# Patient Record
Sex: MALE | Race: BLACK OR AFRICAN AMERICAN | Employment: OTHER | URBAN - METROPOLITAN AREA
[De-identification: names, ages, dates, MRNs, and addresses within clinical notes are randomized per-mention and may not be internally consistent; named-entity substitution may affect disease eponyms.]

---

## 2017-01-12 ENCOUNTER — CLINICAL SUPPORT (OUTPATIENT)
Dept: CARDIOLOGY CLINIC | Age: 77
End: 2017-01-12

## 2017-01-12 DIAGNOSIS — E78.2 MIXED HYPERLIPIDEMIA: ICD-10-CM

## 2017-01-12 DIAGNOSIS — I10 ESSENTIAL HYPERTENSION: ICD-10-CM

## 2017-01-12 DIAGNOSIS — I50.23 ACUTE ON CHRONIC SYSTOLIC HF (HEART FAILURE) (HCC): ICD-10-CM

## 2017-01-12 DIAGNOSIS — Z91.14 NONCOMPLIANCE WITH MEDICATION REGIMEN: ICD-10-CM

## 2017-01-26 ENCOUNTER — OFFICE VISIT (OUTPATIENT)
Dept: CARDIOLOGY CLINIC | Age: 77
End: 2017-01-26

## 2017-01-26 VITALS
WEIGHT: 221 LBS | OXYGEN SATURATION: 98 % | RESPIRATION RATE: 16 BRPM | SYSTOLIC BLOOD PRESSURE: 148 MMHG | HEIGHT: 69 IN | DIASTOLIC BLOOD PRESSURE: 66 MMHG | HEART RATE: 66 BPM | BODY MASS INDEX: 32.73 KG/M2

## 2017-01-26 DIAGNOSIS — I42.8 NICM (NONISCHEMIC CARDIOMYOPATHY) (HCC): Primary | ICD-10-CM

## 2017-01-26 DIAGNOSIS — I50.22 CHRONIC SYSTOLIC CONGESTIVE HEART FAILURE (HCC): ICD-10-CM

## 2017-01-26 DIAGNOSIS — I10 ESSENTIAL HYPERTENSION: ICD-10-CM

## 2017-01-26 DIAGNOSIS — Z91.14 NONCOMPLIANCE WITH MEDICATION REGIMEN: ICD-10-CM

## 2017-01-26 NOTE — PROGRESS NOTES
Chief Complaint   Patient presents with    Results     here for test results-pt denies any cardiac symptoms

## 2017-01-26 NOTE — PROGRESS NOTES
1/26/2017 12:40 PM      Subjective:     Talha Noble is here for f/u visit. After last visit has been taking lasix regularly. Now feels better. No further SOB. Recent Echo unchanged. Visit Vitals    /66 (BP 1 Location: Right arm, BP Patient Position: Sitting)    Pulse 66    Resp 16    Ht 5' 9\" (1.753 m)    Wt 221 lb (100.2 kg)    SpO2 98%    BMI 32.64 kg/m2     Current Outpatient Prescriptions   Medication Sig    furosemide (LASIX) 40 mg tablet TAKE ONE TABLET BY MOUTH DAILY. *DOSE  REDUCED  12/17/15*    albuterol (PROVENTIL HFA, VENTOLIN HFA, PROAIR HFA) 90 mcg/actuation inhaler Take 2 Puffs by inhalation every four (4) hours as needed for Wheezing.  VIAGRA 50 mg tablet     aspirin 81 mg chewable tablet Take 1 Tab by mouth daily.  atorvastatin (LIPITOR) 40 mg tablet Take 40 mg by mouth daily.  carvedilol (COREG) 25 mg tablet Take 25 mg by mouth two (2) times daily (with meals).  enalapril (VASOTEC) 20 mg tablet Take 40 mg by mouth daily. No current facility-administered medications for this visit.           Objective:      Visit Vitals    /66 (BP 1 Location: Right arm, BP Patient Position: Sitting)    Pulse 66    Resp 16    Ht 5' 9\" (1.753 m)    Wt 221 lb (100.2 kg)    SpO2 98%    BMI 32.64 kg/m2       Data Review:     Reviewed and/or ordered active problem list, medication list tests    Past Medical History   Diagnosis Date    Acute on chronic systolic HF (heart failure) ([facility] [address] 2/11/2014     11/15 Echo EF 40-45%    Cancer (HCC)      prostate    COPD (chronic obstructive pulmonary disease) (HCC)     Elevated troponin 2/11/2014    Hypertension     S/P cardiac cath 2/12/2014 2/12/14 no significant coronary disease, severe LV dysfunction      Past Surgical History   Procedure Laterality Date    Colonoscopy,remv lesn,snare  2/22/2016          Colonoscopy,biopsy  2/22/2016           No Known Allergies   Family History   Problem Relation Age of Onset    Adopted: Yes      Social History     Social History    Marital status:      Spouse name: N/A    Number of children: N/A    Years of education: N/A     Occupational History    Not on file. Social History Main Topics    Smoking status: Former Smoker     Packs/day: 2.00     Years: 20.00     Types: Cigarettes     Quit date: 3/10/2016    Smokeless tobacco: Current User    Alcohol use No    Drug use: No    Sexual activity: Not on file     Other Topics Concern    Not on file     Social History Narrative         Review of Systems     General: Not Present- Anorexia, Chills, Dietary Changes, Fatigue, Fever, Medication Changes, Night Sweats, Weight Gain > 10lbs. and Weight Loss > 10lbs. .  Skin: Not Present- Bruising and Excessive Sweating. HEENT: Not Present- Headache, Visual Loss and Vertigo. Respiratory: Not Present- Cough, Decreased Exercise Tolerance, Difficulty Breathing, Snoring and Wheezing. Cardiovascular: Not Present- Abnormal Blood Pressure, Chest Pain, Claudications, Difficulty Breathing On Exertion, Edema, Fainting / Blacking Out, Irregular Heart Beat, Night Cramps, Orthopnea, Palpitations, Paroxysmal Nocturnal Dyspnea, Rapid Heart Rate, Shortness of Breath and Swelling of Extremities. Gastrointestinal: Not Present- Black, Tarry Stool, Bloody Stool, Diarrhea, Hematemesis, Rectal Bleeding and Vomiting. Musculoskeletal: Not Present- Muscle Pain and Muscle Weakness. Neurological: Not Present- Dizziness. Psychiatric: Not Present- Depression. Endocrine: Not Present- Cold Intolerance, Heat Intolerance and Thyroid Problems. Hematology: Not Present- Abnormal Bleeding, Anemia, Blood Clots and Easy Bruising.       Physical Exam   The physical exam findings are as follows:       General   Mental Status - Alert. General Appearance - Not in acute distress.       Chest and Lung Exam   Inspection: Accessory muscles - No use of accessory muscles in breathing. Auscultation:   Breath sounds: - Normal.      Cardiovascular   Inspection: Jugular vein - Bilateral - Inspection Normal.  Palpation/Percussion:   Apical Impulse: - Normal.  Auscultation: Rhythm - Regular. Heart Sounds - S1 WNL and S2 WNL. No S3 or S4. Murmurs & Other Heart Sounds: Auscultation of the heart reveals - No Murmurs. Carotid arteries - No Carotid bruit. Peripheral Vascular   Upper Extremity: Inspection - Bilateral - No Cyanotic nailbeds or Digital clubbing. Lower Extremity:   Palpation: Edema - Bilateral - No edema. Assessment:       ICD-10-CM ICD-9-CM    1. NICM (nonischemic cardiomyopathy) ([facility]) I42.9 425.4    2. Essential hypertension I10 401.9    3. Noncompliance with medication regimen Z91.14 V15.81    4. Chronic systolic congestive heart failure (HCC) I50.22 428.22      428.0     NYHA class 1       Plan:     1. Chronic CHF, NICM: now much better since taking meds. NYHA class 1. Med compliance d/w pt. On Echo LVEF unchanged. Continue current meds. 2. HTN: controlled.

## 2017-01-26 NOTE — MR AVS SNAPSHOT
Visit Information Date & Time Provider Department Dept. Phone Encounter #  
 1/26/2017 12:30 PM Sagar Alvarado, [address] [place] [address] [facility] [phone] [id number] Follow-up Instructions Return in about 3 months (around 4/26/2017). Follow-up and Disposition History Your Appointments 4/27/2017  9:15 AM  
3 MONTH with Sagar Alvarado MD  
[facility] [address] [facility]) Appt Note: . [address] [facility]  
[phone] [address] [facility] Upcoming Health Maintenance Date Due DTaP/Tdap/Td series (1 - Tdap) 4/9/1963 FOBT Q 1 YEAR AGE 50-75 4/9/1992 ZOSTER VACCINE AGE 60> 4/9/2002 GLAUCOMA SCREENING Q2Y 4/9/2007 MEDICARE YEARLY EXAM 4/9/2007 INFLUENZA AGE 9 TO ADULT 8/1/2016 Pneumococcal 65+ High/Highest Risk (2 of 2 - PCV13) 11/30/2016 Allergies as of 1/26/2017  Review Complete On: 1/26/2017 By: Sagar Alvarado MD  
 No Known Allergies Current Immunizations  Reviewed on 11/28/2015 Name Date Influenza Vaccine (Quad) PF 11/30/2015 10:45 AM  
 Pneumococcal Polysaccharide (PPSV-23) 11/30/2015 10:01 AM  
  
 Not reviewed this visit You Were Diagnosed With   
  
 Codes Comments NICM (nonischemic cardiomyopathy) ([facility] [address]    -  Primary ICD-10-CM: I42.9 ICD-9-CM: 425.4 Essential hypertension     ICD-10-CM: I10 
ICD-9-CM: 401.9 Noncompliance with medication regimen     ICD-10-CM: Z91.14 
ICD-9-CM: V15.81 Chronic systolic congestive heart failure (HCC)     ICD-10-CM: I50.22 ICD-9-CM: 428.22, 428.0 NYHA class 1 Vitals BP Pulse Resp Height(growth percentile) Weight(growth percentile) SpO2  
 148/66 (BP 1 Location: Right arm, BP Patient Position: Sitting) 66 16 5' 9\" (1.753 m) 221 lb (100.2 kg) 98% BMI Smoking Status 32.64 kg/m2 Former Smoker Vitals History BMI and BSA Data Body Mass Index Body Surface Area  
 32.64 kg/m 2 2.21 m 2 Preferred Pharmacy Pharmacy Name Phone [facility] [address] [facility], [facility] [address] [facility] [address] [facility] [phone] Your Updated Medication List  
  
   
This list is accurate as of: 1/26/17 12:47 PM.  Always use your most recent med list.  
  
  
  
  
 albuterol 90 mcg/actuation inhaler Commonly known as:  PROVENTIL HFA, VENTOLIN HFA, PROAIR HFA Take 2 Puffs by inhalation every four (4) hours as needed for Wheezing. aspirin 81 mg chewable tablet Take 1 Tab by mouth daily. atorvastatin 40 mg tablet Commonly known as:  LIPITOR Take 40 mg by mouth daily. carvedilol 25 mg tablet Commonly known as:  Mar Kerrick Take 25 mg by mouth two (2) times daily (with meals). enalapril 20 mg tablet Commonly known as:  Sherren Bookbinder Take 40 mg by mouth daily. furosemide 40 mg tablet Commonly known as:  LASIX TAKE ONE TABLET BY MOUTH DAILY. *DOSE  REDUCED  12/17/15* VIAGRA 50 mg tablet Generic drug:  sildenafil citrate Follow-up Instructions Return in about 3 months (around 4/26/2017). Introducing [facility]! Felisha Avnedano introduces [employer] patient portal. Now you can access parts of your medical record, email your doctor's office, and request medication refills online. 1. In your internet browser, go to [email or web address] 2. Click on the First Time User? Click Here link in the Sign In box. You will see the New Member Sign Up page. 3. Enter your [employer] Access Code exactly as it appears below. You will not need to use this code after youve completed the sign-up process. If you do not sign up before the expiration date, you must request a new code. · [employer] Access Code: [email or web address] Expires: 4/12/2017 10:09 AM 
 
4.  Enter the last four digits of your Social Security Number (xxxx) and Date of Birth (mm/dd/yyyy) as indicated and click Submit. You will be taken to the next sign-up page. 5. Create a [employer] ID. This will be your [employer] login ID and cannot be changed, so think of one that is secure and easy to remember. 6. Create a [employer] password. You can change your password at any time. 7. Enter your Password Reset Question and Answer. This can be used at a later time if you forget your password. 8. Enter your e-mail address. You will receive e-mail notification when new information is available in [address] 9. Click Sign Up. You can now view and download portions of your medical record. 10. Click the Download Summary menu link to download a portable copy of your medical information. If you have questions, please visit the Frequently Asked Questions section of the [employer] website. Remember, [employer] is NOT to be used for urgent needs. For medical emergencies, dial 911. Now available from your iPhone and Android! Please provide this summary of care documentation to your next provider. Your primary care clinician is listed as John Minaya. If you have any questions after today's visit, please call [phone].

## 2017-04-09 ENCOUNTER — APPOINTMENT (OUTPATIENT)
Dept: CT IMAGING | Age: 77
End: 2017-04-09
Attending: EMERGENCY MEDICINE
Payer: MEDICARE

## 2017-04-09 ENCOUNTER — HOSPITAL ENCOUNTER (EMERGENCY)
Age: 77
Discharge: HOME | End: 2017-04-09
Attending: EMERGENCY MEDICINE
Payer: MEDICARE

## 2017-04-09 VITALS
TEMPERATURE: 97.4 F | WEIGHT: 220.24 LBS | HEIGHT: 69 IN | DIASTOLIC BLOOD PRESSURE: 59 MMHG | RESPIRATION RATE: 16 BRPM | SYSTOLIC BLOOD PRESSURE: 153 MMHG | BODY MASS INDEX: 32.62 KG/M2 | OXYGEN SATURATION: 93 % | HEART RATE: 52 BPM

## 2017-04-09 DIAGNOSIS — E86.0 DEHYDRATION: ICD-10-CM

## 2017-04-09 DIAGNOSIS — N30.00 ACUTE CYSTITIS WITHOUT HEMATURIA: Primary | ICD-10-CM

## 2017-04-09 LAB
ALBUMIN SERPL BCP-MCNC: 3.4 G/DL (ref 3.5–5)
ALBUMIN/GLOB SERPL: 0.8 {RATIO} (ref 1.1–2.2)
ALP SERPL-CCNC: 86 U/L (ref 45–117)
ALT SERPL-CCNC: 23 U/L (ref 12–78)
ANION GAP BLD CALC-SCNC: 10 MMOL/L (ref 5–15)
APPEARANCE UR: ABNORMAL
AST SERPL W P-5'-P-CCNC: 18 U/L (ref 15–37)
BACTERIA URNS QL MICRO: NEGATIVE /HPF
BASOPHILS # BLD AUTO: 0 K/UL (ref 0–0.1)
BASOPHILS # BLD: 0 % (ref 0–1)
BILIRUB SERPL-MCNC: 0.6 MG/DL (ref 0.2–1)
BILIRUB UR QL CFM: NEGATIVE
BUN SERPL-MCNC: 24 MG/DL (ref 6–20)
BUN/CREAT SERPL: 17 (ref 12–20)
CALCIUM SERPL-MCNC: 9 MG/DL (ref 8.5–10.1)
CHLORIDE SERPL-SCNC: 108 MMOL/L (ref 97–108)
CO2 SERPL-SCNC: 25 MMOL/L (ref 21–32)
COLOR UR: ABNORMAL
CREAT SERPL-MCNC: 1.42 MG/DL (ref 0.7–1.3)
DIFFERENTIAL METHOD BLD: ABNORMAL
EOSINOPHIL # BLD: 0.2 K/UL (ref 0–0.4)
EOSINOPHIL NFR BLD: 3 % (ref 0–7)
EPITH CASTS URNS QL MICRO: ABNORMAL /LPF
ERYTHROCYTE [DISTWIDTH] IN BLOOD BY AUTOMATED COUNT: 14.4 % (ref 11.5–14.5)
GLOBULIN SER CALC-MCNC: 4.3 G/DL (ref 2–4)
GLUCOSE SERPL-MCNC: 107 MG/DL (ref 65–100)
GLUCOSE UR STRIP.AUTO-MCNC: NEGATIVE MG/DL
HCT VFR BLD AUTO: 36.8 % (ref 36.6–50.3)
HGB BLD-MCNC: 11.6 G/DL (ref 12.1–17)
HGB UR QL STRIP: NEGATIVE
HYALINE CASTS URNS QL MICRO: ABNORMAL /LPF (ref 0–5)
KETONES UR QL STRIP.AUTO: NEGATIVE MG/DL
LEUKOCYTE ESTERASE UR QL STRIP.AUTO: ABNORMAL
LYMPHOCYTES # BLD AUTO: 9 % (ref 12–49)
LYMPHOCYTES # BLD: 0.5 K/UL (ref 0.8–3.5)
MCH RBC QN AUTO: 25.7 PG (ref 26–34)
MCHC RBC AUTO-ENTMCNC: 31.5 G/DL (ref 30–36.5)
MCV RBC AUTO: 81.4 FL (ref 80–99)
MONOCYTES # BLD: 0.2 K/UL (ref 0–1)
MONOCYTES NFR BLD AUTO: 4 % (ref 5–13)
NEUTS SEG # BLD: 5.2 K/UL (ref 1.8–8)
NEUTS SEG NFR BLD AUTO: 84 % (ref 32–75)
NITRITE UR QL STRIP.AUTO: NEGATIVE
PH UR STRIP: 5 [PH] (ref 5–8)
PLATELET # BLD AUTO: 109 K/UL (ref 150–400)
POTASSIUM SERPL-SCNC: 3.8 MMOL/L (ref 3.5–5.1)
PROT SERPL-MCNC: 7.7 G/DL (ref 6.4–8.2)
PROT UR STRIP-MCNC: NEGATIVE MG/DL
RBC # BLD AUTO: 4.52 M/UL (ref 4.1–5.7)
RBC #/AREA URNS HPF: ABNORMAL /HPF (ref 0–5)
RBC MORPH BLD: ABNORMAL
SODIUM SERPL-SCNC: 143 MMOL/L (ref 136–145)
SP GR UR REFRACTOMETRY: 1.03 (ref 1–1.03)
UA: UC IF INDICATED,UAUC: ABNORMAL
UROBILINOGEN UR QL STRIP.AUTO: 1 EU/DL (ref 0.2–1)
WBC # BLD AUTO: 6.1 K/UL (ref 4.1–11.1)
WBC URNS QL MICRO: ABNORMAL /HPF (ref 0–4)

## 2017-04-09 PROCEDURE — 36415 COLL VENOUS BLD VENIPUNCTURE: CPT | Performed by: EMERGENCY MEDICINE

## 2017-04-09 PROCEDURE — 96372 THER/PROPH/DIAG INJ SC/IM: CPT

## 2017-04-09 PROCEDURE — 99283 EMERGENCY DEPT VISIT LOW MDM: CPT

## 2017-04-09 PROCEDURE — 85025 COMPLETE CBC W/AUTO DIFF WBC: CPT | Performed by: EMERGENCY MEDICINE

## 2017-04-09 PROCEDURE — 74176 CT ABD & PELVIS W/O CONTRAST: CPT

## 2017-04-09 PROCEDURE — 87086 URINE CULTURE/COLONY COUNT: CPT | Performed by: EMERGENCY MEDICINE

## 2017-04-09 PROCEDURE — 81001 URINALYSIS AUTO W/SCOPE: CPT | Performed by: EMERGENCY MEDICINE

## 2017-04-09 PROCEDURE — 74011000250 HC RX REV CODE- 250: Performed by: EMERGENCY MEDICINE

## 2017-04-09 PROCEDURE — 74011250636 HC RX REV CODE- 250/636: Performed by: EMERGENCY MEDICINE

## 2017-04-09 PROCEDURE — 80053 COMPREHEN METABOLIC PANEL: CPT | Performed by: EMERGENCY MEDICINE

## 2017-04-09 RX ORDER — HYDROCODONE BITARTRATE AND ACETAMINOPHEN 5; 325 MG/1; MG/1
1 TABLET ORAL
Qty: 15 TAB | Refills: 0 | Status: SHIPPED | OUTPATIENT
Start: 2017-04-09 | End: 2017-05-23

## 2017-04-09 RX ORDER — CEFUROXIME AXETIL 500 MG/1
500 TABLET ORAL 2 TIMES DAILY
Qty: 14 TAB | Refills: 0 | Status: SHIPPED | OUTPATIENT
Start: 2017-04-09 | End: 2017-04-16

## 2017-04-09 RX ADMIN — LIDOCAINE HYDROCHLORIDE 1 G: 10 INJECTION, SOLUTION EPIDURAL; INFILTRATION; INTRACAUDAL; PERINEURAL at 09:06

## 2017-04-09 NOTE — ED NOTES
MD at bedside to discharge patient.  Patient in understanding, patient wheeled out to the waiting room

## 2017-04-09 NOTE — ED PROVIDER NOTES
HPI Comments: Dallas Rios is a 78 y.o. male with PMHx of HTN, COPD, and prostate cancer presenting ambulatory to [address] [facility] c/o back pain since Friday, and difficulty urinating since yesterday. Pt notes that his back pain radiates down his right leg, and that the pain is exacerbated by movement. He reports that he has been having decreased urinary output since last night. Per wife, pt has prostate cancer and has had treatment by urology. Last week, pt was on a fluid pill and did not have any issues with urination. Pt thinks that he may have a bladder infection. He denies fever, nausea, vomiting, or abdominal pain. PCP: Aaron Rojas MD  Urology: Dr. John Meza    There are no other complaints, changes, or physical findings at this time. The history is provided by the patient and the spouse. No  was used. Past Medical History:   Diagnosis Date    Acute on chronic systolic HF (heart failure) ([facility] [address] 2/11/2014    11/15 Echo EF 40-45%    Cancer (HCC)     prostate    COPD (chronic obstructive pulmonary disease) ([facility] [address]     Elevated troponin 2/11/2014    Hypertension     S/P cardiac cath 2/12/2014 2/12/14 no significant coronary disease, severe LV dysfunction       Past Surgical History:   Procedure Laterality Date    COLONOSCOPY,DIAGNOSTIC  2/22/2016        [facility]  2/22/2016              Family History:   Problem Relation Age of Onset    Adopted: Yes       Social History     Social History    Marital status:      Spouse name: N/A    Number of children: N/A    Years of education: N/A     Occupational History    Not on file.      Social History Main Topics    Smoking status: Current Every Day Smoker     Packs/day: 2.00     Years: 20.00     Types: Cigarettes    Smokeless tobacco: Current User    Alcohol use No    Drug use: No    Sexual activity: Not on file     Other Topics Concern    Not on file     Social History Narrative ALLERGIES: Review of patient's allergies indicates no known allergies. Review of Systems   Constitutional: Negative for activity change, chills and fever. HENT: Negative for congestion and sore throat. Eyes: Negative for pain and redness. Respiratory: Negative for cough, chest tightness and shortness of breath. Cardiovascular: Negative for chest pain and palpitations. Gastrointestinal: Negative for abdominal pain, diarrhea, nausea and vomiting. Genitourinary: Positive for decreased urine volume and difficulty urinating. Negative for dysuria, frequency and urgency. Musculoskeletal: Positive for back pain. Negative for neck pain. Skin: Negative for rash. Neurological: Negative for syncope, light-headedness and headaches. Psychiatric/Behavioral: Negative for confusion. All other systems reviewed and are negative. Patient Vitals for the past 12 hrs:   Temp Pulse Resp BP SpO2   04/09/17 0651 97.4 °F (36.3 °C) (!) 52 16 153/59 93 %            Physical Exam   Constitutional: He is oriented to person, place, and time. He appears well-developed and well-nourished. No distress. HENT:   Head: Normocephalic. Nose: Nose normal.   Mouth/Throat: Oropharynx is clear and moist. No oropharyngeal exudate. Eyes: Conjunctivae are normal. Pupils are equal, round, and reactive to light. No scleral icterus. Neck: Normal range of motion. Neck supple. No JVD present. No tracheal deviation present. No thyromegaly present. Cardiovascular: Normal rate, regular rhythm and intact distal pulses. Exam reveals no gallop and no friction rub. No murmur heard. Pulmonary/Chest: Effort normal and breath sounds normal. No stridor. No respiratory distress. He has no wheezes. He has no rales. Abdominal: Soft. Bowel sounds are normal. He exhibits no distension. There is no tenderness. There is no rebound and no guarding. No CVAT   Musculoskeletal: Normal range of motion. He exhibits no edema.    Mild tenderness of the right lower lumbar paraspinal muscles  No midline vertebral tenderness   Positive SLR on the right, negative on the left  Full AROM of bilateral LE's  Toes up/down   Lymphadenopathy:     He has no cervical adenopathy. Neurological: He is alert and oriented to person, place, and time. No cranial nerve deficit. He exhibits normal muscle tone. Coordination normal.   Skin: Skin is warm and dry. No rash noted. He is not diaphoretic. No erythema. Psychiatric: He has a normal mood and affect. His behavior is normal.   Nursing note and vitals reviewed.        MDM  Number of Diagnoses or Management Options  Acute cystitis without hematuria:   Dehydration:   Diagnosis management comments: DDx: sciatica, UTI, metabolic abnormalities       Amount and/or Complexity of Data Reviewed  Clinical lab tests: ordered and reviewed  Tests in the radiology section of CPT®: ordered and reviewed  Obtain history from someone other than the patient: yes (Wife)  Review and summarize past medical records: yes    Risk of Complications, Morbidity, and/or Mortality  General comments: Pt well appearing; afebrile; +uti with 20-50 wbc; gave im ceftriaxone here; dc with ceftin; no increased wbc; cr only  mildly increased from baseline; ct abd/pelvis without acute findings; dc home; Pratima Hoyos MD      Patient Progress  Patient progress: stable    ED Course       Procedures    LABORATORY TESTS:  Recent Results (from the past 12 hour(s))   URINALYSIS W/ REFLEX CULTURE    Collection Time: 04/09/17  7:36 AM   Result Value Ref Range    Color YELLOW/STRAW      Appearance CLOUDY (A) CLEAR      Specific gravity 1.029 1.003 - 1.030      pH (UA) 5.0 5.0 - 8.0      Protein NEGATIVE  NEG mg/dL    Glucose NEGATIVE  NEG mg/dL    Ketone NEGATIVE  NEG mg/dL    Blood NEGATIVE  NEG      Urobilinogen 1.0 0.2 - 1.0 EU/dL    Nitrites NEGATIVE  NEG      Leukocyte Esterase SMALL (A) NEG      WBC 20-50 0 - 4 /hpf    RBC 0-5 0 - 5 /hpf    Epithelial cells FEW FEW /lpf    Bacteria NEGATIVE  NEG /hpf    UA:UC IF INDICATED URINE CULTURE ORDERED (A) CNI      Hyaline cast 5-10 0 - 5 /lpf   BILIRUBIN, CONFIRM    Collection Time: 04/09/17  7:36 AM   Result Value Ref Range    Bilirubin UA, confirm NEGATIVE  NEG     METABOLIC PANEL, COMPREHENSIVE    Collection Time: 04/09/17  8:07 AM   Result Value Ref Range    Sodium 143 136 - 145 mmol/L    Potassium 3.8 3.5 - 5.1 mmol/L    Chloride 108 97 - 108 mmol/L    CO2 25 21 - 32 mmol/L    Anion gap 10 5 - 15 mmol/L    Glucose 107 (H) 65 - 100 mg/dL    BUN 24 (H) 6 - 20 MG/DL    Creatinine 1.42 (H) 0.70 - 1.30 MG/DL    BUN/Creatinine ratio 17 12 - 20      GFR est AA 59 (L) >60 ml/min/1.73m2    GFR est non-AA 49 (L) >60 ml/min/1.73m2    Calcium 9.0 8.5 - 10.1 MG/DL    Bilirubin, total 0.6 0.2 - 1.0 MG/DL    ALT (SGPT) 23 12 - 78 U/L    AST (SGOT) 18 15 - 37 U/L    Alk. phosphatase 86 45 - 117 U/L    Protein, total 7.7 6.4 - 8.2 g/dL    Albumin 3.4 (L) 3.5 - 5.0 g/dL    Globulin 4.3 (H) 2.0 - 4.0 g/dL    A-G Ratio 0.8 (L) 1.1 - 2.2     CBC WITH AUTOMATED DIFF    Collection Time: 04/09/17  8:07 AM   Result Value Ref Range    WBC 6.1 4.1 - 11.1 K/uL    RBC 4.52 4.10 - 5.70 M/uL    HGB 11.6 (L) 12.1 - 17.0 g/dL    HCT 36.8 36.6 - 50.3 %    MCV 81.4 80.0 - 99.0 FL    MCH 25.7 (L) 26.0 - 34.0 PG    MCHC 31.5 30.0 - 36.5 g/dL    RDW 14.4 11.5 - 14.5 %    PLATELET 412 (L) 332 - 400 K/uL    NEUTROPHILS 84 (H) 32 - 75 %    LYMPHOCYTES 9 (L) 12 - 49 %    MONOCYTES 4 (L) 5 - 13 %    EOSINOPHILS 3 0 - 7 %    BASOPHILS 0 0 - 1 %    ABS. NEUTROPHILS 5.2 1.8 - 8.0 K/UL    ABS. LYMPHOCYTES 0.5 (L) 0.8 - 3.5 K/UL    ABS. MONOCYTES 0.2 0.0 - 1.0 K/UL    ABS. EOSINOPHILS 0.2 0.0 - 0.4 K/UL    ABS. BASOPHILS 0.0 0.0 - 0.1 K/UL    RBC COMMENTS NORMOCYTIC, NORMOCHROMIC      DF SMEAR SCANNED         IMAGING RESULTS:    INDICATION: right flank pain     COMPARISON: February 2016      the heart is enlarged.  Lung bases are otherwise unremarkable for acute findings. Liver and spleen appear unremarkable. The gallbladder and pancreas appear  normal. There is no renal obstruction or calcification. Large cyst on the left  is unchanged. There is no free air or free fluid. There is no bowel wall  thickening or obstruction, the appendix appears normal. The bladder is midline  and unfilled.     IMPRESSION  impression: No acute findings.     TECHNIQUE:   Thin axial images were obtained through the abdomen and pelvis. Coronal and  sagittal reconstructions were generated. Oral contrast was not administered. CT  dose reduction was achieved through use of a standardized protocol tailored for  this examination and automatic exposure control for dose modulation.      The absence of intravenous contrast material reduces the sensitivity for  evaluation of the solid parenchymal organs of the abdomen. MEDICATIONS GIVEN:  Medications   cefTRIAXone (ROCEPHIN) 1 g in lidocaine (PF) (XYLOCAINE) 10 mg/mL (1 %) IM injection (not administered)       IMPRESSION:  1. Acute cystitis without hematuria    2. Dehydration        PLAN:  1. Current Discharge Medication List      START taking these medications    Details   cefUROXime (CEFTIN) 500 mg tablet Take 1 Tab by mouth two (2) times a day for 7 days. Qty: 14 Tab, Refills: 0      HYDROcodone-acetaminophen (NORCO) 5-325 mg per tablet Take 1 Tab by mouth every four (4) hours as needed for Pain. Max Daily Amount: 6 Tabs. Qty: 15 Tab, Refills: 0           2. Follow-up Information     Follow up With Details Comments Contact Info    Annalise Santos MD Schedule an appointment as soon as possible for a visit in 2 days  [facility]  [phone]          Return to ED if worse     DISCHARGE NOTE:  8:39 AM  The patient is ready for discharge. The patient's signs, symptoms, diagnosis, and discharge instructions have been discussed and the patient has conveyed their understanding.  The patient is to follow up as recommended or return to the ER should their symptoms worsen. Plan has been discussed and the patient is in agreement. This note is prepared by Heydi Camarillo, acting as Scribe for MD Frankie Rivas MD: The scribe's documentation has been prepared under my direction and personally reviewed by me in its entirety. I confirm that the note above accurately reflects all work, treatment, procedures, and medical decision making performed by me.

## 2017-04-09 NOTE — DISCHARGE INSTRUCTIONS

## 2017-04-10 LAB
BACTERIA SPEC CULT: NORMAL
CC UR VC: NORMAL
SERVICE CMNT-IMP: NORMAL

## 2017-04-12 ENCOUNTER — PATIENT OUTREACH (OUTPATIENT)
Dept: CARDIOLOGY CLINIC | Age: 77
End: 2017-04-12

## 2017-04-12 NOTE — PROGRESS NOTES
This note will not be viewable in [address] Called pt to follow up on ED visit to [facility] on 17 with a diagnosis of hematuria. Pt verified  and reports that he is still having back pain. Pt advised to call PCP to schedule follow up. Pt encouraged to complete ABx course and to follow up with PCP. Pt reports that he has not called PCP yet. Pt encouraged to call PCP today. Performed medication reconciliation with pt and pt has medications. Reviewed discharge instructions with pt and pt states that he will call PCP. Will continue to follow.

## 2017-05-14 ENCOUNTER — HOSPITAL ENCOUNTER (INPATIENT)
Age: 77
LOS: 9 days | Discharge: HOME HEALTH | DRG: 871 | End: 2017-05-23
Attending: EMERGENCY MEDICINE | Admitting: INTERNAL MEDICINE
Payer: MEDICARE

## 2017-05-14 ENCOUNTER — APPOINTMENT (OUTPATIENT)
Dept: GENERAL RADIOLOGY | Age: 77
DRG: 871 | End: 2017-05-14
Attending: EMERGENCY MEDICINE
Payer: MEDICARE

## 2017-05-14 DIAGNOSIS — N39.0 URINARY TRACT INFECTION WITHOUT HEMATURIA, SITE UNSPECIFIED: ICD-10-CM

## 2017-05-14 DIAGNOSIS — I50.23 ACUTE ON CHRONIC SYSTOLIC CONGESTIVE HEART FAILURE (HCC): Primary | ICD-10-CM

## 2017-05-14 PROBLEM — J96.00 ACUTE RESPIRATORY FAILURE (HCC): Status: ACTIVE | Noted: 2017-05-14

## 2017-05-14 LAB
ALBUMIN SERPL BCP-MCNC: 3.1 G/DL (ref 3.5–5)
ALBUMIN/GLOB SERPL: 0.7 {RATIO} (ref 1.1–2.2)
ALP SERPL-CCNC: 114 U/L (ref 45–117)
ALT SERPL-CCNC: 28 U/L (ref 12–78)
ANION GAP BLD CALC-SCNC: 9 MMOL/L (ref 5–15)
APPEARANCE UR: ABNORMAL
AST SERPL W P-5'-P-CCNC: 30 U/L (ref 15–37)
BACTERIA URNS QL MICRO: ABNORMAL /HPF
BASOPHILS # BLD AUTO: 0 K/UL
BASOPHILS # BLD: 0 %
BILIRUB SERPL-MCNC: 1.3 MG/DL (ref 0.2–1)
BILIRUB UR QL CFM: NEGATIVE
BNP SERPL-MCNC: 5713 PG/ML (ref 0–450)
BUN SERPL-MCNC: 23 MG/DL (ref 6–20)
BUN/CREAT SERPL: 13 (ref 12–20)
CALCIUM SERPL-MCNC: 8.7 MG/DL (ref 8.5–10.1)
CHLORIDE SERPL-SCNC: 106 MMOL/L (ref 97–108)
CK SERPL-CCNC: 105 U/L (ref 39–308)
CO2 SERPL-SCNC: 23 MMOL/L (ref 21–32)
COLOR UR: ABNORMAL
CREAT SERPL-MCNC: 1.74 MG/DL (ref 0.7–1.3)
DIFFERENTIAL METHOD BLD: ABNORMAL
EOSINOPHIL # BLD: 0.1 K/UL
EOSINOPHIL NFR BLD: 1 %
EPITH CASTS URNS QL MICRO: ABNORMAL /LPF
ERYTHROCYTE [DISTWIDTH] IN BLOOD BY AUTOMATED COUNT: 15 % (ref 11.5–14.5)
GLOBULIN SER CALC-MCNC: 4.6 G/DL (ref 2–4)
GLUCOSE SERPL-MCNC: 172 MG/DL (ref 65–100)
GLUCOSE UR STRIP.AUTO-MCNC: NEGATIVE MG/DL
HCT VFR BLD AUTO: 36.5 % (ref 36.6–50.3)
HGB BLD-MCNC: 11.9 G/DL (ref 12.1–17)
HGB UR QL STRIP: NEGATIVE
KETONES UR QL STRIP.AUTO: ABNORMAL MG/DL
LACTATE SERPL-SCNC: 1.9 MMOL/L (ref 0.4–2)
LEUKOCYTE ESTERASE UR QL STRIP.AUTO: ABNORMAL
LYMPHOCYTES # BLD AUTO: 1 %
LYMPHOCYTES # BLD: 0.1 K/UL
MCH RBC QN AUTO: 26.4 PG (ref 26–34)
MCHC RBC AUTO-ENTMCNC: 32.6 G/DL (ref 30–36.5)
MCV RBC AUTO: 81.1 FL (ref 80–99)
MONOCYTES # BLD: 0 K/UL
MONOCYTES NFR BLD AUTO: 0 %
NEUTS BAND NFR BLD MANUAL: 13 %
NEUTS SEG # BLD: 5.8 K/UL
NEUTS SEG NFR BLD AUTO: 85 %
NITRITE UR QL STRIP.AUTO: NEGATIVE
PH UR STRIP: 5 [PH] (ref 5–8)
PLATELET # BLD AUTO: 52 K/UL (ref 150–400)
PLATELET COMMENTS,PCOM: ABNORMAL
POTASSIUM SERPL-SCNC: 3.5 MMOL/L (ref 3.5–5.1)
PROT SERPL-MCNC: 7.7 G/DL (ref 6.4–8.2)
PROT UR STRIP-MCNC: 30 MG/DL
RBC # BLD AUTO: 4.5 M/UL (ref 4.1–5.7)
RBC #/AREA URNS HPF: ABNORMAL /HPF (ref 0–5)
RBC MORPH BLD: ABNORMAL
RBC MORPH BLD: ABNORMAL
SODIUM SERPL-SCNC: 138 MMOL/L (ref 136–145)
SP GR UR REFRACTOMETRY: 1.02 (ref 1–1.03)
TROPONIN I SERPL-MCNC: <0.04 NG/ML
UA: UC IF INDICATED,UAUC: ABNORMAL
UROBILINOGEN UR QL STRIP.AUTO: 2 EU/DL (ref 0.2–1)
WBC # BLD AUTO: 6 K/UL (ref 4.1–11.1)
WBC URNS QL MICRO: ABNORMAL /HPF (ref 0–4)

## 2017-05-14 PROCEDURE — 65270000029 HC RM PRIVATE

## 2017-05-14 PROCEDURE — 87040 BLOOD CULTURE FOR BACTERIA: CPT | Performed by: EMERGENCY MEDICINE

## 2017-05-14 PROCEDURE — 99285 EMERGENCY DEPT VISIT HI MDM: CPT

## 2017-05-14 PROCEDURE — 84484 ASSAY OF TROPONIN QUANT: CPT | Performed by: EMERGENCY MEDICINE

## 2017-05-14 PROCEDURE — 74011250636 HC RX REV CODE- 250/636: Performed by: EMERGENCY MEDICINE

## 2017-05-14 PROCEDURE — 36415 COLL VENOUS BLD VENIPUNCTURE: CPT | Performed by: EMERGENCY MEDICINE

## 2017-05-14 PROCEDURE — 82550 ASSAY OF CK (CPK): CPT | Performed by: EMERGENCY MEDICINE

## 2017-05-14 PROCEDURE — 94761 N-INVAS EAR/PLS OXIMETRY MLT: CPT

## 2017-05-14 PROCEDURE — 74011000258 HC RX REV CODE- 258: Performed by: EMERGENCY MEDICINE

## 2017-05-14 PROCEDURE — 80053 COMPREHEN METABOLIC PANEL: CPT | Performed by: EMERGENCY MEDICINE

## 2017-05-14 PROCEDURE — 83880 ASSAY OF NATRIURETIC PEPTIDE: CPT | Performed by: EMERGENCY MEDICINE

## 2017-05-14 PROCEDURE — 93005 ELECTROCARDIOGRAM TRACING: CPT

## 2017-05-14 PROCEDURE — 83605 ASSAY OF LACTIC ACID: CPT | Performed by: EMERGENCY MEDICINE

## 2017-05-14 PROCEDURE — 94640 AIRWAY INHALATION TREATMENT: CPT

## 2017-05-14 PROCEDURE — 71020 XR CHEST PA LAT: CPT

## 2017-05-14 PROCEDURE — 87086 URINE CULTURE/COLONY COUNT: CPT | Performed by: EMERGENCY MEDICINE

## 2017-05-14 PROCEDURE — 96360 HYDRATION IV INFUSION INIT: CPT

## 2017-05-14 PROCEDURE — 85025 COMPLETE CBC W/AUTO DIFF WBC: CPT | Performed by: EMERGENCY MEDICINE

## 2017-05-14 PROCEDURE — 81001 URINALYSIS AUTO W/SCOPE: CPT | Performed by: EMERGENCY MEDICINE

## 2017-05-14 PROCEDURE — 74011250636 HC RX REV CODE- 250/636: Performed by: INTERNAL MEDICINE

## 2017-05-14 RX ORDER — SODIUM CHLORIDE 0.9 % (FLUSH) 0.9 %
5-10 SYRINGE (ML) INJECTION AS NEEDED
Status: DISCONTINUED | OUTPATIENT
Start: 2017-05-14 | End: 2017-05-23 | Stop reason: HOSPADM

## 2017-05-14 RX ORDER — ACETAMINOPHEN 325 MG/1
650 TABLET ORAL
Status: DISCONTINUED | OUTPATIENT
Start: 2017-05-14 | End: 2017-05-23 | Stop reason: HOSPADM

## 2017-05-14 RX ORDER — FUROSEMIDE 10 MG/ML
80 INJECTION INTRAMUSCULAR; INTRAVENOUS
Status: DISCONTINUED | OUTPATIENT
Start: 2017-05-14 | End: 2017-05-14

## 2017-05-14 RX ORDER — CARVEDILOL 12.5 MG/1
25 TABLET ORAL 2 TIMES DAILY WITH MEALS
Status: DISCONTINUED | OUTPATIENT
Start: 2017-05-15 | End: 2017-05-23 | Stop reason: HOSPADM

## 2017-05-14 RX ORDER — ATORVASTATIN CALCIUM 40 MG/1
40 TABLET, FILM COATED ORAL DAILY
Status: DISCONTINUED | OUTPATIENT
Start: 2017-05-15 | End: 2017-05-23 | Stop reason: HOSPADM

## 2017-05-14 RX ORDER — ENALAPRIL MALEATE 5 MG/1
40 TABLET ORAL DAILY
Status: DISCONTINUED | OUTPATIENT
Start: 2017-05-15 | End: 2017-05-15

## 2017-05-14 RX ORDER — HYDROCODONE BITARTRATE AND ACETAMINOPHEN 5; 325 MG/1; MG/1
1 TABLET ORAL
Status: DISCONTINUED | OUTPATIENT
Start: 2017-05-14 | End: 2017-05-23 | Stop reason: HOSPADM

## 2017-05-14 RX ORDER — ALBUTEROL SULFATE 90 UG/1
2 AEROSOL, METERED RESPIRATORY (INHALATION)
Status: DISCONTINUED | OUTPATIENT
Start: 2017-05-14 | End: 2017-05-23 | Stop reason: HOSPADM

## 2017-05-14 RX ORDER — SODIUM CHLORIDE 0.9 % (FLUSH) 0.9 %
5-10 SYRINGE (ML) INJECTION EVERY 8 HOURS
Status: DISCONTINUED | OUTPATIENT
Start: 2017-05-14 | End: 2017-05-23 | Stop reason: HOSPADM

## 2017-05-14 RX ORDER — GUAIFENESIN 100 MG/5ML
81 LIQUID (ML) ORAL DAILY
Status: DISCONTINUED | OUTPATIENT
Start: 2017-05-15 | End: 2017-05-23 | Stop reason: HOSPADM

## 2017-05-14 RX ORDER — ENOXAPARIN SODIUM 100 MG/ML
40 INJECTION SUBCUTANEOUS EVERY 24 HOURS
Status: DISCONTINUED | OUTPATIENT
Start: 2017-05-14 | End: 2017-05-16

## 2017-05-14 RX ADMIN — SODIUM CHLORIDE 500 ML: 900 INJECTION, SOLUTION INTRAVENOUS at 22:30

## 2017-05-14 RX ADMIN — Medication 10 ML: at 23:59

## 2017-05-14 RX ADMIN — ENOXAPARIN SODIUM 40 MG: 40 INJECTION SUBCUTANEOUS at 23:53

## 2017-05-14 RX ADMIN — CEFTRIAXONE 1 G: 1 INJECTION, POWDER, FOR SOLUTION INTRAMUSCULAR; INTRAVENOUS at 23:53

## 2017-05-14 NOTE — IP AVS SNAPSHOT
[address] [facility] 
[phone] Patient: Buddy Jung MRN: [id number] FCU:7[id number] You are allergic to the following No active allergies Recent Documentation Height Weight BMI Smoking Status 1.676 m 96.8 kg 34.44 kg/m2 Current Every Day Smoker Emergency Contacts Name Discharge Info Relation Home Work Mobile Paty Wayne  Spouse [3] [phone] Ching Grant  Child [2] [phone] About your hospitalization You were admitted on:  May 14, 2017 You last received care in the:  [facility] 2 GENERAL SURGERY You were discharged on:  May 23, 2017 Unit phone number:  [phone] Why you were hospitalized Your primary diagnosis was:  Not on File Your diagnoses also included:  Acute Respiratory Failure (Hcc) Providers Seen During Your Hospitalizations Provider Role Specialty Primary office phone Adriana Calhoun DO Attending Provider Emergency Medicine [phone] Callie Magdaleno MD Attending Provider Internal Medicine [phone] Armen Weathers MD Attending Provider Hospitalist [phone] Ela Conway MD Attending Provider Internal Medicine [phone] Your Primary Care Physician (PCP) Primary Care Physician Office Phone Office Fax Yessenia Norma [phone] Follow-up Information Follow up With Details Comments Contact Info Jenni Gresham MD On 5/26/2017 2:40 pm  1 [address] [address] [phone] [facility] On 5/23/2017 This is your home health provider of choice. If you do not hear from them within 24 - 48 hours please contact them directly  [address] [address] [phone] Current Discharge Medication List  
  
START taking these medications Dose & Instructions Dispensing Information Comments Morning Noon Evening Bedtime  
 amoxicillin-clavulanate 875-125 mg per tablet Commonly known as:  AUGMENTIN Your last dose was: Your next dose is:    
   
   
 Dose:  1 Tab Take 1 Tab by mouth every twelve (12) hours for 10 days. Quantity:  20 Tab Refills:  0 CONTINUE these medications which have NOT CHANGED Dose & Instructions Dispensing Information Comments Morning Noon Evening Bedtime  
 albuterol 90 mcg/actuation inhaler Commonly known as:  PROVENTIL HFA, VENTOLIN HFA, PROAIR HFA Your last dose was: Your next dose is:    
   
   
 Dose:  2 Puff Take 2 Puffs by inhalation every four (4) hours as needed for Wheezing. Quantity:  1 Inhaler Refills:  0  
     
   
   
   
  
 aspirin 81 mg chewable tablet Your last dose was: Your next dose is:    
   
   
 Dose:  81 mg Take 1 Tab by mouth daily. Quantity:  10 Tab Refills:  0  
     
   
   
   
  
 atorvastatin 40 mg tablet Commonly known as:  LIPITOR Your last dose was: Your next dose is:    
   
   
 Dose:  40 mg Take 40 mg by mouth daily. Refills:  0  
     
   
   
   
  
 carvedilol 25 mg tablet Commonly known as:  Lisa Ness Your last dose was: Your next dose is:    
   
   
 Dose:  25 mg Take 25 mg by mouth two (2) times daily (with meals). Refills:  0  
     
   
   
   
  
 enalapril 20 mg tablet Commonly known as:  Suzanne Nelson Your last dose was: Your next dose is:    
   
   
 Dose:  40 mg Take 40 mg by mouth daily. Refills:  0  
     
   
   
   
  
 furosemide 40 mg tablet Commonly known as:  LASIX Your last dose was: Your next dose is: TAKE ONE TABLET BY MOUTH DAILY. *DOSE  REDUCED  12/17/15* Quantity:  30 Tab Refills:  6 VIAGRA 50 mg tablet Generic drug:  sildenafil citrate Your last dose was: Your next dose is:    
   
   
  Refills:  0 STOP taking these medications HYDROcodone-acetaminophen 5-325 mg per tablet Commonly known as:  Bibi Shipman Where to Get Your Medications Information on where to get these meds will be given to you by the nurse or doctor. ! Ask your nurse or doctor about these medications  
  amoxicillin-clavulanate 875-125 mg per tablet Discharge Instructions HOSPITALIST DISCHARGE INSTRUCTIONS 
 
NAME: Conner Ramirez :  1940 MRN:  [id number] Date/Time:  2017 12:02 PM 
 
ADMIT DATE: 2017 DISCHARGE DATE: 2017 · It is important that you take the medication exactly as they are prescribed. · Keep your medication in the bottles provided by the pharmacist and keep a list of the medication names, dosages, and times to be taken in your wallet. · Do not take other medications without consulting your doctor. What to do at [facility] Recommended diet:  Cardiac Diet Recommended activity: Activity as tolerated If you have questions regarding the hospital related prescriptions or hospital related issues please call [facility] at . You can always direct your questions to your primary care doctor if you are unable to reach your hospital physician; your PCP works as an extension of your hospital doctor just like your hospital doctor is an extension of your PCP for your time at the hospital [facility], [facility]) If you experience any of the following symptoms then please call your primary care physician or return to the emergency room if you cannot get hold of your doctor: 
 
Fever, chills, nausea, vomiting, or persistent diarrhea Worsening weakness or new problems with your speech or balance Dark stools or visible blood in your stools New Leg swelling or shortness of breath as this could be signs of a clot Additional Instructions: 
 
 
Wounds cleaned with sterile water or saline and cover with non-adherent dressings. Information obtained by : 
I understand that if any problems occur once I am at home I am to contact my physician. I understand and acknowledge receipt of the instructions indicated above. Physician's or R.N.'s Signature                                                                  Date/Time Patient or Representative Signature Discharge Orders None [employer] Announcement We are excited to announce that we are making your provider's discharge notes available to you in [employer]. You will see these notes when they are completed and signed by the physician that discharged you from your recent hospital stay. If you have any questions or concerns about any information you see in [employer], please call the Health Information Department where you were seen or reach out to your Primary Care Provider for more information about your plan of care. Introducing [facility]! Selvin Connor introduces [employer] patient portal. Now you can access parts of your medical record, email your doctor's office, and request medication refills online. 1. In your internet browser, go to [email or web address] 2. Click on the First Time User? Click Here link in the Sign In box. You will see the New Member Sign Up page. 3. Enter your [employer] Access Code exactly as it appears below. You will not need to use this code after youve completed the sign-up process. If you do not sign up before the expiration date, you must request a new code. · [employer] Access Code: [email or web address] Expires: 8/21/2017 12:45 PM 
 
4. Enter the last four digits of your Social Security Number (xxxx) and Date of Birth (mm/dd/yyyy) as indicated and click Submit. You will be taken to the next sign-up page. 5. Create a [employer] ID. This will be your [employer] login ID and cannot be changed, so think of one that is secure and easy to remember. 6. Create a [employer] password. You can change your password at any time. 7. Enter your Password Reset Question and Answer. This can be used at a later time if you forget your password. 8. Enter your e-mail address. You will receive e-mail notification when new information is available in [address] 9. Click Sign Up. You can now view and download portions of your medical record. 10. Click the Download Summary menu link to download a portable copy of your medical information. If you have questions, please visit the Frequently Asked Questions section of the [employer] website. Remember, [employer] is NOT to be used for urgent needs. For medical emergencies, dial 911. Now available from your iPhone and Android! General Information Please provide this summary of care documentation to your next provider. Patient Signature:  ____________________________________________________________ Date:  ____________________________________________________________  
  
[facility] Provider Signature:  ____________________________________________________________ Date:  ____________________________________________________________

## 2017-05-15 LAB
ANION GAP BLD CALC-SCNC: 11 MMOL/L (ref 5–15)
BUN SERPL-MCNC: 24 MG/DL (ref 6–20)
BUN/CREAT SERPL: 14 (ref 12–20)
CALCIUM SERPL-MCNC: 8.3 MG/DL (ref 8.5–10.1)
CHLORIDE SERPL-SCNC: 108 MMOL/L (ref 97–108)
CO2 SERPL-SCNC: 21 MMOL/L (ref 21–32)
CREAT SERPL-MCNC: 1.72 MG/DL (ref 0.7–1.3)
ERYTHROCYTE [DISTWIDTH] IN BLOOD BY AUTOMATED COUNT: 15 % (ref 11.5–14.5)
GLUCOSE SERPL-MCNC: 112 MG/DL (ref 65–100)
HCT VFR BLD AUTO: 32.3 % (ref 36.6–50.3)
HGB BLD-MCNC: 10.6 G/DL (ref 12.1–17)
MCH RBC QN AUTO: 26.6 PG (ref 26–34)
MCHC RBC AUTO-ENTMCNC: 32.8 G/DL (ref 30–36.5)
MCV RBC AUTO: 81 FL (ref 80–99)
PLATELET # BLD AUTO: 55 K/UL (ref 150–400)
POTASSIUM SERPL-SCNC: 4.3 MMOL/L (ref 3.5–5.1)
RBC # BLD AUTO: 3.99 M/UL (ref 4.1–5.7)
SODIUM SERPL-SCNC: 140 MMOL/L (ref 136–145)
WBC # BLD AUTO: 5.7 K/UL (ref 4.1–11.1)

## 2017-05-15 PROCEDURE — 36415 COLL VENOUS BLD VENIPUNCTURE: CPT | Performed by: INTERNAL MEDICINE

## 2017-05-15 PROCEDURE — 74011250637 HC RX REV CODE- 250/637: Performed by: INTERNAL MEDICINE

## 2017-05-15 PROCEDURE — 74011000258 HC RX REV CODE- 258: Performed by: INTERNAL MEDICINE

## 2017-05-15 PROCEDURE — 74011250636 HC RX REV CODE- 250/636: Performed by: EMERGENCY MEDICINE

## 2017-05-15 PROCEDURE — 74011250636 HC RX REV CODE- 250/636: Performed by: INTERNAL MEDICINE

## 2017-05-15 PROCEDURE — 65270000029 HC RM PRIVATE

## 2017-05-15 PROCEDURE — 85027 COMPLETE CBC AUTOMATED: CPT | Performed by: INTERNAL MEDICINE

## 2017-05-15 PROCEDURE — 80048 BASIC METABOLIC PNL TOTAL CA: CPT | Performed by: INTERNAL MEDICINE

## 2017-05-15 RX ORDER — FUROSEMIDE 10 MG/ML
40 INJECTION INTRAMUSCULAR; INTRAVENOUS DAILY
Status: DISCONTINUED | OUTPATIENT
Start: 2017-05-15 | End: 2017-05-17

## 2017-05-15 RX ORDER — ACYCLOVIR 200 MG/1
200 CAPSULE ORAL
Status: DISCONTINUED | OUTPATIENT
Start: 2017-05-15 | End: 2017-05-20

## 2017-05-15 RX ORDER — FOLIC ACID 1 MG/1
1 TABLET ORAL DAILY
Status: DISCONTINUED | OUTPATIENT
Start: 2017-05-15 | End: 2017-05-23 | Stop reason: HOSPADM

## 2017-05-15 RX ORDER — ACYCLOVIR 50 MG/G
OINTMENT TOPICAL
Status: DISCONTINUED | OUTPATIENT
Start: 2017-05-15 | End: 2017-05-21

## 2017-05-15 RX ORDER — SODIUM CHLORIDE 9 MG/ML
50 INJECTION, SOLUTION INTRAVENOUS CONTINUOUS
Status: DISCONTINUED | OUTPATIENT
Start: 2017-05-15 | End: 2017-05-16

## 2017-05-15 RX ORDER — AZITHROMYCIN 250 MG/1
500 TABLET, FILM COATED ORAL DAILY
Status: DISCONTINUED | OUTPATIENT
Start: 2017-05-15 | End: 2017-05-15

## 2017-05-15 RX ORDER — HYDRALAZINE HYDROCHLORIDE 20 MG/ML
10 INJECTION INTRAMUSCULAR; INTRAVENOUS
Status: DISCONTINUED | OUTPATIENT
Start: 2017-05-15 | End: 2017-05-23 | Stop reason: HOSPADM

## 2017-05-15 RX ADMIN — ACYCLOVIR 200 MG: 200 CAPSULE ORAL at 22:12

## 2017-05-15 RX ADMIN — ALBUTEROL SULFATE 2 PUFF: 90 AEROSOL, METERED RESPIRATORY (INHALATION) at 02:09

## 2017-05-15 RX ADMIN — Medication 10 ML: at 04:03

## 2017-05-15 RX ADMIN — ACYCLOVIR 200 MG: 200 CAPSULE ORAL at 13:22

## 2017-05-15 RX ADMIN — Medication 10 ML: at 10:08

## 2017-05-15 RX ADMIN — FUROSEMIDE 40 MG: 10 INJECTION, SOLUTION INTRAMUSCULAR; INTRAVENOUS at 10:10

## 2017-05-15 RX ADMIN — ENALAPRIL MALEATE 40 MG: 5 TABLET ORAL at 10:52

## 2017-05-15 RX ADMIN — ACYCLOVIR: 50 OINTMENT TOPICAL at 15:34

## 2017-05-15 RX ADMIN — Medication 10 ML: at 22:13

## 2017-05-15 RX ADMIN — CARVEDILOL 25 MG: 12.5 TABLET, FILM COATED ORAL at 17:00

## 2017-05-15 RX ADMIN — ACYCLOVIR 200 MG: 200 CAPSULE ORAL at 18:02

## 2017-05-15 RX ADMIN — CEFTRIAXONE 1 G: 1 INJECTION, POWDER, FOR SOLUTION INTRAMUSCULAR; INTRAVENOUS at 22:12

## 2017-05-15 RX ADMIN — ACYCLOVIR: 50 OINTMENT TOPICAL at 20:25

## 2017-05-15 RX ADMIN — ACYCLOVIR: 50 OINTMENT TOPICAL at 13:22

## 2017-05-15 RX ADMIN — ATORVASTATIN CALCIUM 40 MG: 40 TABLET, FILM COATED ORAL at 10:09

## 2017-05-15 RX ADMIN — ACETAMINOPHEN 650 MG: 325 TABLET, FILM COATED ORAL at 04:02

## 2017-05-15 RX ADMIN — FOLIC ACID 1 MG: 1 TABLET ORAL at 13:33

## 2017-05-15 RX ADMIN — ASPIRIN 81 MG CHEWABLE TABLET 81 MG: 81 TABLET CHEWABLE at 10:09

## 2017-05-15 RX ADMIN — AZITHROMYCIN MONOHYDRATE 500 MG: 500 INJECTION, POWDER, LYOPHILIZED, FOR SOLUTION INTRAVENOUS at 01:05

## 2017-05-15 RX ADMIN — AZITHROMYCIN 500 MG: 250 TABLET, FILM COATED ORAL at 10:09

## 2017-05-15 RX ADMIN — CARVEDILOL 25 MG: 12.5 TABLET, FILM COATED ORAL at 10:09

## 2017-05-15 RX ADMIN — SODIUM CHLORIDE 50 ML/HR: 900 INJECTION, SOLUTION INTRAVENOUS at 10:06

## 2017-05-15 RX ADMIN — ACYCLOVIR: 50 OINTMENT TOPICAL at 17:00

## 2017-05-15 NOTE — ED NOTES
79yo male Pt with PMH of prostate CA, heart failure and HTN presents to ED for chest pain to left lower chest, SOB and productive cough since yesterday, worse with walking. Febrile and tachycardic upon arrival, Code Sepsis activated from triage oriented to room and call bell, cardiac and continuous spO2 monitoring initiated, IV started, blood samples collected and sent to lab for analysis, EKG completed, plan of care reviewed, call bell in reach, transferred to radiology for Chest xray, side rails up x2, will continue to monitor. 11:43 PM  O2 sat decreased to 84% with ambulation, IV antibiotics administered, POC to admit.  1:03 AM  Hospitalist at bedside for admission eval, IV antibiotics infusing as ordered, awaiting ready bed.  2:14 AM  Nebulizer HFA administered for SOB, inhaler retained in patient's personal belongings at bedside, teaching completed. Report called to nursing unit, transfer pending transport.

## 2017-05-15 NOTE — PROGRESS NOTES
Pt is a 79 y/o AAM admitted for acute respiratory failure. Pt lives with spouse in two story home with 14-steps to bedroom. Pt has two steps to the entrance of his residence. Pt is independent to include driving. Pt has [facility] providers [facility], SNF or DME. Pt prefers [address] in [place], [place]. Pt drove to [address] and at discharge will transport his self via private vehicle. CM met with pt to complete initial assessment. Pt is alert and oriented to person, place, time and situation. CM will continue to follow to assist with discharge plans as needed. Care Management Interventions  PCP Verified by CM:  Yes (Dr. Esther Armijo)  Mode of Transport at Discharge: Self  Transition of Care Consult (CM Consult): Discharge Planning (CM to follow )  Discharge Durable Medical Equipment: No  Health Maintenance Reviewed: Yes  Physical Therapy Consult: No  Occupational Therapy Consult: No  Speech Therapy Consult: No  Current Support Network: Lives with Spouse  Confirm Follow Up Transport: Self  Plan discussed with Pt/Family/Caregiver: Yes  Discharge Location  Discharge Placement: Home    Linn Sanz MSW, [address] [place] [address]   [phone]

## 2017-05-15 NOTE — CARDIO/PULMONARY
C/P Rehab Note:    Chart Reviewed. Admitting diagnosis of Fever and Tachycardia. Pt is a current day smoker. Teaching last received on 2/16. Focus of teaching today on CHF. PMH significant for:  -CHF LV EF 40-45%  -COPD  -CAD  -HTN    Met with patient who was lying in bed. Reviewed role of Cardiac Rehab Nurse. Patient  given printed teaching materials on CHF and low NA diet. Instruction given on s/s of CHF, checking weight every am and calling MD if weight is up 2-3 lbs in a day or 5 lbs in a week, fluid/Na restrictions, s/s of worsening CHF and when to call MD. Reviewed activity as tolerated with frequent rest periods as needed, taking medications as prescribed, and the importance of follow up visits with physician. Pt has a functioning scale at home and agreeable to checking weight daily. Discussed rational for daily weights and following a low NA diet. He reports he normally eats albert and eggs for breakfast and hot dogs for lunch. Provided handout on, \" [employer]\", and reviewed. Discussed low Na alternatives. Pt does not use salt for flavoring but instead pepper. Pt had no further questions at this time but reinforcement needed.

## 2017-05-15 NOTE — H&P
Hospitalist Admission Note    NAME: Margy Hess   :  1940   MRN:  [id number]     Date/Time:  2017 11:38 PM    Patient PCP: Monica Davis MD  ________________________________________________________________________    My assessment of this patient's clinical condition and my plan of care is as follows. Assessment / Plan:  Sirs Syndrome ( Fever, Tachycardia)  Exertional Hypoxemia, POA  Ra sats drop to 88 on ambulation per ED, 95% at rest  Community Acquired Pneumonia Clinically , CXR reads CHF bnp elevated  Hx of Chronic systolic CHF EF 42-47%  COPD, with mild exacerbation            Patient is at high risk for further deterioration and needs to be admitted to Telemetry for further management,   O2 Supplementation  Prn to keep sats > 90  IV Ceftriaxone and Zithromax, Nebs, Mucolytic, hold off steroids for now  Patient has been Given NS Bolus 500 cc and  IV Lasix 80 ng in ED. Will continue his Usual dose of daily Lasix change to IV and watch renal functions    UTI  On IV Ceftriaxone, pending C&S    Acute Renal Failure, POA   Baseline Cr is normal, is on Chronic Lasix  Will not Hydrate nor Give IV Lasix, but continue usual Home dose and follow renal functions  Hold Vasotec    CAD  HTN  Hypercholesterolemia  Continue Coreg, ASA, Lipitor      Prostate CA          Code Status: Full Code  Surrogate Decision Maker:   Daughter Leno Yost                                                     \" My wife is 86y/O\"    DVT Prophylaxis: Lovenox  GI Prophylaxis: not indicated    Baseline: Full ADL  Reviewed all labs, old hx and CXR and performed complex decision making. Discussed plan with ED physican, patient and Nurse        Subjective:   CHIEF COMPLAINT: Cough and SOB    HISTORY OF PRESENT ILLNESS:     Colin Ontiveros is a 78 y.o.  male who presents with 2 days of Fever, cough and SOB. Patient is a very poor historian and difficult to understand.     Patient has CHF, COPD, CAD not on home O2 he comes in complaing that he has been having Cough, with sputum production   For the last 2 days, he states he also felt Febrile but never checked his Temp. He denies CP, no Orthopnea, no Pedal edema. Int he ED. He was noted to have a Fever, Tachycardic in the 130's RA sat at rest 94-95% but with ambulation   Apparently dropped into the 80's. CXR is also being read as interstitial Edema. Denies any Sick contact. He states that he still works as a  3 x a week up to 3 pm.  On those days, he does not take  His lasix until after work    We were asked to admit for work up and evaluation of the above problems. Past Medical History:   Diagnosis Date    Acute on chronic systolic HF (heart failure) ([address] 2/11/2014    11/15 Echo EF 40-45%    Cancer (HCC)     prostate    COPD (chronic obstructive pulmonary disease) ([address]     Elevated troponin 2/11/2014    Hypertension     S/P cardiac cath 2/12/2014 2/12/14 no significant coronary disease, severe LV dysfunction        Past Surgical History:   Procedure Laterality Date    COLONOSCOPY,DIAGNOSTIC  2/22/2016        [facility]  2/22/2016            Social History   Substance Use Topics    Smoking status: Current Every Day Smoker     Packs/day: 2.00     Years: 20.00     Types: Cigarettes    Smokeless tobacco: Current User    Alcohol use No        Family History   Problem Relation Age of Onset    Adopted: Yes     No Known Allergies     Prior to Admission medications    Medication Sig Start Date End Date Taking? Authorizing Provider   HYDROcodone-acetaminophen (NORCO) 5-325 mg per tablet Take 1 Tab by mouth every four (4) hours as needed for Pain. Max Daily Amount: 6 Tabs. 4/9/17   Pratima Hoyos MD   furosemide (LASIX) 40 mg tablet TAKE ONE TABLET BY MOUTH DAILY.  *DOSE  REDUCED  12/17/15* 1/3/17   Daryl Kessler NP   albuterol (PROVENTIL HFA, VENTOLIN HFA, PROAIR HFA) 90 mcg/actuation inhaler Take 2 Puffs by inhalation every four (4) hours as needed for Wheezing. 12/29/16   Vaibhav Castro DO   VIAGRA 50 mg tablet  8/22/16   Historical Provider   aspirin 81 mg chewable tablet Take 1 Tab by mouth daily. 3/3/16   Demar Rollins MD   atorvastatin (LIPITOR) 40 mg tablet Take 40 mg by mouth daily. Historical Provider   carvedilol (COREG) 25 mg tablet Take 25 mg by mouth two (2) times daily (with meals). Historical Provider   enalapril (VASOTEC) 20 mg tablet Take 40 mg by mouth daily. Historical Provider       REVIEW OF SYSTEMS:     I am not able to complete the review of systems because:    The patient is intubated and sedated    The patient has altered mental status due to his acute medical problems    The patient has baseline aphasia from prior stroke(s)    The patient has baseline dementia and is not reliable historian    The patient is in acute medical distress and unable to provide information           Total of 12 systems reviewed as follows:       POSITIVE= underlined text  Negative = text not underlined  General:  fever, chills, sweats, generalized weakness, weight loss/gain,      loss of appetite   Eyes:    blurred vision, eye pain, loss of vision, double vision  ENT:    rhinorrhea, pharyngitis   Respiratory:   cough, sputum production, SOB, PA, wheezing, pleuritic pain   Cardiology:   chest pain, palpitations, orthopnea, PND, edema, syncope   Gastrointestinal:  abdominal pain , N/V, diarrhea, dysphagia, constipation, bleeding   Genitourinary:  frequency, urgency, dysuria, hematuria, incontinence   Muskuloskeletal :  arthralgia, myalgia, back pain  Hematology:  easy bruising, nose or gum bleeding, lymphadenopathy   Dermatological: rash, ulceration, pruritis, color change / jaundice  Endocrine:   hot flashes or polydipsia   Neurological:  headache, dizziness, confusion, focal weakness, paresthesia,     Speech difficulties, memory loss, gait difficulty  Psychological: Feelings of anxiety, depression, agitation    Objective:   VITALS:    Visit Vitals    /64    Pulse (!) 104    Temp 100 °F (37.8 °C)    Resp 22    Ht 5' 6\" (1.676 m)    Wt 98.2 kg (216 lb 7.9 oz)    SpO2 94%    BMI 34.94 kg/m2       PHYSICAL EXAM:    General:    Alert, cooperative, no distress, Coughing Productivelyappears stated age. HEENT: Atraumatic, anicteric sclerae, pink conjunctivae     No oral ulcers, mucosa moist, throat clear, dentition fair  Neck:  Supple, symmetrical,  thyroid: non tender, NO JVP  Lungs:   Clear to auscultation bilaterally. Some Wheezing or positive Rhonchi. minimal rales. Chest wall:  No tenderness  No Accessory muscle use. Heart:   Regular  rhythm,  No  murmur   No edema  Abdomen:   Soft, non-tender. Not distended. Bowel sounds normal  Extremities: No cyanosis. No clubbing,      Skin turgor normal, Capillary refill normal, Radial dial pulse 2+  Skin:     Not pale. Not Jaundiced  No rashes   Psych:  Good insight. Not depressed. Not anxious or agitated. Neurologic: EOMs intact. No facial asymmetry. No aphasia or slurred speech. Symmetrical strength, Sensation grossly intact.  Alert and oriented X 4.     _______________________________________________________________________  Care Plan discussed with:    Comments   Patient y    Family      RN y    Care Manager                    Consultant:   ED Physician   _______________________________________________________________________  Expected  Disposition:   Home with Family y   HH/PT/OT/RN    SNF/LTC    FRANCO    ________________________________________________________________________  TOTAL TIME:  61 Minutes    Critical Care Provided     Minutes non procedure based      Comments    YES Reviewed previous records   >50% of visit spent in counseling and coordination of care  Discussion with patient and/or family and questions answered       ________________________________________________________________________  Signed: Callie Magdaleno MD    Procedures: see electronic medical records for all procedures/Xrays and details which were not copied into this note but were reviewed prior to creation of Plan. LAB DATA REVIEWED:    Recent Results (from the past 24 hour(s))   EKG, 12 LEAD, INITIAL    Collection Time: 05/14/17  8:28 PM   Result Value Ref Range    Ventricular Rate 119 BPM    Atrial Rate 119 BPM    P-R Interval 126 ms    QRS Duration 112 ms    Q-T Interval 332 ms    QTC Calculation (Bezet) 467 ms    Calculated P Axis 73 degrees    Calculated R Axis 103 degrees    Calculated T Axis -100 degrees    Diagnosis       Sinus tachycardia  Rightward axis  Possible Anterior infarct , age undetermined  ST & T wave abnormality, consider inferolateral ischemia  Abnormal ECG  When compared with ECG of 29-DEC-2016 01:47,  GA interval has decreased  T wave inversion now evident in Inferior leads     CBC WITH AUTOMATED DIFF    Collection Time: 05/14/17  8:40 PM   Result Value Ref Range    WBC 6.0 4.1 - 11.1 K/uL    RBC 4.50 4. 10 - 5.70 M/uL    HGB 11.9 (L) 12.1 - 17.0 g/dL    HCT 36.5 (L) 36.6 - 50.3 %    MCV 81.1 80.0 - 99.0 FL    MCH 26.4 26.0 - 34.0 PG    MCHC 32.6 30.0 - 36.5 g/dL    RDW 15.0 (H) 11.5 - 14.5 %    PLATELET 52 (L) 715 - 400 K/uL    NEUTROPHILS 85 %    BAND NEUTROPHILS 13 %    LYMPHOCYTES 1 %    MONOCYTES 0 %    EOSINOPHILS 1 %    BASOPHILS 0 %    ABS. NEUTROPHILS 5.8 K/UL    ABS. LYMPHOCYTES 0.1 K/UL    ABS. MONOCYTES 0.0 K/UL    ABS. EOSINOPHILS 0.1 K/UL    ABS.  BASOPHILS 0.0 K/UL    DF MANUAL      PLATELET COMMENTS DECREASED PLATELETS      RBC COMMENTS ANISOCYTOSIS  PRESENT        RBC COMMENTS MICROCYTOSIS  PRESENT       METABOLIC PANEL, COMPREHENSIVE    Collection Time: 05/14/17  8:40 PM   Result Value Ref Range    Sodium 138 136 - 145 mmol/L    Potassium 3.5 3.5 - 5.1 mmol/L    Chloride 106 97 - 108 mmol/L    CO2 23 21 - 32 mmol/L    Anion gap 9 5 - 15 mmol/L    Glucose 172 (H) 65 - 100 mg/dL    BUN 23 (H) 6 - 20 MG/DL    Creatinine 1.74 (H) 0.70 - 1.30 MG/DL    BUN/Creatinine ratio 13 12 - 20      GFR est AA 47 (L) >60 ml/min/1.73m2    GFR est non-AA 38 (L) >60 ml/min/1.73m2    Calcium 8.7 8.5 - 10.1 MG/DL    Bilirubin, total 1.3 (H) 0.2 - 1.0 MG/DL    ALT (SGPT) 28 12 - 78 U/L    AST (SGOT) 30 15 - 37 U/L    Alk.  phosphatase 114 45 - 117 U/L    Protein, total 7.7 6.4 - 8.2 g/dL    Albumin 3.1 (L) 3.5 - 5.0 g/dL    Globulin 4.6 (H) 2.0 - 4.0 g/dL    A-G Ratio 0.7 (L) 1.1 - 2.2     LACTIC ACID, PLASMA    Collection Time: 05/14/17  8:40 PM   Result Value Ref Range    Lactic acid 1.9 0.4 - 2.0 MMOL/L   CK W/ REFLX CKMB    Collection Time: 05/14/17  8:40 PM   Result Value Ref Range     39 - 308 U/L   TROPONIN I    Collection Time: 05/14/17  8:40 PM   Result Value Ref Range    Troponin-I, Qt. <0.04 <0.05 ng/mL   PRO-BNP    Collection Time: 05/14/17  8:40 PM   Result Value Ref Range    NT pro-BNP 5713 (H) 0 - 450 PG/ML   URINALYSIS W/ REFLEX CULTURE    Collection Time: 05/14/17  9:40 PM   Result Value Ref Range    Color ORANGE      Appearance CLOUDY (A) CLEAR      Specific gravity 1.023 1.003 - 1.030      pH (UA) 5.0 5.0 - 8.0      Protein 30 (A) NEG mg/dL    Glucose NEGATIVE  NEG mg/dL    Ketone TRACE (A) NEG mg/dL    Blood NEGATIVE  NEG      Urobilinogen 2.0 (H) 0.2 - 1.0 EU/dL    Nitrites NEGATIVE  NEG      Leukocyte Esterase MODERATE (A) NEG      WBC 10-20 0 - 4 /hpf    RBC 5-10 0 - 5 /hpf    Epithelial cells MODERATE (A) FEW /lpf    Bacteria 2+ (A) NEG /hpf    UA:UC IF INDICATED URINE CULTURE ORDERED (A) CNI     BILIRUBIN, CONFIRM    Collection Time: 05/14/17  9:40 PM   Result Value Ref Range    Bilirubin UA, confirm NEGATIVE  NEG

## 2017-05-15 NOTE — ED PROVIDER NOTES
HPI Comments: Cristina Gunn is a 78 y.o. male with PMHx significant for COPD, systolic CHF, HTN, prostate CA, who presents ambulatory to ED [facility] ED with cc of progressively worsening SOB onset a couple of days ago. Patient reports exacerbation with walking. Associated symptoms include L lower CP and productive cough. Patient specifically denies any recent leg swelling, N/V/D.       PCP: Didi Mcmullen MD      Social History: (+) Tobacco, (-) EtOH, (-) Illicit Drugs       There are no other complaints, changes, or physical findings at this time. Written by Eugenio Marinelli ED Scribe, as dictated by Nina Mohamud DO. The history is provided by the patient. No  was used. Past Medical History:   Diagnosis Date    Acute on chronic systolic HF (heart failure) ([address] 2/11/2014    11/15 Echo EF 40-45%    Cancer (HCC)     prostate    COPD (chronic obstructive pulmonary disease) ([facility]     Elevated troponin 2/11/2014    Hypertension     S/P cardiac cath 2/12/2014 2/12/14 no significant coronary disease, severe LV dysfunction       Past Surgical History:   Procedure Laterality Date    COLONOSCOPY,DIAGNOSTIC  2/22/2016        [facility]  2/22/2016              Family History:   Problem Relation Age of Onset    Adopted: Yes       Social History     Social History    Marital status:      Spouse name: N/A    Number of children: N/A    Years of education: N/A     Occupational History    Not on file. Social History Main Topics    Smoking status: Current Every Day Smoker     Packs/day: 2.00     Years: 20.00     Types: Cigarettes    Smokeless tobacco: Current User    Alcohol use No    Drug use: No    Sexual activity: Not on file     Other Topics Concern    Not on file     Social History Narrative         ALLERGIES: Review of patient's allergies indicates no known allergies.     Review of Systems   Constitutional: Negative for chills, fatigue and fever.   HENT: Negative for congestion and rhinorrhea. Eyes: Negative for visual disturbance. Respiratory: Positive for cough and shortness of breath. Negative for wheezing. Cardiovascular: Positive for chest pain. Negative for palpitations. Gastrointestinal: Negative for abdominal distention, abdominal pain, constipation, diarrhea, nausea and vomiting. Endocrine: Negative. Genitourinary: Negative for difficulty urinating and dysuria. Musculoskeletal: Negative. Skin: Negative for rash. Neurological: Negative for dizziness, weakness and light-headedness. Psychiatric/Behavioral: Negative for suicidal ideas. Patient Vitals for the past 12 hrs:   Temp Pulse Resp BP SpO2   05/14/17 2145 100 °F (37.8 °C) (!) 104 22 125/64 94 %   05/14/17 2034 - - - 115/64 -   05/14/17 2025 (!) 100.9 °F (38.3 °C) (!) 130 20 114/67 95 %       Physical Exam   Constitutional: He is oriented to person, place, and time. He appears well-developed and well-nourished. No distress. HENT:   Head: Normocephalic and atraumatic. Mouth/Throat: Oropharynx is clear and moist.   Eyes: Conjunctivae and EOM are normal.   Neck: Neck supple. No JVD present. No tracheal deviation present. Cardiovascular: Intact distal pulses. An irregular rhythm present. Tachycardia present. Exam reveals no gallop and no friction rub. No murmur heard. Pulmonary/Chest: Effort normal. No stridor. No respiratory distress. He has no wheezes. Lung sounds diminished in bilateral bases     Abdominal: Soft. Bowel sounds are normal. He exhibits no distension and no mass. There is no tenderness. There is no guarding. Musculoskeletal: Normal range of motion. He exhibits no edema or tenderness. No deformity  No pitting edema   Neurological: He is alert and oriented to person, place, and time. He has normal strength. No focal deficits   Skin: Skin is warm, dry and intact. No rash noted. Psychiatric: He has a normal mood and affect.  His behavior is normal. Judgment and thought content normal.   Nursing note and vitals reviewed. MDM  Number of Diagnoses or Management Options  Acute on chronic systolic congestive heart failure [facility]):   Urinary tract infection without hematuria, site unspecified:   Diagnosis management comments: DDx: pneumonia, CHF exacerbation, COPD exacerbation, ACS, arrhythmia, pleural effusion. Amount and/or Complexity of Data Reviewed  Clinical lab tests: reviewed and ordered  Tests in the radiology section of CPT®: ordered and reviewed  Tests in the medicine section of CPT®: ordered and reviewed  Review and summarize past medical records: yes  Discuss the patient with other providers: yes (hospitalist)  Independent visualization of images, tracings, or specimens: yes      ED Course       Procedures    EKG interpretation: (Preliminary) 20:28  Rhythm: sinus tachycardia; and regular . Rate (approx.): 119; Axis: rightward axis; TX interval: normal; QRS interval: normal ; ST/T wave: T wave inversion in lateral leads. Written by Sony Olmedo ED Scribhollie, as dictated by Lavonne Yang DO.    CONSULT NOTE:   11:00 PM  Lavonne Yang DO spoke with Dr. Stephanie Boston,   Specialty: Hospitalist  Discussed pt's hx, disposition, and available diagnostic and imaging results. Reviewed care plans. Consultant will evaluate pt for admission. Written by Sony Olmedo ED Scribe, as dictated by Lavonne Yang DO. PROGRESS NOTE  11:09 PM   Updated patient on plan of care.   Written by CARLY Flahertyibhollie, as dictated by Lavonne Yang DO.    LABORATORY TESTS:  Recent Results (from the past 12 hour(s))   EKG, 12 LEAD, INITIAL    Collection Time: 05/14/17  8:28 PM   Result Value Ref Range    Ventricular Rate 119 BPM    Atrial Rate 119 BPM    P-R Interval 126 ms    QRS Duration 112 ms    Q-T Interval 332 ms    QTC Calculation (Bezet) 467 ms    Calculated P Axis 73 degrees    Calculated R Axis 103 degrees    Calculated T Axis -100 degrees    Diagnosis       Sinus tachycardia  Rightward axis  Possible Anterior infarct , age undetermined  ST & T wave abnormality, consider inferolateral ischemia  Abnormal ECG  When compared with ECG of 29-DEC-2016 01:47,  SC interval has decreased  T wave inversion now evident in Inferior leads     CBC WITH AUTOMATED DIFF    Collection Time: 05/14/17  8:40 PM   Result Value Ref Range    WBC 6.0 4.1 - 11.1 K/uL    RBC 4.50 4. 10 - 5.70 M/uL    HGB 11.9 (L) 12.1 - 17.0 g/dL    HCT 36.5 (L) 36.6 - 50.3 %    MCV 81.1 80.0 - 99.0 FL    MCH 26.4 26.0 - 34.0 PG    MCHC 32.6 30.0 - 36.5 g/dL    RDW 15.0 (H) 11.5 - 14.5 %    PLATELET 52 (L) 122 - 400 K/uL    NEUTROPHILS 85 %    BAND NEUTROPHILS 13 %    LYMPHOCYTES 1 %    MONOCYTES 0 %    EOSINOPHILS 1 %    BASOPHILS 0 %    ABS. NEUTROPHILS 5.8 K/UL    ABS. LYMPHOCYTES 0.1 K/UL    ABS. MONOCYTES 0.0 K/UL    ABS. EOSINOPHILS 0.1 K/UL    ABS. BASOPHILS 0.0 K/UL    DF MANUAL      PLATELET COMMENTS DECREASED PLATELETS      RBC COMMENTS ANISOCYTOSIS  PRESENT        RBC COMMENTS MICROCYTOSIS  PRESENT       METABOLIC PANEL, COMPREHENSIVE    Collection Time: 05/14/17  8:40 PM   Result Value Ref Range    Sodium 138 136 - 145 mmol/L    Potassium 3.5 3.5 - 5.1 mmol/L    Chloride 106 97 - 108 mmol/L    CO2 23 21 - 32 mmol/L    Anion gap 9 5 - 15 mmol/L    Glucose 172 (H) 65 - 100 mg/dL    BUN 23 (H) 6 - 20 MG/DL    Creatinine 1.74 (H) 0.70 - 1.30 MG/DL    BUN/Creatinine ratio 13 12 - 20      GFR est AA 47 (L) >60 ml/min/1.73m2    GFR est non-AA 38 (L) >60 ml/min/1.73m2    Calcium 8.7 8.5 - 10.1 MG/DL    Bilirubin, total 1.3 (H) 0.2 - 1.0 MG/DL    ALT (SGPT) 28 12 - 78 U/L    AST (SGOT) 30 15 - 37 U/L    Alk.  phosphatase 114 45 - 117 U/L    Protein, total 7.7 6.4 - 8.2 g/dL    Albumin 3.1 (L) 3.5 - 5.0 g/dL    Globulin 4.6 (H) 2.0 - 4.0 g/dL    A-G Ratio 0.7 (L) 1.1 - 2.2     LACTIC ACID, PLASMA    Collection Time: 05/14/17  8:40 PM   Result Value Ref Range    Lactic acid 1.9 0.4 - 2.0 MMOL/L   CK W/ REFLX CKMB    Collection Time: 05/14/17  8:40 PM   Result Value Ref Range     39 - 308 U/L   TROPONIN I    Collection Time: 05/14/17  8:40 PM   Result Value Ref Range    Troponin-I, Qt. <0.04 <0.05 ng/mL   PRO-BNP    Collection Time: 05/14/17  8:40 PM   Result Value Ref Range    NT pro-BNP 5713 (H) 0 - 450 PG/ML   URINALYSIS W/ REFLEX CULTURE    Collection Time: 05/14/17  9:40 PM   Result Value Ref Range    Color ORANGE      Appearance CLOUDY (A) CLEAR      Specific gravity 1.023 1.003 - 1.030      pH (UA) 5.0 5.0 - 8.0      Protein 30 (A) NEG mg/dL    Glucose NEGATIVE  NEG mg/dL    Ketone TRACE (A) NEG mg/dL    Blood NEGATIVE  NEG      Urobilinogen 2.0 (H) 0.2 - 1.0 EU/dL    Nitrites NEGATIVE  NEG      Leukocyte Esterase MODERATE (A) NEG      WBC 10-20 0 - 4 /hpf    RBC 5-10 0 - 5 /hpf    Epithelial cells MODERATE (A) FEW /lpf    Bacteria 2+ (A) NEG /hpf    UA:UC IF INDICATED URINE CULTURE ORDERED (A) CNI     BILIRUBIN, CONFIRM    Collection Time: 05/14/17  9:40 PM   Result Value Ref Range    Bilirubin UA, confirm NEGATIVE  NEG         IMAGING RESULTS:  XR CHEST PA LAT   Final Result   INDICATION: cp/sob      COMPARISON: 12/29/2016     FINDINGS: PA and lateral views of the chest demonstrate a stable  cardiomediastinal silhouette and no focal airspace disease. There is mild  interstitial pulmonary edema. There is chronic cardiomegaly. The visualized  osseous structures are unremarkable.     IMPRESSION  IMPRESSION: Interstitial pulmonary edema.        MEDICATIONS GIVEN:  Medications   azithromycin (ZITHROMAX) 500 mg in 0.9% sodium chloride (MBP/ADV) 250 mL (not administered)   cefTRIAXone (ROCEPHIN) 1 g in 0.9% sodium chloride (MBP/ADV) 50 mL MBP (not administered)   albuterol (PROVENTIL HFA, VENTOLIN HFA, PROAIR HFA) inhaler 2 Puff (not administered)   aspirin chewable tablet 81 mg (not administered)   atorvastatin (LIPITOR) tablet 40 mg (not administered)   carvedilol (COREG) tablet 25 mg (not administered)   enalapril (VASOTEC) tablet 40 mg (not administered)   HYDROcodone-acetaminophen (NORCO) 5-325 mg per tablet 1 Tab (not administered)   sodium chloride (NS) flush 5-10 mL (not administered)   sodium chloride (NS) flush 5-10 mL (not administered)   acetaminophen (TYLENOL) tablet 650 mg (not administered)   enoxaparin (LOVENOX) injection 40 mg (not administered)       IMPRESSION:  1. Acute on chronic systolic congestive heart failure ([facility] [address]    2. Urinary tract infection without hematuria, site unspecified        PLAN:  1. Admit to Hospitalist    Admission Note:  11:05 PM  Patient is being admitted to the hospital by Dr. Sailaja Wallace. The results of their tests and reasons for their admission have been discussed with them and available family. They convey agreement and understanding for the need to be admitted and for their admission diagnosis. Written by Luis Alberto Gaona ED Scribe, as dictated by Marco Antonio Newell DO. This note is prepared by Luis Alberto Gaona, acting as Scribe for Marco Antonio Newell DO. Marco Antonio Newell DO: The scribe's documentation has been prepared under my direction and personally reviewed by me in its entirety. I confirm that the note above accurately reflects all work, treatment, procedures, and medical decision making performed by me.

## 2017-05-15 NOTE — PROGRESS NOTES
Hospitalist Progress Note    NAME: Hever Angeles   :  1940   MRN:  [id number]       Interim Hospital Summary: 78 y.o. male whom presented on 2017 with      Assessment / Plan:  Sepsis POA: fever, tachycardia and UTI, c/w IVF and ABX. No signs of Pneumonia at this time. Acute hypoxic Respiratory Failure  POA/Exertional Hypoxemia, POA Ra sats drop to 88 on ambulation per ED, 95% at rest, due to CHF, c/w supplemental O2, wean down as tolerated. Acute on Chronic Systolic Heart Failure POA: CXR not in romana pulmonary edema but shows congestion and some fluid in fissure, c/w diuretics, monitor I/O and weight. EF 45%. UTI: c/w CTX, f/u cultures. Herpes Zoster: in knee and popliteal area, start Acyclovir PO and topical, contact isolation. COPD, with mild exacerbation: c/w ABX, Bronchodilators, monitor. Acute Renal Failure, POA monitor closely nor on diuretics. CAD  So far stable, no chest pain. HTN c/w Coreg, use hydralazine prn. Hypercholesterolemia c/w Statin, monitor LFT. Prostate CA  Surrogate Decision Maker: Daughter Bindu Durán   Baseline: Full ADL  Body mass index is 34.69 kg/(m^2). Code status: Full  Prophylaxis: lovenox  Recommended Disposition:  PT, OT, RN     Subjective:     Chief Complaint / Reason for Physician Visit  \"My knees are hurting\". Discussed with RN events overnight. Review of Systems:  Symptom Y/N Comments  Symptom Y/N Comments   Fever/Chills y   Chest Pain     Poor Appetite    Edema     Cough    Abdominal Pain     Sputum    Joint Pain y    SOB/PA y   Pruritis/Rash     Nausea/vomit    Tolerating PT/OT     Diarrhea    Tolerating Diet y    Constipation    Other       Could NOT obtain due to:      Objective:     VITALS:   Last 24hrs VS reviewed since prior progress note.  Most recent are:  Patient Vitals for the past 24 hrs:   Temp Pulse Resp BP SpO2   05/15/17 1050 99.2 °F (37.3 °C) 99 24 115/61 98 %   05/15/17 0759 99.1 °F (37.3 °C) (!) 108 20 116/63 97 %   05/15/17 0555 99.8 °F (37.7 °C) - - - -   05/15/17 0257 (!) 100.5 °F (38.1 °C) (!) 106 22 96/62 97 %   05/15/17 0130 - (!) 108 - 100/56 94 %   05/14/17 2245 - (!) 105 23 106/62 94 %   05/14/17 2230 - (!) 115 26 118/76 95 %   05/14/17 2200 - (!) 107 26 123/84 95 %   05/14/17 2145 100 °F (37.8 °C) (!) 104 22 125/64 94 %   05/14/17 2131 - (!) 103 25 111/71 94 %   05/14/17 2034 - - - 115/64 -   05/14/17 2025 (!) 100.9 °F (38.3 °C) (!) 130 20 114/67 95 %       Intake/Output Summary (Last 24 hours) at 05/15/17 1207  Last data filed at 05/15/17 1147   Gross per 24 hour   Intake              360 ml   Output              400 ml   Net              -40 ml        PHYSICAL EXAM:  General: WD, WN. Alert, cooperative, fever, knee pain  EENT:  EOMI. Anicteric sclerae. MMM  Resp:  Coarse BS. No accessory muscle use  CV:  Regular  rhythm,  No edema  GI:  Soft, Non distended, Non tender.  +Bowel sounds  Neurologic:  Alert and oriented X 3, normal speech,   Psych:   Good insight. Not anxious nor agitated  Skin:  No jaundice, small blistering lesions in keen and popliteal areas    Reviewed most current lab test results and cultures  YES  Reviewed most current radiology test results   YES  Review and summation of old records today    NO  Reviewed patient's current orders and MAR    YES  PMH/ reviewed - no change compared to H&P  ________________________________________________________________________  Care Plan discussed with:    Comments   Patient y    Family      RN y    Care Manager     Consultant                        Multidiciplinary team rounds were held today with , nursing, pharmacist and clinical coordinator. Patient's plan of care was discussed; medications were reviewed and discharge planning was addressed.      ________________________________________________________________________  Total NON critical care TIME:  35   Minutes    Total CRITICAL CARE TIME Spent:   Minutes non procedure based      Comments   >50% of visit spent in counseling and coordination of care y    ________________________________________________________________________  Laura Mendez MD     Procedures: see electronic medical records for all procedures/Xrays and details which were not copied into this note but were reviewed prior to creation of Plan. LABS:  I reviewed today's most current labs and imaging studies.   Pertinent labs include:  Recent Labs      05/15/17   0401  05/14/17   2040   WBC  5.7  6.0   HGB  10.6*  11.9*   HCT  32.3*  36.5*   PLT  55*  52*     Recent Labs      05/15/17   0401  05/14/17   2040   NA  140  138   K  4.3  3.5   CL  108  106   CO2  21  23   GLU  112*  172*   BUN  24*  23*   CREA  1.72*  1.74*   CA  8.3*  8.7   ALB   --   3.1*   TBILI   --   1.3*   SGOT   --   30   ALT   --   28       Signed: Laura Mendez MD

## 2017-05-15 NOTE — ED NOTES
Assumed care of patient. Patient is alert and oriented, does not appear to be in distress. Patient ambulatory to ED with c/o sob, left sided chest pain and fever.

## 2017-05-16 ENCOUNTER — APPOINTMENT (OUTPATIENT)
Dept: ULTRASOUND IMAGING | Age: 77
DRG: 871 | End: 2017-05-16
Attending: INTERNAL MEDICINE
Payer: MEDICARE

## 2017-05-16 LAB
ATRIAL RATE: 119 BPM
BACTERIA SPEC CULT: NORMAL
CALCULATED P AXIS, ECG09: 73 DEGREES
CALCULATED R AXIS, ECG10: 103 DEGREES
CALCULATED T AXIS, ECG11: -100 DEGREES
CC UR VC: NORMAL
DIAGNOSIS, 93000: NORMAL
P-R INTERVAL, ECG05: 126 MS
Q-T INTERVAL, ECG07: 332 MS
QRS DURATION, ECG06: 112 MS
QTC CALCULATION (BEZET), ECG08: 467 MS
SERVICE CMNT-IMP: NORMAL
VENTRICULAR RATE, ECG03: 119 BPM

## 2017-05-16 PROCEDURE — 76770 US EXAM ABDO BACK WALL COMP: CPT

## 2017-05-16 PROCEDURE — 74011250637 HC RX REV CODE- 250/637: Performed by: INTERNAL MEDICINE

## 2017-05-16 PROCEDURE — 65270000029 HC RM PRIVATE

## 2017-05-16 PROCEDURE — 74011250636 HC RX REV CODE- 250/636: Performed by: INTERNAL MEDICINE

## 2017-05-16 RX ORDER — AZITHROMYCIN 250 MG/1
250 TABLET, FILM COATED ORAL DAILY
Status: DISCONTINUED | OUTPATIENT
Start: 2017-05-17 | End: 2017-05-17

## 2017-05-16 RX ADMIN — Medication 10 ML: at 15:12

## 2017-05-16 RX ADMIN — CARVEDILOL 25 MG: 12.5 TABLET, FILM COATED ORAL at 09:33

## 2017-05-16 RX ADMIN — SODIUM CHLORIDE 50 ML/HR: 900 INJECTION, SOLUTION INTRAVENOUS at 04:06

## 2017-05-16 RX ADMIN — ACYCLOVIR: 50 OINTMENT TOPICAL at 09:34

## 2017-05-16 RX ADMIN — ACYCLOVIR: 50 OINTMENT TOPICAL at 17:30

## 2017-05-16 RX ADMIN — ASPIRIN 81 MG CHEWABLE TABLET 81 MG: 81 TABLET CHEWABLE at 09:35

## 2017-05-16 RX ADMIN — SODIUM CHLORIDE 50 ML/HR: 900 INJECTION, SOLUTION INTRAVENOUS at 04:04

## 2017-05-16 RX ADMIN — CARVEDILOL 25 MG: 12.5 TABLET, FILM COATED ORAL at 16:37

## 2017-05-16 RX ADMIN — ACYCLOVIR 200 MG: 200 CAPSULE ORAL at 15:17

## 2017-05-16 RX ADMIN — Medication 10 ML: at 22:00

## 2017-05-16 RX ADMIN — FUROSEMIDE 40 MG: 10 INJECTION, SOLUTION INTRAMUSCULAR; INTRAVENOUS at 09:35

## 2017-05-16 RX ADMIN — ACYCLOVIR: 50 OINTMENT TOPICAL at 11:50

## 2017-05-16 RX ADMIN — SODIUM CHLORIDE 50 ML/HR: 900 INJECTION, SOLUTION INTRAVENOUS at 09:36

## 2017-05-16 RX ADMIN — ACYCLOVIR 200 MG: 200 CAPSULE ORAL at 09:33

## 2017-05-16 RX ADMIN — FOLIC ACID 1 MG: 1 TABLET ORAL at 09:33

## 2017-05-16 RX ADMIN — ACYCLOVIR: 50 OINTMENT TOPICAL at 21:57

## 2017-05-16 RX ADMIN — ACYCLOVIR 200 MG: 200 CAPSULE ORAL at 11:55

## 2017-05-16 RX ADMIN — ACYCLOVIR: 50 OINTMENT TOPICAL at 00:00

## 2017-05-16 RX ADMIN — ACYCLOVIR 200 MG: 200 CAPSULE ORAL at 21:57

## 2017-05-16 RX ADMIN — ATORVASTATIN CALCIUM 40 MG: 40 TABLET, FILM COATED ORAL at 09:35

## 2017-05-16 RX ADMIN — ACYCLOVIR 200 MG: 200 CAPSULE ORAL at 17:29

## 2017-05-16 RX ADMIN — ACYCLOVIR: 50 OINTMENT TOPICAL at 03:00

## 2017-05-16 NOTE — PROGRESS NOTES
Hospitalist Progress Note    NAME: Conner Ramirez   :  1940   MRN:  [id number]       Interim Hospital Summary: 78 y.o. male whom presented on 2017 with      Assessment / Plan: Thrombocytopenia (since )  -stop lovenox as her count is 55. Acute drop from baseline. SIRS     COPD, with mild exacerbation:   -triggered by CHF or bronchitis. No need for steroids. Cont Bronchodilators  -switch to oral zithromax in AM.   Repeat CXR, doubt PNA    Acute hypoxic Respiratory Failure due to Acute combined HF POA  -CXR Interstitial pulmonary edema with fluid in fissures. proBNP >5000  -Echo EF 66% with diastolic dysfunction  -continue IV lasix  -repeat CXR in AM    ROBINSON  -likely cardio renal syndrome. Continue to monitor - Cr stable  -check renal US     Herpes Zoster, left leg  -finish course of oral Acyclovir    CAD    HTN   -continue asa,coreg and statin    Prostate CA    No UTI. Cultures no growth      Surrogate Decision Maker: Daughter Carlena Nyhan   Baseline: Full ADL  Body mass index is 34.69 kg/(m^2). Code status: Full  Prophylaxis: lovenox  Recommended Disposition:  PT, OT, RN     Subjective:     Chief Complaint / Reason for Physician Visit  Follow up sepsis, resp failure CAP, CHF, zoster rash  Breathing better and now off oxygen. Review of Systems:  Symptom Y/N Comments  Symptom Y/N Comments   Fever/Chills n   Chest Pain     Poor Appetite    Edema     Cough    Abdominal Pain     Sputum    Joint Pain     SOB/PA n   Pruritis/Rash     Nausea/vomit    Tolerating PT/OT     Diarrhea    Tolerating Diet y    Constipation    Other       Could NOT obtain due to:      Objective:     VITALS:   Last 24hrs VS reviewed since prior progress note.  Most recent are:  Patient Vitals for the past 24 hrs:   Temp Pulse Resp BP SpO2   17 1101 98.8 °F (37.1 °C) 96 18 126/73 97 %   17 0906 98.4 °F (36.9 °C) (!) 116 18 120/68 98 %   05/15/17 2023 99 °F (37.2 °C) (!) 101 20 119/69 98 %   05/15/17 1657 98.7 °F (37.1 °C) 100 20 109/69 98 %       Intake/Output Summary (Last 24 hours) at 05/16/17 1345  Last data filed at 05/16/17 1101   Gross per 24 hour   Intake              695 ml   Output             1950 ml   Net            -1255 ml        PHYSICAL EXAM:  General: WD, WN. Alert, cooperative, fever, knee pain  EENT:  EOMI. Anicteric sclerae. MMM  Resp:  Coarse BS. No accessory muscle use  CV:  Regular  rhythm,  No edema  GI:  Soft, Non distended, Non tender.  +Bowel sounds  Neurologic:  Alert and oriented X 3, normal speech,   Psych:   Good insight. Not anxious nor agitated  Skin:  No jaundice, small blistering lesions in keen and popliteal areas    Reviewed most current lab test results and cultures  YES  Reviewed most current radiology test results   YES  Review and summation of old records today    NO  Reviewed patient's current orders and MAR    YES  PMH/ reviewed - no change compared to H&P  ________________________________________________________________________  Care Plan discussed with:    Comments   Patient y    Family      RN y    Care Manager     Consultant                        Multidiciplinary team rounds were held today with , nursing, pharmacist and clinical coordinator. Patient's plan of care was discussed; medications were reviewed and discharge planning was addressed. ________________________________________________________________________  Total NON critical care TIME:  35   Minutes    Total CRITICAL CARE TIME Spent:   Minutes non procedure based      Comments   >50% of visit spent in counseling and coordination of care y    ________________________________________________________________________  Marisol Loyola MD     Procedures: see electronic medical records for all procedures/Xrays and details which were not copied into this note but were reviewed prior to creation of Plan. LABS:  I reviewed today's most current labs and imaging studies.   Pertinent labs include:  Recent Labs      05/15/17   0401  05/14/17 2040   WBC  5.7  6.0   HGB  10.6*  11.9*   HCT  32.3*  36.5*   PLT  55*  52*     Recent Labs      05/15/17   0401 05/14/17 2040   NA  140  138   K  4.3  3.5   CL  108  106   CO2  21  23   GLU  112*  172*   BUN  24*  23*   CREA  1.72*  1.74*   CA  8.3*  8.7   ALB   --   3.1*   TBILI   --   1.3*   SGOT   --   30   ALT   --   28       Signed: Ela Conway MD

## 2017-05-16 NOTE — CARDIO/PULMONARY
Cardiopulmonary Rehab Nursing Entry:    Chart Reviewed. Admitting diagnosis of Fever and Tachycardia. Pt is a current day smoker. Teaching last received on 2/16. Focus of teaching today on CHF.     PMH significant for:  -CHF LV EF 40-45%  -COPD  -CAD  -HTN    Met with pt for f/u. Pt is able to state foods that he should avoid but, eats them because he likes hot dogs and albert. Has a scale but, does not use it because he gets up and goes to work. He knows he takes one lasix pill a day and takes it regularly. He mows the grass and occassionally takes walks. Reinforced CHF instructions. Pt says he knws he hasnt been doing what he should. Pt without additional concerns.

## 2017-05-16 NOTE — PROGRESS NOTES
End of Shift Nursing Note    Bedside shift change report given to Lili Murillo (oncoming nurse) by Alvaro Concepcion (offgoing nurse). Report included the following information SBAR, Kardex, Intake/Output and MAR. Zone Phone:   [phone]    Significant changes during shift:    none   Non-emergent issues for physician to address:   none     Number times ambulated in hallway past shift: 0      Number of times OOB to chair past shift: 2    POD #:      Vital Signs:    Temp: 99 °F (37.2 °C)     Pulse (Heart Rate): (!) 101     BP: 119/69     Resp Rate: 20     O2 Sat (%): 98 %    Lines & Drains:     Urinary Catheter? No   Placement Date:    Skin Integrity:      Wounds: no   Dressings Present: no    Wound Concerns: no      GI:    Current diet:  DIET CARDIAC Regular    Nausea: NO  Vomiting: NO  Bowel Sounds: Yes  Flatus: YES  Last Bowel Movement: yesterday   Appearance:     Respiratory:  Supplemental O2: No      Device:    via  Liters/min     Incentive Spirometer: YES  Volume: 1200  Coughing and Deep Breathing: YES  Oral Care: YES  Understanding (patient/family education): YES   Getting out of bed: YES  Head of bed elevation: YES    Patient Safety:    Falls Score: 2  Mobility Score: 1  Bed Alarm On? No  Sitter? No      Opportunity for questions and clarification was given to oncoming nurse. Patient bed is in low position, side rails are up x 2, door & observation blinds open as needed, call bell within reach and patient not in distress.     Tri Fisher RN

## 2017-05-17 ENCOUNTER — APPOINTMENT (OUTPATIENT)
Dept: GENERAL RADIOLOGY | Age: 77
DRG: 871 | End: 2017-05-17
Attending: INTERNAL MEDICINE
Payer: MEDICARE

## 2017-05-17 LAB
ANION GAP BLD CALC-SCNC: 9 MMOL/L (ref 5–15)
APPEARANCE UR: CLEAR
BACTERIA URNS QL MICRO: NEGATIVE /HPF
BASOPHILS # BLD AUTO: 0 K/UL (ref 0–0.1)
BASOPHILS # BLD: 0 % (ref 0–1)
BILIRUB UR QL CFM: NEGATIVE
BUN SERPL-MCNC: 20 MG/DL (ref 6–20)
BUN/CREAT SERPL: 13 (ref 12–20)
CALCIUM SERPL-MCNC: 8.9 MG/DL (ref 8.5–10.1)
CHLORIDE SERPL-SCNC: 104 MMOL/L (ref 97–108)
CHLORIDE UR-SCNC: 50 MMOL/L
CK SERPL-CCNC: 51 U/L (ref 39–308)
CO2 SERPL-SCNC: 24 MMOL/L (ref 21–32)
COLOR UR: ABNORMAL
CREAT SERPL-MCNC: 1.49 MG/DL (ref 0.7–1.3)
CREAT UR-MCNC: 211 MG/DL
EOSINOPHIL # BLD: 0.1 K/UL (ref 0–0.4)
EOSINOPHIL #/AREA URNS HPF: NEGATIVE /[HPF]
EOSINOPHIL NFR BLD: 3 % (ref 0–7)
EPITH CASTS URNS QL MICRO: ABNORMAL /LPF
ERYTHROCYTE [DISTWIDTH] IN BLOOD BY AUTOMATED COUNT: 15.2 % (ref 11.5–14.5)
FERRITIN SERPL-MCNC: 1482 NG/ML (ref 26–388)
FOLATE SERPL-MCNC: 19.8 NG/ML (ref 5–21)
GLUCOSE SERPL-MCNC: 101 MG/DL (ref 65–100)
GLUCOSE UR STRIP.AUTO-MCNC: NEGATIVE MG/DL
HAPTOGLOB SERPL-MCNC: 385 MG/DL (ref 30–200)
HCT VFR BLD AUTO: 33.4 % (ref 36.6–50.3)
HGB BLD-MCNC: 11 G/DL (ref 12.1–17)
HGB UR QL STRIP: ABNORMAL
KETONES UR QL STRIP.AUTO: NEGATIVE MG/DL
LACTATE SERPL-SCNC: 1.3 MMOL/L (ref 0.4–2)
LDH SERPL L TO P-CCNC: 198 U/L (ref 85–241)
LEUKOCYTE ESTERASE UR QL STRIP.AUTO: NEGATIVE
LYMPHOCYTES # BLD AUTO: 12 % (ref 12–49)
LYMPHOCYTES # BLD: 0.5 K/UL (ref 0.8–3.5)
MAGNESIUM SERPL-MCNC: 2.3 MG/DL (ref 1.6–2.4)
MCH RBC QN AUTO: 26.1 PG (ref 26–34)
MCHC RBC AUTO-ENTMCNC: 32.9 G/DL (ref 30–36.5)
MCV RBC AUTO: 79.1 FL (ref 80–99)
MONOCYTES # BLD: 0.5 K/UL (ref 0–1)
MONOCYTES NFR BLD AUTO: 11 % (ref 5–13)
NEUTS SEG # BLD: 3.1 K/UL (ref 1.8–8)
NEUTS SEG NFR BLD AUTO: 74 % (ref 32–75)
NITRITE UR QL STRIP.AUTO: NEGATIVE
OSMOLALITY UR: 705 MOSM/KG H2O
PH UR STRIP: 5.5 [PH] (ref 5–8)
PLATELET # BLD AUTO: 64 K/UL (ref 150–400)
POTASSIUM SERPL-SCNC: 3.6 MMOL/L (ref 3.5–5.1)
PROT UR STRIP-MCNC: 100 MG/DL
PROT UR-MCNC: 155 MG/DL (ref 0–11.9)
PROT/CREAT UR-RTO: 0.7
RBC # BLD AUTO: 4.22 M/UL (ref 4.1–5.7)
RBC #/AREA URNS HPF: ABNORMAL /HPF (ref 0–5)
RBC MORPH BLD: ABNORMAL
RBC MORPH BLD: ABNORMAL
SODIUM SERPL-SCNC: 137 MMOL/L (ref 136–145)
SODIUM UR-SCNC: 46 MMOL/L
SP GR UR REFRACTOMETRY: 1.02 (ref 1–1.03)
UA: UC IF INDICATED,UAUC: ABNORMAL
UROBILINOGEN UR QL STRIP.AUTO: 1 EU/DL (ref 0.2–1)
UUN UR-MCNC: 1196 MG/DL
WBC # BLD AUTO: 4.2 K/UL (ref 4.1–11.1)
WBC URNS QL MICRO: ABNORMAL /HPF (ref 0–4)

## 2017-05-17 PROCEDURE — 71010 XR CHEST PORT: CPT

## 2017-05-17 PROCEDURE — 82436 ASSAY OF URINE CHLORIDE: CPT | Performed by: INTERNAL MEDICINE

## 2017-05-17 PROCEDURE — 87205 SMEAR GRAM STAIN: CPT | Performed by: INTERNAL MEDICINE

## 2017-05-17 PROCEDURE — 87040 BLOOD CULTURE FOR BACTERIA: CPT | Performed by: INTERNAL MEDICINE

## 2017-05-17 PROCEDURE — 84156 ASSAY OF PROTEIN URINE: CPT | Performed by: INTERNAL MEDICINE

## 2017-05-17 PROCEDURE — 65270000029 HC RM PRIVATE

## 2017-05-17 PROCEDURE — 74011000258 HC RX REV CODE- 258: Performed by: INTERNAL MEDICINE

## 2017-05-17 PROCEDURE — 84300 ASSAY OF URINE SODIUM: CPT | Performed by: INTERNAL MEDICINE

## 2017-05-17 PROCEDURE — 87252 VIRUS INOCULATION TISSUE: CPT | Performed by: INTERNAL MEDICINE

## 2017-05-17 PROCEDURE — 80048 BASIC METABOLIC PNL TOTAL CA: CPT | Performed by: INTERNAL MEDICINE

## 2017-05-17 PROCEDURE — 84155 ASSAY OF PROTEIN SERUM: CPT | Performed by: INTERNAL MEDICINE

## 2017-05-17 PROCEDURE — 36415 COLL VENOUS BLD VENIPUNCTURE: CPT | Performed by: INTERNAL MEDICINE

## 2017-05-17 PROCEDURE — 82746 ASSAY OF FOLIC ACID SERUM: CPT | Performed by: INTERNAL MEDICINE

## 2017-05-17 PROCEDURE — 83615 LACTATE (LD) (LDH) ENZYME: CPT | Performed by: INTERNAL MEDICINE

## 2017-05-17 PROCEDURE — 82525 ASSAY OF COPPER: CPT | Performed by: INTERNAL MEDICINE

## 2017-05-17 PROCEDURE — 83935 ASSAY OF URINE OSMOLALITY: CPT | Performed by: INTERNAL MEDICINE

## 2017-05-17 PROCEDURE — 83735 ASSAY OF MAGNESIUM: CPT | Performed by: INTERNAL MEDICINE

## 2017-05-17 PROCEDURE — 82607 VITAMIN B-12: CPT | Performed by: INTERNAL MEDICINE

## 2017-05-17 PROCEDURE — 82550 ASSAY OF CK (CPK): CPT | Performed by: INTERNAL MEDICINE

## 2017-05-17 PROCEDURE — 82728 ASSAY OF FERRITIN: CPT | Performed by: INTERNAL MEDICINE

## 2017-05-17 PROCEDURE — 74011250637 HC RX REV CODE- 250/637: Performed by: INTERNAL MEDICINE

## 2017-05-17 PROCEDURE — 74011250636 HC RX REV CODE- 250/636: Performed by: INTERNAL MEDICINE

## 2017-05-17 PROCEDURE — 82784 ASSAY IGA/IGD/IGG/IGM EACH: CPT | Performed by: INTERNAL MEDICINE

## 2017-05-17 PROCEDURE — 84540 ASSAY OF URINE/UREA-N: CPT | Performed by: INTERNAL MEDICINE

## 2017-05-17 PROCEDURE — 85025 COMPLETE CBC W/AUTO DIFF WBC: CPT | Performed by: INTERNAL MEDICINE

## 2017-05-17 PROCEDURE — 83010 ASSAY OF HAPTOGLOBIN QUANT: CPT | Performed by: INTERNAL MEDICINE

## 2017-05-17 PROCEDURE — 81001 URINALYSIS AUTO W/SCOPE: CPT | Performed by: INTERNAL MEDICINE

## 2017-05-17 PROCEDURE — 83605 ASSAY OF LACTIC ACID: CPT | Performed by: INTERNAL MEDICINE

## 2017-05-17 RX ORDER — FUROSEMIDE 10 MG/ML
40 INJECTION INTRAMUSCULAR; INTRAVENOUS DAILY
Status: DISCONTINUED | OUTPATIENT
Start: 2017-05-17 | End: 2017-05-22

## 2017-05-17 RX ORDER — VANCOMYCIN/0.9 % SOD CHLORIDE 1.5G/250ML
1500 PLASTIC BAG, INJECTION (ML) INTRAVENOUS EVERY 24 HOURS
Status: DISCONTINUED | OUTPATIENT
Start: 2017-05-18 | End: 2017-05-22

## 2017-05-17 RX ORDER — SODIUM CHLORIDE 0.9 % (FLUSH) 0.9 %
5-10 SYRINGE (ML) INJECTION AS NEEDED
Status: CANCELLED | OUTPATIENT
Start: 2017-05-17

## 2017-05-17 RX ADMIN — ACYCLOVIR: 50 OINTMENT TOPICAL at 22:18

## 2017-05-17 RX ADMIN — ATORVASTATIN CALCIUM 40 MG: 40 TABLET, FILM COATED ORAL at 11:17

## 2017-05-17 RX ADMIN — ACYCLOVIR 200 MG: 200 CAPSULE ORAL at 14:36

## 2017-05-17 RX ADMIN — FOLIC ACID 1 MG: 1 TABLET ORAL at 11:18

## 2017-05-17 RX ADMIN — ACYCLOVIR 200 MG: 200 CAPSULE ORAL at 19:30

## 2017-05-17 RX ADMIN — AZITHROMYCIN 250 MG: 250 TABLET, FILM COATED ORAL at 11:16

## 2017-05-17 RX ADMIN — ACYCLOVIR: 50 OINTMENT TOPICAL at 11:19

## 2017-05-17 RX ADMIN — ACYCLOVIR: 50 OINTMENT TOPICAL at 19:10

## 2017-05-17 RX ADMIN — ACYCLOVIR: 50 OINTMENT TOPICAL at 00:59

## 2017-05-17 RX ADMIN — CARVEDILOL 25 MG: 12.5 TABLET, FILM COATED ORAL at 16:28

## 2017-05-17 RX ADMIN — Medication 10 ML: at 14:35

## 2017-05-17 RX ADMIN — ACYCLOVIR 200 MG: 200 CAPSULE ORAL at 11:18

## 2017-05-17 RX ADMIN — ASPIRIN 81 MG CHEWABLE TABLET 81 MG: 81 TABLET CHEWABLE at 11:16

## 2017-05-17 RX ADMIN — ACYCLOVIR: 50 OINTMENT TOPICAL at 03:12

## 2017-05-17 RX ADMIN — Medication 10 ML: at 22:00

## 2017-05-17 RX ADMIN — ACYCLOVIR 200 MG: 200 CAPSULE ORAL at 22:18

## 2017-05-17 RX ADMIN — FUROSEMIDE 40 MG: 10 INJECTION, SOLUTION INTRAMUSCULAR; INTRAVENOUS at 19:10

## 2017-05-17 RX ADMIN — PIPERACILLIN SODIUM,TAZOBACTAM SODIUM 3.38 G: 3; .375 INJECTION, POWDER, FOR SOLUTION INTRAVENOUS at 22:20

## 2017-05-17 RX ADMIN — ACYCLOVIR: 50 OINTMENT TOPICAL at 14:36

## 2017-05-17 RX ADMIN — VANCOMYCIN HYDROCHLORIDE 2500 MG: 10 INJECTION, POWDER, LYOPHILIZED, FOR SOLUTION INTRAVENOUS at 19:06

## 2017-05-17 NOTE — CDMP QUERY
Dr Guillermo Ceballos    Please clarify if this patient is being treated/managed for:    =>sepsis poa in setting of SIRS with UTI, acute renal failure, lactic acid 1.9, bands 13; borderline low BP/67--106//56, code 'sepsis' activated from triage. =>Other Explanation of clinical findings  =>Unable to Determine (no explanation of clinical findings)    The medical record reflects the following clinical findings, treatment, and risk factors:    Risk Factors:   Clinical Indicators: sirs syndrome with infection source: UTI/pneumonia; Acute renal failure, lactic acid 1.9, bands 13; borderline low BP/67--106//56, code 'sepsis' activated from triage. Treatment: 500 cc bolus ns, zithromax, Rocephin,     Please clarify and document your clinical opinion in the progress notes and discharge summary including the definitive and/or presumptive diagnosis, (suspected or probable), related to the above clinical findings. Please include clinical findings supporting your diagnosis. Thank Charan Wetzel  [address]; WPT;3845

## 2017-05-17 NOTE — CONSULTS
[address]   0 [facility]       Name:  Elayne Guido   MR#:  [id number]   :  1940   Account #:  [de-identified]    Date of Consultation:  2017   Date of Adm:  2017       IMPRESSION: Thrombocytopenia. HISTORY OF PRESENT ILLNESS:  He has actually been mildly   thrombocytopenic back to 2016 with fairly significant   worsening at the time of this admission. I reviewed the blood smear. There are no worrisome features like schistocytes or leukemic cells,   but I am unable to exclude a myelodysplastic syndrome. I will screen   for deficiency states, and multiple myeloma. I suspect that the acute   drop is indeed related to infection or low-grade DIC-like process, but   once the acute illness is improved, he will likely need a deeper   examination as to why his baseline platelet count is less than 100,000   and why it has slowly dropped over the last year. That would not be   typical of a process such as immune-mediated thrombocytopenia   purpura, but more likely intrinsic bone marrow pathology. I do very   much appreciate the consult. DISCUSSION: The patient is a rather pleasant 68-year-old gentleman   who came into the hospital with complaints of cough with sputum   production for the last 2 days, he had been feeling warm. He had   noted a mild racing of his heart. He underwent a chest x-ray which was   read as showing interstitial edema. He was thought to be in possibly   congestive heart failure. He was thought to potentially have a   community-acquired pneumonia based on his clinical presentation or a   mild exacerbation of his COPD. He was started on IV ceftriaxone as   well as Zithromax. He was seen earlier by Dr. Nancy Ledesma, who noted that   the platelet count was rather low in the 50,000 range and he stopped   the Lovenox.   He was noted to have herpes zoster involving the left leg   and was on oral acyclovir and the decision had been made at the time   of admission to have him on Zovirax capsules 200 mg 5 times a day   and to continue this. He was also given topical acyclovir for his   cutaneous lesions as well. He was changed to Zosyn on Wednesday 17th and continued on his azithromycin. He was also started on   vancomycin on the 17th. PAST MEDICAL HISTORY: Significant for acute on chronic systolic   failure,  prostate cancer, COPD, hypertension, prior cardiac   catheterization showing no significant coronary artery disease, but   severe LV dysfunction. COPD. He has had a colonoscopy. FAMILY HISTORY: Unremarkable. He reports that he has adopted. To   his knowledge no diseases run in the family. SOCIAL HISTORY: Reveals that he does smoke 2 packs per day for   20 years. He admits that he was supposed to stop this years ago, but   even smoked on the day of his admission to the hospital. He does not   drink alcohol. 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 MEDICATIONS AT PRESENT INCLUDE   1. Zovirax ointment every 3 hours. 2. Acyclovir capsules 200 mg po 5 times a day. 3. Chewable aspirin 81 mg a day. 4. Lipitor 40 mg daily. 5. Zithromax 500 mg IV daily. 6. Coreg 25 mg b.i.d. with meals. 7. Folic acid 1 mg daily. 8. Lasix 40 mg IV. 9. Zosyn 3.375 grams q.8h.   10. Vancomycin 1500 mg every 24 hours. 11. P.r.n. orders for acetaminophen. 12. Albuterol. 13. Hydralazine. 14. Hydrocodone/acetaminophen. 15. Saline flushes. REVIEW OF SYSTEMS   Reveals no particular ENT, cardiovascular, GI, , musculoskeletal,   dermatologic, respiratory, cardiovascular, psychiatric or infectious   symptoms other than as mentioned in the HPI. PHYSICAL EXAMINATION   GENERAL: He is alert, pleasant, currently in no acute distress. VITAL SIGNS: Temperature 101.1, pulse 61, respiratory rate 20, blood   pressure 120/72 with an O2 saturation of 100%. HEENT: Currently unremarkable.    NECK: Supple, without adenopathy. LUNGS: Clear to auscultation. CARDIOVASCULAR: Reveals regular rate and rhythm. ABDOMEN: Soft, nontender with normoactive bowel sounds. No   obvious hepatosplenomegaly appreciated. EXTREMITIES: Currently unremarkable. SKIN:  Examination of the skin shows clustered vesicles over the   lateral aspect of the right lower extremity. LABORATORY DATA: Reveals a white count of 4.2, hemoglobin 11,   hematocrit 33.4, platelet count 99,110. Sodium 137, potassium 3.6,   BUN 20, creatinine 1.49, glucose 101. M.  Ya Argueta MD      [facility] / DV   D:  05/17/2017   17:35   T:  05/17/2017   18:45   Job #:  [id number]

## 2017-05-17 NOTE — CONSULTS
Impression: thrombocytopenia - he has actually been mildly thrombocytopenic back to February 2016 with fairly significant worsening at the time of this admission. I have reviewed the blood smear and there are no worrisome features like schistocytes or leukemic cells but I am unable to exclude MDS. I will screen for deficiency states and multiple myeloma. I suspect that the acute drop is indeed related to infection or low grade DIC like process but once the acute illness is improved, he will likely need a deeper examination as to why his baseline plt count is less than 100k and why it has been slowly dropping over the last year - that would not be typical of ITP but more likely intrinsic bone marrow pathology. Thanks for the consult!  Lynad Mello MD FACP

## 2017-05-17 NOTE — PROGRESS NOTES
Pharmacy Automatic Renal Dosing Protocol - Antimicrobials      Indication for Antimicrobials: Cellulitis, sepsis   Current Regimen of Each Antimicrobial (Start Day & Day of Therapy):  Vancomycin 2500 mg IV load followed by 1500 mg IV every 24 hours ( Day #1)  Zosyn 3.375 g IV every 6 hours ( Day #1)    Significant cultures:    Blood: NGTD x 3 days- pending   Urine: pending      CAPD, Intermittent Hemodialysis or Renal Replacement Therapy: None documented  Paralysis, amputations, malnutrition: None documented  Recent Labs      17   1312  05/15/17   0401  17   CREA  1.49*  1.72*  1.74*   BUN  20  24*  23*   WBC  4.2  5.7  6.0     Temp (24hrs), Av.8 ° F (37.7 ° C), Min:97.8 ° F (36.6 ° C), Max:101.1 ° F (38.4 ° C)    Creatinine Clearance (ml/min): 39 mL/min       Goal Vancomycin Level(s): ~15 mcg/mL      Impression/Plan: Will order vancomycin 2500 mg IV load followed by 1500 mg IV every 24 hours for an estimated trough of 15.3 mcg/mL. Will change Zosyn 3.375 g IV every 8 hours extended infusion per protocol. Pharmacy will follow daily and adjust doses of monitored medications as appropriate for renal function and/or serum levels.     Thank you,  Ema Whalen, PHARMD

## 2017-05-17 NOTE — PROGRESS NOTES
End of Shift Nursing Note    Bedside shift change report given to Al (oncoming nurse) by Olayinka Chairez (offgoing nurse). Report included the following information SBAR, Kardex, Intake/Output and Recent Results. Zone Phone:       Significant changes during shift:   Patient refused to have his labs drawn, this nurse attempted once and patient told her that he did not mean no harm but he got two IV lines and if we cannot get the blood from there he will not allow anyone to stick him. Nurse explained to patient that we are not allowed to draw labs from his peripheral IV lines, he got really upset ad stated that when the doctor comes in today, he will tell him to take out the IV from his hand if we cannot draw his blood from them. Patient lesions on his legs are oozing, nurse offered to change the linen but patient refused. Non-emergent issues for physician to address:   None     Number times ambulated in hallway past shift: 0      Number of times OOB to chair past shift: 1    POD #: 0     Vital Signs:    Temp: 97.8 °F (36.6 °C)     Pulse (Heart Rate): (!) 101     BP: 147/66     Resp Rate: 18     O2 Sat (%): 97 %    Lines & Drains:     Urinary Catheter? No   Placement Date:    Medical Necessity:   Central Line? No   Placement Date:    Medical Necessity:   PICC Line?  No   Placement Date:    Medical Necessity:     NG tube [] in [] removed [] not applicable   Drains [] in [] removed [] not applicable     Skin Integrity:      Wounds: no   Dressings Present: no    Wound Concerns: no      GI:    Current diet:  DIET CARDIAC Regular    Nausea: NO  Vomiting: NO  Bowel Sounds: YES  Flatus: YES  Last Bowel Movement: yesterday   Appearance: unobserved    Respiratory:  Supplemental O2: No      Device:    via  Liters/min     Incentive Spirometer: YES  Volume: 750  Coughing and Deep Breathing: YES  Oral Care: YES  Understanding (patient/family education): YES   Getting out of bed: YES  Head of bed elevation: YES    Patient Safety:    Falls Score: 0  Mobility Score: 5  Bed Alarm On? No  Sitter? No      Opportunity for questions and clarification was given to oncoming nurse. Patient bed is in low position, side rails are up x 2, door & observation blinds open as needed, call bell within reach and patient not in distress.     Saturnino Jarrett

## 2017-05-17 NOTE — CONSULTS
Nephrology Consult Note     [facility]aw [facility]     www. [email or web address]                    Phone - [phone]   Patient: Basilio Flores  YOB: 1940     Date- 5/17/2017   MRN: [id number]  Gae Meigs, MD   Age: 78 y.o. Sex: male      ADMIT DATE:5/14/2017    CONSULTATION DATE:5/17/2017                REASON FOR CONSULTATION: I have been asked to see this patient by Jd De La Cruz for  ACUTE RENAL FAILURE  ASSESSMENT:   Acute renal failure secondary to multiple possibilities. 1.Pre renal factors -due to lasix use  2. Intra reanl factors - AIN due to abx use , cardiorenal syndrome, TTP less likely- he is on po acyclovir. - acyclovir induced nephrotoxicity less likely. 3.Post renal factors -urinary retention  ·   · Left renal cyst  · chf  · Sob  · Rash - herpes zoster  · Thrombocytopenia  · H/o HTN  · H/o prostate cancer  · Active Problems:  ·   Acute respiratory failure (HCC) (5/14/2017)  ·   ·   PLAN:   · Check LDH, haptoglobin  · Check CPK  · Continue lasix for chf - we will have to accept higher cr. To achieve better resp. status  · Check post void bladder scan  · Repeat urine analysis to check for crystals  · Avoid hypotension  · Avoid acei or arb  · DR. Lola Robert consulted from thrombocytopenia  · Check     [] High complexity decision making was performed  [] Patient is at high-risk of decompensation with multiple organ involvement    Subjective:   HPI: Basilio Flores is a 78 y.o.  male. He is admitted with sob. He has been getting iv diuretics for edema and sob. His cr. increased to 1.7 from 1.4 on admission. He has long standing h/o hypertension. He has h/o prostate cancer. He reports weak urinary stream. He denies dysuria or hematuria. He is getting iv antibiotics since admission. He is on acyclovir for herpes zoster. His pro bnp was 5713 on admission  He denies taking NSAIDS  No h/o DM or renal stone  He denies any vomiting or diarrhea.   No fever  He c/o cough and sob  Review of Systems:  A 11 point review of system was performed today. Pertinent positives and negatives are mentioned in the HPI. The reminder of the ROS is negative and noncontributory. Past Medical Hx:   Past Medical History:   Diagnosis Date    Acute on chronic systolic HF (heart failure) ([facility] 2/11/2014    11/15 Echo EF 40-45%    Cancer (HCC)     prostate    COPD (chronic obstructive pulmonary disease) ([facility]     Elevated troponin 2/11/2014    Hypertension     S/P cardiac cath 2/12/2014 2/12/14 no significant coronary disease, severe LV dysfunction        Past Surgical Hx:     Past Surgical History:   Procedure Laterality Date    COLONOSCOPY,DIAGNOSTIC  2/22/2016        Sofia Aliment  2/22/2016            Social Hx:  reports that he has been smoking Cigarettes. He has a 40.00 pack-year smoking history. He uses smokeless tobacco. He reports that he does not drink alcohol or use illicit drugs. Family History   Problem Relation Age of Onset    Adopted: Yes     Medications:  Prior to Admission medications    Medication Sig Start Date End Date Taking? Authorizing Provider   HYDROcodone-acetaminophen (NORCO) 5-325 mg per tablet Take 1 Tab by mouth every four (4) hours as needed for Pain. Max Daily Amount: 6 Tabs. 4/9/17   Scottie Chowdhury MD   furosemide (LASIX) 40 mg tablet TAKE ONE TABLET BY MOUTH DAILY. *DOSE  REDUCED  12/17/15* 1/3/17   Avel Hurley NP   albuterol (PROVENTIL HFA, VENTOLIN HFA, PROAIR HFA) 90 mcg/actuation inhaler Take 2 Puffs by inhalation every four (4) hours as needed for Wheezing. 12/29/16   [employer], DO   VIAGRA 50 mg tablet  8/22/16   Historical Provider   aspirin 81 mg chewable tablet Take 1 Tab by mouth daily. 3/3/16   Esteban Bang MD   atorvastatin (LIPITOR) 40 mg tablet Take 40 mg by mouth daily. Historical Provider   carvedilol (COREG) 25 mg tablet Take 25 mg by mouth two (2) times daily (with meals). Historical Provider   enalapril (VASOTEC) 20 mg tablet Take 40 mg by mouth daily. Historical Provider       No Known Allergies   Objective:    Vitals:    Vitals:    05/16/17 1101 05/16/17 1945 05/17/17 1112 05/17/17 1432   BP: 126/73 147/66 112/68 118/69   Pulse: 96 (!) 101 100 99   Resp: 18 18 20 20   Temp: 98.8 °F (37.1 °C) 97.8 °F (36.6 °C) 99.2 °F (37.3 °C) (!) 100.9 °F (38.3 °C)   SpO2: 97% 97% 100% 98%   Weight:       Height:         I&O's:  05/16 0701 - 05/17 0700  In: -   Out: 1250 [Urine:1250]    Physical Exam:  General:Alert, No distress,   Eyes:No scleral icterus, No conjunctival pallor  Neck:Supple,no mass palpable,no thyromegaly  Lungs:Clears to auscultation Bilaterally, normal respiratory effort  CVS:RRR, S1 S2 normal,  No rub,  Abdomen:Soft, Non tender, No hepatosplenomegaly  Extremities: + LE edema  Skin:b/l lower ext - vesicular lesion +  Psych: appropriate affect , good mood  :  No rocha  Musculoskeletal : no redness, no joint tenderness  NEURO: Normal Speech, Non focal    CODE STATUS:  full  Care Plan discussed with:  patient     Chart reviewed. ECG[de-identified] Rev:yes  Xray/CT/US/MRI REV:yes  Renal usg  The right kidney measures 10.0 cm. The left kidney measures 14.7 cm with mid to lower pole cyst measuring 6.1 x 8.2  x 8.8 cm. No hydronephrosis is evident. The proximal abdominal aorta is normal in caliber; the mid to distal tibial  aorta and aortic bifurcation are obscured by bowel gas. The visualized IVC is unremarkable.    The urinary bladder shows a satisfactory configuration.         IMPRESSION  IMPRESSION: 8.8 cm left mid to lower pole renal cyst.     No hydronephrosis.      Lab Data Personally Reviewed: (see below)  Recent Labs      05/17/17   1312  05/15/17   0401  05/14/17   2040   NA  137  140  138   K  3.6  4.3  3.5   CL  104  108  106   CO2  24  21  23   GLU  101*  112*  172*   BUN  20  24*  23*   CREA  1.49*  1.72*  1.74*   CA  8.9  8.3*  8.7   MG  2.3   --    --      Recent Labs 05/17/17   1312  05/15/17   0401  05/14/17   2040   WBC  4.2  5.7  6.0   HGB  11.0*  10.6*  11.9*   HCT  33.4*  32.3*  36.5*   PLT  64*  55*  52*     Recent Labs      05/17/17   1312  05/15/17   0401  05/14/17 2040   NA  137  140  138   K  3.6  4.3  3.5   CL  104  108  106   CO2  24  21  23   BUN  20  24*  23*   CREA  1.49*  1.72*  1.74*   GLU  101*  112*  172*   CA  8.9  8.3*  8.7   MG  2.3   --    --      Lab Results   Component Value Date/Time    Color ORANGE 05/14/2017 09:40 PM    Appearance CLOUDY 05/14/2017 09:40 PM    Specific gravity 1.023 05/14/2017 09:40 PM    pH (UA) 5.0 05/14/2017 09:40 PM    Protein 30 05/14/2017 09:40 PM    Glucose NEGATIVE  05/14/2017 09:40 PM    Ketone TRACE 05/14/2017 09:40 PM    Bilirubin SMALL 02/08/2014 10:25 AM    Urobilinogen 2.0 05/14/2017 09:40 PM    Nitrites NEGATIVE  05/14/2017 09:40 PM    Leukocyte Esterase MODERATE 05/14/2017 09:40 PM    Epithelial cells MODERATE 05/14/2017 09:40 PM    Bacteria 2+ 05/14/2017 09:40 PM    WBC 10-20 05/14/2017 09:40 PM    RBC 5-10 05/14/2017 09:40 PM     Lab Results   Component Value Date/Time    Culture result: NO GROWTH 1 DAY 05/14/2017 09:40 PM    Culture result: NO GROWTH 3 DAYS 05/14/2017 08:40 PM    Culture result: NO GROWTH 1 DAY 04/09/2017 07:36 AM     Prior to Admission Medications   Prescriptions Last Dose Informant Patient Reported? Taking? HYDROcodone-acetaminophen (NORCO) 5-325 mg per tablet   No No   Sig: Take 1 Tab by mouth every four (4) hours as needed for Pain. Max Daily Amount: 6 Tabs. VIAGRA 50 mg tablet   Yes No   albuterol (PROVENTIL HFA, VENTOLIN HFA, PROAIR HFA) 90 mcg/actuation inhaler   No No   Sig: Take 2 Puffs by inhalation every four (4) hours as needed for Wheezing. aspirin 81 mg chewable tablet   No No   Sig: Take 1 Tab by mouth daily. atorvastatin (LIPITOR) 40 mg tablet   Yes No   Sig: Take 40 mg by mouth daily.    carvedilol (COREG) 25 mg tablet   Yes No   Sig: Take 25 mg by mouth two (2) times daily (with meals). enalapril (VASOTEC) 20 mg tablet  Self Yes No   Sig: Take 40 mg by mouth daily. furosemide (LASIX) 40 mg tablet   No No   Sig: TAKE ONE TABLET BY MOUTH DAILY. *DOSE  REDUCED  12/17/15*      Facility-Administered Medications: None       Medications list Personally Reviewed   [x]      Yes     []               No    Thank you for allowing us to participate in the care this patient. We will follow patient with you. Signed By: Tayler Delacruz MD  [facility]  [facility], [place]  [place], [address] [place]  Phone - [phone]         Fax - [phone] [facility] Office  [address] [place] [address]  Phone - [phone]        Fax - [phone]     [email or web address]

## 2017-05-17 NOTE — PROGRESS NOTES
Hospitalist Progress Note    NAME: April George   :  1940   MRN:  [id number]       Interim Hospital Summary: 78 y.o. male whom presented on 2017 with      Assessment / Plan:    Fevers / SIRS  Vesicular type rash with cellulitis   -involves both legs but looks like shingles. Pemphigoid vs bacterial ?  -Will send viral culture and bacterial culture of fluid from one of the blisters.      -continue oral Acyclovir but this is likely not zoster  -send paired blood cultures and start IV vanco and zosyn  -he is bleeding around this rash likely related to his worsening thrombocytopenia  -consult Surgery for tissue biopsy. Discussed with Dr Juan M Tanner    Thrombocytopenia (since )  -stop lovenox. Acute drop from baseline probably related to infection.     -consult hematology. He is bleeding at the area of his rash    COPD, with mild exacerbation:   -triggered by CHF or bronchitis. No need for steroids. Cont Bronchodilators  -switch to oral zithromax in AM.      Acute hypoxic Respiratory Failure due to Acute combined HF POA  -CXR Interstitial pulmonary edema with fluid in fissures. proBNP >5000  -Echo EF 01% with diastolic dysfunction  -repeat CXR still shows CHF  -hold on lasix. He is not hypoxic despite xray. Will wait for renal input. ROBINSON  -likely cardio renal syndrome vs from sepsis vs drugs  -renal US 8.8 cm left mid to lower pole renal cyst.  No hydronephrosis. -avoiding IVF due to CHF. Get renal input    CAD    HTN   -continue asa,coreg and statin    Prostate CA    No UTI. Cultures no growth      Surrogate Decision Maker: Daughter Farida Mcgowan   Baseline: Full ADL  Body mass index is 34.69 kg/(m^2). Code status: Full  Prophylaxis: lovenox  Recommended Disposition:  PT, OT, RN                 Subjective:     Chief Complaint / Reason for Physician Visit  Follow up sepsis, resp failure CAP, CHF, zoster rash  Breathing better and now off oxygen. Labs pending.   Leg rash now bleeding more.     Review of Systems:  Symptom Y/N Comments  Symptom Y/N Comments   Fever/Chills n   Chest Pain     Poor Appetite    Edema     Cough    Abdominal Pain     Sputum    Joint Pain     SOB/PA n   Pruritis/Rash     Nausea/vomit    Tolerating PT/OT     Diarrhea    Tolerating Diet y    Constipation    Other       Could NOT obtain due to:      Objective:     VITALS:   Last 24hrs VS reviewed since prior progress note. Most recent are:  Patient Vitals for the past 24 hrs:   Temp Pulse Resp BP SpO2   05/16/17 1945 97.8 °F (36.6 °C) (!) 101 18 147/66 97 %   05/16/17 1101 98.8 °F (37.1 °C) 96 18 126/73 97 %       Intake/Output Summary (Last 24 hours) at 05/17/17 1100  Last data filed at 05/16/17 1945   Gross per 24 hour   Intake                0 ml   Output             1000 ml   Net            -1000 ml        PHYSICAL EXAM:  General: WD, WN. Alert, cooperative, fever, knee pain  EENT:  EOMI. Anicteric sclerae. MMM  Resp:  Coarse BS. No accessory muscle use  CV:  Regular  rhythm,  No edema  GI:  Soft, Non distended, Non tender.  +Bowel sounds  Neurologic:  Alert and oriented X 3, normal speech,   Psych:   Good insight. Not anxious nor agitated  Skin:  Vesicular rash with bleeding behind left knee and lateral aspect of right leg    Reviewed most current lab test results and cultures  YES  Reviewed most current radiology test results   YES  Review and summation of old records today    NO  Reviewed patient's current orders and MAR    YES  PMH/SH reviewed - no change compared to H&P  ________________________________________________________________________  Care Plan discussed with:    Comments   Patient y    Family      RN y    Care Manager     Consultant  y Dr Shaune Severin team rounds were held today with , nursing, pharmacist and clinical coordinator. Patient's plan of care was discussed; medications were reviewed and discharge planning was addressed. ________________________________________________________________________  Total NON critical care TIME:  35   Minutes    Total CRITICAL CARE TIME Spent:   Minutes non procedure based      Comments   >50% of visit spent in counseling and coordination of care y    ________________________________________________________________________  Omi Pascual MD     Procedures: see electronic medical records for all procedures/Xrays and details which were not copied into this note but were reviewed prior to creation of Plan. LABS:  I reviewed today's most current labs and imaging studies.   Pertinent labs include:  Recent Labs      05/15/17   0401 05/14/17 2040   WBC  5.7  6.0   HGB  10.6*  11.9*   HCT  32.3*  36.5*   PLT  55*  52*     Recent Labs      05/15/17   0401 05/14/17 2040   NA  140  138   K  4.3  3.5   CL  108  106   CO2  21  23   GLU  112*  172*   BUN  24*  23*   CREA  1.72*  1.74*   CA  8.3*  8.7   ALB   --   3.1*   TBILI   --   1.3*   SGOT   --   30   ALT   --   28       Signed: Omi Pascual MD

## 2017-05-18 LAB
ALBUMIN SERPL BCP-MCNC: 2.2 G/DL (ref 3.5–5)
ANION GAP BLD CALC-SCNC: 11 MMOL/L (ref 5–15)
BUN SERPL-MCNC: 20 MG/DL (ref 6–20)
BUN/CREAT SERPL: 14 (ref 12–20)
CALCIUM SERPL-MCNC: 8.9 MG/DL (ref 8.5–10.1)
CHLORIDE SERPL-SCNC: 104 MMOL/L (ref 97–108)
CO2 SERPL-SCNC: 24 MMOL/L (ref 21–32)
CREAT SERPL-MCNC: 1.45 MG/DL (ref 0.7–1.3)
GLUCOSE SERPL-MCNC: 110 MG/DL (ref 65–100)
PHOSPHATE SERPL-MCNC: 2.9 MG/DL (ref 2.6–4.7)
POTASSIUM SERPL-SCNC: 3.5 MMOL/L (ref 3.5–5.1)
SODIUM SERPL-SCNC: 139 MMOL/L (ref 136–145)
VIT B12 SERPL-MCNC: 425 PG/ML (ref 211–911)

## 2017-05-18 PROCEDURE — 74011250636 HC RX REV CODE- 250/636: Performed by: INTERNAL MEDICINE

## 2017-05-18 PROCEDURE — 88305 TISSUE EXAM BY PATHOLOGIST: CPT | Performed by: SURGERY

## 2017-05-18 PROCEDURE — 65270000029 HC RM PRIVATE

## 2017-05-18 PROCEDURE — 74011250637 HC RX REV CODE- 250/637: Performed by: INTERNAL MEDICINE

## 2017-05-18 PROCEDURE — 88312 SPECIAL STAINS GROUP 1: CPT | Performed by: SURGERY

## 2017-05-18 PROCEDURE — 51798 US URINE CAPACITY MEASURE: CPT

## 2017-05-18 PROCEDURE — 74011000258 HC RX REV CODE- 258: Performed by: INTERNAL MEDICINE

## 2017-05-18 PROCEDURE — 74011250636 HC RX REV CODE- 250/636: Performed by: SURGERY

## 2017-05-18 PROCEDURE — 36415 COLL VENOUS BLD VENIPUNCTURE: CPT | Performed by: INTERNAL MEDICINE

## 2017-05-18 PROCEDURE — 80069 RENAL FUNCTION PANEL: CPT | Performed by: INTERNAL MEDICINE

## 2017-05-18 PROCEDURE — 74011000258 HC RX REV CODE- 258: Performed by: SURGERY

## 2017-05-18 RX ORDER — POTASSIUM CHLORIDE 750 MG/1
40 TABLET, FILM COATED, EXTENDED RELEASE ORAL
Status: DISCONTINUED | OUTPATIENT
Start: 2017-05-18 | End: 2017-05-18 | Stop reason: SDUPTHER

## 2017-05-18 RX ORDER — HYDROMORPHONE HYDROCHLORIDE 1 MG/ML
1 INJECTION, SOLUTION INTRAMUSCULAR; INTRAVENOUS; SUBCUTANEOUS ONCE
Status: COMPLETED | OUTPATIENT
Start: 2017-05-18 | End: 2017-05-18

## 2017-05-18 RX ORDER — POTASSIUM CHLORIDE 750 MG/1
40 TABLET, FILM COATED, EXTENDED RELEASE ORAL
Status: COMPLETED | OUTPATIENT
Start: 2017-05-18 | End: 2017-05-18

## 2017-05-18 RX ADMIN — ACYCLOVIR 200 MG: 200 CAPSULE ORAL at 15:23

## 2017-05-18 RX ADMIN — POTASSIUM CHLORIDE 40 MEQ: 750 TABLET, FILM COATED, EXTENDED RELEASE ORAL at 17:30

## 2017-05-18 RX ADMIN — Medication 10 ML: at 05:24

## 2017-05-18 RX ADMIN — Medication 10 ML: at 22:09

## 2017-05-18 RX ADMIN — ACYCLOVIR 200 MG: 200 CAPSULE ORAL at 12:47

## 2017-05-18 RX ADMIN — PIPERACILLIN SODIUM,TAZOBACTAM SODIUM 3.38 G: 3; .375 INJECTION, POWDER, FOR SOLUTION INTRAVENOUS at 05:20

## 2017-05-18 RX ADMIN — PIPERACILLIN SODIUM,TAZOBACTAM SODIUM 3.38 G: 3; .375 INJECTION, POWDER, FOR SOLUTION INTRAVENOUS at 12:45

## 2017-05-18 RX ADMIN — PIPERACILLIN SODIUM,TAZOBACTAM SODIUM 3.38 G: 3; .375 INJECTION, POWDER, FOR SOLUTION INTRAVENOUS at 22:09

## 2017-05-18 RX ADMIN — ACYCLOVIR: 50 OINTMENT TOPICAL at 18:00

## 2017-05-18 RX ADMIN — CARVEDILOL 25 MG: 12.5 TABLET, FILM COATED ORAL at 09:12

## 2017-05-18 RX ADMIN — ACYCLOVIR 200 MG: 200 CAPSULE ORAL at 17:30

## 2017-05-18 RX ADMIN — HYDROMORPHONE HYDROCHLORIDE 1 MG: 1 INJECTION, SOLUTION INTRAMUSCULAR; INTRAVENOUS; SUBCUTANEOUS at 17:30

## 2017-05-18 RX ADMIN — FOLIC ACID 1 MG: 1 TABLET ORAL at 09:12

## 2017-05-18 RX ADMIN — ATORVASTATIN CALCIUM 40 MG: 40 TABLET, FILM COATED ORAL at 09:12

## 2017-05-18 RX ADMIN — ACYCLOVIR 200 MG: 200 CAPSULE ORAL at 06:50

## 2017-05-18 RX ADMIN — FUROSEMIDE 40 MG: 10 INJECTION, SOLUTION INTRAMUSCULAR; INTRAVENOUS at 09:12

## 2017-05-18 RX ADMIN — ACYCLOVIR: 50 OINTMENT TOPICAL at 22:09

## 2017-05-18 RX ADMIN — Medication 10 ML: at 06:50

## 2017-05-18 RX ADMIN — VANCOMYCIN HYDROCHLORIDE 1500 MG: 10 INJECTION, POWDER, LYOPHILIZED, FOR SOLUTION INTRAVENOUS at 19:54

## 2017-05-18 RX ADMIN — ACYCLOVIR: 50 OINTMENT TOPICAL at 05:22

## 2017-05-18 RX ADMIN — Medication 10 ML: at 13:00

## 2017-05-18 RX ADMIN — ACYCLOVIR 200 MG: 200 CAPSULE ORAL at 22:08

## 2017-05-18 RX ADMIN — ACYCLOVIR: 50 OINTMENT TOPICAL at 09:14

## 2017-05-18 RX ADMIN — ACYCLOVIR: 50 OINTMENT TOPICAL at 03:00

## 2017-05-18 RX ADMIN — ASPIRIN 81 MG CHEWABLE TABLET 81 MG: 81 TABLET CHEWABLE at 09:12

## 2017-05-18 RX ADMIN — ACYCLOVIR: 50 OINTMENT TOPICAL at 15:23

## 2017-05-18 RX ADMIN — ACYCLOVIR: 50 OINTMENT TOPICAL at 12:51

## 2017-05-18 NOTE — PROGRESS NOTES
Nephrology Progress Note     Saw Candelario       [email or web address]. [email or web address]                   Phone - [phone]   Patient: Zachary Garcia  YOB: 1940     Date- 5/18/2017     CC: Follow up for acute renal failure          Subjective: Interval History:   -  Cr. Improved  Sob better  He is concerned about his leg rash  No c/o sob, fever. No c/o nausea or vomiting  No c/o chest pain  He is voiding well. No bladder scan done     Assessment:   · Acute renal failure likely due to cardiorenal syndrome,   · Left renal cyst  · chf -Sob  · hypokalemia  · CKD 3- baseline cr. 1.1-1.3 in 2016  · Rash - herpes zoster-Thrombocytopenia  · H/o HTN  · H/o prostate cancer     Plan:   Lasix 40 mg daily  kdur 40 meq po   Check bladder scan today  Bmp in am  Care Plan discussed with: pt and DR. MAHONEY  Review of Systems: Pertinent items are noted in HPI. Objective:   Vitals:    05/17/17 1617 05/17/17 2000 05/18/17 0904 05/18/17 1255   BP: 120/72 132/70 129/87 117/77   Pulse: 61 60 (!) 107 66   Resp: 20 20 20 20   Temp: (!) 101.1 °F (38.4 °C) 99.2 °F (37.3 °C) 99.6 °F (37.6 °C) 99.2 °F (37.3 °C)   SpO2: 100% 100% 100% 99%   Weight:       Height:           Intake/Output Summary (Last 24 hours) at 05/18/17 1444  Last data filed at 05/18/17 1301   Gross per 24 hour   Intake              500 ml   Output             2200 ml   Net            -1700 ml     Physical Exam:   GEN: NAD  NECK- Supple, no thyromegaly  RESP: Clear b/l, no wheezing, No accessory muscle use  CVS: RRR,S1,S2   NEURO: non focal, normal speech  SKIN: b/l vesicular rash on legs  ABDO: soft , non tender, No hepatosplenomegaly      Chart reviewed. Pertinent Notes reviewed.      Medications list  reviewed             Data Review :  Recent Labs      05/18/17   0450  05/17/17   1312   NA  139  137   K  3.5  3.6   CL  104  104   CO2  24  24   GLU  110*  101*   BUN  20  20   CREA  1.45*  1.49*   CA  8.9  8.9   MG   --   2.3   PHOS  2.9   -- ALB  2.2*   --      Recent Labs      05/17/17   1312   WBC  4.2   HGB  11.0*   HCT  33.4*   PLT  64*     Current Facility-Administered Medications   Medication    piperacillin-tazobactam (ZOSYN) 3.375 g in 0.9% sodium chloride (MBP/ADV) 100 mL MBP    furosemide (LASIX) injection 40 mg    vancomycin (VANCOCIN) 1500 mg in  ml infusion    acyclovir (ZOVIRAX) capsule 200 mg    acyclovir (ZOVIRAX) 5 % ointment    hydrALAZINE (APRESOLINE) 20 mg/mL injection 10 mg    folic acid (FOLVITE) tablet 1 mg    albuterol (PROVENTIL HFA, VENTOLIN HFA, PROAIR HFA) inhaler 2 Puff    aspirin chewable tablet 81 mg    atorvastatin (LIPITOR) tablet 40 mg    carvedilol (COREG) tablet 25 mg    HYDROcodone-acetaminophen (NORCO) 5-325 mg per tablet 1 Tab    sodium chloride (NS) flush 5-10 mL    sodium chloride (NS) flush 5-10 mL    acetaminophen (TYLENOL) tablet 650 mg       Papo FriMD eve  [facility]  [email or web address]  [address] [facility], [address] [place]  Phone - [phone]         Fax - [phone]  [facility]  [address] [place] [address]  Phone - [phone]        Fax - [phone]

## 2017-05-18 NOTE — PROGRESS NOTES
As MD rounded on pt, informed MD that pt's temp was 101.1 and MEWS 3. MD stated to draw paired blood cultures and that he would start the pt on vancomycin.

## 2017-05-18 NOTE — PROGRESS NOTES
Incision/Drainage Procedure Note    Encounter Date: 5/14/2017    Location of lesion:  Right shin     Time out at performed by me (see consent form):  * Patient was identified by name and date of birth   * Agreement on procedure being performed was verified  * Risks and Benefits explained to the patient  * Procedure site verified and marked as necessary  * Patient was positioned for comfort  * Consent was signed and verified    Procedure Details: The risks,benefits and alternatives were explained and consent was obtained for the procedure. Time out was performed. The area was prepped with betadine and draped in the usual manner. Local anesthesia was infiltrated into the skin overlying new lesions. Punch biopsies were done x2. The wounds were packed with Surgicel. 4x4's were placed and cling was used to hold them in place. Estimated Blood Loss:  minimal     Disposition:  Procedure well tolerated. Post-procedure pain scale: 0/10. Plan:  Await path -- one punch sent in formalin and the other for IF. Leave Surgicel in place until Saturday, then daily dressing changes.       Signed By: Magan Weathers MD

## 2017-05-18 NOTE — PROGRESS NOTES
End of Shift Nursing Note    Bedside shift change report given to Anastasiya Lr (oncoming nurse) by Elio Wynn (offgoing nurse). Report included the following information SBAR, Kardex, Intake/Output, MAR and Recent Results. Zone Phone:   [phone]    Significant changes during shift:    none   Non-emergent issues for physician to address:   none     Number times ambulated in hallway past shift: 0      Number of times OOB to chair past shift: 1    POD #: n/a     Vital Signs:    Temp: 99.4 °F (37.4 °C)     Pulse (Heart Rate): 92     BP: 118/66     Resp Rate: 20     O2 Sat (%): 99 %    Lines & Drains:     Urinary Catheter? No    Central Line? No     PICC Line? No       NG tube [] in [] removed [x] not applicable   Drains [] in [] removed [x] not applicable     Skin Integrity:      Wounds: yes   Dressings Present: yes    Wound Concerns: yes      GI:    Current diet:  DIET CARDIAC Regular    Nausea: NO  Vomiting: NO  Bowel Sounds: YES  Flatus: YES  Last Bowel Movement: several days ago       Respiratory:  Supplemental O2: No         Incentive Spirometer: YES  Volume: 500  Coughing and Deep Breathing: YES  Oral Care: NO  Understanding (patient/family education): YES   Getting out of bed: YES  Head of bed elevation: YES    Patient Safety:    Falls Score: 1  Mobility Score: 0  Bed Alarm On? No  Sitter? No      Opportunity for questions and clarification was given to oncoming nurse. Patient bed is in low position, side rails are up x 2, door & observation blinds open as needed, call bell within reach and patient not in distress.     Vivienne Day

## 2017-05-18 NOTE — PROGRESS NOTES
Hematology Oncology Progress Note         Follow up for: thrombocytopenia (borderline plt counts back to 2012 with intermittent swings up, more recently significant drop)    Chart notes reviewed since last visit. Case discussed with following: Dr. Karel Parisi, nursing staff. Patient complains of the following: upset that he still has a rash. Has had itchiness of skin for 5-6 years which he reports he was told not to worry about, just had dry skin. Saturday he started to have the eruption on his legs, R> L which is somewhat painful and pruritic. He reports that since he came to the ER, he should have been given an answer right away and had it \"fixed\" by now. He is upset that he still has a rash and is not on treatment giving him a significant improvement. He finds it hard to believe there is no dermatologist here. He wonders if some of his drugs might be triggering the eruption. Additional concerns noted by the staff:     Patient Vitals for the past 24 hrs:   BP Temp Pulse Resp SpO2   05/18/17 0904 129/87 - (!) 107 - 100 %   05/17/17 2000 132/70 99.2 °F (37.3 °C) 60 20 100 %   05/17/17 1617 120/72 (!) 101.1 °F (38.4 °C) 61 20 100 %   05/17/17 1432 118/69 (!) 100.9 °F (38.3 °C) 99 20 98 %   05/17/17 1112 112/68 99.2 °F (37.3 °C) 100 20 100 %       Review of Systems:  Constitutional  fevers   Allergic/Immunologic No recent allergic reactions   Eyes No significant visual difficulties. No diplopia. ENMT No problems with hearing, no sore throat, no sinus drainage. Endocrine No hot flashes or night sweats. No cold intolerance, polyuria, or polydipsia   Hematologic/Lymphatic No easy bruising or bleeding. The patient denies any tender or palpable lymph nodes   Breasts No abnormal masses of breast, nipple discharge or pain. Respiratory POA dyspnea on exertion   Cardiovascular No anginal chest pain, irregular heart beat, tachycardia, palpitations or orthopnea.    Gastrointestinal No nausea, vomiting, diarrhea, constipation, cramping, dysphagia, reflux, heartburn, GI bleeding, or early satiety. No change in bowel habits. Genitourinary (M) No hematuria, dysuria, increased frequency, urgency, hesitancy or incontinence. Musculoskeletal No joint pain, swelling or redness. No decreased range of motion. Integumentary Hyperpigmented vesicular. rash involving lower extremities, as well as dry skin   Neurologic No headache, blurred vision, and no areas of focal weakness or numbness. Normal gait. No sensory problems. Psychiatric No insomnia, depression, giancarlo or mood swings. No psychotropic drugs. Physical Examination:  Constitutional Alert, cooperative, oriented. Mood and affect appropriate. Appears close to chronological age. Well nourished. Well developed. Head Normocephalic; no scars   Eyes Conjunctivae and sclerae are clear and without icterus. Pupils are reactive and equal.   ENMT Sinuses are nontender. No oral exudates, ulcers, masses, thrush or mucositis. Oropharynx clear. Tongue normal.   Neck Supple without masses or thyromegaly. No jugular venous distension. Hematologic/Lymphatic No petechiae or purpura. No tender or palpable lymph nodes in the cervical, supraclavicular, axillary or inguinal area. Respiratory Lungs are clear to auscultation without rhonchi or wheezing. Cardiovascular Regular rate and rhythm of heart without murmurs, gallops or rubs. Chest / Line Site Chest is symmetric with no chest wall deformities. Abdomen Non-tender, non-distended, no masses, ascites or hepatosplenomegaly. Good bowel sounds. No guarding or rebound tenderness. No pulsatile masses. Musculoskeletal No tenderness or swelling, normal range of motion without obvious weakness. Extremities No visible deformities, no cyanosis, clubbing or edema. Skin No rashes, scars, or lesions suggestive of malignancy. No petechiae, purpura, or ecchymoses. No excoriations.     Neurologic No sensory or motor deficits noted   Psychiatric Alert and oriented times three. Coherent speech. Verbalizes understanding of our discussions today. Labs:  Recent Results (from the past 24 hour(s))   CBC WITH AUTOMATED DIFF    Collection Time: 05/17/17  1:12 PM   Result Value Ref Range    WBC 4.2 4.1 - 11.1 K/uL    RBC 4.22 4.10 - 5.70 M/uL    HGB 11.0 (L) 12.1 - 17.0 g/dL    HCT 33.4 (L) 36.6 - 50.3 %    MCV 79.1 (L) 80.0 - 99.0 FL    MCH 26.1 26.0 - 34.0 PG    MCHC 32.9 30.0 - 36.5 g/dL    RDW 15.2 (H) 11.5 - 14.5 %    PLATELET 64 (L) 992 - 400 K/uL    NEUTROPHILS 74 32 - 75 %    LYMPHOCYTES 12 12 - 49 %    MONOCYTES 11 5 - 13 %    EOSINOPHILS 3 0 - 7 %    BASOPHILS 0 0 - 1 %    ABS. NEUTROPHILS 3.1 1.8 - 8.0 K/UL    ABS. LYMPHOCYTES 0.5 (L) 0.8 - 3.5 K/UL    ABS. MONOCYTES 0.5 0.0 - 1.0 K/UL    ABS. EOSINOPHILS 0.1 0.0 - 0.4 K/UL    ABS.  BASOPHILS 0.0 0.0 - 0.1 K/UL    RBC COMMENTS MICROCYTOSIS  PRESENT        RBC COMMENTS ANISOCYTOSIS  1+       METABOLIC PANEL, BASIC    Collection Time: 05/17/17  1:12 PM   Result Value Ref Range    Sodium 137 136 - 145 mmol/L    Potassium 3.6 3.5 - 5.1 mmol/L    Chloride 104 97 - 108 mmol/L    CO2 24 21 - 32 mmol/L    Anion gap 9 5 - 15 mmol/L    Glucose 101 (H) 65 - 100 mg/dL    BUN 20 6 - 20 MG/DL    Creatinine 1.49 (H) 0.70 - 1.30 MG/DL    BUN/Creatinine ratio 13 12 - 20      GFR est AA 56 (L) >60 ml/min/1.73m2    GFR est non-AA 46 (L) >60 ml/min/1.73m2    Calcium 8.9 8.5 - 10.1 MG/DL   MAGNESIUM    Collection Time: 05/17/17  1:12 PM   Result Value Ref Range    Magnesium 2.3 1.6 - 2.4 mg/dL   VITAMIN B12    Collection Time: 05/17/17  6:18 PM   Result Value Ref Range    Vitamin B12 425 211 - 911 pg/mL   FOLATE    Collection Time: 05/17/17  6:18 PM   Result Value Ref Range    Folate 19.8 5.0 - 21.0 ng/mL   FERRITIN    Collection Time: 05/17/17  6:18 PM   Result Value Ref Range    Ferritin 1482 (H) 26 - 388 NG/ML   LD    Collection Time: 05/17/17  6:18 PM   Result Value Ref Range     85 - 241 U/L   CK    Collection Time: 05/17/17  6:18 PM   Result Value Ref Range    CK 51 39 - 308 U/L   UREA NITROGEN, UR, RANDOM    Collection Time: 05/17/17  6:18 PM   Result Value Ref Range    Urea Nitrogen,urine random 1196 MG/DL   CULTURE, BLOOD    Collection Time: 05/17/17  6:18 PM   Result Value Ref Range    Special Requests: NO SPECIAL REQUESTS      Culture result: NO GROWTH AFTER 13 HOURS     CULTURE, BLOOD    Collection Time: 05/17/17  6:18 PM   Result Value Ref Range    Special Requests: NO SPECIAL REQUESTS      Culture result: NO GROWTH AFTER 12 HOURS     CHLORIDE, UR, RANDOM    Collection Time: 05/17/17  6:54 PM   Result Value Ref Range    Chloride,urine random 50 MMOL/L   SODIUM, UR, RANDOM    Collection Time: 05/17/17  6:54 PM   Result Value Ref Range    Sodium urine, random 46 MMOL/L   OSMOLALITY, UR    Collection Time: 05/17/17  6:54 PM   Result Value Ref Range    Osmolality,urine 705 MOSM/kg H2O   EOSINOPHILS, URINE    Collection Time: 05/17/17  6:54 PM   Result Value Ref Range    Eosinophils,urine NEGATIVE      PROTEIN/CREATININE RATIO, URINE    Collection Time: 05/17/17  6:54 PM   Result Value Ref Range    Protein, urine random 155 (H) 0.0 - 11.9 mg/dL    Creatinine, urine 211.00 mg/dL    Protein/Creat.  urine Ratio 0.7     URINALYSIS W/ REFLEX CULTURE    Collection Time: 05/17/17  6:54 PM   Result Value Ref Range    Color DARK YELLOW      Appearance CLEAR CLEAR      Specific gravity 1.024 1.003 - 1.030      pH (UA) 5.5 5.0 - 8.0      Protein 100 (A) NEG mg/dL    Glucose NEGATIVE  NEG mg/dL    Ketone NEGATIVE  NEG mg/dL    Blood TRACE (A) NEG      Urobilinogen 1.0 0.2 - 1.0 EU/dL    Nitrites NEGATIVE  NEG      Leukocyte Esterase NEGATIVE  NEG      WBC 0-4 0 - 4 /hpf    RBC 0-5 0 - 5 /hpf    Epithelial cells FEW FEW /lpf    Bacteria NEGATIVE  NEG /hpf    UA:UC IF INDICATED CULTURE NOT INDICATED BY UA RESULT CNI     BILIRUBIN, CONFIRM    Collection Time: 05/17/17  6:54 PM   Result Value Ref Range Bilirubin UA, confirm NEGATIVE  NEG     CULTURE, WOUND W GRAM STAIN    Collection Time: 05/17/17  6:58 PM   Result Value Ref Range    Special Requests: NO SPECIAL REQUESTS      GRAM STAIN NO WBC'S SEEN      GRAM STAIN NO ORGANISMS SEEN      Culture result: PENDING    HAPTOGLOBIN    Collection Time: 05/17/17  8:45 PM   Result Value Ref Range    Haptoglobin 385 (H) 30 - 200 mg/dL   LACTIC ACID, PLASMA    Collection Time: 05/17/17  8:45 PM   Result Value Ref Range    Lactic acid 1.3 0.4 - 2.0 MMOL/L   RENAL FUNCTION PANEL    Collection Time: 05/18/17  4:50 AM   Result Value Ref Range    Sodium 139 136 - 145 mmol/L    Potassium 3.5 3.5 - 5.1 mmol/L    Chloride 104 97 - 108 mmol/L    CO2 24 21 - 32 mmol/L    Anion gap 11 5 - 15 mmol/L    Glucose 110 (H) 65 - 100 mg/dL    BUN 20 6 - 20 MG/DL    Creatinine 1.45 (H) 0.70 - 1.30 MG/DL    BUN/Creatinine ratio 14 12 - 20      GFR est AA 58 (L) >60 ml/min/1.73m2    GFR est non-AA 47 (L) >60 ml/min/1.73m2    Calcium 8.9 8.5 - 10.1 MG/DL    Phosphorus 2.9 2.6 - 4.7 MG/DL    Albumin 2.2 (L) 3.5 - 5.0 g/dL       Imaging:  CXray 5/17/17  reviewed: FINDINGS: AP portable imaging of the chest performed at 7:51 AM demonstrates  stable cardiomegaly. Mild to moderate pulmonary interstitial edema is increased. There is bibasilar atelectasis. Degenerative changes are present in the acromioclavicular joints bilaterally. IMPRESSION: Cardiomegaly with increased mild to moderate pulmonary interstitial  edema and bibasilar atelectasis      Assessment and Plan:   Vesicular rash, surrounding hyperpigmentation  - unfortunately my ability to recognize rashes is suboptimal. Currently on acyclovir but distribution is not the classic one for shingles given that both legs involved. Fevers - currently on azithormycin and zosyn and vanc    Thrombocytopenia, anemia - preliminary labs do not show deficiency of iron, b12 or folate. Haptoglobin high so not hemolyzing.  Smear yesterday without schistocytes so not TTP or HUS. Copper pending. Thrombocytopenia is a more chronic process of at least 5 years duration so at some point likely to need a bone marrow biopsy but not acutely    Renal insufficiency - improved since admit. Stable. COPD with exacerbation - breathing comfortably    CAD -stable    Prostate cancer - sounds like he has been on lupron    HTN - on coreg    45 minutes spent in face to face counselling or on floor coordinating care.

## 2017-05-18 NOTE — PROGRESS NOTES
End of Shift Nursing Note    Bedside shift change report given to [facility] (oncoming nurse) by Mia Mcleod (offgoing nurse). Report included the following information SBAR, Kardex, Intake/Output, MAR and Recent Results. Zone Phone:   [phone]    Significant changes during shift:    Pt spiked temperature. (see previous progress notes) Noticed that pt is starting to break out with red hives throughout neck and face area. Non-emergent issues for physician to address:   none     Number times ambulated in hallway past shift: 0      Number of times OOB to chair past shift: 1    POD #: n/a     Vital Signs:    Temp: (!) 101.1 °F (38.4 °C)     Pulse (Heart Rate): 61     BP: 120/72     Resp Rate: 20     O2 Sat (%): 100 %    Lines & Drains:     Urinary Catheter? No     Central Line? No     PICC Line? No       NG tube [] in [] removed [x] not applicable   Drains [] in [] removed [x] not applicable     Skin Integrity:      Wounds: yes   Dressings Present: no    Wound Concerns: yes      GI:    Current diet:  DIET CARDIAC Regular    Nausea: NO  Vomiting: NO  Bowel Sounds: YES  Flatus: YES  Last Bowel Movement: yesterday       Respiratory:  Supplemental O2: No         Incentive Spirometer: YES  Volume: 500  Coughing and Deep Breathing: YES  Oral Care: NO  Understanding (patient/family education): YES   Getting out of bed: YES  Head of bed elevation: YES    Patient Safety:    Falls Score: 1  Mobility Score: 0  Bed Alarm On? No  Sitter? No      Opportunity for questions and clarification was given to oncoming nurse. Patient bed is in low position, side rails are up x 2, door & observation blinds open as needed, call bell within reach and patient not in distress.     Mahad Villa

## 2017-05-18 NOTE — PROGRESS NOTES
End of Shift Nursing Note    Bedside shift change report given to Al (oncoming nurse) by Chaitanya King (offgoing nurse). Report included the following information SBAR, Kardex, Intake/Output and Recent Results. Zone Phone:       Significant changes during shift:   Lactate acid at 2045 is 1.3. Unable to pring label for BMP, called lab, lab stated that they only have the order for Renal Panel so I should go ahead and send only the Renal ranjan lab. Lesions on legs still weeping. Non-emergent issues for physician to address:   None     Number times ambulated in hallway past shift: 0      Number of times OOB to chair past shift: 2    POD #: 0     Vital Signs:    Temp: 99.2 °F (37.3 °C)     Pulse (Heart Rate): 60     BP: 132/70     Resp Rate: 20     O2 Sat (%): 100 %    Lines & Drains:     Urinary Catheter? No   Placement Date:    Medical Necessity:   Central Line? No   Placement Date:    Medical Necessity:   PICC Line? No   Placement Date:    Medical Necessity:     NG tube [] in [] removed [] not applicable   Drains [] in [] removed [] not applicable     Skin Integrity:      Wounds: no   Dressings Present: no    Wound Concerns: no      GI:    Current diet:  DIET CARDIAC Regular    Nausea: NO  Vomiting: NO  Bowel Sounds: NO  Flatus: NO  Last Bowel Movement: yesterday   Appearance: unobserved    Respiratory:  Supplemental O2: No      Device:    via  Liters/min     Incentive Spirometer: YES  Volume: 750  Coughing and Deep Breathing: YES  Oral Care: YES  Understanding (patient/family education): YES   Getting out of bed: YES  Head of bed elevation: YES    Patient Safety:    Falls Score: 0  Mobility Score: 4  Bed Alarm On? No  Sitter? No      Opportunity for questions and clarification was given to oncoming nurse. Patient bed is in low position, side rails are up x 2, door & observation blinds open as needed, call bell within reach and patient not in distress.     Kennedi Molina

## 2017-05-18 NOTE — PROGRESS NOTES
Hospitalist Progress Note    NAME: Doc Eduardo   :  1940   MRN:  [id number]       Interim Hospital Summary: 78 y.o. male whom presented on 2017 with      Assessment / Plan:    Fevers / SIRS  Vesicular type rash with cellulitis   -involves both legs but looks like shingles. Pemphigoid vs zoster vs bacterial   -sent viral culture and bacterial culture of fluid from one of the blisters.      -continue oral Acyclovir but this is likely not zoster as it involved both LE  -continue IV vanco and zosyn. Follow up on blood cultures  -consult Surgery for tissue biopsy. Discussed with Dr Leopoldo Blanca    Thrombocytopenia (since )  -stopped lovenox. -appreciate Hematology input. No schistocytes on smear, doubt TTP or HUS. -he has had thrombocytopenia since . Will need BM bx at some point. Needs OP follow up. COPD  -exacerbation triggered by CHF. No need for steroids. Cont Bronchodilators. Stopped zithromax as patient is now on broad spectrum abx for sepsis    Acute hypoxic Respiratory Failure due to Acute combined HF POA  -CXR Interstitial pulmonary edema with fluid in fissures. proBNP >5000  -Echo EF 66% with diastolic dysfunction  -repeat CXR still shows CHF  -weaned off oxygen. Continue IV lasix    ROBINSON  -likely cardio renal syndrome. Cr trending down  -renal US 8.8 cm left mid to lower pole renal cyst.  No hydronephrosis. -continue IV lasix. Discussed with Dr Je Morelos    CAD    HTN   -continue asa,coreg and statin    Prostate CA    No UTI. Cultures no growth      Surrogate Decision Maker: Daughter Geovany Sierra   Baseline: Full ADL  Body mass index is 34.69 kg/(m^2). Code status: Full  Prophylaxis: lovenox  Recommended Disposition:  PT, OT, RN     Subjective:     Chief Complaint / Reason for Physician Visit  Follow up sepsis, resp failure CAP, CHF, zoster rash  Breathing okay.   Temp down    Review of Systems:  Symptom Y/N Comments  Symptom Y/N Comments   Fever/Chills y   Chest Pain     Poor Appetite    Edema y    Cough    Abdominal Pain     Sputum    Joint Pain     SOB/PA n   Pruritis/Rash     Nausea/vomit    Tolerating PT/OT     Diarrhea    Tolerating Diet y    Constipation    Other       Could NOT obtain due to:      Objective:     VITALS:   Last 24hrs VS reviewed since prior progress note. Most recent are:  Patient Vitals for the past 24 hrs:   Temp Pulse Resp BP SpO2   05/18/17 1255 99.2 °F (37.3 °C) 66 20 117/77 99 %   05/18/17 0904 99.6 °F (37.6 °C) (!) 107 20 129/87 100 %   05/17/17 2000 99.2 °F (37.3 °C) 60 20 132/70 100 %   05/17/17 1617 (!) 101.1 °F (38.4 °C) 61 20 120/72 100 %       Intake/Output Summary (Last 24 hours) at 05/18/17 1534  Last data filed at 05/18/17 1301   Gross per 24 hour   Intake              500 ml   Output             2200 ml   Net            -1700 ml        PHYSICAL EXAM:  General: WD, WN. Alert, cooperative, fever, knee pain  EENT:  EOMI. Anicteric sclerae. MMM  Resp:  Coarse BS. No accessory muscle use  CV:  Regular  rhythm,  No edema  GI:  Soft, Non distended, Non tender.  +Bowel sounds  Neurologic:  Alert and oriented X 3, normal speech,   Psych:   Good insight. Not anxious nor agitated  Skin:  Vesicular rash with bleeding behind left knee and lateral aspect of right leg    Reviewed most current lab test results and cultures  YES  Reviewed most current radiology test results   YES  Review and summation of old records today    NO  Reviewed patient's current orders and MAR    YES  PMH/ reviewed - no change compared to H&P  ________________________________________________________________________  Care Plan discussed with:    Comments   Patient y    Family      RN y    Care Manager     Consultant  y Dr Zachary Montero / [facility]                     Multidiciplinary team rounds were held today with , nursing, pharmacist and clinical coordinator.   Patient's plan of care was discussed; medications were reviewed and discharge planning was addressed. ________________________________________________________________________  Total NON critical care TIME:   30   Minutes    Total CRITICAL CARE TIME Spent:   Minutes non procedure based      Comments   >50% of visit spent in counseling and coordination of care y    ________________________________________________________________________  Felicia Edwards MD     Procedures: see electronic medical records for all procedures/Xrays and details which were not copied into this note but were reviewed prior to creation of Plan. LABS:  I reviewed today's most current labs and imaging studies.   Pertinent labs include:  Recent Labs      05/17/17   1312   WBC  4.2   HGB  11.0*   HCT  33.4*   PLT  64*     Recent Labs      05/18/17   0450  05/17/17   1312   NA  139  137   K  3.5  3.6   CL  104  104   CO2  24  24   GLU  110*  101*   BUN  20  20   CREA  1.45*  1.49*   CA  8.9  8.9   MG   --   2.3   PHOS  2.9   --    ALB  2.2*   --        Signed: Felicia Edwards MD

## 2017-05-19 LAB
ALBUMIN SERPL BCP-MCNC: 2.1 G/DL (ref 3.5–5)
ALBUMIN SERPL ELPH-MCNC: 2.5 G/DL (ref 2.9–4.4)
ALBUMIN/GLOB SERPL: 0.7 {RATIO} (ref 0.7–1.7)
ALPHA1 GLOB SERPL ELPH-MCNC: 0.5 G/DL (ref 0–0.4)
ALPHA2 GLOB SERPL ELPH-MCNC: 1 G/DL (ref 0.4–1)
ANION GAP BLD CALC-SCNC: 13 MMOL/L (ref 5–15)
B-GLOBULIN SERPL ELPH-MCNC: 1 G/DL (ref 0.7–1.3)
BACTERIA SPEC CULT: NORMAL
BASOPHILS # BLD AUTO: 0 K/UL (ref 0–0.1)
BASOPHILS # BLD: 0 % (ref 0–1)
BUN SERPL-MCNC: 21 MG/DL (ref 6–20)
BUN/CREAT SERPL: 14 (ref 12–20)
CALCIUM SERPL-MCNC: 8.5 MG/DL (ref 8.5–10.1)
CHLORIDE SERPL-SCNC: 106 MMOL/L (ref 97–108)
CO2 SERPL-SCNC: 21 MMOL/L (ref 21–32)
CREAT SERPL-MCNC: 1.5 MG/DL (ref 0.7–1.3)
DATE LAST DOSE: ABNORMAL
EOSINOPHIL # BLD: 0.1 K/UL (ref 0–0.4)
EOSINOPHIL NFR BLD: 2 % (ref 0–7)
ERYTHROCYTE [DISTWIDTH] IN BLOOD BY AUTOMATED COUNT: 15.5 % (ref 11.5–14.5)
GAMMA GLOB SERPL ELPH-MCNC: 1.2 G/DL (ref 0.4–1.8)
GLOBULIN SER CALC-MCNC: 3.7 G/DL (ref 2.2–3.9)
GLUCOSE SERPL-MCNC: 105 MG/DL (ref 65–100)
HCT VFR BLD AUTO: 29.7 % (ref 36.6–50.3)
HGB BLD-MCNC: 9.5 G/DL (ref 12.1–17)
LYMPHOCYTES # BLD AUTO: 14 % (ref 12–49)
LYMPHOCYTES # BLD: 0.9 K/UL (ref 0.8–3.5)
M PROTEIN SERPL ELPH-MCNC: ABNORMAL G/DL
MAGNESIUM SERPL-MCNC: 2.3 MG/DL (ref 1.6–2.4)
MCH RBC QN AUTO: 25.2 PG (ref 26–34)
MCHC RBC AUTO-ENTMCNC: 32 G/DL (ref 30–36.5)
MCV RBC AUTO: 78.8 FL (ref 80–99)
MONOCYTES # BLD: 0.8 K/UL (ref 0–1)
MONOCYTES NFR BLD AUTO: 12 % (ref 5–13)
NEUTS SEG # BLD: 4.8 K/UL (ref 1.8–8)
NEUTS SEG NFR BLD AUTO: 72 % (ref 32–75)
PHOSPHATE SERPL-MCNC: 3.2 MG/DL (ref 2.6–4.7)
PLATELET # BLD AUTO: 106 K/UL (ref 150–400)
POTASSIUM SERPL-SCNC: 4 MMOL/L (ref 3.5–5.1)
PROT SERPL-MCNC: 6.2 G/DL (ref 6–8.5)
RBC # BLD AUTO: 3.77 M/UL (ref 4.1–5.7)
REPORTED DOSE,DOSE: ABNORMAL UNITS
REPORTED DOSE/TIME,TMG: 1900
SERVICE CMNT-IMP: NORMAL
SODIUM SERPL-SCNC: 140 MMOL/L (ref 136–145)
VANCOMYCIN TROUGH SERPL-MCNC: 13 UG/ML (ref 5–10)
WBC # BLD AUTO: 6.6 K/UL (ref 4.1–11.1)

## 2017-05-19 PROCEDURE — 80069 RENAL FUNCTION PANEL: CPT | Performed by: INTERNAL MEDICINE

## 2017-05-19 PROCEDURE — 74011250636 HC RX REV CODE- 250/636: Performed by: INTERNAL MEDICINE

## 2017-05-19 PROCEDURE — 74011250637 HC RX REV CODE- 250/637: Performed by: INTERNAL MEDICINE

## 2017-05-19 PROCEDURE — 65270000029 HC RM PRIVATE

## 2017-05-19 PROCEDURE — 83735 ASSAY OF MAGNESIUM: CPT | Performed by: INTERNAL MEDICINE

## 2017-05-19 PROCEDURE — 36415 COLL VENOUS BLD VENIPUNCTURE: CPT | Performed by: INTERNAL MEDICINE

## 2017-05-19 PROCEDURE — 85025 COMPLETE CBC W/AUTO DIFF WBC: CPT | Performed by: INTERNAL MEDICINE

## 2017-05-19 PROCEDURE — 74011000258 HC RX REV CODE- 258: Performed by: SURGERY

## 2017-05-19 PROCEDURE — 77030011256 HC DRSG MEPILEX <16IN NO BORD MOLN -A

## 2017-05-19 PROCEDURE — 74011250636 HC RX REV CODE- 250/636: Performed by: SURGERY

## 2017-05-19 PROCEDURE — 77030019607 HC DSG BURN S&N -A

## 2017-05-19 PROCEDURE — 80202 ASSAY OF VANCOMYCIN: CPT | Performed by: INTERNAL MEDICINE

## 2017-05-19 RX ADMIN — Medication 10 ML: at 23:56

## 2017-05-19 RX ADMIN — ASPIRIN 81 MG CHEWABLE TABLET 81 MG: 81 TABLET CHEWABLE at 09:40

## 2017-05-19 RX ADMIN — Medication 10 ML: at 05:55

## 2017-05-19 RX ADMIN — VANCOMYCIN HYDROCHLORIDE 1500 MG: 10 INJECTION, POWDER, LYOPHILIZED, FOR SOLUTION INTRAVENOUS at 19:14

## 2017-05-19 RX ADMIN — ACYCLOVIR: 50 OINTMENT TOPICAL at 00:26

## 2017-05-19 RX ADMIN — ACYCLOVIR 200 MG: 200 CAPSULE ORAL at 05:54

## 2017-05-19 RX ADMIN — PIPERACILLIN SODIUM,TAZOBACTAM SODIUM 3.38 G: 3; .375 INJECTION, POWDER, FOR SOLUTION INTRAVENOUS at 05:00

## 2017-05-19 RX ADMIN — CARVEDILOL 25 MG: 12.5 TABLET, FILM COATED ORAL at 09:40

## 2017-05-19 RX ADMIN — ACYCLOVIR: 50 OINTMENT TOPICAL at 05:54

## 2017-05-19 RX ADMIN — PIPERACILLIN SODIUM,TAZOBACTAM SODIUM 3.38 G: 3; .375 INJECTION, POWDER, FOR SOLUTION INTRAVENOUS at 21:57

## 2017-05-19 RX ADMIN — ACYCLOVIR 200 MG: 200 CAPSULE ORAL at 11:34

## 2017-05-19 RX ADMIN — FOLIC ACID 1 MG: 1 TABLET ORAL at 09:40

## 2017-05-19 RX ADMIN — ACYCLOVIR: 50 OINTMENT TOPICAL at 20:55

## 2017-05-19 RX ADMIN — ATORVASTATIN CALCIUM 40 MG: 40 TABLET, FILM COATED ORAL at 09:40

## 2017-05-19 RX ADMIN — ACYCLOVIR 200 MG: 200 CAPSULE ORAL at 21:57

## 2017-05-19 RX ADMIN — ACYCLOVIR: 50 OINTMENT TOPICAL at 17:26

## 2017-05-19 RX ADMIN — CARVEDILOL 25 MG: 12.5 TABLET, FILM COATED ORAL at 17:25

## 2017-05-19 RX ADMIN — ACYCLOVIR: 50 OINTMENT TOPICAL at 15:18

## 2017-05-19 RX ADMIN — PIPERACILLIN SODIUM,TAZOBACTAM SODIUM 3.38 G: 3; .375 INJECTION, POWDER, FOR SOLUTION INTRAVENOUS at 13:26

## 2017-05-19 RX ADMIN — Medication 10 ML: at 13:28

## 2017-05-19 RX ADMIN — FUROSEMIDE 40 MG: 10 INJECTION, SOLUTION INTRAMUSCULAR; INTRAVENOUS at 09:40

## 2017-05-19 RX ADMIN — ACYCLOVIR: 50 OINTMENT TOPICAL at 09:41

## 2017-05-19 RX ADMIN — ACYCLOVIR 200 MG: 200 CAPSULE ORAL at 13:27

## 2017-05-19 RX ADMIN — ACYCLOVIR 200 MG: 200 CAPSULE ORAL at 17:25

## 2017-05-19 RX ADMIN — ACYCLOVIR: 50 OINTMENT TOPICAL at 23:56

## 2017-05-19 RX ADMIN — HYDROCODONE BITARTRATE AND ACETAMINOPHEN 1 TABLET: 5; 325 TABLET ORAL at 00:24

## 2017-05-19 RX ADMIN — ACYCLOVIR: 50 OINTMENT TOPICAL at 04:17

## 2017-05-19 RX ADMIN — ACYCLOVIR: 50 OINTMENT TOPICAL at 11:34

## 2017-05-19 NOTE — PROGRESS NOTES
Hematology Oncology Progress Note         Follow up for: thrombocytopenia (borderline plt counts back to 2012 with intermittent swings up, more recently significant drop)    Chart notes reviewed since last visit. Case discussed with following: patient    Patient complains of the following: remains upset that he still has a rash. Wonders why we can't be certain re diagnosis yet - have reassured him that as soon as pathologist has chance to look at it, we will have a better idea of what it may or may not be. Additional concerns noted by the staff:     Patient Vitals for the past 24 hrs:   BP Temp Pulse Resp SpO2   05/19/17 0758 129/63 98.3 °F (36.8 °C) (!) 114 18 97 %   05/18/17 1557 118/66 99.4 °F (37.4 °C) 92 20 99 %   05/18/17 1255 117/77 99.2 °F (37.3 °C) 66 20 99 %       Review of Systems:  Constitutional  fevers   Allergic/Immunologic No recent allergic reactions   Eyes No significant visual difficulties. No diplopia. ENMT No problems with hearing, no sore throat, no sinus drainage. Endocrine No hot flashes or night sweats. No cold intolerance, polyuria, or polydipsia   Hematologic/Lymphatic No easy bruising or bleeding. The patient denies any tender or palpable lymph nodes   Breasts No abnormal masses of breast, nipple discharge or pain. Respiratory POA dyspnea on exertion   Cardiovascular No anginal chest pain, irregular heart beat, tachycardia, palpitations or orthopnea. Gastrointestinal No nausea, vomiting, diarrhea, constipation, cramping, dysphagia, reflux, heartburn, GI bleeding, or early satiety. No change in bowel habits. Genitourinary (M) No hematuria, dysuria, increased frequency, urgency, hesitancy or incontinence. Musculoskeletal No joint pain, swelling or redness. No decreased range of motion. Integumentary Hyperpigmented vesicular. rash involving lower extremities, as well as dry skin   Neurologic No headache, blurred vision, and no areas of focal weakness or numbness. Normal gait. No sensory problems. Psychiatric No insomnia, depression, giancarlo or mood swings. No psychotropic drugs. Physical Examination:  Constitutional Alert, cooperative, oriented. Mood and affect appropriate. Appears close to chronological age. Well nourished. Well developed. Head Normocephalic; no scars   Eyes Conjunctivae and sclerae are clear and without icterus. Pupils are reactive and equal.   ENMT Sinuses are nontender. No oral exudates, ulcers, masses, thrush or mucositis. Oropharynx clear. Tongue normal.   Neck Supple without masses or thyromegaly. No jugular venous distension. Hematologic/Lymphatic No petechiae or purpura. No tender or palpable lymph nodes in the cervical, supraclavicular, axillary or inguinal area. Respiratory Lungs are clear to auscultation without rhonchi or wheezing. Cardiovascular Regular rate and rhythm of heart without murmurs, gallops or rubs. Chest / Line Site Chest is symmetric with no chest wall deformities. Abdomen Non-tender, non-distended, no masses, ascites or hepatosplenomegaly. Good bowel sounds. No guarding or rebound tenderness. No pulsatile masses. Musculoskeletal No tenderness or swelling, normal range of motion without obvious weakness. Extremities No visible deformities, no cyanosis, clubbing or edema. Skin No rashes, scars, or lesions suggestive of malignancy. No petechiae, purpura, or ecchymoses. No excoriations. Neurologic No sensory or motor deficits noted   Psychiatric Alert and oriented times three. Coherent speech. Verbalizes understanding of our discussions today.        Labs:  Recent Results (from the past 24 hour(s))   RENAL FUNCTION PANEL    Collection Time: 05/19/17  4:10 AM   Result Value Ref Range    Sodium 140 136 - 145 mmol/L    Potassium 4.0 3.5 - 5.1 mmol/L    Chloride 106 97 - 108 mmol/L    CO2 21 21 - 32 mmol/L    Anion gap 13 5 - 15 mmol/L    Glucose 105 (H) 65 - 100 mg/dL    BUN 21 (H) 6 - 20 MG/DL Creatinine 1.50 (H) 0.70 - 1.30 MG/DL    BUN/Creatinine ratio 14 12 - 20      GFR est AA 55 (L) >60 ml/min/1.73m2    GFR est non-AA 46 (L) >60 ml/min/1.73m2    Calcium 8.5 8.5 - 10.1 MG/DL    Phosphorus 3.2 2.6 - 4.7 MG/DL    Albumin 2.1 (L) 3.5 - 5.0 g/dL   CBC WITH AUTOMATED DIFF    Collection Time: 05/19/17  4:10 AM   Result Value Ref Range    WBC 6.6 4.1 - 11.1 K/uL    RBC 3.77 (L) 4.10 - 5.70 M/uL    HGB 9.5 (L) 12.1 - 17.0 g/dL    HCT 29.7 (L) 36.6 - 50.3 %    MCV 78.8 (L) 80.0 - 99.0 FL    MCH 25.2 (L) 26.0 - 34.0 PG    MCHC 32.0 30.0 - 36.5 g/dL    RDW 15.5 (H) 11.5 - 14.5 %    PLATELET 940 (L) 610 - 400 K/uL    NEUTROPHILS 72 32 - 75 %    LYMPHOCYTES 14 12 - 49 %    MONOCYTES 12 5 - 13 %    EOSINOPHILS 2 0 - 7 %    BASOPHILS 0 0 - 1 %    ABS. NEUTROPHILS 4.8 1.8 - 8.0 K/UL    ABS. LYMPHOCYTES 0.9 0.8 - 3.5 K/UL    ABS. MONOCYTES 0.8 0.0 - 1.0 K/UL    ABS. EOSINOPHILS 0.1 0.0 - 0.4 K/UL    ABS. BASOPHILS 0.0 0.0 - 0.1 K/UL   MAGNESIUM    Collection Time: 05/19/17  4:10 AM   Result Value Ref Range    Magnesium 2.3 1.6 - 2.4 mg/dL       Imaging:  CXray 5/17/17  reviewed: FINDINGS: AP portable imaging of the chest performed at 7:51 AM demonstrates  stable cardiomegaly. Mild to moderate pulmonary interstitial edema is increased. There is bibasilar atelectasis. Degenerative changes are present in the acromioclavicular joints bilaterally. IMPRESSION: Cardiomegaly with increased mild to moderate pulmonary interstitial  edema and bibasilar atelectasis      Assessment and Plan:   Vesicular rash, surrounding hyperpigmentation  - unfortunately my ability to recognize rashes is suboptimal. Currently on acyclovir but distribution is not the classic one for shingles given that both legs involved. Punch bx x 2 yesterday. Await path    Fevers - currently on azithormycin and zosyn and vancomycin    Thrombocytopenia, anemia - preliminary labs do not show deficiency of iron, b12 or folate. Haptoglobin high so not hemolyzing. Smear yesterday without schistocytes so not TTP or HUS. Copper pending. Thrombocytopenia is a more chronic process of at least 5 years duration so at some point likely to need a bone marrow biopsy but not acutely. He is back to his baseline at 106k which I find reassuring that he is responding to current measures re \"sepsis\" like picture which is so commonly associated with drop in plts. Renal insufficiency - improved since admit. Stable. COPD with exacerbation - breathing comfortably    CAD -stable    Prostate cancer - sounds like he has been on lupron    HTN - on coreg    35 minutes spent in face to face counselling or on floor coordinating care.

## 2017-05-19 NOTE — PROGRESS NOTES
End of Shift Nursing Note    Bedside shift change report given to [address] [place] [address] (oncoming nurse) by Felton Thomas (offgoing nurse). Report included the following information SBAR, Kardex, MAR and Recent Results. Zone Phone:       Significant changes during shift:    Voided 200 ml urine at 2005, post void bladder scan, 23 ml. 0438 post void bladder scan 12 ml. Right lower extremity lesions bleeding a lot this morning, applied cream, covered with 4x 4 and wrapped with cling. Non-emergent issues for physician to address:   None     Number times ambulated in hallway past shift: 0      Number of times OOB to chair past shift: 2    POD #:      Vital Signs:    Temp: 99.4 °F (37.4 °C)     Pulse (Heart Rate): 92     BP: 118/66     Resp Rate: 20     O2 Sat (%): 99 %    Lines & Drains:     Urinary Catheter? No   Placement Date:    Medical Necessity:   Central Line? No   Placement Date:    Medical Necessity:   PICC Line? No   Placement Date:    Medical Necessity:     NG tube [] in [] removed [] not applicable   Drains [] in [] removed [] not applicable     Skin Integrity:      Wounds: no   Dressings Present: no    Wound Concerns: no      GI:    Current diet:  DIET CARDIAC Regular    Nausea: NO  Vomiting: NO  Bowel Sounds: YES  Flatus: YES  Last Bowel Movement: several days ago   Appearance: unobserved    Respiratory:  Supplemental O2: No      Device:    via  Liters/min     Incentive Spirometer: YES  Volume: 750  Coughing and Deep Breathing: YES  Oral Care: YES  Understanding (patient/family education): YES   Getting out of bed: YES  Head of bed elevation: YES    Patient Safety:    Falls Score: 0  Mobility Score: 5  Bed Alarm On? No  Sitter? No      Opportunity for questions and clarification was given to oncoming nurse. Patient bed is in low position, side rails are up x 2, door & observation blinds open as needed, call bell within reach and patient not in distress.     Andreia Rothman

## 2017-05-19 NOTE — PROGRESS NOTES
Nephrology Progress Note     [facility]       [facility]. [facility]                   Phone - [phone]   Patient: Jennifer Ramirez  YOB: 1940     Date- 5/19/2017     CC: Follow up for acute renal failure          Subjective: Interval History:   -  Cr. Stable  Sob better  He is started on vanco.  He has punch biopsy done today    No c/o sob, fever. No c/o nausea or vomiting  No c/o chest pain       Assessment:   · Acute renal failure likely due to cardiorenal syndrome,   · Left renal cyst  · chf -Sob  · hypokalemia  · CKD 3- baseline cr. 1.1-1.3 in 2016  · Rash - herpes zoster-Thrombocytopenia  · H/o HTN  · H/o prostate cancer     Plan:   Lasix 40 mg daily  Follow vanco. Level  Bmp in am  Care Plan discussed with: pt   Review of Systems: Pertinent items are noted in HPI. Objective:   Vitals:    05/18/17 1255 05/18/17 1557 05/19/17 0758 05/19/17 1250   BP: 117/77 118/66 129/63 110/61   Pulse: 66 92 (!) 114 74   Resp: 20 20 18 16   Temp: 99.2 °F (37.3 °C) 99.4 °F (37.4 °C) 98.3 °F (36.8 °C) 99.8 °F (37.7 °C)   SpO2: 99% 99% 97% 95%   Weight:       Height:           Intake/Output Summary (Last 24 hours) at 05/19/17 1533  Last data filed at 05/19/17 1519   Gross per 24 hour   Intake              100 ml   Output             1350 ml   Net            -1250 ml     Physical Exam:   GEN: NAD  NECK- Supple, no thyromegaly  RESP: Clear b/l, no wheezing, No accessory muscle use  CVS: RRR,S1,S2   NEURO: non focal, normal speech  SKIN: b/l vesicular rash on legs        Chart reviewed. Pertinent Notes reviewed.      Medications list  reviewed             Data Review :  Recent Labs      05/19/17   0410  05/18/17   0450  05/17/17   1312   NA  140  139  137   K  4.0  3.5  3.6   CL  106  104  104   CO2  21  24  24   GLU  105*  110*  101*   BUN  21*  20  20   CREA  1.50*  1.45*  1.49*   CA  8.5  8.9  8.9   MG  2.3   --   2.3   PHOS  3.2  2.9   --    ALB  2.1*  2.2*   --      Recent Labs      05/19/17   0410  05/17/17   1312   WBC  6.6  4.2   HGB  9.5*  11.0*   HCT  29.7*  33.4*   PLT  106*  64*     Current Facility-Administered Medications   Medication     Vancomycin Trough    piperacillin-tazobactam (ZOSYN) 3.375 g in 0.9% sodium chloride (MBP/ADV) 100 mL MBP    furosemide (LASIX) injection 40 mg    vancomycin (VANCOCIN) 1500 mg in  ml infusion    acyclovir (ZOVIRAX) capsule 200 mg    acyclovir (ZOVIRAX) 5 % ointment    hydrALAZINE (APRESOLINE) 20 mg/mL injection 10 mg    folic acid (FOLVITE) tablet 1 mg    albuterol (PROVENTIL HFA, VENTOLIN HFA, PROAIR HFA) inhaler 2 Puff    aspirin chewable tablet 81 mg    atorvastatin (LIPITOR) tablet 40 mg    carvedilol (COREG) tablet 25 mg    HYDROcodone-acetaminophen (NORCO) 5-325 mg per tablet 1 Tab    sodium chloride (NS) flush 5-10 mL    sodium chloride (NS) flush 5-10 mL    acetaminophen (TYLENOL) tablet 650 mg       Kalie Cisneros MD  [facility]  [email or web address]  [address] [facility], [address] [place]  Phone - [phone]         Fax - [phone]  [facility] Office  [address] [address]  Phone - [phone]        Fax - [phone]

## 2017-05-19 NOTE — PROGRESS NOTES
Hospitalist Progress Note    NAME: Maria Victoria Hickey   :  1940   MRN:  [id number]       Interim Hospital Summary: 78 y.o. male whom presented on 2017 with      Assessment / Plan:    Fevers / SIRS  Vesicular type rash with cellulitis   -Pemphigoid vs zoster vs something else with secondary bacterial infection  -sent viral culture and bacterial culture of fluid from one of the blisters - pending  -complete course of oral Acyclovir but this is likely not zoster as it involved both LE  -continue IV vanco and zosyn. Follow up on blood cultures  -punch biopsy x 2. Follow up on path and IF results    Thrombocytopenia (since )  -stopped lovenox. -appreciate Hematology input. No schistocytes on smear, doubt TTP or HUS. -he has had thrombocytopenia since . Counts now trending up so initial drop likely related to sepsis. Will need BM bx at some point. Needs OP follow up. COPD  -exacerbation triggered by CHF. No need for steroids. Cont Bronchodilators. Stopped zithromax as patient is now on broad spectrum abx for sepsis    Acute hypoxic Respiratory Failure due to Acute combined HF POA  -CXR Interstitial pulmonary edema with fluid in fissures. proBNP >5000  -Echo EF 10% with diastolic dysfunction  -repeat CXR still shows CHF  -weaned off oxygen. Continue IV lasix    Deconditioning  -PT OT eval.  ? SNF rehab    ROBINSON on CKD3  -likely cardio renal syndrome. Cr trending down  -renal US 8.8 cm left mid to lower pole renal cyst.  No hydronephrosis. -continue IV lasix. CAD    HTN   -continue asa,coreg and statin    Prostate CA    No UTI. Cultures no growth      Surrogate Decision Maker: Daughter Sarah Prince   Baseline: Full ADL  Body mass index is 34.69 kg/(m^2).   Code status: Full  Prophylaxis: lovenox  Recommended Disposition:  PT, OT, RN     Subjective:     Chief Complaint / Reason for Physician Visit  Follow up sepsis, resp failure CAP, CHF, zoster rash  Cellulitic rash resolving. Less pain. Feels weak    Review of Systems:  Symptom Y/N Comments  Symptom Y/N Comments   Fever/Chills y   Chest Pain     Poor Appetite    Edema y    Cough    Abdominal Pain     Sputum    Joint Pain     SOB/PA n   Pruritis/Rash     Nausea/vomit    Tolerating PT/OT     Diarrhea    Tolerating Diet y    Constipation    Other       Could NOT obtain due to:      Objective:     VITALS:   Last 24hrs VS reviewed since prior progress note. Most recent are:  Patient Vitals for the past 24 hrs:   Temp Pulse Resp BP SpO2   05/19/17 1250 99.8 °F (37.7 °C) 74 16 110/61 95 %   05/19/17 0758 98.3 °F (36.8 °C) (!) 114 18 129/63 97 %       Intake/Output Summary (Last 24 hours) at 05/19/17 1657  Last data filed at 05/19/17 1519   Gross per 24 hour   Intake              100 ml   Output             1250 ml   Net            -1150 ml        PHYSICAL EXAM:  General: WD, WN. Alert, cooperative, fever, knee pain  EENT:  EOMI. Anicteric sclerae. MMM  Resp:  Coarse BS. No accessory muscle use  CV:  Regular  rhythm,  No edema  GI:  Soft, Non distended, Non tender.  +Bowel sounds  Neurologic:  Alert and oriented X 3, normal speech,   Psych:   Good insight. Not anxious nor agitated  Skin:  Vesicular rash with bleeding behind left knee and lateral aspect of right leg    Reviewed most current lab test results and cultures  YES  Reviewed most current radiology test results   YES  Review and summation of old records today    NO  Reviewed patient's current orders and MAR    YES  PMH/ reviewed - no change compared to H&P  ________________________________________________________________________  Care Plan discussed with:    Comments   Patient y    Family      RN y    Care Manager     Consultant  y Dr Derrick Lakhani / [facility]                     Multidiciplinary team rounds were held today with , nursing, pharmacist and clinical coordinator.   Patient's plan of care was discussed; medications were reviewed and discharge planning was addressed. ________________________________________________________________________  Total NON critical care TIME:   30   Minutes    Total CRITICAL CARE TIME Spent:   Minutes non procedure based      Comments   >50% of visit spent in counseling and coordination of care y    ________________________________________________________________________  Ann Marie Pinedo MD     Procedures: see electronic medical records for all procedures/Xrays and details which were not copied into this note but were reviewed prior to creation of Plan. LABS:  I reviewed today's most current labs and imaging studies.   Pertinent labs include:  Recent Labs      05/19/17   0410  05/17/17   1312   WBC  6.6  4.2   HGB  9.5*  11.0*   HCT  29.7*  33.4*   PLT  106*  64*     Recent Labs      05/19/17   0410  05/18/17   0450  05/17/17   1312   NA  140  139  137   K  4.0  3.5  3.6   CL  106  104  104   CO2  21  24  24   GLU  105*  110*  101*   BUN  21*  20  20   CREA  1.50*  1.45*  1.49*   CA  8.5  8.9  8.9   MG  2.3   --   2.3   PHOS  3.2  2.9   --    ALB  2.1*  2.2*   --        Signed: Ann Marie Pinedo MD

## 2017-05-19 NOTE — PROGRESS NOTES
Nutrition Services      Nutrition Screen:  Wt Readings from Last 10 Encounters:   05/15/17 97.5 kg (214 lb 15.2 oz)   04/09/17 99.9 kg (220 lb 3.8 oz)   01/26/17 100.2 kg (221 lb)   12/29/16 99 kg (218 lb 3.2 oz)   12/29/16 97.4 kg (214 lb 11.7 oz)   12/23/16 100.7 kg (222 lb 0.1 oz)   10/13/16 94.9 kg (209 lb 5 oz)   03/31/16 91.5 kg (201 lb 12.8 oz)   02/19/16 91 kg (200 lb 9.9 oz)   12/17/15 92.5 kg (204 lb)     Body mass index is 34.69 kg/(m^2). Supplements:                        _____ ordered ______  declined. __ __  Pt is nutritionally stable at this time, will rescreen in 7 days. _x __    Pt is at nutritional risk and will be rescreened in 2-5 days. __ __  Pt is at moderate or high nutritional risk, will refer to RD for assessment.        Mireya Gomez  Dietetic Technician, Registered

## 2017-05-19 NOTE — WOUND CARE
Wound care asked by staff nurse and Dr Oliva Matthew to see patients RLE, right buttock and left behind the knee wounds. Patient has a vesicular type rash of unknown etiology. Dr Juan M Tanner biopsied x2 places on RLE yesterday. 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 Zoster has been ruled out secondary to not responding to anti-viral's and there are too many different places. I am unable to recognize the vesical, peeling wounds. Patient reports they are painful but not \"itchy\" - however he is scratching his right buttocks. The weeping drainage is sero-sang. Wounds cleaned with NS and covered with non-adherent silicone based dressings.  nurse spoke with Dr Oliva Matthew and Dr Juan M Tanner and they will await biopsy results to take next steps.   Rosalba Boston RN, [employer]

## 2017-05-20 LAB
ALBUMIN SERPL BCP-MCNC: 2.1 G/DL (ref 3.5–5)
ANION GAP BLD CALC-SCNC: 11 MMOL/L (ref 5–15)
BASOPHILS # BLD AUTO: 0 K/UL (ref 0–0.1)
BASOPHILS # BLD: 0 % (ref 0–1)
BUN SERPL-MCNC: 21 MG/DL (ref 6–20)
BUN/CREAT SERPL: 15 (ref 12–20)
CALCIUM SERPL-MCNC: 8.7 MG/DL (ref 8.5–10.1)
CHLORIDE SERPL-SCNC: 104 MMOL/L (ref 97–108)
CO2 SERPL-SCNC: 24 MMOL/L (ref 21–32)
COPPER SERPL-MCNC: 152 UG/DL (ref 72–166)
CREAT SERPL-MCNC: 1.38 MG/DL (ref 0.7–1.3)
EOSINOPHIL # BLD: 0.2 K/UL (ref 0–0.4)
EOSINOPHIL NFR BLD: 2 % (ref 0–7)
ERYTHROCYTE [DISTWIDTH] IN BLOOD BY AUTOMATED COUNT: 15.4 % (ref 11.5–14.5)
GLUCOSE SERPL-MCNC: 104 MG/DL (ref 65–100)
HCT VFR BLD AUTO: 30.2 % (ref 36.6–50.3)
HGB BLD-MCNC: 9.7 G/DL (ref 12.1–17)
LYMPHOCYTES # BLD AUTO: 11 % (ref 12–49)
LYMPHOCYTES # BLD: 0.9 K/UL (ref 0.8–3.5)
MCH RBC QN AUTO: 25 PG (ref 26–34)
MCHC RBC AUTO-ENTMCNC: 32.1 G/DL (ref 30–36.5)
MCV RBC AUTO: 77.8 FL (ref 80–99)
MONOCYTES # BLD: 0.7 K/UL (ref 0–1)
MONOCYTES NFR BLD AUTO: 9 % (ref 5–13)
NEUTS SEG # BLD: 5.9 K/UL (ref 1.8–8)
NEUTS SEG NFR BLD AUTO: 78 % (ref 32–75)
PHOSPHATE SERPL-MCNC: 2.9 MG/DL (ref 2.6–4.7)
PLATELET # BLD AUTO: 122 K/UL (ref 150–400)
POTASSIUM SERPL-SCNC: 3.8 MMOL/L (ref 3.5–5.1)
RBC # BLD AUTO: 3.88 M/UL (ref 4.1–5.7)
SODIUM SERPL-SCNC: 139 MMOL/L (ref 136–145)
WBC # BLD AUTO: 7.7 K/UL (ref 4.1–11.1)

## 2017-05-20 PROCEDURE — G8978 MOBILITY CURRENT STATUS: HCPCS

## 2017-05-20 PROCEDURE — 74011250637 HC RX REV CODE- 250/637: Performed by: INTERNAL MEDICINE

## 2017-05-20 PROCEDURE — 36415 COLL VENOUS BLD VENIPUNCTURE: CPT | Performed by: INTERNAL MEDICINE

## 2017-05-20 PROCEDURE — 74011000258 HC RX REV CODE- 258: Performed by: SURGERY

## 2017-05-20 PROCEDURE — 97161 PT EVAL LOW COMPLEX 20 MIN: CPT

## 2017-05-20 PROCEDURE — 51798 US URINE CAPACITY MEASURE: CPT

## 2017-05-20 PROCEDURE — 85025 COMPLETE CBC W/AUTO DIFF WBC: CPT | Performed by: INTERNAL MEDICINE

## 2017-05-20 PROCEDURE — 77030011256 HC DRSG MEPILEX <16IN NO BORD MOLN -A

## 2017-05-20 PROCEDURE — G8979 MOBILITY GOAL STATUS: HCPCS

## 2017-05-20 PROCEDURE — 80069 RENAL FUNCTION PANEL: CPT | Performed by: INTERNAL MEDICINE

## 2017-05-20 PROCEDURE — 74011250636 HC RX REV CODE- 250/636: Performed by: INTERNAL MEDICINE

## 2017-05-20 PROCEDURE — G8980 MOBILITY D/C STATUS: HCPCS

## 2017-05-20 PROCEDURE — 65270000029 HC RM PRIVATE

## 2017-05-20 PROCEDURE — 74011250636 HC RX REV CODE- 250/636: Performed by: SURGERY

## 2017-05-20 PROCEDURE — 77030019607 HC DSG BURN S&N -A

## 2017-05-20 RX ORDER — ACYCLOVIR 200 MG/1
800 CAPSULE ORAL
Status: DISPENSED | OUTPATIENT
Start: 2017-05-20 | End: 2017-05-22

## 2017-05-20 RX ADMIN — PIPERACILLIN SODIUM,TAZOBACTAM SODIUM 3.38 G: 3; .375 INJECTION, POWDER, FOR SOLUTION INTRAVENOUS at 06:25

## 2017-05-20 RX ADMIN — CARVEDILOL 25 MG: 12.5 TABLET, FILM COATED ORAL at 19:12

## 2017-05-20 RX ADMIN — ATORVASTATIN CALCIUM 40 MG: 40 TABLET, FILM COATED ORAL at 09:53

## 2017-05-20 RX ADMIN — FUROSEMIDE 40 MG: 10 INJECTION, SOLUTION INTRAMUSCULAR; INTRAVENOUS at 09:53

## 2017-05-20 RX ADMIN — ACETAMINOPHEN 650 MG: 325 TABLET, FILM COATED ORAL at 03:48

## 2017-05-20 RX ADMIN — HYDROCODONE BITARTRATE AND ACETAMINOPHEN 1 TABLET: 5; 325 TABLET ORAL at 03:48

## 2017-05-20 RX ADMIN — ACYCLOVIR: 50 OINTMENT TOPICAL at 09:55

## 2017-05-20 RX ADMIN — ACYCLOVIR 800 MG: 200 CAPSULE ORAL at 13:54

## 2017-05-20 RX ADMIN — ACYCLOVIR: 50 OINTMENT TOPICAL at 15:57

## 2017-05-20 RX ADMIN — ACYCLOVIR: 50 OINTMENT TOPICAL at 11:50

## 2017-05-20 RX ADMIN — Medication 10 ML: at 13:53

## 2017-05-20 RX ADMIN — ACYCLOVIR: 50 OINTMENT TOPICAL at 19:12

## 2017-05-20 RX ADMIN — PIPERACILLIN SODIUM,TAZOBACTAM SODIUM 3.38 G: 3; .375 INJECTION, POWDER, FOR SOLUTION INTRAVENOUS at 13:51

## 2017-05-20 RX ADMIN — ACYCLOVIR 200 MG: 200 CAPSULE ORAL at 07:37

## 2017-05-20 RX ADMIN — ASPIRIN 81 MG CHEWABLE TABLET 81 MG: 81 TABLET CHEWABLE at 09:53

## 2017-05-20 RX ADMIN — ACYCLOVIR: 50 OINTMENT TOPICAL at 03:49

## 2017-05-20 RX ADMIN — ACYCLOVIR: 50 OINTMENT TOPICAL at 06:26

## 2017-05-20 RX ADMIN — Medication 10 ML: at 06:26

## 2017-05-20 RX ADMIN — FOLIC ACID 1 MG: 1 TABLET ORAL at 09:53

## 2017-05-20 RX ADMIN — CARVEDILOL 25 MG: 12.5 TABLET, FILM COATED ORAL at 07:37

## 2017-05-20 NOTE — PROGRESS NOTES
Hospitalist Progress Note    NAME: Zachary Garcia   :  1940   MRN:  [id number]       Interim Hospital Summary: 78 y.o. male whom presented on 2017 with      Assessment / Plan:    Sepsis due to LE cellulitis  Vesicular type rash  -Pemphigoid vs zoster vs something else with secondary bacterial infection  -sent viral culture and bacterial culture of fluid from one of the blisters - pending  -complete course of oral Acyclovir but this is likely not zoster as it involved both LE  -continue IV vanco and zosyn. Still with high fevers but blood cultures no growth. Wound culture no growth. LE erythema improved. -punch biopsy x 2. Follow up on path and IF results    Thrombocytopenia (since )  -stopped lovenox. -appreciate Hematology input. No schistocytes on smear, doubt TTP or HUS. -he has had thrombocytopenia since . Counts now trending up so initial drop likely related to sepsis. Will need BM bx at some point. Needs OP follow up. COPD  -exacerbation triggered by CHF. No need for steroids. Cont Bronchodilators. Stopped zithromax as patient is now on broad spectrum abx for sepsis    Acute hypoxic Respiratory Failure due to Acute combined HF POA  -CXR Interstitial pulmonary edema with fluid in fissures. proBNP >5000  -Echo EF 40% with diastolic dysfunction  -repeat CXR still shows CHF  -weaned off oxygen. Continue IV lasix    Deconditioning  -PT OT eval.  No rehab needs identified    ROBINSON on CKD3  -likely cardio renal syndrome. Cr trending down  -renal US 8.8 cm left mid to lower pole renal cyst.  No hydronephrosis. -continue IV lasix. CAD    HTN   -continue asa,coreg and statin    Prostate CA    No UTI. Cultures no growth      Surrogate Decision Maker: Daughter Reagan Antunez   Baseline: Full ADL  Body mass index is 34.62 kg/(m^2).   Code status: Full  Prophylaxis: lovenox  Recommended Disposition:  PT, OT, RN     Subjective:     Chief Complaint / Reason for Physician Visit  Follow up sepsis, resp failure CAP, CHF, zoster rash  LE erythema improved. Less leg pain. Cr improved. Fever still present    Review of Systems:  Symptom Y/N Comments  Symptom Y/N Comments   Fever/Chills y   Chest Pain     Poor Appetite    Edema y    Cough    Abdominal Pain     Sputum    Joint Pain     SOB/PA n   Pruritis/Rash     Nausea/vomit    Tolerating PT/OT     Diarrhea    Tolerating Diet y    Constipation    Other       Could NOT obtain due to:      Objective:     VITALS:   Last 24hrs VS reviewed since prior progress note. Most recent are:  Patient Vitals for the past 24 hrs:   Temp Pulse Resp BP SpO2   05/20/17 0734 98.9 °F (37.2 °C) (!) 104 16 143/69 95 %   05/20/17 0415 99.9 °F (37.7 °C) 88 - - -   05/20/17 0315 (!) 101.7 °F (38.7 °C) (!) 121 16 136/70 94 %   05/19/17 2001 98 °F (36.7 °C) 76 18 120/70 95 %   05/19/17 1725 98 °F (36.7 °C) 70 18 118/80 96 %   05/19/17 1250 99.8 °F (37.7 °C) 74 16 110/61 95 %       Intake/Output Summary (Last 24 hours) at 05/20/17 1106  Last data filed at 05/20/17 1030   Gross per 24 hour   Intake              200 ml   Output              800 ml   Net             -600 ml        PHYSICAL EXAM:  General: WD, WN. Alert, cooperative, fever, knee pain  EENT:  EOMI. Anicteric sclerae. MMM  Resp:  Coarse BS.   No accessory muscle use  CV:  Regular  rhythm,  No edema  GI:  Soft, Non distended, Non tender.  +Bowel sounds  Neurologic:  Alert and oriented X 3, normal speech,   Psych:   Good insight. Not anxious nor agitated  Skin:  Vesicular rash stable, leg erythema resolving    Reviewed most current lab test results and cultures  YES  Reviewed most current radiology test results   YES  Review and summation of old records today    NO  Reviewed patient's current orders and MAR    YES  PMH/SH reviewed - no change compared to H&P  ________________________________________________________________________  Care Plan discussed with:    Comments   Patient y    Family RN y    Care Manager     Consultant                        Multidiciplinary team rounds were held today with , nursing, pharmacist and clinical coordinator. Patient's plan of care was discussed; medications were reviewed and discharge planning was addressed. ________________________________________________________________________  Total NON critical care TIME:   25   Minutes    Total CRITICAL CARE TIME Spent:   Minutes non procedure based      Comments   >50% of visit spent in counseling and coordination of care y    ________________________________________________________________________  Elizabeth Aranda MD     Procedures: see electronic medical records for all procedures/Xrays and details which were not copied into this note but were reviewed prior to creation of Plan. LABS:  I reviewed today's most current labs and imaging studies.   Pertinent labs include:  Recent Labs      05/20/17   0400  05/19/17   0410  05/17/17   1312   WBC  7.7  6.6  4.2   HGB  9.7*  9.5*  11.0*   HCT  30.2*  29.7*  33.4*   PLT  122*  106*  64*     Recent Labs      05/20/17   0400  05/19/17   0410  05/18/17   0450  05/17/17   1312   NA  139  140  139  137   K  3.8  4.0  3.5  3.6   CL  104  106  104  104   CO2  24  21  24  24   GLU  104*  105*  110*  101*   BUN  21*  21*  20  20   CREA  1.38*  1.50*  1.45*  1.49*   CA  8.7  8.5  8.9  8.9   MG   --   2.3   --   2.3   PHOS  2.9  3.2  2.9   --    ALB  2.1*  2.1*  2.2*   --        Signed: Elizabeth Aranda MD

## 2017-05-20 NOTE — PROGRESS NOTES
End of Shift Nursing Note    Bedside shift change report given to Cathy Phelps (oncoming nurse) by Aurelia Willis (offgoing nurse). Report included the following information SBAR, Kardex, Procedure Summary, Intake/Output, MAR and Recent Results. Zone Phone:   none    Significant changes during shift:    none   Non-emergent issues for physician to address:   none     Number times ambulated in hallway past shift: 1    Number of times OOB to chair past shift: 1      POD #: n/a     Vital Signs:    Temp: 99.4 °F (37.4 °C)     Pulse (Heart Rate): 66     BP: 119/55     Resp Rate: 20     O2 Sat (%): 100 %    Lines & Drains:     Urinary Catheter? No     Central Line? No     PICC Line? No     NG tube [] in [] removed [x] not applicable   Drains [] in [] removed [x] not applicable     Skin Integrity:      Wounds: yes   Dressings Present: yes    Wound Concerns: no      GI:    Current diet:  DIET CARDIAC Regular    Nausea: NO  Vomiting: NO  Bowel Sounds: YES  Flatus: YES  Last Bowel Movement: today       Respiratory:  Supplemental O2: No        Incentive Spirometer: YES  Volume: 1000  Coughing and Deep Breathing: YES  Oral Care: YES  Understanding (patient/family education): YES   Getting out of bed: YES  Head of bed elevation: YES    Patient Safety:    Falls Score: 1  Mobility Score: 0  Bed Alarm On? No  Sitter? No      Opportunity for questions and clarification was given to oncoming nurse. Patient bed is in low position, side rails are up x 2, door & observation blinds open as needed, call bell within reach and patient not in distress.     [facility] Staff

## 2017-05-20 NOTE — PROGRESS NOTES
Pharmacy Automatic Renal Dosing    Indication: Cellulitis w/ vesicular rash (both legs, but looks like shingles);  sepsis, in setting of  prostate cancer    Current regimen:  Vancomycin 1.5gm IV q24h; ( Day #4)  Zosyn 3.375 g IV every 8 hours ( Day #4)  Acyclovir 200mg po 5x daily; start 5/15; Day #6   Acyclovir 5% ointment q3h; start 5/15; Day #6    Goal Vancomycin Level (if needed): 10-15 (changed from 15-20--no evidence of sepsis)  Level(s) Ordered (if needed):   Measured / Extrapolated Vancomycin Levels (if drawn): 13.0  / 12.5    Significant Cultures:    - blood : No Growth (FINAL)    - urine:   No Growth (FINAL)   - p.blood: ng x2 days (pending)   - Leg wound drainage- pending   - viral - Negative (FINAL)    CAPD, Intermittent Hemodialysis or Renal Replacement Therapy: none  Paralysis, amputations, malnutrition: none    Recent Labs      17   0400  17   0410  17   0450  17   1312   CREA  1.38*  1.50*  1.45*  1.49*   BUN  21*  21*  20  20   WBC  7.7  6.6   --   4.2     Temp (24hrs), Av.2 °F (37.3 °C), Min:98 °F (36.7 °C), Max:101.7 °F (38.7 °C)    CrCl (Creatinine Clearance (ml/min)): 42    Impression/Plan:    No evidence of sepsis (blood cultures negative so far), so I changed the desired trough range to 10-15  Vancomycin Trough 12.5 which is within the desired range  Continue Vancomycin 1.5gm IV q24h and Zosyn 3.375gm IV q8h  Viral assay negative, so no evidence of H.zoster; pending punch bx of leg lesions  [facility]   Pharmacy will follow daily and adjust doses of monitored medications as appropriate.     Thank you,  Mary Mcfadden, [facility]

## 2017-05-21 LAB
ALBUMIN SERPL BCP-MCNC: 2.1 G/DL (ref 3.5–5)
ANION GAP BLD CALC-SCNC: 10 MMOL/L (ref 5–15)
BACTERIA SPEC CULT: NORMAL
BUN SERPL-MCNC: 19 MG/DL (ref 6–20)
BUN/CREAT SERPL: 14 (ref 12–20)
CALCIUM SERPL-MCNC: 8.6 MG/DL (ref 8.5–10.1)
CHLORIDE SERPL-SCNC: 105 MMOL/L (ref 97–108)
CO2 SERPL-SCNC: 25 MMOL/L (ref 21–32)
CREAT SERPL-MCNC: 1.33 MG/DL (ref 0.7–1.3)
GLUCOSE SERPL-MCNC: 107 MG/DL (ref 65–100)
GRAM STN SPEC: NORMAL
GRAM STN SPEC: NORMAL
PHOSPHATE SERPL-MCNC: 3.1 MG/DL (ref 2.6–4.7)
POTASSIUM SERPL-SCNC: 3.6 MMOL/L (ref 3.5–5.1)
SERVICE CMNT-IMP: NORMAL
SODIUM SERPL-SCNC: 140 MMOL/L (ref 136–145)

## 2017-05-21 PROCEDURE — 74011250636 HC RX REV CODE- 250/636: Performed by: INTERNAL MEDICINE

## 2017-05-21 PROCEDURE — 97165 OT EVAL LOW COMPLEX 30 MIN: CPT | Performed by: OCCUPATIONAL THERAPIST

## 2017-05-21 PROCEDURE — 74011250637 HC RX REV CODE- 250/637: Performed by: INTERNAL MEDICINE

## 2017-05-21 PROCEDURE — 36415 COLL VENOUS BLD VENIPUNCTURE: CPT | Performed by: INTERNAL MEDICINE

## 2017-05-21 PROCEDURE — 74011000258 HC RX REV CODE- 258: Performed by: SURGERY

## 2017-05-21 PROCEDURE — 74011250636 HC RX REV CODE- 250/636: Performed by: SURGERY

## 2017-05-21 PROCEDURE — 65270000029 HC RM PRIVATE

## 2017-05-21 PROCEDURE — 80069 RENAL FUNCTION PANEL: CPT | Performed by: INTERNAL MEDICINE

## 2017-05-21 RX ADMIN — PIPERACILLIN SODIUM,TAZOBACTAM SODIUM 3.38 G: 3; .375 INJECTION, POWDER, FOR SOLUTION INTRAVENOUS at 14:25

## 2017-05-21 RX ADMIN — FUROSEMIDE 40 MG: 10 INJECTION, SOLUTION INTRAMUSCULAR; INTRAVENOUS at 08:17

## 2017-05-21 RX ADMIN — ACYCLOVIR: 50 OINTMENT TOPICAL at 08:27

## 2017-05-21 RX ADMIN — ACYCLOVIR 800 MG: 200 CAPSULE ORAL at 22:17

## 2017-05-21 RX ADMIN — ACETAMINOPHEN 650 MG: 325 TABLET, FILM COATED ORAL at 12:44

## 2017-05-21 RX ADMIN — ACYCLOVIR 800 MG: 200 CAPSULE ORAL at 08:17

## 2017-05-21 RX ADMIN — PIPERACILLIN SODIUM,TAZOBACTAM SODIUM 3.38 G: 3; .375 INJECTION, POWDER, FOR SOLUTION INTRAVENOUS at 06:29

## 2017-05-21 RX ADMIN — ASPIRIN 81 MG CHEWABLE TABLET 81 MG: 81 TABLET CHEWABLE at 08:15

## 2017-05-21 RX ADMIN — Medication 10 ML: at 06:31

## 2017-05-21 RX ADMIN — Medication 10 ML: at 14:26

## 2017-05-21 RX ADMIN — ACYCLOVIR: 50 OINTMENT TOPICAL at 06:28

## 2017-05-21 RX ADMIN — PIPERACILLIN SODIUM,TAZOBACTAM SODIUM 3.38 G: 3; .375 INJECTION, POWDER, FOR SOLUTION INTRAVENOUS at 22:19

## 2017-05-21 RX ADMIN — ACYCLOVIR: 50 OINTMENT TOPICAL at 00:16

## 2017-05-21 RX ADMIN — PIPERACILLIN SODIUM,TAZOBACTAM SODIUM 3.38 G: 3; .375 INJECTION, POWDER, FOR SOLUTION INTRAVENOUS at 00:17

## 2017-05-21 RX ADMIN — ACYCLOVIR: 50 OINTMENT TOPICAL at 00:15

## 2017-05-21 RX ADMIN — ATORVASTATIN CALCIUM 40 MG: 40 TABLET, FILM COATED ORAL at 08:16

## 2017-05-21 RX ADMIN — ACYCLOVIR 800 MG: 200 CAPSULE ORAL at 17:39

## 2017-05-21 RX ADMIN — FOLIC ACID 1 MG: 1 TABLET ORAL at 08:17

## 2017-05-21 RX ADMIN — Medication 5 ML: at 22:00

## 2017-05-21 RX ADMIN — ACYCLOVIR 800 MG: 200 CAPSULE ORAL at 00:15

## 2017-05-21 RX ADMIN — VANCOMYCIN HYDROCHLORIDE 1500 MG: 10 INJECTION, POWDER, LYOPHILIZED, FOR SOLUTION INTRAVENOUS at 19:10

## 2017-05-21 RX ADMIN — ACYCLOVIR 800 MG: 200 CAPSULE ORAL at 14:25

## 2017-05-21 NOTE — PROGRESS NOTES
Occupational Therapy EVALUATION  Patient: Sharon Cabrera (47 y.o. male)  Date: 5/21/2017  Primary Diagnosis: Acute respiratory failure (HCC)        Precautions: contact isolation        ASSESSMENT :   Based on the objective data described below, the patient presents  with decreased ability to dress/bathe LE 's and completing ADL transfers ( commode/shower) due to fatigue, difficulty reaching LE, and inability to raise Right LE to clear high tub/shower threshold. Patient will benefit from skilled intervention to address the above impairments. Patients rehabilitation potential is considered to be Fair  Factors which may influence rehabilitation potential include:   []             None noted  [x]             Mental ability/status  []             Medical condition  []             Home/family situation and support systems  []             Safety awareness  []             Pain tolerance/management  []             Other:      PLAN :  Recommendations and Planned Interventions:  [x]               Self Care Training                  []        Therapeutic Activities  []               Functional Mobility Training    []        Cognitive Retraining  []               Therapeutic Exercises           []        Endurance Activities  []               Balance Training                   []        Neuromuscular Re-Education  []               Visual/Perceptual Training     []   Home Safety Training  [x]               Patient Education                 []        Family Training/Education  []               Other (comment):    Frequency/Duration: Patient will be followed by occupational therapy 3 times a week to address goals. Discharge Recommendations: To Be Determined  Further Equipment Recommendations for Discharge: TBD     SUBJECTIVE:   Patient stated \" It's hard to get to my feet because of my stomach and age. \"    OBJECTIVE DATA SUMMARY:   HISTORY:   Past Medical History:   Diagnosis Date    Acute on chronic systolic HF (heart failure) ([facility] [address] 2/11/2014    11/15 Echo EF 40-45%    Cancer (HCC)     prostate    COPD (chronic obstructive pulmonary disease) (HCC)     Elevated troponin 2/11/2014    Hypertension     S/P cardiac cath 2/12/2014 2/12/14 no significant coronary disease, severe LV dysfunction     Past Surgical History:   Procedure Laterality Date    COLONOSCOPY,DIAGNOSTIC  2/22/2016        Sohail Pickett [facility]  2/22/2016            Prior Level of Function/Home Situation:   Expanded or extensive additional review of patient history:     Home Situation  Home Environment: Private residence  # Steps to Enter: 2  Rails to Enter: No  One/Two Story Residence: Two story  # of Interior Steps: 15  Interior Rails: Both  Lift Chair Available: No  Living Alone: No  Support Systems: Spouse/Significant Other/Partner  Patient Expects to be Discharged to[de-identified] Private residence  Current DME Used/Available at Home: None  Tub or Shower Type: Tub/Shower combination  [x]  Right hand dominant   []  Left hand dominant    EXAMINATION OF PERFORMANCE DEFICITS:  Cognitive/Behavioral Status:  Neurologic State: Alert  Orientation Level: Oriented X4  Cognition: Follows commands  Perception: Appears intact  Perseveration: No perseveration noted  Safety/Judgement: Awareness of environment    Skin: bandaged right lateral calf    Edema:  Min edema bilateral ankles/feet , non pitting     Hearing: Auditory  Auditory Impairment: None    Vision/Perceptual:            Grossly  Intact for item location                          Range of Motion:  R/U UE range of motion WFL                             Strength:  5/5 prox to distal  R/L UE                   Coordination:     Fine Motor Skills-Upper: Right Intact; Left Intact    Gross Motor Skills-Upper: Right Intact    Tone   Denies numbness/tingling at finger tips   Tone: Normal                         Balance:  Sitting: Intact  Standing: Intact   Single Leg stance (i.e. Enter/exit shower) requires contact guard assist    Functional Mobility and Transfers for ADLs:  Bed Mobility:   N/A : patient received in bedside chair    Transfers:  Sit to Stand: Independent  Stand to Sit: Modified independent  Toilet Transfer : Supervision  Shower Transfer: Contact guard assistance    ADL Assessment:  Feeding: Modified independent    Oral Facial Hygiene/Grooming: Modified Independent    Bathing: Moderate assistance : difficulty reaching bilateral feet (in between toes, calfs) : Fatigues   Upper Body Dressing: Minimum assistance    Lower Body Dressing: Minimum assistance: unable to place remove  Right sock, difficulty using crosse leg method (weakness, fatigue)     Toileting: Contact guard assistance:  Relies on grab bar/ sink               ADL Intervention and task modifications:     Suggested use of grab bars in the  home, tub bench to allow patient to more safely enter/exit tub/shower. Suggested Adaptive Equipment for LE dressing                                      Functional Measure:Barthel Index:    Bathin  Bladder: 10  Bowels: 10  Groomin  Dressin  Feeding: 10  Mobility: 15  Stairs: 0  Toilet Use: 10  Transfer (Bed to Chair and Back): 15  Total: 80       Barthel and G-code impairment scale:  Percentage of impairment CH  0% CI  1-19% CJ  20-39% CK  40-59% CL  60-79% CM  80-99% CN  100%   Barthel Score 0-100 100 99-80 79-60 59-40 20-39 1-19   0   Barthel Score 0-20 20 17-19 13-16 9-12 5-8 1-4 0      The Barthel ADL Index: Guidelines  1. The index should be used as a record of what a patient does, not as a record of what a patient could do. 2. The main aim is to establish degree of independence from any help, physical or verbal, however minor and for whatever reason. 3. The need for supervision renders the patient not independent. 4. A patient's performance should be established using the best available evidence.  Asking the patient, friends/relatives and nurses are the usual sources, but direct observation and common sense are also important. However direct testing is not needed. 5. Usually the patient's performance over the preceding 24-48 hours is important, but occasionally longer periods will be relevant. 6. Middle categories imply that the patient supplies over 50 per cent of the effort. 7. Use of aids to be independent is allowed. Valentina Calderon., Barthel, D.W. (4903). Functional evaluation: the Barthel Index. [address] [facility] (14)2. Harinder Armanod skylar KRZYSZTOF Vilallta, Vaibhav Phillips., Woody Li., [place], [address] [facility] (1999). Measuring the change in disability after inpatient rehabilitation; comparison of the responsiveness of the Barthel Index and Functional [place] Measure. Journal of Neurology, Neurosurgery, and Psychiatry, 66(4), 890-123. JOÃO Fuller, BISHOP Santos, & Faby Hagan MNADER. (2004.) Assessment of post-stroke quality of life in cost-effectiveness studies: The usefulness of the Barthel Index and the EuroQoL-5D. Quality of Life Research, 13, 563-59       G codes: In compliance with CMSs Claims Based Outcome Reporting, the following G-code set was chosen for this patient based on their primary functional limitation being treated: The outcome measure chosen to determine the severity of the functional limitation was the Barthel  with a score of 80/100 which was correlated with the impairment scale. ?  Self Care:     - CURRENT STATUS: CI - 1%-19% impaired, limited or restricted    - GOAL STATUS: CI - 1%-19% impaired, limited or restricted    - D/C STATUS:  ---------------To be determined---------------     Occupational Therapy Evaluation Charge Determination   History Examination Decision-Making   MEDIUM Complexity : Expanded review of history including physical, cognitive and psychosocial  history  LOW Complexity : 1-3 performance deficits relating to physical, cognitive , or psychosocial skils that result in activity limitations and / or participation restrictions  MEDIUM Complexity : Patient may present with comorbidities that affect occupational performnce. Miniml to moderate modification of tasks or assistance (eg, physical or verbal ) with assesment(s) is necessary to enable patient to complete evaluation       Based on the above components, the patient evaluation is determined to be of the following complexity level: LOW   Pain:  Pain Scale 1: Numeric (0 - 10)  Pain Intensity 1: 0              Activity Tolerance:   Fair: increased respirations with minimal activity     After treatment:   [x] Patient left in no apparent distress sitting up in chair  [] Patient left in no apparent distress in bed  [x] Call bell / phone left within reach  [] Nursing notified  [] Caregiver present  [] Bed alarm activated    COMMUNICATION/EDUCATION:     [x] Home safety education was provided and the patient/caregiver indicated understanding. [x] Patient/family have participated as able in goal setting and plan of care. [x] Patient/family agree to work toward stated goals and plan of care. [] Patient understands intent and goals of therapy, but is neutral about his/her participation. [] Patient is unable to participate in goal setting and plan of care. This patients plan of care is appropriate for delegation to [facility].     Thank you for this referral.  Julieta Medina, OTR/L

## 2017-05-21 NOTE — PROGRESS NOTES
End of Shift Nursing Note    Bedside shift change report given to Wilbur Martinez (oncoming nurse) by Frieda Melara (offgoing nurse). Report included the following information SBAR, Kardex, Intake/Output, MAR and Recent Results. Zone Phone:   none    Significant changes during shift:    none   Non-emergent issues for physician to address:   none     Number times ambulated in hallway past shift: 0; pt ambulates to and from room      Number of times OOB to chair past shift: 2    POD #: n/a     Vital Signs:    Temp: 98.8 °F (37.1 °C)     Pulse (Heart Rate): (!) 101     BP: 106/67     Resp Rate: 20     O2 Sat (%): 96 %    Lines & Drains:     Urinary Catheter? No     Central Line? No     PICC Line? No       NG tube [] in [] removed [x] not applicable   Drains [] in [] removed [x] not applicable     Skin Integrity:      Wounds: yes   Dressings Present: yes    Wound Concerns: no      GI:    Current diet:  DIET CARDIAC Regular    Nausea: NO  Vomiting: NO  Bowel Sounds: YES  Flatus: YES  Last Bowel Movement: today      Respiratory:  Supplemental O2: No       Incentive Spirometer: YES  Volume: 1000  Coughing and Deep Breathing: YES  Oral Care: YES  Understanding (patient/family education): YES   Getting out of bed: YES  Head of bed elevation: YES    Patient Safety:    Falls Score: 1  Mobility Score: 0  Bed Alarm On? No  Sitter? No      Opportunity for questions and clarification was given to oncoming nurse. Patient bed is in low position, side rails are up x 2, door & observation blinds open as needed, call bell within reach and patient not in distress.     Glorious Ask

## 2017-05-21 NOTE — PROGRESS NOTES
Hospitalist Progress Note    NAME: Jennifer Ramirez   :  1940   MRN:  [id number]       Interim Hospital Summary: 78 y.o. male whom presented on 2017 with      Assessment / Plan:    Sepsis due to LE cellulitis  Vesicular type rash  -Pemphigoid vs zoster vs something else with secondary bacterial infection  -sent viral culture and bacterial culture of fluid from one of the blisters - so far no growth  -completed course of oral Acyclovir but this is likely not zoster as it involved both LE  -continue IV vanco and zosyn. Temp finally seems to be resolving and blood / wound cultures no growth so far. LE erythema resolving  -punch biopsy x 2. Follow up on path and IF results    Thrombocytopenia (since )  -stopped lovenox. -appreciate Hematology input. No schistocytes on smear, doubt TTP or HUS. -he has had thrombocytopenia since . Counts now trending up so initial drop likely related to sepsis. Will need BM bx at some point. Needs OP follow up with hematology. COPD, now stable  -No need for steroids. Cont Bronchodilators. Stopped zithromax as patient is now on broad spectrum abx for sepsis. Acute hypoxic Respiratory Failure due to Acute combined HF POA  -CXR Interstitial pulmonary edema with fluid in fissures. proBNP >5000  -Echo EF 69% with diastolic dysfunction  -weaned off oxygen. Switch to oral lasix tomorrow    ROBINSON on CKD3  -likely cardio renal syndrome. Cr down to baseline  -renal US 8.8 cm left mid to lower pole renal cyst.  No hydronephrosis. CAD    HTN   -continue asa,coreg and statin    Prostate CA    No UTI. Cultures no growth      Surrogate Decision Maker: Daughter Angelic Palomares   Baseline: Full ADL  Body mass index is 34.16 kg/(m^2). Code status: Full  Prophylaxis: lovenox  Recommended Disposition: HH PT, OT, RN     Subjective:     Chief Complaint / Reason for Physician Visit  Follow up sepsis, resp failure CAP, CHF, zoster rash  LE erythema improved.   Less leg pain. Cr improved. No more fever    Review of Systems:  Symptom Y/N Comments  Symptom Y/N Comments   Fever/Chills n   Chest Pain     Poor Appetite    Edema y    Cough    Abdominal Pain     Sputum    Joint Pain     SOB/PA n   Pruritis/Rash     Nausea/vomit    Tolerating PT/OT     Diarrhea    Tolerating Diet y    Constipation    Other       Could NOT obtain due to:      Objective:     VITALS:   Last 24hrs VS reviewed since prior progress note. Most recent are:  Patient Vitals for the past 24 hrs:   Temp Pulse Resp BP SpO2   05/21/17 1116 98.6 °F (37 °C) 100 20 131/83 97 %   05/21/17 0805 98.1 °F (36.7 °C) (!) 111 16 118/71 96 %   05/20/17 2040 100.1 °F (37.8 °C) 83 16 105/79 97 %   05/20/17 1912 - 64 - 123/64 -   05/20/17 1554 99.4 °F (37.4 °C) 66 20 119/55 100 %   05/20/17 1148 99.2 °F (37.3 °C) 97 20 98/70 94 %       Intake/Output Summary (Last 24 hours) at 05/21/17 1119  Last data filed at 05/21/17 1116   Gross per 24 hour   Intake              470 ml   Output             1850 ml   Net            -1380 ml        PHYSICAL EXAM:  General: WD, WN. Alert, cooperative, fever, knee pain  EENT:  EOMI. Anicteric sclerae. MMM  Resp:  Coarse BS. No accessory muscle use  CV:  Regular  rhythm,  No edema  GI:  Soft, Non distended, Non tender.  +Bowel sounds  Neurologic:  Alert and oriented X 3, normal speech,   Psych:   Good insight. Not anxious nor agitated  Skin:  Vesicular rash looks better, leg erythema resolved and less tender.   No new lesions    Reviewed most current lab test results and cultures  YES  Reviewed most current radiology test results   YES  Review and summation of old records today    NO  Reviewed patient's current orders and MAR    YES  PMH/SH reviewed - no change compared to H&P  ________________________________________________________________________  Care Plan discussed with:    Comments   Patient y    Family      RN y    Care Manager     Consultant                        Multidiciplinary team rounds were held today with , nursing, pharmacist and clinical coordinator. Patient's plan of care was discussed; medications were reviewed and discharge planning was addressed. ________________________________________________________________________  Total NON critical care TIME:   25   Minutes    Total CRITICAL CARE TIME Spent:   Minutes non procedure based      Comments   >50% of visit spent in counseling and coordination of care y    ________________________________________________________________________  Sohan Pate MD     Procedures: see electronic medical records for all procedures/Xrays and details which were not copied into this note but were reviewed prior to creation of Plan. LABS:  I reviewed today's most current labs and imaging studies.   Pertinent labs include:  Recent Labs      05/20/17   0400  05/19/17   0410   WBC  7.7  6.6   HGB  9.7*  9.5*   HCT  30.2*  29.7*   PLT  122*  106*     Recent Labs      05/21/17   0609  05/20/17   0400  05/19/17   0410   NA  140  139  140   K  3.6  3.8  4.0   CL  105  104  106   CO2  25  24  21   GLU  107*  104*  105*   BUN  19  21*  21*   CREA  1.33*  1.38*  1.50*   CA  8.6  8.7  8.5   MG   --    --   2.3   PHOS  3.1  2.9  3.2   ALB  2.1*  2.1*  2.1*       Signed: Sohan Pate MD

## 2017-05-22 LAB
ALBUMIN SERPL BCP-MCNC: 2.1 G/DL (ref 3.5–5)
ANION GAP BLD CALC-SCNC: 9 MMOL/L (ref 5–15)
BASOPHILS # BLD AUTO: 0 K/UL (ref 0–0.1)
BASOPHILS # BLD: 0 % (ref 0–1)
BUN SERPL-MCNC: 16 MG/DL (ref 6–20)
BUN/CREAT SERPL: 13 (ref 12–20)
CALCIUM SERPL-MCNC: 8.5 MG/DL (ref 8.5–10.1)
CHLORIDE SERPL-SCNC: 105 MMOL/L (ref 97–108)
CO2 SERPL-SCNC: 25 MMOL/L (ref 21–32)
CREAT SERPL-MCNC: 1.2 MG/DL (ref 0.7–1.3)
DIFFERENTIAL METHOD BLD: ABNORMAL
EOSINOPHIL # BLD: 0.2 K/UL (ref 0–0.4)
EOSINOPHIL NFR BLD: 3 % (ref 0–7)
ERYTHROCYTE [DISTWIDTH] IN BLOOD BY AUTOMATED COUNT: 15.3 % (ref 11.5–14.5)
GLUCOSE SERPL-MCNC: 97 MG/DL (ref 65–100)
HCT VFR BLD AUTO: 28.6 % (ref 36.6–50.3)
HGB BLD-MCNC: 9.4 G/DL (ref 12.1–17)
IGA SERPL-MCNC: 220 MG/DL (ref 61–437)
IGG SERPL-MCNC: 1140 MG/DL (ref 700–1600)
IGM SERPL-MCNC: 314 MG/DL (ref 15–143)
LYMPHOCYTES # BLD AUTO: 9 % (ref 12–49)
LYMPHOCYTES # BLD: 0.7 K/UL (ref 0.8–3.5)
MAGNESIUM SERPL-MCNC: 2.4 MG/DL (ref 1.6–2.4)
MCH RBC QN AUTO: 26 PG (ref 26–34)
MCHC RBC AUTO-ENTMCNC: 32.9 G/DL (ref 30–36.5)
MCV RBC AUTO: 79.2 FL (ref 80–99)
MONOCYTES # BLD: 0.3 K/UL (ref 0–1)
MONOCYTES NFR BLD AUTO: 4 % (ref 5–13)
NEUTS SEG # BLD: 6.7 K/UL (ref 1.8–8)
NEUTS SEG NFR BLD AUTO: 84 % (ref 32–75)
PHOSPHATE SERPL-MCNC: 3.1 MG/DL (ref 2.6–4.7)
PLATELET # BLD AUTO: 198 K/UL (ref 150–400)
PLATELET COMMENTS,PCOM: ABNORMAL
POTASSIUM SERPL-SCNC: 3.6 MMOL/L (ref 3.5–5.1)
PROT PATTERN SERPL IFE-IMP: ABNORMAL
RBC # BLD AUTO: 3.61 M/UL (ref 4.1–5.7)
RBC MORPH BLD: ABNORMAL
SODIUM SERPL-SCNC: 139 MMOL/L (ref 136–145)
WBC # BLD AUTO: 7.9 K/UL (ref 4.1–11.1)

## 2017-05-22 PROCEDURE — 80069 RENAL FUNCTION PANEL: CPT | Performed by: INTERNAL MEDICINE

## 2017-05-22 PROCEDURE — 74011250637 HC RX REV CODE- 250/637: Performed by: INTERNAL MEDICINE

## 2017-05-22 PROCEDURE — 74011250636 HC RX REV CODE- 250/636: Performed by: SURGERY

## 2017-05-22 PROCEDURE — 85025 COMPLETE CBC W/AUTO DIFF WBC: CPT | Performed by: INTERNAL MEDICINE

## 2017-05-22 PROCEDURE — 51798 US URINE CAPACITY MEASURE: CPT

## 2017-05-22 PROCEDURE — 36415 COLL VENOUS BLD VENIPUNCTURE: CPT | Performed by: INTERNAL MEDICINE

## 2017-05-22 PROCEDURE — 74011000258 HC RX REV CODE- 258: Performed by: SURGERY

## 2017-05-22 PROCEDURE — 65270000029 HC RM PRIVATE

## 2017-05-22 PROCEDURE — 83735 ASSAY OF MAGNESIUM: CPT | Performed by: INTERNAL MEDICINE

## 2017-05-22 PROCEDURE — 74011250636 HC RX REV CODE- 250/636: Performed by: INTERNAL MEDICINE

## 2017-05-22 RX ORDER — AMOXICILLIN AND CLAVULANATE POTASSIUM 875; 125 MG/1; MG/1
1 TABLET, FILM COATED ORAL EVERY 12 HOURS
Status: DISCONTINUED | OUTPATIENT
Start: 2017-05-22 | End: 2017-05-23 | Stop reason: HOSPADM

## 2017-05-22 RX ORDER — FUROSEMIDE 40 MG/1
40 TABLET ORAL DAILY
Status: DISCONTINUED | OUTPATIENT
Start: 2017-05-23 | End: 2017-05-23 | Stop reason: HOSPADM

## 2017-05-22 RX ADMIN — CARVEDILOL 25 MG: 12.5 TABLET, FILM COATED ORAL at 16:08

## 2017-05-22 RX ADMIN — ATORVASTATIN CALCIUM 40 MG: 40 TABLET, FILM COATED ORAL at 09:52

## 2017-05-22 RX ADMIN — FUROSEMIDE 40 MG: 10 INJECTION, SOLUTION INTRAMUSCULAR; INTRAVENOUS at 09:52

## 2017-05-22 RX ADMIN — CARVEDILOL 25 MG: 12.5 TABLET, FILM COATED ORAL at 09:52

## 2017-05-22 RX ADMIN — Medication 10 ML: at 21:06

## 2017-05-22 RX ADMIN — AMOXICILLIN AND CLAVULANATE POTASSIUM 1 TABLET: 875; 125 TABLET, FILM COATED ORAL at 21:06

## 2017-05-22 RX ADMIN — PIPERACILLIN SODIUM,TAZOBACTAM SODIUM 3.38 G: 3; .375 INJECTION, POWDER, FOR SOLUTION INTRAVENOUS at 13:32

## 2017-05-22 RX ADMIN — AMOXICILLIN AND CLAVULANATE POTASSIUM 1 TABLET: 875; 125 TABLET, FILM COATED ORAL at 16:08

## 2017-05-22 RX ADMIN — Medication 10 ML: at 16:11

## 2017-05-22 RX ADMIN — FOLIC ACID 1 MG: 1 TABLET ORAL at 09:52

## 2017-05-22 RX ADMIN — ASPIRIN 81 MG CHEWABLE TABLET 81 MG: 81 TABLET CHEWABLE at 09:52

## 2017-05-22 RX ADMIN — PIPERACILLIN SODIUM,TAZOBACTAM SODIUM 3.38 G: 3; .375 INJECTION, POWDER, FOR SOLUTION INTRAVENOUS at 04:08

## 2017-05-22 NOTE — CARDIO/PULMONARY
Cardiopulmonary Rehab Nursing Entry:     Chart Reviewed. Admitting diagnosis of Fever and Tachycardia. Pt is a current day smoker. Teaching last received on 2/16. Focus of teaching today on CHF.      PMH significant for:  -CHF LV EF 40-45%  -COPD  -CAD  -HTN     Met with pt for f/u. Pt is able to state foods that he should avoid but, eats them because he likes hot dogs and albert. Has a scale but, does not use it because he gets up and goes to work. He knows he takes one lasix pill a day and takes it regularly. He mows the grass and occassionally takes walks. Pt states parameters for contact MD if he gains weight. Able to state need to avoid added salt and to eat fresh vegetables.

## 2017-05-22 NOTE — PROGRESS NOTES
Nephrology Progress Note     Saw Candelario       [facility]. [email or web address] [employer] [facility]                   Phone - [phone]   Patient: Cristina Gunn  YOB: 1940     Date- 5/22/2017     CC: Follow up for acute renal failure          Subjective: Interval History:     Creat improved. Punch biopsy done last week is pending    Generally feeling well. On ROS does c/o some coughing and mild chest discomfort with the coughing. ROS: no fever/chills. No HEENT complaints. No SOB but +cough. +chest discomfort with coughing, no other chest pain. No abd pain. No joint pain. +rash. Assessment:   · Acute renal failure likely due to cardiorenal syndrome, improved. · Left renal cyst  · chf -Sob  · hypokalemia  · CKD 3- baseline cr. 1.1-1.3 in 2016  · Rash - herpes zoster-Thrombocytopenia  · H/o HTN  · H/o prostate cancer     Plan:     Continue Lasix 40 mg daily  Follow vanco level    Care Plan discussed with: pt   Review of Systems: Pertinent items are noted in HPI. Objective:   Vitals:    05/21/17 1955 05/21/17 2326 05/22/17 0430 05/22/17 0800   BP: 109/78 121/74 117/66 113/69   Pulse: (!) 108 99 100 (!) 108   Resp: 22 18 21 20   Temp: 98 °F (36.7 °C) 98.8 °F (37.1 °C) 98.7 °F (37.1 °C) 98.5 °F (36.9 °C)   SpO2: 96% 97% 95% 97%   Weight:       Height:           Intake/Output Summary (Last 24 hours) at 05/22/17 1130  Last data filed at 05/21/17 1859   Gross per 24 hour   Intake              500 ml   Output              300 ml   Net              200 ml     Physical Exam:   GEN: Healthy-appearing elderly man in no distress. Up in a chair. NECK- Supple,  RESP: lungs clear  CVS: RRR; no gallop or rub   Abdomen: soft and non-tender  NEURO: non focal, normal speech  SKIN: bandages on the legs  Extremities: trace edema  Musculoskeletal: grossly unremarkable      Chart reviewed. Pertinent Notes reviewed.      Medications list  reviewed             Data Review :  Recent Labs      05/22/17   3848 05/21/17   0609  05/20/17   0400   NA  139  140  139   K  3.6  3.6  3.8   CL  105  105  104   CO2  25  25  24   GLU  97  107*  104*   BUN  16  19  21*   CREA  1.20  1.33*  1.38*   CA  8.5  8.6  8.7   MG  2.4   --    --    PHOS  3.1  3.1  2.9   ALB  2.1*  2.1*  2.1*     Recent Labs      05/22/17   0419  05/20/17   0400   WBC  7.9  7.7   HGB  9.4*  9.7*   HCT  28.6*  30.2*   PLT  198  122*     Current Facility-Administered Medications   Medication    piperacillin-tazobactam (ZOSYN) 3.375 g in 0.9% sodium chloride (MBP/ADV) 100 mL MBP    furosemide (LASIX) injection 40 mg    vancomycin (VANCOCIN) 1500 mg in  ml infusion    hydrALAZINE (APRESOLINE) 20 mg/mL injection 10 mg    folic acid (FOLVITE) tablet 1 mg    albuterol (PROVENTIL HFA, VENTOLIN HFA, PROAIR HFA) inhaler 2 Puff    aspirin chewable tablet 81 mg    atorvastatin (LIPITOR) tablet 40 mg    carvedilol (COREG) tablet 25 mg    HYDROcodone-acetaminophen (NORCO) 5-325 mg per tablet 1 Tab    sodium chloride (NS) flush 5-10 mL    sodium chloride (NS) flush 5-10 mL    acetaminophen (TYLENOL) tablet 650 mg       Stuart Barton MD  [facility]  [email or web address]  [address] [facility], [address] [facility]  Phone - [phone]         Fax - [phone]  [facility] Office  [address] [address]  Phone - [phone]        Fax - [phone]

## 2017-05-22 NOTE — PROGRESS NOTES
Hematology Oncology Progress Note         Follow up for: thrombocytopenia (borderline plt counts back to 2012 with intermittent swings up, more recently significant drop)    Chart notes reviewed since last visit. Case discussed with following: patient    Patient complains of the following: remains upset that he still has a rash. Wonders why we can't be certain re diagnosis yet - have reassured him that as soon as pathologist has chance to look at it, we will have a better idea of what it may or may not be. Additional concerns noted by the staff:     Patient Vitals for the past 24 hrs:   BP Temp Pulse Resp SpO2   05/22/17 0430 117/66 98.7 °F (37.1 °C) 100 21 95 %   05/21/17 2326 121/74 98.8 °F (37.1 °C) 99 18 97 %   05/21/17 1955 109/78 98 °F (36.7 °C) (!) 108 22 96 %   05/21/17 1615 106/67 98.8 °F (37.1 °C) (!) 101 20 96 %   05/21/17 1116 131/83 98.6 °F (37 °C) 100 20 97 %       Review of Systems:  Constitutional  fevers   Allergic/Immunologic No recent allergic reactions   Eyes No significant visual difficulties. No diplopia. ENMT No problems with hearing, no sore throat, no sinus drainage. Endocrine No hot flashes or night sweats. No cold intolerance, polyuria, or polydipsia   Hematologic/Lymphatic No easy bruising or bleeding. The patient denies any tender or palpable lymph nodes   Breasts No abnormal masses of breast, nipple discharge or pain. Respiratory POA dyspnea on exertion   Cardiovascular No anginal chest pain, irregular heart beat, tachycardia, palpitations or orthopnea. Gastrointestinal No nausea, vomiting, diarrhea, constipation, cramping, dysphagia, reflux, heartburn, GI bleeding, or early satiety. No change in bowel habits. Genitourinary (M) No hematuria, dysuria, increased frequency, urgency, hesitancy or incontinence. Musculoskeletal No joint pain, swelling or redness. No decreased range of motion. Integumentary Hyperpigmented vesicular.  rash involving lower extremities, as well as dry skin   Neurologic No headache, blurred vision, and no areas of focal weakness or numbness. Normal gait. No sensory problems. Psychiatric No insomnia, depression, giancarlo or mood swings. No psychotropic drugs. Physical Examination:  Constitutional Alert, cooperative, oriented. Mood and affect appropriate. Appears close to chronological age. Well nourished. Well developed. Head Normocephalic; no scars   Eyes Conjunctivae and sclerae are clear and without icterus. Pupils are reactive and equal.   ENMT Sinuses are nontender. No oral exudates, ulcers, masses, thrush or mucositis. Oropharynx clear. Tongue normal.   Neck Supple without masses or thyromegaly. No jugular venous distension. Hematologic/Lymphatic No petechiae or purpura. No tender or palpable lymph nodes in the cervical, supraclavicular, axillary or inguinal area. Respiratory Lungs are clear to auscultation without rhonchi or wheezing. Cardiovascular Regular rate and rhythm of heart without murmurs, gallops or rubs. Chest / Line Site Chest is symmetric with no chest wall deformities. Abdomen Non-tender, non-distended, no masses, ascites or hepatosplenomegaly. Good bowel sounds. No guarding or rebound tenderness. No pulsatile masses. Musculoskeletal No tenderness or swelling, normal range of motion without obvious weakness. Extremities No visible deformities, no cyanosis, clubbing or edema. Skin No rashes, scars, or lesions suggestive of malignancy. No petechiae, purpura, or ecchymoses. No excoriations. Neurologic No sensory or motor deficits noted   Psychiatric Alert and oriented times three. Coherent speech. Verbalizes understanding of our discussions today.        Labs:  Recent Results (from the past 24 hour(s))   RENAL FUNCTION PANEL    Collection Time: 05/22/17  4:19 AM   Result Value Ref Range    Sodium 139 136 - 145 mmol/L    Potassium 3.6 3.5 - 5.1 mmol/L    Chloride 105 97 - 108 mmol/L    CO2 25 21 - 32 mmol/L    Anion gap 9 5 - 15 mmol/L    Glucose 97 65 - 100 mg/dL    BUN 16 6 - 20 MG/DL    Creatinine 1.20 0.70 - 1.30 MG/DL    BUN/Creatinine ratio 13 12 - 20      GFR est AA >60 >60 ml/min/1.73m2    GFR est non-AA 59 (L) >60 ml/min/1.73m2    Calcium 8.5 8.5 - 10.1 MG/DL    Phosphorus 3.1 2.6 - 4.7 MG/DL    Albumin 2.1 (L) 3.5 - 5.0 g/dL   CBC WITH AUTOMATED DIFF    Collection Time: 05/22/17  4:19 AM   Result Value Ref Range    WBC 7.9 4.1 - 11.1 K/uL    RBC 3.61 (L) 4.10 - 5.70 M/uL    HGB 9.4 (L) 12.1 - 17.0 g/dL    HCT 28.6 (L) 36.6 - 50.3 %    MCV 79.2 (L) 80.0 - 99.0 FL    MCH 26.0 26.0 - 34.0 PG    MCHC 32.9 30.0 - 36.5 g/dL    RDW 15.3 (H) 11.5 - 14.5 %    PLATELET 625 436 - 908 K/uL    NEUTROPHILS 84 (H) 32 - 75 %    LYMPHOCYTES 9 (L) 12 - 49 %    MONOCYTES 4 (L) 5 - 13 %    EOSINOPHILS 3 0 - 7 %    BASOPHILS 0 0 - 1 %    ABS. NEUTROPHILS 6.7 1.8 - 8.0 K/UL    ABS. LYMPHOCYTES 0.7 (L) 0.8 - 3.5 K/UL    ABS. MONOCYTES 0.3 0.0 - 1.0 K/UL    ABS. EOSINOPHILS 0.2 0.0 - 0.4 K/UL    ABS. BASOPHILS 0.0 0.0 - 0.1 K/UL    PLATELET COMMENTS LARGE PLATELETS      RBC COMMENTS NORMOCYTIC, NORMOCHROMIC      DF SMEAR SCANNED     MAGNESIUM    Collection Time: 05/22/17  4:19 AM   Result Value Ref Range    Magnesium 2.4 1.6 - 2.4 mg/dL       Imaging:  CXray 5/17/17  reviewed: FINDINGS: AP portable imaging of the chest performed at 7:51 AM demonstrates  stable cardiomegaly. Mild to moderate pulmonary interstitial edema is increased. There is bibasilar atelectasis. Degenerative changes are present in the acromioclavicular joints bilaterally. IMPRESSION: Cardiomegaly with increased mild to moderate pulmonary interstitial  edema and bibasilar atelectasis      Assessment and Plan:   Vesicular rash, surrounding hyperpigmentation  - unfortunately my ability to recognize rashes is suboptimal. Currently on acyclovir but distribution is not the classic one for shingles given that both legs involved. Punch bx x 2 yesterday.  Await path    Fevers - currently on azithormycin and zosyn and vancomycin    Thrombocytopenia, anemia - preliminary labs do not show deficiency of iron, b12 or folate. Haptoglobin high so not hemolyzing. Smear yesterday without schistocytes so not TTP or HUS. Copper pending. Thrombocytopenia is a more chronic process of at least 5 years duration so at some point likely to need a bone marrow biopsy but not acutely. He is back to his baseline at 106k which I find reassuring that he is responding to current measures re \"sepsis\" like picture which is so commonly associated with drop in plts. Renal insufficiency - improved since admit. Stable.      COPD with exacerbation - breathing comfortably    CAD -stable    Prostate cancer - sounds like he has been on lupron    HTN - on coreg    Await path report on skin biopsy no new skin lesion reassured about lesion on scalp not new itches he is very frustrated with care

## 2017-05-22 NOTE — PROGRESS NOTES
Infection control called and after reviewing patient's chart feels we can discontinue contact isolation.

## 2017-05-22 NOTE — PROGRESS NOTES
End of Shift Nursing Note    Bedside shift change report given to Nano Alexandra RN (oncoming nurse) by Mick Lopez RN (offgoing nurse). Report included the following information SBAR, Kardex and Intake/Output. Zone Phone:       Significant changes during shift:    none   Non-emergent issues for physician to address:   none     Number times ambulated in hallway past shift: 1      Number of times OOB to chair past shift: 3    POD #:      Vital Signs:    Temp: 98.5 °F (36.9 °C)     Pulse (Heart Rate): (!) 108     BP: 113/69     Resp Rate: 20     O2 Sat (%): 97 %    Lines & Drains:     Urinary Catheter? No   Placement Date:    Medical Necessity:   Central Line? No   Placement Date:    Medical Necessity:   PICC Line? No   Placement Date:    Medical Necessity:     NG tube [] in [] removed [x] not applicable   Drains [] in [] removed [x] not applicable     Skin Integrity:      Wounds: yes   Dressings Present: yes    Wound Concerns: no      GI:    Current diet:  DIET CARDIAC Regular    Nausea: NO  Vomiting: NO  Bowel Sounds: YES  Flatus: YES  Last Bowel Movement: yesterday   Appearance:     Respiratory:  Supplemental O2: No      Device:    via  Liters/min     Incentive Spirometer: YES  Volume:   Coughing and Deep Breathing: YES  Oral Care: YES  Understanding (patient/family education): YES   Getting out of bed: YES  Head of bed elevation: YES    Patient Safety:    Falls Score: 2  Mobility Score: 2  Bed Alarm On? No  Sitter? No      Opportunity for questions and clarification was given to oncoming nurse. Patient bed is in low position, side rails are up x 2, door & observation blinds open as needed, call bell within reach and patient not in distress.     Texie Clarity

## 2017-05-22 NOTE — PROGRESS NOTES
End of Shift Nursing Note    Bedside shift change report given to [address] (oncoming nurse) by Alvina Milian (offgoing nurse). Report included the following information SBAR, Kardex and MAR. Zone Phone:       Significant changes during shift:    none   Non-emergent issues for physician to address:   none     Number times ambulated in hallway past shift: 0        Number of times OOB to chair past shift: 1    POD #:      Vital Signs:    Temp: 98.7 °F (37.1 °C)     Pulse (Heart Rate): 100     BP: 117/66     Resp Rate: 21     O2 Sat (%): 95 %      Skin Integrity:      Wounds: yes   Dressings Present: yes    Wound Concerns: no      Opportunity for questions and clarification was given to oncoming nurse. Patient bed is in low position, side rails are up x 2, door & observation blinds open as needed, call bell within reach and patient not in distress.     Herminia Cooley RN

## 2017-05-22 NOTE — PROGRESS NOTES
Hospitalist Progress Note    NAME: Laurent Jang   :  1940   MRN:  [id number]       Interim Hospital Summary: 78 y.o. male whom presented on 2017 with      Assessment / Plan:    Sepsis due to LE cellulitis  Vesicular rash, ? vasculitis  -Pemphigoid vs zoster vs something else with secondary bacterial infection  -sent viral culture and bacterial culture of fluid from one of the blisters - so far no growth  -completed course of oral Acyclovir but this is likely not zoster as it involved both LE  -stop IV vanco and zosyn. Switch to augmentin. Fever and leukocytosis has resolved. Cultures negative. LE erythema resolved.    -punch biopsy x 2. Follow up on path and IF results. Spoke to pathology and specimen is currently be evaluated by our dermatopathologist.   Preliminary - not pemphigoid or viral, could be vasculitis. Thrombocytopenia (since )  -stopped lovenox. -appreciate Hematology input. No schistocytes on smear, doubt TTP or HUS. -he has had thrombocytopenia since . Counts now trending up so initial drop likely related to sepsis. Will need BM bx at some point. Needs OP follow up with hematology. COPD, now stable  -No need for steroids. Cont Bronchodilators. Stopped zithromax as patient is now on broad spectrum abx for sepsis. Acute hypoxic Respiratory Failure due to Acute combined HF POA  -CXR Interstitial pulmonary edema with fluid in fissures. proBNP >5000  -Echo EF 43% with diastolic dysfunction  -weaned off oxygen. RA 97%. Switch to oral lasix today. ROBINSON on CKD3  -likely cardio renal syndrome. Cr down to baseline  -renal US 8.8 cm left mid to lower pole renal cyst.  No hydronephrosis. CAD    HTN   -continue asa,coreg and statin    Prostate CA    No UTI. Cultures no growth      Surrogate Decision Maker: Daughter Iwona Blackmon   Baseline: Full ADL  Body mass index is 34.62 kg/(m^2).   Code status: Full  Prophylaxis: lovenox  Recommended Disposition:  PT, OT, RN     Subjective:     Chief Complaint / Reason for Physician Visit  Follow up sepsis, resp failure CAP, CHF, zoster rash  LE erythema improved. Cr improved. No more fever    Review of Systems:  Symptom Y/N Comments  Symptom Y/N Comments   Fever/Chills n   Chest Pain     Poor Appetite    Edema y    Cough    Abdominal Pain     Sputum    Joint Pain     SOB/PA n   Pruritis/Rash     Nausea/vomit    Tolerating PT/OT     Diarrhea    Tolerating Diet y    Constipation    Other       Could NOT obtain due to:      Objective:     VITALS:   Last 24hrs VS reviewed since prior progress note. Most recent are:  Patient Vitals for the past 24 hrs:   Temp Pulse Resp BP SpO2   05/22/17 0800 98.5 °F (36.9 °C) (!) 108 20 113/69 97 %   05/22/17 0430 98.7 °F (37.1 °C) 100 21 117/66 95 %   05/21/17 2326 98.8 °F (37.1 °C) 99 18 121/74 97 %   05/21/17 1955 98 °F (36.7 °C) (!) 108 22 109/78 96 %       Intake/Output Summary (Last 24 hours) at 05/22/17 1645  Last data filed at 05/22/17 1247   Gross per 24 hour   Intake              500 ml   Output              850 ml   Net             -350 ml        PHYSICAL EXAM:  General: WD, WN. Alert, cooperative, fever, knee pain  EENT:  EOMI. Anicteric sclerae. MMM  Resp:  Coarse BS. No accessory muscle use  CV:  Regular  rhythm,  No edema  GI:  Soft, Non distended, Non tender.  +Bowel sounds  Neurologic:  Alert and oriented X 3, normal speech,   Psych:   Good insight. Not anxious nor agitated  Skin:  Vesicular rash looks better, leg erythema resolved and less tender.   No new lesions    Reviewed most current lab test results and cultures  YES  Reviewed most current radiology test results   YES  Review and summation of old records today    NO  Reviewed patient's current orders and MAR    YES  PMH/SH reviewed - no change compared to H&P  ________________________________________________________________________  Care Plan discussed with:    Comments   Patient y    Family      RN y    Care Manager     Consultant                        Multidiciplinary team rounds were held today with , nursing, pharmacist and clinical coordinator. Patient's plan of care was discussed; medications were reviewed and discharge planning was addressed. ________________________________________________________________________  Total NON critical care TIME:   25   Minutes    Total CRITICAL CARE TIME Spent:   Minutes non procedure based      Comments   >50% of visit spent in counseling and coordination of care y    ________________________________________________________________________  Alpa Rizvi MD     Procedures: see electronic medical records for all procedures/Xrays and details which were not copied into this note but were reviewed prior to creation of Plan. LABS:  I reviewed today's most current labs and imaging studies.   Pertinent labs include:  Recent Labs      05/22/17   0419  05/20/17   0400   WBC  7.9  7.7   HGB  9.4*  9.7*   HCT  28.6*  30.2*   PLT  198  122*     Recent Labs      05/22/17   0419  05/21/17   0609  05/20/17   0400   NA  139  140  139   K  3.6  3.6  3.8   CL  105  105  104   CO2  25  25  24   GLU  97  107*  104*   BUN  16  19  21*   CREA  1.20  1.33*  1.38*   CA  8.5  8.6  8.7   MG  2.4   --    --    PHOS  3.1  3.1  2.9   ALB  2.1*  2.1*  2.1*       Signed: Alpa Rizvi MD

## 2017-05-23 ENCOUNTER — HOME HEALTH ADMISSION (OUTPATIENT)
Dept: HOME HEALTH SERVICES | Facility: HOME HEALTH | Age: 77
End: 2017-05-23
Payer: MEDICARE

## 2017-05-23 VITALS
RESPIRATION RATE: 18 BRPM | HEIGHT: 66 IN | WEIGHT: 213.41 LBS | HEART RATE: 118 BPM | SYSTOLIC BLOOD PRESSURE: 131 MMHG | BODY MASS INDEX: 34.3 KG/M2 | TEMPERATURE: 98.3 F | OXYGEN SATURATION: 97 % | DIASTOLIC BLOOD PRESSURE: 70 MMHG

## 2017-05-23 LAB
ALBUMIN SERPL BCP-MCNC: 2.1 G/DL (ref 3.5–5)
ANION GAP BLD CALC-SCNC: 8 MMOL/L (ref 5–15)
BACTERIA SPEC CULT: NORMAL
BACTERIA SPEC CULT: NORMAL
BUN SERPL-MCNC: 19 MG/DL (ref 6–20)
BUN/CREAT SERPL: 17 (ref 12–20)
CALCIUM SERPL-MCNC: 8.7 MG/DL (ref 8.5–10.1)
CHLORIDE SERPL-SCNC: 105 MMOL/L (ref 97–108)
CO2 SERPL-SCNC: 26 MMOL/L (ref 21–32)
CREAT SERPL-MCNC: 1.13 MG/DL (ref 0.7–1.3)
GLUCOSE SERPL-MCNC: 97 MG/DL (ref 65–100)
PHOSPHATE SERPL-MCNC: 3.3 MG/DL (ref 2.6–4.7)
POTASSIUM SERPL-SCNC: 3.7 MMOL/L (ref 3.5–5.1)
SERVICE CMNT-IMP: NORMAL
SERVICE CMNT-IMP: NORMAL
SODIUM SERPL-SCNC: 139 MMOL/L (ref 136–145)

## 2017-05-23 PROCEDURE — 77030019607 HC DSG BURN S&N -A

## 2017-05-23 PROCEDURE — 74011250637 HC RX REV CODE- 250/637: Performed by: INTERNAL MEDICINE

## 2017-05-23 PROCEDURE — 97535 SELF CARE MNGMENT TRAINING: CPT | Performed by: OCCUPATIONAL THERAPIST

## 2017-05-23 PROCEDURE — 80069 RENAL FUNCTION PANEL: CPT | Performed by: INTERNAL MEDICINE

## 2017-05-23 PROCEDURE — 36415 COLL VENOUS BLD VENIPUNCTURE: CPT | Performed by: INTERNAL MEDICINE

## 2017-05-23 RX ORDER — AMOXICILLIN AND CLAVULANATE POTASSIUM 875; 125 MG/1; MG/1
1 TABLET, FILM COATED ORAL EVERY 12 HOURS
Qty: 14 TAB | Refills: 0 | Status: SHIPPED | OUTPATIENT
Start: 2017-05-23 | End: 2017-05-23

## 2017-05-23 RX ORDER — AMOXICILLIN AND CLAVULANATE POTASSIUM 875; 125 MG/1; MG/1
1 TABLET, FILM COATED ORAL EVERY 12 HOURS
Qty: 20 TAB | Refills: 0 | Status: SHIPPED | OUTPATIENT
Start: 2017-05-23 | End: 2017-06-02

## 2017-05-23 RX ADMIN — ASPIRIN 81 MG CHEWABLE TABLET 81 MG: 81 TABLET CHEWABLE at 08:19

## 2017-05-23 RX ADMIN — FUROSEMIDE 40 MG: 40 TABLET ORAL at 08:19

## 2017-05-23 RX ADMIN — FOLIC ACID 1 MG: 1 TABLET ORAL at 08:19

## 2017-05-23 RX ADMIN — ATORVASTATIN CALCIUM 40 MG: 40 TABLET, FILM COATED ORAL at 08:19

## 2017-05-23 RX ADMIN — Medication 10 ML: at 06:17

## 2017-05-23 RX ADMIN — CARVEDILOL 25 MG: 12.5 TABLET, FILM COATED ORAL at 08:19

## 2017-05-23 RX ADMIN — AMOXICILLIN AND CLAVULANATE POTASSIUM 1 TABLET: 875; 125 TABLET, FILM COATED ORAL at 08:19

## 2017-05-23 NOTE — PROGRESS NOTES
Saw Candelario     NAME:Twan Wayne  NRW:[id number]   :1940   -                 Labs reviewed  Creatinine stable   Continue current lasix dose  We will sign off. Call if needed. Thanks. Jose Proctor, [address] [place] [address] [facility]  [facility]  Carli Agar[address], [place] Tenisha [place], [address]  Phone - [phone]         Fax - [phone] [facility] Office  [address] [place] [address]  Phone - [phone]        Fax - [phone]     www. [facility]

## 2017-05-23 NOTE — PROGRESS NOTES
Problem: Self Care Deficits Care Plan (Adult)  Goal: *Acute Goals and Plan of Care (Insert Text)  OCCUPATIONAL THERAPY TREATMENT/DISCHARGE  Patient: Hoang Crawford (89 y.o. male)  Date: 5/23/2017  Diagnosis: Acute respiratory failure (HCC) <principal problem not specified>       Precautions:  standard      ASSESSMENT:  Pt was seated upon arrival and reported that he has been independent in self care/lower body adls. Pt needed extra time and effort to complete seated lower body adls and was educated in using adaptive aids--long shoe horn, dressing stick, reacher , sock aide, long sponge this session. Pt verbalized and demonstrated understanding. Pt reported that he does not need a tub seat for seated bathing and has no difficulty getting in and out of his tub for a standing shower. Pt reported that he is up to the bathroom ad lizzeth and has no concerns re: returning home--except for his LE wounds which may prevent him from returning to work. Will discharge pt at his request and due to pt's independence. .  Progression toward goals:  [ ]            Improving appropriately and progressing toward goals  [X]            Improving slowly and progressing toward goals  [ ]            Not making progress toward goals and plan of care will be adjusted       PLAN:  Patient will be discharged from occupational therapy at this time. Rationale for discharge:  [X]   Goals Achieved  [ ]   Omid Been  [ ]   Patient not participating in therapy  [ ]   Other:  Discharge Recommendations:  None  Further Equipment Recommendations for Discharge:  None--pt will get any adaptive aids that he feels that he needs--discussed availability at local stores and encouraged pt to call ahead. SUBJECTIVE:   Patient stated I don't have any.    (concerns re: going home)      OBJECTIVE DATA SUMMARY:   Cognitive/Behavioral Status:  Neurologic State: Alert; Appropriate for age  Orientation Level: Oriented X4  Cognition: Appropriate decision making              Functional Mobility and Transfers for ADLs:     Independent           ADL Intervention:                    Lower Body Bathing  Adaptive Equipment:  (educated on use of long sponge to be used while seated for bathing)           Lower Body Dressing Assistance  Dressing Assistance: Independent (seated in chair. pt is Indep at baseline)  Socks:  (additional time and effort without AD--pt stated no need for)  Leg Crossed Method Used: No (bending forward to floor)  Position Performed: Seated in chair  Cues:  (educ on how to use the AE kit)  Adaptive Equipment Used: Dressing stick; Long handled shoe horn;Reacher;Sock aid                                Pain:  Pain Scale 1: Numeric (0 - 10)  Pain Intensity 1: 0                       After treatment:   [X] Patient left in no apparent distress sitting up in chair  [ ] Patient left in no apparent distress in bed  [X] Call bell left within reach  [X] Nursing notified  [ ] Caregiver present  [ ] Bed alarm activated      COMMUNICATION/COLLABORATION:   The patients plan of care was discussed with: Registered Nurse     CHANTELLE Arana/L  Time Calculation: 16 mins

## 2017-05-23 NOTE — PROGRESS NOTES
End of Shift Nursing Note    Bedside shift change report given to Jovanni Nascimento RN (oncoming nurse) by Dioni Burns RN (offgoing nurse). Report included the following information SBAR, Kardex, Intake/Output and MAR. Zone Phone:   [phone]    Significant changes during shift:    None   Non-emergent issues for physician to address:  None      Number times ambulated in hallway past shift:       Number of times OOB to chair past shift:     POD #:      Vital Signs:    Temp: 99.2 °F (37.3 °C)     Pulse (Heart Rate): 99     BP: 139/52     Resp Rate: 18     O2 Sat (%): 97 %    Lines & Drains:     Urinary Catheter? No   Placement Date:    Medical Necessity:   Central Line? No   Placement Date:    Medical Necessity:   PICC Line? No   Placement Date:    Medical Necessity:     NG tube [] in [] removed [x] not applicable   Drains [] in [] removed [x] not applicable     Skin Integrity:      Wounds: no   Dressings Present: no    Wound Concerns: no      GI:    Current diet:  DIET CARDIAC Regular    Nausea: NO  Vomiting: NO  Bowel Sounds: YES  Flatus: YES  Last Bowel Movement: yesterday   Appearance:     Respiratory:  Supplemental O2: No      Device:    via  Liters/min     Incentive Spirometer: YES  Volume:   Coughing and Deep Breathing: YES  Oral Care: YES  Understanding (patient/family education): YES   Getting out of bed: YES  Head of bed elevation: YES    Patient Safety:    Falls Score: Mobility Score: 2  Bed Alarm On? No  Sitter? No      Opportunity for questions and clarification was given to oncoming nurse. Patient bed is in fowlers position, side rails are up x 2, door & observation blinds open as needed, call bell within reach and patient not in distress.     Kimberli Buchanan

## 2017-05-23 NOTE — DISCHARGE INSTRUCTIONS
HOSPITALIST DISCHARGE INSTRUCTIONS    NAME: Cristina Gunn   :  1940   MRN:  [id number]     Date/Time:  2017 12:02 PM    ADMIT DATE: 2017   DISCHARGE DATE: 2017         · It is important that you take the medication exactly as they are prescribed. · Keep your medication in the bottles provided by the pharmacist and keep a list of the medication names, dosages, and times to be taken in your wallet. · Do not take other medications without consulting your doctor. What to do at [address]:  Cardiac Diet    Recommended activity: Activity as tolerated      If you have questions regarding the hospital related prescriptions or hospital related issues please call [facility] at . You can always direct your questions to your primary care doctor if you are unable to reach your hospital physician; your PCP works as an extension of your hospital doctor just like your hospital doctor is an extension of your PCP for your time at the hospital [facility])    If you experience any of the following symptoms then please call your primary care physician or return to the emergency room if you cannot get hold of your doctor:    Fever, chills, nausea, vomiting, or persistent diarrhea  Worsening weakness or new problems with your speech or balance  Dark stools or visible blood in your stools  New Leg swelling or shortness of breath as this could be signs of a clot    Additional Instructions:      Wounds cleaned with sterile water or saline and cover with non-adherent dressings. Information obtained by :  I understand that if any problems occur once I am at home I am to contact my physician. I understand and acknowledge receipt of the instructions indicated above.                                                                                                                                            Physician's or R.N.'s Signature Date/Time                                                                                                                                              Patient or Representative Signature

## 2017-05-23 NOTE — PROGRESS NOTES
IV out, went over discharge paperwork with patient and wife, no questions, volunteer transported patient out.

## 2017-05-23 NOTE — DISCHARGE SUMMARY
Hospitalist Discharge Summary     Patient ID:  Mauricio Velasco  [id number]  78 y.o.  1940    PCP on record: Rory Krause MD    Admit date: 5/14/2017  Discharge date and time: 5/23/2017      DISCHARGE DIAGNOSIS:    Acute hypoxic Respiratory Failure due to Acute combined HF POA  COPD exacerbation  ROBINSON on CKD3  Sepsis due to LE cellulitis  Leukocytoclastic vasculitis, both LE POA  Thrombocytopenia, resolved  CAD   HTN   Prostate CA       CONSULTATIONS:  IP CONSULT TO HEMATOLOGY  IP CONSULT TO NEPHROLOGY  IP CONSULT TO GENERAL SURGERY    Excerpted HPI from H&P of Georgiann Councilman, MD:  CHIEF COMPLAINT: Cough and SOB     HISTORY OF PRESENT ILLNESS:   Sherie Hawkins is a 78 y.o.  male who presents with 2 days of Fever, cough and SOB. Patient is a very poor historian and difficult to understand.     Patient has CHF, COPD, CAD not on home O2 he comes in complaing that he has been having Cough, with sputum production   For the last 2 days, he states he also felt Febrile but never checked his Temp. He denies CP, no Orthopnea, no Pedal edema. Int he ED. He was noted to have a Fever, Tachycardic in the 130's RA sat at rest 94-95% but with ambulation   Apparently dropped into the 80's. CXR is also being read as interstitial Edema. Denies any Sick contact. He states that he still works as a  3 x a week up to 3 pm. On those days, he does not take  His lasix until after work     We were asked to admit for work up and evaluation of the above problems. ______________________________________________________________________  DISCHARGE SUMMARY/HOSPITAL COURSE:  for full details see H&P, daily progress notes, labs, consult notes. Acute hypoxic Respiratory Failure due to Acute combined HF POA  COPD exacerbation  -CXR Interstitial pulmonary edema with fluid in fissures.  proBNP >5000  -Echo EF 92% with diastolic dysfunction  -he responded to IV lasix diuresis and now weaned off oxygen. RA 97%. Switch back to his oral lasix and restart ACEi  -did not need for steroids.        Sepsis due to LE cellulitis  Vesicular rash, unspecified  -initial differential included Bullous Pemphigoid vs zoster vs rhus dermatitis (works at [employer] next to [employer] ) that has developed secondary bacterial infection or cellulitis. -sent viral culture and bacterial culture of fluid from one of the blisters - so far no growth  -completed course of oral Acyclovir but this is likely not zoster as it involved both LE  -stop IV vanco and zosyn. Switch to augmentin. Fever and leukocytosis has resolved. Cultures negative. LE erythema resolved. He will finish course of augmentin for the cellulitis but the primary rash is unknown so requested for punch biopsy x 2. The final path and IF results are still pending.  -asked him to follow up with his PCP next week. I will forward the path results to his PCP once it becomes available. Addendum 5/29/17. Pathology results from skin punch biopsy are as follows: FINAL PATHOLOGIC DIAGNOSIS   Skin, right shin, punch biopsy:   Leukocytoclastic vasculitis   Neutrophilic subcorneal pustule   See comment   Comment   The findings are not entirely diagnostic and could be seen in infection (especially herpes), drug reaction or associated with other diseases such as autoimmune. Gram, GMS and PAS stains are negative for microorganisms but the pustule was no longer present for these stains. Negative stains do not exclude an infectious etiology. Differentials include drug allergy vs underlying systemic autoimmune process. Suspect this is just a cutaneous reaction due to a recent drug reaction but I called patient and advised him to follow up with his PCP if the rash persist or if new ones develop, if so he will need a referral to dermatology or rheumatology. Will forward this summary to his PCP. End of addendum       Thrombocytopenia (since 2012)  -stopped lovenox. -appreciate Hematology input. No schistocytes on smear, doubt TTP or HUS. -he has had thrombocytopenia since 2012. Counts have recovered back to normal and so initial drop likely related to sepsis. Would recommend OP follow up with hematology if his counts drop again as he may need BM bxt. Needs        ROBINSON on CKD3  -likely cardio renal syndrome. Cr down to baseline  -renal US 8.8 cm left mid to lower pole renal cyst. No hydronephrosis.  -Cr back to baseline     CAD   HTN   -continue asa,coreg and statin     Prostate CA      _______________________________________________________________________  Patient seen and examined by me on discharge day. Pertinent Findings:  Gen:    Not in distress  Chest: Clear lungs  CVS:   Regular rhythm. No edema  Abd:  Soft, not distended, not tender  Neuro:  Alert, Oriented x 4, grossly non focal exam  Skin The rash looks better. Erythema resolved and no new lesions. Bleeding stopped. Some clean open areas covered with dressing.   _______________________________________________________________________  DISCHARGE MEDICATIONS:   Current Discharge Medication List      START taking these medications    Details   amoxicillin-clavulanate (AUGMENTIN) 875-125 mg per tablet Take 1 Tab by mouth every twelve (12) hours for 10 days. Qty: 20 Tab, Refills: 0         CONTINUE these medications which have NOT CHANGED    Details   furosemide (LASIX) 40 mg tablet TAKE ONE TABLET BY MOUTH DAILY. *DOSE  REDUCED  12/17/15*  Qty: 30 Tab, Refills: 6      albuterol (PROVENTIL HFA, VENTOLIN HFA, PROAIR HFA) 90 mcg/actuation inhaler Take 2 Puffs by inhalation every four (4) hours as needed for Wheezing. Qty: 1 Inhaler, Refills: 0      VIAGRA 50 mg tablet       aspirin 81 mg chewable tablet Take 1 Tab by mouth daily. Qty: 10 Tab, Refills: 0      atorvastatin (LIPITOR) 40 mg tablet Take 40 mg by mouth daily. carvedilol (COREG) 25 mg tablet Take 25 mg by mouth two (2) times daily (with meals). enalapril (VASOTEC) 20 mg tablet Take 40 mg by mouth daily. STOP taking these medications       HYDROcodone-acetaminophen (NORCO) 5-325 mg per tablet Comments:   Reason for Stopping:               My Recommended Diet, Activity, Wound Care, and follow-up labs are listed in the patient's Discharge Insturctions which I have personally completed and reviewed.     _______________________________________________________________________  DISPOSITION:    Home with Family:    Home with HH/PT/OT/RN: x   SNF/LTC:    FRANCO:    OTHER:        Condition at Discharge:  Stable  _______________________________________________________________________  Follow up with:   PCP : Jagdish Casas MD  Follow-up Information     Follow up With Details [address] [place], [place] [address], [place]   [facility]  [phone]                Total time in minutes spent coordinating this discharge (includes going over instructions, follow-up, prescriptions, and preparing report for sign off to her PCP) :  35 minutes    Signed:  Kem Garcia MD

## 2017-05-23 NOTE — PROGRESS NOTES
Pt will discharge home with [facility]. CM placed referral via CC to [facility] for RN as directed by pt. CM placed FOC document on pt's bedside chart. CM provided pt with second IM letter and placed copy on bedside chart. Pt will be transported by spouse at discharge via private vehicle. CM scheduled post hospital follow-up appointment with Dr. Alfonso Ash and updated AVS to reflect date and time. CM advised pt spouse of the appointment as scheduled. There were no additional discharge needs. Care Management Interventions  PCP Verified by CM: Yes (Dr. Alfonso Ash)  Mode of Transport at Discharge:  Other (see comment) (private vehicle )  Transition of Care Consult (CM Consult): Home Health ([facility] )  [address]: Yes  Discharge Durable Medical Equipment: No  Health Maintenance Reviewed: Yes  Physical Therapy Consult: Yes  Occupational Therapy Consult: Yes  Speech Therapy Consult: No  Current Support Network: Lives with Spouse  Confirm Follow Up Transport: Self  Plan discussed with Pt/Family/Caregiver: Yes  Freedom of Choice Offered: Yes ([facility] )  Discharge Location  Discharge Placement: Home with home health    LUIS Torres, [address] [facility]   [phone]

## 2017-05-23 NOTE — HOME CARE
Home Health Care Discharge Planning: [facility]  Face to Face Encounter      NAME: Mauricio Velasco   :  1940   MRN:  [id number]     Primary Diagnosis:   Sepsis due to LE cellulitis  Vesicular rash    Date of Face to Face:  2017 12:00 PM                                  Face to Face Encounter findings are related to primary reason for home care:   YES    1. I certify that the patient needs intermittent skilled nursing care, physical therapy and/or speech therapy. I will not be following this patient in the Community and Dr. Rory Krause MD will be responsible for signing the [address] 2. Initial Orders for Care: Skilled Nursing for wound care    3. I certify that this patient is homebound because of illness or injury, need the aid of supportive devices such as crutches, canes, wheelchairs, and walkers; the use of special transportation; or the assistance of another person in order to leave their place of residence. There exists a normal inability to leave home and leaving home requires a considerable and taxing effort. 4. I certify that this patient is under my care and that I had a Face-to-Face Encounter that meets the physician Face-to-Face Encounter requirements. Document the physical findings from the Face-to-Face Encounter that support the need for skilled services:  Has wound that requires skilled nursing assessment and treatment     Alpa Rizvi MD  Discharging Physician  Office: [phone]  Fax:   [phone]

## 2017-05-24 ENCOUNTER — HOME CARE VISIT (OUTPATIENT)
Dept: SCHEDULING | Facility: HOME HEALTH | Age: 77
End: 2017-05-24

## 2017-05-24 PROCEDURE — A6223 GAUZE >16<=48 NO W/SAL W/O B: HCPCS

## 2017-05-24 PROCEDURE — A4452 WATERPROOF TAPE: HCPCS

## 2017-05-24 PROCEDURE — A6252 ABSORPT DRG >16 <=48 W/O BDR: HCPCS

## 2017-05-26 LAB
SPECIMEN SOURCE: NORMAL
VIRUS SPEC CULT: NORMAL

## 2017-05-30 ENCOUNTER — PATIENT OUTREACH (OUTPATIENT)
Dept: CARDIOLOGY CLINIC | Age: 77
End: 2017-05-30

## 2017-05-30 NOTE — PROGRESS NOTES
This note will not be viewable in [address] Called and LVM stating my name, NN for RCA, date and time of call, and direct office telephone number.

## 2017-07-25 ENCOUNTER — OFFICE VISIT (OUTPATIENT)
Dept: CARDIOLOGY CLINIC | Age: 77
End: 2017-07-25

## 2017-07-25 ENCOUNTER — HOSPITAL ENCOUNTER (OUTPATIENT)
Dept: LAB | Age: 77
Discharge: HOME | End: 2017-07-25
Payer: MEDICARE

## 2017-07-25 VITALS
BODY MASS INDEX: 34.28 KG/M2 | RESPIRATION RATE: 18 BRPM | SYSTOLIC BLOOD PRESSURE: 84 MMHG | WEIGHT: 213.3 LBS | HEART RATE: 89 BPM | OXYGEN SATURATION: 99 % | HEIGHT: 66 IN | DIASTOLIC BLOOD PRESSURE: 60 MMHG

## 2017-07-25 DIAGNOSIS — I50.22 CHRONIC SYSTOLIC CONGESTIVE HEART FAILURE (HCC): ICD-10-CM

## 2017-07-25 DIAGNOSIS — I42.8 NICM (NONISCHEMIC CARDIOMYOPATHY) (HCC): ICD-10-CM

## 2017-07-25 DIAGNOSIS — I48.92 ATRIAL FLUTTER, UNSPECIFIED TYPE (HCC): Primary | ICD-10-CM

## 2017-07-25 DIAGNOSIS — I10 ESSENTIAL HYPERTENSION: ICD-10-CM

## 2017-07-25 DIAGNOSIS — E78.2 MIXED HYPERLIPIDEMIA: ICD-10-CM

## 2017-07-25 DIAGNOSIS — Z72.0 TOBACCO USE: ICD-10-CM

## 2017-07-25 PROCEDURE — 36415 COLL VENOUS BLD VENIPUNCTURE: CPT

## 2017-07-25 PROCEDURE — 80048 BASIC METABOLIC PNL TOTAL CA: CPT

## 2017-07-25 PROCEDURE — 83735 ASSAY OF MAGNESIUM: CPT

## 2017-07-25 RX ORDER — ENALAPRIL MALEATE 10 MG/1
10 TABLET ORAL DAILY
Qty: 30 TAB | Refills: 6 | Status: SHIPPED | OUTPATIENT
Start: 2017-07-25 | End: 2017-10-02

## 2017-07-25 NOTE — PROGRESS NOTES
Subjective/HPI:     Dev Grimaldo is a 78 y.o. male is here for f/u appt. The patient denies chest pain/ shortness of breath, orthopnea, PND, LE edema, palpitations, syncope, presyncope or fatigue. He continues to smoke 1.5 to 2 ppd. PCP Provider  Jyoti Carrillo MD  Past Medical History:   Diagnosis Date    Acute on chronic systolic HF (heart failure) ([facility] 2/11/2014    11/15 Echo EF 40-45%    Cancer ([facility]     prostate    COPD (chronic obstructive pulmonary disease) ([facility]     Elevated troponin 2/11/2014    Hypertension     S/P cardiac cath 2/12/2014 2/12/14 no significant coronary disease, severe LV dysfunction      Past Surgical History:   Procedure Laterality Date    COLONOSCOPY,DIAGNOSTIC  2/22/2016         COLONOSCOPY,JOSE BENAVIDES,SNARE  2/22/2016          No Known Allergies   Family History   Problem Relation Age of Onset    Adopted: Yes      Current Outpatient Prescriptions   Medication Sig    enalapril (VASOTEC) 10 mg tablet Take 1 Tab by mouth daily.  apixaban (ELIQUIS) 5 mg tablet Take 1 Tab by mouth two (2) times a day.  apixaban (ELIQUIS) 5 mg tablet Take 1 Tab by mouth two (2) times a day.  furosemide (LASIX) 40 mg tablet TAKE ONE TABLET BY MOUTH DAILY. *DOSE  REDUCED  12/17/15*    albuterol (PROVENTIL HFA, VENTOLIN HFA, PROAIR HFA) 90 mcg/actuation inhaler Take 2 Puffs by inhalation every four (4) hours as needed for Wheezing.  VIAGRA 50 mg tablet     aspirin 81 mg chewable tablet Take 1 Tab by mouth daily.  atorvastatin (LIPITOR) 40 mg tablet Take 40 mg by mouth daily.  carvedilol (COREG) 25 mg tablet Take 25 mg by mouth two (2) times daily (with meals). No current facility-administered medications for this visit.        Vitals:    07/25/17 0945 07/25/17 0955   BP: (!) 88/60 (!) 84/60   Pulse: 89    Resp: 18    SpO2: 99%    Weight: 213 lb 4.8 oz (96.8 kg)    Height: 5' 6\" (1.676 m)      Social History     Social History    Marital status:      Spouse name: N/A    Number of children: N/A    Years of education: N/A     Occupational History    Not on file. Social History Main Topics    Smoking status: Current Every Day Smoker     Packs/day: 2.00     Years: 20.00     Types: Cigarettes    Smokeless tobacco: Former User    Alcohol use No    Drug use: No    Sexual activity: Not on file     Other Topics Concern    Not on file     Social History Narrative       I have reviewed the nurses notes, vitals, problem list, allergy list, medical history, family, social history and medications. Review of Symptoms:    General: Pt denies excessive weight gain or loss. Pt is able to conduct ADL's  HEENT: Denies blurred vision, headaches, epistaxis and difficulty swallowing. Respiratory: Denies shortness of breath, PA, wheezing or stridor. Cardiovascular: Denies precordial pain, palpitations, edema or PND  Gastrointestinal: Denies poor appetite, indigestion, abdominal pain or blood in stool  Urinary: Denies dysuria, pyuria  Musculoskeletal: Denies pain or swelling from muscles or joints  Neurologic: Denies tremor, paresthesias, or sensory motor disturbance  Skin: Denies rash, itching or texture change. Psych: Denies depression        Physical Exam:      General: Well developed, in no acute distress, cooperative and alert  HEENT: No carotid bruits, no JVD, trach is midline. Neck Supple, PEERL, EOM intact. Heart:  Normal S1/S2 negative S3 or S4. irregular, no murmur, gallop or rub.   Respiratory: +restricted BS throughout  Abdomen:   Soft, non-tender, no masses, bowel sounds are active.   Extremities:  No edema, normal cap refill, no cyanosis, atraumatic. Neuro: A&Ox3, speech clear, gait stable. Skin: Skin color is normal. No rashes or lesions.  Non diaphoretic  Vascular: 2+ pulses symmetric in all extremities    Cardiographics    ECG: atrial flutter, HR 83    Results for orders placed or performed during the hospital encounter of 05/14/17 EKG, 12 LEAD, INITIAL   Result Value Ref Range    Ventricular Rate 119 BPM    Atrial Rate 119 BPM    P-R Interval 126 ms    QRS Duration 112 ms    Q-T Interval 332 ms    QTC Calculation (Bezet) 467 ms    Calculated P Axis 73 degrees    Calculated R Axis 103 degrees    Calculated T Axis -100 degrees    Diagnosis       Sinus tachycardia  Rightward axis  Nonspecific intraventricular conduction delay  Probably Left ventricular hypertrophy    ST & T wave abnormality, consider inferolateral ischemia  Abnormal ECG  Confirmed by Buffy Sky ([id number]) on 5/16/2017 7:07:33 PM           Cardiology Labs:  No results found for: CHOL, CHOLX, CHLST, CHOLV, 701950, HDL, LDL, LDLC, DLDLP, TGLX, TRIGL, TRIGP, CHHD, [facility]    Lab Results   Component Value Date/Time    Sodium 139 05/23/2017 04:28 AM    Potassium 3.7 05/23/2017 04:28 AM    Chloride 105 05/23/2017 04:28 AM    CO2 26 05/23/2017 04:28 AM    Anion gap 8 05/23/2017 04:28 AM    Glucose 97 05/23/2017 04:28 AM    BUN 19 05/23/2017 04:28 AM    Creatinine 1.13 05/23/2017 04:28 AM    BUN/Creatinine ratio 17 05/23/2017 04:28 AM    GFR est AA >60 05/23/2017 04:28 AM    GFR est non-AA >60 05/23/2017 04:28 AM    Calcium 8.7 05/23/2017 04:28 AM    AST (SGOT) 30 05/14/2017 08:40 PM    Alk. phosphatase 114 05/14/2017 08:40 PM    Protein, total 6.2 05/17/2017 08:45 PM    Albumin 2.1 05/23/2017 04:28 AM    Globulin 4.6 05/14/2017 08:40 PM    A-G Ratio 0.7 05/14/2017 08:40 PM    ALT (SGPT) 28 05/14/2017 08:40 PM           Assessment:     Assessment:     Bryon Davis was seen today for other. Diagnoses and all orders for this visit:    Atrial flutter, unspecified type ([address]  -     AMB POC EKG ROUTINE W/ 12 LEADS, INTER & REP  -     apixaban (ELIQUIS) 5 mg tablet; Take 1 Tab by mouth two (2) times a day. -     apixaban (ELIQUIS) 5 mg tablet; Take 1 Tab by mouth two (2) times a day. -     MAGNESIUM  -     METABOLIC PANEL, BASIC  -     Cancel: CARDIOVERSION, ELECTIVE;  Future  -     Cancel: TAZ DOPPLER; Future  -     2D ECHO LIMTED ADULT W OR WO CONTR; Future    Chronic systolic congestive heart failure (HCC)  -     enalapril (VASOTEC) 10 mg tablet; Take 1 Tab by mouth daily. -     2D ECHO LIMTED ADULT W OR WO CONTR; Future    [facility] (nonischemic cardiomyopathy) [facility]  -     2D ECHO LIMTED ADULT W OR WO CONTR; Future    Mixed hyperlipidemia    Essential hypertension    Tobacco use        ICD-10-CM ICD-9-CM    1. Atrial flutter, unspecified type (HCC) I48.92 427.32 AMB POC EKG ROUTINE W/ 12 LEADS, INTER & REP      apixaban (ELIQUIS) 5 mg tablet      apixaban (ELIQUIS) 5 mg tablet      MAGNESIUM      METABOLIC PANEL, BASIC      2D ECHO LIMTED ADULT W OR WO CONTR      CANCELED: CARDIOVERSION, ELECTIVE      CANCELED: TAZ DOPPLER   2. Chronic systolic congestive heart failure (HCC) I50.22 428.22 enalapril (VASOTEC) 10 mg tablet     428.0 2D ECHO LIMTED ADULT W OR WO CONTR   3. NICM (nonischemic cardiomyopathy) ([facility] I42.8 425.4 2D ECHO LIMTED ADULT W OR WO CONTR   4. Mixed hyperlipidemia E78.2 272.2    5. Essential hypertension I10 401.9    6. Tobacco use Z72.0 305.1      Orders Placed This Encounter    MAGNESIUM    METABOLIC PANEL, BASIC    AMB POC EKG ROUTINE W/ 12 LEADS, INTER & REP     Order Specific Question:   Reason for Exam:     Answer:   routine    2D ECHO LIMTED ADULT W OR WO CONTR     Standing Status:   Future     Standing Expiration Date:   1/25/2018     Order Specific Question:   Reason for Exam:     Answer:   atrial flutter     Order Specific Question:   Contrast Enhancement (Bubble Study, Definity, Optison) may be used if criteria listed in established evidence-based protocol has been identified. Answer: Yes    enalapril (VASOTEC) 10 mg tablet     Sig: Take 1 Tab by mouth daily. Dispense:  30 Tab     Refill:  6    apixaban (ELIQUIS) 5 mg tablet     Sig: Take 1 Tab by mouth two (2) times a day.      Dispense:  60 Tab     Refill:  6    apixaban (ELIQUIS) 5 mg tablet     Sig: Take 1 Tab by mouth two (2) times a day. Dispense:  60 Tab     Refill:  1     Order Specific Question:   Expiration Date     Answer:   7/25/2019     Order Specific Question:   Lot#     Answer:   [id number]     Order Specific Question:        Answer:   B[employer]     Order Specific Question:   NDC#     Answer:   4 boxes=56 pills        Plan:     Patient presents today for f/u and denies cardiac complaints. His EKG today shows new onset of rate controlled atrial flutter with hypotension. I will decrease Enalapril to 10mg daily. I will start him on Eliquis 5mg po bid and obtain BMP and Mg. I will evaluate with an echo and have him return in 2 weeks. Schedule consultation with Dr Robert Turner MD       Pt seen and examined in details. Agree with NP A&P.      1. Atrial flutter: rate controlled. Plan as above. 2. Chronic CHF, NICM: NYHA class 1. Due to low BP, reducing enalapril dose. F/u in couple of weeks. Check labs. 3. HTN: low BP. Plan as above. 4. Smoking cessation d/w pt.      Mario Alberto Jang MD

## 2017-07-25 NOTE — MR AVS SNAPSHOT
Visit Information Date & Time Provider Department Dept. Phone Encounter #  
 7/25/2017 10:00 AM Alexandre Law, [address] [facility] [phone] [id number] Follow-up Instructions Return in about 2 weeks (around 8/8/2017). Your Appointments 10/5/2017  8:30 AM  
New Patient with Ya Eden MD  
[facility] [address] [facility]) Appt Note: NP/Est PCP/$0CP KMP 07/05/17  
 [address] [facility] [address] [phone]  
  
   
 8 [facility] [address] Upcoming Health Maintenance Date Due DTaP/Tdap/Td series (1 - Tdap) 4/9/1963 ZOSTER VACCINE AGE 60> 2/9/2002 GLAUCOMA SCREENING Q2Y 4/9/2007 MEDICARE YEARLY EXAM 4/9/2007 Pneumococcal 65+ High/Highest Risk (2 of 2 - PCV13) 11/30/2016 INFLUENZA AGE 9 TO ADULT 8/1/2017 Allergies as of 7/25/2017  Review Complete On: 7/25/2017 By: Branden Lopez NP No Known Allergies Current Immunizations  Reviewed on 11/28/2015 Name Date Influenza Vaccine (Quad) PF 11/30/2015 10:45 AM  
 Pneumococcal Polysaccharide (PPSV-23) 11/30/2015 10:01 AM  
  
 Not reviewed this visit You Were Diagnosed With   
  
 Codes Comments Atrial flutter, unspecified type ([facility]    -  Primary ICD-10-CM: I48.92 
ICD-9-CM: 427.32 Chronic systolic congestive heart failure (HCC)     ICD-10-CM: I50.22 ICD-9-CM: 428.22, 428.0 NICM (nonischemic cardiomyopathy) ([facility]     ICD-10-CM: I42.8 ICD-9-CM: 425.4 Mixed hyperlipidemia     ICD-10-CM: E78.2 ICD-9-CM: 272.2 Essential hypertension     ICD-10-CM: I10 
ICD-9-CM: 401.9 Vitals BP Pulse Resp Height(growth percentile) Weight(growth percentile) SpO2  
 (!) 84/60 (BP 1 Location: Left arm, BP Patient Position: Sitting) 89 18 5' 6\" (1.676 m) 213 lb 4.8 oz (96.8 kg) 99% BMI Smoking Status 34.43 kg/m2 Current Every Day Smoker Vitals History BMI and BSA Data Body Mass Index Body Surface Area 34.43 kg/m 2 2.12 m 2 Preferred Pharmacy Pharmacy Name Phone [facility] [address] [place], [place] - [address] [facility] [phone] Your Updated Medication List  
  
   
This list is accurate as of: 7/25/17 10:34 AM.  Always use your most recent med list.  
  
  
  
  
 albuterol 90 mcg/actuation inhaler Commonly known as:  PROVENTIL HFA, VENTOLIN HFA, PROAIR HFA Take 2 Puffs by inhalation every four (4) hours as needed for Wheezing. * apixaban 5 mg tablet Commonly known as:  Pattricia Boom Take 1 Tab by mouth two (2) times a day. * apixaban 5 mg tablet Commonly known as:  Pattricia Boom Take 1 Tab by mouth two (2) times a day. aspirin 81 mg chewable tablet Take 1 Tab by mouth daily. atorvastatin 40 mg tablet Commonly known as:  LIPITOR Take 40 mg by mouth daily. carvedilol 25 mg tablet Commonly known as:  Carrolyn Peabody Take 25 mg by mouth two (2) times daily (with meals). enalapril 10 mg tablet Commonly known as:  Hulan Mad Take 1 Tab by mouth daily. furosemide 40 mg tablet Commonly known as:  LASIX TAKE ONE TABLET BY MOUTH DAILY. *DOSE  REDUCED  12/17/15* VIAGRA 50 mg tablet Generic drug:  sildenafil citrate * Notice: This list has 2 medication(s) that are the same as other medications prescribed for you. Read the directions carefully, and ask your doctor or other care provider to review them with you. Prescriptions Sent to Pharmacy Refills  
 enalapril (VASOTEC) 10 mg tablet 6 Sig: Take 1 Tab by mouth daily. Class: Normal  
 Pharmacy: [address] Ph #: [phone] Route: Oral  
 apixaban (ELIQUIS) 5 mg tablet 6 Sig: Take 1 Tab by mouth two (2) times a day. Class: Normal  
 Pharmacy: [address] Ph #: [phone]  Route: Oral  
  
 We Performed the Following AMB POC EKG ROUTINE W/ 12 LEADS, INTER & REP [64916 CPT(R)] MAGNESIUM [id number] CPT(R)] METABOLIC PANEL, BASIC [11567 CPT(R)] Follow-up Instructions Return in about 2 weeks (around 8/8/2017). To-Do List   
 07/25/2017 ECHO:  2D ECHO LIMTED ADULT W OR WO CONTR Introducing [facility]! Kristan Sylvester introduces NextPotential patient portal. Now you can access parts of your medical record, email your doctor's office, and request medication refills online. 1. In your internet browser, go to [email or web address] 2. Click on the First Time User? Click Here link in the Sign In box. You will see the New Member Sign Up page. 3. Enter your NextPotential Access Code exactly as it appears below. You will not need to use this code after youve completed the sign-up process. If you do not sign up before the expiration date, you must request a new code. · NextPotential Access Code: [email or web address] Expires: 8/21/2017 12:45 PM 
 
4. Enter the last four digits of your Social Security Number (xxxx) and Date of Birth (mm/dd/yyyy) as indicated and click Submit. You will be taken to the next sign-up page. 5. Create a NextPotential ID. This will be your NextPotential login ID and cannot be changed, so think of one that is secure and easy to remember. 6. Create a NextPotential password. You can change your password at any time. 7. Enter your Password Reset Question and Answer. This can be used at a later time if you forget your password. 8. Enter your e-mail address. You will receive e-mail notification when new information is available in [address] 9. Click Sign Up. You can now view and download portions of your medical record. 10. Click the Download Summary menu link to download a portable copy of your medical information. If you have questions, please visit the Frequently Asked Questions section of the NextPotential website.  Remember, NextPotential is NOT to be used for urgent needs. For medical emergencies, dial 911. Now available from your iPhone and Android! Please provide this summary of care documentation to your next provider. Your primary care clinician is listed as Odette Boogie. If you have any questions after today's visit, please call [phone].

## 2017-07-26 LAB
BUN SERPL-MCNC: 38 MG/DL (ref 8–27)
BUN/CREAT SERPL: 26 (ref 10–24)
CALCIUM SERPL-MCNC: 10.1 MG/DL (ref 8.6–10.2)
CHLORIDE SERPL-SCNC: 100 MMOL/L (ref 96–106)
CO2 SERPL-SCNC: 24 MMOL/L (ref 18–29)
CREAT SERPL-MCNC: 1.49 MG/DL (ref 0.76–1.27)
GLUCOSE SERPL-MCNC: 98 MG/DL (ref 65–99)
INTERPRETATION: NORMAL
MAGNESIUM SERPL-MCNC: 2.5 MG/DL (ref 1.6–2.3)
POTASSIUM SERPL-SCNC: 5.2 MMOL/L (ref 3.5–5.2)
SODIUM SERPL-SCNC: 141 MMOL/L (ref 134–144)

## 2017-07-27 ENCOUNTER — TELEPHONE (OUTPATIENT)
Dept: CARDIOLOGY CLINIC | Age: 77
End: 2017-07-27

## 2017-07-27 DIAGNOSIS — N17.9 AKI (ACUTE KIDNEY INJURY) (HCC): ICD-10-CM

## 2017-07-27 DIAGNOSIS — I10 ESSENTIAL HYPERTENSION: Primary | ICD-10-CM

## 2017-07-27 DIAGNOSIS — I42.8 NICM (NONISCHEMIC CARDIOMYOPATHY) (HCC): ICD-10-CM

## 2017-07-27 NOTE — TELEPHONE ENCOUNTER
Message  Received: Today       LALA Blankenship LPN                     Please contact pt and let him know his kidney function has weakened some again, it is similar to where he was 2 months ago. Cut back on his Lasix to half a tab until we see him in f/u. Repeat BMP and mag right before the appt on the 8th       Called pt,verified pt with two pt identifiers, relayed message above to pt. Advised he is to cut his 20 mg tab of lasix in half and take a half daily. Repeat his labs before his upcoming appt with  on the 8th. Went over instructions for his Echo on the 31st and advised I would leave his lab slip up front for him to p/u. Told him I had also received a fax from his pharmacy stating that he could not afford the Eliquis. Asked pt if he is taking the samples that I had given him in office. He said he was. Advised him to give the month free card to the pharmacist for another month free of Eliquis. Then we can figure out what to do if he needs to stay on the Eliquis. He verbalized that he understood everything.

## 2017-07-27 NOTE — PROGRESS NOTES
Please contact pt and let him know his kidney function has weakened some again, it is similar to where he was 2 months ago. Cut back on his Lasix to half a tab until we see him in f/u.  Repeat BMP and mag right before the appt on the 8th

## 2017-07-31 ENCOUNTER — CLINICAL SUPPORT (OUTPATIENT)
Dept: CARDIOLOGY CLINIC | Age: 77
End: 2017-07-31

## 2017-07-31 DIAGNOSIS — I50.22 CHRONIC SYSTOLIC CONGESTIVE HEART FAILURE (HCC): ICD-10-CM

## 2017-07-31 DIAGNOSIS — I42.8 NICM (NONISCHEMIC CARDIOMYOPATHY) (HCC): ICD-10-CM

## 2017-07-31 DIAGNOSIS — I48.92 ATRIAL FLUTTER, UNSPECIFIED TYPE (HCC): ICD-10-CM

## 2017-08-02 NOTE — PROGRESS NOTES
Please let Mr Steve Vines know his heart strength is normal and improved from his last echo in 1/17. His left top chamber of his heart is moderately enlarged which we will see with heart rhythm issues, COPD, and HTN.  F/U as planned

## 2017-08-03 ENCOUNTER — TELEPHONE (OUTPATIENT)
Dept: CARDIOLOGY CLINIC | Age: 77
End: 2017-08-03

## 2017-08-03 NOTE — TELEPHONE ENCOUNTER
----- Message from Yehuda Tim NP sent at 8/2/2017  2:07 PM EDT -----  Please let Mr Janae Hdez know his heart strength is normal and improved from his last echo in 1/17. His left top chamber of his heart is moderately enlarged which we will see with heart rhythm issues, COPD, and HTN. F/U as planned      Called pt and left message to return my call. Pt returned my call,verified pt with two pt identifiers, relayed message above to pt. Advised of upcoming appts again. He verbalized that he understood everything.

## 2017-08-07 ENCOUNTER — HOSPITAL ENCOUNTER (OUTPATIENT)
Dept: LAB | Age: 77
Discharge: HOME | End: 2017-08-07
Payer: MEDICARE

## 2017-08-07 PROCEDURE — 36415 COLL VENOUS BLD VENIPUNCTURE: CPT

## 2017-08-07 PROCEDURE — 83735 ASSAY OF MAGNESIUM: CPT

## 2017-08-07 PROCEDURE — 80048 BASIC METABOLIC PNL TOTAL CA: CPT

## 2017-08-08 ENCOUNTER — OFFICE VISIT (OUTPATIENT)
Dept: CARDIOLOGY CLINIC | Age: 77
End: 2017-08-08

## 2017-08-08 VITALS
WEIGHT: 214.1 LBS | HEIGHT: 66 IN | RESPIRATION RATE: 16 BRPM | SYSTOLIC BLOOD PRESSURE: 110 MMHG | OXYGEN SATURATION: 98 % | HEART RATE: 80 BPM | BODY MASS INDEX: 34.41 KG/M2 | DIASTOLIC BLOOD PRESSURE: 70 MMHG

## 2017-08-08 DIAGNOSIS — I48.92 ATRIAL FLUTTER, UNSPECIFIED TYPE (HCC): ICD-10-CM

## 2017-08-08 DIAGNOSIS — I48.3 TYPICAL ATRIAL FLUTTER (HCC): ICD-10-CM

## 2017-08-08 DIAGNOSIS — E78.2 MIXED HYPERLIPIDEMIA: ICD-10-CM

## 2017-08-08 DIAGNOSIS — I50.22 CHRONIC SYSTOLIC CONGESTIVE HEART FAILURE (HCC): ICD-10-CM

## 2017-08-08 DIAGNOSIS — I42.8 NICM (NONISCHEMIC CARDIOMYOPATHY) (HCC): Primary | ICD-10-CM

## 2017-08-08 LAB
BUN SERPL-MCNC: 19 MG/DL (ref 8–27)
BUN/CREAT SERPL: 16 (ref 10–24)
CALCIUM SERPL-MCNC: 9.5 MG/DL (ref 8.6–10.2)
CHLORIDE SERPL-SCNC: 106 MMOL/L (ref 96–106)
CO2 SERPL-SCNC: 23 MMOL/L (ref 18–29)
CREAT SERPL-MCNC: 1.22 MG/DL (ref 0.76–1.27)
GLUCOSE SERPL-MCNC: 124 MG/DL (ref 65–99)
INTERPRETATION: NORMAL
MAGNESIUM SERPL-MCNC: 2.1 MG/DL (ref 1.6–2.3)
POTASSIUM SERPL-SCNC: 4.5 MMOL/L (ref 3.5–5.2)
SODIUM SERPL-SCNC: 145 MMOL/L (ref 134–144)

## 2017-08-08 NOTE — MR AVS SNAPSHOT
Visit Information Date & Time Provider Department Dept. Phone Encounter #  
 8/8/2017  9:30 AM Rony Jennings, [address] [facility] [phone] [id number] Follow-up Instructions Return in about 3 months (around 11/8/2017). Your Appointments 8/17/2017 10:15 AM  
EP STUDY with Artemio Kapoor MD  
[facility] [address] [facility]) Appt Note: [facility] Dr [facility]  
[phone] [facility]  
  
    
 10/5/2017  8:30 AM  
New Patient with Lito Frausto MD  
[address] [place] [address]) Appt Note: NP/Est PCP/$0CP KMP 07/05/17  
 [address] [facility] [address] [phone]  
  
   
 [address] [facility] [address] Upcoming Health Maintenance Date Due DTaP/Tdap/Td series (1 - Tdap) 4/9/1963 ZOSTER VACCINE AGE 60> 2/9/2002 GLAUCOMA SCREENING Q2Y 4/9/2007 MEDICARE YEARLY EXAM 4/9/2007 Pneumococcal 65+ High/Highest Risk (2 of 2 - PCV13) 11/30/2016 INFLUENZA AGE 9 TO ADULT 8/1/2017 Allergies as of 8/8/2017  Review Complete On: 8/8/2017 By: Rony Jennings MD  
 No Known Allergies Current Immunizations  Reviewed on 11/28/2015 Name Date Influenza Vaccine (Quad) PF 11/30/2015 10:45 AM  
 Pneumococcal Polysaccharide (PPSV-23) 11/30/2015 10:01 AM  
  
 Not reviewed this visit You Were Diagnosed With   
  
 Codes Comments NICM (nonischemic cardiomyopathy) ([facility]    -  Primary ICD-10-CM: I42.8 ICD-9-CM: 425. 4 Chronic systolic congestive heart failure (HCC)     ICD-10-CM: I50.22 ICD-9-CM: 428.22, 428.0 Mixed hyperlipidemia     ICD-10-CM: E78.2 ICD-9-CM: 272.2 Typical atrial flutter (HCC)     ICD-10-CM: I48.3 ICD-9-CM: 427.32 Vitals BP Pulse Resp Height(growth percentile) Weight(growth percentile) SpO2 110/70 (BP Patient Position: Sitting) 80 16 5' 6\" (1.676 m) 214 lb 1.6 oz (97.1 kg) 98% BMI Smoking Status 34.56 kg/m2 Current Every Day Smoker BMI and BSA Data Body Mass Index Body Surface Area 34.56 kg/m 2 2.13 m 2 Preferred Pharmacy Pharmacy Name Phone [facility] [address] [facility] [address] [phone] Your Updated Medication List  
  
   
This list is accurate as of: 8/8/17 10:25 AM.  Always use your most recent med list.  
  
  
  
  
 albuterol 90 mcg/actuation inhaler Commonly known as:  PROVENTIL HFA, VENTOLIN HFA, PROAIR HFA Take 2 Puffs by inhalation every four (4) hours as needed for Wheezing. apixaban 5 mg tablet Commonly known as:  Filomena Bloodgood Take 1 Tab by mouth two (2) times a day. aspirin 81 mg chewable tablet Take 1 Tab by mouth daily. atorvastatin 40 mg tablet Commonly known as:  LIPITOR Take 40 mg by mouth daily. carvedilol 25 mg tablet Commonly known as:  Lucius Burger Take 25 mg by mouth two (2) times daily (with meals). enalapril 10 mg tablet Commonly known as:  Johnnie Lecher Take 1 Tab by mouth daily. furosemide 40 mg tablet Commonly known as:  LASIX TAKE ONE TABLET BY MOUTH DAILY. *DOSE  REDUCED  12/17/15* VIAGRA 50 mg tablet Generic drug:  sildenafil citrate Follow-up Instructions Return in about 3 months (around 11/8/2017). Introducing [facility]! [facility] introduces ConferenceEdge patient portal. Now you can access parts of your medical record, email your doctor's office, and request medication refills online. 1. In your internet browser, go to [email or web address] 2. Click on the First Time User? Click Here link in the Sign In box. You will see the New Member Sign Up page. 3. Enter your ConferenceEdge Access Code exactly as it appears below.  You will not need to use this code after youve completed the sign-up process. If you do not sign up before the expiration date, you must request a new code. · CircleCI Access Code: [email or web address] Expires: 8/21/2017 12:45 PM 
 
4. Enter the last four digits of your Social Security Number (xxxx) and Date of Birth (mm/dd/yyyy) as indicated and click Submit. You will be taken to the next sign-up page. 5. Create a CircleCI ID. This will be your CircleCI login ID and cannot be changed, so think of one that is secure and easy to remember. 6. Create a CircleCI password. You can change your password at any time. 7. Enter your Password Reset Question and Answer. This can be used at a later time if you forget your password. 8. Enter your e-mail address. You will receive e-mail notification when new information is available in [address] 9. Click Sign Up. You can now view and download portions of your medical record. 10. Click the Download Summary menu link to download a portable copy of your medical information. If you have questions, please visit the Frequently Asked Questions section of the CircleCI website. Remember, CircleCI is NOT to be used for urgent needs. For medical emergencies, dial 911. Now available from your iPhone and Android! Please provide this summary of care documentation to your next provider. Your primary care clinician is listed as Davion Owens. If you have any questions after today's visit, please call [phone].

## 2017-08-08 NOTE — PROGRESS NOTES
Lenny Garland DNP, ANP-BC  Subjective/HPI:     Jb Rodas is a 78 y.o. male is here for routine f/u. The patient denies chest pain/ shortness of breath, orthopnea, PND, LE edema, palpitations, syncope, presyncope or fatigue. Patient reports feeling in his usual state of health, denies lightheadedness dizziness shortness of breath chest pain excessive fatigue. During his use of Eliquis denies bruising bleeding or changes in bowel or urine color to suggest bleeding. ECHO  SUMMARY:  Left ventricle: Systolic function was moderately reduced. Ejection  fraction was estimated in the range of 55 % to 60 %. There were no  regional wall motion abnormalities. Left atrium: The atrium was moderately dilated. PCP Provider  Danika Le MD  Past Medical History:   Diagnosis Date    Acute on chronic systolic HF (heart failure) ([facility] 2/11/2014    11/15 Echo EF 40-45%    Cancer ([facility]     prostate    COPD (chronic obstructive pulmonary disease) ([facility]     Elevated troponin 2/11/2014    Hypertension     S/P cardiac cath 2/12/2014 2/12/14 no significant coronary disease, severe LV dysfunction      Past Surgical History:   Procedure Laterality Date    COLONOSCOPY,DIAGNOSTIC  2/22/2016         COLONOSCOPY,REMV LESN,SNARE  2/22/2016          No Known Allergies   Family History   Problem Relation Age of Onset    Adopted: Yes      Current Outpatient Prescriptions   Medication Sig    enalapril (VASOTEC) 10 mg tablet Take 1 Tab by mouth daily.  apixaban (ELIQUIS) 5 mg tablet Take 1 Tab by mouth two (2) times a day.  furosemide (LASIX) 40 mg tablet TAKE ONE TABLET BY MOUTH DAILY. *DOSE  REDUCED  12/17/15*    albuterol (PROVENTIL HFA, VENTOLIN HFA, PROAIR HFA) 90 mcg/actuation inhaler Take 2 Puffs by inhalation every four (4) hours as needed for Wheezing.  aspirin 81 mg chewable tablet Take 1 Tab by mouth daily.  atorvastatin (LIPITOR) 40 mg tablet Take 40 mg by mouth daily.     carvedilol (COREG) 25 mg tablet Take 25 mg by mouth two (2) times daily (with meals).  VIAGRA 50 mg tablet      No current facility-administered medications for this visit. Vitals:    08/08/17 0953   BP: 110/70   Pulse: 80   Resp: 16   SpO2: 98%   Weight: 214 lb 1.6 oz (97.1 kg)   Height: 5' 6\" (1.676 m)     Social History     Social History    Marital status:      Spouse name: N/A    Number of children: N/A    Years of education: N/A     Occupational History    Not on file. Social History Main Topics    Smoking status: Current Every Day Smoker     Packs/day: 2.00     Years: 20.00     Types: Cigarettes    Smokeless tobacco: Former User    Alcohol use No    Drug use: No    Sexual activity: Not on file     Other Topics Concern    Not on file     Social History Narrative       I have reviewed the nurses notes, vitals, problem list, allergy list, medical history, family, social history and medications. Review of Symptoms:    General: Pt denies excessive weight gain or loss. Pt is able to conduct ADL's  HEENT: Denies blurred vision, headaches, epistaxis and difficulty swallowing. Respiratory: Denies shortness of breath, PA, wheezing or stridor. Cardiovascular: Denies precordial pain, palpitations, edema or PND  Gastrointestinal: Denies poor appetite, indigestion, abdominal pain or blood in stool  Musculoskeletal: Denies pain or swelling from muscles or joints  Neurologic: Denies tremor, paresthesias, or sensory motor disturbance  Skin: Denies rash, itching or texture change. Physical Exam:      General: Well developed, in no acute distress, cooperative and alert  HEENT: No carotid bruits, no JVD, trach is midline. Neck Supple, PEERL, EOM intact. Heart:  Normal S1/S2 negative S3 or S4.  Regular, no murmur, gallop or rub.   Respiratory: Clear bilaterally x 4, no wheezing or rales  Abdomen:   Soft, non-tender, no masses, bowel sounds are active.   Extremities:  No edema, normal cap refill, no cyanosis, atraumatic. Neuro: A&Ox3, speech clear, gait stable. Skin: Skin color is normal. No rashes or lesions. Non diaphoretic  Vascular: 2+ pulses symmetric in all extremities    Cardiographics    ECG: atrial flutter   Results for orders placed or performed during the hospital encounter of 05/14/17   EKG, 12 LEAD, INITIAL   Result Value Ref Range    Ventricular Rate 119 BPM    Atrial Rate 119 BPM    P-R Interval 126 ms    QRS Duration 112 ms    Q-T Interval 332 ms    QTC Calculation (Bezet) 467 ms    Calculated P Axis 73 degrees    Calculated R Axis 103 degrees    Calculated T Axis -100 degrees    Diagnosis       Sinus tachycardia  Rightward axis  Nonspecific intraventricular conduction delay  Probably Left ventricular hypertrophy    ST & T wave abnormality, consider inferolateral ischemia  Abnormal ECG  Confirmed by Laurence Noble ([id number]) on 5/16/2017 7:07:33 PM           Cardiology Labs:  No results found for: CHOL, CHOLX, CHLST, CHOLV, 935649, HDL, LDL, LDLC, DLDLP, TGLX, TRIGL, TRIGP, CHHD, CHHDX    Lab Results   Component Value Date/Time    Sodium 145 08/07/2017 08:13 AM    Potassium 4.5 08/07/2017 08:13 AM    Chloride 106 08/07/2017 08:13 AM    CO2 23 08/07/2017 08:13 AM    Anion gap 8 05/23/2017 04:28 AM    Glucose 124 08/07/2017 08:13 AM    BUN 19 08/07/2017 08:13 AM    Creatinine 1.22 08/07/2017 08:13 AM    BUN/Creatinine ratio 16 08/07/2017 08:13 AM    GFR est AA 67 08/07/2017 08:13 AM    GFR est non-AA 58 08/07/2017 08:13 AM    Calcium 9.5 08/07/2017 08:13 AM    Bilirubin, total 1.3 05/14/2017 08:40 PM    AST (SGOT) 30 05/14/2017 08:40 PM    Alk. phosphatase 114 05/14/2017 08:40 PM    Protein, total 6.2 05/17/2017 08:45 PM    Albumin 2.1 05/23/2017 04:28 AM    Globulin 4.6 05/14/2017 08:40 PM    A-G Ratio 0.7 05/14/2017 08:40 PM    ALT (SGPT) 28 05/14/2017 08:40 PM           Assessment:     Assessment:     Diagnoses and all orders for this visit:    1. NICM (nonischemic cardiomyopathy) ([facility]    2.  Chronic systolic congestive heart failure ([facility]    3. Mixed hyperlipidemia        ICD-10-CM ICD-9-CM    1. NICM (nonischemic cardiomyopathy) (HCC) I42.8 425.4    2. Chronic systolic congestive heart failure (HCC) I50.22 428.22      428.0    3. Mixed hyperlipidemia E78.2 272.2      No orders of the defined types were placed in this encounter. Plan:     1. Nonischemic cardiomyopathy: Fully compensated ejection fraction is now 60% continue current medications. Renal function is normal  2. Rate controlled atrial flutter: Has EP consult pending in 1 week, candidate for a flutter ablation. Has been a brief interruption in anticoagulation, patient given Eliquis samples today. 3.  Hypertension controlled continue current medications  Cinthia Durant NP       Pt seen and examined in details. Agree with NP A&P. BP better since lowering enalapril dose. Smoking cessation d/w pt. F/u in 3 months.      Lida Landon MD

## 2017-08-08 NOTE — PROGRESS NOTES
Patient had recent blood work done he states they accidentally threw aware his blood thinner pills asking for samples.

## 2017-08-17 ENCOUNTER — OFFICE VISIT (OUTPATIENT)
Dept: CARDIOLOGY CLINIC | Age: 77
End: 2017-08-17

## 2017-08-17 VITALS
DIASTOLIC BLOOD PRESSURE: 74 MMHG | OXYGEN SATURATION: 97 % | HEART RATE: 91 BPM | WEIGHT: 216.3 LBS | BODY MASS INDEX: 34.76 KG/M2 | RESPIRATION RATE: 18 BRPM | HEIGHT: 66 IN | SYSTOLIC BLOOD PRESSURE: 130 MMHG

## 2017-08-17 DIAGNOSIS — I42.8 NICM (NONISCHEMIC CARDIOMYOPATHY) (HCC): Primary | ICD-10-CM

## 2017-08-17 DIAGNOSIS — I10 ESSENTIAL HYPERTENSION: ICD-10-CM

## 2017-08-17 DIAGNOSIS — I48.92 ATRIAL FLUTTER, UNSPECIFIED TYPE (HCC): ICD-10-CM

## 2017-08-17 DIAGNOSIS — E78.2 MIXED HYPERLIPIDEMIA: ICD-10-CM

## 2017-08-17 RX ORDER — ENALAPRIL MALEATE 20 MG/1
TABLET ORAL
COMMUNITY
Start: 2017-07-12 | End: 2017-09-12

## 2017-08-17 RX ORDER — CLOTRIMAZOLE AND BETAMETHASONE DIPROPIONATE 10; .64 MG/G; MG/G
CREAM TOPICAL
COMMUNITY
Start: 2017-08-15 | End: 2017-10-05 | Stop reason: ALTCHOICE

## 2017-08-17 NOTE — MR AVS SNAPSHOT
Visit Information Date & Time Provider Department Dept. Phone Encounter #  
 8/17/2017 10:15 AM Lisa Carrillo, [address] [facility] [phone] [id number] Follow-up Instructions Return in about 4 weeks (around 9/14/2017). Follow-up and Disposition History Your Appointments 10/5/2017  8:30 AM  
New Patient with MD Rupali Amin [facility]) Appt Note: NP/Est PCP/$0CP KMP 07/05/17  
 [address]  
  
    
 11/7/2017 10:45 AM  
3 MONTH with Bethany Hand MD  
[facility]) Appt Note: 3 mnth f/u,jaa  
 [address]  
[phone] [address] Upcoming Health Maintenance Date Due DTaP/Tdap/Td series (1 - Tdap) 4/9/1963 ZOSTER VACCINE AGE 60> 2/9/2002 GLAUCOMA SCREENING Q2Y 4/9/2007 MEDICARE YEARLY EXAM 4/9/2007 Pneumococcal 65+ High/Highest Risk (2 of 2 - PCV13) 11/30/2016 INFLUENZA AGE 9 TO ADULT 8/1/2017 Allergies as of 8/17/2017  Review Complete On: 8/17/2017 By: Yordan Leon MD  
 No Known Allergies Current Immunizations  Reviewed on 11/28/2015 Name Date Influenza Vaccine (Quad) PF 11/30/2015 10:45 AM  
 Pneumococcal Polysaccharide (PPSV-23) 11/30/2015 10:01 AM  
  
 Not reviewed this visit You Were Diagnosed With   
  
 Codes Comments N[facility]nonischemic [facility]) [facility]    -  Primary ICD-10-CM: I42.8 ICD-9-CM: 425.4 Atrial flutter, unspecified type ([facility]     ICD-10-CM: I48.92 
ICD-9-CM: 427.32 Mixed hyperlipidemia     ICD-10-CM: E78.2 ICD-9-CM: 272.2 Essential hypertension     ICD-10-CM: I10 
ICD-9-CM: 401.9 Vitals BP Pulse Resp Height(growth percentile) Weight(growth percentile) SpO2 130/74 (BP 1 Location: Left arm, BP Patient Position: Sitting) 91 18 5' 6\" (1.676 m) 216 lb 4.8 oz (98.1 kg) 97% BMI Smoking Status 34.91 kg/m2 Former Smoker Vitals History BMI and BSA Data Body Mass Index Body Surface Area 34.91 kg/m 2 2.14 m 2 Preferred Pharmacy Pharmacy Name Phone [facility] [address] Yolanda Settler [phone] Your Updated Medication List  
  
   
This list is accurate as of: 8/17/17 11:27 AM.  Always use your most recent med list.  
  
  
  
  
 albuterol 90 mcg/actuation inhaler Commonly known as:  PROVENTIL HFA, VENTOLIN HFA, PROAIR HFA Take 2 Puffs by inhalation every four (4) hours as needed for Wheezing. apixaban 5 mg tablet Commonly known as:  Joe Gentleman Take 1 Tab by mouth two (2) times a day. aspirin 81 mg chewable tablet Take 1 Tab by mouth daily. atorvastatin 40 mg tablet Commonly known as:  LIPITOR Take 40 mg by mouth daily. carvedilol 25 mg tablet Commonly known as:  Francenia Elders Take 25 mg by mouth two (2) times daily (with meals). clotrimazole-betamethasone topical cream  
Commonly known as:  LOTRISONE  
  
 * enalapril 20 mg tablet Commonly known as:  Larina Dense * enalapril 10 mg tablet Commonly known as:  Larina Dense Take 1 Tab by mouth daily. furosemide 40 mg tablet Commonly known as:  LASIX TAKE ONE TABLET BY MOUTH DAILY. *DOSE  REDUCED  12/17/15* VIAGRA 50 mg tablet Generic drug:  sildenafil citrate * Notice: This list has 2 medication(s) that are the same as other medications prescribed for you. Read the directions carefully, and ask your doctor or other care provider to review them with you. We Performed the Following AMB POC EKG ROUTINE W/ 12 LEADS, INTER & REP [30936 CPT(R)] Follow-up Instructions Return in about 4 weeks (around 9/14/2017). To-Do List   
 08/22/2017 8:00 AM  
 Appointment with [address] [place] [address] at [address] [facility] ([phone]) NPO AFTER MIDNIGHT! ROUTINE CASES:  Please arrive 2 hour prior to your scheduled appointment time. If your scheduled appointment is for 0730, 0800, 0815, please arrive by 0645. NON ROUTINE CASES:  PATIENTS WHO REQUIRE LABS, X-RAY, EKG, or MEDS:  PLEASE ARRIVE 3 HOURS PRIOR TO YOUR SCHEDULED APPOINTMENT. If you require hydration prior to your procedure, PLEASE ARRIVE 4 HOURS PRIOR TO YOUR APPOINTMENT  **** IT IS THE OFFICE SCHEDULARS RESPONSBILITY TO NOTIFY THE CATH LAB SCHEDULAR IF THE PATIENT WILL REQUIRE ANY ADDITIONAL TIME FOR PREP FROM ROUTINE CASE ***** Introducing [facility]! [facility] introduces [employer] patient portal. Now you can access parts of your medical record, email your doctor's office, and request medication refills online. 1. In your internet browser, go to [email or web address] 2. Click on the First Time User? Click Here link in the Sign In box. You will see the New Member Sign Up page. 3. Enter your [employer] Access Code exactly as it appears below. You will not need to use this code after youve completed the sign-up process. If you do not sign up before the expiration date, you must request a new code. · [employer] Access Code: [email or web address] Expires: 8/21/2017 12:45 PM 
 
4. Enter the last four digits of your Social Security Number (xxxx) and Date of Birth (mm/dd/yyyy) as indicated and click Submit. You will be taken to the next sign-up page. 5. Create a Pandoramat ID. This will be your [employer] login ID and cannot be changed, so think of one that is secure and easy to remember. 6. Create a Pand[employer]at password. You can change your password at any time. 7. Enter your Password Reset Question and Answer. This can be used at a later time if you forget your password. 8. Enter your e-mail address. You will receive e-mail notification when new information is available in [address] 9. Click Sign Up. You can now view and download portions of your medical record. 10. Click the Download Summary menu link to download a portable copy of your medical information. If you have questions, please visit the Frequently Asked Questions section of the allGreenup website. Remember, [employer] is NOT to be used for urgent needs. For medical emergencies, dial 911. Now available from your iPhone and Android! Please provide this summary of care documentation to your next provider. Your primary care clinician is listed as Michael Rich. If you have any questions after today's visit, please call [phone].

## 2017-08-17 NOTE — PROGRESS NOTES
Subjective:      Cordelia Kennedy is a 78 y.o. male is here for EP consult. He has been dealing with a chest cold, on medication for this. He has had a cough and some wheezing d/t this cold. The patient denies chest pain, orthopnea, PND, LE edema, palpitations, syncope, presyncope or fatigue.        Patient Active Problem List    Diagnosis Date Noted    Tobacco use 07/25/2017    Acute respiratory failure ([address] 05/14/2017    Mixed hyperlipidemia 03/31/2016    Acute renal failure (ARF) ([address] 02/17/2016    Pulmonary edema 11/28/2015    Acute on chronic combined systolic and diastolic heart failure ([address] 11/28/2015    Acute respiratory failure with hypoxia ([address] 11/28/2015    Noncompliance with medication regimen 11/28/2015    Malignant essential hypertension 11/28/2015    Hypertension 05/20/2014    NICM (nonischemic cardiomyopathy) ([facility] 02/20/2014    S/P cardiac cath 02/12/2014    Elevated troponin 02/11/2014    Acute on chronic systolic HF (heart failure) ([address] 02/11/2014    Uncontrolled hypertension 02/11/2014    PNA (pneumonia) 02/08/2014    CHF (congestive heart failure) ([address] 02/08/2014      Geoffrey Lozano MD  Past Medical History:   Diagnosis Date    Acute on chronic systolic HF (heart failure) ([address] 2/11/2014    11/15 Echo EF 40-45%    Cancer (HCC)     prostate    COPD (chronic obstructive pulmonary disease) ([facility]     Elevated troponin 2/11/2014    Hypertension     S/P cardiac cath 2/12/2014 2/12/14 no significant coronary disease, severe LV dysfunction      Past Surgical History:   Procedure Laterality Date    COLONOSCOPY,DIAGNOSTIC  2/22/2016         COLONOSCOPY,REMV LESN,SNARE  2/22/2016          No Known Allergies   Family History   Problem Relation Age of Onset    Adopted: Yes    negative for cardiac disease  Social History     Social History    Marital status:      Spouse name: N/A    Number of children: N/A    Years of education: N/A     Social History Main Topics    Smoking status: Former Smoker     Packs/day: 2.00     Years: 20.00     Types: Cigarettes     Quit date: 8/7/2017    Smokeless tobacco: Former User     Types: Chew    Alcohol use No    Drug use: No    Sexual activity: Not Asked     Other Topics Concern    None     Social History Narrative     Current Outpatient Prescriptions   Medication Sig    clotrimazole-betamethasone (LOTRISONE) topical cream     apixaban (ELIQUIS) 5 mg tablet Take 1 Tab by mouth two (2) times a day.  enalapril (VASOTEC) 10 mg tablet Take 1 Tab by mouth daily.  furosemide (LASIX) 40 mg tablet TAKE ONE TABLET BY MOUTH DAILY. *DOSE  REDUCED  12/17/15*    albuterol (PROVENTIL HFA, VENTOLIN HFA, PROAIR HFA) 90 mcg/actuation inhaler Take 2 Puffs by inhalation every four (4) hours as needed for Wheezing.  VIAGRA 50 mg tablet     aspirin 81 mg chewable tablet Take 1 Tab by mouth daily.  atorvastatin (LIPITOR) 40 mg tablet Take 40 mg by mouth daily.  carvedilol (COREG) 25 mg tablet Take 25 mg by mouth two (2) times daily (with meals).  enalapril (VASOTEC) 20 mg tablet      No current facility-administered medications for this visit. Vitals:    08/17/17 1013   BP: 130/74   Pulse: 91   Resp: 18   SpO2: 97%   Weight: 216 lb 4.8 oz (98.1 kg)   Height: 5' 6\" (1.676 m)       I have reviewed the nurses notes, vitals, problem list, allergy list, medical history, family, social history and medications. Review of Symptoms:    General: Pt denies excessive weight gain or loss. Pt is able to conduct ADL's  HEENT: Denies blurred vision, headaches, epistaxis and difficulty swallowing.+cough  Respiratory: Denies shortness of breath, PA, +wheezing, denies stridor.   Cardiovascular: Denies precordial pain, palpitations, edema or PND  Gastrointestinal: Denies poor appetite, indigestion, abdominal pain or blood in stool  Urinary: Denies dysuria, pyuria  Musculoskeletal: Denies pain or swelling from muscles or joints  Neurologic: Denies tremor, paresthesias, or sensory motor disturbance  Skin: Denies rash, itching or texture change. Psych: Denies depression      Physical Exam:      General: Well developed, in no acute distress. HEENT: Eyes - PERRL, no jvd  Heart:  Normal S1/S2 negative S3 or S4. irregular, no murmur, gallop or rub.   Respiratory: Clear bilaterally x 4, no wheezing or rales  Abdomen:   Soft, non-tender, bowel sounds are active.   Extremities:  No edema, normal cap refill, no cyanosis. Musculoskeletal: No clubbing  Neuro: A&Ox3, speech clear, gait stable. Skin: Skin color is normal. No rashes or lesions.  Non diaphoretic  Vascular: 2+ pulses symmetric in all extremities    Cardiographics    Ekg:aflutter with RVR     Results for orders placed or performed during the hospital encounter of 05/14/17   EKG, 12 LEAD, INITIAL   Result Value Ref Range    Ventricular Rate 119 BPM    Atrial Rate 119 BPM    P-R Interval 126 ms    QRS Duration 112 ms    Q-T Interval 332 ms    QTC Calculation (Bezet) 467 ms    Calculated P Axis 73 degrees    Calculated R Axis 103 degrees    Calculated T Axis -100 degrees    Diagnosis       Sinus tachycardia  Rightward axis  Nonspecific intraventricular conduction delay  Probably Left ventricular hypertrophy    ST & T wave abnormality, consider inferolateral ischemia  Abnormal ECG  Confirmed by Madeline Benitez ([id number]) on 5/16/2017 7:07:33 PM           Lab Results   Component Value Date/Time    WBC 7.9 05/22/2017 04:19 AM    HGB 9.4 05/22/2017 04:19 AM    HCT 28.6 05/22/2017 04:19 AM    PLATELET 239 88/60/4296 04:19 AM    MCV 79.2 05/22/2017 04:19 AM      Lab Results   Component Value Date/Time    Sodium 145 08/07/2017 08:13 AM    Potassium 4.5 08/07/2017 08:13 AM    Chloride 106 08/07/2017 08:13 AM    CO2 23 08/07/2017 08:13 AM    Anion gap 8 05/23/2017 04:28 AM    Glucose 124 08/07/2017 08:13 AM    BUN 19 08/07/2017 08:13 AM    Creatinine 1.22 08/07/2017 08:13 AM BUN/Creatinine ratio 16 08/07/2017 08:13 AM    GFR est AA 67 08/07/2017 08:13 AM    GFR est non-AA 58 08/07/2017 08:13 AM    Calcium 9.5 08/07/2017 08:13 AM    Bilirubin, total 1.3 05/14/2017 08:40 PM    AST (SGOT) 30 05/14/2017 08:40 PM    Alk. phosphatase 114 05/14/2017 08:40 PM    Protein, total 6.2 05/17/2017 08:45 PM    Albumin 2.1 05/23/2017 04:28 AM    Globulin 4.6 05/14/2017 08:40 PM    A-G Ratio 0.7 05/14/2017 08:40 PM    ALT (SGPT) 28 05/14/2017 08:40 PM         Assessment:     Assessment:        ICD-10-CM ICD-9-CM    1. NICM (nonischemic cardiomyopathy) (HCC) I42.8 425.4    2. Atrial flutter, unspecified type ([facility] I48.92 427.32 CANCELED: XR CHEST PA LAT   3. Mixed hyperlipidemia E78.2 272.2 AMB POC EKG ROUTINE W/ 12 LEADS, INTER & REP   4. Essential hypertension I10 401.9      Orders Placed This Encounter    AMB POC EKG ROUTINE W/ 12 LEADS, INTER & REP     Order Specific Question:   Reason for Exam:     Answer:   Routine    enalapril (VASOTEC) 20 mg tablet    clotrimazole-betamethasone (LOTRISONE) topical cream        Plan:   Mr Carter Gaytan is here today consulting regarding new onset of atrial flutter. He has been tx recently for an URI and has some sob/cough. Denies other cardiac complaints. EKG remains aflutter with RVR. BP is normotensive. He is a candidate for atrial flutter I discussed the risks/benefits/alternatives of the procedure with the patient. Risks include (but are not limited to) bleeding, infection, cva/mi/tamponade/death. The patient understands and agrees to proceed. Thank you for this interesting consultation. We will schedule. Continue medical management for CHF, HTN and hyperlipidemia. Thank you for allowing me to participate in Fam Schwarz 's care.     Jessica Samuels MD, Rahul Sheth

## 2017-08-21 RX ORDER — CARVEDILOL 25 MG/1
TABLET ORAL
Qty: 60 TAB | Refills: 6 | Status: ON HOLD | OUTPATIENT
Start: 2017-08-21 | End: 2018-06-22 | Stop reason: SDUPTHER

## 2017-08-22 ENCOUNTER — ANESTHESIA (OUTPATIENT)
Dept: CARDIAC CATH/INVASIVE PROCEDURES | Age: 77
End: 2017-08-22
Payer: MEDICARE

## 2017-08-22 ENCOUNTER — ANESTHESIA EVENT (OUTPATIENT)
Dept: CARDIAC CATH/INVASIVE PROCEDURES | Age: 77
End: 2017-08-22
Payer: MEDICARE

## 2017-08-22 ENCOUNTER — HOSPITAL ENCOUNTER (OUTPATIENT)
Dept: NON INVASIVE DIAGNOSTICS | Age: 77
Discharge: HOME | End: 2017-08-22
Attending: INTERNAL MEDICINE | Admitting: INTERNAL MEDICINE
Payer: MEDICARE

## 2017-08-22 VITALS
WEIGHT: 214 LBS | OXYGEN SATURATION: 96 % | DIASTOLIC BLOOD PRESSURE: 69 MMHG | RESPIRATION RATE: 24 BRPM | HEART RATE: 85 BPM | BODY MASS INDEX: 32.43 KG/M2 | SYSTOLIC BLOOD PRESSURE: 131 MMHG | HEIGHT: 68 IN

## 2017-08-22 PROBLEM — I48.92 ATRIAL FLUTTER (HCC): Status: ACTIVE | Noted: 2017-08-22

## 2017-08-22 LAB
BASOPHILS # BLD: 0 K/UL (ref 0–0.1)
BASOPHILS NFR BLD: 0 % (ref 0–1)
EOSINOPHIL # BLD: 0.2 K/UL (ref 0–0.4)
EOSINOPHIL NFR BLD: 5 % (ref 0–7)
ERYTHROCYTE [DISTWIDTH] IN BLOOD BY AUTOMATED COUNT: 14.1 % (ref 11.5–14.5)
HCT VFR BLD AUTO: 34.8 % (ref 36.6–50.3)
HGB BLD-MCNC: 10.9 G/DL (ref 12.1–17)
LYMPHOCYTES # BLD: 0.9 K/UL (ref 0.8–3.5)
LYMPHOCYTES NFR BLD: 20 % (ref 12–49)
MCH RBC QN AUTO: 26.1 PG (ref 26–34)
MCHC RBC AUTO-ENTMCNC: 31.3 G/DL (ref 30–36.5)
MCV RBC AUTO: 83.5 FL (ref 80–99)
MONOCYTES # BLD: 0.4 K/UL (ref 0–1)
MONOCYTES NFR BLD: 9 % (ref 5–13)
NEUTS SEG # BLD: 3 K/UL (ref 1.8–8)
NEUTS SEG NFR BLD: 66 % (ref 32–75)
PLATELET # BLD AUTO: 151 K/UL (ref 150–400)
RBC # BLD AUTO: 4.17 M/UL (ref 4.1–5.7)
WBC # BLD AUTO: 4.6 K/UL (ref 4.1–11.1)

## 2017-08-22 PROCEDURE — 74011250636 HC RX REV CODE- 250/636

## 2017-08-22 PROCEDURE — 74011250637 HC RX REV CODE- 250/637

## 2017-08-22 PROCEDURE — C1894 INTRO/SHEATH, NON-LASER: HCPCS

## 2017-08-22 PROCEDURE — 36415 COLL VENOUS BLD VENIPUNCTURE: CPT | Performed by: INTERNAL MEDICINE

## 2017-08-22 PROCEDURE — C1731 CATH, EP, 20 OR MORE ELEC: HCPCS

## 2017-08-22 PROCEDURE — 74011000250 HC RX REV CODE- 250

## 2017-08-22 PROCEDURE — 94640 AIRWAY INHALATION TREATMENT: CPT

## 2017-08-22 PROCEDURE — 77030010880 HC CBL EP SUPRME STJU -C

## 2017-08-22 PROCEDURE — 77030029065 HC DRSG HEMO QCLOT ZMED -B

## 2017-08-22 PROCEDURE — 85025 COMPLETE CBC W/AUTO DIFF WBC: CPT | Performed by: INTERNAL MEDICINE

## 2017-08-22 PROCEDURE — 77030030806 HC PTCH ENSIT NAVX STJU -G

## 2017-08-22 PROCEDURE — 93613 INTRACARDIAC EPHYS 3D MAPG: CPT

## 2017-08-22 PROCEDURE — 74011000250 HC RX REV CODE- 250: Performed by: INTERNAL MEDICINE

## 2017-08-22 PROCEDURE — 77030000299 HC FEMSTP COMP GLD STJU -B

## 2017-08-22 PROCEDURE — C1733 CATH, EP, OTHR THAN COOL-TIP: HCPCS

## 2017-08-22 PROCEDURE — 77030004964 HC CBL EP ABLAT BSC -C

## 2017-08-22 PROCEDURE — 76060000033 HC ANESTHESIA 1 TO 1.5 HR

## 2017-08-22 PROCEDURE — 77030011640 HC PAD GRND REM COVD -A

## 2017-08-22 PROCEDURE — 77030018729 HC ELECTRD DEFIB PAD CARD -B

## 2017-08-22 RX ORDER — MIDAZOLAM HYDROCHLORIDE 1 MG/ML
INJECTION, SOLUTION INTRAMUSCULAR; INTRAVENOUS AS NEEDED
Status: DISCONTINUED | OUTPATIENT
Start: 2017-08-22 | End: 2017-08-22 | Stop reason: HOSPADM

## 2017-08-22 RX ORDER — DOBUTAMINE HYDROCHLORIDE 200 MG/100ML
2.5-1 INJECTION INTRAVENOUS
Status: CANCELLED | OUTPATIENT
Start: 2017-08-22

## 2017-08-22 RX ORDER — PROPOFOL 10 MG/ML
INJECTION, EMULSION INTRAVENOUS
Status: DISCONTINUED | OUTPATIENT
Start: 2017-08-22 | End: 2017-08-22 | Stop reason: HOSPADM

## 2017-08-22 RX ORDER — IPRATROPIUM BROMIDE AND ALBUTEROL SULFATE 2.5; .5 MG/3ML; MG/3ML
3 SOLUTION RESPIRATORY (INHALATION)
Status: COMPLETED | OUTPATIENT
Start: 2017-08-22 | End: 2017-08-22

## 2017-08-22 RX ORDER — DOBUTAMINE HYDROCHLORIDE 200 MG/100ML
INJECTION INTRAVENOUS
Status: COMPLETED
Start: 2017-08-22 | End: 2017-08-22

## 2017-08-22 RX ORDER — LIDOCAINE HYDROCHLORIDE 10 MG/ML
1-40 INJECTION INFILTRATION; PERINEURAL
Status: CANCELLED | OUTPATIENT
Start: 2017-08-22

## 2017-08-22 RX ORDER — FENTANYL CITRATE 50 UG/ML
12.5-5 INJECTION, SOLUTION INTRAMUSCULAR; INTRAVENOUS
Status: CANCELLED | OUTPATIENT
Start: 2017-08-22

## 2017-08-22 RX ORDER — MIDAZOLAM HYDROCHLORIDE 1 MG/ML
1-5 INJECTION, SOLUTION INTRAMUSCULAR; INTRAVENOUS
Status: CANCELLED | OUTPATIENT
Start: 2017-08-22

## 2017-08-22 RX ORDER — HEPARIN SODIUM 200 [USP'U]/100ML
1000 INJECTION, SOLUTION INTRAVENOUS ONCE
Status: CANCELLED | OUTPATIENT
Start: 2017-08-22 | End: 2017-08-22

## 2017-08-22 RX ORDER — ACETAMINOPHEN 325 MG/1
TABLET ORAL
Status: COMPLETED
Start: 2017-08-22 | End: 2017-08-22

## 2017-08-22 RX ORDER — HEPARIN SODIUM 200 [USP'U]/100ML
INJECTION, SOLUTION INTRAVENOUS
Status: COMPLETED
Start: 2017-08-22 | End: 2017-08-22

## 2017-08-22 RX ORDER — PROPOFOL 10 MG/ML
INJECTION, EMULSION INTRAVENOUS AS NEEDED
Status: DISCONTINUED | OUTPATIENT
Start: 2017-08-22 | End: 2017-08-22 | Stop reason: HOSPADM

## 2017-08-22 RX ORDER — ACETAMINOPHEN 325 MG/1
650 TABLET ORAL
Status: DISCONTINUED | OUTPATIENT
Start: 2017-08-22 | End: 2017-08-22 | Stop reason: HOSPADM

## 2017-08-22 RX ORDER — SODIUM CHLORIDE 0.9 % (FLUSH) 0.9 %
5-10 SYRINGE (ML) INJECTION AS NEEDED
Status: DISCONTINUED | OUTPATIENT
Start: 2017-08-22 | End: 2017-08-22 | Stop reason: HOSPADM

## 2017-08-22 RX ORDER — HYDROCODONE BITARTRATE AND ACETAMINOPHEN 5; 325 MG/1; MG/1
1 TABLET ORAL
Status: DISCONTINUED | OUTPATIENT
Start: 2017-08-22 | End: 2017-08-22 | Stop reason: HOSPADM

## 2017-08-22 RX ORDER — LIDOCAINE HYDROCHLORIDE 10 MG/ML
INJECTION INFILTRATION; PERINEURAL
Status: COMPLETED
Start: 2017-08-22 | End: 2017-08-22

## 2017-08-22 RX ORDER — SODIUM CHLORIDE 0.9 % (FLUSH) 0.9 %
5-10 SYRINGE (ML) INJECTION EVERY 8 HOURS
Status: DISCONTINUED | OUTPATIENT
Start: 2017-08-22 | End: 2017-08-22 | Stop reason: HOSPADM

## 2017-08-22 RX ORDER — KETAMINE HYDROCHLORIDE 100 MG/ML
INJECTION, SOLUTION INTRAMUSCULAR; INTRAVENOUS AS NEEDED
Status: DISCONTINUED | OUTPATIENT
Start: 2017-08-22 | End: 2017-08-22 | Stop reason: HOSPADM

## 2017-08-22 RX ADMIN — KETAMINE HYDROCHLORIDE 10 MG: 100 INJECTION, SOLUTION INTRAMUSCULAR; INTRAVENOUS at 08:22

## 2017-08-22 RX ADMIN — HEPARIN SODIUM 1000 UNITS: 200 INJECTION, SOLUTION INTRAVENOUS at 08:03

## 2017-08-22 RX ADMIN — PROPOFOL 20 MG: 10 INJECTION, EMULSION INTRAVENOUS at 08:39

## 2017-08-22 RX ADMIN — LIDOCAINE HYDROCHLORIDE 10 ML: 10 INJECTION, SOLUTION INFILTRATION; PERINEURAL at 08:38

## 2017-08-22 RX ADMIN — PROPOFOL 50 MCG/KG/MIN: 10 INJECTION, EMULSION INTRAVENOUS at 08:20

## 2017-08-22 RX ADMIN — ACETAMINOPHEN 650 MG: 325 TABLET, FILM COATED ORAL at 11:27

## 2017-08-22 RX ADMIN — ACETAMINOPHEN 650 MG: 325 TABLET ORAL at 11:27

## 2017-08-22 RX ADMIN — MIDAZOLAM HYDROCHLORIDE 1 MG: 1 INJECTION, SOLUTION INTRAMUSCULAR; INTRAVENOUS at 08:17

## 2017-08-22 RX ADMIN — DOBUTAMINE HYDROCHLORIDE 5 MCG/KG/MIN: 200 INJECTION INTRAVENOUS at 08:50

## 2017-08-22 RX ADMIN — KETAMINE HYDROCHLORIDE 10 MG: 100 INJECTION, SOLUTION INTRAMUSCULAR; INTRAVENOUS at 08:40

## 2017-08-22 RX ADMIN — IPRATROPIUM BROMIDE AND ALBUTEROL SULFATE 3 ML: .5; 3 SOLUTION RESPIRATORY (INHALATION) at 10:33

## 2017-08-22 RX ADMIN — PROPOFOL 20 MG: 10 INJECTION, EMULSION INTRAVENOUS at 08:37

## 2017-08-22 NOTE — IP AVS SNAPSHOT
[address] 
[phone] Patient: Libby Denton MRN: [id number] WIM:5[birth year]309 You are allergic to the following No active allergies Recent Documentation Height Weight BMI Smoking Status 1.727 m 97.1 kg 32.54 kg/m2 Former Smoker Emergency Contacts Name Discharge Info Relation Home Work Mobile Paty Wayne  Spouse [3] [phone] hCing Grant  Child [2] [phone] About your hospitalization You were admitted on:  August 22, 2017 You last received care in the:  [facility] CARDIAC CATH LAB You were discharged on:  August 22, 2017 Unit phone number:  [phone] Why you were hospitalized Your primary diagnosis was:  Not on File Your diagnoses also included:  Atrial Flutter (Hcc) Providers Seen During Your Hospitalizations Provider Role Specialty Primary office phone Robert Chaudhry MD Attending Provider Cardiology [phone] Your Primary Care Physician (PCP) Primary Care Physician Office Phone Office Fax Yolanda Brown [phone] Follow-up Information Follow up With Details Comments Contact Info Mariluz Syed MD   1 [facility] 100[address] [facility] [address] [phone] Your Appointments Tuesday September 12, 2017  1:00 PM EDT  
6 MONTH with Robert Chaudhry MD  
[facility]) [address]  
[phone] Thursday October 05, 2017  8:30 AM EDT New Patient with Adolfo Araujo MD  
[facility] [address] [facility] [address] [address] [phone] Current Discharge Medication List  
  
CONTINUE these medications which have NOT CHANGED Dose & Instructions Dispensing Information Comments Morning Noon Evening Bedtime  
 albuterol 90 mcg/actuation inhaler Commonly known as:  PROVENTIL HFA, VENTOLIN HFA, PROAIR HFA Your last dose was: Your next dose is:    
   
   
 Dose:  2 Puff Take 2 Puffs by inhalation every four (4) hours as needed for Wheezing. Quantity:  1 Inhaler Refills:  0  
     
   
   
   
  
 apixaban 5 mg tablet Commonly known as:  Dhiraj Brill Your last dose was: Your next dose is:    
   
   
 Dose:  5 mg Take 1 Tab by mouth two (2) times a day. Quantity:  42 Tab Refills:  0  
     
   
   
   
  
 aspirin 81 mg chewable tablet Your last dose was: Your next dose is:    
   
   
 Dose:  81 mg Take 1 Tab by mouth daily. Quantity:  10 Tab Refills:  0  
     
   
   
   
  
 atorvastatin 40 mg tablet Commonly known as:  LIPITOR Your last dose was: Your next dose is:    
   
   
 Dose:  40 mg Take 40 mg by mouth daily. Refills:  0  
     
   
   
   
  
 carvedilol 25 mg tablet Commonly known as:  [place]ington Parrot Your last dose was: Your next dose is:    
   
   
 Dose:  25 mg Take 25 mg by mouth two (2) times daily (with meals). Refills:  0  
     
   
   
   
  
 clotrimazole-betamethasone topical cream  
Commonly known as:  B[place]son [place] Your last dose was: Your next dose is:    
   
   
  Refills:  0  
     
   
   
   
  
 * enalapril 20 mg tablet Commonly known as:  Frutoso Meckel Your last dose was: Your next dose is:    
   
   
  Refills:  0  
     
   
   
   
  
 * enalapril 10 mg tablet Commonly known as:  Frutoso Meckel Your last dose was: Your next dose is:    
   
   
 Dose:  10 mg Take 1 Tab by mouth daily. Quantity:  30 Tab Refills:  6  
     
   
   
   
  
 furosemide 40 mg tablet Commonly known as:  LASIX Your last dose was: Your next dose is: TAKE ONE TABLET BY MOUTH DAILY. *DOSE  REDUCED  12/17/15* Quantity:  30 Tab Refills:  6 VIAGRA 50 mg tablet Generic drug:  sildenafil citrate Your last dose was: Your next dose is:    
   
   
  Refills:  0  
     
   
   
   
  
 * Notice: This list has 2 medication(s) that are the same as other medications prescribed for you. Read the directions carefully, and ask your doctor or other care provider to review them with you. Discharge Instructions [facility] [address]  [phone] ABLATION DISCHARGE INSTRUCTIONS Patient ID: 
Seymour Ortiz [id number] 
87 y.o. 
1940 Admit Date: 8/22/2017 Discharge Date: 8/22/2017 Admitting Physician: Francisco Pena MD  
 
Discharge Physician: Francisco Pena MD 
 
Admission Diagnoses:  
flutter Discharge Diagnoses: Active Problems: * No active hospital problems. * 
atrial flutter Discharge Condition: Good Cardiology Procedures this Admission:  atrial flutter ablation Disposition: home Reference discharge instructions provided by nursing for diet and activity. Follow-up with Dr Estevan Olmedo in one week. Call [phone] to make an appointment. Signed: 
Francisco Pena MD 
8/22/2017 
8:36 AM 
 
S/P ABLATION DISCHARGE INSTRUCTIONS It is normal to feel tired the first couple days. Take it easy and follow the physicians instructions. CHECK THE CATHETER INSERTION SITE DAILY: 
You may shower 24 hours after the procedure, remove the bandage during showering. Wash with soap and water and pat dry. Gentle cleaning of the site with soap and water is sufficient, cover with a dry clean dressing or bandage. Do not apply creams or powders to the area. Do not sit in a bathtub or pool of water for 7 days or until wound has completely healed. Temporary bruising and discomfort is normal and may last a few weeks.   You may have a  formation of a small lump at the site which may last up to 6 weeks. CALL THE PHYSICIAN: If the site becomes red, swollen or feels warm to the touch If there is bleeding or drainage or if there is unusual pain at the groin or down the leg. If there is any bleeding, lie down, apply pressure or have someone apply pressure with a clean cloth until the bleeding stops. If the bleeding continues, call 911 to be transported to the hospital. 
DO  [place] [address]. Activity: For the first 24-48 hours or as instructed by the physician: No lifting, pushing or pulling over 5 pounds and no straining the insertion site. Do not life grocery bags or the garbage can, do not run the vacuum  or  for 7 days. Start with short walks as in the hospital and gradually increase as tolerated each day. It is recommended to walk 30 minutes 5-7 days per week. Follow your physicians instructions on activity. Avoid walking outside in extremes of heat or cold. Walk inside when it is cold and windy or hot and humid. Things to keep in mind: 
No driving for at least 5 days, or as designated by your physician. Limit the number of times you go up and down the stairs Take rests and pace yourself with activity. Be careful and do not strain with bowel movements. Medications: Take all medications as prescribed Call your physician if you have any questions Keep an updated list of your medications with you at all times and give a list to your physician and pharmacist 
 
Signs and Symptoms: 
Be cautious of symptoms of angina or recurrent symptoms such as chest discomfort, unusual shortness of breath or fatigue, palpitations. After Care: Follow up with your physician as instructed. Follow a heart healthy diet with proper portion control, daily stress management, daily exercise, blood pressure and cholesterol control , and smoking cessation. Discharge Orders None Introducing [facility] [place] [facility]! [facility] introduces [employer] patient portal. Now you can access parts of your medical record, email your doctor's office, and request medication refills online. 1. In your internet browser, go to [email or web address] 2. Click on the First Time User? Click Here link in the Sign In box. You will see the New Member Sign Up page. 3. Enter your [employer] Access Code exactly as it appears below. You will not need to use this code after youve completed the sign-up process. If you do not sign up before the expiration date, you must request a new code. · [employer] Access Code: [email or web address] Expires: 11/19/2017  1:11 PM 
 
4. Enter the last four digits of your Social Security Number (xxxx) and Date of Birth (mm/dd/yyyy) as indicated and click Submit. You will be taken to the next sign-up page. 5. Create a [employer] ID. This will be your [employer] login ID and cannot be changed, so think of one that is secure and easy to remember. 6. Create a [employer] password. You can change your password at any time. 7. Enter your Password Reset Question and Answer. This can be used at a later time if you forget your password. 8. Enter your e-mail address. You will receive e-mail notification when new information is available in [address] 9. Click Sign Up. You can now view and download portions of your medical record. 10. Click the Download Summary menu link to download a portable copy of your medical information. If you have questions, please visit the Frequently Asked Questions section of the [employer] website. Remember, [employer] is NOT to be used for urgent needs. For medical emergencies, dial 911. Now available from your iPhone and Android! General Information Please provide this summary of care documentation to your next provider.  
  
  
    
    
 Patient Signature: ____________________________________________________________ Date:  ____________________________________________________________  
  
Felix Gant Provider Signature:  ____________________________________________________________ Date:  ____________________________________________________________

## 2017-08-22 NOTE — INTERVAL H&P NOTE
H&P Update:  David Portillo was seen and examined. History and physical has been reviewed. The patient has been examined.  There have been no significant clinical changes since the completion of the originally dated History and Physical.    Signed By: Tala Maravilla MD     August 22, 2017 8:33 AM

## 2017-08-22 NOTE — ANESTHESIA PREPROCEDURE EVALUATION
Anesthetic History   No history of anesthetic complications            Review of Systems / Medical History  Patient summary reviewed, nursing notes reviewed and pertinent labs reviewed    Pulmonary    COPD            Comments: Current Every Day Smoker  Hx Acute respiratory failure with hypoxia     Neuro/Psych   Within defined limits           Cardiovascular    Hypertension      CHF        Exercise tolerance: >4 METS  Comments: S/P cardiac cath 2/12/14 no significant coronary disease, severe LV dysfunction  NICM (nonischemic cardiomyopathy)  EF 55-60%  Hx Pulmonary edema       GI/Hepatic/Renal               Comments: Diarrhea Endo/Other             Other Findings   Comments: Hx prostate ca         Physical Exam    Airway  Mallampati: III    Neck ROM: normal range of motion   Mouth opening: Normal     Cardiovascular  Regular rate and rhythm,  S1 and S2 normal,  no murmur, click, rub, or gallop             Dental    Dentition: Edentulous     Pulmonary  Breath sounds clear to auscultation               Abdominal  GI exam deferred       Other Findings            Anesthetic Plan    ASA: 3  Anesthesia type: total IV anesthesia and general          Induction: Intravenous  Anesthetic plan and risks discussed with: Patient

## 2017-08-22 NOTE — DISCHARGE INSTRUCTIONS
[address]  [phone] [address] [facility] INSTRUCTIONS    Patient ID:  Harriet Keith  [id number]  51 y.o.  1940    Admit Date: 8/22/2017    Discharge Date: 8/22/2017     Admitting Physician: Norah Colunga MD     Discharge Physician: Norah Colunga MD    Admission Diagnoses:   flutter    Discharge Diagnoses: Active Problems:    * No active hospital problems. *  atrial flutter    Discharge Condition: Good    Cardiology Procedures this Admission:  atrial flutter ablation    Disposition: home    Reference discharge instructions provided by nursing for diet and activity. Follow-up with Dr Juliette Mccormack in one week. Call [phone] to make an appointment. Signed:  Norah Colunga MD  8/22/2017  8:36 AM    S/P ABLATION DISCHARGE INSTRUCTIONS    It is normal to feel tired the first couple days. Take it easy and follow the physicians instructions. CHECK THE CATHETER INSERTION SITE DAILY:  You may shower 24 hours after the procedure, remove the bandage during showering. Wash with soap and water and pat dry. Gentle cleaning of the site with soap and water is sufficient, cover with a dry clean dressing or bandage. Do not apply creams or powders to the area. Do not sit in a bathtub or pool of water for 7 days or until wound has completely healed. Temporary bruising and discomfort is normal and may last a few weeks. You may have a  formation of a small lump at the site which may last up to 6 weeks. CALL THE PHYSICIAN:  If the site becomes red, swollen or feels warm to the touch  If there is bleeding or drainage or if there is unusual pain at the groin or down the leg. If there is any bleeding, lie down, apply pressure or have someone apply pressure with a clean cloth until the bleeding stops.   If the bleeding continues, call 911 to be transported to the hospital.  DO NOT DRIVE YOURSELF, Jomar 911.    Activity:      For the first 24-48 hours or as instructed by the physician:  No lifting, pushing or pulling over 5 pounds and no straining the insertion site. Do not life grocery bags or the garbage can, do not run the vacuum  or  for 7 days. Start with short walks as in the hospital and gradually increase as tolerated each day. It is recommended to walk 30 minutes 5-7 days per week. Follow your physicians instructions on activity. Avoid walking outside in extremes of heat or cold. Walk inside when it is cold and windy or hot and humid. Things to keep in mind:  No driving for at least 5 days, or as designated by your physician. Limit the number of times you go up and down the stairs  Take rests and pace yourself with activity. Be careful and do not strain with bowel movements. Medications: Take all medications as prescribed  Call your physician if you have any questions  Keep an updated list of your medications with you at all times and give a list to your physician and pharmacist    Signs and Symptoms:  Be cautious of symptoms of angina or recurrent symptoms such as chest discomfort, unusual shortness of breath or fatigue, palpitations. After Care: Follow up with your physician as instructed. Follow a heart healthy diet with proper portion control, daily stress management, daily exercise, blood pressure and cholesterol control , and smoking cessation.

## 2017-08-22 NOTE — PROCEDURES
[address] [address]  [phone]    Indications and Pre-Procedure Diagnosis:  Fam Schwarz is a 78 y.o. male with atrial flutter is referred for electr-physiologic evaluation and intervention. Post Procedure Diagnosis    Atrial flutter    Electrophysiology Study Procedure  Informed consent was obtained. All vascular access sites were prepped and draped in the usual sterile fashion and the Seldinger technique was used to catherize the RFV with multi-polar electrode catheters, which were placed in the appropriate intra-cardiac sites under fluoroscopic guidance (see catheter list). Right and left atrial pacing and recording, His bundle recording, and right ventricular pacing and recording were performed. Continuous pulse oximetry and cuff BP monitoring were performed. During the procedure, the patient received Versed, Fentanyl, Ketamine and Propofol for sedation per anesthesia personnel. Ablation Procedure  Mapping was performed using standard catheter-based techniques and 3-D electro-anatomic mapping. The initial rhythm was counter clockwise atrial flutter at a cycle length of 249 msec. Pacing from the posterior septum showed entrainment with concealed fusion and post pacing intervals within 30 msec of the flutter cycle length. A large curve 10 mm tip Blazer catheter was used to deliver 4 RF lesions for a total of 6 minutes in the cavo-tricuspid isthmus with termination of the arrhythmia and creation of bi-directional isthmus block. Follow ablation, rapid atrial pacing, on and off dobutamine @ 2 mcg/min, failed to induce any atrial arrhythmias. There was no evidence of atrial fibrillation during the procedure. The patient left the laboratory in a stable condition. At the end of the procedure all catheters were removed and vascular hemostasis achieved. Fluoroscopic and total procedure times were 5 and 30 minutes respectively. Estimated blood loss: <10 ml.  Sharp counts: correct. Specimen (s) collected: none. The procedure related complication occurred: none. The following problems were encountered: none. Conduction intervals (ms)    A-A A-H H-V P-R QRS Q-T R-R V-V  689 139 61 200 106 [phone]    AV gus conduction    VA Block when pacing at 570 ms    Findings and Summary    This study demonstrates:  1. Counter clockwise isthmus dependent atrial flutter  2. Successful RFA of the CTI with termination to sinus and confirmed bi-directional isthmus block  3. No further inducible atrial arrhythmias on dobutamine with rapid atrial pacing    Recommendation:  1. OAC for 4 weeks    Thank you for allowing me to participate in this patients care.     Lily Huizar MD, Lou Becerril

## 2017-08-22 NOTE — PROGRESS NOTES
Pt ambulated to BR; gait steady. Groin dressing dry and intact. Pt denies c/o. DC instructions reviewed with pt and family; all verbalize understanding. SL dc'd without difficulty. Pt dc'd to home with family via car.

## 2017-08-22 NOTE — PROGRESS NOTES
TRANSFER - IN REPORT:    Verbal report received from Kinga Hemphill RN(name) on [employer]  being received from EP(unit) for routine progression of care      Report consisted of patients Situation, Background, Assessment and   Recommendations(SBAR). Information from the following report(s) Procedure Summary and MAR was reviewed with the receiving nurse. Opportunity for questions and clarification was provided. Assessment completed upon patients arrival to unit and care assumed.

## 2017-08-22 NOTE — PROGRESS NOTES
Pt completed neb tx; lungs with less wheezing. Femostop applied after procedure at 100 mm hg, now decreased to 40 mm hg.  Groin site soft and drLyndals Flank, pt with much less coughing.

## 2017-08-22 NOTE — H&P (VIEW-ONLY)
Subjective:      Arlen Sommer is a 78 y.o. male is here for EP consult. He has been dealing with a chest cold, on medication for this. He has had a cough and some wheezing d/t this cold. The patient denies chest pain, orthopnea, PND, LE edema, palpitations, syncope, presyncope or fatigue.        Patient Active Problem List    Diagnosis Date Noted    Tobacco use 07/25/2017    Acute respiratory failure ([address] 05/14/2017    Mixed hyperlipidemia 03/31/2016    Acute renal failure (ARF) ([address] 02/17/2016    Pulmonary edema 11/28/2015    Acute on chronic combined systolic and diastolic heart failure ([address] 11/28/2015    Acute respiratory failure with hypoxia ([address] 11/28/2015    Noncompliance with medication regimen 11/28/2015    Malignant essential hypertension 11/28/2015    Hypertension 05/20/2014    NICM (nonischemic cardiomyopathy) ([facility] 02/20/2014    S/P cardiac cath 02/12/2014    Elevated troponin 02/11/2014    Acute on chronic systolic HF (heart failure) ([address] 02/11/2014    Uncontrolled hypertension 02/11/2014    PNA (pneumonia) 02/08/2014    CHF (congestive heart failure) ([address] 02/08/2014      Otto Junior MD  Past Medical History:   Diagnosis Date    Acute on chronic systolic HF (heart failure) ([address] 2/11/2014    11/15 Echo EF 40-45%    Cancer (HCC)     prostate    COPD (chronic obstructive pulmonary disease) ([facility]     Elevated troponin 2/11/2014    Hypertension     S/P cardiac cath 2/12/2014 2/12/14 no significant coronary disease, severe LV dysfunction      Past Surgical History:   Procedure Laterality Date    COLONOSCOPY,DIAGNOSTIC  2/22/2016         COLONOSCOPY,REMV LESN,SNARE  2/22/2016          No Known Allergies   Family History   Problem Relation Age of Onset    Adopted: Yes    negative for cardiac disease  Social History     Social History    Marital status:      Spouse name: N/A    Number of children: N/A    Years of education: N/A     Social History Main Topics    Smoking status: Former Smoker     Packs/day: 2.00     Years: 20.00     Types: Cigarettes     Quit date: 8/7/2017    Smokeless tobacco: Former User     Types: Chew    Alcohol use No    Drug use: No    Sexual activity: Not Asked     Other Topics Concern    None     Social History Narrative     Current Outpatient Prescriptions   Medication Sig    clotrimazole-betamethasone (LOTRISONE) topical cream     apixaban (ELIQUIS) 5 mg tablet Take 1 Tab by mouth two (2) times a day.  enalapril (VASOTEC) 10 mg tablet Take 1 Tab by mouth daily.  furosemide (LASIX) 40 mg tablet TAKE ONE TABLET BY MOUTH DAILY. *DOSE  REDUCED  12/17/15*    albuterol (PROVENTIL HFA, VENTOLIN HFA, PROAIR HFA) 90 mcg/actuation inhaler Take 2 Puffs by inhalation every four (4) hours as needed for Wheezing.  VIAGRA 50 mg tablet     aspirin 81 mg chewable tablet Take 1 Tab by mouth daily.  atorvastatin (LIPITOR) 40 mg tablet Take 40 mg by mouth daily.  carvedilol (COREG) 25 mg tablet Take 25 mg by mouth two (2) times daily (with meals).  enalapril (VASOTEC) 20 mg tablet      No current facility-administered medications for this visit. Vitals:    08/17/17 1013   BP: 130/74   Pulse: 91   Resp: 18   SpO2: 97%   Weight: 216 lb 4.8 oz (98.1 kg)   Height: 5' 6\" (1.676 m)       I have reviewed the nurses notes, vitals, problem list, allergy list, medical history, family, social history and medications. Review of Symptoms:    General: Pt denies excessive weight gain or loss. Pt is able to conduct ADL's  HEENT: Denies blurred vision, headaches, epistaxis and difficulty swallowing.+cough  Respiratory: Denies shortness of breath, PA, +wheezing, denies stridor.   Cardiovascular: Denies precordial pain, palpitations, edema or PND  Gastrointestinal: Denies poor appetite, indigestion, abdominal pain or blood in stool  Urinary: Denies dysuria, pyuria  Musculoskeletal: Denies pain or swelling from muscles or joints  Neurologic: Denies tremor, paresthesias, or sensory motor disturbance  Skin: Denies rash, itching or texture change. Psych: Denies depression      Physical Exam:      General: Well developed, in no acute distress. HEENT: Eyes - PERRL, no jvd  Heart:  Normal S1/S2 negative S3 or S4. irregular, no murmur, gallop or rub.   Respiratory: Clear bilaterally x 4, no wheezing or rales  Abdomen:   Soft, non-tender, bowel sounds are active.   Extremities:  No edema, normal cap refill, no cyanosis. Musculoskeletal: No clubbing  Neuro: A&Ox3, speech clear, gait stable. Skin: Skin color is normal. No rashes or lesions.  Non diaphoretic  Vascular: 2+ pulses symmetric in all extremities    Cardiographics    Ekg:aflutter with RVR     Results for orders placed or performed during the hospital encounter of 05/14/17   EKG, 12 LEAD, INITIAL   Result Value Ref Range    Ventricular Rate 119 BPM    Atrial Rate 119 BPM    P-R Interval 126 ms    QRS Duration 112 ms    Q-T Interval 332 ms    QTC Calculation (Bezet) 467 ms    Calculated P Axis 73 degrees    Calculated R Axis 103 degrees    Calculated T Axis -100 degrees    Diagnosis       Sinus tachycardia  Rightward axis  Nonspecific intraventricular conduction delay  Probably Left ventricular hypertrophy    ST & T wave abnormality, consider inferolateral ischemia  Abnormal ECG  Confirmed by Mao Davidson ([id number]) on 5/16/2017 7:07:33 PM           Lab Results   Component Value Date/Time    WBC 7.9 05/22/2017 04:19 AM    HGB 9.4 05/22/2017 04:19 AM    HCT 28.6 05/22/2017 04:19 AM    PLATELET 450 57/42/0646 04:19 AM    MCV 79.2 05/22/2017 04:19 AM      Lab Results   Component Value Date/Time    Sodium 145 08/07/2017 08:13 AM    Potassium 4.5 08/07/2017 08:13 AM    Chloride 106 08/07/2017 08:13 AM    CO2 23 08/07/2017 08:13 AM    Anion gap 8 05/23/2017 04:28 AM    Glucose 124 08/07/2017 08:13 AM    BUN 19 08/07/2017 08:13 AM    Creatinine 1.22 08/07/2017 08:13 AM BUN/Creatinine ratio 16 08/07/2017 08:13 AM    GFR est AA 67 08/07/2017 08:13 AM    GFR est non-AA 58 08/07/2017 08:13 AM    Calcium 9.5 08/07/2017 08:13 AM    Bilirubin, total 1.3 05/14/2017 08:40 PM    AST (SGOT) 30 05/14/2017 08:40 PM    Alk. phosphatase 114 05/14/2017 08:40 PM    Protein, total 6.2 05/17/2017 08:45 PM    Albumin 2.1 05/23/2017 04:28 AM    Globulin 4.6 05/14/2017 08:40 PM    A-G Ratio 0.7 05/14/2017 08:40 PM    ALT (SGPT) 28 05/14/2017 08:40 PM         Assessment:     Assessment:        ICD-10-CM ICD-9-CM    1. NICM (nonischemic cardiomyopathy) (HCC) I42.8 425.4    2. Atrial flutter, unspecified type ([facility] I48.92 427.32 CANCELED: XR CHEST PA LAT   3. Mixed hyperlipidemia E78.2 272.2 AMB POC EKG ROUTINE W/ 12 LEADS, INTER & REP   4. Essential hypertension I10 401.9      Orders Placed This Encounter    AMB POC EKG ROUTINE W/ 12 LEADS, INTER & REP     Order Specific Question:   Reason for Exam:     Answer:   Routine    enalapril (VASOTEC) 20 mg tablet    clotrimazole-betamethasone (LOTRISONE) topical cream        Plan:   Mr Dena Grimm is here today consulting regarding new onset of atrial flutter. He has been tx recently for an URI and has some sob/cough. Denies other cardiac complaints. EKG remains aflutter with RVR. BP is normotensive. He is a candidate for atrial flutter I discussed the risks/benefits/alternatives of the procedure with the patient. Risks include (but are not limited to) bleeding, infection, cva/mi/tamponade/death. The patient understands and agrees to proceed. Thank you for this interesting consultation. We will schedule. Continue medical management for CHF, HTN and hyperlipidemia. Thank you for allowing me to participate in Juan Harvey 's care.     91 Derian Colunga MD, Marquise Lawrence

## 2017-08-23 NOTE — ANESTHESIA POSTPROCEDURE EVALUATION
Post-Anesthesia Evaluation and Assessment    Patient: Libby Denton MRN: [id number]  SSN: [id number]    YOB: 1940  Age: 78 y.o. Sex: male       Cardiovascular Function/Vital Signs  Visit Vitals    /69 (BP 1 Location: Right arm, BP Patient Position: At rest)    Pulse 85    Resp 24    Ht 5' 8\" (1.727 m)    Wt 97.1 kg (214 lb)    SpO2 96%    BMI 32.54 kg/m2       Patient is status post total IV anesthesia, general anesthesia for * No procedures listed *. Nausea/Vomiting: None    Postoperative hydration reviewed and adequate. Pain:  Pain Scale 1: Numeric (0 - 10) (08/22/17 1315)  Pain Intensity 1: 0 (08/22/17 1315)   Managed    Neurological Status: At baseline    Mental Status and Level of Consciousness: Arousable    Pulmonary Status:   O2 Device: Room air (08/22/17 1315)   Adequate oxygenation and airway patent    Complications related to anesthesia: None    Post-anesthesia assessment completed.  No concerns    Signed By: Selena Figueroa MD     August 23, 2017

## 2017-08-24 RX ORDER — FUROSEMIDE 40 MG/1
TABLET ORAL
Qty: 30 TAB | Refills: 6 | Status: SHIPPED | OUTPATIENT
Start: 2017-08-24 | End: 2018-10-08 | Stop reason: SDUPTHER

## 2017-08-28 ENCOUNTER — PATIENT OUTREACH (OUTPATIENT)
Dept: CARDIOLOGY CLINIC | Age: 77
End: 2017-08-28

## 2017-08-28 NOTE — PROGRESS NOTES
This note will not be viewable in [address] Called pt to discuss his earlier questions. Per NP Meghan Johnston, pt is to take 1/2 lasix per note on 7/27/17 and pt is not to walk except for going to mailbox or around the house. Pt states understanding and states appreciation for the call. Per NP Meghan Johnston:    LALA Mahoney RN                     Yes, I see that Keila Velazquez instructed him to do this in her lab result note on 7/25 and we will reevaluate at his follow up. He should be up and moving around as normal. Light to moderate walking. He can escalate an exercise program of walking or more after he sees us for follow up. Nimesh Hernández looks like his appointment is in a few weeks.

## 2017-08-28 NOTE — Clinical Note
Pt had an ablation. He states that he was told to take 1/2 lasix as kidney function had decreased? ?  Pt is also asking about when he can begin walking.   Please and thank you!!

## 2017-08-28 NOTE — PROGRESS NOTES
This note will not be viewable in [address] Called pt to follow up on hospital visit to [address] [facility], discharged on 17. Pt verified  and address. Pt reports that ablation site is well healed with no pain nor swelling. Performed medication reconciliation with pt and pt states that fluid pill dose changed and that he was told to stop \"blood thinner\" after 30 days. Pt states that has no SOB and is asking when he can begin walking. Chart sent to NP for review. Confirmed follow up appts with pt and pt states that he is seeing a bone doctor as well for his knee. Pt asked that I call him back regarding medications.

## 2017-09-12 ENCOUNTER — OFFICE VISIT (OUTPATIENT)
Dept: CARDIOLOGY CLINIC | Age: 77
End: 2017-09-12

## 2017-09-12 VITALS
WEIGHT: 219.4 LBS | HEIGHT: 68 IN | BODY MASS INDEX: 33.25 KG/M2 | SYSTOLIC BLOOD PRESSURE: 146 MMHG | DIASTOLIC BLOOD PRESSURE: 70 MMHG | RESPIRATION RATE: 18 BRPM | OXYGEN SATURATION: 94 % | HEART RATE: 72 BPM

## 2017-09-12 DIAGNOSIS — I10 ESSENTIAL HYPERTENSION: ICD-10-CM

## 2017-09-12 DIAGNOSIS — I50.22 CHRONIC SYSTOLIC CONGESTIVE HEART FAILURE (HCC): ICD-10-CM

## 2017-09-12 DIAGNOSIS — I48.92 ATRIAL FLUTTER, UNSPECIFIED TYPE (HCC): ICD-10-CM

## 2017-09-12 DIAGNOSIS — I49.9 IRREGULAR HEART BEAT: Primary | ICD-10-CM

## 2017-09-12 DIAGNOSIS — E78.2 MIXED HYPERLIPIDEMIA: ICD-10-CM

## 2017-09-12 NOTE — PROGRESS NOTES
Chief Complaint   Patient presents with    Irregular Heart Beat     3 wk appt. Denied cardiac symptoms.

## 2017-09-12 NOTE — PROGRESS NOTES
Subjective:      Kirsten Maldonado is a 78 y.o. male is here for follow up s/p AFL ablation. He is doing well. The patient denies chest pain/ shortness of breath, orthopnea, PND, LE edema, palpitations, syncope, presyncope or fatigue.        Patient Active Problem List    Diagnosis Date Noted    Irregular heart beat 09/12/2017    Atrial flutter ([address] 08/22/2017    Tobacco use 07/25/2017    Acute respiratory failure ([address] 05/14/2017    Mixed hyperlipidemia 03/31/2016    Acute renal failure (ARF) ([address] 02/17/2016    Pulmonary edema 11/28/2015    Acute on chronic combined systolic and diastolic heart failure ([address] 11/28/2015    Acute respiratory failure with hypoxia ([address] 11/28/2015    Noncompliance with medication regimen 11/28/2015    Malignant essential hypertension 11/28/2015    Hypertension 05/20/2014    NICM (nonischemic cardiomyopathy) ([address] 02/20/2014    S/P cardiac cath 02/12/2014    Elevated troponin 02/11/2014    Acute on chronic systolic HF (heart failure) ([address] 02/11/2014    Uncontrolled hypertension 02/11/2014    PNA (pneumonia) 02/08/2014    CHF (congestive heart failure) ([address] 02/08/2014      Benita Cueva MD  Past Medical History:   Diagnosis Date    Acute on chronic systolic HF (heart failure) ([address] 2/11/2014    11/15 Echo EF 40-45%    Cancer ([address]     prostate    COPD (chronic obstructive pulmonary disease) ([address]     Elevated troponin 2/11/2014    Hypertension     S/P cardiac cath 2/12/2014 2/12/14 no significant coronary disease, severe LV dysfunction      Past Surgical History:   Procedure Laterality Date    COLONOSCOPY,DIAGNOSTIC  2/22/2016         COLONOSCOPY,REMV LESN,SNARE  2/22/2016          No Known Allergies   Family History   Problem Relation Age of Onset    Adopted: Yes    negative for cardiac disease  Social History     Social History    Marital status:      Spouse name: N/A    Number of children: N/A    Years of education: N/A     Social History Main Topics    Smoking status: Former Smoker     Packs/day: 2.00     Years: 20.00     Types: Cigarettes     Quit date: 8/7/2017    Smokeless tobacco: Current User     Types: Chew    Alcohol use No    Drug use: No    Sexual activity: Not Asked     Other Topics Concern    None     Social History Narrative     Current Outpatient Prescriptions   Medication Sig    furosemide (LASIX) 40 mg tablet TAKE ONE TABLET BY MOUTH DAILY- DOSE REDUCED 12/17/15    clotrimazole-betamethasone (LOTRISONE) topical cream     enalapril (VASOTEC) 10 mg tablet Take 1 Tab by mouth daily.  albuterol (PROVENTIL HFA, VENTOLIN HFA, PROAIR HFA) 90 mcg/actuation inhaler Take 2 Puffs by inhalation every four (4) hours as needed for Wheezing.  VIAGRA 50 mg tablet     aspirin 81 mg chewable tablet Take 1 Tab by mouth daily.  atorvastatin (LIPITOR) 40 mg tablet Take 40 mg by mouth daily.  carvedilol (COREG) 25 mg tablet Take 25 mg by mouth two (2) times daily (with meals). No current facility-administered medications for this visit. Vitals:    09/12/17 1246   BP: 146/70   Pulse: 72   Resp: 18   SpO2: 94%   Weight: 219 lb 6.4 oz (99.5 kg)   Height: 5' 8\" (1.727 m)       I have reviewed the nurses notes, vitals, problem list, allergy list, medical history, family, social history and medications. Review of Symptoms:    General: Pt denies excessive weight gain or loss. Pt is able to conduct ADL's  HEENT: Denies blurred vision, headaches, epistaxis and difficulty swallowing. Respiratory: Denies shortness of breath, PA, wheezing or stridor. Cardiovascular: Denies precordial pain, palpitations, edema or PND  Gastrointestinal: Denies poor appetite, indigestion, abdominal pain or blood in stool  Urinary: Denies dysuria, pyuria  Musculoskeletal: Denies pain or swelling from muscles or joints  Neurologic: Denies tremor, paresthesias, or sensory motor disturbance  Skin: Denies rash, itching or texture change.   Psych: Denies depression      Physical Exam:      General: Well developed, in no acute distress. HEENT: Eyes - PERRL, no jvd  Heart:  Normal S1/S2 negative S3 or S4. Regular, no murmur, gallop or rub.   Respiratory: Clear bilaterally x 4, no wheezing or rales  Abdomen:   Soft, non-tender, bowel sounds are active.   Extremities:  No edema, normal cap refill, no cyanosis. Musculoskeletal: No clubbing  Neuro: A&Ox3, speech clear, gait stable. Skin: Skin color is normal. No rashes or lesions.  Non diaphoretic  Vascular: 2+ pulses symmetric in all extremities    Cardiographics    Ekg: sinus rhythm with ventricular trigeminy    Results for orders placed or performed during the hospital encounter of 05/14/17   EKG, 12 LEAD, INITIAL   Result Value Ref Range    Ventricular Rate 119 BPM    Atrial Rate 119 BPM    P-R Interval 126 ms    QRS Duration 112 ms    Q-T Interval 332 ms    QTC Calculation (Bezet) 467 ms    Calculated P Axis 73 degrees    Calculated R Axis 103 degrees    Calculated T Axis -100 degrees    Diagnosis       Sinus tachycardia  Rightward axis  Nonspecific intraventricular conduction delay  Probably Left ventricular hypertrophy    ST & T wave abnormality, consider inferolateral ischemia  Abnormal ECG  Confirmed by Beata Melendez ([id number]) on 5/16/2017 7:07:33 PM           Lab Results   Component Value Date/Time    WBC 4.6 08/22/2017 07:54 AM    HGB 10.9 08/22/2017 07:54 AM    HCT 34.8 08/22/2017 07:54 AM    PLATELET 559 83/87/1602 07:54 AM    MCV 83.5 08/22/2017 07:54 AM      Lab Results   Component Value Date/Time    Sodium 145 08/07/2017 08:13 AM    Potassium 4.5 08/07/2017 08:13 AM    Chloride 106 08/07/2017 08:13 AM    CO2 23 08/07/2017 08:13 AM    Anion gap 8 05/23/2017 04:28 AM    Glucose 124 08/07/2017 08:13 AM    BUN 19 08/07/2017 08:13 AM    Creatinine 1.22 08/07/2017 08:13 AM    BUN/Creatinine ratio 16 08/07/2017 08:13 AM    GFR est AA 67 08/07/2017 08:13 AM    GFR est non-AA 58 08/07/2017 08:13 AM Calcium 9.5 08/07/2017 08:13 AM    Bilirubin, total 1.3 05/14/2017 08:40 PM    AST (SGOT) 30 05/14/2017 08:40 PM    Alk. phosphatase 114 05/14/2017 08:40 PM    Protein, total 6.2 05/17/2017 08:45 PM    Albumin 2.1 05/23/2017 04:28 AM    Globulin 4.6 05/14/2017 08:40 PM    A-G Ratio 0.7 05/14/2017 08:40 PM    ALT (SGPT) 28 05/14/2017 08:40 PM         Assessment:     Assessment:        ICD-10-CM ICD-9-CM    1. Irregular heart beat I49.9 427.9 AMB POC EKG ROUTINE W/ 12 LEADS, INTER & REP   2. Essential hypertension I10 401.9    3. Atrial flutter, unspecified type ([address] I48.92 427.32    4. Mixed hyperlipidemia E78.2 272.2    5. Chronic systolic congestive heart failure (HCC) I50.22 428.22      428.0      Orders Placed This Encounter    AMB POC EKG ROUTINE W/ 12 LEADS, INTER & REP     Order Specific Question:   Reason for Exam:     Answer:   routine        Plan:   Mr. Venita Carpenter is here for follow up s/p AFL ablation. He denies cardiac complaints. EKG demonstrates sinus rhythm with ventricular trigeminy. He stopped taking his Eliquis one week post procedure. Will continue with current medical therapy and follow up with Dr. Frances Corcoran in one year. Continue medical management for HTN, hyperlipidemia, CHF. Thank you for allowing me to participate in Jannet Carlson 's care.     LALA Davis MD, Demetrice Alva

## 2017-09-12 NOTE — MR AVS SNAPSHOT
Visit Information Date & Time Provider Department Dept. Phone Encounter #  
 9/12/2017  1:00 PM James Ceron, [address] [facility] [phone] [id number] Your Appointments 10/5/2017  8:30 AM  
New Patient with MD Jason Sosa [facility]) Appt Note: NP/Est PCP/$0CP KMP 07/05/17  
 [address]  
  
    
 11/7/2017 10:45 AM  
3 MONTH with Zulema Newell MD  
[facility]) Appt Note: 3 mnth f/u,jaa  
 [address] [facility]  
[phone] [address] [facility] Upcoming Health Maintenance Date Due DTaP/Tdap/Td series (1 - Tdap) 4/9/1963 ZOSTER VACCINE AGE 60> 2/9/2002 GLAUCOMA SCREENING Q2Y 4/9/2007 MEDICARE YEARLY EXAM 4/9/2007 Pneumococcal 65+ High/Highest Risk (2 of 2 - PCV13) 11/30/2016 INFLUENZA AGE 9 TO ADULT 8/1/2017 Allergies as of 9/12/2017  Review Complete On: 9/12/2017 By: Nelson Restrepo NP No Known Allergies Current Immunizations  Reviewed on 11/28/2015 Name Date Influenza Vaccine (Quad) PF 11/30/2015 10:45 AM  
 Pneumococcal Polysaccharide (PPSV-23) 11/30/2015 10:01 AM  
  
 Not reviewed this visit You Were Diagnosed With   
  
 Codes Comments Irregular heart beat    -  Primary ICD-10-CM: I49.9 ICD-9-CM: 427.9 Essential hypertension     ICD-10-CM: I10 
ICD-9-CM: 401.9 Atrial flutter, unspecified type ([facility]     ICD-10-CM: I48.92 
ICD-9-CM: 427.32 Mixed hyperlipidemia     ICD-10-CM: E78.2 ICD-9-CM: 272.2 Vitals BP Pulse Resp Height(growth percentile) Weight(growth percentile) SpO2  
 146/70 (BP 1 Location: Left arm, BP Patient Position: Sitting) 72 18 5' 8\" (1.727 m) 219 lb 6.4 oz (99.5 kg) 94% BMI Smoking Status 33.36 kg/m2 Former Smoker Vitals History BMI and BSA Data Body Mass Index Body Surface Area  
 33.36 kg/m 2 2.18 m 2 Preferred Pharmacy Pharmacy Name Phone [facility] [address] [place], [place] - [address] [place] [address]  [phone] Your Updated Medication List  
  
   
This list is accurate as of: 9/12/17  1:39 PM.  Always use your most recent med list.  
  
  
  
  
 albuterol 90 mcg/actuation inhaler Commonly known as:  PROVENTIL HFA, VENTOLIN HFA, PROAIR HFA Take 2 Puffs by inhalation every four (4) hours as needed for Wheezing. aspirin 81 mg chewable tablet Take 1 Tab by mouth daily. atorvastatin 40 mg tablet Commonly known as:  LIPITOR Take 40 mg by mouth daily. carvedilol 25 mg tablet Commonly known as:  Francia Awe Take 25 mg by mouth two (2) times daily (with meals). clotrimazole-betamethasone topical cream  
Commonly known as:  LOTRISONE  
  
 enalapril 10 mg tablet Commonly known as:  Michial Curia Take 1 Tab by mouth daily. furosemide 40 mg tablet Commonly known as:  LASIX TAKE ONE TABLET BY MOUTH DAILY- DOSE REDUCED 12/17/15 VIAGRA 50 mg tablet Generic drug:  sildenafil citrate We Performed the Following AMB POC EKG ROUTINE W/ 12 LEADS, INTER & REP [48472 CPT(R)] Introducing [facility] SERVICES! Ronna Christy introduces [employer] patient portal. Now you can access parts of your medical record, email your doctor's office, and request medication refills online. 1. In your internet browser, go to [email or web address] Contact. [email or web address]/Click Contact 2. Click on the First Time User? Click Here link in the Sign In box. You will see the New Member Sign Up page. 3. Enter your [employer] Access Code exactly as it appears below. You will not need to use this code after youve completed the sign-up process. If you do not sign up before the expiration date, you must request a new code. · [employer] Access Code: [email or web address] Expires: 11/19/2017  1:11 PM 
 
4. Enter the last four digits of your Social Security Number (xxxx) and Date of Birth (mm/dd/yyyy) as indicated and click Submit. You will be taken to the next sign-up page. 5. Create a [employer] ID. This will be your [employer] login ID and cannot be changed, so think of one that is secure and easy to remember. 6. Create a [employer] password. You can change your password at any time. 7. Enter your Password Reset Question and Answer. This can be used at a later time if you forget your password. 8. Enter your e-mail address. You will receive e-mail notification when new information is available in [address] 9. Click Sign Up. You can now view and download portions of your medical record. 10. Click the Download Summary menu link to download a portable copy of your medical information. If you have questions, please visit the Frequently Asked Questions section of the [employer] website. Remember, [employer] is NOT to be used for urgent needs. For medical emergencies, dial 911. Now available from your iPhone and Android! Please provide this summary of care documentation to your next provider. Your primary care clinician is listed as Anai Will. If you have any questions after today's visit, please call [phone].

## 2017-09-19 RX ORDER — ATORVASTATIN CALCIUM 40 MG/1
TABLET, FILM COATED ORAL
Qty: 30 TAB | Refills: 6 | Status: SHIPPED | OUTPATIENT
Start: 2017-09-19 | End: 2018-04-09 | Stop reason: SDUPTHER

## 2017-09-29 ENCOUNTER — APPOINTMENT (OUTPATIENT)
Dept: CT IMAGING | Age: 77
End: 2017-09-29
Attending: EMERGENCY MEDICINE
Payer: MEDICARE

## 2017-09-29 ENCOUNTER — HOSPITAL ENCOUNTER (EMERGENCY)
Age: 77
Discharge: HOME | End: 2017-09-29
Attending: EMERGENCY MEDICINE
Payer: MEDICARE

## 2017-09-29 VITALS
HEART RATE: 71 BPM | TEMPERATURE: 97.9 F | HEIGHT: 69 IN | DIASTOLIC BLOOD PRESSURE: 64 MMHG | RESPIRATION RATE: 16 BRPM | OXYGEN SATURATION: 99 % | WEIGHT: 216.49 LBS | BODY MASS INDEX: 32.07 KG/M2 | SYSTOLIC BLOOD PRESSURE: 147 MMHG

## 2017-09-29 DIAGNOSIS — G89.29 CHRONIC LOW BACK PAIN, UNSPECIFIED BACK PAIN LATERALITY, WITH SCIATICA PRESENCE UNSPECIFIED: ICD-10-CM

## 2017-09-29 DIAGNOSIS — M54.5 CHRONIC LOW BACK PAIN, UNSPECIFIED BACK PAIN LATERALITY, WITH SCIATICA PRESENCE UNSPECIFIED: ICD-10-CM

## 2017-09-29 DIAGNOSIS — R51.9 NONINTRACTABLE HEADACHE, UNSPECIFIED CHRONICITY PATTERN, UNSPECIFIED HEADACHE TYPE: Primary | ICD-10-CM

## 2017-09-29 PROCEDURE — 70450 CT HEAD/BRAIN W/O DYE: CPT

## 2017-09-29 PROCEDURE — 99284 EMERGENCY DEPT VISIT MOD MDM: CPT

## 2017-09-29 PROCEDURE — 74011250636 HC RX REV CODE- 250/636: Performed by: EMERGENCY MEDICINE

## 2017-09-29 PROCEDURE — 96372 THER/PROPH/DIAG INJ SC/IM: CPT

## 2017-09-29 RX ORDER — NAPROXEN 500 MG/1
500 TABLET ORAL
Qty: 20 TAB | Refills: 0 | Status: SHIPPED | OUTPATIENT
Start: 2017-09-29 | End: 2017-10-02

## 2017-09-29 RX ORDER — KETOROLAC TROMETHAMINE 30 MG/ML
15 INJECTION, SOLUTION INTRAMUSCULAR; INTRAVENOUS
Status: COMPLETED | OUTPATIENT
Start: 2017-09-29 | End: 2017-09-29

## 2017-09-29 RX ADMIN — KETOROLAC TROMETHAMINE 15 MG: 30 INJECTION, SOLUTION INTRAMUSCULAR at 08:13

## 2017-09-29 NOTE — ED NOTES
Dr. Irma Gramajo at bedside. Pt in no apparent distress at this time. Pt provided with discharge instructions by Dr. Irma Gramajo and denies any further questions at this time. Pt ambulatory to waiting room.

## 2017-09-29 NOTE — DISCHARGE INSTRUCTIONS
Back Pain: Care Instructions  Your Care Instructions    Back pain has many possible causes. It is often related to problems with muscles and ligaments of the back. It may also be related to problems with the nerves, discs, or bones of the back. Moving, lifting, standing, sitting, or sleeping in an awkward way can strain the back. Sometimes you don't notice the injury until later. Arthritis is another common cause of back pain. Although it may hurt a lot, back pain usually improves on its own within several weeks. Most people recover in 12 weeks or less. Using good home treatment and being careful not to stress your back can help you feel better sooner. Follow-up care is a key part of your treatment and safety. Be sure to make and go to all appointments, and call your doctor if you are having problems. Its also a good idea to know your test results and keep a list of the medicines you take. How can you care for yourself at home? · Sit or lie in positions that are most comfortable and reduce your pain. Try one of these positions when you lie down:  ¨ Lie on your back with your knees bent and supported by large pillows. ¨ Lie on the floor with your legs on the seat of a sofa or chair. Wes Loach on your side with your knees and hips bent and a pillow between your legs. ¨ Lie on your stomach if it does not make pain worse. · Do not sit up in bed, and avoid soft couches and twisted positions. Bed rest can help relieve pain at first, but it delays healing. Avoid bed rest after the first day of back pain. · Change positions every 30 minutes. If you must sit for long periods of time, take breaks from sitting. Get up and walk around, or lie in a comfortable position. · Try using a heating pad on a low or medium setting for 15 to 20 minutes every 2 or 3 hours. Try a warm shower in place of one session with the heating pad. · You can also try an ice pack for 10 to 15 minutes every 2 to 3 hours.  Put a thin cloth between the ice pack and your skin. · Take pain medicines exactly as directed. ¨ If the doctor gave you a prescription medicine for pain, take it as prescribed. ¨ If you are not taking a prescription pain medicine, ask your doctor if you can take an over-the-counter medicine. · Take short walks several times a day. You can start with 5 to 10 minutes, 3 or 4 times a day, and work up to longer walks. Walk on level surfaces and avoid hills and stairs until your back is better. · Return to work and other activities as soon as you can. Continued rest without activity is usually not good for your back. · To prevent future back pain, do exercises to stretch and strengthen your back and stomach. Learn how to use good posture, safe lifting techniques, and proper body mechanics. When should you call for help? Call your doctor now or seek immediate medical care if:  · You have new or worsening numbness in your legs. · You have new or worsening weakness in your legs. (This could make it hard to stand up.)  · You lose control of your bladder or bowels. Watch closely for changes in your health, and be sure to contact your doctor if:  · Your pain gets worse. · You are not getting better after 2 weeks. Where can you learn more? Go to http://bg-gurpreet.info/. Enter H879 in the search box to learn more about \"Back Pain: Care Instructions. \"  Current as of: March 21, 2017  Content Version: 11.3  © 1747-8124 [employer]. Care instructions adapted under license by [employer] (which disclaims liability or warranty for this information). If you have questions about a medical condition or this instruction, always ask your healthcare professional. [place] [address] any warranty or liability for your use of this information. Head or Face Pain: Care Instructions  Your Care Instructions  Common causes of head or face pain are allergies, stress, and injuries. Other causes include tooth problems and sinus infections. Eating certain foods, such as chocolate or cheese, or drinking certain liquids, such as coffee or cola, can cause head pain for some people. If you have mild head pain, you may not need treatment. It is important to watch your symptoms and talk to your doctor if your pain continues or gets worse. Follow-up care is a key part of your treatment and safety. Be sure to make and go to all appointments, and call your doctor if you are having problems. It's also a good idea to know your test results and keep a list of the medicines you take. How can you care for yourself at home? · Take pain medicines exactly as directed. ¨ If the doctor gave you a prescription medicine for pain, take it as prescribed. ¨ If you are not taking a prescription pain medicine, ask your doctor if you can take an over-the-counter pain medicine. · Take it easy for the next few days or longer if you are not feeling well. · Use a warm, moist towel or heating pad set on low to relax tight muscles in your shoulder and neck. Have someone gently massage your neck and shoulders. · Put ice or a cold pack on the area for 10 to 20 minutes at a time. Put a thin cloth between the ice and your skin. When should you call for help? Call 911 anytime you think you may need emergency care. For example, call if:  · You have twitching, jerking, or a seizure. · You passed out (lost consciousness). · You have symptoms of a stroke. These may include:  ¨ Sudden numbness, tingling, weakness, or loss of movement in your face, arm, or leg, especially on only one side of your body. ¨ Sudden vision changes. ¨ Sudden trouble speaking. ¨ Sudden confusion or trouble understanding simple statements. ¨ Sudden problems with walking or balance. ¨ A sudden, severe headache that is different from past headaches. · You have jaw pain and pain in your chest, shoulder, neck, or arm.   Call your doctor now or seek immediate medical care if:  · You have a fever with a stiff neck or a severe headache. · You have nausea and vomiting, or you cannot keep food or liquids down. Watch closely for changes in your health, and be sure to contact your doctor if:  · Your head or face pain does not get better as expected. Where can you learn more? Go to http://bg-gurpreet.info/. Enter P568 in the search box to learn more about \"Head or Face Pain: Care Instructions. \"  Current as of: March 20, 2017  Content Version: 11.3  © 1581-3938 [employer]. Care instructions adapted under license by [facility] (which disclaims liability or warranty for this information). If you have questions about a medical condition or this instruction, always ask your healthcare professional. [address] any warranty or liability for your use of this information.

## 2017-09-29 NOTE — ED TRIAGE NOTES
Pt presents to ED c/o low back pain x a couple of years. Reports wearing a brace in the past, but no longer has the brace for support. Reports headache that started 2 days ago. Pt reports taking 2 tablets of Aleve yesterday without any relief.

## 2017-09-29 NOTE — ED PROVIDER NOTES
HPI Comments: Silverio Lewis is a 78 y.o. male with PMhx significant for HTN and COPD who presents ambulatory to the ED with cc of a moderate headache which began last night. Pt treated the pain with 2 Aleve last night with no relief. His pain lasted through the night into this morning. He also notes some dizziness with fast movement. He notes that he has adequate fluid intake. Pt also complains of chronic lower back pain ongoing 2 years. He states that he worked as a  for 27 years. The pain is exacerbated by ambulation. Pt notes he has an appointment with a PCP through a [facility] on 10/6/17 to discuss his chronic symptoms. He specifically denies any fevers, chills, nausea, vomiting, chest pain, shortness of breath, rash, diarrhea, sweating or weight loss. Social Hx: former Tobacco, - EtOH, - Illicit Drugs    PCP: [facility]    There are no other complaints, changes or physical findings at this time. The history is provided by the patient. No  was used. Past Medical History:   Diagnosis Date    Acute on chronic systolic HF (heart failure) ([facility] [address] 2/11/2014    11/15 Echo EF 40-45%    Cancer (HCC)     prostate    COPD (chronic obstructive pulmonary disease) ([facility] [address]     Elevated troponin 2/11/2014    Hypertension     S/P cardiac cath 2/12/2014 2/12/14 no significant coronary disease, severe LV dysfunction     Past Surgical History:   Procedure Laterality Date    CARDIAC SURG PROCEDURE UNLIST  08/22/2017    SVT ablation    COLONOSCOPY,DIAGNOSTIC  2/22/2016         COLONOSCOPY,REMV Cosme Bulla  2/22/2016          Family History:   Problem Relation Age of Onset    Adopted: Yes     Social History     Social History    Marital status:      Spouse name: N/A    Number of children: N/A    Years of education: N/A     Occupational History    Not on file.      Social History Main Topics    Smoking status: Former Smoker     Packs/day: 2.00 Years: 20.00     Types: Cigarettes     Quit date: 8/7/2017    Smokeless tobacco: Never Used      Comment: Chewing tobacco    Alcohol use No    Drug use: No    Sexual activity: Not on file     Other Topics Concern    Not on file     Social History Narrative     ALLERGIES: Review of patient's allergies indicates no known allergies. Review of Systems   Constitutional: Negative. Negative for activity change, appetite change, chills, fatigue, fever and unexpected weight change. HENT: Negative. Negative for congestion, hearing loss, rhinorrhea, sneezing and voice change. Eyes: Negative. Negative for pain and visual disturbance. Respiratory: Negative. Negative for apnea, cough, choking, chest tightness and shortness of breath. Cardiovascular: Negative. Negative for chest pain and palpitations. Gastrointestinal: Negative. Negative for abdominal distention, abdominal pain, blood in stool, diarrhea, nausea and vomiting. Genitourinary: Negative. Negative for difficulty urinating, flank pain, frequency and urgency. No discharge   Musculoskeletal: Positive for back pain (chronic lower). Negative for arthralgias, myalgias and neck stiffness. Skin: Negative. Negative for color change and rash. Neurological: Positive for dizziness and headaches. Negative for seizures, syncope, speech difficulty, weakness and numbness. Hematological: Negative for adenopathy. Psychiatric/Behavioral: Negative. Negative for agitation, behavioral problems, dysphoric mood and suicidal ideas. The patient is not nervous/anxious. Vitals:    09/29/17 0746 09/29/17 0747 09/29/17 0800 09/29/17 0948   BP: 116/59 116/59 117/58 147/64   Pulse:  71     Resp:  16     Temp:  97.9 °F (36.6 °C)     SpO2:  97% 97% 99%   Weight:  98.2 kg (216 lb 7.9 oz)     Height:  5' 9\" (1.753 m)            Physical Exam   Constitutional: He is oriented to person, place, and time. He appears well-developed and well-nourished.  No distress. HENT:   Head: Normocephalic and atraumatic. Mouth/Throat: Oropharynx is clear and moist. No oropharyngeal exudate. Eyes: Conjunctivae and EOM are normal. Pupils are equal, round, and reactive to light. Right eye exhibits no discharge. Left eye exhibits no discharge. Neck: Normal range of motion. Neck supple. Cardiovascular: Normal rate, regular rhythm and intact distal pulses. Exam reveals no gallop and no friction rub. No murmur heard. Pulmonary/Chest: Effort normal and breath sounds normal. No respiratory distress. He has no wheezes. He has no rales. He exhibits no tenderness. Abdominal: Soft. Bowel sounds are normal. He exhibits no distension and no mass. There is no tenderness. There is no rebound and no guarding. Musculoskeletal: Normal range of motion. He exhibits no edema. Lymphadenopathy:     He has no cervical adenopathy. Neurological: He is alert and oriented to person, place, and time. No cranial nerve deficit. Coordination normal.   Left sided eyelid droop   Skin: Skin is warm and dry. No rash noted. No erythema. Psychiatric: He has a normal mood and affect. Nursing note and vitals reviewed. MDM  Number of Diagnoses or Management Options  Chronic low back pain, unspecified back pain laterality, with sciatica presence unspecified:   Nonintractable headache, unspecified chronicity pattern, unspecified headache type:   Diagnosis management comments: Pt presenting with new onset debilitating headache that is more prevalent in the morning. Given left sided eyelid droop, will CT image pt's head and provide analgesics for pt's back pain.        Amount and/or Complexity of Data Reviewed  Tests in the radiology section of CPT®: ordered and reviewed  Review and summarize past medical records: yes    Patient Progress  Patient progress: stable    ED Course     Procedures    Chief Complaint   Patient presents with    Back Pain     x2 years    Headache     x2 days     7:50 AM  The patients presenting problems have been discussed, and they are in agreement with the care plan formulated and outlined with them. Pt is requesting imaging of his back to assess for a \"pinched nerve\" and has been counseled on the abilities of X-ray versus MRI imaging and has been advised to ask his PCP for an MRI. I have encouraged them to ask questions as they arise throughout their visit. MEDICATIONS GIVEN:  Medications   ketorolac (TORADOL) injection 15 mg (15 mg IntraMUSCular Given 9/29/17 0813)     VITAL SIGNS:  Patient Vitals for the past 12 hrs:   Temp Pulse Resp BP SpO2   09/29/17 0948 - - - 147/64 99 %   09/29/17 0800 - - - 117/58 97 %   09/29/17 0747 97.9 °F (36.6 °C) 71 16 116/59 97 %   09/29/17 0746 - - - 116/59 -     RADIOLOGY RESULTS:  The following have been ordered and reviewed:  CT Results  (Last 48 hours)               09/29/17 0918  CT HEAD WO CONT Final result    Impression:  IMPRESSION:       No change, no acute intracranial abnormality       Narrative:  EXAM:  CT HEAD WO CONT       INDICATION:   Abnormal gait (ataxia) with headache for 2 days. No history of   trauma       COMPARISON: May 2015. CONTRAST:  None. TECHNIQUE: Unenhanced CT of the head was performed using 5 mm images. Brain and   bone windows were generated. CT dose reduction was achieved through use of a   standardized protocol tailored for this examination and automatic exposure   control for dose modulation. FINDINGS:   The ventricles and sulci are normal in size, shape and configuration and   midline. Minimal low density within the periventricular white matter. There is   no intracranial hemorrhage, extra-axial collection, mass, mass effect or midline   shift. The basilar cisterns are open. No acute infarct is identified. The bone   windows demonstrate no abnormalities. The visualized portions of the paranasal   sinuses and mastoid air cells are clear.                PROGRESS NOTES:  9:43 AM  The patient states that their symptoms have resolved and they feel much better. There are no other new complaints at this time. His questions have been answered. We are awaiting final results and those will be reviewed with them when they become available. Will discharge with Naproxen. 9:45 AM  Pt requests prescription for Viagra. Advised him to discuss with PCP. DIAGNOSIS:    1. Nonintractable headache, unspecified chronicity pattern, unspecified headache type    2. Chronic low back pain, unspecified back pain laterality, with sciatica presence unspecified      PLAN:  Follow-up Information     Follow up With Details Comments Contact Info    Provider Unknown   Patient not available to ask      [facility] EM[facility]GENCY DEPT Call in 1 week As needed, If symptoms worsen [address] [place] [address]  [phone]        Discharge Medication List as of 9/29/2017  9:44 AM      START taking these medications    Details   naproxen (NAPROSYN) 500 mg tablet Take 1 Tab by mouth every twelve (12) hours as needed for Pain., Normal, Disp-20 Tab, R-0         CONTINUE these medications which have NOT CHANGED    Details   !! atorvastatin (LIPITOR) 40 mg tablet TAKE ONE TABLET BY MOUTH NIGHTLY, Normal, Disp-30 Tab, R-6      furosemide (LASIX) 40 mg tablet TAKE ONE TABLET BY MOUTH DAILY- DOSE REDUCED 12/17/15, Normal, Disp-30 Tab, R-6      clotrimazole-betamethasone (LOTRISONE) topical cream Historical Med      enalapril (VASOTEC) 10 mg tablet Take 1 Tab by mouth daily. , Normal, Disp-30 Tab, R-6      albuterol (PROVENTIL HFA, VENTOLIN HFA, PROAIR HFA) 90 mcg/actuation inhaler Take 2 Puffs by inhalation every four (4) hours as needed for Wheezing., Normal, Disp-1 Inhaler, R-0      VIAGRA 50 mg tablet Historical Med, [place]      aspirin 81 mg chewable tablet Take 1 Tab by mouth daily. , Normal, Disp-10 Tab, R-0      !! atorvastatin (LIPITOR) 40 mg tablet Take 40 mg by mouth daily. , Historical Med      carvedilol (COREG) 25 mg tablet Take 25 mg by mouth two (2) times daily (with meals). , Historical Med       !! - Potential duplicate medications found. Please discuss with provider. ED COURSE: The patients hospital course has been uncomplicated. 9:44 AM  Twan Wayne's  results have been reviewed with him. He has been counseled regarding his diagnosis. He verbally conveys understanding and agreement of the signs, symptoms, diagnosis, treatment and prognosis and additionally agrees to follow up as recommended with his PCP in 24 - 48 hours. He also agrees with the care-plan and conveys that all of his questions have been answered. I have also put together some discharge instructions for him that include: 1) educational information regarding their diagnosis, 2) how to care for their diagnosis at home, as well a 3) list of reasons why they would want to return to the ED prior to their follow-up appointment, should their condition change. Attestation: This note is prepared by Elias Richardson, acting as Scribe for [employer] IncDiana Arredondo MD: The scribe's documentation has been prepared under my direction and personally reviewed by me in its entirety. I confirm that the note above accurately reflects all work, treatment, procedures, and medical decision making performed by me.

## 2017-09-29 NOTE — ED NOTES
Pt resting comfortably on stretcher. Bed in lowest position, call bell within reach. Pt in no apparent distress at this time.

## 2017-10-02 ENCOUNTER — APPOINTMENT (OUTPATIENT)
Dept: GENERAL RADIOLOGY | Age: 77
End: 2017-10-02
Attending: EMERGENCY MEDICINE
Payer: MEDICARE

## 2017-10-02 ENCOUNTER — HOSPITAL ENCOUNTER (EMERGENCY)
Age: 77
Discharge: HOME | End: 2017-10-02
Attending: EMERGENCY MEDICINE
Payer: MEDICARE

## 2017-10-02 VITALS
HEIGHT: 69 IN | DIASTOLIC BLOOD PRESSURE: 66 MMHG | OXYGEN SATURATION: 97 % | SYSTOLIC BLOOD PRESSURE: 127 MMHG | WEIGHT: 215.17 LBS | HEART RATE: 59 BPM | RESPIRATION RATE: 19 BRPM | BODY MASS INDEX: 31.87 KG/M2

## 2017-10-02 DIAGNOSIS — N18.9 ACUTE RENAL FAILURE SUPERIMPOSED ON CHRONIC KIDNEY DISEASE, UNSPECIFIED CKD STAGE, UNSPECIFIED ACUTE RENAL FAILURE TYPE (HCC): Primary | ICD-10-CM

## 2017-10-02 DIAGNOSIS — I50.22 CHRONIC SYSTOLIC CONGESTIVE HEART FAILURE (HCC): ICD-10-CM

## 2017-10-02 DIAGNOSIS — N17.9 ACUTE RENAL FAILURE SUPERIMPOSED ON CHRONIC KIDNEY DISEASE, UNSPECIFIED CKD STAGE, UNSPECIFIED ACUTE RENAL FAILURE TYPE (HCC): Primary | ICD-10-CM

## 2017-10-02 LAB
ALBUMIN SERPL-MCNC: 3 G/DL (ref 3.5–5)
ALBUMIN/GLOB SERPL: 0.6 {RATIO} (ref 1.1–2.2)
ALP SERPL-CCNC: 116 U/L (ref 45–117)
ALT SERPL-CCNC: 30 U/L (ref 12–78)
ANION GAP SERPL CALC-SCNC: 8 MMOL/L (ref 5–15)
APPEARANCE UR: ABNORMAL
AST SERPL-CCNC: 23 U/L (ref 15–37)
BACTERIA URNS QL MICRO: ABNORMAL /HPF
BASOPHILS # BLD: 0 K/UL (ref 0–0.1)
BASOPHILS NFR BLD: 0 % (ref 0–1)
BILIRUB SERPL-MCNC: 0.5 MG/DL (ref 0.2–1)
BILIRUB UR QL CFM: NEGATIVE
BUN SERPL-MCNC: 22 MG/DL (ref 6–20)
BUN/CREAT SERPL: 13 (ref 12–20)
CALCIUM SERPL-MCNC: 8.7 MG/DL (ref 8.5–10.1)
CHLORIDE SERPL-SCNC: 107 MMOL/L (ref 97–108)
CO2 SERPL-SCNC: 23 MMOL/L (ref 21–32)
COLOR UR: ABNORMAL
CREAT SERPL-MCNC: 1.66 MG/DL (ref 0.7–1.3)
DIFFERENTIAL METHOD BLD: ABNORMAL
EOSINOPHIL # BLD: 0.1 K/UL (ref 0–0.4)
EOSINOPHIL NFR BLD: 2 % (ref 0–7)
EPITH CASTS URNS QL MICRO: ABNORMAL /LPF
ERYTHROCYTE [DISTWIDTH] IN BLOOD BY AUTOMATED COUNT: 13.7 % (ref 11.5–14.5)
GLOBULIN SER CALC-MCNC: 4.8 G/DL (ref 2–4)
GLUCOSE SERPL-MCNC: 130 MG/DL (ref 65–100)
GLUCOSE UR STRIP.AUTO-MCNC: NEGATIVE MG/DL
GRAN CASTS URNS QL MICRO: ABNORMAL /LPF
HCT VFR BLD AUTO: 32.1 % (ref 36.6–50.3)
HGB BLD-MCNC: 10.4 G/DL (ref 12.1–17)
HGB UR QL STRIP: NEGATIVE
HYALINE CASTS URNS QL MICRO: ABNORMAL /LPF (ref 0–5)
KETONES UR QL STRIP.AUTO: NEGATIVE MG/DL
LEUKOCYTE ESTERASE UR QL STRIP.AUTO: ABNORMAL
LYMPHOCYTES # BLD: 0.4 K/UL (ref 0.8–3.5)
LYMPHOCYTES NFR BLD: 8 % (ref 12–49)
MAGNESIUM SERPL-MCNC: 2.2 MG/DL (ref 1.6–2.4)
MCH RBC QN AUTO: 25.9 PG (ref 26–34)
MCHC RBC AUTO-ENTMCNC: 32.4 G/DL (ref 30–36.5)
MCV RBC AUTO: 79.9 FL (ref 80–99)
MONOCYTES # BLD: 0.2 K/UL (ref 0–1)
MONOCYTES NFR BLD: 4 % (ref 5–13)
NEUTS SEG # BLD: 4 K/UL (ref 1.8–8)
NEUTS SEG NFR BLD: 86 % (ref 32–75)
NITRITE UR QL STRIP.AUTO: NEGATIVE
PH UR STRIP: 5 [PH] (ref 5–8)
PLATELET # BLD AUTO: 57 K/UL (ref 150–400)
POTASSIUM SERPL-SCNC: 3.7 MMOL/L (ref 3.5–5.1)
PROT SERPL-MCNC: 7.8 G/DL (ref 6.4–8.2)
PROT UR STRIP-MCNC: ABNORMAL MG/DL
RBC # BLD AUTO: 4.02 M/UL (ref 4.1–5.7)
RBC #/AREA URNS HPF: ABNORMAL /HPF (ref 0–5)
RBC MORPH BLD: ABNORMAL
SODIUM SERPL-SCNC: 138 MMOL/L (ref 136–145)
SP GR UR REFRACTOMETRY: 1.02 (ref 1–1.03)
TROPONIN I SERPL-MCNC: <0.04 NG/ML
UA: UC IF INDICATED,UAUC: ABNORMAL
UROBILINOGEN UR QL STRIP.AUTO: 1 EU/DL (ref 0.2–1)
WBC # BLD AUTO: 4.7 K/UL (ref 4.1–11.1)
WBC URNS QL MICRO: ABNORMAL /HPF (ref 0–4)

## 2017-10-02 PROCEDURE — 85025 COMPLETE CBC W/AUTO DIFF WBC: CPT | Performed by: EMERGENCY MEDICINE

## 2017-10-02 PROCEDURE — 36415 COLL VENOUS BLD VENIPUNCTURE: CPT | Performed by: EMERGENCY MEDICINE

## 2017-10-02 PROCEDURE — 93005 ELECTROCARDIOGRAM TRACING: CPT

## 2017-10-02 PROCEDURE — 83735 ASSAY OF MAGNESIUM: CPT | Performed by: EMERGENCY MEDICINE

## 2017-10-02 PROCEDURE — 87086 URINE CULTURE/COLONY COUNT: CPT | Performed by: EMERGENCY MEDICINE

## 2017-10-02 PROCEDURE — 80053 COMPREHEN METABOLIC PANEL: CPT | Performed by: EMERGENCY MEDICINE

## 2017-10-02 PROCEDURE — 71020 XR CHEST PA LAT: CPT

## 2017-10-02 PROCEDURE — 81001 URINALYSIS AUTO W/SCOPE: CPT | Performed by: EMERGENCY MEDICINE

## 2017-10-02 PROCEDURE — 84484 ASSAY OF TROPONIN QUANT: CPT | Performed by: EMERGENCY MEDICINE

## 2017-10-02 PROCEDURE — 72100 X-RAY EXAM L-S SPINE 2/3 VWS: CPT

## 2017-10-02 PROCEDURE — 99284 EMERGENCY DEPT VISIT MOD MDM: CPT

## 2017-10-02 RX ORDER — OXYCODONE HYDROCHLORIDE 5 MG/1
5 TABLET ORAL
Qty: 12 TAB | Refills: 0 | Status: SHIPPED | OUTPATIENT
Start: 2017-10-02 | End: 2017-10-05 | Stop reason: ALTCHOICE

## 2017-10-02 NOTE — ED PROVIDER NOTES
[facility].  EMERGENCY DEPARTMENT HISTORY AND PHYSICAL EXAM       Date of Service: 10/2/2017   Patient Name: Gibson Cervantes   YOB: 1940  Medical Record Number: [id number]    History of Presenting Illness     Chief Complaint   Patient presents with    Urinary Retention     Pt. complains of urinary retention and left lower back pain         History Provided By:  patient    Additional History:   Gibson Cervantes is a 78 y.o. male with PMhx significant for HTN, CHF, and COPD who presents ambulatory to the ED with multiple complaints. Pt complains of difficulty urinating, noting he last urinated at ~0700. Pt reports his doctor has recently decreased his diuretic dosage from 2 tabs to 0.5 tabs. He indicates he is concerned as he typically urinates immediately after taking the diuretic. Pt also c/o of intermittent chest pain and SOB with ambulation x 5 days. He reports acute on chronic low back pain x 2-3 years, but denies any recent fall, injury, or trauma that could contribute with his pain. Per chart review, pt has an appointment with PCP regarding his chronic issues. Pt specifically denies any saddle anesthesia, urinary incontinence, weakness, and numbness. Social Hx: - Tobacco, - EtOH, - Illicit Drugs    There are no other complaints, changes or physical findings at this time.     Primary Care Provider: PROVIDER UNKNOWN    Past History     Past Medical History:   Past Medical History:   Diagnosis Date    Acute on chronic systolic HF (heart failure) ([facility] 2/11/2014    11/15 Echo EF 40-45%    Cancer (HCC)     prostate    COPD (chronic obstructive pulmonary disease) ([facility] [address]     Elevated troponin 2/11/2014    Hypertension     S/P cardiac cath 2/12/2014 2/12/14 no significant coronary disease, severe LV dysfunction        Past Surgical History:   Past Surgical History:   Procedure Laterality Date    CARDIAC SURG PROCEDURE UNLIST  08/22/2017    SVT ablation    COLONOSCOPY,DIAGNOSTIC  2/22/2016         COLONOSCOPY,JOSE MGALEXANDRA,SNARE  2/22/2016             Family History:   Family History   Problem Relation Age of Onset    Adopted: Yes        Social History:   Social History   Substance Use Topics    Smoking status: Former Smoker     Packs/day: 2.00     Years: 20.00     Types: Cigarettes     Quit date: 8/7/2017    Smokeless tobacco: Current User     Types: Chew      Comment: Chewing tobacco    Alcohol use No        Allergies:   No Known Allergies      Review of Systems   Review of Systems   Constitutional: Negative for chills and fever. HENT: Negative for congestion and sore throat. Eyes: Negative for visual disturbance. Respiratory: Positive for shortness of breath. Negative for cough. Cardiovascular: Positive for chest pain. Negative for leg swelling. Gastrointestinal: Negative for abdominal pain, blood in stool, diarrhea and nausea. Endocrine: Negative for polyuria. Genitourinary: Positive for difficulty urinating. Negative for dysuria and testicular pain. - urinary incontinence   Musculoskeletal: Positive for back pain (low). Negative for arthralgias, joint swelling and myalgias. Skin: Negative for rash. Allergic/Immunologic: Negative for immunocompromised state. Neurological: Negative for weakness, numbness and headaches. - saddle anesthesia   Hematological: Does not bruise/bleed easily. Psychiatric/Behavioral: Negative for confusion. Physical Exam  Physical Exam   Constitutional: He is oriented to person, place, and time. He appears well-developed and well-nourished. HENT:   Head: Normocephalic and atraumatic. Moist mucous membranes   Eyes: Conjunctivae are normal. Pupils are equal, round, and reactive to light. Right eye exhibits no discharge. Left eye exhibits no discharge. Neck: Normal range of motion. Neck supple. No tracheal deviation present.    Cardiovascular: Normal rate, regular rhythm and normal heart sounds. No murmur heard. Pulmonary/Chest: Effort normal and breath sounds normal. No respiratory distress. He has no wheezes. He has no rales. Abdominal: Soft. Bowel sounds are normal. There is no tenderness. There is no rebound and no guarding. Musculoskeletal: Normal range of motion. He exhibits no edema, tenderness or deformity. Neurological: He is alert and oriented to person, place, and time. Skin: Skin is warm and dry. No rash noted. No erythema. Psychiatric: His behavior is normal.   Nursing note and vitals reviewed. Medical Decision Making   I am the first provider for this patient. I reviewed the vital signs, available nursing notes, past medical history, past surgical history, family history and social history. Old Medical Records: Old medical records. Provider Notes:   Patient here with multiple complaints. He is not having urinary retention. I see no reason to pursue MRI at this time. Will get plain film of the lumbar spine. Urine does not appear infected. He has no acute abdomen on exam, EKG is reassuring. His renal function does look to be declining. Will advise him to stop any NSAIDS, will ask him to hold is Ace Inhibitor until he is seen by PCP as this could worsen his renal function. Will ask him to follow up with nephrology as well. ED Course:  9:35 AM   Initial assessment performed. The patients presenting problems have been discussed, and they are in agreement with the care plan formulated and outlined with them. I have encouraged them to ask questions as they arise throughout their visit. Progress Notes:     10:47 AM  Post void residual minimal less than 100. Pt requesting L spine XR due to his chronic back pain. Written by CARLY Waterman, as dictated by Louie Byrd DO    12:40 PM  Asking pt to hold any antiinflammatory as well as his enalapril until his appointment with PCP as scheduled on 10/5.   Written by CARLY Waterman, as dictated by Zhanna Henry DO    Procedures:     Procedure Note - Rectal Exam:   10:10 AM  Performed by: Zhanna Henry DO  Chaperoned by: Marcella Toure ED scribe  Rectal exam performed. The procedure took 1-15 minutes, and pt tolerated well. Diagnostic Study Results     Labs -      Recent Results (from the past 12 hour(s))   EKG, 12 LEAD, INITIAL    Collection Time: 10/02/17 10:01 AM   Result Value Ref Range    Ventricular Rate 76 BPM    Atrial Rate 76 BPM    P-R Interval 196 ms    QRS Duration 120 ms    Q-T Interval 422 ms    QTC Calculation (Bezet) 474 ms    Calculated P Axis 57 degrees    Calculated R Axis 71 degrees    Calculated T Axis 67 degrees    Diagnosis       Sinus rhythm with frequent and consecutive premature ventricular complexes  Possible Left atrial enlargement  Nonspecific intraventricular conduction delay  Abnormal ECG  When compared with ECG of 14-MAY-2017 20:28,  premature ventricular complexes are now present  Vent.  rate has decreased BY  43 BPM  ST no longer depressed in Inferior leads  T wave inversion no longer evident in Inferior leads     URINALYSIS W/ REFLEX CULTURE    Collection Time: 10/02/17 10:40 AM   Result Value Ref Range    Color DARK YELLOW      Appearance CLOUDY (A) CLEAR      Specific gravity 1.017 1.003 - 1.030      pH (UA) 5.0 5.0 - 8.0      Protein TRACE (A) NEG mg/dL    Glucose NEGATIVE  NEG mg/dL    Ketone NEGATIVE  NEG mg/dL    Blood NEGATIVE  NEG      Urobilinogen 1.0 0.2 - 1.0 EU/dL    Nitrites NEGATIVE  NEG      Leukocyte Esterase SMALL (A) NEG      WBC 5-10 0 - 4 /hpf    RBC 0-5 0 - 5 /hpf    Epithelial cells FEW FEW /lpf    Bacteria 3+ (A) NEG /hpf    UA:UC IF INDICATED URINE CULTURE ORDERED (A) CNI      Hyaline cast 5-10 0 - 5 /lpf    Granular cast 2-5 (A) NEG /lpf   BILIRUBIN, CONFIRM    Collection Time: 10/02/17 10:40 AM   Result Value Ref Range    Bilirubin UA, confirm NEGATIVE  NEG     CBC WITH AUTOMATED DIFF    Collection Time: 10/02/17 11:17 AM   Result Value Ref Range WBC 4.7 4.1 - 11.1 K/uL    RBC 4.02 (L) 4.10 - 5.70 M/uL    HGB 10.4 (L) 12.1 - 17.0 g/dL    HCT 32.1 (L) 36.6 - 50.3 %    MCV 79.9 (L) 80.0 - 99.0 FL    MCH 25.9 (L) 26.0 - 34.0 PG    MCHC 32.4 30.0 - 36.5 g/dL    RDW 13.7 11.5 - 14.5 %    PLATELET 57 (L) 572 - 400 K/uL    NEUTROPHILS 86 (H) 32 - 75 %    LYMPHOCYTES 8 (L) 12 - 49 %    MONOCYTES 4 (L) 5 - 13 %    EOSINOPHILS 2 0 - 7 %    BASOPHILS 0 0 - 1 %    ABS. NEUTROPHILS 4.0 1.8 - 8.0 K/UL    ABS. LYMPHOCYTES 0.4 (L) 0.8 - 3.5 K/UL    ABS. MONOCYTES 0.2 0.0 - 1.0 K/UL    ABS. EOSINOPHILS 0.1 0.0 - 0.4 K/UL    ABS. BASOPHILS 0.0 0.0 - 0.1 K/UL    DF SMEAR SCANNED      RBC COMMENTS NORMOCYTIC, NORMOCHROMIC     METABOLIC PANEL, COMPREHENSIVE    Collection Time: 10/02/17 11:17 AM   Result Value Ref Range    Sodium 138 136 - 145 mmol/L    Potassium 3.7 3.5 - 5.1 mmol/L    Chloride 107 97 - 108 mmol/L    CO2 23 21 - 32 mmol/L    Anion gap 8 5 - 15 mmol/L    Glucose 130 (H) 65 - 100 mg/dL    BUN 22 (H) 6 - 20 MG/DL    Creatinine 1.66 (H) 0.70 - 1.30 MG/DL    BUN/Creatinine ratio 13 12 - 20      GFR est AA 49 (L) >60 ml/min/1.73m2    GFR est non-AA 41 (L) >60 ml/min/1.73m2    Calcium 8.7 8.5 - 10.1 MG/DL    Bilirubin, total 0.5 0.2 - 1.0 MG/DL    ALT (SGPT) 30 12 - 78 U/L    AST (SGOT) 23 15 - 37 U/L    Alk. phosphatase 116 45 - 117 U/L    Protein, total 7.8 6.4 - 8.2 g/dL    Albumin 3.0 (L) 3.5 - 5.0 g/dL    Globulin 4.8 (H) 2.0 - 4.0 g/dL    A-G Ratio 0.6 (L) 1.1 - 2.2     MAGNESIUM    Collection Time: 10/02/17 11:17 AM   Result Value Ref Range    Magnesium 2.2 1.6 - 2.4 mg/dL   TROPONIN I    Collection Time: 10/02/17 11:17 AM   Result Value Ref Range    Troponin-I, Qt. <0.04 <0.05 ng/mL       Radiologic Studies -  The following have been ordered and reviewed:  XR SPINE LUMB 2 OR 3 V   Final Result   EXAM:  XR SPINE LUMB 2 OR 3 V  INDICATION: Back pain.   COMPARISON: None.     FINDINGS: AP, lateral and spot lateral views of the lumbar spine demonstrate  normal alignment. There are mild degenerative changes throughout the lumbar  spine and posterior facet arthrosis. The vertebral body heights and disc spaces  are preserved. There is no fracture, subluxation or other acute abnormality.     IMPRESSION  IMPRESSION: Mild lumbar spondylosis. CXR Results  (Last 48 hours)               10/02/17 1100  XR CHEST PA LAT Final result    Impression:  IMPRESSION:   1. Findings are consistent mild interstitial pulmonary edema       Narrative:  Exam:  2 view chest       Indication: Chest pain, shortness of breath since Friday. COMPARISON: 5/17/2017       PA and lateral views demonstrate no change in the mild cardiomegaly. The patient   is on a cardiac monitor. There is pulmonary vascular congestion and mild   interstitial edema in the mid lower lung zones right greater than left. There is   subpleural edema in the plane of the fissures. No pleural effusions. Vital Signs-Reviewed the patient's vital signs. Patient Vitals for the past 12 hrs:   Pulse Resp BP SpO2   10/02/17 1249 (!) 59 19 127/66 97 %   10/02/17 1031 72 22 122/65 97 %       Medications Given in the ED:  Medications - No data to display    Pulse Oximetry Analysis - Normal 97% on RA     Cardiac Monitor:   Rate: 72  Rhythm: sinus rhythm with frequent and consecutive premature ventricular complexes     EKG interpretation: (Preliminary)  1001  Rhythm: sinus rhythm with frequent and consecutive premature ventricular complexes; and regular . Rate (approx.): 76; Axis: normal; W[place] interval: normal; QRS interval: 120 ms; ST/T wave: lateral T wave changes nonspecific; QT/QTc: 422/474 ms. Written by Kang Rosales ED Scribe, as dictated by Earnestine Rodriguez DO    Diagnosis   Clinical Impression:   1. Acute renal failure superimposed on chronic kidney disease, unspecified CKD stage, unspecified acute renal failure type ([address]    2.  Chronic systolic congestive heart failure ([address]         Plan:  1: Discharge home  Follow-up Information     Follow up With Details Comments Gonzalo Glover MD Call in 1 day Please call today or tomorrow for an appointment this week. Carli Agarwal   Unit   [place]  [phone]      Your PCP   As scheduled. 2:   Discharge Medication List as of 10/2/2017 12:39 PM      CONTINUE these medications which have NOT CHANGED    Details   !! atorvastatin (LIPITOR) 40 mg tablet TAKE ONE TABLET BY MOUTH NIGHTLY, Normal, Disp-30 Tab, R-6      furosemide (LASIX) 40 mg tablet TAKE ONE TABLET BY MOUTH DAILY- DOSE REDUCED 12/17/15, Normal, Disp-30 Tab, R-6      clotrimazole-betamethasone (LOTRISONE) topical cream Historical Med      albuterol (PROVENTIL HFA, VENTOLIN HFA, PROAIR HFA) 90 mcg/actuation inhaler Take 2 Puffs by inhalation every four (4) hours as needed for Wheezing., Normal, Disp-1 Inhaler, R-0      VIAGRA 50 mg tablet Historical Med, ASIA      aspirin 81 mg chewable tablet Take 1 Tab by mouth daily. , Normal, Disp-10 Tab, R-0      !! atorvastatin (LIPITOR) 40 mg tablet Take 40 mg by mouth daily. , Historical Med      carvedilol (COREG) 25 mg tablet Take 25 mg by mouth two (2) times daily (with meals). , Historical Med       !! - Potential duplicate medications found. Please discuss with provider. STOP taking these medications       naproxen (NAPROSYN) 500 mg tablet Comments:   Reason for Stopping:         enalapril (VASOTEC) 10 mg tablet Comments:   Reason for Stopping:             Return to ED if Worse    Disposition Note:    DISCHARGE NOTE  12:42 PM  The patient has been re-evaluated and is ready for discharge. Reviewed available results with patient. Counseled pt on diagnosis and care plan. Pt has expressed understanding, and all questions have been answered. Pt agrees with plan and agrees to F/U as recommended, or return to the ED if their sxs worsen.  Discharge instructions have been provided and explained to the pt, along with reasons to return to the ED. Written by Henry Bazan ED Scribe, as dictated by Niesha Edmondson DO.    _______________________________   Attestations: This note is prepared by Henry Bazan, acting as Scribe for Niesha Edmondson DO. Niesha Edmondson DO: The scribe's documentation has been prepared under my direction and personally reviewed by me in its entirety.  I confirm that the note above accurately reflects all work, treatment, procedures, and medical decision making performed by me.    _______________________________

## 2017-10-02 NOTE — DISCHARGE INSTRUCTIONS
Acute Kidney Injury: Care Instructions  Your Care Instructions  Acute kidney injury is the sudden loss of kidney function that happens when the kidneys stop working over a period of hours, days or, in some cases, weeks. It is also known as acute renal failure. Common causes of acute kidney injury are dehydration, blood loss, and medicines. When acute kidney injury happens, the kidneys cannot remove waste and excess fluids from the body. The waste and fluids build up and become harmful. Kidney function may return to normal if the cause of acute kidney injury is treated quickly. Your chance of a full recovery depends on what caused the problem, how quickly the cause was treated, and what other medical problems you have. A machine may be used to help your kidneys remove waste and fluids for a short period of time. This is called dialysis. Follow-up care is a key part of your treatment and safety. Be sure to make and go to all appointments, and call your doctor if you are having problems. It's also a good idea to know your test results and keep a list of the medicines you take. How can you care for yourself at home? · Talk to your doctor about how much fluid you should drink. · Eat a balanced diet. Talk to your doctor or a dietitian about what type of diet may be best for you. · Follow the instructions and schedule for dialysis that your doctor gives you. · Do not smoke. Smoking can make your condition worse. If you need help quitting, talk to your doctor about stop-smoking programs and medicines. These can increase your chances of quitting for good. · Do not drink alcohol. · Review all of your medicines with your doctor. Do not take any medicines, including nonsteroidal anti-inflammatory drugs (NSAIDs) such as ibuprofen (Advil, Motrin) or naproxen (Aleve), unless your doctor says it is safe for you to do so.   · Make sure that anyone treating you for any health problem knows that you have had acute kidney injury. When should you call for help? Call 911 anytime you think you may need emergency care. For example, call if:  · You passed out (lost consciousness). · You have severe trouble breathing. Call your doctor now or seek immediate medical care if:  · You have less urine than normal or no urine. · You have trouble urinating or can urinate only very small amounts. · You are confused or have trouble thinking clearly. · You feel weaker or more tired than usual.  · You are very thirsty, lightheaded, or dizzy. · You have nausea and vomiting. · You have new swelling of your arms or feet, or your swelling is worse. · You have blood in your urine. · Your body weight goes up every day. · You have new or worse trouble breathing. Watch closely for changes in your health, and be sure to contact your doctor if:  · You do not get better as expected. Where can you learn more? Go to http://bg-gurpreet.info/. Enter O694 in the search box to learn more about \"Acute Kidney Injury: Care Instructions. \"  Current as of: April 3, 2017  Content Version: 11.3  © 8760-1987 [employer]. Care instructions adapted under license by [employer] (which disclaims liability or warranty for this information). If you have questions about a medical condition or this instruction, always ask your healthcare professional. [address] any warranty or liability for your use of this information.

## 2017-10-02 NOTE — ED TRIAGE NOTES
Assumed care of pt from triage. Pt reports he has back pain and difficulty urinating. Pt also reports SOB. Pt reports he had a cardiac cath done in Aug and they went in through his groin. Pt reports chest pain since Friday. And back pain for a few weeks.   Pt on monitor x 3 VSS

## 2017-10-02 NOTE — ED NOTES
Pt discharged by Dr. Evette Pinedo. Pt provided with discharge instructions Rx and instructions on follow up care. Pt out of ED ambulatory without difficulty accompanied by Family.

## 2017-10-03 LAB
ATRIAL RATE: 76 BPM
CALCULATED P AXIS, ECG09: 57 DEGREES
CALCULATED R AXIS, ECG10: 71 DEGREES
CALCULATED T AXIS, ECG11: 67 DEGREES
DIAGNOSIS, 93000: NORMAL
P-R INTERVAL, ECG05: 196 MS
Q-T INTERVAL, ECG07: 422 MS
QRS DURATION, ECG06: 120 MS
QTC CALCULATION (BEZET), ECG08: 474 MS
VENTRICULAR RATE, ECG03: 76 BPM

## 2017-10-05 ENCOUNTER — HOSPITAL ENCOUNTER (OUTPATIENT)
Dept: LAB | Age: 77
Discharge: HOME | End: 2017-10-05
Payer: MEDICARE

## 2017-10-05 ENCOUNTER — OFFICE VISIT (OUTPATIENT)
Dept: INTERNAL MEDICINE CLINIC | Age: 77
End: 2017-10-05

## 2017-10-05 VITALS
OXYGEN SATURATION: 95 % | DIASTOLIC BLOOD PRESSURE: 65 MMHG | RESPIRATION RATE: 18 BRPM | BODY MASS INDEX: 34.51 KG/M2 | HEIGHT: 69 IN | WEIGHT: 233 LBS | TEMPERATURE: 98.8 F | HEART RATE: 72 BPM | SYSTOLIC BLOOD PRESSURE: 119 MMHG

## 2017-10-05 DIAGNOSIS — S60.529A BLISTER OF HAND, UNSPECIFIED LATERALITY, INITIAL ENCOUNTER: ICD-10-CM

## 2017-10-05 DIAGNOSIS — Z12.5 ENCOUNTER FOR SCREENING FOR MALIGNANT NEOPLASM OF PROSTATE: ICD-10-CM

## 2017-10-05 DIAGNOSIS — C61 PROSTATE CANCER (HCC): ICD-10-CM

## 2017-10-05 DIAGNOSIS — Z00.00 MEDICARE ANNUAL WELLNESS VISIT, INITIAL: Primary | ICD-10-CM

## 2017-10-05 DIAGNOSIS — I42.8 NICM (NONISCHEMIC CARDIOMYOPATHY) (HCC): ICD-10-CM

## 2017-10-05 DIAGNOSIS — I10 ESSENTIAL HYPERTENSION: ICD-10-CM

## 2017-10-05 DIAGNOSIS — R73.01 IFG (IMPAIRED FASTING GLUCOSE): ICD-10-CM

## 2017-10-05 DIAGNOSIS — Z76.89 ENCOUNTER TO ESTABLISH CARE: ICD-10-CM

## 2017-10-05 DIAGNOSIS — E78.2 MIXED HYPERLIPIDEMIA: ICD-10-CM

## 2017-10-05 DIAGNOSIS — I50.42 CHRONIC COMBINED SYSTOLIC AND DIASTOLIC CONGESTIVE HEART FAILURE (HCC): ICD-10-CM

## 2017-10-05 DIAGNOSIS — I48.92 ATRIAL FLUTTER, UNSPECIFIED TYPE (HCC): ICD-10-CM

## 2017-10-05 DIAGNOSIS — Z71.89 ADVANCE CARE PLANNING: ICD-10-CM

## 2017-10-05 DIAGNOSIS — N18.4 STAGE 4 CHRONIC KIDNEY DISEASE (HCC): ICD-10-CM

## 2017-10-05 PROBLEM — J96.00 ACUTE RESPIRATORY FAILURE (HCC): Status: RESOLVED | Noted: 2017-05-14 | Resolved: 2017-10-05

## 2017-10-05 PROBLEM — I49.9 IRREGULAR HEART BEAT: Status: RESOLVED | Noted: 2017-09-12 | Resolved: 2017-10-05

## 2017-10-05 LAB
BACTERIA SPEC CULT: NORMAL
CC UR VC: NORMAL
SERVICE CMNT-IMP: NORMAL

## 2017-10-05 PROCEDURE — 83036 HEMOGLOBIN GLYCOSYLATED A1C: CPT

## 2017-10-05 PROCEDURE — 84153 ASSAY OF PSA TOTAL: CPT

## 2017-10-05 PROCEDURE — 84443 ASSAY THYROID STIM HORMONE: CPT

## 2017-10-05 PROCEDURE — 36415 COLL VENOUS BLD VENIPUNCTURE: CPT

## 2017-10-05 PROCEDURE — 80061 LIPID PANEL: CPT

## 2017-10-05 RX ORDER — MELOXICAM 7.5 MG/1
7.5 TABLET ORAL
COMMUNITY
Start: 2017-10-04 | End: 2017-10-27 | Stop reason: ALTCHOICE

## 2017-10-05 RX ORDER — ENALAPRIL MALEATE 10 MG/1
10 TABLET ORAL DAILY
COMMUNITY
Start: 2017-09-18 | End: 2017-11-07

## 2017-10-05 RX ORDER — CEPHALEXIN 500 MG/1
500 CAPSULE ORAL EVERY 6 HOURS
COMMUNITY
Start: 2017-10-04 | End: 2017-10-27 | Stop reason: ALTCHOICE

## 2017-10-05 NOTE — PATIENT INSTRUCTIONS
Schedule of Personalized Health Plan    The best way to stay healthy is to live a healthy lifestyle. A healthy lifestyle includes regular exercise, eating a well-balanced diet, keeping a healthy weight and not smoking. Regular physical exams and screening tests are another important way to take care of yourself. Preventive exams provided by health care providers can find health problems early when treatment works best and can keep you from getting certain diseases or illnesses. Preventive services include exams, lab tests, screenings, shots, monitoring and information to help you take care of your own health. All people over 65 should have a pneumonia shot. Pneumonia shots are usually only needed once in a lifetime unless your doctor decides differently. All people over 65 should have a yearly flu shot. People over 65 are at medium to high risk for Hepatitis B. Three shots are needed for complete protection. In addition to your physical exam, some screening tests are recommended:    Bone mass measurement (dexa scan) is recommended every two years if you have certain risk factors, such as personal history of vertebral fracture or chronic steroid medication use    Diabetes Mellitus screening is recommended every year. Glaucoma is an eye disease caused by high pressure in the eye. An eye exam is recommended every year. Cardiovascular screening tests that check your cholesterol and other blood fat (lipid) levels are recommended every five years. Colorectal Cancer screening tests help to find pre-cancerous polyps (growths in the colon) so they can be removed before they turn into cancer. Tests ordered for screening depend on your personal and family history risk factors.     Screening for Prostate Cancer is recommended yearly with a digital rectal exam and/or a PSA test    Here is a list of your current Health Maintenance items with a due date:  Health Maintenance   Topic Date Due    DTaP/Tdap/Td series (1 - Tdap) 04/09/1963    ZOSTER VACCINE AGE 60>  02/09/2002    GLAUCOMA SCREENING Q2Y  04/09/2007    MEDICARE YEARLY EXAM  04/09/2007    Pneumococcal 65+ High/Highest Risk (2 of 2 - PCV13) 11/30/2016    INFLUENZA AGE 9 TO ADULT  08/01/2017

## 2017-10-05 NOTE — PROGRESS NOTES
Reviewed record  In preparation for visit and have obtained necessary documentation. 1. Have you been to the ER, urgent care clinic since your last visit? Hospitalized since your last visit? [address] and   2. Have you seen or consulted any other health care providers outside of the [address] [place] [address] since your last visit? Include any pap smears or colon screening. Dr Denver Bookbindtiffanie directives: Patient declines information on advanced directives. Patients vital signs discussed with physician. Previous PCP:Dr Cazares  Zoster: declines  Eye exam: seeing eye doctor next week  States he had flu and pneumonia vaccine recently at [facility].

## 2017-10-05 NOTE — PROGRESS NOTES
This is an Initial Medicare Annual Wellness Exam (AWV) (Performed 12 months after IPPE or effective date of Medicare Part B enrollment, Once in a lifetime)    I have reviewed the patient's medical history in detail and updated the computerized patient record. History   Mady Roman is a 78 y.o. male. Accompanied by wife. Presents to [facility] and for [facility] Annual Wellness Visit. His previous PCP was Dr. Michele Loco. He has HTN, CKD stage 3- 4, CHF (combined diastolic and systolic) with echo EF 67-18% in 11/15, non-ischemic cardiomyopathy, atrial flutter s/p AFL ablation on 8/22/17, prediabetes, COPD, and history of prostate cancer. Admitted to ED [facility] 5/14-5/23/17 with acute respiratory failure due to CHF, COPD exacerbation, ROBINSON on CKD 3, and sepsis due to RLE cellulitis. Today he complains of blisters on his hands. Was seen at [facility] ED on 10/4/17; prescribed Keflex 500 mg every 6 hrs and Mobic 7.5 mg 1 tab daily (#15). Blisters not tender but uncomfortable especially when he is trying to sleep at night. Also complains of feeling weak all over and having mid-line chronic low back pain for 2 yrs; denies previous injury. Denies CP, SOB, leg swelling, orthopnea, or PND. Was seen at ED [facility] ED on 9/29/17 for back pain and headache. Given Toradol injection and discharged on Naprosyn. Seen again at [facility] ED on 10/2/17 for urine retention and back pain. Given prescription for oxycodone IR and referred to Dr. Venita Land. Has upcoming appointment with Dr. Albin Steinberg (Nephrology) on 10/30/17 for evaluation of worsening CKD. Is scheduled for a bone scan later this month, ordered by his urologist.      Review of Studies  EKG 10/2/17: NSR with frequent PVC's. Echo (TTE) 7/31/17: EF 55-60%. No regional wall motion abnormalities. LA moderately dilated. Lumbar spine X-ray 10/2/17: Mild spondylosis.   CXR 10/2/17: mild interstitial edema in the mid lower lung zones right greater than left  CT head 9/29/17: no acute abnormality    Other Providers: Dr. Avelina Steele (Cardiology), Dr. Luana Ro (Cardiology EPS), Dr. Tameka La (Urology)    Soc Hx  . Has 2 children and 3 grandchildren. Retired . Quit 8/7/17; previously smoked 1.5-2 ppd for 50 yrs. No longer drinks alcohol. Denies recreational drug use. Does not get much exercise. Health Maintenance  Flu vaccine: 9/18/17    Pneumonia vaccine: PPSV-23 11/30/15, PCV-13 9/18/17      Tetanus vaccine: not indicated     Zoster vaccine: declined  Colonoscopy: 2/22/16, Dr. Herbert Terrazas, one sessile polyp, sigmoid diverticulosis, int hemorrhoids, repeat in 5 yrs  Eye exam: has appt next week (Dr. April Hoyt?)  Lipids: will check today (fasting)  A1c: will check today  Advanced Directives: given information  End of Life: given information    A complete review of systems was performed and is negative except for those mentioned in the HPI. Past Medical History:   Diagnosis Date    Acute on chronic systolic HF (heart failure) ([facility] 2/11/2014    11/15 Echo EF 40-45%    Acute respiratory failure with hypoxia (HCC) 11/28/2015    Cancer (HCC)     prostate    COPD (chronic obstructive pulmonary disease) (HCC)     Elevated troponin 2/11/2014    Hypertension     PNA (pneumonia) 2/8/2014    Pulmonary edema 11/28/2015    S/P cardiac cath 2/12/2014 2/12/14 no significant coronary disease, severe LV dysfunction      Past Surgical History:   Procedure Laterality Date    CARDIAC SURG PROCEDURE UNLIST  08/22/2017    SVT ablation    COLONOSCOPY,DIAGNOSTIC  2/22/2016         COLONOSCOPY,REMV LESN,SNARE  2/22/2016          Current Outpatient Prescriptions   Medication Sig Dispense Refill    enalapril (VASOTEC) 10 mg tablet Take 10 mg by mouth daily.  meloxicam (MOBIC) 7.5 mg tablet Take 7.5 mg by mouth daily as needed.  cephALEXin (KEFLEX) 500 mg capsule Take 500 mg by mouth every six (6) hours.       atorvastatin (LIPITOR) 40 mg tablet TAKE ONE TABLET BY MOUTH NIGHTLY 30 Tab 6    furosemide (LASIX) 40 mg tablet TAKE ONE TABLET BY MOUTH DAILY- DOSE REDUCED 12/17/15 30 Tab 6    albuterol (PROVENTIL HFA, VENTOLIN HFA, PROAIR HFA) 90 mcg/actuation inhaler Take 2 Puffs by inhalation every four (4) hours as needed for Wheezing. 1 Inhaler 0    aspirin 81 mg chewable tablet Take 1 Tab by mouth daily. 10 Tab 0    carvedilol (COREG) 25 mg tablet Take 25 mg by mouth two (2) times daily (with meals). No Known Allergies     Family History   Problem Relation Age of Onset    Adopted: Yes    Family history unknown: Yes     Social History   Substance Use Topics    Smoking status: Former Smoker     Packs/day: 2.00     Years: 20.00     Types: Cigarettes     Quit date: 8/7/2017    Smokeless tobacco: Current User     Types: Chew      Comment: Chewing tobacco    Alcohol use No     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    S/P cardiac cath Z98.890    NICM (nonischemic cardiomyopathy) ([facility] I42.8    Hypertension I10    Acute on chronic combined systolic and diastolic heart failure (HCC) I50.43    Noncompliance with medication regimen Z91.14    Mixed hyperlipidemia E78.2    Tobacco use Z72.0    Atrial flutter (HCC) I48.92    Stage 4 chronic kidney disease ([facility] N18.4       Depression Risk Factor Screening:     PHQ over the last two weeks 10/5/2017   Little interest or pleasure in doing things Not at all   Feeling down, depressed or hopeless Not at all   Total Score PHQ 2 0       Alcohol Risk Factor Screening: You do not drink alcohol or very rarely. Functional Ability and Level of Safety:     Hearing Loss   Hearing is good. Activities of Daily Living   Self-care. Requires assistance with: no ADLs    Fall Risk   Fall Risk Assessment, last 12 mths 10/5/2017   Able to walk? Yes   Fall in past 12 months?  No     Abuse Screen   Patient is not abused    Review of Systems   Pertinent items are noted in HPI. Physical Examination     Evaluation of Cognitive Function:  Mood/affect:  neutral  Appearance: age appropriate  Family member/caregiver input: wife    Visit Vitals    /65 (BP 1 Location: Left arm, BP Patient Position: Sitting)    Pulse 72    Temp 98.8 °F (37.1 °C) (Oral)    Resp 18    Ht 5' 9\" (1.753 m)    Wt 233 lb (105.7 kg)    SpO2 95%    BMI 34.41 kg/m2       General: Alert and oriented, no distress. Ambulates with cane. HEENT: Normocephalic, atraumatic. Marked arcus senilis at both eyes. Conjunctiva clear. Pupils equal, round, reactive to light. Extraocular movements intact. Neck: Supple, no carotid bruits. No thyromegaly or lymphadenopathy  Lungs: Clear to auscultation bilaterally. Good air movement  Chest Wall: No tenderness or deformity. Heart: Irregular, normal S1 and S2, no murmur, click, rub, or gallop. Abdomen: Soft, non-tender, non-distended. Bowel sounds normal. No masses. No organomegaly. Extremities: Normal, no clubbing cyanosis, or edema. Pulses: 1+ and symmetric in all extremities. Skin: Color and turgor normal. 3-4 large (2-3 cm) tense blisters at both hands (dorsum and palm) with 2-3 areas of hyperpigmentation from previously drained blisters. Neurological: CNII-XII intact. Normal strength, sensation, and reflexes throughout. Psychological: Normal mood and affect.  Behavior is normal.    Lab Results   Component Value Date/Time    Sodium 138 10/02/2017 11:17 AM    Potassium 3.7 10/02/2017 11:17 AM    Chloride 107 10/02/2017 11:17 AM    CO2 23 10/02/2017 11:17 AM    Anion gap 8 10/02/2017 11:17 AM    Glucose 130 10/02/2017 11:17 AM    BUN 22 10/02/2017 11:17 AM    Creatinine 1.66 10/02/2017 11:17 AM    BUN/Creatinine ratio 13 10/02/2017 11:17 AM    GFR est AA 49 10/02/2017 11:17 AM    GFR est non-AA 41 10/02/2017 11:17 AM    Calcium 8.7 10/02/2017 11:17 AM     Lab Results   Component Value Date/Time    WBC 4.7 10/02/2017 11:17 AM    HGB 10.4 10/02/2017 11:17 AM    HCT 32.1 10/02/2017 11:17 AM    PLATELET 57 91/30/4233 11:17 AM    MCV 79.9 10/02/2017 11:17 AM       Patient Care Team:  Lay Black MD as PCP - General (Internal Medicine)  Praveena Reeves MD as Physician (Cardiology)  Markell Fuller RN as Nurse Darshana Palmer MD (Cardiology)  Abdullahi Hagan MD (Urology)          Advice/Referrals/Counseling   Education and counseling provided:  Are appropriate based on today's review and evaluation  End-of-Life planning (with patient's consent)  Cardiovascular screening blood test  Screening for glaucoma  Diabetes screening test    Assessment/Plan       ICD-10-CM ICD-9-CM    1. Medicare annual wellness visit, initial Z00.00 V70.0    2. Encounter to establish care Z76.89 V65.8    3. Essential hypertension I10 401.9    4. Chronic combined systolic and diastolic congestive heart failure (HCC) I50.42 428.42      428.0    5. NICM (nonischemic cardiomyopathy) (HCC) I42.8 425.4    6. Atrial flutter, unspecified type (HCC) I48.92 427.32    7. Stage 4 chronic kidney disease (HCC) N18.4 585.4    8. Mixed hyperlipidemia E78.2 272.2 LIPID PANEL      TSH REFLEX TO T4   9. Prostate cancer ([facility] C61 185    10. IFG (impaired fasting glucose) R73.01 790.21 HEMOGLOBIN A1C WITH EAG   11. Blister of hand, unspecified laterality, initial encounter S60.529A 914.2    12. Encounter for screening for malignant neoplasm of prostate  Z12.5 V76.44 PSA SCREENING (SCREENING)   13. Advance care planning Z71.89 V65.49          Diagnoses and all orders for this visit:    1. Medicare annual wellness visit, initial    2. Encounter to establish care  Medical records from Dr. German Rider, Dr. Catalina Villeda, and Dr. Magdy Davis reviewed. Medical records from Dr. Conner Tamayo requested. 3. Essential hypertension  Well-controlled. /65 today. Continue enalapril and carvedilol. 4. Chronic combined systolic and diastolic congestive heart failure (HCC)  Asymptomatic. NYHA III.  Continue Lasix, enalapril, and carvedilol. 5. NICM (nonischemic cardiomyopathy) ([facility]    6. Atrial flutter, unspecified type ([facility]  Stable. Continue to follow with Dr. Raj Cosme.    7. Stage 4 chronic kidney disease ([facility]  Worsening status. Recently had acute on chronic kidney disease. Last creat 1.66 and GFR 49 on 10/2/17. Keep scheduled appointment with Dr. Toshia Brady. 8. Mixed hyperlipidemia  Check FLP today. Continue atorvastatin 40 mg daily pending results. -     LIPID PANEL  -     TSH REFLEX TO T4    9. Prostate cancer ([facility]  Unclear at this time when he was diagnosed, what previous treatment he had, and whether or not he is in remission. Medical records from urologist requested. 10. IFG (impaired fasting glucose)  -     HEMOGLOBIN A1C WITH EAG    11. Blister of hand, bilateral, initial encounter  May be due to allergic contact dermatitis. Also consider severe palmoplantar dyshidrotic eczema, bullous pemphigoid,or pemphigus vulgaris. The blisters do not look infected but okay to continue Keflex. Advised patient to use Mobic sparingly since NSAID's can worsen CHF. 12. Encounter for screening for malignant neoplasm of prostate   -     PSA SCREENING (SCREENING)    13. Advance Care Planning      Follow-up Disposition:  Return in about 3 weeks (around 10/26/2017), or if symptoms worsen or fail to improve, for Hand blisters, CHF, back pain. Patient seen and had Medicare Annual Wellness Exam; Wellness Schedule printed, reviewed, and given to patient.

## 2017-10-05 NOTE — MR AVS SNAPSHOT
Visit Information Date & Time Provider Department Dept. Phone Encounter #  
 10/5/2017  8:30 AM Irma Borjas Mildredines Fleming [phone] [id number] Follow-up Instructions Return in about 3 weeks (around 10/26/2017), or if symptoms worsen or fail to improve, for Hand blisters, CHF, back pain. Your Appointments 11/7/2017 10:45 AM  
3 MONTH with Esteban Leonard MD  
[facility] Appt Note: 3 mnth f/u,jaa  
 [id number] [facility]  
[phone] [id number] [facility] Upcoming Health Maintenance Date Due DTaP/Tdap/Td series (1 - Tdap) 4/9/1963 ZOSTER VACCINE AGE 60> 2/9/2002 GLAUCOMA SCREENING Q2Y 4/9/2007 Pneumococcal 65+ High/Highest Risk (2 of 2 - PCV13) 11/30/2016 INFLUENZA AGE 9 TO ADULT 8/1/2017 MEDICARE YEARLY EXAM 10/6/2018 Allergies as of 10/5/2017  Review Complete On: 10/5/2017 By: Irma Borjas MD  
 No Known Allergies Current Immunizations  Reviewed on 11/28/2015 Name Date Influenza Vaccine (Quad) PF 11/30/2015 10:45 AM  
 Pneumococcal Polysaccharide (PPSV-23) 11/30/2015 10:01 AM  
  
 Not reviewed this visit You Were Diagnosed With   
  
 Codes Comments Medicare annual wellness visit, initial    -  Primary ICD-10-CM: Z00.00 ICD-9-CM: V70.0 Essential hypertension     ICD-10-CM: I10 
ICD-9-CM: 401.9 Chronic combined systolic and diastolic congestive heart failure (HCC)     ICD-10-CM: I50.42 
ICD-9-CM: 428.42, 428.0 NICM (nonischemic cardiomyopathy) ([facility]     ICD-10-CM: I42.8 ICD-9-CM: 425.4 Atrial flutter, unspecified type ([facility]     ICD-10-CM: I48.92 
ICD-9-CM: 427.32 Stage 4 chronic kidney disease ([facility]     ICD-10-CM: N18.4 ICD-9-CM: 585.4 Mixed hyperlipidemia     ICD-10-CM: E78.2 ICD-9-CM: 272.2 Prostate cancer [facility])     ICD-10-CM: K48 ICD-9-CM: 393 IFG (impaired fasting glucose)     ICD-10-CM: R73.01 
ICD-9-CM: 790.21 Encounter for screening for malignant neoplasm of prostate     ICD-10-CM: Z12.5 ICD-9-CM: V76.44 Vitals BP Pulse Temp Resp Height(growth percentile) Weight(growth percentile)  
 119/65 (BP 1 Location: Left arm, BP Patient Position: Sitting) 72 98.8 °F (37.1 °C) (Oral) 18 5' 9\" (1.753 m) 233 lb (105.7 kg) SpO2 BMI Smoking Status 95% 34.41 kg/m2 Former Smoker BMI and BSA Data Body Mass Index Body Surface Area 34.41 kg/m 2 2.27 m 2 Preferred Pharmacy Pharmacy Name Phone [facility] [address] [place], [place] - [address] [place] [address] [place] [phone] Your Updated Medication List  
  
   
This list is accurate as of: 10/5/17  9:30 AM.  Always use your most recent med list.  
  
  
  
  
 albuterol 90 mcg/actuation inhaler Commonly known as:  PROVENTIL HFA, VENTOLIN HFA, PROAIR HFA Take 2 Puffs by inhalation every four (4) hours as needed for Wheezing. aspirin 81 mg chewable tablet Take 1 Tab by mouth daily. atorvastatin 40 mg tablet Commonly known as:  LIPITOR  
TAKE ONE TABLET BY MOUTH NIGHTLY  
  
 carvedilol 25 mg tablet Commonly known as:  Fern Frank Take 25 mg by mouth two (2) times daily (with meals). cephALEXin 500 mg capsule Commonly known as:  Juanetta Muzzy Take 500 mg by mouth every six (6) hours. enalapril 10 mg tablet Commonly known as:  Jaqueline Burow Take 10 mg by mouth daily. furosemide 40 mg tablet Commonly known as:  LASIX TAKE ONE TABLET BY MOUTH DAILY- DOSE REDUCED 12/17/15  
  
 meloxicam 7.5 mg tablet Commonly known as:  MOBIC Take 7.5 mg by mouth daily as needed. We Performed the Following HEMOGLOBIN A1C WITH EAG [52405 CPT(R)] LIPID PANEL [39332 CPT(R)] PSA SCREENING (SCREENING) [ HCP] TSH REFLEX TO T4 [[id number] Custom] Follow-up Instructions Return in about 3 weeks (around 10/26/2017), or if symptoms worsen or fail to improve, for Hand blisters, CHF, back pain. Patient Instructions Schedule of Personalized Health Plan The best way to stay healthy is to live a healthy lifestyle. A healthy lifestyle includes regular exercise, eating a well-balanced diet, keeping a healthy weight and not smoking. Regular physical exams and screening tests are another important way to take care of yourself. Preventive exams provided by health care providers can find health problems early when treatment works best and can keep you from getting certain diseases or illnesses. Preventive services include exams, lab tests, screenings, shots, monitoring and information to help you take care of your own health. All people over 65 should have a pneumonia shot. Pneumonia shots are usually only needed once in a lifetime unless your doctor decides differently. All people over 65 should have a yearly flu shot. People over 65 are at medium to high risk for Hepatitis B. Three shots are needed for complete protection. In addition to your physical exam, some screening tests are recommended: 
 
Bone mass measurement (dexa scan) is recommended every two years if you have certain risk factors, such as personal history of vertebral fracture or chronic steroid medication use Diabetes Mellitus screening is recommended every year. Glaucoma is an eye disease caused by high pressure in the eye. An eye exam is recommended every year. Cardiovascular screening tests that check your cholesterol and other blood fat (lipid) levels are recommended every five years. Colorectal Cancer screening tests help to find pre-cancerous polyps (growths in the colon) so they can be removed before they turn into cancer. Tests ordered for screening depend on your personal and family history risk factors. Screening for Prostate Cancer is recommended yearly with a digital rectal exam and/or a PSA test 
 
Here is a list of your current Health Maintenance items with a due date: 
Health Maintenance Topic Date Due  
 DTaP/Tdap/Td series (1 - Tdap) 04/09/1963  ZOSTER VACCINE AGE 60>  02/09/2002  GLAUCOMA SCREENING Q2Y  04/09/2007  MEDICARE YEARLY EXAM  04/09/2007  Pneumococcal 65+ High/Highest Risk (2 of 2 - PCV13) 11/30/2016  INFLUENZA AGE 9 TO ADULT  08/01/2017 Introducing [facility] SERVICES! Carline Augusto introduces WEbook patient portal. Now you can access parts of your medical record, email your doctor's office, and request medication refills online. 1. In your internet browser, go to [email or web address] 2. Click on the First Time User? Click Here link in the Sign In box. You will see the New Member Sign Up page. 3. Enter your WEbook Access Code exactly as it appears below. You will not need to use this code after youve completed the sign-up process. If you do not sign up before the expiration date, you must request a new code. · WEbook Access Code: [email or web address] Expires: 11/19/2017  1:11 PM 
 
4. Enter the last four digits of your Social Security Number (xxxx) and Date of Birth (mm/dd/yyyy) as indicated and click Submit. You will be taken to the next sign-up page. 5. Create a WEbook ID. This will be your WEbook login ID and cannot be changed, so think of one that is secure and easy to remember. 6. Create a WEbook password. You can change your password at any time. 7. Enter your Password Reset Question and Answer. This can be used at a later time if you forget your password. 8. Enter your e-mail address. You will receive e-mail notification when new information is available in [address] 9. Click Sign Up. You can now view and download portions of your medical record.  
10. Click the Download Summary menu link to download a portable copy of your medical information. If you have questions, please visit the Frequently Asked Questions section of the [employer] website. Remember, [employer]rib is NOT to be used for urgent needs. For medical emergencies, dial 911. Now available from your iPhone and Android! Please provide this summary of care documentation to your next provider. Your primary care clinician is listed as Andrew Barton. If you have any questions after today's visit, please call [phone].

## 2017-10-06 LAB
CHOLEST SERPL-MCNC: 87 MG/DL (ref 100–199)
EST. AVERAGE GLUCOSE BLD GHB EST-MCNC: 137 MG/DL
HBA1C MFR BLD: 6.4 % (ref 4.8–5.6)
HDLC SERPL-MCNC: 14 MG/DL
INTERPRETATION, 910389: NORMAL
LDLC SERPL CALC-MCNC: 51 MG/DL (ref 0–99)
PSA SERPL-MCNC: 0.2 NG/ML (ref 0–4)
TRIGL SERPL-MCNC: 110 MG/DL (ref 0–149)
TSH SERPL DL<=0.005 MIU/L-ACNC: 1 UIU/ML (ref 0.45–4.5)
VLDLC SERPL CALC-MCNC: 22 MG/DL (ref 5–40)

## 2017-10-06 NOTE — ACP (ADVANCE CARE PLANNING)
Advance Care Planning (ACP) Provider Conversation Snapshot    Date of ACP Conversation: 10/05/17  Persons included in Conversation:  patient and family  Length of ACP Conversation in minutes:  <16 minutes (Non-Billable)    Authorized Decision Maker (if patient is incapable of making informed decisions):    This person is:   Healthcare Agent/Medical Power of  under Advance Directive          For Patients with Decision Making Capacity:   Values/Goals: Exploration of values, goals, and preferences if recovery is not expected, even with continued medical treatment in the event of:  Imminent death    Conversation Outcomes / Follow-Up Plan:   Recommended completion of Advance Directive form after review of ACP materials and conversation with prospective healthcare agent

## 2017-10-08 PROBLEM — R73.03 PREDIABETES: Status: ACTIVE | Noted: 2017-10-08

## 2017-10-08 PROBLEM — C61 PROSTATE CANCER (HCC): Status: ACTIVE | Noted: 2017-10-08

## 2017-10-08 PROBLEM — G89.29 CHRONIC LOW BACK PAIN: Status: ACTIVE | Noted: 2017-10-08

## 2017-10-08 PROBLEM — J44.9 COPD (CHRONIC OBSTRUCTIVE PULMONARY DISEASE) (HCC): Status: ACTIVE | Noted: 2017-10-08

## 2017-10-08 PROBLEM — M54.50 CHRONIC LOW BACK PAIN: Status: ACTIVE | Noted: 2017-10-08

## 2017-10-08 PROBLEM — N18.30 STAGE 3 CHRONIC KIDNEY DISEASE (HCC): Status: ACTIVE | Noted: 2017-10-05

## 2017-10-08 PROBLEM — S60.529A: Status: ACTIVE | Noted: 2017-10-08

## 2017-10-08 PROBLEM — E66.9 OBESITY (BMI 30-39.9): Status: ACTIVE | Noted: 2017-10-08

## 2017-10-12 NOTE — PROGRESS NOTES
Spoke with patient and after verifying name and date of birth of patient gave him test results per Dr. Jesus Alberto Prabhakar. Patient stated understanding.

## 2017-10-12 NOTE — PROGRESS NOTES
Your recent blood work showed normal cholesterol, thyroid, and prostate blood test (PSA). You have borderline diabetes. To decrease your risk of getting full-blown diabetes, work on diet, exercise, and weight loss. For diet, increase fruits, vegetables, and low-fat dairy products, and decrease red meats (beef, pork), sweets, soft drinks, juices, and foods high in carbohydrates like white bread, white rice, potatoes, crackers, potato chips.

## 2017-10-27 ENCOUNTER — OFFICE VISIT (OUTPATIENT)
Dept: INTERNAL MEDICINE CLINIC | Age: 77
End: 2017-10-27

## 2017-10-27 VITALS
TEMPERATURE: 97.6 F | HEART RATE: 81 BPM | OXYGEN SATURATION: 97 % | DIASTOLIC BLOOD PRESSURE: 65 MMHG | RESPIRATION RATE: 18 BRPM | HEIGHT: 69 IN | SYSTOLIC BLOOD PRESSURE: 111 MMHG | WEIGHT: 213 LBS | BODY MASS INDEX: 31.55 KG/M2

## 2017-10-27 DIAGNOSIS — S60.529D: ICD-10-CM

## 2017-10-27 DIAGNOSIS — I50.42 CHRONIC COMBINED SYSTOLIC AND DIASTOLIC CONGESTIVE HEART FAILURE (HCC): ICD-10-CM

## 2017-10-27 DIAGNOSIS — L85.3 DRY SKIN: ICD-10-CM

## 2017-10-27 DIAGNOSIS — I10 ESSENTIAL HYPERTENSION: Primary | ICD-10-CM

## 2017-10-27 DIAGNOSIS — N18.30 STAGE 3 CHRONIC KIDNEY DISEASE (HCC): ICD-10-CM

## 2017-10-27 DIAGNOSIS — E78.2 MIXED HYPERLIPIDEMIA: ICD-10-CM

## 2017-10-27 NOTE — PROGRESS NOTES
CC:   Chief Complaint   Patient presents with    CHF    Back Pain    Skin Problem     blisters on hands       HISTORY OF PRESENT ILLNESS  Crista Scruggs is a 78 y.o. male. Presents for 3 week follow up on hand blisters. He has HTN, CKD stage 3- 4, CHF (combined diastolic and systolic) with echo EF 03-03% in 11/15, non-ischemic cardiomyopathy, atrial flutter s/p AFL ablation on 8/22/17, prediabetes, COPD, and history of prostate cancer. Reports hand blisters better. Back pain better. He complains of dry itchy skin. Does not use any lotions. Denies CP, SOB, leg swelling, orthopnea, or PND. Is scheduled for the following appointments:  10/31/17-  Dr. Elyse Lantigua (Nephrology) on 10/30/17 for evaluation of worsening CKD. 11/7/17- Dr. Young Acosta (Cardiology)  12/12/17- [facility] doctor at [facility]  2/18- Dr. Rina Sanchez, to get prostate cancer-related injection    Review of Studies  EKG 10/2/17: NSR with frequent PVC's. Echo (TTE) 7/31/17: EF 55-60%. No regional wall motion abnormalities. LA moderately dilated. Lumbar spine X-ray 10/2/17: Mild spondylosis. CXR 10/2/17: mild interstitial edema in the mid lower lung zones right greater than left  CT head 9/29/17: no acute abnormality     Other Providers: Dr. Young Acosta (Cardiology), Dr. Bethany Vargas (Cardiology EPS), Dr. Rina Sanchez (Urology)     Soc Hx  . Has 2 children and 3 grandchildren. Retired . Quit 8/7/17; previously smoked 1.5-2 ppd for 50 yrs. No longer drinks alcohol. Denies recreational drug use.  Does not get much exercise.     Health Maintenance  Flu vaccine: 9/18/17                                      Pneumonia vaccine: PPSV-23 11/30/15, PCV-13 9/18/17                                                                                  Tetanus vaccine: not indicated                                                                   Zoster vaccine: declined  Colonoscopy: 2/22/16, Dr. Alejandrina Ramos, one sessile polyp, sigmoid diverticulosis, int hemorrhoids, repeat in 5 yrs  Eye exam: has appt next week (Dr. James Riggs?)  Lipids: 10/5/17 (tot chol 87, LDL 51)  A1c: 10/5/17 (6.4%)  Advanced Directives: given information  End of Life: given information    ROS  A complete review of systems was performed and is negative except for those mentioned in the HPI. 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    S/P cardiac cath Z98.890    NICM (nonischemic cardiomyopathy) ([facility] I42.8    Hypertension I10    Mixed hyperlipidemia E78.2    Tobacco use Z72.0    Atrial flutter (HCC) I48.92    Stage 3 chronic kidney disease N18.3    Frequent hospital admissions Z78.9    Prediabetes R73.03    Prostate cancer ([facility] C61    COPD (chronic obstructive pulmonary disease) ([facility] J44.9    Chronic low back pain M54.5, G89.29    Hand blister S60.529A    Obesity (BMI 30-39. 9) E66.9     Past Medical History:   Diagnosis Date    Acute respiratory failure with hypoxia ([facility] 02/08/2014    also 11/28/15, 2/17/16, 5/14/17     Atrial flutter ([facility] 08/2017    saw Dr. Diane Mckeon; underwent a-flutter ablation    BPH (benign prostatic hyperplasia)     CHF (congestive heart failure) ([facility] 02/11/2014    TTE 11/15: EF 40-45%, 2/14: EF 20%  Admitted for acute exacerbations 2/8/14, 11/28/15, 2/17/16, and 5/4/17)    Chronic low back pain     LS spine X-ray 4/8/17: mild DDD    COPD (chronic obstructive pulmonary disease) ([facility]     ED (erectile dysfunction)     Elevated troponin 02/11/2014    also 11/28/15; due to cardiac strain    Esophagitis     reflux, hiatal hernia    Femur fracture, right ([address]     Frequent hospital admissions     5/14-5/23/17: acute hypoxic resp failure due to CHF exac, ROBINSON on CKD 3, sepsis due to LE cellulitis, 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  2/17-2/22/16: acute hypoxic resp failure, acute diarrhea  11/28-11/30/15: acute hypoxic resp failure due to acute CHF exac, elevated troponin due to cardiac strain  2/8-2/12/14: acuter hypoxic resp failure due to CHF exac, pneumonia     Hypertension     Nonischemic cardiomyopathy ([facility]     with LVH    Pneumonia 02/08/2014 2/8/14 and 10/30/15    Prediabetes     [facility] 2016    Pulmonary edema 11/28/2015    S/P cardiac cath 2/12/2014 2/12/14 no significant coronary disease, severe LV dysfunction    Shingles     Tinea cruris     also genital folliculitis    Vertigo      No Known Allergies     Current Outpatient Prescriptions   Medication Sig Dispense Refill    enalapril (VASOTEC) 10 mg tablet Take 10 mg by mouth daily.  atorvastatin (LIPITOR) 40 mg tablet TAKE ONE TABLET BY MOUTH NIGHTLY 30 Tab 6    furosemide (LASIX) 40 mg tablet TAKE ONE TABLET BY MOUTH DAILY- DOSE REDUCED 12/17/15 30 Tab 6    albuterol (PROVENTIL HFA, VENTOLIN HFA, PROAIR HFA) 90 mcg/actuation inhaler Take 2 Puffs by inhalation every four (4) hours as needed for Wheezing. 1 Inhaler 0    aspirin 81 mg chewable tablet Take 1 Tab by mouth daily. 10 Tab 0    carvedilol (COREG) 25 mg tablet Take 25 mg by mouth two (2) times daily (with meals). PHYSICAL EXAM  Visit Vitals    /65 (BP 1 Location: Left arm, BP Patient Position: Sitting)    Pulse 81    Temp 97.6 °F (36.4 °C) (Oral)    Resp 18    Ht 5' 9\" (1.753 m)    Wt 213 lb (96.6 kg)    SpO2 97%    BMI 31.45 kg/m2       General: Obese, no distress. HEENT:  Head normocephalic/atraumatic, no scleral icterus  Lungs:  Clear to ausculation bilaterally. Good air movement. Heart:  Regular rate and rhythm, normal S1 and S2, no murmur, gallop, or rub  Extremities: No clubbing, cyanosis, or edema. Skin: Areas of blisters on hands with 2-3 cm areas of pink, denuded skin or hyperpigmented plaques. Neurological: Alert and oriented. Psychiatric: Normal mood and affect.  Behavior is normal.     Results for orders placed or performed in visit on 10/05/17   LIPID PANEL   Result Value Ref Range Cholesterol, total 87 (L) 100 - 199 mg/dL    Triglyceride 110 0 - 149 mg/dL    HDL Cholesterol 14 (L) >39 mg/dL    VLDL, calculated 22 5 - 40 mg/dL    LDL, calculated 51 0 - 99 mg/dL   HEMOGLOBIN A1C WITH EAG   Result Value Ref Range    Hemoglobin A1c 6.4 (H) 4.8 - 5.6 %    Estimated average glucose 137 mg/dL   TSH REFLEX TO T4   Result Value Ref Range    TSH 1.000 0.450 - 4.500 uIU/mL   PSA SCREENING (SCREENING)   Result Value Ref Range    Prostate Specific Ag 0.2 0.0 - 4.0 ng/mL   CVD REPORT   Result Value Ref Range    INTERPRETATION Note          ASSESSMENT AND PLAN    ICD-10-CM ICD-9-CM    1. Essential hypertension I10 401.9    2. Blister of hand, unspecified laterality, subsequent encounter S60.529D V58.89    3. Chronic combined systolic and diastolic congestive heart failure (HCC) I50.42 428.42      428.0    4. Dry skin L85.3 701.1    5. Stage 3 chronic kidney disease N18.3 585.3    6. Mixed hyperlipidemia E78.2 272.2        Diagnoses and all orders for this visit:    1. Essential hypertension  Controlled, continue present management. 2. Blister of hand, unspecified laterality, subsequent encounter  Improving. 3. Chronic combined systolic and diastolic congestive heart failure (HCC)  Stable. Continue present management. 4. Dry skin  For Dry Skin  Use Aquaphor, Eucerin Cream, or Vaseline (over the counter) daily after bathing and as needed for rash/dry skin/itching. Use a gentle soap: Dove, Tone, Caress or Aveeno. Avoid Ivory and deodorant soaps. Bathe in the coolest water that is comfortable. Pat skin dry, do not rub. 5. Stage 3 chronic kidney disease  Keep scheduled appointment with Dr. Mo Rodriguez. 6. Mixed hyperlipidemia    Still awaiting medical records from Dr. Tameka La. Follow-up Disposition:  Return in about 1 month (around 11/27/2017), or if symptoms worsen or fail to improve, for HTN, dry skin, hand blisters.       Provided patient and/or family with advanced directive information and answered pertinent questions. Encouraged patient to provide a copy of advanced directive to the office when available. I have discussed the diagnosis with the patient and the intended plan as seen in the above orders. Patient is in agreement. The patient has received an after-visit summary and questions were answered concerning future plans. I have discussed medication side effects and warnings with the patient as well.

## 2017-10-27 NOTE — PROGRESS NOTES
Reviewed record  In preparation for visit and have obtained necessary documentation. 1. Have you been to the ER, urgent care clinic since your last visit? Hospitalized since your last visit?no  2. Have you seen or consulted any other health care providers outside of the [address] [place] [address] since your last visit? Include any pap smears or colon screening. no  Advanced directives: Patient declines information on advanced directives. Patients vital signs discussed with physician. Is working on getting an appt with Dr Kristian Luis.

## 2017-10-27 NOTE — MR AVS SNAPSHOT
Visit Information Date & Time Provider Department Dept. Phone Encounter #  
 10/27/2017 11:30 AM Mikael LepeJohanna [phone] [id number] Follow-up Instructions Return in about 1 month (around 11/27/2017), or if symptoms worsen or fail to improve, for HTN, dry skin, hand blisters. Your Appointments 11/7/2017 10:45 AM  
3 MONTH with Dougie Brown MD  
[facility] [address] [place] [address]) Appt Note: 3 mnth f/u,jaa  
 [address]  
[phone] [address] Upcoming Health Maintenance Date Due DTaP/Tdap/Td series (1 - Tdap) 4/9/1963 GLAUCOMA SCREENING Q2Y 4/9/2007 MEDICARE YEARLY EXAM 10/6/2018 Allergies as of 10/27/2017  Review Complete On: 10/27/2017 By: Mikael Lepe MD  
 No Known Allergies Current Immunizations  Reviewed on 11/28/2015 Name Date Influenza High Dose Vaccine PF 9/18/2017 Influenza Vaccine (Quad) PF 11/30/2015 10:45 AM  
 Pneumococcal Conjugate (PCV-13) 9/18/2017 Pneumococcal Polysaccharide (PPSV-23) 11/30/2015 10:01 AM  
  
 Not reviewed this visit You Were Diagnosed With   
  
 Codes Comments Essential hypertension    -  Primary ICD-10-CM: I10 
ICD-9-CM: 401.9 Blister of hand, unspecified laterality, subsequent encounter     ICD-10-CM: H06.468I ICD-9-CM: V58.89 Chronic combined systolic and diastolic congestive heart failure (HCC)     ICD-10-CM: I50.42 
ICD-9-CM: 428.42, 428.0 Dry skin     ICD-10-CM: L85.3 ICD-9-CM: 701.1 Stage 3 chronic kidney disease     ICD-10-CM: N18.3 ICD-9-CM: 229. 3 Mixed hyperlipidemia     ICD-10-CM: E78.2 ICD-9-CM: 272.2 Vitals BP Pulse Temp Resp Height(growth percentile) Weight(growth percentile)  111/65 (BP 1 Location: Left arm, BP Patient Position: Sitting) 81 97.6 °F (36.4 °C) (Oral) 18 5' 9\" (1.753 m) 213 lb (96.6 kg) SpO2 BMI Smoking Status 97% 31.45 kg/m2 Former Smoker BMI and BSA Data Body Mass Index Body Surface Area  
 31.45 kg/m 2 2.17 m 2 Preferred Pharmacy Pharmacy Name Phone [facility] [address] [place], [place] - [address] [place] [address] Pari Condon [phone] Your Updated Medication List  
  
   
This list is accurate as of: 10/27/17 11:47 AM.  Always use your most recent med list.  
  
  
  
  
 albuterol 90 mcg/actuation inhaler Commonly known as:  PROVENTIL HFA, VENTOLIN HFA, PROAIR HFA Take 2 Puffs by inhalation every four (4) hours as needed for Wheezing. aspirin 81 mg chewable tablet Take 1 Tab by mouth daily. atorvastatin 40 mg tablet Commonly known as:  LIPITOR  
TAKE ONE TABLET BY MOUTH NIGHTLY  
  
 carvedilol 25 mg tablet Commonly known as:  Vergia Vito Take 25 mg by mouth two (2) times daily (with meals). enalapril 10 mg tablet Commonly known as:  Georganne Merritts Take 10 mg by mouth daily. furosemide 40 mg tablet Commonly known as:  LASIX TAKE ONE TABLET BY MOUTH DAILY- DOSE REDUCED 12/17/15 Follow-up Instructions Return in about 1 month (around 11/27/2017), or if symptoms worsen or fail to improve, for HTN, dry skin, hand blisters. Patient Instructions For Dry Skin Use Aquaphor, Eucerin Cream, or Vaseline (over the counter) daily after bathing and as needed for rash/dry skin/itching. Use a gentle soap: Dove, Tone, Caress or Aveeno. Avoid Ivory and deodorant soaps. Bathe in the coolest water that is comfortable. Pat skin dry, do not rub. Dry Skin: Care Instructions Your Care Instructions Dry skin is a common problem, especially in areas where the air is very dry. Dry skin can also become a problem as you get older and lose natural oils that keep your skin moist. 
A tendency toward dry, itchy skin may run in families.  Some problems with the body's defenses (immune system), allergies, or an infection with a fungus may also cause patches of dry skin. An over-the-counter cream may help your dry skin. If your skin problem does not get better with home treatment, your doctor may prescribe ointment. You may need antibiotics if you have a skin infection. Follow-up care is a key part of your treatment and safety. Be sure to make and go to all appointments, and call your doctor if you are having problems. It's also a good idea to know your test results and keep a list of the medicines you take. How can you care for yourself at home? Showers and baths · Keep showers and baths short, and use warm or lukewarm water. Don't use hot water. It takes off more of your skin's natural oils. · Use as little soap as you can. Choose a mild soap, such as Dove, Cetaphil, or Neutrogena. Or use a skin cleanser like Aquanil or Cetaphil. · If you are taking a bath, use soap only at the very end. Then rinse off all traces of soap with fresh water. Gently pat your skin dry with a towel. Skin creams and moisturizers · Apply moisturizer or skin cream right away (within 3 minutes) after a bath or shower. Use a moisturizer at other times too, as often as you need it. · Moisturizing creams are better than lotions. Try brands like CeraVe cream, Cetaphil cream, or Eucerin cream. 
Other tips · When washing clothes, use a small amount of detergent. Don't use fabric softeners or dryer sheets. · For small areas of itchy skin, try an over-the-counter 1% hydrocortisone cream. 
· If you have very dry hands, spread petroleum jelly (such as Vaseline) on your hands before bed. Wear thin cotton gloves while you sleep. If your feet are dry, spread Vaseline on them and wear socks while you sleep. When should you call for help? Call your doctor now or seek immediate medical care if: 
? · You have signs of infection, such as: 
¨ Pain, warmth, or swelling in the skin. ¨ Red streaks near a wound in the skin. ¨ Pus coming from a wound in your skin. ¨ A fever. ? Watch closely for changes in your health, and be sure to contact your doctor if: 
? · You do not get better as expected. Where can you learn more? Go to http://bg-gurpreet.info/. Enter O871 in the search box to learn more about \"Dry Skin: Care Instructions. \" Current as of: October 13, 2016 Content Version: 11.4 © 3116-1360 Badge. Care instructions adapted under license by [employer] (which disclaims liability or warranty for this information). If you have questions about a medical condition or this instruction, always ask your healthcare professional. [address] any warranty or liability for your use of this information. Introducing [facility] & HEALTH SERVICES! Gilberto Millard introduces [employer] patient portal. Now you can access parts of your medical record, email your doctor's office, and request medication refills online. 1. In your internet browser, go to [email or web address] 2. Click on the First Time User? Click Here link in the Sign In box. You will see the New Member Sign Up page. 3. Enter your [employer] Access Code exactly as it appears below. You will not need to use this code after youve completed the sign-up process. If you do not sign up before the expiration date, you must request a new code. · [employer] Access Code: [email or web address] Expires: 11/19/2017  1:11 PM 
 
4. Enter the last four digits of your Social Security Number (xxxx) and Date of Birth (mm/dd/yyyy) as indicated and click Submit. You will be taken to the next sign-up page. 5. Create a [employer] ID. This will be your [employer] login ID and cannot be changed, so think of one that is secure and easy to remember. 6. Create a worldhistoryprojectt password. You can change your password at any time. 7. Enter your Password Reset Question and Answer. This can be used at a later time if you forget your password. 8. Enter your e-mail address. You will receive e-mail notification when new information is available in [address] 9. Click Sign Up. You can now view and download portions of your medical record. 10. Click the Download Summary menu link to download a portable copy of your medical information. If you have questions, please visit the Frequently Asked Questions section of the [employer] website. Remember, [employer] is NOT to be used for urgent needs. For medical emergencies, dial 911. Now available from your iPhone and Android! Please provide this summary of care documentation to your next provider. Your primary care clinician is listed as Franceen Number. If you have any questions after today's visit, please call [phone].

## 2017-11-07 ENCOUNTER — OFFICE VISIT (OUTPATIENT)
Dept: CARDIOLOGY CLINIC | Age: 77
End: 2017-11-07

## 2017-11-07 VITALS
RESPIRATION RATE: 20 BRPM | HEART RATE: 64 BPM | WEIGHT: 216.4 LBS | BODY MASS INDEX: 32.05 KG/M2 | OXYGEN SATURATION: 96 % | SYSTOLIC BLOOD PRESSURE: 130 MMHG | HEIGHT: 69 IN | DIASTOLIC BLOOD PRESSURE: 68 MMHG

## 2017-11-07 DIAGNOSIS — I10 ESSENTIAL HYPERTENSION: Primary | ICD-10-CM

## 2017-11-07 DIAGNOSIS — N28.9 RENAL INSUFFICIENCY: ICD-10-CM

## 2017-11-07 DIAGNOSIS — E78.2 MIXED HYPERLIPIDEMIA: ICD-10-CM

## 2017-11-07 DIAGNOSIS — I42.8 NICM (NONISCHEMIC CARDIOMYOPATHY) (HCC): ICD-10-CM

## 2017-11-07 DIAGNOSIS — I48.92 ATRIAL FLUTTER, UNSPECIFIED TYPE (HCC): ICD-10-CM

## 2017-11-07 NOTE — MR AVS SNAPSHOT
Visit Information Date & Time Provider Department Dept. Phone Encounter #  
 11/7/2017 10:45 AM Kiran Almonte, [address] [facility] [phone] [id number] Follow-up Instructions Return in about 6 months (around 5/7/2018). Routing History Follow-up and Disposition History Your Appointments 12/1/2017 11:30 AM  
ROUTINE CARE with Francisco Lewis MD  
[facility]) Appt Note: 1 month (around 11/27/2017), or if symptoms worsen or fail to improve, for HTN, dry skin, hand blisters [address] [facility] [address] [phone]  
  
   
 [address] [facility] [address] Upcoming Health Maintenance Date Due DTaP/Tdap/Td series (1 - Tdap) 4/9/1961 GLAUCOMA SCREENING Q2Y 4/9/2007 MEDICARE YEARLY EXAM 10/6/2018 Allergies as of 11/7/2017  Review Complete On: 11/7/2017 By: Kiran Almonte MD  
  
 Severity Noted Reaction Type Reactions Oxycodone  10/31/2017    Contact Dermatitis Current Immunizations  Reviewed on 11/28/2015 Name Date Influenza High Dose Vaccine PF 9/18/2017 Influenza Vaccine (Quad) PF 11/30/2015 10:45 AM  
 Pneumococcal Conjugate (PCV-13) 9/18/2017 Pneumococcal Polysaccharide (PPSV-23) 11/30/2015 10:01 AM  
  
 Not reviewed this visit You Were Diagnosed With   
  
 Codes Comments Essential hypertension    -  Primary ICD-10-CM: I10 
ICD-9-CM: 401.9 Atrial flutter, unspecified type ([facility]     ICD-10-CM: I48.92 
ICD-9-CM: 427.32   
 NICM (nonischemic cardiomyopathy) ([facility]     ICD-10-CM: I42.8 ICD-9-CM: 425.4 Mixed hyperlipidemia     ICD-10-CM: E78.2 ICD-9-CM: 272.2 Renal insufficiency     ICD-10-CM: N28.9 ICD-9-CM: 593.9 Vitals  BP Pulse Resp Height(growth percentile) Weight(growth percentile) SpO2  
 130/68 (BP 1 Location: Right arm, BP Patient Position: Sitting) 64 20 5' 9\" (1.753 m) 216 lb 6.4 oz (98.2 kg) 96% BMI Smoking Status 31.96 kg/m2 Former Smoker Vitals History BMI and BSA Data Body Mass Index Body Surface Area 31.96 kg/m 2 2.19 m 2 Preferred Pharmacy Pharmacy Name Phone [facility] [address] [place], [place] - [address] [place] [address] [place] [phone] Your Updated Medication List  
  
   
This list is accurate as of: 11/7/17 11:26 AM.  Always use your most recent med list.  
  
  
  
  
 albuterol 90 mcg/actuation inhaler Commonly known as:  PROVENTIL HFA, VENTOLIN HFA, PROAIR HFA Take 2 Puffs by inhalation every four (4) hours as needed for Wheezing. aspirin 81 mg chewable tablet Take 1 Tab by mouth daily. atorvastatin 40 mg tablet Commonly known as:  LIPITOR  
TAKE ONE TABLET BY MOUTH NIGHTLY  
  
 carvedilol 25 mg tablet Commonly known as:  Gillermina Begun Take 25 mg by mouth two (2) times daily (with meals). furosemide 40 mg tablet Commonly known as:  LASIX TAKE ONE TABLET BY MOUTH DAILY- DOSE REDUCED 12/17/15 We Performed the Following AMB POC EKG ROUTINE W/ 12 LEADS, INTER & REP [16652 CPT(R)] Follow-up Instructions Return in about 6 months (around 5/7/2018). Introducing hospitals & HEALTH SERVICES! [employer] introduces [employer] patient portal. Now you can access parts of your medical record, email your doctor's office, and request medication refills online. 1. In your internet browser, go to [email or web address] 2. Click on the First Time User? Click Here link in the Sign In box. You will see the New Member Sign Up page. 3. Enter your [employer] Access Code exactly as it appears below. You will not need to use this code after youve completed the sign-up process. If you do not sign up before the expiration date, you must request a new code. · [employer] Access Code: [email or web address] Expires: 11/19/2017 12:11 PM 
 
 4. Enter the last four digits of your Social Security Number (xxxx) and Date of Birth (mm/dd/yyyy) as indicated and click Submit. You will be taken to the next sign-up page. 5. Create a [employer] ID. This will be your [employer] login ID and cannot be changed, so think of one that is secure and easy to remember. 6. Create a [employer] password. You can change your password at any time. 7. Enter your Password Reset Question and Answer. This can be used at a later time if you forget your password. 8. Enter your e-mail address. You will receive e-mail notification when new information is available in [address] 9. Click Sign Up. You can now view and download portions of your medical record. 10. Click the Download Summary menu link to download a portable copy of your medical information. If you have questions, please visit the Frequently Asked Questions section of the [employer] website. Remember, [employer] is NOT to be used for urgent needs. For medical emergencies, dial 911. Now available from your iPhone and Android! Please provide this summary of care documentation to your next provider. Your primary care clinician is listed as Kacie Prince. If you have any questions after today's visit, please call [phone].

## 2017-11-07 NOTE — PROGRESS NOTES
11/7/2017 11:20 AM      Subjective:     Gibson Cervantes is here for f/u visit. He denies chest pain, chest pressure/discomfort, dyspnea, palpitations, irregular heart beats, near-syncope, syncope, fatigue, orthopnea, paroxysmal nocturnal dyspnea, exertional chest pressure/discomfort, claudication, lower extremity edema, tachypnea. Visit Vitals    /68 (BP 1 Location: Right arm, BP Patient Position: Sitting)    Pulse 64    Resp 20    Ht 5' 9\" (1.753 m)    Wt 216 lb 6.4 oz (98.2 kg)    SpO2 96%    BMI 31.96 kg/m2     Current Outpatient Prescriptions   Medication Sig    atorvastatin (LIPITOR) 40 mg tablet TAKE ONE TABLET BY MOUTH NIGHTLY    furosemide (LASIX) 40 mg tablet TAKE ONE TABLET BY MOUTH DAILY- DOSE REDUCED 12/17/15    albuterol (PROVENTIL HFA, VENTOLIN HFA, PROAIR HFA) 90 mcg/actuation inhaler Take 2 Puffs by inhalation every four (4) hours as needed for Wheezing.  aspirin 81 mg chewable tablet Take 1 Tab by mouth daily.  carvedilol (COREG) 25 mg tablet Take 25 mg by mouth two (2) times daily (with meals). No current facility-administered medications for this visit.           Objective:      Visit Vitals    /68 (BP 1 Location: Right arm, BP Patient Position: Sitting)    Pulse 64    Resp 20    Ht 5' 9\" (1.753 m)    Wt 216 lb 6.4 oz (98.2 kg)    SpO2 96%    BMI 31.96 kg/m2       Data Review:    EKG: sinus bradycardia, non specific T wave changes    Reviewed and/or ordered active problem list, medication list tests    Past Medical History:   Diagnosis Date    Acute respiratory failure with hypoxia ([facility] 02/08/2014    also 11/28/15, 2/17/16, 5/14/17     Atrial flutter ([address] 08/2017    saw Dr. Samira Joseph; underwent a-flutter ablation    BPH (benign prostatic hyperplasia)     CHF (congestive heart failure) ([facility] 02/11/2014    TTE 11/15: EF 40-45%, 2/14: EF 20%  Admitted for acute exacerbations 2/8/14, 11/28/15, 2/17/16, and 5/4/17)    Chronic low back pain     LS spine X-ray 4/8/17: mild DDD    COPD (chronic obstructive pulmonary disease) ([facility]     ED (erectile dysfunction)     Elevated troponin 02/11/2014    also 11/28/15; due to cardiac strain    Esophagitis     reflux, hiatal hernia    Femur fracture, right ([facility]     Frequent hospital admissions     5/14-5/23/17: acute hypoxic resp failure due to CHF exac, ROBINSON on CKD 3, sepsis due to LE cellulitis, 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  2/17-2/22/16: acute hypoxic resp failure, acute diarrhea  11/28-11/30/15: acute hypoxic resp failure due to acute CHF exac, elevated troponin due to cardiac strain  2/8-2/12/14: acuter hypoxic resp failure due to CHF exac, pneumonia     Hypertension     Nonischemic cardiomyopathy ([facility]     with LVH    Pneumonia 02/08/2014 2/8/14 and 10/30/15    Prediabetes     Prostate cancer ([facility] 2016    Pulmonary edema 11/28/2015    S/P cardiac cath 2/12/2014 2/12/14 no significant coronary disease, severe LV dysfunction    Shingles     Tinea cruris     also genital folliculitis    Vertigo       Past Surgical History:   Procedure Laterality Date    CARDIAC SURG PROCEDURE UNLIST  08/22/2017    SVT ablation    COLONOSCOPY,DIAGNOSTIC  02/22/2016    Dr. Vivienne Connor; single sessile polyp at descending colon, sigmoid diverticulosis, int hemorrhoids    COLONOSCOPY,[place]UZI [place]  2/22/2016         HX ORTHOPAEDIC      surgery on right index finger    HX ORTHOPAEDIC  2000    right shoulder surgery    HX OTHER SURGICAL  08/22/2017    Dr. Diane Mckeon; atrial flutter ablation    HX TONSILLECTOMY       Allergies   Allergen Reactions    Oxycodone Contact Dermatitis      Family History   Problem Relation Age of Onset    Adopted: Yes    Family history unknown: Yes      Social History     Social History    Marital status:      Spouse name: N/A    Number of children: N/A    Years of education: N/A     Occupational History    Not on file.      Social History Main Topics  Smoking status: Former Smoker     Packs/day: 2.00     Years: 20.00     Types: Cigarettes     Quit date: 8/7/2017    Smokeless tobacco: Current User     Types: Chew      Comment: Chewing tobacco    Alcohol use No    Drug use: No    Sexual activity: Not on file     Other Topics Concern    Not on file     Social History Narrative         Review of Systems     General: Not Present- Anorexia, Chills, Dietary Changes, Fatigue, Fever, Medication Changes, Night Sweats, Weight Gain > 10lbs. and Weight Loss > 10lbs. .  Skin: Not Present- Bruising and Excessive Sweating. HEENT: Not Present- Headache, Visual Loss and Vertigo. Respiratory: Not Present- Cough, Decreased Exercise Tolerance, Difficulty Breathing, Snoring and Wheezing. Cardiovascular: Not Present- Abnormal Blood Pressure, Chest Pain, Claudications, Difficulty Breathing On Exertion, Edema, Fainting / Blacking Out, Irregular Heart Beat, Night Cramps, Orthopnea, Palpitations, Paroxysmal Nocturnal Dyspnea, Rapid Heart Rate, Shortness of Breath and Swelling of Extremities. Gastrointestinal: Not Present- Black, Tarry Stool, Bloody Stool, Diarrhea, Hematemesis, Rectal Bleeding and Vomiting. Musculoskeletal: Not Present- Muscle Pain and Muscle Weakness. Neurological: Not Present- Dizziness. Psychiatric: Not Present- Depression. Endocrine: Not Present- Cold Intolerance, Heat Intolerance and Thyroid Problems. Hematology: Not Present- Abnormal Bleeding, Anemia, Blood Clots and Easy Bruising.       Physical Exam   The physical exam findings are as follows:       General   Mental Status - Alert. General Appearance - Not in acute distress. Chest and Lung Exam   Inspection: Accessory muscles - No use of accessory muscles in breathing. Auscultation:   Breath sounds: - Normal.      Cardiovascular   Inspection: Jugular vein - Bilateral - Inspection Normal.  Palpation/Percussion:   Apical Impulse: - Normal.  Auscultation: Rhythm - Regular.  Heart Sounds - S1 WNL and S2 WNL. No S3 or S4. Murmurs & Other Heart Sounds: Auscultation of the heart reveals - No Murmurs. Carotid arteries - No Carotid bruit. Peripheral Vascular   Upper Extremity: Inspection - Bilateral - No Cyanotic nailbeds or Digital clubbing. Lower Extremity:   Palpation: Edema - Bilateral - No edema. Assessment:       ICD-10-CM ICD-9-CM    1. Essential hypertension I10 401.9    2. Atrial flutter, unspecified type (formerly [facility]) I48.92 427.32 AMB POC EKG ROUTINE W/ 12 LEADS, INTER & REP   3. NICM (nonischemic cardiomyopathy) (formerly [facility]) I42.8 425.4    4. Mixed hyperlipidemia E78.2 272.2    5. Renal insufficiency N28.9 593.9        Plan:     1. Nonischemic cardiomyopathy: resolved. Fully compensated ejection fraction is 60% on last Echo. No ACEI or ARB due to renal insuff. 2. s/p atrial flutter ablation. In sinus rhythm. Off NOAC. 3. Hypertension controlled continue current medications  4. CRI: f/u with nephrology.

## 2017-12-27 ENCOUNTER — OFFICE VISIT (OUTPATIENT)
Dept: INTERNAL MEDICINE CLINIC | Age: 77
End: 2017-12-27

## 2017-12-27 VITALS
HEIGHT: 69 IN | OXYGEN SATURATION: 94 % | DIASTOLIC BLOOD PRESSURE: 63 MMHG | SYSTOLIC BLOOD PRESSURE: 151 MMHG | TEMPERATURE: 97.6 F | WEIGHT: 225 LBS | RESPIRATION RATE: 18 BRPM | HEART RATE: 89 BPM | BODY MASS INDEX: 33.33 KG/M2

## 2017-12-27 DIAGNOSIS — I48.92 ATRIAL FLUTTER, UNSPECIFIED TYPE (HCC): ICD-10-CM

## 2017-12-27 DIAGNOSIS — S60.529D: ICD-10-CM

## 2017-12-27 DIAGNOSIS — J44.9 CHRONIC OBSTRUCTIVE PULMONARY DISEASE, UNSPECIFIED COPD TYPE (HCC): ICD-10-CM

## 2017-12-27 DIAGNOSIS — G89.29 CHRONIC BILATERAL LOW BACK PAIN WITHOUT SCIATICA: ICD-10-CM

## 2017-12-27 DIAGNOSIS — M54.50 CHRONIC BILATERAL LOW BACK PAIN WITHOUT SCIATICA: ICD-10-CM

## 2017-12-27 DIAGNOSIS — I42.8 NICM (NONISCHEMIC CARDIOMYOPATHY) (HCC): ICD-10-CM

## 2017-12-27 DIAGNOSIS — N18.30 STAGE 3 CHRONIC KIDNEY DISEASE (HCC): ICD-10-CM

## 2017-12-27 DIAGNOSIS — Z72.0 TOBACCO USE: ICD-10-CM

## 2017-12-27 DIAGNOSIS — E66.9 OBESITY (BMI 30-39.9): ICD-10-CM

## 2017-12-27 DIAGNOSIS — E78.2 MIXED HYPERLIPIDEMIA: ICD-10-CM

## 2017-12-27 DIAGNOSIS — I50.42 CHRONIC COMBINED SYSTOLIC AND DIASTOLIC CONGESTIVE HEART FAILURE (HCC): ICD-10-CM

## 2017-12-27 DIAGNOSIS — R73.03 PREDIABETES: ICD-10-CM

## 2017-12-27 DIAGNOSIS — I10 ESSENTIAL HYPERTENSION: Primary | ICD-10-CM

## 2017-12-27 DIAGNOSIS — C61 PROSTATE CANCER (HCC): ICD-10-CM

## 2017-12-27 PROBLEM — N28.9 RENAL INSUFFICIENCY: Status: RESOLVED | Noted: 2017-11-07 | Resolved: 2017-12-27

## 2017-12-27 LAB — HBA1C MFR BLD HPLC: 6 % (ref 4.8–5.6)

## 2017-12-27 NOTE — MR AVS SNAPSHOT
Visit Information Date & Time Provider Department Dept. Phone Encounter #  
 12/27/2017  8:10 AM Kaleb Braga, [facility] [phone] [id number] Follow-up Instructions Return in about 4 months (around 4/27/2018), or if symptoms worsen or fail to improve, for HTN, prediabetes; schedule for fasting labs 1 week prior to appointment. Your Appointments 5/15/2018 10:15 AM  
6 MONTH with Mariza Henao MD  
[facility] [address] [place] [address]) Appt Note: Dr. Sophia Dancer [facility]  
[phone] [address] Upcoming Health Maintenance Date Due  
 MEDICARE YEARLY EXAM 10/6/2018 GLAUCOMA SCREENING Q2Y 10/24/2019 DTaP/Tdap/Td series (2 - Td) 12/27/2027 Allergies as of 12/27/2017  Review Complete On: 12/27/2017 By: Kaleb Braga MD  
  
 Severity Noted Reaction Type Reactions Oxycodone  10/31/2017    Contact Dermatitis Current Immunizations  Reviewed on 11/28/2015 Name Date Influenza High Dose Vaccine PF 9/18/2017 Influenza Vaccine (Quad) PF 11/30/2015 10:45 AM  
 Pneumococcal Conjugate (PCV-13) 9/18/2017 Pneumococcal Polysaccharide (PPSV-23) 11/30/2015 10:01 AM  
  
 Not reviewed this visit You Were Diagnosed With   
  
 Codes Comments Essential hypertension    -  Primary ICD-10-CM: I10 
ICD-9-CM: 401.9 Stage 3 chronic kidney disease     ICD-10-CM: N18.3 ICD-9-CM: 816. 3 Chronic bilateral low back pain without sciatica     ICD-10-CM: M54.5, G89.29 ICD-9-CM: 724.2, 338.29 Blister of hand, unspecified laterality, subsequent encounter     ICD-10-CM: J29.388M ICD-9-CM: V58.89 Prediabetes     ICD-10-CM: R73.03 
ICD-9-CM: 790.29 Mixed hyperlipidemia     ICD-10-CM: E78.2 ICD-9-CM: 272.2 NICM (nonischemic cardiomyopathy) ([facility]     ICD-10-CM: I42.8 ICD-9-CM: 425.4 Chronic combined systolic and diastolic congestive heart failure (HCC)     ICD-10-CM: I50.42 
ICD-9-CM: 428.42, 428.0 Chronic obstructive pulmonary disease, unspecified COPD type ([facility]     ICD-10-CM: J44.9 ICD-9-CM: 262 Prostate cancer [facility])     ICD-10-CM: W77 ICD-9-CM: 372 Atrial flutter, unspecified type ([facility]     ICD-10-CM: I48.92 
ICD-9-CM: 427.32 Obesity (BMI 30-39. 9)     ICD-10-CM: E66.9 ICD-9-CM: 278.00 Tobacco use     ICD-10-CM: Z72.0 ICD-9-CM: 305.1 Vitals BP Pulse Temp Resp Height(growth percentile) Weight(growth percentile) 151/63 89 97.6 °F (36.4 °C) (Oral) 18 5' 9\" (1.753 m) 225 lb (102.1 kg) SpO2 BMI Smoking Status 94% 33.23 kg/m2 Former Smoker Vitals History BMI and BSA Data Body Mass Index Body Surface Area  
 33.23 kg/m 2 2.23 m 2 Preferred Pharmacy Pharmacy Name [facility] [address] [place] [address] [phone] Your Updated Medication List  
  
   
This list is accurate as of: 12/27/17  9:14 AM.  Always use your most recent med list.  
  
  
  
  
 albuterol 90 mcg/actuation inhaler Commonly known as:  PROVENTIL HFA, VENTOLIN HFA, PROAIR HFA Take 2 Puffs by inhalation every four (4) hours as needed for Wheezing. aspirin 81 mg chewable tablet Take 1 Tab by mouth daily. atorvastatin 40 mg tablet Commonly known as:  LIPITOR  
TAKE ONE TABLET BY MOUTH NIGHTLY  
  
 carvedilol 25 mg tablet Commonly known as:  Sharolyn Vieira Take 25 mg by mouth two (2) times daily (with meals). furosemide 40 mg tablet Commonly known as:  LASIX TAKE ONE TABLET BY MOUTH DAILY- DOSE REDUCED 12/17/15 Follow-up Instructions Return in about 4 months (around 4/27/2018), or if symptoms worsen or fail to improve, for HTN, prediabetes; schedule for fasting labs 1 week prior to appointment. Patient Instructions Patient Instructions 1. Take Tylenol or acetaminophen as needed for back pain. 2. Do NOT take Advil, Motrin, ibuprofen, Aleve, naproxen, BC powder, or Goody's pills for pain because these can worsen the kidneys. 3. Do not eat foods high in salt like sausage, ham, hot dogs, luncheon meats, pretzels, and salted crackers. Low Sodium Diet (2,000 Milligram): Care Instructions Your Care Instructions Too much sodium causes your body to hold on to extra water. This can raise your blood pressure and force your heart and kidneys to work harder. In very serious cases, this could cause you to be put in the hospital. It might even be life-threatening. By limiting sodium, you will feel better and lower your risk of serious problems. The most common source of sodium is salt. People get most of the salt in their diet from canned, prepared, and packaged foods. Fast food and restaurant meals also are very high in sodium. Your doctor will probably limit your sodium to less than 2,000 milligrams (mg) a day. This limit counts all the sodium in prepared and packaged foods and any salt you add to your food. Follow-up care is a key part of your treatment and safety. Be sure to make and go to all appointments, and call your doctor if you are having problems. It's also a good idea to know your test results and keep a list of the medicines you take. How can you care for yourself at home? Read food labels · Read labels on cans and food packages. The labels tell you how much sodium is in each serving. Make sure that you look at the serving size. If you eat more than the serving size, you have eaten more sodium. · Food labels also tell you the Percent Daily Value for sodium. Choose products with low Percent Daily Values for sodium. · Be aware that sodium can come in forms other than salt, including monosodium glutamate (MSG), sodium citrate, and sodium bicarbonate (baking soda). MSG is often added to Asian food.  When you eat out, you can sometimes ask for food without MSG or added salt. Buy low-sodium foods · Buy foods that are labeled \"unsalted\" (no salt added), \"sodium-free\" (less than 5 mg of sodium per serving), or \"low-sodium\" (less than 140 mg of sodium per serving). Foods labeled \"reduced-sodium\" and \"light sodium\" may still have too much sodium. Be sure to read the label to see how much sodium you are getting. · Buy fresh vegetables, or frozen vegetables without added sauces. Buy low-sodium versions of canned vegetables, soups, and other canned goods. Prepare low-sodium meals · Cut back on the amount of salt you use in cooking. This will help you adjust to the taste. Do not add salt after cooking. One teaspoon of salt has about 2,300 mg of sodium. · Take the salt shaker off the table. · Flavor your food with garlic, lemon juice, onion, vinegar, herbs, and spices. Do not use soy sauce, lite soy sauce, steak sauce, onion salt, garlic salt, celery salt, mustard, or ketchup on your food. · Use low-sodium salad dressings, sauces, and ketchup. Or make your own salad dressings and sauces without adding salt. · Use less salt (or none) when recipes call for it. You can often use half the salt a recipe calls for without losing flavor. Other foods such as rice, pasta, and grains do not need added salt. · Rinse canned vegetables, and cook them in fresh water. This removes some-but not all-of the salt. · Avoid water that is naturally high in sodium or that has been treated with water softeners, which add sodium. Call your local water company to find out the sodium content of your water supply. If you buy bottled water, read the label and choose a sodium-free brand. Avoid high-sodium foods · Avoid eating: ¨ Smoked, cured, salted, and canned meat, fish, and poultry. ¨ Ham, albert, hot dogs, and luncheon meats. ¨ Regular, hard, and processed cheese and regular peanut butter.  
¨ Crackers with salted tops, and other salted snack foods such as pretzels, chips, and salted popcorn. ¨ Frozen prepared meals, unless labeled low-sodium. ¨ Canned and dried soups, broths, and bouillon, unless labeled sodium-free or low-sodium. ¨ Canned vegetables, unless labeled sodium-free or low-sodium. ¨ [place] fries, pizza, tacos, and other fast foods. ¨ Pickles, olives, ketchup, and other condiments, especially soy sauce, unless labeled sodium-free or low-sodium. Where can you learn more? Go to http://bgSocialOptimizrgurpreet.info/. Enter X645 in the search box to learn more about \"Low Sodium Diet (2,000 Milligram): Care Instructions. \" Current as of: May 12, 2017 Content Version: 11.4 © 1121-1623 [employer]. Care instructions adapted under license by [employer] (which disclaims liability or warranty for this information). If you have questions about a medical condition or this instruction, always ask your healthcare professional. [place] [address] any warranty or liability for your use of this information. Introducing [facility]! [employer] introduces [employer] patient portal. Now you can access parts of your medical record, email your doctor's office, and request medication refills online. 1. In your internet browser, go to [email or web address]. [employer] 2. Click on the First Time User? Click Here link in the Sign In box. You will see the New Member Sign Up page. 3. Enter your [employer] Access Code exactly as it appears below. You will not need to use this code after youve completed the sign-up process. If you do not sign up before the expiration date, you must request a new code. · [employer] Access Code: [email or web address] Expires: 3/27/2018  8:50 AM 
 
4. Enter the last four digits of your Social Security Number (xxxx) and Date of Birth (mm/dd/yyyy) as indicated and click Submit. You will be taken to the next sign-up page. 5. Create a [employer] ID. This will be your [employer] login ID and cannot be changed, so think of one that is secure and easy to remember. 6. Create a [employer] password. You can change your password at any time. 7. Enter your Password Reset Question and Answer. This can be used at a later time if you forget your password. 8. Enter your e-mail address. You will receive e-mail notification when new information is available in [address] 9. Click Sign Up. You can now view and download portions of your medical record. 10. Click the Download Summary menu link to download a portable copy of your medical information. If you have questions, please visit the Frequently Asked Questions section of the [employer] website. Remember, [employer] is NOT to be used for urgent needs. For medical emergencies, dial 911. Now available from your iPhone and Android! Please provide this summary of care documentation to your next provider. Your primary care clinician is listed as Berhane Ann. If you have any questions after today's visit, please call [phone].

## 2017-12-27 NOTE — PROGRESS NOTES
CC:   Chief Complaint   Patient presents with    Hypertension    Skin Problem     recheck peeling hands    Back Pain       HISTORY OF PRESENT ILLNESS  Chandni Urena is a 78 y.o. male. Presents for 2 month follow up evaluation. He has HTN, hyperlipidemia, CKD stage 3-4, CHF (combined diastolic and systolic) with echo EF 71-00% in 7/17, non-ischemic cardiomyopathy, atrial flutter s/p AFL ablation on 8/22/17, prediabetes, COPD, and history of prostate cancer. He complains of intermittent low back pain that is worse with standing or walking. Has had for years. Reports the blisters on his hands have gone away completely now. Denies CP, SOB, heart palpitations, leg swelling, orthopnea, or PND. Lat saw Dr. Burnetta Goltz on 10/30/17; no changes made. Saw Dr. Luis Mcgregor (Cardiology) on 11/7/17; noted that his fully compensated ejection fraction is 60% on last echo. Saw an eye doctor in [facility] area earlier this month. Reports he is scheduled to have eye surgery on 1/15/18 for cataracts and glaucoma. Next appointment with Dr. Jessica Martines is in Feb 2018. Cardiovascular Review  The patient has hypertension, CHF and non-ischemic cardiomyopathy. He reports taking medications as instructed, no medication side effects noted. Diet and Lifestyle: generally follows a low sodium diet, no formal exercise but active during the day. Admits he ate a [employer] sausage sandwich this morning. Lab review: labs reviewed and discussed with patient. Review of Studies  EKG 10/2/17: NSR with frequent PVC's. Echo (TTE) 7/31/17: EF 55-60%. No regional wall motion abnormalities. LA moderately dilated. Lumbar spine X-ray 10/2/17: Mild spondylosis. CXR 10/2/17: mild interstitial edema in the mid lower lung zones right greater than left  CT head 9/29/17: no acute abnormality      Other Providers: Dr. Luis Mcgregor (Cardiology), Dr. Honey Camara (Cardiology EPS), Dr. Jessica Martines (Urology), Dr. Neha Phillips (Ophthalmology)      Soc Hx  .  Has 2 children and 3 grandchildren. Retired . He chews tobacco. Quit smoking 8/7/17; previously smoked 1.5-2 ppd for 50 yrs. No longer drinks alcohol. Denies recreational drug use. Does not get much exercise.      ROS  A complete review of systems was performed and is negative except for those mentioned in the HPI. 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    S/P cardiac cath Z98.890    NICM (nonischemic cardiomyopathy) ([facility] I42.8    Hypertension I10    Mixed hyperlipidemia E78.2    Tobacco use Z72.0    Atrial flutter (HCC) I48.92    Stage 3 chronic kidney disease N18.3    Frequent hospital admissions Z78.9    Prediabetes R73.03    Prostate cancer [facility] C61    COPD (chronic obstructive pulmonary disease) ([facility] J44.9    Chronic low back pain M54.5, G89.29    Hand blister S60.529A    Obesity (BMI 30-39. 9) E66.9    Renal insufficiency N28.9     Past Medical History:   Diagnosis Date    Acute respiratory failure with hypoxia ([facility] 02/08/2014    also 11/28/15, 2/17/16, 5/14/17     Atrial flutter ([facility] 08/2017    saw Dr. Dariusz Danielson; underwent a-flutter ablation    BPH (benign prostatic hyperplasia)     CHF (congestive heart failure) ([facility] 02/11/2014    TTE 11/15: EF 40-45%, 2/14: EF 20%  Admitted for acute exacerbations 2/8/14, 11/28/15, 2/17/16, and 5/4/17)    Chronic low back pain     LS spine X-ray 4/8/17: mild DDD    COPD (chronic obstructive pulmonary disease) ([facility]     ED (erectile dysfunction)     Elevated troponin 02/11/2014    also 11/28/15; due to cardiac strain    Esophagitis     reflux, hiatal hernia    Femur fracture, right ([facility]     Frequent hospital admissions     5/14-5/23/17: acute hypoxic resp failure due to CHF exac, ROBINSON on CKD 3, sepsis due to LE cellulitis, 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  2/17-2/22/16: acute hypoxic resp failure, acute diarrhea  11/28-11/30/15: acute hypoxic resp failure due to acute CHF exac, elevated troponin due to cardiac strain  2/8-2/12/14: acuter hypoxic resp failure due to CHF exac, pneumonia     Hypertension     Nonischemic cardiomyopathy ([facility]     with LVH    Pneumonia 02/08/2014 2/8/14 and 10/30/15    Prediabetes     Prostate cancer ([facility] 2016    Pulmonary edema 11/28/2015    S/P cardiac cath 2/12/2014 2/12/14 no significant coronary disease, severe LV dysfunction    Shingles     Tinea cruris     also genital folliculitis    Vertigo      Allergies   Allergen Reactions    Oxycodone Contact Dermatitis     Current Outpatient Prescriptions   Medication Sig Dispense Refill    atorvastatin (LIPITOR) 40 mg tablet TAKE ONE TABLET BY MOUTH NIGHTLY 30 Tab 6    furosemide (LASIX) 40 mg tablet TAKE ONE TABLET BY MOUTH DAILY- DOSE REDUCED 12/17/15 30 Tab 6    albuterol (PROVENTIL HFA, VENTOLIN HFA, PROAIR HFA) 90 mcg/actuation inhaler Take 2 Puffs by inhalation every four (4) hours as needed for Wheezing. 1 Inhaler 0    aspirin 81 mg chewable tablet Take 1 Tab by mouth daily. 10 Tab 0    carvedilol (COREG) 25 mg tablet Take 25 mg by mouth two (2) times daily (with meals). PHYSICAL EXAM  Visit Vitals    /63    Pulse 89    Temp 97.6 °F (36.4 °C) (Oral)    Resp 18    Ht 5' 9\" (1.753 m)    Wt 225 lb (102.1 kg)    SpO2 94%    BMI 33.23 kg/m2       General: Obese, no distress. HEENT:  Head normocephalic/atraumatic, no scleral icterus  Neck: Supple. No carotid bruits, JVD, lymphadenopathy, or thyromegaly. Lungs:  Clear to ausculation bilaterally. Good air movement. Heart:  Regular rate and rhythm, normal S1 and S2, no murmur, gallop, or rub  Extremities: No clubbing, cyanosis, or edema. Skin: Hand blisters completely resolved. Normal appearing skin at hands. Neurological: Alert and oriented. Psychiatric: Normal mood and affect.  Behavior is normal.     Results for orders placed or performed in visit on 10/05/17   LIPID PANEL Result Value Ref Range    Cholesterol, total 87 (L) 100 - 199 mg/dL    Triglyceride 110 0 - 149 mg/dL    HDL Cholesterol 14 (L) >39 mg/dL    VLDL, calculated 22 5 - 40 mg/dL    LDL, calculated 51 0 - 99 mg/dL   HEMOGLOBIN A1C WITH EAG   Result Value Ref Range    Hemoglobin A1c 6.4 (H) 4.8 - 5.6 %    Estimated average glucose 137 mg/dL   TSH REFLEX TO T4   Result Value Ref Range    TSH 1.000 0.450 - 4.500 uIU/mL   PSA SCREENING (SCREENING)   Result Value Ref Range    Prostate Specific Ag 0.2 0.0 - 4.0 ng/mL   CVD REPORT   Result Value Ref Range    INTERPRETATION Note        Results for orders placed or performed in visit on 12/27/17   AMB POC HEMOGLOBIN A1C   Result Value Ref Range    Hemoglobin A1c (POC) 6.0 (A) 4.8 - 5.6 %         ASSESSMENT AND PLAN    ICD-10-CM ICD-9-CM    1. Essential hypertension I10 401.9    2. Stage 3 chronic kidney disease N18.3 585.3    3. Chronic bilateral low back pain without sciatica M54.5 724.2     G89.29 338.29    4. Blister of hand, unspecified laterality, subsequent encounter S60.529D V58.89    5. Prediabetes R73.03 790.29 AMB POC HEMOGLOBIN A1C   6. Mixed hyperlipidemia E78.2 272.2    7. NICM (nonischemic cardiomyopathy) (HCC) I42.8 425.4    8. Chronic combined systolic and diastolic congestive heart failure (HCC) I50.42 428.42      428.0    9. Chronic obstructive pulmonary disease, unspecified COPD type ([facility] J44.9 496    10. Prostate cancer ([facility] C61 185    11. Atrial flutter, unspecified type ([facility] I48.92 427.32    12. Obesity (BMI 30-39. 9) E66.9 278.00    13. Tobacco use Z72.0 305.1        Diagnoses and all orders for this visit:    1. Essential hypertension  Elevated today at 151/63. Previously less than 130/80. High reading today may be related to high sodium intake this morning. Continue present medications; will reassess BP at next clinic visit. 2. Stage 3 chronic kidney disease  Stable. Continue to follow with Dr. Priscilla Linda.     3. Chronic bilateral low back pain without sciatica  Patient Instructions  1. Take Tylenol or acetaminophen as needed for back pain. 2. Do NOT take Advil, Motrin, ibuprofen, Aleve, naproxen, BC powder, or Goody's pills for pain because these can worsen the kidneys. 3. Do not eat foods high in salt like sausage, ham, hot dogs, luncheon meats, pretzels, and salted crackers. 4. Blister of hand, unspecified laterality, subsequent encounter  Resolved. 5. Prediabetes  A1c improved from 6.4% three months ago to 6.0% today. -     AMB POC HEMOGLOBIN A1C    6. Mixed hyperlipidemia  Controlled on atorvastatin 40 mg daily. 7. NICM (nonischemic cardiomyopathy) ([address]    8. Chronic combined systolic and diastolic congestive heart failure (HCC)  Asymptomatic. Continue Lasix and carvedilol. Not on ACE or ARB due to CKD. 9. Chronic obstructive pulmonary disease, unspecified COPD type ([address]  Controlled. Smoking cessation encouraged. Continue albuterol inhaler as needed. 10. Prostate cancer ([address]    11. Atrial flutter, unspecified type ([address]  In NSR since ablation. 12. Obesity (BMI 30-39. 9)  Discussed the patient's BMI with him. The BMI follow up plan is as follows: dietary management education, guidance, and counseling; encourage exercise; monitor weight; prescribed dietary intake. Follow up BMI in 4 months. 13. Tobacco use  Counseled on smoking cessation. Follow-up Disposition:  Return in about 4 months (around 4/27/2018), or if symptoms worsen or fail to improve, for HTN, prediabetes; schedule for fasting labs 1 week prior to appointment. Provided patient and/or family with advanced directive information and answered pertinent questions. Encouraged patient to provide a copy of advanced directive to the office when available. I have discussed the diagnosis with the patient and the intended plan as seen in the above orders. Patient is in agreement.   The patient has received an after-visit summary and questions were answered concerning future plans. I have discussed medication side effects and warnings with the patient as well.

## 2017-12-27 NOTE — PATIENT INSTRUCTIONS
Patient Instructions  1. Take Tylenol or acetaminophen as needed for back pain. 2. Do NOT take Advil, Motrin, ibuprofen, Aleve, naproxen, BC powder, or Goody's pills for pain because these can worsen the kidneys. 3. Do not eat foods high in salt like sausage, ham, hot dogs, luncheon meats, pretzels, and salted crackers. Low Sodium Diet (2,000 Milligram): Care Instructions  Your Care Instructions    Too much sodium causes your body to hold on to extra water. This can raise your blood pressure and force your heart and kidneys to work harder. In very serious cases, this could cause you to be put in the hospital. It might even be life-threatening. By limiting sodium, you will feel better and lower your risk of serious problems. The most common source of sodium is salt. People get most of the salt in their diet from canned, prepared, and packaged foods. Fast food and restaurant meals also are very high in sodium. Your doctor will probably limit your sodium to less than 2,000 milligrams (mg) a day. This limit counts all the sodium in prepared and packaged foods and any salt you add to your food. Follow-up care is a key part of your treatment and safety. Be sure to make and go to all appointments, and call your doctor if you are having problems. It's also a good idea to know your test results and keep a list of the medicines you take. How can you care for yourself at home? Read food labels  · Read labels on cans and food packages. The labels tell you how much sodium is in each serving. Make sure that you look at the serving size. If you eat more than the serving size, you have eaten more sodium. · Food labels also tell you the Percent Daily Value for sodium. Choose products with low Percent Daily Values for sodium. · Be aware that sodium can come in forms other than salt, including monosodium glutamate (MSG), sodium citrate, and sodium bicarbonate (baking soda). MSG is often added to Asian food.  When you eat out, you can sometimes ask for food without MSG or added salt. Buy low-sodium foods  · Buy foods that are labeled \"unsalted\" (no salt added), \"sodium-free\" (less than 5 mg of sodium per serving), or \"low-sodium\" (less than 140 mg of sodium per serving). Foods labeled \"reduced-sodium\" and \"light sodium\" may still have too much sodium. Be sure to read the label to see how much sodium you are getting. · Buy fresh vegetables, or frozen vegetables without added sauces. Buy low-sodium versions of canned vegetables, soups, and other canned goods. Prepare low-sodium meals  · Cut back on the amount of salt you use in cooking. This will help you adjust to the taste. Do not add salt after cooking. One teaspoon of salt has about 2,300 mg of sodium. · Take the salt shaker off the table. · Flavor your food with garlic, lemon juice, onion, vinegar, herbs, and spices. Do not use soy sauce, lite soy sauce, steak sauce, onion salt, garlic salt, celery salt, mustard, or ketchup on your food. · Use low-sodium salad dressings, sauces, and ketchup. Or make your own salad dressings and sauces without adding salt. · Use less salt (or none) when recipes call for it. You can often use half the salt a recipe calls for without losing flavor. Other foods such as rice, pasta, and grains do not need added salt. · Rinse canned vegetables, and cook them in fresh water. This removes some-but not all-of the salt. · Avoid water that is naturally high in sodium or that has been treated with water softeners, which add sodium. Call your local water company to find out the sodium content of your water supply. If you buy bottled water, read the label and choose a sodium-free brand. Avoid high-sodium foods  · Avoid eating:  ¨ Smoked, cured, salted, and canned meat, fish, and poultry. ¨ Ham, albert, hot dogs, and luncheon meats. ¨ Regular, hard, and processed cheese and regular peanut butter.   ¨ Crackers with salted tops, and other salted snack foods such as pretzels, chips, and salted popcorn. ¨ Frozen prepared meals, unless labeled low-sodium. ¨ Canned and dried soups, broths, and bouillon, unless labeled sodium-free or low-sodium. ¨ Canned vegetables, unless labeled sodium-free or low-sodium. ¨ [place] [employer] fries, pizza, tacos, and other fast foods. ¨ Pickles, olives, ketchup, and other condiments, especially soy sauce, unless labeled sodium-free or low-sodium. Where can you learn more? Go to http://bg-gurpreet.info/. Enter F148 in the search box to learn more about \"Low Sodium Diet (2,000 Milligram): Care Instructions. \"  Current as of: May 12, 2017  Content Version: 11.4  © 4962-5346 [employer], [employer]. Care instructions adapted under license by [employer] (which disclaims liability or warranty for this information). If you have questions about a medical condition or this instruction, always ask your healthcare professional. [place] [address] any warranty or liability for your use of this information.

## 2017-12-27 NOTE — PROGRESS NOTES
Reviewed record  In preparation for visit and have obtained necessary documentation. 1. Have you been to the ER, urgent care clinic since your last visit? Hospitalized since your last visit?no  2. Have you seen or consulted any other health care providers outside of the [address] [place] [address] since your last visit? Include any pap smears or colon screening. Saw Dr Alex Mario for eyes   Advanced directives: Patient has been given information on advanced directives at a previous visit. Patients vital signs discussed with physician. Per Dr. Gayla Hobbs verbal order read back orders placed for A1C.

## 2018-02-20 DIAGNOSIS — I50.22 CHRONIC SYSTOLIC CONGESTIVE HEART FAILURE (HCC): ICD-10-CM

## 2018-02-20 RX ORDER — ENALAPRIL MALEATE 10 MG/1
TABLET ORAL
Qty: 30 TAB | Refills: 6 | Status: SHIPPED | OUTPATIENT
Start: 2018-02-20 | End: 2018-04-24

## 2018-03-21 RX ORDER — CARVEDILOL 25 MG/1
TABLET ORAL
Qty: 60 TAB | Refills: 6 | Status: SHIPPED | OUTPATIENT
Start: 2018-03-21 | End: 2020-05-29

## 2018-03-29 ENCOUNTER — DOCUMENTATION ONLY (OUTPATIENT)
Dept: CARDIOLOGY CLINIC | Age: 78
End: 2018-03-29

## 2018-03-29 NOTE — PROGRESS NOTES
Is this pt suppose to be on Enalapril 10 mg tab? According to last office note on 11/7/17 he is to not take ACEI or ARB due to renal. Medication was refilled on 2/20/18. Please advise, thank you. Message  Received: Today       MD Tena Marie LPN       Caller: Unspecified (Today, 12:13 PM)                     Yes. He is not supposed to be on ACEI or ARB due to renal insuff. Who prescribed it? It was sent in on 2/20/18 by our office. I will call the pharmacy and d/c medication and call pt to make sure he is not taking it. Thank you. Enalapril is an ACEI. He is not suppose to be on it. Just want to verify since the response above is yes and no. Thank you. Message  Received: Today       MD Tena Marie LPN       Caller: Unspecified (Yesterday, 12:13 PM)                     Yes.  I just want to make sure because your response is yes/no. Is pt suppose to be on Enalapril which is an ACEI? Thank you.

## 2018-04-02 ENCOUNTER — DOCUMENTATION ONLY (OUTPATIENT)
Dept: INTERNAL MEDICINE CLINIC | Facility: CLINIC | Age: 78
End: 2018-04-02

## 2018-04-02 ENCOUNTER — TELEPHONE (OUTPATIENT)
Dept: CARDIOLOGY CLINIC | Age: 78
End: 2018-04-02

## 2018-04-02 NOTE — TELEPHONE ENCOUNTER
Called pt,verified pt with two pt identifiers, went over pt medication with him. Pt has hard time reading, so he spelled out best he could the medication and I updated in our system. Pt is taking the Enalapril 10 mg daily. Advised pt that I do not think he is to be taking this medication due to his kidney function. Pt is very admit that he is suppose to be taking this medication. Advised pt I will check with  and call him back to advise. He verbalized that he understood everything. See note in document per  that he is to stop his Enalapril. Discussed with another nurse in office to verify that pt is to stop his Enalapril also. The nurse is Kalyani. I copied note above also. Called pt,verified pt with two pt identifiers, told pt that he is to stop his Enalapril due to his kidney function. Advised him he can check his BP at home. Pt stated he does not have a BP cuff. Advised him to make note when he goes to the dr. He is going to his kidney dr on 4/9/18 and seeing us on 5/15/18. Advised him if he is having symptoms to call us and we can get him in here for a BP check. He verbalized that he understood everything. Faxed note to [facility] [address] stating that pt is NOT Taking this medication. Faxed to [phone] and received fax confirmation.

## 2018-04-02 NOTE — TELEPHONE ENCOUNTER
Is this pt suppose to be on Enalapril 10 mg tab? According to last office note on 11/7/17 he is to not take ACEI or ARB due to renal. Medication was refilled on 2/20/18. Please advise, thank you. Message  Received: Today       MD Betty Cheatham LPN       Caller: Unspecified (Today, 12:13 PM)                     Yes. He is not supposed to be on ACEI or ARB due to renal insuff. Who prescribed it? It was sent in on 2/20/18 by our office. I will call the pharmacy and d/c medication and call pt to make sure he is not taking it. Thank you. Enalapril is an ACEI. He is not suppose to be on it. Just want to verify since the response above is yes and no. Thank you. Message  Received: Today       MD Betty Cheatham LPN       Caller: Unspecified (Yesterday, 12:13 PM)                     Yes.

## 2018-04-09 RX ORDER — ATORVASTATIN CALCIUM 40 MG/1
TABLET, FILM COATED ORAL
Qty: 90 TAB | Refills: 1 | Status: SHIPPED | OUTPATIENT
Start: 2018-04-09 | End: 2018-11-26 | Stop reason: SDUPTHER

## 2018-04-09 RX ORDER — AMLODIPINE BESYLATE 2.5 MG/1
2.5 TABLET ORAL DAILY
Qty: 30 TAB | Refills: 0 | Status: SHIPPED | OUTPATIENT
Start: 2018-04-09 | End: 2018-05-07 | Stop reason: SDUPTHER

## 2018-04-09 RX ORDER — AMLODIPINE BESYLATE 2.5 MG/1
2.5 TABLET ORAL DAILY
COMMUNITY
End: 2018-04-09 | Stop reason: SDUPTHER

## 2018-04-09 NOTE — TELEPHONE ENCOUNTER
Pt came in office concerned with increased BP . Per Dr Kip Nunez need start Amlodipine 2.5 mg daily. Patient to follow up in 2 weeks.

## 2018-04-18 ENCOUNTER — TELEPHONE (OUTPATIENT)
Dept: INTERNAL MEDICINE CLINIC | Facility: CLINIC | Age: 78
End: 2018-04-18

## 2018-04-18 DIAGNOSIS — R73.09 ELEVATED GLUCOSE: ICD-10-CM

## 2018-04-18 DIAGNOSIS — R73.03 PREDIABETES: ICD-10-CM

## 2018-04-18 DIAGNOSIS — N18.30 STAGE 3 CHRONIC KIDNEY DISEASE (HCC): ICD-10-CM

## 2018-04-18 DIAGNOSIS — I10 ESSENTIAL HYPERTENSION: ICD-10-CM

## 2018-04-18 DIAGNOSIS — I50.40 COMBINED SYSTOLIC AND DIASTOLIC CONGESTIVE HEART FAILURE, UNSPECIFIED HF CHRONICITY (HCC): Primary | ICD-10-CM

## 2018-04-18 DIAGNOSIS — E78.2 MIXED HYPERLIPIDEMIA: ICD-10-CM

## 2018-04-19 ENCOUNTER — APPOINTMENT (OUTPATIENT)
Dept: INTERNAL MEDICINE CLINIC | Facility: CLINIC | Age: 78
End: 2018-04-19

## 2018-04-20 ENCOUNTER — DOCUMENTATION ONLY (OUTPATIENT)
Dept: CARDIOLOGY CLINIC | Age: 78
End: 2018-04-20

## 2018-04-20 LAB
ALBUMIN SERPL-MCNC: 4.5 G/DL (ref 3.5–4.8)
ALBUMIN/GLOB SERPL: 1.3 {RATIO} (ref 1.2–2.2)
ALP SERPL-CCNC: 98 IU/L (ref 39–117)
ALT SERPL-CCNC: 20 IU/L (ref 0–44)
AST SERPL-CCNC: 24 IU/L (ref 0–40)
BASOPHILS # BLD AUTO: 0 X10E3/UL (ref 0–0.2)
BASOPHILS NFR BLD AUTO: 1 %
BILIRUB SERPL-MCNC: 0.3 MG/DL (ref 0–1.2)
BUN SERPL-MCNC: 12 MG/DL (ref 8–27)
BUN/CREAT SERPL: 10 (ref 10–24)
CALCIUM SERPL-MCNC: 9.7 MG/DL (ref 8.6–10.2)
CHLORIDE SERPL-SCNC: 102 MMOL/L (ref 96–106)
CHOLEST SERPL-MCNC: 117 MG/DL (ref 100–199)
CO2 SERPL-SCNC: 21 MMOL/L (ref 18–29)
CREAT SERPL-MCNC: 1.17 MG/DL (ref 0.76–1.27)
EOSINOPHIL # BLD AUTO: 0.2 X10E3/UL (ref 0–0.4)
EOSINOPHIL NFR BLD AUTO: 4 %
ERYTHROCYTE [DISTWIDTH] IN BLOOD BY AUTOMATED COUNT: 14.4 % (ref 12.3–15.4)
EST. AVERAGE GLUCOSE BLD GHB EST-MCNC: 134 MG/DL
GFR SERPLBLD CREATININE-BSD FMLA CKD-EPI: 60 ML/MIN/1.73
GFR SERPLBLD CREATININE-BSD FMLA CKD-EPI: 70 ML/MIN/1.73
GLOBULIN SER CALC-MCNC: 3.5 G/DL (ref 1.5–4.5)
GLUCOSE SERPL-MCNC: 137 MG/DL (ref 65–99)
HBA1C MFR BLD: 6.3 % (ref 4.8–5.6)
HCT VFR BLD AUTO: 35.4 % (ref 37.5–51)
HDLC SERPL-MCNC: 45 MG/DL
HGB BLD-MCNC: 10.9 G/DL (ref 13–17.7)
IMM GRANULOCYTES # BLD: 0 X10E3/UL (ref 0–0.1)
IMM GRANULOCYTES NFR BLD: 0 %
LDLC SERPL CALC-MCNC: 43 MG/DL (ref 0–99)
LYMPHOCYTES # BLD AUTO: 1.2 X10E3/UL (ref 0.7–3.1)
LYMPHOCYTES NFR BLD AUTO: 23 %
MCH RBC QN AUTO: 25.1 PG (ref 26.6–33)
MCHC RBC AUTO-ENTMCNC: 30.8 G/DL (ref 31.5–35.7)
MCV RBC AUTO: 81 FL (ref 79–97)
MONOCYTES # BLD AUTO: 0.2 X10E3/UL (ref 0.1–0.9)
MONOCYTES NFR BLD AUTO: 4 %
NEUTROPHILS # BLD AUTO: 3.4 X10E3/UL (ref 1.4–7)
NEUTROPHILS NFR BLD AUTO: 68 %
PLATELET # BLD AUTO: 127 X10E3/UL (ref 150–379)
POTASSIUM SERPL-SCNC: 4 MMOL/L (ref 3.5–5.2)
PROT SERPL-MCNC: 8 G/DL (ref 6–8.5)
RBC # BLD AUTO: 4.35 X10E6/UL (ref 4.14–5.8)
SODIUM SERPL-SCNC: 144 MMOL/L (ref 134–144)
TRIGL SERPL-MCNC: 143 MG/DL (ref 0–149)
TSH SERPL DL<=0.005 MIU/L-ACNC: 2.08 UIU/ML (ref 0.45–4.5)
VLDLC SERPL CALC-MCNC: 29 MG/DL (ref 5–40)
WBC # BLD AUTO: 4.9 X10E3/UL (ref 3.4–10.8)

## 2018-04-20 NOTE — PROGRESS NOTES
Received fax from [facility] stating that they are requesting a refill on pt's Enalapril 10 mg tab. Wrote on the paper that pt is no longer taking the medication Enalapril 10 mg tab and they can D/C medication. Faxed to [phone] and received fax confirmation.

## 2018-04-23 PROBLEM — S60.529A: Status: RESOLVED | Noted: 2017-10-08 | Resolved: 2018-04-23

## 2018-04-23 NOTE — TELEPHONE ENCOUNTER
Will discuss results with pt at clinic visit on 4/26/18. Normal CMP (kidney tests improved from 6 mons ago, pt with hx of CKD stage 3), lipid panel, and TSH. Has improving normocytic anemia, low platelet count at 989 K (improved from 57 K in 10/17), and A1c 6.3% (prediabetes, was 6.4% in 10/17).

## 2018-04-24 ENCOUNTER — OFFICE VISIT (OUTPATIENT)
Dept: CARDIOLOGY CLINIC | Age: 78
End: 2018-04-24

## 2018-04-24 VITALS
DIASTOLIC BLOOD PRESSURE: 68 MMHG | SYSTOLIC BLOOD PRESSURE: 120 MMHG | HEIGHT: 69 IN | WEIGHT: 224 LBS | OXYGEN SATURATION: 99 % | BODY MASS INDEX: 33.18 KG/M2 | HEART RATE: 70 BPM

## 2018-04-24 DIAGNOSIS — I42.8 NICM (NONISCHEMIC CARDIOMYOPATHY) (HCC): Primary | ICD-10-CM

## 2018-04-24 DIAGNOSIS — E78.2 MIXED HYPERLIPIDEMIA: ICD-10-CM

## 2018-04-24 DIAGNOSIS — I10 ESSENTIAL HYPERTENSION: ICD-10-CM

## 2018-04-24 DIAGNOSIS — I48.92 ATRIAL FLUTTER, UNSPECIFIED TYPE (HCC): ICD-10-CM

## 2018-04-24 DIAGNOSIS — N18.30 STAGE 3 CHRONIC KIDNEY DISEASE (HCC): ICD-10-CM

## 2018-04-24 NOTE — PROGRESS NOTES
4/24/2018 9:40 AM      Subjective:     Shirin Sanchez   denies chest pain, chest pressure/discomfort, dyspnea, palpitations, irregular heart beats, near-syncope, syncope, fatigue, orthopnea, paroxysmal nocturnal dyspnea, exertional chest pressure/discomfort, claudication, lower extremity edema. Visit Vitals    /68 (BP 1 Location: Right arm, BP Patient Position: Sitting)    Pulse 70    Ht 5' 9\" (1.753 m)    Wt 224 lb (101.6 kg)    SpO2 99%    BMI 33.08 kg/m2     Current Outpatient Prescriptions   Medication Sig    amLODIPine (NORVASC) 2.5 mg tablet Take 1 Tab by mouth daily. Replaced with Amlodipine 2.5 mg take 1 pill by mouth daily    atorvastatin (LIPITOR) 40 mg tablet TAKE ONE TABLET BY MOUTH NIGHTLY    carvedilol (COREG) 25 mg tablet TAKE ONE TABLET BY MOUTH TWICE DAILY WITH MEALS    furosemide (LASIX) 40 mg tablet TAKE ONE TABLET BY MOUTH DAILY- DOSE REDUCED 12/17/15    albuterol (PROVENTIL HFA, VENTOLIN HFA, PROAIR HFA) 90 mcg/actuation inhaler Take 2 Puffs by inhalation every four (4) hours as needed for Wheezing.  aspirin 81 mg chewable tablet Take 1 Tab by mouth daily. No current facility-administered medications for this visit.           Objective:      Visit Vitals    /68 (BP 1 Location: Right arm, BP Patient Position: Sitting)    Pulse 70    Ht 5' 9\" (1.753 m)    Wt 224 lb (101.6 kg)    SpO2 99%    BMI 33.08 kg/m2       Data Review:     EKG: Normal sinus rhythm, no acute st/t changes    Reviewed and/or ordered active problem list, medication list tests    Past Medical History:   Diagnosis Date    Acute [facility] failure with [facility] 02/08/2014    also 11/28/15, 2/17/16, 5/14/17     Atrial flutter ([facility] 08/2017    saw Dr. Frankie Farr; underwent a-flutter ablation    BPH (benign prostatic hyperplasia)     CHF (congestive heart failure) ([facility] 02/11/2014    TTE 11/15: EF 40-45%, 2/14: EF 20%  Admitted for acute exacerbations 2/8/14, 11/28/15, 2/17/16, and 5/4/17)    Chronic low back pain     LS spine X-ray 4/8/17: mild DDD    COPD (chronic obstructive pulmonary disease) ([facility]     ED (erectile dysfunction)     Elevated troponin 02/11/2014    also 11/28/15; due to cardiac strain    Esophagitis     reflux, hiatal hernia    Femur fracture, right ([facility]     Frequent hospital admissions     5/14-5/23/17: acute hypoxic resp failure due to CHF exac, ROBINSON on CKD 3, sepsis due to LE cellulitis, 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  2/17-2/22/16: acute hypoxic resp failure, acute diarrhea  11/28-11/30/15: acute hypoxic resp failure due to acute CHF exac, elevated troponin due to cardiac strain  2/8-2/12/14: acuter hypoxic resp failure due to CHF exac, pneumonia     Hand blister 10/8/2017    Bilateral    Hypertension     Nonischemic cardiomyopathy ([facility]     with LVH    Pneumonia 02/08/2014 2/8/14 and 10/30/15    Prediabetes     Prostate cancer ([facility] 2016    Pulmonary edema 11/28/2015    S/P cardiac cath 2/12/2014 2/12/14 no significant coronary disease, severe LV dysfunction    Shingles     Tinea cruris     also genital folliculitis    Vertigo       Past Surgical History:   Procedure Laterality Date    CARDIAC SURG PROCEDURE UNLIST  08/22/2017    SVT ablation    COLONOSCOPY,DIAGNOSTIC  02/22/2016    Dr. Lauro Mishra; single sessile polyp at descending colon, sigmoid diverticulosis, int hemorrhoids    COLONOSCOPY,JOSE Llanos  2/22/2016         HX ORTHOPAEDIC      surgery on right index finger    HX ORTHOPAEDIC  2000    right shoulder surgery    HX OTHER SURGICAL  08/22/2017    Dr. Laquita Muhammad; atrial flutter ablation    HX TONSILLECTOMY       Allergies   Allergen Reactions    Oxycodone Contact Dermatitis      Family History   Problem Relation Age of Onset    Adopted:  Yes    Family history unknown: Yes      Social History     Social History    Marital status:      Spouse name: N/A    Number of children: N/A    Years of education: N/A     Occupational History    Not on file. Social History Main Topics    Smoking status: Former Smoker     Packs/day: 2.00     Years: 20.00     Types: Cigarettes     Quit date: 8/7/2017    Smokeless tobacco: Current User     Types: Chew      Comment: Chewing tobacco    Alcohol use No    Drug use: No    Sexual activity: Not on file     Other Topics Concern    Not on file     Social History Narrative         Review of Systems     General: Not Present- Anorexia, Chills, Dietary Changes, Fatigue, Fever, Medication Changes, Night Sweats, Weight Gain > 10lbs. and Weight Loss > 10lbs. .  Skin: Not Present- Bruising and Excessive Sweating. HEENT: Not Present- Headache, Visual Loss and Vertigo. Respiratory: Not Present- Cough, Decreased Exercise Tolerance, Difficulty Breathing, Snoring and Wheezing. Cardiovascular: Not Present- Abnormal Blood Pressure, Chest Pain, Claudications, Difficulty Breathing On Exertion, Edema, Fainting / Blacking Out, Irregular Heart Beat, Night Cramps, Orthopnea, Palpitations, Paroxysmal Nocturnal Dyspnea, Rapid Heart Rate, Shortness of Breath and Swelling of Extremities. Gastrointestinal: Not Present- Black, Tarry Stool, Bloody Stool, Diarrhea, Hematemesis, Rectal Bleeding and Vomiting. Musculoskeletal: Not Present- Muscle Pain and Muscle Weakness. Neurological: Not Present- Dizziness. Psychiatric: Not Present- Depression. Endocrine: Not Present- Cold Intolerance, Heat Intolerance and Thyroid Problems. Hematology: Not Present- Abnormal Bleeding, Anemia, Blood Clots and Easy Bruising.       Physical Exam   The physical exam findings are as follows:       General   Mental Status - Alert. General Appearance - Not in acute distress. Chest and Lung Exam   Inspection: Accessory muscles - No use of accessory muscles in breathing.   Auscultation:   Breath sounds: - Normal.      Cardiovascular   Inspection: Jugular vein - Bilateral - Inspection Normal.  Palpation/Percussion:   Apical Impulse: - Normal.  Auscultation: Rhythm - Regular. Heart Sounds - S1 WNL and S2 WNL. No S3 or S4. Murmurs & Other Heart Sounds: Auscultation of the heart reveals - No Murmurs. Carotid arteries - No Carotid bruit. Peripheral Vascular   Upper Extremity: Inspection - Bilateral - No Cyanotic nailbeds or Digital clubbing. Lower Extremity:   Palpation: Edema - Bilateral - No edema. Assessment:       ICD-10-CM ICD-9-CM    1. NICM (nonischemic cardiomyopathy) ([facility]) I42.8 425.4 AMB POC EKG ROUTINE W/ 12 LEADS, INTER & REP   2. Essential hypertension I10 401.9 AMB POC EKG ROUTINE W/ 12 LEADS, INTER & REP   3. Mixed hyperlipidemia E78.2 272.2 AMB POC EKG ROUTINE W/ 12 LEADS, INTER & REP   4. Atrial flutter, unspecified type ([facility]) I48.92 427.32 AMB POC EKG ROUTINE W/ 12 LEADS, INTER & REP   5. Stage 3 chronic kidney disease N18.3 585. 3        Plan:     1. Nonischemic cardiomyopathy: resolved. Fully compensated ejection fraction is 60% on last Echo. No ACEI or ARB due to renal insuff. 2. s/p atrial flutter ablation. In sinus rhythm. Off NOAC.    3. Hypertension controlled continue current medications  4. CRI: f/u with nephrology. 5. Recent labs reviewed.

## 2018-04-24 NOTE — PROGRESS NOTES
[address] [place] [address]  [phone]     Subjective:      David Portillo is a 70 y.o. male is here for routine f/u. Reports occasional SOB with moderate activity. Continues to chew tobacco.  Has PRN Albuterol if needed. The patient denies chest pain, orthopnea, PND, LE edema, palpitations, syncope, or presyncope.        Patient Active Problem List    Diagnosis Date Noted    Prediabetes 10/08/2017    Prostate cancer ([facility] 10/08/2017    COPD (chronic obstructive pulmonary disease) ([facility] 10/08/2017    Chronic low back pain 10/08/2017    Obesity (BMI 30-39.9) 10/08/2017    Frequent hospital admissions     Stage 3 chronic kidney disease 10/05/2017    Atrial flutter ([facility] 08/22/2017    Tobacco use 07/25/2017    Mixed hyperlipidemia 03/31/2016    Hypertension 05/20/2014    NICM (nonischemic cardiomyopathy) ([facility] 02/20/2014    S/P cardiac cath 02/12/2014    Combined systolic and diastolic congestive heart failure ([facility] 02/08/2014      Marlene Clancy MD  Past Medical History:   Diagnosis Date    Acute respiratory failure with hypoxia ([facility] 02/08/2014    also 11/28/15, 2/17/16, 5/14/17     Atrial flutter ([facility] 08/2017    saw Dr. Hipolito Leal; underwent a-flutter ablation    BPH (benign prostatic hyperplasia)     CHF (congestive heart failure) ([facility] 02/11/2014    TTE 11/15: EF 40-45%, 2/14: EF 20%  Admitted for acute exacerbations 2/8/14, 11/28/15, 2/17/16, and 5/4/17)    Chronic low back pain     LS spine X-ray 4/8/17: mild DDD    COPD (chronic obstructive pulmonary disease) ([address]     ED (erectile dysfunction)     Elevated troponin 02/11/2014    also 11/28/15; due to cardiac strain    Esophagitis     reflux, hiatal hernia    Femur fracture, right ([address]     Frequent hospital admissions     5/14-5/23/17: acute hypoxic resp failure due to CHF exac, ROBINSON on CKD 3, sepsis due to LE cellulitis, 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  2/17-2/22/16: acute hypoxic resp failure, acute diarrhea  11/28-11/30/15: acute hypoxic resp failure due to acute CHF exac, elevated troponin due to cardiac strain  2/8-2/12/14: acuter hypoxic resp failure due to CHF exac, pneumonia     Hand blister 10/8/2017    Bilateral    Hypertension     Nonischemic cardiomyopathy ([facility]     with LVH    Pneumonia 02/08/2014 2/8/14 and 10/30/15    Prediabetes     Prostate cancer ([facility] 2016    Pulmonary edema 11/28/2015    S/P cardiac cath 2/12/2014 2/12/14 no significant coronary disease, severe LV dysfunction    Shingles     Tinea cruris     also genital folliculitis    Vertigo       Past Surgical History:   Procedure Laterality Date    CARDIAC SURG PROCEDURE UNLIST  08/22/2017    SVT ablation    COLONOSCOPY,DIAGNOSTIC  02/22/2016    Dr. Deepthi Estevez; single sessile polyp at descending colon, sigmoid diverticulosis, int hemorrhoids    COLONOSCOPY,REMV LESN,SNARE  2/22/2016         HX ORTHOPAEDIC      surgery on right index finger    HX ORTHOPAEDIC  2000    right shoulder surgery    HX OTHER SURGICAL  08/22/2017    Dr. Desi Carrasquillo; atrial flutter ablation    HX TONSILLECTOMY       Allergies   Allergen Reactions    Oxycodone Contact Dermatitis      Family History   Problem Relation Age of Onset    Adopted: Yes    Family history unknown: Yes      Social History     Social History    Marital status:      Spouse name: N/A    Number of children: N/A    Years of education: N/A     Occupational History    Not on file.      Social History Main Topics    Smoking status: Former Smoker     Packs/day: 2.00     Years: 20.00     Types: Cigarettes     Quit date: 8/7/2017    Smokeless tobacco: Current User     Types: Chew      Comment: Chewing tobacco    Alcohol use No    Drug use: No    Sexual activity: Not on file     Other Topics Concern    Not on file     Social History Narrative      Current Outpatient Prescriptions   Medication Sig    amLODIPine (NORVASC) 2.5 mg tablet Take 1 Tab by mouth daily. Replaced with Amlodipine 2.5 mg take 1 pill by mouth daily    atorvastatin (LIPITOR) 40 mg tablet TAKE ONE TABLET BY MOUTH NIGHTLY    carvedilol (COREG) 25 mg tablet TAKE ONE TABLET BY MOUTH TWICE DAILY WITH MEALS    furosemide (LASIX) 40 mg tablet TAKE ONE TABLET BY MOUTH DAILY- DOSE REDUCED 12/17/15    albuterol (PROVENTIL HFA, VENTOLIN HFA, PROAIR HFA) 90 mcg/actuation inhaler Take 2 Puffs by inhalation every four (4) hours as needed for Wheezing.  aspirin 81 mg chewable tablet Take 1 Tab by mouth daily. No current facility-administered medications for this visit. Review of Symptoms:  11 systems reviewed, negative other than as stated in the HPI    Physical ExamPhysical Exam:    Vitals:    04/24/18 0902 04/24/18 0912   BP: 124/70 120/68   Pulse: 70    SpO2: 99%    Weight: 224 lb (101.6 kg)    Height: 5' 9\" (1.753 m)      Body mass index is 33.08 kg/(m^2). General PE   Gen:  NAD  Mental Status - Alert. General Appearance - Not in acute distress. Chest and Lung Exam   Inspection: Accessory muscles - No use of accessory muscles in breathing. Auscultation:   Breath sounds: - Normal.   Cardiovascular   Inspection: Jugular vein - Bilateral - Inspection Normal.   Palpation/Percussion:   Apical Impulse: - Normal.   Auscultation: Rhythm - Regular. Heart Sounds - S1 WNL and S2 WNL. No S3 or S4. Murmurs & Other Heart Sounds: Auscultation of the heart reveals - No Murmurs. Peripheral Vascular   Upper Extremity: Inspection - Bilateral - No Cyanotic nailbeds or Digital clubbing. Lower Extremity:   Palpation: Edema - Bilateral - No edema. Abdomen:   Soft, non-tender, bowel sounds are active.   Neuro: A&O times 3, CN and motor grossly WNL    Labs:   Lab Results   Component Value Date/Time    Cholesterol, total 117 04/19/2018 08:49 AM    Cholesterol, total 87 (L) 10/05/2017 09:35 AM    HDL Cholesterol 45 04/19/2018 08:49 AM    HDL Cholesterol 14 (L) 10/05/2017 09:35 AM    LDL, calculated 43 04/19/2018 08:49 AM    LDL, calculated 51 10/05/2017 09:35 AM    Triglyceride 143 04/19/2018 08:49 AM    Triglyceride 110 10/05/2017 09:35 AM     Lab Results   Component Value Date/Time    CK 51 05/17/2017 06:18 PM     Lab Results   Component Value Date/Time    Sodium 144 04/19/2018 08:49 AM    Potassium 4.0 04/19/2018 08:49 AM    Chloride 102 04/19/2018 08:49 AM    CO2 21 04/19/2018 08:49 AM    Anion gap 8 10/02/2017 11:17 AM    Glucose 137 (H) 04/19/2018 08:49 AM    BUN 12 04/19/2018 08:49 AM    Creatinine 1.17 04/19/2018 08:49 AM    BUN/Creatinine ratio 10 04/19/2018 08:49 AM    GFR est AA 70 04/19/2018 08:49 AM    GFR est non-AA 60 04/19/2018 08:49 AM    Calcium 9.7 04/19/2018 08:49 AM    Bilirubin, total 0.3 04/19/2018 08:49 AM    AST (SGOT) 24 04/19/2018 08:49 AM    Alk. phosphatase 98 04/19/2018 08:49 AM    Protein, total 8.0 04/19/2018 08:49 AM    Albumin 4.5 04/19/2018 08:49 AM    Globulin 4.8 (H) 10/02/2017 11:17 AM    A-G Ratio 1.3 04/19/2018 08:49 AM    ALT (SGPT) 20 04/19/2018 08:49 AM       EKG:  SR     Assessment:     Assessment:      1. NICM (nonischemic cardiomyopathy) ([facility]    2. Essential hypertension    3. Mixed hyperlipidemia    4. Atrial flutter, unspecified type ([facility]    5. Stage 3 chronic kidney disease        Orders Placed This Encounter    AMB POC EKG ROUTINE W/ 12 LEADS, INTER & REP     Order Specific Question:   Reason for Exam:     Answer:   ROUTINE        Plan:     Pt presents for f/u. Continues to chew tobacco.  Has PRN Albuterol if needed.     1. Nonischemic cardiomyopathy: resolved. Fully compensated ejection fraction is 60% on Echo 07/17.   2. AFL s/p atrial flutter ablation 08/17. In sinus rhythm. Off NOAC.    3. Hypertension. Well controlled. Continue current medications  4. HLD - Noted 4/18 FLP with LDL at 43. Continue statin. Lipids and labs followed by PCP  5. Non obstructive CAD per cardiac cath 02/14. 4. CRI: Recent Cr improved to 1.17 (04/18). F/u with nephrology. Continue current care and f/u in 6 months. Ming Colunga NP       Pt seen and examined in details. Agree with NP A&P. D/w pt.      Eda Crowley MD

## 2018-04-24 NOTE — PROGRESS NOTES
Chief Complaint   Patient presents with    Hypertension     1. Have you been to the ER, urgent care clinic since your last visit? Hospitalized since your last visit? NO    2. Have you seen or consulted any other health care providers outside of the [address] [place] [address] since your last visit? Include any pap smears or colon screening.  NO

## 2018-04-26 ENCOUNTER — OFFICE VISIT (OUTPATIENT)
Dept: INTERNAL MEDICINE CLINIC | Facility: CLINIC | Age: 78
End: 2018-04-26

## 2018-04-26 VITALS
SYSTOLIC BLOOD PRESSURE: 136 MMHG | HEART RATE: 68 BPM | HEIGHT: 69 IN | BODY MASS INDEX: 33.62 KG/M2 | TEMPERATURE: 98 F | WEIGHT: 227 LBS | DIASTOLIC BLOOD PRESSURE: 70 MMHG | RESPIRATION RATE: 18 BRPM | OXYGEN SATURATION: 97 %

## 2018-04-26 DIAGNOSIS — I42.8 NICM (NONISCHEMIC CARDIOMYOPATHY) (HCC): ICD-10-CM

## 2018-04-26 DIAGNOSIS — M54.50 CHRONIC BILATERAL LOW BACK PAIN WITHOUT SCIATICA: ICD-10-CM

## 2018-04-26 DIAGNOSIS — I10 ESSENTIAL HYPERTENSION: Primary | ICD-10-CM

## 2018-04-26 DIAGNOSIS — Z72.0 SMOKELESS TOBACCO USE: ICD-10-CM

## 2018-04-26 DIAGNOSIS — G89.29 CHRONIC BILATERAL LOW BACK PAIN WITHOUT SCIATICA: ICD-10-CM

## 2018-04-26 DIAGNOSIS — R73.03 PREDIABETES: ICD-10-CM

## 2018-04-26 DIAGNOSIS — I48.92 ATRIAL FLUTTER, UNSPECIFIED TYPE (HCC): ICD-10-CM

## 2018-04-26 DIAGNOSIS — I50.40 COMBINED SYSTOLIC AND DIASTOLIC CONGESTIVE HEART FAILURE, UNSPECIFIED HF CHRONICITY (HCC): ICD-10-CM

## 2018-04-26 DIAGNOSIS — N18.30 STAGE 3 CHRONIC KIDNEY DISEASE (HCC): ICD-10-CM

## 2018-04-26 DIAGNOSIS — J44.9 CHRONIC OBSTRUCTIVE PULMONARY DISEASE, UNSPECIFIED COPD TYPE (HCC): ICD-10-CM

## 2018-04-26 DIAGNOSIS — E78.2 MIXED HYPERLIPIDEMIA: ICD-10-CM

## 2018-04-26 DIAGNOSIS — C61 PROSTATE CANCER (HCC): ICD-10-CM

## 2018-04-26 DIAGNOSIS — N52.9 ERECTILE DYSFUNCTION, UNSPECIFIED ERECTILE DYSFUNCTION TYPE: ICD-10-CM

## 2018-04-26 RX ORDER — SILDENAFIL CITRATE 20 MG/1
TABLET ORAL
Qty: 4 TAB | Refills: 3 | Status: SHIPPED | OUTPATIENT
Start: 2018-04-26 | End: 2018-07-03 | Stop reason: ALTCHOICE

## 2018-04-26 NOTE — MR AVS SNAPSHOT
[address] [address] [phone] Patient: Shahbaz Barlow MRN: [id number] KWB:[id number] Visit Information Date & Time Provider Department Dept. Phone Encounter #  
 4/26/2018  8:10 AM Evette Arauz MD [facility] of [address] [place] [address] [id number] Follow-up Instructions Return in about 3 months (around 7/26/2018), or if symptoms worsen or fail to improve, for HTN, back pain. Your Appointments 10/30/2018  9:00 AM  
ESTABLISHED PATIENT with Saul Mcclelland MD  
[facility]) Appt Note: 6mo f/up  [facility] [id number] [facility] [address]  
[phone] [id number] [facility] [address] Upcoming Health Maintenance Date Due  
 MEDICARE YEARLY EXAM 10/6/2018 GLAUCOMA SCREENING Q2Y 4/3/2020 DTaP/Tdap/Td series (2 - Td) 12/27/2027 Allergies as of 4/26/2018  Review Complete On: 4/26/2018 By: Evette Arauz MD  
  
 Severity Noted Reaction Type Reactions Oxycodone  10/31/2017    Contact Dermatitis Current Immunizations  Reviewed on 11/28/2015 Name Date Influenza High Dose Vaccine PF 9/18/2017 Influenza Vaccine (Quad) PF 11/30/2015 10:45 AM  
 Pneumococcal Conjugate (PCV-13) 9/18/2017 Pneumococcal Polysaccharide (PPSV-23) 11/30/2015 10:01 AM  
  
 Not reviewed this visit You Were Diagnosed With   
  
 Codes Comments Essential hypertension    -  Primary ICD-10-CM: I10 
ICD-9-CM: 401.9 Chronic obstructive pulmonary disease, unspecified COPD type ([facility]     ICD-10-CM: J44.9 ICD-9-CM: 811 Mixed hyperlipidemia     ICD-10-CM: E78.2 ICD-9-CM: 272.2 Chronic bilateral low back pain without sciatica     ICD-10-CM: M54.5, G89.29 ICD-9-CM: 724.2, 338.29 Prediabetes     ICD-10-CM: R73.03 
ICD-9-CM: 790.29 Stage 3 chronic kidney disease     ICD-10-CM: N18.3 ICD-9-CM: 505. 3 Combined systolic and diastolic congestive heart failure, unspecified HF chronicity ([facility]     ICD-10-CM: I50.40 ICD-9-CM: 428.40 NICM (nonischemic cardiomyopathy) ([facility]     ICD-10-CM: I42.8 ICD-9-CM: 425.4 Atrial flutter, unspecified type ([facility].)     ICD-10-CM: I48.92 
ICD-9-CM: 427.32 Prostate cancer [facility])     ICD-10-CM: B37 ICD-9-CM: 025 Erectile dysfunction, unspecified erectile dysfunction type     ICD-10-CM: N52.9 ICD-9-CM: 607.84 Vitals BP Pulse Temp Resp Height(growth percentile) Weight(growth percentile) 136/70 68 98 °F (36.7 °C) (Oral) 18 5' 9\" (1.753 m) 227 lb (103 kg) SpO2 BMI Smoking Status 97% 33.52 kg/m2 Former Smoker Vitals History BMI and BSA Data Body Mass Index Body Surface Area  
 33.52 kg/m 2 2.24 m 2 Preferred Pharmacy Pharmacy Name Phone [facility] [address] [place] [address] [phone] Your Updated Medication List  
  
   
This list is accurate as of 4/26/18  8:49 AM.  Always use your most recent med list.  
  
  
  
  
 albuterol 90 mcg/actuation inhaler Commonly known as:  PROVENTIL HFA, VENTOLIN HFA, PROAIR HFA Take 2 Puffs by inhalation every four (4) hours as needed for Wheezing. amLODIPine 2.5 mg tablet Commonly known as:  Orval Olivia Take 1 Tab by mouth daily. Replaced with Amlodipine 2.5 mg take 1 pill by mouth daily  
  
 aspirin 81 mg chewable tablet Take 1 Tab by mouth daily. atorvastatin 40 mg tablet Commonly known as:  LIPITOR  
TAKE ONE TABLET BY MOUTH NIGHTLY  
  
 carvedilol 25 mg tablet Commonly known as:  COREG  
TAKE ONE TABLET BY MOUTH TWICE DAILY WITH MEALS  
  
 furosemide 40 mg tablet Commonly known as:  LASIX TAKE ONE TABLET BY MOUTH DAILY- DOSE REDUCED 12/17/15  
  
 sildenafil (antihypertensive) 20 mg tablet Commonly known as:  REVATIO Take 1 tab by mouth 30 minutes before planned intercourse. Prescriptions Sent to Pharmacy Refills  
 sildenafil, antihypertensive, (REVATIO) 20 mg tablet 3 Sig: Take 1 tab by mouth 30 minutes before planned intercourse. Class: Normal  
 Pharmacy: [facility] DR JEREMIAH MOCTEZUMA [address] [facility], [address] [facility] #: [phone] We Performed the Following REFERRAL TO ORTHOPEDICS [PJB181 Custom] Comments:  
 Evaluation and management of chronic low back with injections as needed. Follow-up Instructions Return in about 3 months (around 7/26/2018), or if symptoms worsen or fail to improve, for HTN, back pain. Referral Information Referral ID Referred By Referred To  
  
 [id number] [facility], [address] [place] [address] [address] [place] [address], [facility] Phone: [phone] Fax: [phone] Visits Status Start Date End Date 1 New Request 4/26/18 4/26/19 If your referral has a status of pending review or denied, additional information will be sent to support the outcome of this decision. Patient Instructions Patient Instructions Use Salon Pas lidocaine pain patch to the back to help with back pain. You can buy the pain patches from any drug store. Back Stretches: Exercises Your Care Instructions Here are some examples of exercises for stretching your back. Start each exercise slowly. Ease off the exercise if you start to have pain. Your doctor or physical therapist will tell you when you can start these exercises and which ones will work best for you. How to do the exercises Overhead stretch 1. Stand comfortably with your feet shoulder-width apart. 2. Looking straight ahead, raise both arms over your head and reach toward the ceiling. Do not allow your head to tilt back. 3. Hold for 15 to 30 seconds, then lower your arms to your sides. 4. Repeat 2 to 4 times. Side stretch 1. Stand comfortably with your feet shoulder-width apart. 2. Raise one arm over your head, and then lean to the other side. 3. Slide your hand down your leg as you let the weight of your arm gently stretch your side muscles. Hold for 15 to 30 seconds. 4. Repeat 2 to 4 times on each side. Press-up 1. Lie on your stomach, supporting your body with your forearms. 2. Press your elbows down into the floor to raise your upper back. As you do this, relax your stomach muscles and allow your back to arch without using your back muscles. As your press up, do not let your hips or pelvis come off the floor. 3. Hold for 15 to 30 seconds, then relax. 4. Repeat 2 to 4 times. Relax and rest 
 
1. Lie on your back with a rolled towel under your neck and a pillow under your knees. Extend your arms comfortably to your sides. 2. Relax and breathe normally. 3. Remain in this position for about 10 minutes. 4. If you can, do this 2 or 3 times each day. Follow-up care is a key part of your treatment and safety. Be sure to make and go to all appointments, and call your doctor if you are having problems. It's also a good idea to know your test results and keep a list of the medicines you take. Where can you learn more? Go to http://bg-gurpreet.info/. Enter H684 in the search box to learn more about \"Back Stretches: Exercises. \" Current as of: March 21, 2017 Content Version: 11.4 © 7649-7681 [employer]. Care instructions adapted under license by "[employer] (which disclaims liability or warranty for this information). If you have questions about a medical condition or this instruction, always ask your healthcare professional. [address] [address] any warranty or liability for your use of this information. Introducing [facility]!    
 Carlton Ladd introduces [facility] patient portal. Now you can access parts of your medical record, email your doctor's office, and request medication refills online. 1. In your internet browser, go to [email or web address] 2. Click on the First Time User? Click Here link in the Sign In box. You will see the New Member Sign Up page. 3. Enter your [employer] Access Code exactly as it appears below. You will not need to use this code after youve completed the sign-up process. If you do not sign up before the expiration date, you must request a new code. · [employer] Access Code: [email or web address] Expires: 7/23/2018  9:40 AM 
 
4. Enter the last four digits of your Social Security Number (xxxx) and Date of Birth (mm/dd/yyyy) as indicated and click Submit. You will be taken to the next sign-up page. 5. Create a [employer] ID. This will be your [employer] login ID and cannot be changed, so think of one that is secure and easy to remember. 6. Create a [employer] password. You can change your password at any time. 7. Enter your Password Reset Question and Answer. This can be used at a later time if you forget your password. 8. Enter your e-mail address. You will receive e-mail notification when new information is available in [address] 9. Click Sign Up. You can now view and download portions of your medical record. 10. Click the Download Summary menu link to download a portable copy of your medical information. If you have questions, please visit the Frequently Asked Questions section of the [employer] website. Remember, [employer] is NOT to be used for urgent needs. For medical emergencies, dial 911. Now available from your iPhone and Android! Please provide this summary of care documentation to your next provider. Your primary care clinician is listed as Sekou Freeman. If you have any questions after today's visit, please call [phone].

## 2018-04-26 NOTE — PROGRESS NOTES
Twan Wayne  Identified pt with two pt identifiers(name and ). No chief complaint on file. 1. Have you been to the ER, urgent care clinic since your last visit? Hospitalized since your last visit? [facility] and [facility]    2. Have you seen or consulted any other health care providers outside of the [address] [place] [address] since your last visit? Include any pap smears or colon screening. Dr Sree Warner and kidney doctor(can't recall name of physician)    Today's provider has been notified of reason for visit, vitals and flowsheets obtained on patients. Patient received paperwork for advance directive during previous visit but has not completed at this time     Reviewed record In preparation for visit, huddled with provider and have obtained necessary documentation      There are no preventive care reminders to display for this patient.     Wt Readings from Last 3 Encounters:   18 227 lb (103 kg)   18 224 lb (101.6 kg)   17 225 lb (102.1 kg)     Temp Readings from Last 3 Encounters:   18 98 °F (36.7 °C) (Oral)   17 97.6 °F (36.4 °C) (Oral)   10/27/17 97.6 °F (36.4 °C) (Oral)     BP Readings from Last 3 Encounters:   18 136/70   18 120/68   17 151/63     Pulse Readings from Last 3 Encounters:   18 68   18 70   17 89     Vitals:    18 0803 18 0806   BP: 149/73 136/70   Pulse: 68    Resp: 18    Temp: 98 °F (36.7 °C)    TempSrc: Oral    SpO2: 97%    Weight: 227 lb (103 kg)    Height: 5' 9\" (1.753 m)    PainSc:   8          Learning Assessment:  :     Learning Assessment 10/5/2017 2014   PRIMARY LEARNER Patient Patient   HIGHEST LEVEL OF EDUCATION - PRIMARY LEARNER  - DID NOT GRADUATE HIGH SCHOOL   BARRIERS PRIMARY LEARNER - NONE   CO-LEARNER CAREGIVER - No   PRIMARY LANGUAGE ENGLISH ENGLISH    NEED - No   LEARNER PREFERENCE PRIMARY LISTENING LISTENING     DEMONSTRATION - READING -   ANSWERED BY patient Patient   RELATIONSHIP SELF SELF       Depression Screening:  :     PHQ over the last two weeks 4/26/2018   Little interest or pleasure in doing things Not at all   Feeling down, depressed or hopeless Not at all   Total Score PHQ 2 0       Fall Risk Assessment:  :     Fall Risk Assessment, last 12 mths 4/26/2018   Able to walk? Yes   Fall in past 12 months? No   Fall with injury? -       Abuse Screening:  :     Abuse Screening Questionnaire 10/5/2017   Do you ever feel afraid of your partner? N   Are you in a relationship with someone who physically or mentally threatens you? N   Is it safe for you to go home? Y       ADL Screening:  :     ADL Assessment 4/26/2018   Feeding yourself No Help Needed   Getting from bed to chair No Help Needed   Getting dressed No Help Needed   Bathing or showering No Help Needed   Walk across the room (includes cane/walker) No Help Needed   Using the telphone No Help Needed   Taking your medications No Help Needed   Preparing meals No Help Needed   Managing money (expenses/bills) No Help Needed   Moderately strenuous housework (laundry) No Help Needed   Shopping for personal items (toiletries/medicines) No Help Needed   Shopping for groceries No Help Needed   Driving No Help Needed   Climbing a flight of stairs No Help Needed   Getting to places beyond walking distances No Help Needed                 Medication reconciliation up to date and corrected with patient at this time.

## 2018-04-26 NOTE — PROGRESS NOTES
CC:   Chief Complaint   Patient presents with    Results    Back Pain     had PT       HISTORY OF [address] is a 70 y.o. male. Presents for 4 month follow up evaluation. He has HTN, hyperlipidemia, CKD stage 3, CHF (combined diastolic and systolic) with echo EF 86-40% in 7/17, non-ischemic cardiomyopathy, atrial flutter s/p AFL ablation on 8/22/17, prediabetes, COPD, and prostate cancer. Reports he last saw Dr. Nahomi Haider (Nephrology) about 2 months ago and Dr. Donya Palacio (Ophthalmology) on 4/2/18. Today he complains of:    1)  bilateral low back pain that sometimes radiates down his right leg. Present for years but worsened a few months ago. Pain is 8/10, located at bilateral low back, achy and sometimes sharp, intermittent, worse with walking or standing. Denies recent injury, trauma, or falls. On his own, he saw an orthopedic doctor at [facility] (cannot remember the name) who recommended physical therapy. Underwent 6 weeks of PT, but reports no improvement in his pain. Takes Tylenol for pain but does not help much. Denies taking ibuprofen or Aleve. 2) epigastric abdominal pain over the past few days. Denies heartburn, increased gas or belching. 3) erectile dysfunction. He wants a prescription for Viagra or Cialis.     Cardiovascular Review  The patient has hypertension, CHF, non-ischemic cardiomyopathy, and atrial flutter s/p AFL ablation on 8/22/17.  Denies CP, SOB, heart palpitations, leg swelling, orthopnea, or PND. He reports taking medications as instructed, no medication side effects noted. Diet and Lifestyle: generally follows a low sodium diet, no formal exercise but active during the day.  Lab review: labs reviewed and discussed with patient. Saw Dr. Candie Luong (Cardiology) on 4/24/18, no changes made. Oncology Review  Diagnosed with prostate cancer on 2/6/09. Stage [id number]. Pre-treatment PSA was 24.8 ng/ml.   Had external beam radiation therapy in 2009. Has been on Lupron since 12/22/16; receiving every 6 months, last received on 2/28/18. According to patient, Dr. Elvie Veliz sent him for a DEXA that returned showing mild bone thinning. He was told to drink more milk and eat more broccoli. Review of Studies  EKG 10/2/17: NSR with frequent PVC's. Echo (TTE) 7/31/17: EF 55-60%. No regional wall motion abnormalities. LA moderately dilated. Lumbar spine X-ray 10/2/17: Mild spondylosis. CXR 10/2/17: mild interstitial edema in the mid lower lung zones right greater than left  CT head 9/29/17: no acute abnormality      Other Providers: Dr. Kimberly Wyatt (Cardiology), Dr. Dorothea Enamorado (Cardiology EPS), Dr. Elvie Veliz (Urology), Dr. Mavis Roper (Nephrology), Dr. Chris Ng (Ophthalmology)      Soc Hx  . Has 2 children and 3 grandchildren. Retired . He chews tobacco. Quit smoking 8/7/17; previously smoked 1.5-2 ppd for 50 yrs. No longer drinks alcohol. Denies recreational drug use. Does not get much exercise.      ROS  A complete review of systems was performed and is negative except for those mentioned in the HPI. 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    S/P cardiac cath Z98.890    NICM (nonischemic cardiomyopathy) ([facility] I42.8    Hypertension I10    Mixed hyperlipidemia E78.2    Tobacco use Z72.0    Atrial flutter (HCC) I48.92    Stage 3 chronic kidney disease N18.3    Frequent hospital admissions Z78.9    Prediabetes R73.03    Prostate cancer ([address] C61    COPD (chronic obstructive pulmonary disease) (HCC) J44.9    Chronic low back pain M54.5, G89.29    Obesity (BMI 30-39. 9) E66.9     Past Medical History:   Diagnosis Date    Acute respiratory failure with hypoxia ([address] 02/08/2014    also 11/28/15, 2/17/16, 5/14/17     Atrial flutter ([address] 08/2017    saw Dr. Dorothea Enamorado; underwent a-flutter ablation    BPH (benign prostatic hyperplasia)     [facility]congestive heart failure) ([facility] 02/11/2014    TTE 11/15: EF 40-45%, 2/14: EF 20%  Admitted for acute exacerbations 2/8/14, 11/28/15, 2/17/16, and 5/4/17)    Chronic low back pain     LS spine X-ray 4/8/17: mild DDD    COPD (chronic obstructive pulmonary disease) ([facility]     ED (erectile dysfunction)     Elevated troponin 02/11/2014    also 11/28/15; due to cardiac strain    Esophagitis     reflux, hiatal hernia    Femur fracture, right ([address]     Frequent hospital admissions     5/14-5/23/17: acute hypoxic resp failure due to CHF exac, ROBINSON on CKD 3, sepsis due to LE cellulitis, 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  2/17-2/22/16: acute hypoxic resp failure, acute diarrhea  11/28-11/30/15: acute hypoxic resp failure due to acute CHF exac, elevated troponin due to cardiac strain  2/8-2/12/14: acuter hypoxic resp failure due to CHF exac, pneumonia     Hand blister 10/8/2017    Bilateral    Hypertension     Nonischemic cardiomyopathy ([facility]     with LVH    Pneumonia 02/08/2014 2/8/14 and 10/30/15    Prediabetes     Prostate cancer ([facility] 2016    Pulmonary edema 11/28/2015    S/P cardiac cath 2/12/2014 2/12/14 no significant coronary disease, severe LV dysfunction    Shingles     Tinea cruris     also genital folliculitis    Vertigo      Allergies   Allergen Reactions    Oxycodone Contact Dermatitis       Current Outpatient Prescriptions   Medication Sig Dispense Refill    amLODIPine (NORVASC) 2.5 mg tablet Take 1 Tab by mouth daily.  Replaced with Amlodipine 2.5 mg take 1 pill by mouth daily 30 Tab 0    atorvastatin (LIPITOR) 40 mg tablet TAKE ONE TABLET BY MOUTH NIGHTLY 90 Tab 1    carvedilol (COREG) 25 mg tablet TAKE ONE TABLET BY MOUTH TWICE DAILY WITH MEALS 60 Tab 6    furosemide (LASIX) 40 mg tablet TAKE ONE TABLET BY MOUTH DAILY- DOSE REDUCED 12/17/15 30 Tab 6    albuterol (PROVENTIL HFA, VENTOLIN HFA, PROAIR HFA) 90 mcg/actuation inhaler Take 2 Puffs by inhalation every four (4) hours as needed for Wheezing. 1 Inhaler 0    aspirin 81 mg chewable tablet Take 1 Tab by mouth daily. 10 Tab 0         PHYSICAL EXAM  Visit Vitals    /70    Pulse 68    Temp 98 °F (36.7 °C) (Oral)    Resp 18    Ht 5' 9\" (1.753 m)    Wt 227 lb (103 kg)    SpO2 97%    BMI 33.52 kg/m2       General: Obese, no distress. HEENT:  Head normocephalic/atraumatic, no scleral icterus  Neck: Supple. No carotid bruits, JVD, lymphadenopathy, or thyromegaly. Lungs:  Clear to ausculation bilaterally. Good air movement. Heart:  Regular rate and rhythm, normal S1 and S2, no murmur, gallop, or rub  Abdomen: Soft, non-distended, normal bowel sounds, mild epigastric tenderness, no guarding, masses, rebound tenderness, or HSM. Back: Slightly decreased ROM. No vertebral or paravertebral tenderness. Negative SLR bilaterally. Extremities: No clubbing, cyanosis, or edema. Neurological: Alert and oriented. Psychiatric: Normal mood and affect. Behavior is normal.     Results for orders placed or performed in visit on 63/70/71   METABOLIC PANEL, COMPREHENSIVE   Result Value Ref Range    Glucose 137 (H) 65 - 99 mg/dL    BUN 12 8 - 27 mg/dL    Creatinine 1.17 0.76 - 1.27 mg/dL    GFR est non-AA 60 >59 mL/min/1.73    GFR est AA 70 >59 mL/min/1.73    BUN/Creatinine ratio 10 10 - 24    Sodium 144 134 - 144 mmol/L    Potassium 4.0 3.5 - 5.2 mmol/L    Chloride 102 96 - 106 mmol/L    CO2 21 18 - 29 mmol/L    Calcium 9.7 8.6 - 10.2 mg/dL    Protein, total 8.0 6.0 - 8.5 g/dL    Albumin 4.5 3.5 - 4.8 g/dL    GLOBULIN, TOTAL 3.5 1.5 - 4.5 g/dL    A-G Ratio 1.3 1.2 - 2.2    Bilirubin, total 0.3 0.0 - 1.2 mg/dL    Alk.  phosphatase 98 39 - 117 IU/L    AST (SGOT) 24 0 - 40 IU/L    ALT (SGPT) 20 0 - 44 IU/L   LIPID PANEL   Result Value Ref Range    Cholesterol, total 117 100 - 199 mg/dL    Triglyceride 143 0 - 149 mg/dL    HDL Cholesterol 45 >39 mg/dL    VLDL, calculated 29 5 - 40 mg/dL    LDL, calculated 43 0 - 99 mg/dL   CBC WITH AUTOMATED DIFF   Result Value Ref Range    WBC 4.9 3.4 - 10.8 x10E3/uL    RBC 4.35 4.14 - 5.80 x10E6/uL    HGB 10.9 (L) 13.0 - 17.7 g/dL    HCT 35.4 (L) 37.5 - 51.0 %    MCV 81 79 - 97 fL    MCH 25.1 (L) 26.6 - 33.0 pg    MCHC 30.8 (L) 31.5 - 35.7 g/dL    RDW 14.4 12.3 - 15.4 %    PLATELET 364 (L) 547 - 379 x10E3/uL    NEUTROPHILS 68 Not Estab. %    Lymphocytes 23 Not Estab. %    MONOCYTES 4 Not Estab. %    EOSINOPHILS 4 Not Estab. %    BASOPHILS 1 Not Estab. %    ABS. NEUTROPHILS 3.4 1.4 - 7.0 x10E3/uL    Abs Lymphocytes 1.2 0.7 - 3.1 x10E3/uL    ABS. MONOCYTES 0.2 0.1 - 0.9 x10E3/uL    ABS. EOSINOPHILS 0.2 0.0 - 0.4 x10E3/uL    ABS. BASOPHILS 0.0 0.0 - 0.2 x10E3/uL    IMMATURE GRANULOCYTES 0 Not Estab. %    ABS. IMM. GRANS. 0.0 0.0 - 0.1 x10E3/uL   HEMOGLOBIN A1C WITH EAG   Result Value Ref Range    Hemoglobin A1c 6.3 (H) 4.8 - 5.6 %    Estimated average glucose 134 mg/dL   TSH RFX ON ABNORMAL TO FREE T4   Result Value Ref Range    TSH 2.080 0.450 - 4.500 uIU/mL         ASSESSMENT AND PLAN    ICD-10-CM ICD-9-CM    1. Essential hypertension I10 401.9    2. Chronic obstructive pulmonary disease, unspecified COPD type ([facility] J44.9 496    3. Mixed hyperlipidemia E78.2 272.2    4. Chronic bilateral low back pain without sciatica M54.5 724.2 REFERRAL TO ORTHOPEDICS    G89.29 338.29    5. Prediabetes R73.03 790.29    6. Stage 3 chronic kidney disease N18.3 585.3    7. Combined systolic and diastolic congestive heart failure, unspecified HF chronicity ([facility]) I50.40 428.40    8. NICM (nonischemic cardiomyopathy) ([facility]) I42.8 425.4    9. Atrial flutter, unspecified type (HCC) I48.92 427.32    10. Prostate cancer ([facility] C61 185    11. Erectile dysfunction, unspecified erectile dysfunction type N52.9 607.84 sildenafil, antihypertensive, (REVATIO) 20 mg tablet   12. Smokeless tobacco use Z72.0 305.1        Discussed recent lab results with patient.  Normal CMP (kidney tests improved from 6 mons ago, pt with hx of CKD stage 3), lipid panel, and TSH. Has improving normocytic anemia, low platelet count at 192 K (improved from 57 K in 10/17), and A1c 6.3% (prediabetes, was 6.4% in 10/17). Diagnoses and all orders for this visit:    1. Essential hypertension  Controlled. Continue amlodipine, carvedilol, and furosemide. 2. Chronic obstructive pulmonary disease, unspecified COPD type ([facility]  Controlled. 3. Mixed hyperlipidemia  Controlled. Continue atorvastatin. 4. Chronic bilateral low back pain without sciatica  Patient wants to try steroid injections to the back.  -     REFERRAL TO ORTHOPEDICS (Dr. Josse White)    5. Prediabetes  Counseled on low-sugar diet. Has been eating slices of cake and pastries daily for breakfast.    6. Stage 3 chronic kidney disease  Surprisingly improved. Will continue to follow every 3-6 months. 7. Combined systolic and diastolic congestive heart failure, unspecified HF chronicity ([address]  Controlled. Last echo with LVEF 55-60%. Continue carvedilol and furosemide. 8. NICM (nonischemic cardiomyopathy) ([address]    9. Atrial flutter, unspecified type (HCC)  In NSR. Continue beta-blocker and daily ASA. 10. Prostate cancer ([address]  On Lupron injections every 6 months. 11. Erectile dysfunction, unspecified erectile dysfunction type  Not on a nitrate.  -     Start sildenafil, antihypertensive, (REVATIO) 20 mg tablet; Take 1 tab by mouth 30 minutes before planned intercourse. 12. Smokeless tobacco use  Counseled on cessation of chewing tobacco.      Follow-up Disposition:  Return in about 3 months (around 7/26/2018), or if symptoms worsen or fail to improve, for HTN, back pain. Provided patient and/or family with advanced directive information and answered pertinent questions. Encouraged patient to provide a copy of advanced directive to the office when available. I have discussed the diagnosis with the patient and the intended plan as seen in the above orders. Patient is in agreement.   The patient has received an after-visit summary and questions were answered concerning future plans. I have discussed medication side effects and warnings with the patient as well.

## 2018-04-26 NOTE — PATIENT INSTRUCTIONS
Patient Instructions  Use Salon Pas lidocaine pain patch to the back to help with back pain. You can buy the pain patches from any drug store. Back Stretches: Exercises  Your Care Instructions  Here are some examples of exercises for stretching your back. Start each exercise slowly. Ease off the exercise if you start to have pain. Your doctor or physical therapist will tell you when you can start these exercises and which ones will work best for you. How to do the exercises  Overhead stretch    1. Stand comfortably with your feet shoulder-width apart. 2. Looking straight ahead, raise both arms over your head and reach toward the ceiling. Do not allow your head to tilt back. 3. Hold for 15 to 30 seconds, then lower your arms to your sides. 4. Repeat 2 to 4 times. Side stretch    1. Stand comfortably with your feet shoulder-width apart. 2. Raise one arm over your head, and then lean to the other side. 3. Slide your hand down your leg as you let the weight of your arm gently stretch your side muscles. Hold for 15 to 30 seconds. 4. Repeat 2 to 4 times on each side. Press-up    1. Lie on your stomach, supporting your body with your forearms. 2. Press your elbows down into the floor to raise your upper back. As you do this, relax your stomach muscles and allow your back to arch without using your back muscles. As your press up, do not let your hips or pelvis come off the floor. 3. Hold for 15 to 30 seconds, then relax. 4. Repeat 2 to 4 times. Relax and rest    1. Lie on your back with a rolled towel under your neck and a pillow under your knees. Extend your arms comfortably to your sides. 2. Relax and breathe normally. 3. Remain in this position for about 10 minutes. 4. If you can, do this 2 or 3 times each day. Follow-up care is a key part of your treatment and safety. Be sure to make and go to all appointments, and call your doctor if you are having problems.  It's also a good idea to know your test results and keep a list of the medicines you take. Where can you learn more? Go to http://bg-gurpreet.info/. Enter V021 in the search box to learn more about \"Back Stretches: Exercises. \"  Current as of: March 21, 2017  Content Version: 11.4  © 3663-6563 [employer], [employer]. Care instructions adapted under license by [employer] (which disclaims liability or warranty for this information). If you have questions about a medical condition or this instruction, always ask your healthcare professional. [address] any warranty or liability for your use of this information.

## 2018-05-07 RX ORDER — AMLODIPINE BESYLATE 2.5 MG/1
TABLET ORAL
Qty: 30 TAB | Refills: 0 | Status: SHIPPED | OUTPATIENT
Start: 2018-05-07 | End: 2018-06-04 | Stop reason: SDUPTHER

## 2018-05-11 ENCOUNTER — HOSPITAL ENCOUNTER (OUTPATIENT)
Dept: CT IMAGING | Age: 78
Discharge: HOME | End: 2018-05-11
Attending: ORTHOPAEDIC SURGERY
Payer: MEDICARE

## 2018-05-11 ENCOUNTER — HOSPITAL ENCOUNTER (OUTPATIENT)
Dept: GENERAL RADIOLOGY | Age: 78
Discharge: HOME | End: 2018-05-11
Attending: ORTHOPAEDIC SURGERY
Payer: MEDICARE

## 2018-05-11 DIAGNOSIS — M54.50 LOW BACK PAIN: ICD-10-CM

## 2018-05-11 PROCEDURE — 72020 X-RAY EXAM OF SPINE 1 VIEW: CPT

## 2018-05-11 RX ORDER — LIDOCAINE HYDROCHLORIDE 10 MG/ML
10 INJECTION INFILTRATION; PERINEURAL
Status: DISPENSED | OUTPATIENT
Start: 2018-05-11 | End: 2018-05-11

## 2018-05-11 NOTE — PROGRESS NOTES
Radiology-Lumbar myelogram cancelled due to concerns of patient about potential risks. Explained that this test might be a prerequisite of surgery;patient stated he doesn't want surgery and would like to therefore cancel. -ZO.

## 2018-06-05 RX ORDER — AMLODIPINE BESYLATE 2.5 MG/1
TABLET ORAL
Qty: 30 TAB | Refills: 0 | Status: SHIPPED | OUTPATIENT
Start: 2018-06-05 | End: 2020-04-06

## 2018-06-22 RX ORDER — CARVEDILOL 25 MG/1
TABLET ORAL
Qty: 60 TAB | Refills: 6 | Status: SHIPPED | COMMUNITY
Start: 2018-06-22 | End: 2018-07-03 | Stop reason: SDUPTHER

## 2018-07-03 ENCOUNTER — TELEPHONE (OUTPATIENT)
Dept: INTERNAL MEDICINE CLINIC | Facility: CLINIC | Age: 78
End: 2018-07-03

## 2018-07-03 ENCOUNTER — OFFICE VISIT (OUTPATIENT)
Dept: INTERNAL MEDICINE CLINIC | Facility: CLINIC | Age: 78
End: 2018-07-03

## 2018-07-03 VITALS
RESPIRATION RATE: 16 BRPM | HEART RATE: 75 BPM | DIASTOLIC BLOOD PRESSURE: 59 MMHG | TEMPERATURE: 97.6 F | BODY MASS INDEX: 33.77 KG/M2 | HEIGHT: 69 IN | OXYGEN SATURATION: 98 % | WEIGHT: 228 LBS | SYSTOLIC BLOOD PRESSURE: 131 MMHG

## 2018-07-03 DIAGNOSIS — M54.50 CHRONIC BILATERAL LOW BACK PAIN WITHOUT SCIATICA: ICD-10-CM

## 2018-07-03 DIAGNOSIS — Z72.0 SMOKELESS TOBACCO USE: ICD-10-CM

## 2018-07-03 DIAGNOSIS — G89.29 CHRONIC BILATERAL LOW BACK PAIN WITHOUT SCIATICA: ICD-10-CM

## 2018-07-03 DIAGNOSIS — N52.9 ERECTILE DYSFUNCTION, UNSPECIFIED ERECTILE DYSFUNCTION TYPE: ICD-10-CM

## 2018-07-03 DIAGNOSIS — I10 ESSENTIAL HYPERTENSION: Primary | ICD-10-CM

## 2018-07-03 DIAGNOSIS — J44.9 CHRONIC OBSTRUCTIVE PULMONARY DISEASE, UNSPECIFIED COPD TYPE (HCC): ICD-10-CM

## 2018-07-03 DIAGNOSIS — C61 PROSTATE CANCER (HCC): ICD-10-CM

## 2018-07-03 DIAGNOSIS — N18.30 STAGE 3 CHRONIC KIDNEY DISEASE (HCC): ICD-10-CM

## 2018-07-03 DIAGNOSIS — B37.2 INTERTRIGINOUS CANDIDIASIS: ICD-10-CM

## 2018-07-03 DIAGNOSIS — I50.40 COMBINED SYSTOLIC AND DIASTOLIC CONGESTIVE HEART FAILURE, UNSPECIFIED HF CHRONICITY (HCC): ICD-10-CM

## 2018-07-03 RX ORDER — SILDENAFIL 100 MG/1
100 TABLET, FILM COATED ORAL AS NEEDED
Qty: 4 TAB | Refills: 5 | Status: SHIPPED | OUTPATIENT
Start: 2018-07-03 | End: 2018-11-05

## 2018-07-03 RX ORDER — CLOTRIMAZOLE AND BETAMETHASONE DIPROPIONATE 10; .64 MG/G; MG/G
CREAM TOPICAL 2 TIMES DAILY
Qty: 45 G | Refills: 3 | Status: SHIPPED | OUTPATIENT
Start: 2018-07-03 | End: 2019-07-09 | Stop reason: SDUPTHER

## 2018-07-03 NOTE — PROGRESS NOTES
CC:   Chief Complaint   Patient presents with    Hypertension       HISTORY OF PRESENT ILLNESS  Mariana Salguero is a 70 y.o. male. Presents for 2 month follow up evaluation. He has HTN, hyperlipidemia, CKD stage 3, CHF (combined diastolic and systolic) with echo EF 01-98% in 7/17, non-ischemic cardiomyopathy, atrial flutter s/p AFL ablation on 8/22/17, prediabetes, COPD, and prostate cancer.       Today he reports that his bilateral low back pain is much better. Underwent an epidural steroid injection by Dr. Gallo Del Valle on 5/25/18 that helped his pain significantly. States he is scheduled for another injection in 3 months. He complains today of ED that started after treatment for prostate cancer. Viagra 20 mg (Revatio) prescribed at last clinic visit did not work. On questioning, he reports that he used to get 100 mg tablets. Also complains of a recurrent rash at the upper inner thighs. Would like a refill on clotrimazole cream which he used with improvement in the rash previously; he brought in an empty box to show the medication.     Cardiovascular Review  The patient has hypertension, CHF, non-ischemic cardiomyopathy, and atrial flutter s/p AFL ablation on 8/22/17.  Denies CP, SOB, heart palpitations, leg swelling, orthopnea, or PND. He reports taking medications as instructed, no medication side effects noted. Diet and Lifestyle: generally follows a low sodium diet, no formal exercise but active during the day.  Lab review: labs reviewed and discussed with patient. Saw Dr. Catalina Villeda (Cardiology) on 4/24/18, no changes made.      Oncology Review  Diagnosed with prostate cancer on 2/6/09. Stage [id number]. Pre-treatment PSA was 24.8 ng/ml. Had external beam radiation therapy in 2009. Has been on Lupron since 12/22/16; receiving every 6 months, last received on 2/28/18. According to patient, Dr. Conner Tamayo sent him for a DEXA that returned showing mild bone thinning.   He was told to drink more milk and eat more broccoli.        Review of Studies  EKG 10/2/17: NSR with frequent PVC's. Echo (TTE) 7/31/17: EF 55-60%. No regional wall motion abnormalities. LA moderately dilated. Lumbar spine X-ray 10/2/17: Mild spondylosis. CXR 10/2/17: mild interstitial edema in the mid lower lung zones right greater than left  CT head 9/29/17: no acute abnormality      Other Providers: Dr. Bianca Pulido (Cardiology), Dr. Dhiraj Preston (Cardiology EPS), Dr. Mirtha Burch (Urology), Dr. Jesse Joseph (Nephrology), Dr. Semaj Pace (Ophthalmology)      Soc Hx  . Has 2 children and 3 grandchildren. Retired . He chews tobacco. Quit smoking 8/7/17; previously smoked 1.5-2 ppd for 50 yrs. No longer drinks alcohol. Denies recreational drug use. Does not get much exercise.      ROS  A complete review of systems was performed and is negative except for those mentioned in the HPI.     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    S/P cardiac cath Z98.890    NICM (nonischemic cardiomyopathy) ([facility] I42.8    Hypertension I10    Mixed hyperlipidemia E78.2    Smokeless tobacco use Z72.0    Atrial flutter (HCC) I48.92    Stage 3 chronic kidney disease N18.3    Frequent hospital admissions Z78.9    Prediabetes R73.03    Prostate cancer ([facility] C61    COPD (chronic obstructive pulmonary disease) ([facility] J44.9    Chronic low back pain M54.5, G89.29    Obesity (BMI 30-39. 9) E66.9     Past Medical History:   Diagnosis Date    Acute respiratory failure with hypoxia ([facility] 02/08/2014    also 11/28/15, 2/17/16, 5/14/17     Atrial flutter ([facility] 08/2017    saw Dr. Dhiraj Preston; underwent a-flutter ablation    BPH (benign prostatic hyperplasia)     CHF (congestive heart failure) ([facility] 02/11/2014    TTE 11/15: EF 40-45%, 2/14: EF 20%  Admitted for acute exacerbations 2/8/14, 11/28/15, 2/17/16, and 5/4/17)    Chronic low back pain     LS spine X-ray 4/8/17: mild DDD    COPD (chronic obstructive pulmonary disease) ([facility]     ED (erectile dysfunction)     Elevated troponin 02/11/2014    also 11/28/15; due to cardiac strain    Esophagitis     reflux, hiatal hernia    Femur fracture, right (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     with LVH    Pneumonia 02/08/2014 2/8/14 and 10/30/15    Prediabetes     Prostate cancer ([facility] 2016    Pulmonary edema 11/28/2015    S/P cardiac cath 2/12/2014 2/12/14 no significant coronary disease, severe LV dysfunction    Shingles     Tinea cruris     also genital folliculitis    Vertigo      Allergies   Allergen Reactions    Oxycodone Contact Dermatitis       Current Outpatient Prescriptions   Medication Sig Dispense Refill    amLODIPine (NORVASC) 2.5 mg tablet TAKE 1 TABLET BY MOUTH ONCE DAILY 30 Tab 0    sildenafil, antihypertensive, (REVATIO) 20 mg tablet Take 1 tab by mouth 30 minutes before planned intercourse. 4 Tab 3    atorvastatin (LIPITOR) 40 mg tablet TAKE ONE TABLET BY MOUTH NIGHTLY 90 Tab 1    carvedilol (COREG) 25 mg tablet TAKE ONE TABLET BY MOUTH TWICE DAILY WITH MEALS 60 Tab 6    furosemide (LASIX) 40 mg tablet TAKE ONE TABLET BY MOUTH DAILY- DOSE REDUCED 12/17/15 30 Tab 6    albuterol (PROVENTIL HFA, VENTOLIN HFA, PROAIR HFA) 90 mcg/actuation inhaler Take 2 Puffs by inhalation every four (4) hours as needed for Wheezing. 1 Inhaler 0    aspirin 81 mg chewable tablet Take 1 Tab by mouth daily.  10 Tab 0         PHYSICAL EXAM  Visit Vitals    /59 (BP 1 Location: Left arm, BP Patient Position: Sitting)    Pulse 75    Temp 97.6 °F (36.4 °C) (Oral)    Resp 16    Ht 5' 9\" (1.753 m)    Wt 228 lb (103.4 kg)    SpO2 98%    BMI 33.67 kg/m2       General: Obese, no distress. HEENT:  Head normocephalic/atraumatic, no scleral icterus  Lungs:  Clear to ausculation bilaterally. Good air movement. Heart:  Regular rate and rhythm, normal S1 and S2, no murmur, gallop, or rub  Extremities: No clubbing, cyanosis, or edema. Neurological: Alert and oriented. Psychiatric: Cheerful. Behavior is normal.         ASSESSMENT AND PLAN    ICD-10-CM ICD-9-CM    1. Essential hypertension I10 401.9    2. Chronic bilateral low back pain without sciatica M54.5 724.2     G89.29 338.29    3. Erectile dysfunction, unspecified erectile dysfunction type N52.9 607.84 sildenafil citrate (VIAGRA) 100 mg tablet   4. Intertriginous candidiasis B37.2 112.3 clotrimazole-betamethasone (LOTRISONE) topical cream   5. Chronic obstructive pulmonary disease, unspecified COPD type ([facility] J44.9 496    6. Combined systolic and diastolic congestive heart failure, unspecified HF chronicity (HCC) I50.40 428.40    7. Stage 3 chronic kidney disease N18.3 585.3    8. Prostate cancer ([facility] C61 185    9. Smokeless tobacco use Z72.0 305.1        Diagnoses and all orders for this visit:    1. Essential hypertension  Controlled, continue present management with amlodipine and carvedilol. 2. Chronic bilateral low back pain without sciatica  Improved with epidural steroid injection. Follow up with Dr. Donya Szymanski as scheduled. 3. Erectile dysfunction, unspecified erectile dysfunction type  He is not on a nitrate.  -     Start sildenafil citrate (VIAGRA) 100 mg tablet; Take 1 Tab by mouth as needed. (#4, 5 RF)    4. Intertriginous candidiasis  -     Start clotrimazole-betamethasone (LOTRISONE) topical cream; Apply  to affected area two (2) times a day. (45 gm, 3 RF)    5.  Chronic obstructive pulmonary disease, unspecified COPD type ([facility]    6. Combined systolic and diastolic congestive heart failure, unspecified HF chronicity (HCC)    7. Stage 3 chronic kidney disease    8. Prostate cancer ([facility]  Continue Lupron. 9. Smokeless tobacco use  Counseled strongly on stopping chewing tobacco.      Follow-up Disposition:  Return in about 4 months (around 11/3/2018), or if symptoms worsen or fail to improve, for HTN, COPD. Provided patient and/or family with advanced directive information and answered pertinent questions. Encouraged patient to provide a copy of advanced directive to the office when available. I have discussed the diagnosis with the patient and the intended plan as seen in the above orders. Patient is in agreement. The patient has received an after-visit summary and questions were answered concerning future plans. I have discussed medication side effects and warnings with the patient as well.

## 2018-07-03 NOTE — MR AVS SNAPSHOT
[address] [phone] Patient: Mine Hughes MRN: [id number] [facility]:[birth year] Visit Information Date & Time Provider Department Dept. Phone Encounter #  
 7/3/2018  8:10 AM Justina Antunez MD [facility] Internal Medicine [facility] [phone] [id number] Follow-up Instructions Return in about 4 months (around 11/3/2018), or if symptoms worsen or fail to improve, for HTN, COPD. Your Appointments 10/30/2018  9:00 AM  
ESTABLISHED PATIENT with Donya Mendoza MD  
[address] [place] [address]) Appt Note: 6mo f/up  ekr [address]  
[phone] [address] Upcoming Health Maintenance Date Due Influenza Age 5 to Adult 8/1/2018 MEDICARE YEARLY EXAM 10/6/2018 GLAUCOMA SCREENING Q2Y 4/3/2020 DTaP/Tdap/Td series (2 - Td) 12/27/2027 Allergies as of 7/3/2018  Review Complete On: 7/3/2018 By: Justina Antunez MD  
  
 Severity Noted Reaction Type Reactions Oxycodone  10/31/2017    Contact Dermatitis Current Immunizations  Reviewed on 11/28/2015 Name Date Influenza High Dose Vaccine PF 9/18/2017 Influenza Vaccine (Quad) PF 11/30/2015 10:45 AM  
 Pneumococcal Conjugate (PCV-13) 9/18/2017 Pneumococcal Polysaccharide (PPSV-23) 11/30/2015 10:01 AM  
  
 Not reviewed this visit You Were Diagnosed With   
  
 Codes Comments Essential hypertension    -  Primary ICD-10-CM: I10 
ICD-9-CM: 401.9 Chronic bilateral low back pain without sciatica     ICD-10-CM: M54.5, G89.29 ICD-9-CM: 724.2, 338.29 Erectile dysfunction, unspecified erectile dysfunction type     ICD-10-CM: N52.9 ICD-9-CM: 607.84 Intertriginous candidiasis     ICD-10-CM: B37.2 ICD-9-CM: 112.3 Chronic obstructive pulmonary disease, unspecified COPD type ([facility]     ICD-10-CM: J44.9 ICD-9-CM: 801 Combined systolic and diastolic congestive heart failure, unspecified HF chronicity ([facility]     ICD-10-CM: I50.40 ICD-9-CM: 428.40 Stage 3 chronic kidney disease     ICD-10-CM: N18.3 ICD-9-CM: 218. 3 Prostate cancer [facility])     ICD-10-CM: N14 ICD-9-CM: 623 Smokeless tobacco use     ICD-10-CM: Z72.0 ICD-9-CM: 305.1 Vitals BP Pulse Temp Resp Height(growth percentile) Weight(growth percentile) 131/59 (BP 1 Location: Left arm, BP Patient Position: Sitting) 75 97.6 °F (36.4 °C) (Oral) 16 5' 9\" (1.753 m) 228 lb (103.4 kg) SpO2 BMI Smoking Status 98% 33.67 kg/m2 Former Smoker BMI and BSA Data Body Mass Index Body Surface Area  
 33.67 kg/m 2 2.24 m 2 Preferred Pharmacy Pharmacy Name Phone [facility] [address] [place] [address] [phone] Your Updated Medication List  
  
   
This list is accurate as of 7/3/18  8:43 AM.  Always use your most recent med list.  
  
  
  
  
 albuterol 90 mcg/actuation inhaler Commonly known as:  PROVENTIL HFA, VENTOLIN HFA, PROAIR HFA Take 2 Puffs by inhalation every four (4) hours as needed for Wheezing. amLODIPine 2.5 mg tablet Commonly known as:  Elspeth Bakes TAKE 1 TABLET BY MOUTH ONCE DAILY  
  
 aspirin 81 mg chewable tablet Take 1 Tab by mouth daily. atorvastatin 40 mg tablet Commonly known as:  LIPITOR  
TAKE ONE TABLET BY MOUTH NIGHTLY  
  
 carvedilol 25 mg tablet Commonly known as:  COREG  
TAKE ONE TABLET BY MOUTH TWICE DAILY WITH MEALS  
  
 clotrimazole-betamethasone topical cream  
Commonly known as:  Thomos Apley Apply  to affected area two (2) times a day. furosemide 40 mg tablet Commonly known as:  LASIX TAKE ONE TABLET BY MOUTH DAILY- DOSE REDUCED 12/17/15  
  
 sildenafil citrate 100 mg tablet Commonly known as:  VIAGRA Take 1 Tab by mouth as needed. Prescriptions Sent to Pharmacy Refills clotrimazole-betamethasone (LOTRISONE) topical cream 3 Sig: Apply  to affected area two (2) times a day. Class: Normal  
 [facility] [address] Ph #: [phone] Route: Topical  
 sildenafil citrate (VIAGRA) 100 mg tablet 5 Sig: Take 1 Tab by mouth as needed. Class: Normal  
 Pharmacy: [facility]ER MOCTEZUMA [address] Ph #: [phone] Route: Oral  
  
Follow-up Instructions Return in about 4 months (around 11/3/2018), or if symptoms worsen or fail to improve, for HTN, COPD. Patient Instructions Stopping Smokeless Tobacco Use: Care Instructions Your Care Instructions Smokeless tobacco comes in many forms, such as snuff and chewing tobacco: · Snuff is finely ground tobacco sold in cans or pouches. Most of the time, snuff is used by putting a \"pinch\" or \"dip\" between the lower lip or cheek and the gum. · Chewing tobacco is sold as loose leaves, plugs, or twists. It is chewed or placed between the cheek and the gum or teeth. There are plenty of reasons to stop using smokeless tobacco. These products are harmful. They are not risk-free alternatives to smoking. Smokeless tobacco contains nicotine, which is addicting. Though using smokeless tobacco is less harmful than smoking cigarettes, it can cause serious health problems, such as: 
· White patches or red sores in your mouth that can turn into mouth cancer involving the lip, tongue, or cheek. · Tooth loss and other dental problems. · Gum disease. Your gums may pull away from your teeth and not grow back. People who use smokeless tobacco crave the nicotine in it. Giving up smokeless tobacco is much harder than simply changing a habit. Your body has to stop craving the nicotine. It is hard to quit, but you can do it. Many tools are available for people who want to quit using smokeless tobacco. You may find that combining tools works best for you. There are several steps to quitting. First you get ready to quit. Then you get support to help you. After that, you learn new skills and behaviors to quit. For many people, a necessary step is getting and using medicine. Your doctor will help you set up the plan that best meets your needs. You may want to attend a tobacco cessation program. When you choose a program, look for one that has proven success. Ask your doctor for ideas. You will greatly increase your chances of success if you take medicine as well as get counseling or join a cessation program. 
Some of the changes you feel when you first quit smokeless tobacco are uncomfortable. Your body will miss the nicotine at first, and you may feel short-tempered and grumpy. You may have trouble sleeping or concentrating. Medicine can help you deal with these symptoms. You may struggle with changing your habits and rituals. The last step is the tricky one: Be prepared for the urge to use smokeless tobacco to continue for a time. This is a lot to deal with, but keep at it. You will feel better. Follow-up care is a key part of your treatment and safety. Be sure to make and go to all appointments, and call your doctor if you are having problems. It's also a good idea to know your test results and keep a list of the medicines you take. How can you care for yourself at home? · Ask your family, friends, and coworkers for support. You have a better chance of quitting if you have help and support. · Join a support group for people who are trying to quit using smokeless tobacco. 
· Set a quit date. Pick your date carefully so that it is not right in the middle of a big deadline or stressful time. After you quit, do not use smokeless tobacco even once. Get rid of all spit cups, cans, and pouches after your last use. Clean your house and your clothes so that they do not smell of tobacco. 
· Learn how to be a non-user.  Think about ways you can avoid those things that make you reach for tobacco. 
¨ Learn some ways to deal with cravings, like calling a friend or going for a walk. Cravings often pass. ¨ Avoid situations that put you at greatest risk for using smokeless tobacco. For some people, it is hard to spend time with friends without dipping or chewing. For others, they might skip a coffee break with coworkers who smoke or use smokeless tobacco. 
¨ Change your daily routine. Take a different route to work, or eat a meal in a different place. · Cut down on stress. Calm yourself or release tension by doing an activity you enjoy, such as reading a book, taking a hot bath, or gardening. · Talk to your doctor or pharmacist about nicotine replacement therapy. You still get nicotine, but you do not use tobacco. Nicotine replacement products help you slowly reduce the amount of nicotine you need. Many of these products are available over the counter. They include nicotine patches, gum, lozenges, and inhalers. · Ask your doctor about bupropion (Wellbutrin) or varenicline (Chantix), which are prescription medicines. They do not contain nicotine. They help you by reducing withdrawal symptoms, such as stress and anxiety. · Get regular exercise. Having healthy habits will help your body move past its craving for nicotine. · Be prepared to keep trying. Most people are not successful the first few times they try to quit. Do not get mad at yourself if you use tobacco again. Make a list of things you learned, and think about when you want to try again, such as next week, next month, or next year. Where can you learn more? Go to http://bg-gurpreet.info/. Enter L460 in the search box to learn more about \"Stopping Smokeless Tobacco Use: Care Instructions. \" Current as of: March 20, 2017 Content Version: 11.4 © 6660-3806 [employer].  Care instructions adapted under license by [employer] (which disclaims liability or warranty for this information). If you have questions about a medical condition or this instruction, always ask your healthcare professional. [address] any warranty or liability for your use of this information. Introducing [facility]! [facility] introduces Peers App patient portal. Now you can access parts of your medical record, email your doctor's office, and request medication refills online. 1. In your internet browser, go to [email or web address] 2. Click on the First Time User? Click Here link in the Sign In box. You will see the New Member Sign Up page. 3. Enter your Peers App Access Code exactly as it appears below. You will not need to use this code after youve completed the sign-up process. If you do not sign up before the expiration date, you must request a new code. · Peers App Access Code: [email or web address] Expires: 7/23/2018  9:40 AM 
 
4. Enter the last four digits of your Social Security Number (xxxx) and Date of Birth (mm/dd/yyyy) as indicated and click Submit. You will be taken to the next sign-up page. 5. Create a Peers App ID. This will be your Peers App login ID and cannot be changed, so think of one that is secure and easy to remember. 6. Create a Peers App password. You can change your password at any time. 7. Enter your Password Reset Question and Answer. This can be used at a later time if you forget your password. 8. Enter your e-mail address. You will receive e-mail notification when new information is available in [address] 9. Click Sign Up. You can now view and download portions of your medical record. 10. Click the Download Summary menu link to download a portable copy of your medical information. If you have questions, please visit the Frequently Asked Questions section of the Peers App website. Remember, Peers App is NOT to be used for urgent needs. For medical emergencies, dial 911. Now available from your iPhone and Android! Please provide this summary of care documentation to your next provider. Your primary care clinician is listed as Jesus Lara. If you have any questions after today's visit, please call [phone].

## 2018-07-03 NOTE — PROGRESS NOTES
Twan Wayne  Identified pt with two pt identifiers(name and ). No chief complaint on file. 1. Have you been to the ER, urgent care clinic since your last visit? Hospitalized since your last visit? NO    2. Have you seen or consulted any other providers? Today's provider has been notified of reason for visit, vitals and flowsheets obtained on patients. Patient declines information on advanced directives. Reviewed record In preparation for visit, huddled with provider and have obtained necessary documentation      There are no preventive care reminders to display for this patient.     Wt Readings from Last 3 Encounters:   18 228 lb (103.4 kg)   18 227 lb (103 kg)   18 224 lb (101.6 kg)     Temp Readings from Last 3 Encounters:   18 97.6 °F (36.4 °C) (Oral)   18 98 °F (36.7 °C) (Oral)   17 97.6 °F (36.4 °C) (Oral)     BP Readings from Last 3 Encounters:   18 131/59   18 136/70   18 120/68     Pulse Readings from Last 3 Encounters:   18 75   18 68   18 70     Vitals:    18 0823   BP: 131/59   Pulse: 75   Resp: 16   Temp: 97.6 °F (36.4 °C)   TempSrc: Oral   SpO2: 98%   Weight: 228 lb (103.4 kg)   Height: 5' 9\" (1.753 m)   PainSc:   0 - No pain         Learning Assessment:  :     Learning Assessment 10/5/2017 2014   PRIMARY LEARNER Patient Patient   HIGHEST LEVEL OF EDUCATION - PRIMARY LEARNER  - DID NOT GRADUATE HIGH SCHOOL   BARRIERS PRIMARY LEARNER - NONE   CO-LEARNER CAREGIVER - No   PRIMARY LANGUAGE ENGLISH ENGLISH    NEED - No   LEARNER PREFERENCE PRIMARY LISTENING LISTENING     DEMONSTRATION -     READING -   ANSWERED BY patient Patient   RELATIONSHIP SELF SELF       Depression Screening:  :     PHQ over the last two weeks 7/3/2018   Little interest or pleasure in doing things Not at all   Feeling down, depressed or hopeless Not at all   Total Score PHQ 2 0       Fall Risk Assessment:  :     Fall Risk Assessment, last 12 mths 7/3/2018   Able to walk? Yes   Fall in past 12 months? No   Fall with injury? -       Abuse Screening:  :     Abuse Screening Questionnaire 7/3/2018 10/5/2017   Do you ever feel afraid of your partner? N N   Are you in a relationship with someone who physically or mentally threatens you? N N   Is it safe for you to go home? Y Y       ADL Screening:  :     ADL Assessment 7/3/2018   Feeding yourself No Help Needed   Getting from bed to chair No Help Needed   Getting dressed No Help Needed   Bathing or showering No Help Needed   Walk across the room (includes cane/walker) No Help Needed   Using the telphone No Help Needed   Taking your medications No Help Needed   Preparing meals No Help Needed   Managing money (expenses/bills) No Help Needed   Moderately strenuous housework (laundry) No Help Needed   Shopping for personal items (toiletries/medicines) No Help Needed   Shopping for groceries No Help Needed   Driving No Help Needed   Climbing a flight of stairs No Help Needed   Getting to places beyond walking distances No Help Needed         Medication reconciliation up to date and corrected with patient at this time.

## 2018-07-03 NOTE — PATIENT INSTRUCTIONS
Stopping Smokeless Tobacco Use: Care Instructions  Your Care Instructions    Smokeless tobacco comes in many forms, such as snuff and chewing tobacco:  · Snuff is finely ground tobacco sold in cans or pouches. Most of the time, snuff is used by putting a \"pinch\" or \"dip\" between the lower lip or cheek and the gum. · Chewing tobacco is sold as loose leaves, plugs, or twists. It is chewed or placed between the cheek and the gum or teeth. There are plenty of reasons to stop using smokeless tobacco. These products are harmful. They are not risk-free alternatives to smoking. Smokeless tobacco contains nicotine, which is addicting. Though using smokeless tobacco is less harmful than smoking cigarettes, it can cause serious health problems, such as:  · White patches or red sores in your mouth that can turn into mouth cancer involving the lip, tongue, or cheek. · Tooth loss and other dental problems. · Gum disease. Your gums may pull away from your teeth and not grow back. People who use smokeless tobacco crave the nicotine in it. Giving up smokeless tobacco is much harder than simply changing a habit. Your body has to stop craving the nicotine. It is hard to quit, but you can do it. Many tools are available for people who want to quit using smokeless tobacco. You may find that combining tools works best for you. There are several steps to quitting. First you get ready to quit. Then you get support to help you. After that, you learn new skills and behaviors to quit. For many people, a necessary step is getting and using medicine. Your doctor will help you set up the plan that best meets your needs. You may want to attend a tobacco cessation program. When you choose a program, look for one that has proven success. Ask your doctor for ideas.  You will greatly increase your chances of success if you take medicine as well as get counseling or join a cessation program.  Some of the changes you feel when you first quit smokeless tobacco are uncomfortable. Your body will miss the nicotine at first, and you may feel short-tempered and grumpy. You may have trouble sleeping or concentrating. Medicine can help you deal with these symptoms. You may struggle with changing your habits and rituals. The last step is the tricky one: Be prepared for the urge to use smokeless tobacco to continue for a time. This is a lot to deal with, but keep at it. You will feel better. Follow-up care is a key part of your treatment and safety. Be sure to make and go to all appointments, and call your doctor if you are having problems. It's also a good idea to know your test results and keep a list of the medicines you take. How can you care for yourself at home? · Ask your family, friends, and coworkers for support. You have a better chance of quitting if you have help and support. · Join a support group for people who are trying to quit using smokeless tobacco.  · Set a quit date. Pick your date carefully so that it is not right in the middle of a big deadline or stressful time. After you quit, do not use smokeless tobacco even once. Get rid of all spit cups, cans, and pouches after your last use. Clean your house and your clothes so that they do not smell of tobacco.  · Learn how to be a non-user. Think about ways you can avoid those things that make you reach for tobacco.  ¨ Learn some ways to deal with cravings, like calling a friend or going for a walk. Cravings often pass. ¨ Avoid situations that put you at greatest risk for using smokeless tobacco. For some people, it is hard to spend time with friends without dipping or chewing. For others, they might skip a coffee break with coworkers who smoke or use smokeless tobacco.  ¨ Change your daily routine. Take a different route to work, or eat a meal in a different place. · Cut down on stress.  Calm yourself or release tension by doing an activity you enjoy, such as reading a book, taking a hot bath, or gardening. · Talk to your doctor or pharmacist about nicotine replacement therapy. You still get nicotine, but you do not use tobacco. Nicotine replacement products help you slowly reduce the amount of nicotine you need. Many of these products are available over the counter. They include nicotine patches, gum, lozenges, and inhalers. · Ask your doctor about bupropion (Wellbutrin) or varenicline (Chantix), which are prescription medicines. They do not contain nicotine. They help you by reducing withdrawal symptoms, such as stress and anxiety. · Get regular exercise. Having healthy habits will help your body move past its craving for nicotine. · Be prepared to keep trying. Most people are not successful the first few times they try to quit. Do not get mad at yourself if you use tobacco again. Make a list of things you learned, and think about when you want to try again, such as next week, next month, or next year. Where can you learn more? Go to http://bg-gurpreet.info/. Enter O667 in the search box to learn more about \"Stopping Smokeless Tobacco Use: Care Instructions. \"  Current as of: March 20, 2017  Content Version: 11.4  © 3558-1317 [employer], Take the Interview. Care instructions adapted under license by [employer] (which disclaims liability or warranty for this information). If you have questions about a medical condition or this instruction, always ask your healthcare professional. [place] [address] any warranty or liability for your use of this information.

## 2018-07-03 NOTE — TELEPHONE ENCOUNTER
Verbal order given for prescription for Viagra 100 mg #4 take I tab by mouth daily as needed not to exceed more than 1 tablet per 24 hour period.

## 2018-07-09 RX ORDER — AMLODIPINE BESYLATE 2.5 MG/1
TABLET ORAL
Qty: 30 TAB | Refills: 0 | Status: SHIPPED | OUTPATIENT
Start: 2018-07-09 | End: 2018-08-07 | Stop reason: SDUPTHER

## 2018-08-07 RX ORDER — AMLODIPINE BESYLATE 2.5 MG/1
TABLET ORAL
Qty: 30 TAB | Refills: 0 | Status: SHIPPED | OUTPATIENT
Start: 2018-08-07 | End: 2018-09-04 | Stop reason: SDUPTHER

## 2018-09-04 ENCOUNTER — OFFICE VISIT (OUTPATIENT)
Dept: INTERNAL MEDICINE CLINIC | Facility: CLINIC | Age: 78
End: 2018-09-04

## 2018-09-04 VITALS
WEIGHT: 224 LBS | HEIGHT: 69 IN | DIASTOLIC BLOOD PRESSURE: 68 MMHG | RESPIRATION RATE: 18 BRPM | TEMPERATURE: 98.1 F | BODY MASS INDEX: 33.18 KG/M2 | SYSTOLIC BLOOD PRESSURE: 138 MMHG | OXYGEN SATURATION: 95 % | HEART RATE: 66 BPM

## 2018-09-04 DIAGNOSIS — Z23 ENCOUNTER FOR IMMUNIZATION: ICD-10-CM

## 2018-09-04 DIAGNOSIS — H65.92 LEFT NON-SUPPURATIVE OTITIS MEDIA: Primary | ICD-10-CM

## 2018-09-04 RX ORDER — AMOXICILLIN 500 MG/1
500 CAPSULE ORAL 2 TIMES DAILY
Qty: 14 CAP | Refills: 0 | Status: SHIPPED | OUTPATIENT
Start: 2018-09-04 | End: 2018-09-11

## 2018-09-04 NOTE — PROGRESS NOTES
LOUISE Talamantes is a 70y.o. year old male patient of Francisco Lewis MD who presents with c/o left ear infection. Pt has history of has Combined systolic and diastolic congestive heart failure ([address] S/P cardiac cath, NICM (nonischemic cardiomyopathy) ([address] Hypertension, Mixed hyperlipidemia, Smokeless tobacco use, Atrial flutter ([address] Stage 3 chronic kidney disease, Frequent hospital admissions, Prediabetes, Prostate cancer ([address] COPD (chronic obstructive pulmonary disease) ([address] Chronic low back pain, and Obesity (BMI 30-39.9) on his problem list..    C/o left ear pain x 1 week. Sleeps with a fan blowing on him, thinks this is cause. Denies fevers or chills. Denies ear discharge. Denies right ear pain. Has some dizziness in the AM when he first gets up, feels off balance. Denies hearing loss, denies ringing in ears. States had a similar problem a few years ago and had infection in ear. 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    S/P cardiac cath Z98.890    NICM (nonischemic cardiomyopathy) ([address] I42.8    Hypertension I10    Mixed hyperlipidemia E78.2    Smokeless tobacco use Z72.0    Atrial flutter (HCC) I48.92    Stage 3 chronic kidney disease N18.3    Frequent hospital admissions Z78.9    Prediabetes R73.03    Prostate cancer ([address] C61    COPD (chronic obstructive pulmonary disease) (HCC) J44.9    Chronic low back pain M54.5, G89.29    Obesity (BMI 30-39. 9) E66.9     Past Medical History:   Diagnosis Date    Acute respiratory failure with hypoxia ([address] 02/08/2014    also 11/28/15, 2/17/16, 5/14/17     Atrial flutter ([address] 08/2017    saw Dr. Harvey Gasca; underwent a-flutter ablation    BPH (benign prostatic hyperplasia)     CHF (congestive heart failure) ([address] 02/11/2014    TTE 11/15: EF 40-45%, 2/14: EF 20%  Admitted for acute exacerbations 2/8/14, 11/28/15, 2/17/16, and 5/4/17)    Chronic low back pain LS spine X-ray 4/8/17: mild DDD    COPD (chronic obstructive [facility] disease) [facility]     ED (erectile dysfunction)     Elevated troponin 02/11/2014    also 11/28/15; due to cardiac strain    Esophagitis     reflux, hiatal hernia    Femur fracture, right ([address]     Frequent hospital admissions     5/14-5/23/17: acute hypoxic resp failure due to CHF exac, ROBINSON on CKD 3, sepsis due to LE cellulitis, 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  2/17-2/22/16: acute hypoxic resp failure, acute diarrhea  11/28-11/30/15: acute hypoxic resp failure due to acute CHF exac, elevated troponin due to cardiac strain  2/8-2/12/14: acuter hypoxic resp failure due to CHF exac, pneumonia     Hand blister 10/8/2017    Bilateral    Hypertension     Nonischemic cardiomyopathy ([facility]     with LVH    Pneumonia 02/08/2014 2/8/14 and 10/30/15    Prediabetes     Prostate cancer ([facility] 2016    Pulmonary edema 11/28/2015    S/P cardiac cath 2/12/2014 2/12/14 no significant coronary disease, severe LV dysfunction    Shingles     Tinea cruris     also genital folliculitis    Vertigo      Past Surgical History:   Procedure Laterality Date    CARDIAC SURG PROCEDURE UNLIST  08/22/2017    SVT ablation    COLONOSCOPY,DIAGNOSTIC  02/22/2016    Dr. Yeny Patel; single sessile polyp at descending colon, sigmoid diverticulosis, int hemorrhoids    COLONOSCOPY,REMV LESALEXANDRA,SNARE  2/22/2016         HX ORTHOPAEDIC      surgery on right index finger    HX ORTHOPAEDIC  2000    right shoulder surgery    HX OTHER SURGICAL  08/22/2017    Dr. Magdy Davis; atrial flutter ablation    HX TONSILLECTOMY       Social History     Social History    Marital status:      Spouse name: N/A    Number of children: N/A    Years of education: N/A     Social History Main Topics    Smoking status: Former Smoker     Packs/day: 2.00     Years: 20.00     Types: Cigarettes     Quit date: 8/7/2017    Smokeless tobacco: Current User     Types: Chew      Comment: Chewing tobacco    Alcohol use No    Drug use: No    Sexual activity: Not Asked     Other Topics Concern    None     Social History Narrative     Family History   Problem Relation Age of Onset    Adopted: Yes    Family history unknown: Yes     Allergies   Allergen Reactions    Oxycodone Contact Dermatitis       MEDICATIONS  Current Outpatient Prescriptions   Medication Sig    clotrimazole-betamethasone (LOTRISONE) topical cream Apply  to affected area two (2) times a day.  sildenafil citrate (VIAGRA) 100 mg tablet Take 1 Tab by mouth as needed.  amLODIPine (NORVASC) 2.5 mg tablet TAKE 1 TABLET BY MOUTH ONCE DAILY    atorvastatin (LIPITOR) 40 mg tablet TAKE ONE TABLET BY MOUTH NIGHTLY    carvedilol (COREG) 25 mg tablet TAKE ONE TABLET BY MOUTH TWICE DAILY WITH MEALS    furosemide (LASIX) 40 mg tablet TAKE ONE TABLET BY MOUTH DAILY- DOSE REDUCED 12/17/15    albuterol (PROVENTIL HFA, VENTOLIN HFA, PROAIR HFA) 90 mcg/actuation inhaler Take 2 Puffs by inhalation every four (4) hours as needed for Wheezing.  aspirin 81 mg chewable tablet Take 1 Tab by mouth daily. No current facility-administered medications for this visit. REVIEW OF SYSTEMS  Per HPI        Visit Vitals    /68    Pulse 66    Temp 98.1 °F (36.7 °C) (Oral)    Resp 18    Ht 5' 9\" (1.753 m)    Wt 224 lb (101.6 kg)    SpO2 95%    BMI 33.08 kg/m2         General: Well-developed, well-nourished. In no distress. A&O x 3. Head: Normocephalic, atraumatic. Eyes: Conjunctiva clear. Pupils equal, round, reactive to light. Extraocular movements intact. Ears/Nose: Right ear TM and ear canal NML. Left ear with mildly erythematous canal with bulging TM. Nares normal. Septum midline. Normal nasal mucosa. No drainage or sinus tenderness. Mouth/Throat: Lips, mucosa, and tongue normal. Oropharynx benign.     Neck: Supple, symmetrical, trachea midline, no lymphadenopathy, no carotid bruits, no JVD, thyroid: not enlarged, symmetric, no tenderness/mass/nodules. Skin: No rashes or lesions. Neurological: CN II-XII intact. Musculoskeletal: Gait normal.   Psychiatric: Normal mood and affect. Behavior is normal.         ASSESSMENT and PLAN  Diagnoses and all orders for this visit:    1. Left non-suppurative otitis media  -     amoxicillin (AMOXIL) 500 mg capsule; Take 1 Cap by mouth two (2) times a day for 7 days. 2. Encounter for immunization  -     Influenza Vaccine Inactivated (IIV)(FLUAD), Subunit, Adjuvanted, IM, (05208)  -     Administration fee () for Medicare insured patients            Patient Instructions     Vaccine Information Statement    Influenza (Flu) Vaccine (Inactivated or Recombinant): What you need to know    Many Vaccine Information Statements are available in Sets[place] and other languages. See www.immunize.org/vis  Hojas de Información Sobre Vacunas están disponibles en Español y en muchos otros idiomas. Visite www.immunize.org/vis    1. Why get vaccinated? Influenza (flu) is a contagious disease that spreads around the [place] [facility] every year, usually between October and May. Flu is caused by influenza viruses, and is spread mainly by coughing, sneezing, and close contact. Anyone can get flu. Flu strikes suddenly and can last several days. Symptoms vary by age, but can include:   fever/chills   sore throat   muscle aches   fatigue   cough   headache    runny or stuffy nose    Flu can also lead to pneumonia and blood infections, and cause diarrhea and seizures in children. If you have a medical condition, such as heart or lung disease, flu can make it worse. Flu is more dangerous for some people. Infants and young children, people 72years of age and older, pregnant women, and people with certain health conditions or a weakened immune system are at greatest risk. Each year thousands of people in the [facility] die from flu, and many more are hospitalized.      Flu vaccine can:   keep you from getting flu,   make flu less severe if you do get it, and   keep you from spreading flu to your family and other people. 2. Inactivated and recombinant flu vaccines    A dose of flu vaccine is recommended every flu season. Children 6 months through 6years of age may need two doses during the same flu season. Everyone else needs only one dose each flu season. Some inactivated flu vaccines contain a very small amount of a mercury-based preservative called thimerosal. Studies have not shown thimerosal in vaccines to be harmful, but flu vaccines that do not contain thimerosal are available. There is no live flu virus in flu shots. They cannot cause the flu. There are many flu viruses, and they are always changing. Each year a new flu vaccine is made to protect against three or four viruses that are likely to cause disease in the upcoming flu season. But even when the vaccine doesnt exactly match these viruses, it may still provide some protection    Flu vaccine cannot prevent:   flu that is caused by a virus not covered by the vaccine, or   illnesses that look like flu but are not. It takes about 2 weeks for protection to develop after vaccination, and protection lasts through the flu season. 3. Some people should not get this vaccine    Tell the person who is giving you the vaccine:     If you have any severe, life-threatening allergies. If you ever had a life-threatening allergic reaction after a dose of flu vaccine, or have a severe allergy to any part of this vaccine, you may be advised not to get vaccinated. Most, but not all, types of flu vaccine contain a small amount of egg protein.  If you ever had Guillain-Barré Syndrome (also called GBS). Some people with a history of GBS should not get this vaccine. This should be discussed with your doctor.  If you are not feeling well.     It is usually okay to get flu vaccine when you have a mild illness, but you might be asked to come back when you feel better. 4. Risks of a vaccine reaction    With any medicine, including vaccines, there is a chance of reactions. These are usually mild and go away on their own, but serious reactions are also possible. Most people who get a flu shot do not have any problems with it. Minor problems following a flu shot include:    soreness, redness, or swelling where the shot was given     hoarseness   sore, red or itchy eyes   cough   fever   aches   headache   itching   fatigue  If these problems occur, they usually begin soon after the shot and last 1 or 2 days. More serious problems following a flu shot can include the following:     There may be a small increased risk of Guillain-Barré Syndrome (GBS) after inactivated flu vaccine. This risk has been estimated at 1 or 2 additional cases per million people vaccinated. This is much lower than the risk of severe complications from flu, which can be prevented by flu vaccine.  Young children who get the flu shot along with pneumococcal vaccine (PCV13) and/or DTaP vaccine at the same time might be slightly more likely to have a seizure caused by fever. Ask your doctor for more information. Tell your doctor if a child who is getting flu vaccine has ever had a seizure. Problems that could happen after any injected vaccine:      People sometimes faint after a medical procedure, including vaccination. Sitting or lying down for about 15 minutes can help prevent fainting, and injuries caused by a fall. Tell your doctor if you feel dizzy, or have vision changes or ringing in the ears.  Some people get severe pain in the shoulder and have difficulty moving the arm where a shot was given. This happens very rarely.  Any medication can cause a severe allergic reaction.  Such reactions from a vaccine are very rare, estimated at about 1 in a million doses, and would happen within a few minutes to a few hours after the vaccination. As with any medicine, there is a very remote chance of a vaccine causing a serious injury or death. The safety of vaccines is always being monitored. For more information, visit: www.cdc.gov/vaccinesafety/    5. What if there is a serious reaction? What should I look for?  Look for anything that concerns you, such as signs of a severe allergic reaction, very high fever, or unusual behavior. Signs of a severe allergic reaction can include hives, swelling of the face and throat, difficulty breathing, a fast heartbeat, dizziness, and weakness - usually within a few minutes to a few hours after the vaccination. What should I do?  If you think it is a severe allergic reaction or other emergency that cant wait, call 9-1-1 and get the person to the nearest hospital. Otherwise, call your doctor.  Reactions should be reported to the Vaccine Adverse Event Reporting System (VAERS). Your doctor should file this report, or you can do it yourself through  the VAERS web site at www.vaers. [facility].gov, or by calling 6-124.663.2221. VAERS does not give medical advice. 6. The National Vaccine Injury Compensation Program    The [facility] Vaccine Injury Compensation Program (VICP) is a federal program that was created to compensate people who may have been injured by certain vaccines. Persons who believe they may have been injured by a vaccine can learn about the program and about filing a claim by calling 3-959.586.8366 or visiting the [address] [place] [employer] website at www.[facility].gov/vaccinecompensation. There is a time limit to file a claim for compensation. 7. How can I learn more?  Ask your healthcare provider. He or she can give you the vaccine package insert or suggest other sources of information.  Call your local or state health department.    Contact the Centers for Disease Control and Prevention (CDC):  - Call 7-402.281.4276 (1-800-CDC-INFO) or  - Visit CDCs website at www.cdc.gov/flu    Vaccine Information Statement   Inactivated Influenza Vaccine   8/7/2015  [address] PA[address] [id number]-[id number]    Department of Health and Human Services  Centers for Disease Control and Prevention    Office Use Only        Please keep your follow-up appointment with Rachel Jaime MD.     Follow-up Disposition:  Return if symptoms worsen or fail to improve. Health Maintenance Due   Topic Date Due    Influenza Age 5 to Adult  08/01/2018       I have discussed the diagnosis with the patient and the intended plan as seen in the above orders. Patient is in agreement. The patient has received an after-visit summary and questions were answered concerning future plans. I have discussed medication side effects and warnings with the patient as well. Warning signs for the above conditions were discussed including when to call our office or go to the emergency room. The nurse provided the patient and/or family with advanced directive information if needed and encouraged the patient to provide a copy to the office when available.

## 2018-09-04 NOTE — PATIENT INSTRUCTIONS
Vaccine Information Statement    Influenza (Flu) Vaccine (Inactivated or Recombinant): What you need to know    Many Vaccine Information Statements are available in Mongolian and other languages. See www.immunize.org/vis  Hojas de Información Sobre Vacunas están disponibles en Español y en muchos otros idiomas. Visite www.immunize.org/vis    1. Why get vaccinated? Influenza (flu) is a contagious disease that spreads around the [place] every year, usually between October and May. Flu is caused by influenza viruses, and is spread mainly by coughing, sneezing, and close contact. Anyone can get flu. Flu strikes suddenly and can last several days. Symptoms vary by age, but can include:   fever/chills   sore throat   muscle aches   fatigue   cough   headache    runny or stuffy nose    Flu can also lead to pneumonia and blood infections, and cause diarrhea and seizures in children. If you have a medical condition, such as heart or lung disease, flu can make it worse. Flu is more dangerous for some people. Infants and young children, people 72years of age and older, pregnant women, and people with certain health conditions or a weakened immune system are at greatest risk. Each year thousands of people in the [facility] die from flu, and many more are hospitalized. Flu vaccine can:   keep you from getting flu,   make flu less severe if you do get it, and   keep you from spreading flu to your family and other people. 2. Inactivated and recombinant flu vaccines    A dose of flu vaccine is recommended every flu season. Children 6 months through 6years of age may need two doses during the same flu season. Everyone else needs only one dose each flu season.        Some inactivated flu vaccines contain a very small amount of a mercury-based preservative called thimerosal. Studies have not shown thimerosal in vaccines to be harmful, but flu vaccines that do not contain thimerosal are available. There is no live flu virus in flu shots. They cannot cause the flu. There are many flu viruses, and they are always changing. Each year a new flu vaccine is made to protect against three or four viruses that are likely to cause disease in the upcoming flu season. But even when the vaccine doesnt exactly match these viruses, it may still provide some protection    Flu vaccine cannot prevent:   flu that is caused by a virus not covered by the vaccine, or   illnesses that look like flu but are not. It takes about 2 weeks for protection to develop after vaccination, and protection lasts through the flu season. 3. Some people should not get this vaccine    Tell the person who is giving you the vaccine:     If you have any severe, life-threatening allergies. If you ever had a life-threatening allergic reaction after a dose of flu vaccine, or have a severe allergy to any part of this vaccine, you may be advised not to get vaccinated. Most, but not all, types of flu vaccine contain a small amount of egg protein.  If you ever had Guillain-Barré Syndrome (also called GBS). Some people with a history of GBS should not get this vaccine. This should be discussed with your doctor.  If you are not feeling well. It is usually okay to get flu vaccine when you have a mild illness, but you might be asked to come back when you feel better. 4. Risks of a vaccine reaction    With any medicine, including vaccines, there is a chance of reactions. These are usually mild and go away on their own, but serious reactions are also possible. Most people who get a flu shot do not have any problems with it.      Minor problems following a flu shot include:    soreness, redness, or swelling where the shot was given     hoarseness   sore, red or itchy eyes   cough   fever   aches   headache   itching   fatigue  If these problems occur, they usually begin soon after the shot and last 1 or 2 days. More serious problems following a flu shot can include the following:     There may be a small increased risk of Guillain-Barré Syndrome (GBS) after inactivated flu vaccine. This risk has been estimated at 1 or 2 additional cases per million people vaccinated. This is much lower than the risk of severe complications from flu, which can be prevented by flu vaccine.  Young children who get the flu shot along with pneumococcal vaccine (PCV13) and/or DTaP vaccine at the same time might be slightly more likely to have a seizure caused by fever. Ask your doctor for more information. Tell your doctor if a child who is getting flu vaccine has ever had a seizure. Problems that could happen after any injected vaccine:      People sometimes faint after a medical procedure, including vaccination. Sitting or lying down for about 15 minutes can help prevent fainting, and injuries caused by a fall. Tell your doctor if you feel dizzy, or have vision changes or ringing in the ears.  Some people get severe pain in the shoulder and have difficulty moving the arm where a shot was given. This happens very rarely.  Any medication can cause a severe allergic reaction. Such reactions from a vaccine are very rare, estimated at about 1 in a million doses, and would happen within a few minutes to a few hours after the vaccination. As with any medicine, there is a very remote chance of a vaccine causing a serious injury or death. The safety of vaccines is always being monitored. For more information, visit: www.cdc.gov/vaccinesafety/    5. What if there is a serious reaction? What should I look for?  Look for anything that concerns you, such as signs of a severe allergic reaction, very high fever, or unusual behavior.     Signs of a severe allergic reaction can include hives, swelling of the face and throat, difficulty breathing, a fast heartbeat, dizziness, and weakness - usually within a few minutes to a few hours after the vaccination. What should I do?  If you think it is a severe allergic reaction or other emergency that cant wait, call 9-1-1 and get the person to the nearest hospital. Otherwise, call your doctor.  Reactions should be reported to the Vaccine Adverse Event Reporting System (VAERS). Your doctor should file this report, or you can do it yourself through  the VAERS web site at www.vaers. [facility].gov, or by calling 5-583.355.7901. VAERS does not give medical advice. 6. The National Vaccine Injury Compensation Program    The [facility] Vaccine Injury Compensation Program (VICP) is a federal program that was created to compensate people who may have been injured by certain vaccines. Persons who believe they may have been injured by a vaccine can learn about the program and about filing a claim by calling 8-775.508.1445 or visiting the [employer] website at www.[facility].gov/vaccinecompensation. There is a time limit to file a claim for compensation. 7. How can I learn more?  Ask your healthcare provider. He or she can give you the vaccine package insert or suggest other sources of information.  Call your local or state health department.  Contact the Centers for Disease Control and Prevention (CDC):  - Call 8-345.676.2325 (1-800-CDC-INFO) or  - Visit CDCs website at www.cdc.gov/flu    Vaccine Information Statement   Inactivated Influenza Vaccine   8/7/2015  [address] [id number]    Department of Health and Human Services  Centers for Disease Control and Prevention    Office Use Only       Ear Infection (Otitis Media): Care Instructions  Your Care Instructions    An ear infection may start with a cold and affect the middle ear (otitis media). It can hurt a lot. Most ear infections clear up on their own in a couple of days. Most often you will not need antibiotics. This is because many ear infections are caused by a virus. Antibiotics don't work against a virus.  Regular doses of pain medicines are the best way to reduce your fever and help you feel better. Follow-up care is a key part of your treatment and safety. Be sure to make and go to all appointments, and call your doctor if you are having problems. It's also a good idea to know your test results and keep a list of the medicines you take. How can you care for yourself at home? · Take pain medicines exactly as directed. ¨ If the doctor gave you a prescription medicine for pain, take it as prescribed. ¨ If you are not taking a prescription pain medicine, take an over-the-counter medicine, such as acetaminophen (Tylenol), ibuprofen (Advil, Motrin), or naproxen (Aleve). Read and follow all instructions on the label. ¨ Do not take two or more pain medicines at the same time unless the doctor told you to. Many pain medicines have acetaminophen, which is Tylenol. Too much acetaminophen (Tylenol) can be harmful. · Plan to take a full dose of pain reliever before bedtime. Getting enough sleep will help you get better. · Try a warm, moist washcloth on the ear. It may help relieve pain. · If your doctor prescribed antibiotics, take them as directed. Do not stop taking them just because you feel better. You need to take the full course of antibiotics. When should you call for help? Call your doctor now or seek immediate medical care if:    · You have new or increasing ear pain.     · You have new or increasing pus or blood draining from your ear.     · You have a fever with a stiff neck or a severe headache.    Watch closely for changes in your health, and be sure to contact your doctor if:    · You have new or worse symptoms.     · You are not getting better after taking an antibiotic for 2 days. Where can you learn more? Go to http://bg-gurpreet.info/. Enter H413 in the search box to learn more about \"Ear Infection (Otitis Media): Care Instructions. \"  Current as of:  May 12, 2017  Content Version: 11.7  © 9439-0005 [employer]wise, [employer]. Care instructions adapted under license by [employer] (which disclaims liability or warranty for this information). If you have questions about a medical condition or this instruction, always ask your healthcare professional. [place] [address] any warranty or liability for your use of this information.

## 2018-09-04 NOTE — PROGRESS NOTES
Twan Wayne  Identified pt with two pt identifiers(name and ). Chief Complaint   Patient presents with    Ear Pain     left ear    Immunization/Injection       1. Have you been to the ER, urgent care clinic since your last visit? Hospitalized since your last visit? NO    2. Have you seen or consulted any other health care providers outside of the [address] [place] [address] since your last visit? Include any pap smears or colon screening. Sees Dr Verner Asp  Dr Elbert Leon provider has been notified of reason for visit, vitals and flowsheets obtained on patients.      Patient received paperwork for advance directive during previous visit but has not completed at this time     Reviewed record In preparation for visit, huddled with provider and have obtained necessary documentation      Health Maintenance Due   Topic    Influenza Age 5 to Adult        Wt Readings from Last 3 Encounters:   18 224 lb (101.6 kg)   18 228 lb (103.4 kg)   18 227 lb (103 kg)     Temp Readings from Last 3 Encounters:   18 98.1 °F (36.7 °C) (Oral)   18 97.6 °F (36.4 °C) (Oral)   18 98 °F (36.7 °C) (Oral)     BP Readings from Last 3 Encounters:   18 156/66   18 131/59   18 136/70     Pulse Readings from Last 3 Encounters:   18 66   18 75   18 68     Vitals:    18 1457   BP: 156/66   Pulse: 66   Resp: 18   Temp: 98.1 °F (36.7 °C)   TempSrc: Oral   SpO2: 95%   Weight: 224 lb (101.6 kg)   Height: 5' 9\" (1.753 m)   PainSc:   8         Learning Assessment:  :     Learning Assessment 10/5/2017 2014   PRIMARY LEARNER Patient Patient   HIGHEST LEVEL OF EDUCATION - PRIMARY LEARNER  - DID NOT GRADUATE HIGH SCHOOL   BARRIERS PRIMARY LEARNER - NONE   CO-LEARNER CAREGIVER - No   PRIMARY LANGUAGE ENGLISH ENGLISH    NEED - No   LEARNER PREFERENCE PRIMARY LISTENING LISTENING     DEMONSTRATION -     READING -   ANSWERED BY patient Patient   RELATIONSHIP SELF SELF       Depression Screening:  :     PHQ over the last two weeks 9/4/2018   Little interest or pleasure in doing things Not at all   Feeling down, depressed, irritable, or hopeless Not at all   Total Score PHQ 2 0       Fall Risk Assessment:  :     Fall Risk Assessment, last 12 mths 9/4/2018   Able to walk? Yes   Fall in past 12 months? No   Fall with injury? -       Abuse Screening:  :     Abuse Screening Questionnaire 9/4/2018 7/3/2018 10/5/2017   Do you ever feel afraid of your partner? N N N   Are you in a relationship with someone who physically or mentally threatens you? N N N   Is it safe for you to go home? Y Y Y       ADL Screening:  :     ADL Assessment 7/3/2018   Feeding yourself No Help Needed   Getting from bed to chair No Help Needed   Getting dressed No Help Needed   Bathing or showering No Help Needed   Walk across the room (includes cane/walker) No Help Needed   Using the telphone No Help Needed   Taking your medications No Help Needed   Preparing meals No Help Needed   Managing money (expenses/bills) No Help Needed   Moderately strenuous housework (laundry) No Help Needed   Shopping for personal items (toiletries/medicines) No Help Needed   Shopping for groceries No Help Needed   Driving No Help Needed   Climbing a flight of stairs No Help Needed   Getting to places beyond walking distances No Help Needed         After verbal order read back of , patient received High Dose Flu Shot (Adjuvanted Fluad) in left arm. [address] [id number] Lot [id number] Exp 04/30/19 Patient tolerated procedure without complaints and received VIS. Medication reconciliation up to date and corrected with patient at this time.

## 2018-09-04 NOTE — MR AVS SNAPSHOT
[address] [address] [phone] Patient: Chanda Cramer MRN: [id number] FPK:[birth year] Visit Information Date & Time Provider Department Dept. Phone Encounter #  
 9/4/2018  3:00 PM Harjinder Castellanos [address] [facility] [address] [place] [address] [id number] Follow-up Instructions Return if symptoms worsen or fail to improve. Your Appointments 10/30/2018  9:00 AM  
ESTABLISHED PATIENT with Jonatan Fitzpatrick MD  
[facility]) Appt Note: 6mo f/up  ekr [id number] [facility]  
[phone] [address] [facility]  
  
    
 11/5/2018  8:10 AM  
ESTABLISHED PATIENT with Griffin Quezada MD  
[facility] [place] ([facility]) Appt Note: 4 month f/u; HTN, COPD/$0CP KMP 07/03/18  
 [place] [phone]  
  
   
 [address] [facility] Upcoming Health Maintenance Date Due Influenza Age 5 to Adult 8/1/2018 MEDICARE YEARLY EXAM 10/6/2018 GLAUCOMA SCREENING Q2Y 4/3/2020 DTaP/Tdap/Td series (2 - Td) 12/27/2027 Allergies as of 9/4/2018  Review Complete On: 9/4/2018 By: Harjinder Castellanos NP Severity Noted Reaction Type Reactions Oxycodone  10/31/2017    Contact Dermatitis Current Immunizations  Reviewed on 11/28/2015 Name Date Influenza High Dose Vaccine PF 9/18/2017 Influenza Vaccine (Quad) PF 11/30/2015 10:45 AM  
 Influenza Vaccine (Tri) Adjuvanted  Incomplete Pneumococcal Conjugate (PCV-13) 9/18/2017 Pneumococcal Polysaccharide (PPSV-23) 11/30/2015 10:01 AM  
  
 Not reviewed this visit You Were Diagnosed With   
  
 Codes Comments Left non-suppurative otitis media    -  Primary ICD-10-CM: H65.92 
ICD-9-CM: 381.4  Encounter for immunization     ICD-10-CM: U14 
 ICD-9-CM: V03.89 Vitals BP Pulse Temp Resp Height(growth percentile) Weight(growth percentile)  
 138/68 66 98.1 °F (36.7 °C) (Oral) 18 5' 9\" (1.753 m) 224 lb (101.6 kg) SpO2 BMI Smoking Status 95% 33.08 kg/m2 Former Smoker Vitals History BMI and BSA Data Body Mass Index Body Surface Area 33.08 kg/m 2 2.22 m 2 Preferred Pharmacy Pharmacy Name Phone [facility] [address] [facility], [place] [phone] Your Updated Medication List  
  
   
This list is accurate as of 9/4/18  3:26 PM.  Always use your most recent med list.  
  
  
  
  
 albuterol 90 mcg/actuation inhaler Commonly known as:  PROVENTIL HFA, VENTOLIN HFA, PROAIR HFA Take 2 Puffs by inhalation every four (4) hours as needed for Wheezing. amLODIPine 2.5 mg tablet Commonly known as:  Christin Croon TAKE 1 TABLET BY MOUTH ONCE DAILY  
  
 amoxicillin 500 mg capsule Commonly known as:  AMOXIL Take 1 Cap by mouth two (2) times a day for 7 days. aspirin 81 mg chewable tablet Take 1 Tab by mouth daily. atorvastatin 40 mg tablet Commonly known as:  LIPITOR  
TAKE ONE TABLET BY MOUTH NIGHTLY  
  
 carvedilol 25 mg tablet Commonly known as:  COREG  
TAKE ONE TABLET BY MOUTH TWICE DAILY WITH MEALS  
  
 clotrimazole-betamethasone topical cream  
Commonly known as:  Lakeland Arndt Apply  to affected area two (2) times a day. furosemide 40 mg tablet Commonly known as:  LASIX TAKE ONE TABLET BY MOUTH DAILY- DOSE REDUCED 12/17/15  
  
 sildenafil citrate 100 mg tablet Commonly known as:  VIAGRA Take 1 Tab by mouth as needed. Prescriptions Sent to Pharmacy Refills  
 amoxicillin (AMOXIL) 500 mg capsule 0 Sig: Take 1 Cap by mouth two (2) times a day for 7 days. Class: Normal  
 Pharmacy: [facility] KYLER MOCTEZUMA [address] [facility], [address] Ph #: [phone] Route: Oral  
  
We Performed the Following ADMIN INFLUENZA VIRUS VAC [ [facility]] INFLUENZA VACCINE INACTIVATED (IIV), SUBUNIT, ADJUVANTED, IM [id number] CPT(R)] Follow-up Instructions Return if symptoms worsen or fail to improve. Patient Instructions Vaccine Information Statement Influenza (Flu) Vaccine (Inactivated or Recombinant): What you need to know Many Vaccine Information Statements are available in Hebrew and other languages. See www.immunize.org/vis Hojas de Información Sobre Vacunas están disponibles en Español y en muchos otros idiomas. Visite www.immunize.org/vis 1. Why get vaccinated? Influenza (flu) is a contagious disease that spreads around the [facility] every year, usually between October and May. Flu is caused by influenza viruses, and is spread mainly by coughing, sneezing, and close contact. Anyone can get flu. Flu strikes suddenly and can last several days. Symptoms vary by age, but can include: 
 fever/chills  sore throat  muscle aches  fatigue  cough  headache  runny or stuffy nose Flu can also lead to pneumonia and blood infections, and cause diarrhea and seizures in children. If you have a medical condition, such as heart or lung disease, flu can make it worse. Flu is more dangerous for some people. Infants and young children, people 72years of age and older, pregnant women, and people with certain health conditions or a weakened immune system are at greatest risk. Each year thousands of people in the [facility] die from flu, and many more are hospitalized. Flu vaccine can: 
 keep you from getting flu, 
 make flu less severe if you do get it, and 
 keep you from spreading flu to your family and other people. 2. Inactivated and recombinant flu vaccines A dose of flu vaccine is recommended every flu season. Children 6 months through 6years of age may need two doses during the same flu season. Everyone else needs only one dose each flu season. Some inactivated flu vaccines contain a very small amount of a mercury-based preservative called thimerosal. Studies have not shown thimerosal in vaccines to be harmful, but flu vaccines that do not contain thimerosal are available. There is no live flu virus in flu shots. They cannot cause the flu. There are many flu viruses, and they are always changing. Each year a new flu vaccine is made to protect against three or four viruses that are likely to cause disease in the upcoming flu season. But even when the vaccine doesnt exactly match these viruses, it may still provide some protection Flu vaccine cannot prevent: 
 flu that is caused by a virus not covered by the vaccine, or 
 illnesses that look like flu but are not. It takes about 2 weeks for protection to develop after vaccination, and protection lasts through the flu season. 3. Some people should not get this vaccine Tell the person who is giving you the vaccine:  If you have any severe, life-threatening allergies. If you ever had a life-threatening allergic reaction after a dose of flu vaccine, or have a severe allergy to any part of this vaccine, you may be advised not to get vaccinated. Most, but not all, types of flu vaccine contain a small amount of egg protein.  If you ever had Guillain-Barré Syndrome (also called GBS). Some people with a history of GBS should not get this vaccine. This should be discussed with your doctor.  If you are not feeling well. It is usually okay to get flu vaccine when you have a mild illness, but you might be asked to come back when you feel better. 4. Risks of a vaccine reaction With any medicine, including vaccines, there is a chance of reactions. These are usually mild and go away on their own, but serious reactions are also possible. Most people who get a flu shot do not have any problems with it. Minor problems following a flu shot include:  
 soreness, redness, or swelling where the shot was given  hoarseness  sore, red or itchy eyes  cough  fever  aches  headache  itching  fatigue If these problems occur, they usually begin soon after the shot and last 1 or 2 days. More serious problems following a flu shot can include the following:  There may be a small increased risk of Guillain-Barré Syndrome (GBS) after inactivated flu vaccine. This risk has been estimated at 1 or 2 additional cases per million people vaccinated. This is much lower than the risk of severe complications from flu, which can be prevented by flu vaccine.  Young children who get the flu shot along with pneumococcal vaccine (PCV13) and/or DTaP vaccine at the same time might be slightly more likely to have a seizure caused by fever. Ask your doctor for more information. Tell your doctor if a child who is getting flu vaccine has ever had a seizure. Problems that could happen after any injected vaccine:  People sometimes faint after a medical procedure, including vaccination. Sitting or lying down for about 15 minutes can help prevent fainting, and injuries caused by a fall. Tell your doctor if you feel dizzy, or have vision changes or ringing in the ears.  Some people get severe pain in the shoulder and have difficulty moving the arm where a shot was given. This happens very rarely.  Any medication can cause a severe allergic reaction. Such reactions from a vaccine are very rare, estimated at about 1 in a million doses, and would happen within a few minutes to a few hours after the vaccination. As with any medicine, there is a very remote chance of a vaccine causing a serious injury or death. The safety of vaccines is always being monitored. For more information, visit: www.cdc.gov/vaccinesafety/ 
 
 
The [facility] Vaccine Injury Compensation Program (VICP) is a federal program that was created to compensate people who may have been injured by certain vaccines. Persons who believe they may have been injured by a vaccine can learn about the program and about filing a claim by calling 5-698.380.7803 or visiting the [address] [place], [place] [address]Sijibang.[email or web address] website at www.[facility].gov/vaccinecompensation. There is a time limit to file a claim for compensation. 7. How can I learn more?  Ask your healthcare provider. He or she can give you the vaccine package insert or suggest other sources of information.  Call your local or state health department.  Contact the Centers for Disease Control and Prevention (CDC): 
- Call 5-934.458.7035 (1-800-CDC-INFO) or 
- Visit CDCs website at www.cdc.gov/flu Vaccine Information Statement Inactivated Influenza Vaccine 8/7/2015 
[facility] [id number] Department of Health and [place] Centers for Disease Control and Prevention Office Use Only Ear Infection (Otitis Media): Care Instructions Your Care Instructions An ear infection may start with a cold and affect the middle ear (otitis media). It can hurt a lot. Most ear infections clear up on their own in a couple of days. Most often you will not need antibiotics. This is because many ear infections are caused by a virus. Antibiotics don't work against a virus. Regular doses of pain medicines are the best way to reduce your fever and help you feel better. Follow-up care is a key part of your treatment and safety. Be sure to make and go to all appointments, and call your doctor if you are having problems. It's also a good idea to know your test results and keep a list of the medicines you take. How can you care for yourself at home? · Take pain medicines exactly as directed. ¨ If the doctor gave you a prescription medicine for pain, take it as prescribed. ¨ If you are not taking a prescription pain medicine, take an over-the-counter medicine, such as acetaminophen (Tylenol), ibuprofen (Advil, Motrin), or naproxen (Aleve). Read and follow all instructions on the label. ¨ Do not take two or more pain medicines at the same time unless the doctor told you to. Many pain medicines have acetaminophen, which is Tylenol. Too much acetaminophen (Tylenol) can be harmful. · Plan to take a full dose of pain reliever before bedtime. Getting enough sleep will help you get better. · Try a warm, moist washcloth on the ear. It may help relieve pain. · If your doctor prescribed antibiotics, take them as directed. Do not stop taking them just because you feel better. You need to take the full course of antibiotics. When should you call for help? Call your doctor now or seek immediate medical care if: 
  · You have new or increasing ear pain.  
  · You have new or increasing pus or blood draining from your ear.  
  · You have a fever with a stiff neck or a severe headache.  
 Watch closely for changes in your health, and be sure to contact your doctor if:   · You have new or worse symptoms.  
  · You are not getting better after taking an antibiotic for 2 days. Where can you learn more? Go to http://bg-gurpreet.info/. Enter M458 in the search box to learn more about \"Ear Infection (Otitis Media): Care Instructions. \" Current as of: May 12, 2017 Content Version: 11.7 © 6665-6066 [employer] Care instructions adapted under license by [employer] (which disclaims liability or warranty for this information). If you have questions about a medical condition or this instruction, always ask your healthcare professional. [address] any warranty or liability for your use of this information. Introducing [facility] & HEALTH SERVICES! [facility] introduces Scodix patient portal. Now you can access parts of your medical record, email your doctor's office, and request medication refills online. 1. In your internet browser, go to [email or web address] 2. Click on the First Time User? Click Here link in the Sign In box. You will see the New Member Sign Up page. 3. Enter your Scodix Access Code exactly as it appears below. You will not need to use this code after youve completed the sign-up process. If you do not sign up before the expiration date, you must request a new code. · [employer]ix Access Code: [email or web address] Expires: 12/3/2018  2:58 PM 
 
4. Enter the last four digits of your Social Security Number (xxxx) and Date of Birth (mm/dd/yyyy) as indicated and click Submit. You will be taken to the next sign-up page. 5. Create a Greentoet ID. This will be your Scodix login ID and cannot be changed, so think of one that is secure and easy to remember. 6. Create a Scodix password. You can change your password at any time. 7. Enter your Password Reset Question and Answer. This can be used at a later time if you forget your password. 8. Enter your e-mail address. You will receive e-mail notification when new information is available in [address] 9. Click Sign Up. You can now view and download portions of your medical record. 10. Click the Download Summary menu link to download a portable copy of your medical information. If you have questions, please visit the Frequently Asked Questions section of the [employer] website. Remember, CrossFiber is NOT to be used for urgent needs. For medical emergencies, dial 911. Now available from your iPhone and Android! Please provide this summary of care documentation to your next provider. Your primary care clinician is listed as Kacie Prince. If you have any questions after today's visit, please call [phone].

## 2018-09-10 RX ORDER — AMLODIPINE BESYLATE 2.5 MG/1
TABLET ORAL
Qty: 30 TAB | Refills: 0 | Status: SHIPPED | OUTPATIENT
Start: 2018-09-10 | End: 2018-10-08 | Stop reason: SDUPTHER

## 2018-09-28 ENCOUNTER — OFFICE VISIT (OUTPATIENT)
Dept: INTERNAL MEDICINE CLINIC | Facility: CLINIC | Age: 78
End: 2018-09-28

## 2018-09-28 VITALS
BODY MASS INDEX: 33.19 KG/M2 | RESPIRATION RATE: 18 BRPM | TEMPERATURE: 98.1 F | OXYGEN SATURATION: 96 % | HEART RATE: 59 BPM | DIASTOLIC BLOOD PRESSURE: 76 MMHG | WEIGHT: 224.1 LBS | HEIGHT: 69 IN | SYSTOLIC BLOOD PRESSURE: 146 MMHG

## 2018-09-28 DIAGNOSIS — R51.9 ACUTE NONINTRACTABLE HEADACHE, UNSPECIFIED HEADACHE TYPE: Primary | ICD-10-CM

## 2018-09-28 DIAGNOSIS — I10 ESSENTIAL HYPERTENSION: ICD-10-CM

## 2018-09-28 NOTE — PATIENT INSTRUCTIONS
Please make an appt with your eye MD to have your vision checked. Please contact Dr. Lakhwinder Prajapati office to schedule your CT or our  may be able to give you a number to call. Drink plenty of fluids and stay hydrated. Change positions slowly first thing in the AM.       Please contact our office if your symptoms worsen or do not improve. Please call 911 or go directly to the Emergency Department if you develop shortness of breath, chest pain, difficulty breathing or worsening of your symptoms. Headache: Care Instructions  Your Care Instructions    Headaches have many possible causes. Most headaches aren't a sign of a more serious problem, and they will get better on their own. Home treatment may help you feel better faster. The doctor has checked you carefully, but problems can develop later. If you notice any problems or new symptoms, get medical treatment right away. Follow-up care is a key part of your treatment and safety. Be sure to make and go to all appointments, and call your doctor if you are having problems. It's also a good idea to know your test results and keep a list of the medicines you take. How can you care for yourself at home? · Do not drive if you have taken a prescription pain medicine. · Rest in a quiet, dark room until your headache is gone. Close your eyes and try to relax or go to sleep. Don't watch TV or read. · Put a cold, moist cloth or cold pack on the painful area for 10 to 20 minutes at a time. Put a thin cloth between the cold pack and your skin. · Use a warm, moist towel or a heating pad set on low to relax tight shoulder and neck muscles. · Have someone gently massage your neck and shoulders. · Take pain medicines exactly as directed. ¨ If the doctor gave you a prescription medicine for pain, take it as prescribed. ¨ If you are not taking a prescription pain medicine, ask your doctor if you can take an over-the-counter medicine.   · Be careful not to take pain medicine more often than the instructions allow, because you may get worse or more frequent headaches when the medicine wears off. · Do not ignore new symptoms that occur with a headache, such as a fever, weakness or numbness, vision changes, or confusion. These may be signs of a more serious problem. To prevent headaches  · Keep a headache diary so you can figure out what triggers your headaches. Avoiding triggers may help you prevent headaches. Record when each headache began, how long it lasted, and what the pain was like (throbbing, aching, stabbing, or dull). Write down any other symptoms you had with the headache, such as nausea, flashing lights or dark spots, or sensitivity to bright light or loud noise. Note if the headache occurred near your period. List anything that might have triggered the headache, such as certain foods (chocolate, cheese, wine) or odors, smoke, bright light, stress, or lack of sleep. · Find healthy ways to deal with stress. Headaches are most common during or right after stressful times. Take time to relax before and after you do something that has caused a headache in the past.  · Try to keep your muscles relaxed by keeping good posture. Check your jaw, face, neck, and shoulder muscles for tension, and try relaxing them. When sitting at a desk, change positions often, and stretch for 30 seconds each hour. · Get plenty of sleep and exercise. · Eat regularly and well. Long periods without food can trigger a headache. · Treat yourself to a massage. Some people find that regular massages are very helpful in relieving tension. · Limit caffeine by not drinking too much coffee, tea, or soda. But don't quit caffeine suddenly, because that can also give you headaches. · Reduce eyestrain from computers by blinking frequently and looking away from the computer screen every so often.  Make sure you have proper eyewear and that your monitor is set up properly, about an arm's length away. · Seek help if you have depression or anxiety. Your headaches may be linked to these conditions. Treatment can both prevent headaches and help with symptoms of anxiety or depression. When should you call for help? Call 911 anytime you think you may need emergency care. For example, call if:    · You have signs of a stroke. These may include:  ¨ Sudden numbness, paralysis, or weakness in your face, arm, or leg, especially on only one side of your body. ¨ Sudden vision changes. ¨ Sudden trouble speaking. ¨ Sudden confusion or trouble understanding simple statements. ¨ Sudden problems with walking or balance. ¨ A sudden, severe headache that is different from past headaches.    Call your doctor now or seek immediate medical care if:    · You have a new or worse headache.     · Your headache gets much worse. Where can you learn more? Go to http://bg-guprreet.info/. Enter M271 in the search box to learn more about \"Headache: Care Instructions. \"  Current as of: October 9, 2017  Content Version: 11.7  © 3227-1990 [employer]. Care instructions adapted under license by [employer] (which disclaims liability or warranty for this information). If you have questions about a medical condition or this instruction, always ask your healthcare professional. [address] [address] any warranty or liability for your use of this information.

## 2018-09-28 NOTE — PROGRESS NOTES
HPI  Zackery Blackburn is a 70y.o. year old male patient of Cuca Zavala MD who presents with c/o     Pt has history of has Combined systolic and diastolic congestive heart failure ([facility] S/P cardiac cath, NICM (nonischemic cardiomyopathy) ([facility] [address] [facility] Hypertension, Mixed hyperlipidemia, Smokeless tobacco use, Atrial flutter ([facility] [address] [facility] Stage 3 chronic kidney disease, Frequent hospital admissions, Prediabetes, Prostate cancer ([facility] [address] [facility] COPD (chronic obstructive pulmonary disease) ([facility] [address] Chronic low back pain, and Obesity (BMI 30-39.9) on his problem list..      Pt c/o \"pulsating\" feeling in top of his head x 1 month. Occurs first thing in the AM. Has some associated lightheadedness. Denies dizziness. Denies neck pain. Denies blurred vision but states he feels like he needs to get his vision checked bc he feels like its worsening, has an eye MD that he follows with but has not seen in awhile. Denies double vision. Denies numbness or tingling in extremities. Denies temporal pain. Denies CP, palpitations or SOB. He has not taken anything for the pain. States it goes away as the day progresses. Denies falls. Pt was seen by ENT Dr. Alysa Ramires two days ago for ear infection and neck pain. States they wanted to do a scope in the office but he declined. They alternatively scheduled him for a CT of his neck which he has not completed yet. Per Dr. Myla Shipley records pt was c/o persistent neck pain/fullness and has + hx of smoking so neck CT was recommended.        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    S/P cardiac cath Z98.890    NICM (nonischemic cardiomyopathy) ([facility] [address] [facility] I42.8    Hypertension I10    Mixed hyperlipidemia E78.2    Smokeless tobacco use Z72.0    Atrial flutter (HCC) I48.92    Stage 3 chronic kidney disease N18.3    Frequent hospital admissions Z78.9    Prediabetes R73.03    Prostate cancer ([facility] [address] C61    COPD (chronic obstructive pulmonary disease) ([facility] [address] J44.9    Chronic low back pain M54.5, G89.29    Obesity (BMI 30-39. 9) E66.9     Past Medical History:   Diagnosis Date    Acute respiratory failure with hypoxia ([facility] [address] 02/08/2014    also 11/28/15, 2/17/16, 5/14/17     Atrial flutter ([facility] [address] 08/2017    saw Dr. Percy Winston; underwent a-flutter ablation    BPH (benign prostatic hyperplasia)     CHF (congestive heart failure) ([facility] [address] 02/11/2014    TTE 11/15: EF 40-45%, 2/14: EF 20%  Admitted for acute exacerbations 2/8/14, 11/28/15, 2/17/16, and 5/4/17)    Chronic low back pain     LS spine X-ray 4/8/17: mild DDD    COPD (chronic obstructive pulmonary disease) ([facility] [address]     ED (erectile dysfunction)     Elevated troponin 02/11/2014    also 11/28/15; due to cardiac strain    Esophagitis     reflux, hiatal hernia    Femur fracture, right ([address]     Frequent hospital admissions     5/14-5/23/17: acute hypoxic resp failure due to CHF exac, ROBINSON on CKD 3, sepsis due to LE cellulitis, 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  2/17-2/22/16: acute hypoxic resp failure, acute diarrhea  11/28-11/30/15: acute hypoxic resp failure due to acute CHF exac, elevated troponin due to cardiac strain  2/8-2/12/14: acuter hypoxic resp failure due to CHF exac, pneumonia     Hand blister 10/8/2017    Bilateral    Hypertension     Nonischemic cardiomyopathy ([address]     with LVH    Pneumonia 02/08/2014 2/8/14 and 10/30/15    Prediabetes     Prostate cancer ([address] 2016    Pulmonary edema 11/28/2015    S/P cardiac cath 2/12/2014 2/12/14 no significant coronary disease, severe LV dysfunction    Shingles     Tinea cruris     also genital folliculitis    Vertigo      Past Surgical History:   Procedure Laterality Date    CARDIAC SURG PROCEDURE UNLIST  08/22/2017    SVT ablation    COLONOSCOPY,DIAGNOSTIC  02/22/2016    Dr. Danyelle Grewal; single sessile polyp at descending colon, sigmoid diverticulosis, int hemorrhoids    COLONOSCOPY,JOSE BENAVIDES,SNARE  2/22/2016         HX ORTHOPAEDIC      surgery on right index finger    HX ORTHOPAEDIC  2000    right shoulder surgery    HX OTHER SURGICAL  08/22/2017    Dr. Jose Crowley; atrial flutter ablation    HX TONSILLECTOMY       Social History     Social History    Marital status:      Spouse name: N/A    Number of children: N/A    Years of education: N/A     Social History Main Topics    Smoking status: Former Smoker     Packs/day: 2.00     Years: 20.00     Types: Cigarettes     Quit date: 8/7/2017    Smokeless tobacco: Current User     Types: Chew      Comment: Chewing tobacco    Alcohol use No    Drug use: No    Sexual activity: Not on file     Other Topics Concern    Not on file     Social History Narrative     Family History   Problem Relation Age of Onset    Adopted: Yes    Family history unknown: Yes     Allergies   Allergen Reactions    Oxycodone Contact Dermatitis       MEDICATIONS  Current Outpatient Prescriptions   Medication Sig    amLODIPine (NORVASC) 2.5 mg tablet TAKE 1 TABLET BY MOUTH ONCE DAILY    clotrimazole-betamethasone (LOTRISONE) topical cream Apply  to affected area two (2) times a day.  sildenafil citrate (VIAGRA) 100 mg tablet Take 1 Tab by mouth as needed.  amLODIPine (NORVASC) 2.5 mg tablet TAKE 1 TABLET BY MOUTH ONCE DAILY    atorvastatin (LIPITOR) 40 mg tablet TAKE ONE TABLET BY MOUTH NIGHTLY    carvedilol (COREG) 25 mg tablet TAKE ONE TABLET BY MOUTH TWICE DAILY WITH MEALS    furosemide (LASIX) 40 mg tablet TAKE ONE TABLET BY MOUTH DAILY- DOSE REDUCED 12/17/15    albuterol (PROVENTIL HFA, VENTOLIN HFA, PROAIR HFA) 90 mcg/actuation inhaler Take 2 Puffs by inhalation every four (4) hours as needed for Wheezing.  aspirin 81 mg chewable tablet Take 1 Tab by mouth daily. No current facility-administered medications for this visit.         REVIEW OF SYSTEMS  Per HPI        Visit Vitals    /76 (BP 1 Location: Left arm, BP Patient Position: Supine)    Pulse (!) 59    Temp 98.1 °F (36.7 °C) (Oral)    Resp 18    Ht 5' 9\" (1.753 m)    Wt 224 lb 1.6 oz (101.7 kg)    SpO2 96%    BMI 33.09 kg/m2     Vitals 9/28/2018 9/28/2018 9/28/2018   Blood Pressure 146/76 149/75 137/80   Pulse   59   Temp   98.1   Resp   18   Height   5' 9\"   Weight   224 lb 1.6 oz   SpO2   96   BSA   2.23 m2   BMI   33.09 kg/m2   BP comment          General: Well-developed, well-nourished. In no distress. A&O x 3. Head: Normocephalic, atraumatic. Eyes: Conjunctiva clear. Pupils equal, round, reactive to light. Extraocular movements intact. Ears/Nose: TM's and ear canals normal bilaterally. Nares normal. Septum midline. Normal nasal mucosa. No drainage or sinus tenderness. Mouth/Throat: Lips, mucosa, and tongue normal. Oropharynx benign. Neck: Supple, symmetrical, trachea midline, mild right cervical lymphadenopathy, no carotid bruits, no JVD, thyroid: not enlarged, symmetric, no tenderness/mass/nodules. Lungs: Clear to auscultation bilaterally. No crackles or wheezes. No use of accessory muscles. Speaks in full sentences without SOB. Chest Wall: No tenderness or deformity. Heart: RRR, normal S1 and S2, no murmur, click, rub, or gallop. Skin: No rashes or lesions. Neurological: CN II-XII intact. Neurovasc: No edema appreciated. Musculoskeletal: Gait normal.   Psychiatric: Normal mood and affect. Behavior is normal.     Lab Results   Component Value Date/Time    Sodium 144 04/19/2018 08:49 AM    Potassium 4.0 04/19/2018 08:49 AM    Chloride 102 04/19/2018 08:49 AM    CO2 21 04/19/2018 08:49 AM    Anion gap 8 10/02/2017 11:17 AM    Glucose 137 (H) 04/19/2018 08:49 AM    BUN 12 04/19/2018 08:49 AM    Creatinine 1.17 04/19/2018 08:49 AM    BUN/Creatinine ratio 10 04/19/2018 08:49 AM    GFR est AA 70 04/19/2018 08:49 AM    GFR est non-AA 60 04/19/2018 08:49 AM    Calcium 9.7 04/19/2018 08:49 AM    Bilirubin, total 0.3 04/19/2018 08:49 AM    AST (SGOT) 24 04/19/2018 08:49 AM    Alk.  phosphatase 98 04/19/2018 08:49 AM    Protein, total 8.0 04/19/2018 08:49 AM    Albumin 4.5 04/19/2018 08:49 AM    Globulin 4.8 (H) 10/02/2017 11:17 AM    A-G Ratio 1.3 04/19/2018 08:49 AM    ALT (SGPT) 20 04/19/2018 08:49 AM     Lab Results   Component Value Date/Time    WBC 4.9 04/19/2018 08:49 AM    HGB 10.9 (L) 04/19/2018 08:49 AM    HCT 35.4 (L) 04/19/2018 08:49 AM    PLATELET 417 (L) 91/98/7829 08:49 AM    MCV 81 04/19/2018 08:49 AM     Lab Results   Component Value Date/Time    Cholesterol, total 117 04/19/2018 08:49 AM    HDL Cholesterol 45 04/19/2018 08:49 AM    LDL, calculated 43 04/19/2018 08:49 AM    VLDL, calculated 29 04/19/2018 08:49 AM    Triglyceride 143 04/19/2018 08:49 AM         ASSESSMENT and PLAN  Diagnoses and all orders for this visit:    Discussed plan with Dr. Sol Fair who agrees. 1. Acute nonintractable headache, unspecified headache type  -Recommend pt f/u with eye MD to assess vision as possible cause of HA  -Push fluids, stay adequately hydrated, change positions slowly  -May take tylenol if needed for pain  -F/u with Dr. Dov Singh and neck CT    2. Essential hypertension  -BP is stable, orthostatics are negative  -no changes to medications at this time        Patient Instructions     Please make an appt with your eye MD to have your vision checked. Please contact Dr. Lilian Bowden office to schedule your CT or our  may be able to give you a number to call. Drink plenty of fluids and stay hydrated. Change positions slowly first thing in the AM.       Please contact our office if your symptoms worsen or do not improve. Please call 911 or go directly to the Emergency Department if you develop shortness of breath, chest pain, difficulty breathing or worsening of your symptoms. Headache: Care Instructions  Your Care Instructions    Headaches have many possible causes. Most headaches aren't a sign of a more serious problem, and they will get better on their own.  Home treatment may help you feel better faster. The doctor has checked you carefully, but problems can develop later. If you notice any problems or new symptoms, get medical treatment right away. Follow-up care is a key part of your treatment and safety. Be sure to make and go to all appointments, and call your doctor if you are having problems. It's also a good idea to know your test results and keep a list of the medicines you take. How can you care for yourself at home? · Do not drive if you have taken a prescription pain medicine. · Rest in a quiet, dark room until your headache is gone. Close your eyes and try to relax or go to sleep. Don't watch TV or read. · Put a cold, moist cloth or cold pack on the painful area for 10 to 20 minutes at a time. Put a thin cloth between the cold pack and your skin. · Use a warm, moist towel or a heating pad set on low to relax tight shoulder and neck muscles. · Have someone gently massage your neck and shoulders. · Take pain medicines exactly as directed. ¨ If the doctor gave you a prescription medicine for pain, take it as prescribed. ¨ If you are not taking a prescription pain medicine, ask your doctor if you can take an over-the-counter medicine. · Be careful not to take pain medicine more often than the instructions allow, because you may get worse or more frequent headaches when the medicine wears off. · Do not ignore new symptoms that occur with a headache, such as a fever, weakness or numbness, vision changes, or confusion. These may be signs of a more serious problem. To prevent headaches  · Keep a headache diary so you can figure out what triggers your headaches. Avoiding triggers may help you prevent headaches. Record when each headache began, how long it lasted, and what the pain was like (throbbing, aching, stabbing, or dull). Write down any other symptoms you had with the headache, such as nausea, flashing lights or dark spots, or sensitivity to bright light or loud noise. Note if the headache occurred near your period. List anything that might have triggered the headache, such as certain foods (chocolate, cheese, wine) or odors, smoke, bright light, stress, or lack of sleep. · Find healthy ways to deal with stress. Headaches are most common during or right after stressful times. Take time to relax before and after you do something that has caused a headache in the past.  · Try to keep your muscles relaxed by keeping good posture. Check your jaw, face, neck, and shoulder muscles for tension, and try relaxing them. When sitting at a desk, change positions often, and stretch for 30 seconds each hour. · Get plenty of sleep and exercise. · Eat regularly and well. Long periods without food can trigger a headache. · Treat yourself to a massage. Some people find that regular massages are very helpful in relieving tension. · Limit caffeine by not drinking too much coffee, tea, or soda. But don't quit caffeine suddenly, because that can also give you headaches. · Reduce eyestrain from computers by blinking frequently and looking away from the computer screen every so often. Make sure you have proper eyewear and that your monitor is set up properly, about an arm's length away. · Seek help if you have depression or anxiety. Your headaches may be linked to these conditions. Treatment can both prevent headaches and help with symptoms of anxiety or depression. When should you call for help? Call 911 anytime you think you may need emergency care. For example, call if:    · You have signs of a stroke. These may include:  ¨ Sudden numbness, paralysis, or weakness in your face, arm, or leg, especially on only one side of your body. ¨ Sudden vision changes. ¨ Sudden trouble speaking. ¨ Sudden confusion or trouble understanding simple statements. ¨ Sudden problems with walking or balance.   ¨ A sudden, severe headache that is different from past headaches.    Call your doctor now or seek immediate medical care if:    · You have a new or worse headache.     · Your headache gets much worse. Where can you learn more? Go to http://bg-gurpreet.info/. Enter M271 in the search box to learn more about \"Headache: Care Instructions. \"  Current as of: October 9, 2017  Content Version: 11.7  © 9927-1979 [employer]. Care instructions adapted under license by [employer] (which disclaims liability or warranty for this information). If you have questions about a medical condition or this instruction, always ask your healthcare professional. [place] [address] any warranty or liability for your use of this information. Please keep your follow-up appointment with Marcus Gibbs MD.     Follow-up Disposition:  Return if symptoms worsen or fail to improve. Health Maintenance Due   Topic Date Due    Shingrix Vaccine Age 49> (1 of 2) 04/09/1992       I have discussed the diagnosis with the patient and the intended plan as seen in the above orders. Patient is in agreement. The patient has received an after-visit summary and questions were answered concerning future plans. I have discussed medication side effects and warnings with the patient as well. Warning signs for the above conditions were discussed including when to call our office or go to the emergency room. The nurse provided the patient and/or family with advanced directive information if needed and encouraged the patient to provide a copy to the office when available.

## 2018-09-28 NOTE — MR AVS SNAPSHOT
[address] [address] [phone] Patient: Juan Valles MRN: [id number] Visit Information Date & Time Provider Department Dept. Phone Encounter #  
 9/28/2018 10:00 AM Gregory Glaser 132[address] [facility] [address] [place] [address] [id number] Follow-up Instructions Return if symptoms worsen or fail to improve. Your Appointments 10/30/2018  9:00 AM  
ESTABLISHED PATIENT with Justa Gee MD  
[facility] Appt Note: 6mo f/up  ekr [address]  
[phone] [address]  
  
    
 11/5/2018  8:10 AM  
ESTABLISHED PATIENT with Xiomy Ojeda MD  
[facility] [place] [facility] Appt Note: 4 month f/u; HTN, COPD/$0CP KMP 07/03/18  
 [place] [address] [phone]  
  
   
 [address] Luciana [address]oe [address] Upcoming Health Maintenance Date Due Shingrix Vaccine Age 50> (1 of 2) 4/9/1992 MEDICARE YEARLY EXAM 10/6/2018 GLAUCOMA SCREENING Q2Y 4/3/2020 DTaP/Tdap/Td series (2 - Td) 12/27/2027 Allergies as of 9/28/2018  Review Complete On: 9/28/2018 By: Gregory Glaser NP Severity Noted Reaction Type Reactions Oxycodone  10/31/2017    Contact Dermatitis Current Immunizations  Reviewed on 9/28/2018 Name Date Influenza High Dose Vaccine PF 9/18/2017 Influenza Vaccine (Quad) PF 11/30/2015 10:45 AM  
 Influenza Vaccine (Tri) Adjuvanted 9/4/2018 Pneumococcal Conjugate (PCV-13) 9/18/2017 Pneumococcal Polysaccharide (PPSV-23) 11/30/2015 10:01 AM  
  
 Reviewed by New Freeman LPN on 8/29/7775 at 01:35 AM  
You Were Diagnosed With   
  
 Codes Comments Acute nonintractable headache, unspecified headache type    -  Primary ICD-10-CM: R51 ICD-9-CM: 784.0 Essential hypertension     ICD-10-CM: I10 
ICD-9-CM: 401.9 Vitals BP Pulse Temp Resp Height(growth percentile) Weight(growth percentile) 146/76 (BP 1 Location: Left arm, BP Patient Position: Supine) (!) 59 98.1 °F (36.7 °C) (Oral) 18 5' 9\" (1.753 m) 224 lb 1.6 oz (101.7 kg) SpO2 BMI Smoking Status 96% 33.09 kg/m2 Former Smoker Vitals History BMI and BSA Data Body Mass Index Body Surface Area 33.09 kg/m 2 2.23 m 2 Preferred Pharmacy Pharmacy Name Phone [facility] [address] [facility], [place] [address] Pari Condon [phone] Your Updated Medication List  
  
   
This list is accurate as of 9/28/18 10:06 AM.  Always use your most recent med list.  
  
  
  
  
 albuterol 90 mcg/actuation inhaler Commonly known as:  PROVENTIL HFA, VENTOLIN HFA, PROAIR HFA Take 2 Puffs by inhalation every four (4) hours as needed for Wheezing. * amLODIPine 2.5 mg tablet Commonly known as:  Malvin Garcia TAKE 1 TABLET BY MOUTH ONCE DAILY  
  
 * amLODIPine 2.5 mg tablet Commonly known as:  Malvin Garcia TAKE 1 TABLET BY MOUTH ONCE DAILY  
  
 aspirin 81 mg chewable tablet Take 1 Tab by mouth daily. atorvastatin 40 mg tablet Commonly known as:  LIPITOR  
TAKE ONE TABLET BY MOUTH NIGHTLY  
  
 carvedilol 25 mg tablet Commonly known as:  COREG  
TAKE ONE TABLET BY MOUTH TWICE DAILY WITH MEALS  
  
 clotrimazole-betamethasone topical cream  
Commonly known as:  Prachi Fiddler Apply  to affected area two (2) times a day. furosemide 40 mg tablet Commonly known as:  LASIX TAKE ONE TABLET BY MOUTH DAILY- DOSE REDUCED 12/17/15  
  
 sildenafil citrate 100 mg tablet Commonly known as:  VIAGRA Take 1 Tab by mouth as needed. * Notice: This list has 2 medication(s) that are the same as other medications prescribed for you. Read the directions carefully, and ask your doctor or other care provider to review them with you. Follow-up Instructions Return if symptoms worsen or fail to improve. Patient Instructions Please make an appt with your eye MD to have your vision checked. Please contact Dr. Mariana Mcdonough office to schedule your CT or our  may be able to give you a number to call. Drink plenty of fluids and stay hydrated. Change positions slowly first thing in the AM. Please contact our office if your symptoms worsen or do not improve. Please call 911 or go directly to the Emergency Department if you develop shortness of breath, chest pain, difficulty breathing or worsening of your symptoms. Headache: Care Instructions Your Care Instructions Headaches have many possible causes. Most headaches aren't a sign of a more serious problem, and they will get better on their own. Home treatment may help you feel better faster. The doctor has checked you carefully, but problems can develop later. If you notice any problems or new symptoms, get medical treatment right away. Follow-up care is a key part of your treatment and safety. Be sure to make and go to all appointments, and call your doctor if you are having problems. It's also a good idea to know your test results and keep a list of the medicines you take. How can you care for yourself at home? · Do not drive if you have taken a prescription pain medicine. · Rest in a quiet, dark room until your headache is gone. Close your eyes and try to relax or go to sleep. Don't watch TV or read. · Put a cold, moist cloth or cold pack on the painful area for 10 to 20 minutes at a time. Put a thin cloth between the cold pack and your skin. · Use a warm, moist towel or a heating pad set on low to relax tight shoulder and neck muscles. · Have someone gently massage your neck and shoulders. · Take pain medicines exactly as directed. ¨ If the doctor gave you a prescription medicine for pain, take it as prescribed. ¨ If you are not taking a prescription pain medicine, ask your doctor if you can take an over-the-counter medicine. · Be careful not to take pain medicine more often than the instructions allow, because you may get worse or more frequent headaches when the medicine wears off. · Do not ignore new symptoms that occur with a headache, such as a fever, weakness or numbness, vision changes, or confusion. These may be signs of a more serious problem. To prevent headaches · Keep a headache diary so you can figure out what triggers your headaches. Avoiding triggers may help you prevent headaches. Record when each headache began, how long it lasted, and what the pain was like (throbbing, aching, stabbing, or dull). Write down any other symptoms you had with the headache, such as nausea, flashing lights or dark spots, or sensitivity to bright light or loud noise. Note if the headache occurred near your period. List anything that might have triggered the headache, such as certain foods (chocolate, cheese, wine) or odors, smoke, bright light, stress, or lack of sleep. · Find healthy ways to deal with stress. Headaches are most common during or right after stressful times. Take time to relax before and after you do something that has caused a headache in the past. 
· Try to keep your muscles relaxed by keeping good posture. Check your jaw, face, neck, and shoulder muscles for tension, and try relaxing them. When sitting at a desk, change positions often, and stretch for 30 seconds each hour. · Get plenty of sleep and exercise. · Eat regularly and well. Long periods without food can trigger a headache. · Treat yourself to a massage. Some people find that regular massages are very helpful in relieving tension. · Limit caffeine by not drinking too much coffee, tea, or soda. But don't quit caffeine suddenly, because that can also give you headaches.  
· Reduce eyestrain from computers by blinking frequently and looking away from the computer screen every so often. Make sure you have proper eyewear and that your monitor is set up properly, about an arm's length away. · Seek help if you have depression or anxiety. Your headaches may be linked to these conditions. Treatment can both prevent headaches and help with symptoms of anxiety or depression. When should you call for help? Call 911 anytime you think you may need emergency care. For example, call if: 
  · You have signs of a stroke. These may include: 
¨ Sudden numbness, paralysis, or weakness in your face, arm, or leg, especially on only one side of your body. ¨ Sudden vision changes. ¨ Sudden trouble speaking. ¨ Sudden confusion or trouble understanding simple statements. ¨ Sudden problems with walking or balance. ¨ A sudden, severe headache that is different from past headaches.  
 Call your doctor now or seek immediate medical care if: 
  · You have a new or worse headache.  
  · Your headache gets much worse. Where can you learn more? Go to http://bg-gurpreet.info/. Enter M271 in the search box to learn more about \"Headache: Care Instructions. \" Current as of: October 9, 2017 Content Version: 11.7 © 2579-9753 [employer]. Care instructions adapted under license by [employer] (which disclaims liability or warranty for this information). If you have questions about a medical condition or this instruction, always ask your healthcare professional. [place] [address] any warranty or liability for your use of this information. Introducing [facility]! [employer] introduces [employer] patient portal. Now you can access parts of your medical record, email your doctor's office, and request medication refills online. 1. In your internet browser, go to [email or web address] 2. Click on the First Time User? Click Here link in the Sign In box.  You will see the New Member Sign Up page. 3. Enter your Animeeple Access Code exactly as it appears below. You will not need to use this code after youve completed the sign-up process. If you do not sign up before the expiration date, you must request a new code. · Animeeple Access Code: [email or web address] Expires: 12/3/2018  2:58 PM 
 
4. Enter the last four digits of your Social Security Number (xxxx) and Date of Birth (mm/dd/yyyy) as indicated and click Submit. You will be taken to the next sign-up page. 5. Create a Animeeple ID. This will be your Animeeple login ID and cannot be changed, so think of one that is secure and easy to remember. 6. Create a Animeeple password. You can change your password at any time. 7. Enter your Password Reset Question and Answer. This can be used at a later time if you forget your password. 8. Enter your e-mail address. You will receive e-mail notification when new information is available in [address] 9. Click Sign Up. You can now view and download portions of your medical record. 10. Click the Download Summary menu link to download a portable copy of your medical information. If you have questions, please visit the Frequently Asked Questions section of the Animeeple website. Remember, Animeeple is NOT to be used for urgent needs. For medical emergencies, dial 911. Now available from your iPhone and Android! Please provide this summary of care documentation to your next provider. Your primary care clinician is listed as Saralyn Hodgkins. If you have any questions after today's visit, please call [phone].

## 2018-10-04 ENCOUNTER — HOSPITAL ENCOUNTER (OUTPATIENT)
Dept: CT IMAGING | Age: 78
Discharge: HOME | End: 2018-10-04
Attending: OTOLARYNGOLOGY
Payer: MEDICARE

## 2018-10-04 DIAGNOSIS — M54.2 NECK PAIN: ICD-10-CM

## 2018-10-04 LAB — CREAT BLD-MCNC: 1.3 MG/DL (ref 0.6–1.3)

## 2018-10-04 PROCEDURE — 82565 ASSAY OF CREATININE: CPT

## 2018-10-04 PROCEDURE — 74011636320 HC RX REV CODE- 636/320: Performed by: OTOLARYNGOLOGY

## 2018-10-04 PROCEDURE — 70491 CT SOFT TISSUE NECK W/DYE: CPT

## 2018-10-04 PROCEDURE — 74011250636 HC RX REV CODE- 250/636: Performed by: OTOLARYNGOLOGY

## 2018-10-04 RX ORDER — SODIUM CHLORIDE 0.9 % (FLUSH) 0.9 %
10 SYRINGE (ML) INJECTION
Status: COMPLETED | OUTPATIENT
Start: 2018-10-04 | End: 2018-10-04

## 2018-10-04 RX ORDER — SODIUM CHLORIDE 9 MG/ML
50 INJECTION, SOLUTION INTRAVENOUS
Status: COMPLETED | OUTPATIENT
Start: 2018-10-04 | End: 2018-10-04

## 2018-10-04 RX ADMIN — SODIUM CHLORIDE 50 ML/HR: 900 INJECTION, SOLUTION INTRAVENOUS at 10:24

## 2018-10-04 RX ADMIN — IOPAMIDOL 100 ML: 755 INJECTION, SOLUTION INTRAVENOUS at 10:24

## 2018-10-04 RX ADMIN — Medication 10 ML: at 10:24

## 2018-10-05 ENCOUNTER — TELEPHONE (OUTPATIENT)
Dept: CARDIOLOGY CLINIC | Age: 78
End: 2018-10-05

## 2018-10-05 NOTE — TELEPHONE ENCOUNTER
Pt came into office, verified pt with two pt identifiers, pt advised that he has been getting headaches for the past 6-8 months. He states it is happening when he takes his Coreg in the am and pm. He is getting a throbbing headache. He has been on Coreg for 2 years. He went to his PCP for earache and had an ear infection and they gave him antibiotics. They sent him to an ENT for the ear ache and they are doing testing. He just went yesterday. He has also seen an eye dr and he has cataracts that will need to be removed next month-which could be giving him the headaches. Advised pt that the ear ache and cataracts could be giving him the headaches. He does have an appt on 10/30/18 with . Advised pt to take his medication as prescribed until he hears from me. Pt reports no confusion, slurred speech, some dizziness, no weakness in body. No other cardiac symptoms. Advised pt I would send to  and call him once I hear something. Pt verbalized understanding. Message  Received: Today       MD Amber Sanchez LPN       Caller: Unspecified (Today,  8:50 AM)                     Continue cardiac meds. No change from my standpoint. Called pt,verified pt with two pt identifiers, told pt that  is advising to continue current cardiac medications, no change from his standpoint right now. Advised pt to keep his upcoming appt and he can discuss further then. Pt verbalized understanding.

## 2018-10-05 NOTE — TELEPHONE ENCOUNTER
Patient came into office said that the medicine carvedilol 25 mg is giving him headaches. Please call, he is asking for something different.  Thanks

## 2018-10-08 RX ORDER — AMLODIPINE BESYLATE 2.5 MG/1
TABLET ORAL
Qty: 30 TAB | Refills: 0 | Status: SHIPPED | OUTPATIENT
Start: 2018-10-08 | End: 2018-10-30 | Stop reason: SDUPTHER

## 2018-10-09 RX ORDER — FUROSEMIDE 40 MG/1
TABLET ORAL
Qty: 30 TAB | Refills: 6 | Status: SHIPPED | OUTPATIENT
Start: 2018-10-09 | End: 2019-12-03 | Stop reason: SDUPTHER

## 2018-10-30 ENCOUNTER — OFFICE VISIT (OUTPATIENT)
Dept: CARDIOLOGY CLINIC | Age: 78
End: 2018-10-30

## 2018-10-30 VITALS
HEIGHT: 69 IN | HEART RATE: 66 BPM | OXYGEN SATURATION: 99 % | WEIGHT: 226.8 LBS | BODY MASS INDEX: 33.59 KG/M2 | DIASTOLIC BLOOD PRESSURE: 56 MMHG | SYSTOLIC BLOOD PRESSURE: 120 MMHG

## 2018-10-30 DIAGNOSIS — E78.2 MIXED HYPERLIPIDEMIA: ICD-10-CM

## 2018-10-30 DIAGNOSIS — J44.9 CHRONIC OBSTRUCTIVE PULMONARY DISEASE, UNSPECIFIED COPD TYPE (HCC): ICD-10-CM

## 2018-10-30 DIAGNOSIS — I42.8 NICM (NONISCHEMIC CARDIOMYOPATHY) (HCC): ICD-10-CM

## 2018-10-30 DIAGNOSIS — R06.02 SOB (SHORTNESS OF BREATH): ICD-10-CM

## 2018-10-30 DIAGNOSIS — Z72.0 SMOKELESS TOBACCO USE: ICD-10-CM

## 2018-10-30 DIAGNOSIS — I10 ESSENTIAL HYPERTENSION: Primary | ICD-10-CM

## 2018-10-30 NOTE — PROGRESS NOTES
10/30/2018 9:23 AM      Subjective:     Shin Celeste is here for f/u visit. C/o PA for last couple of months. He denies chest pain, chest pressure/discomfort, palpitations, irregular heart beats, near-syncope, syncope, fatigue, orthopnea, paroxysmal nocturnal dyspnea, exertional chest pressure/discomfort, claudication, lower extremity edema, dizziness. Visit Vitals  /56 (BP 1 Location: Right arm, BP Patient Position: Sitting)   Pulse 66   Ht 5' 9\" (1.753 m)   Wt 226 lb 12.8 oz (102.9 kg)   SpO2 99%   BMI 33.49 kg/m²     Current Outpatient Medications   Medication Sig    furosemide (LASIX) 40 mg tablet TAKE ONE TABLET BY MOUTH DAILY - DOSE REDUSED 12/17/15    clotrimazole-betamethasone (LOTRISONE) topical cream Apply  to affected area two (2) times a day.  sildenafil citrate (VIAGRA) 100 mg tablet Take 1 Tab by mouth as needed.  amLODIPine (NORVASC) 2.5 mg tablet TAKE 1 TABLET BY MOUTH ONCE DAILY    atorvastatin (LIPITOR) 40 mg tablet TAKE ONE TABLET BY MOUTH NIGHTLY    carvedilol (COREG) 25 mg tablet TAKE ONE TABLET BY MOUTH TWICE DAILY WITH MEALS    albuterol (PROVENTIL HFA, VENTOLIN HFA, PROAIR HFA) 90 mcg/actuation inhaler Take 2 Puffs by inhalation every four (4) hours as needed for Wheezing.  aspirin 81 mg chewable tablet Take 1 Tab by mouth daily. No current facility-administered medications for this visit.           Objective:      Visit Vitals  /56 (BP 1 Location: Right arm, BP Patient Position: Sitting)   Pulse 66   Ht 5' 9\" (1.753 m)   Wt 226 lb 12.8 oz (102.9 kg)   SpO2 99%   BMI 33.49 kg/m²       Data Review:     EKG: Normal sinus rhythm, no acute st/t changes    Reviewed and/or ordered active problem list, medication list tests    Past Medical History:   Diagnosis Date    Acute respiratory failure with hypoxia ([facility] 02/08/2014    also 11/28/15, 2/17/16, 5/14/17     Atrial flutter ([address] 08/2017    saw Dr. Margrett Spatz; underwent a-flutter ablation  BPH (benign prostatic hyperplasia)     CHF (congestive heart failure) ([facility] 02/11/2014    TTE 11/15: EF 40-45%, 2/14: EF 20%  Admitted for acute exacerbations 2/8/14, 11/28/15, 2/17/16, and 5/4/17)    Chronic low back pain     LS spine X-ray 4/8/17: mild DDD    COPD (chronic obstructive pulmonary disease) ([address]     ED (erectile dysfunction)     Elevated troponin 02/11/2014    also 11/28/15; due to cardiac strain    Esophagitis     reflux, hiatal hernia    Femur fracture, right ([address]     Frequent hospital admissions     5/14-5/23/17: acute hypoxic resp failure due to CHF exac, ROBINSON on CKD 3, sepsis due to LE cellulitis, 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  2/17-2/22/16: acute hypoxic resp failure, acute diarrhea  11/28-11/30/15: acute hypoxic resp failure due to acute CHF exac, elevated troponin due to cardiac strain  2/8-2/12/14: acuter hypoxic resp failure due to CHF exac, pneumonia     Hand blister 10/8/2017    Bilateral    Hypertension     Nonischemic cardiomyopathy ([facility]     with LVH    Pneumonia 02/08/2014 2/8/14 and 10/30/15    Prediabetes     Prostate cancer ([address] 2016    Pulmonary edema 11/28/2015    S/P cardiac cath 2/12/2014 2/12/14 no significant coronary disease, severe LV dysfunction    Shingles     Tinea cruris     also genital folliculitis    Vertigo       Past Surgical History:   Procedure Laterality Date    CARDIAC SURG PROCEDURE UNLIST  08/22/2017    SVT ablation    COLONOSCOPY,DIAGNOSTIC  02/22/2016    Dr. Renee Comer; single sessile polyp at descending colon, sigmoid diverticulosis, int hemorrhoids    COLONOSCOPY,REMV LESN,SNARE  2/22/2016         HX ORTHOPAEDIC      surgery on right index finger    HX ORTHOPAEDIC  2000    right shoulder surgery    HX OTHER SURGICAL  08/22/2017    Dr. Ranjana Tolliver; atrial flutter ablation    HX TONSILLECTOMY       Allergies   Allergen Reactions    Oxycodone Contact Dermatitis      Family History   Adopted: Yes   Family history unknown: Yes      Social History     Socioeconomic History    Marital status:      Spouse name: Not on file    Number of children: Not on file    Years of education: Not on file    Highest education level: Not on file   Social Needs    Financial resource strain: Not on file    Food insecurity - worry: Not on file    Food insecurity - inability: Not on file    Transportation needs - medical: Not on file   [email or web address] needs - non-medical: Not on file   Occupational History    Not on file   Tobacco Use    Smoking status: Former Smoker     Packs/day: 2.00     Years: 20.00     Pack years: 40.00     Types: Cigarettes     Last attempt to quit: 2017     Years since quittin.2    Smokeless tobacco: Current User     Types: Chew    Tobacco comment: Chewing tobacco   Substance and Sexual Activity    Alcohol use: No     Alcohol/week: 0.0 oz    Drug use: No    Sexual activity: Not on file   Other Topics Concern    Not on file   Social History Narrative    Not on file         Review of Systems     General: Not Present- Anorexia, Chills, Dietary Changes, Fatigue, Fever, Medication Changes, Night Sweats, Weight Gain > 10lbs. and Weight Loss > 10lbs. .  Skin: Not Present- Bruising and Excessive Sweating. HEENT: Not Present- Headache, Visual Loss and Vertigo. Respiratory: Not Present- Cough, Snoring and Wheezing. Cardiovascular: Not Present- Abnormal Blood Pressure, Chest Pain, Claudications, Edema, Fainting / Blacking Out, Irregular Heart Beat, Night Cramps, Orthopnea, Palpitations, Paroxysmal Nocturnal Dyspnea, Rapid Heart Rate, and Swelling of Extremities. Gastrointestinal: Not Present- Black, Tarry Stool, Bloody Stool, Diarrhea, Hematemesis, Rectal Bleeding and Vomiting. Musculoskeletal: Not Present- Muscle Pain and Muscle Weakness. Neurological: Not Present- Dizziness. Psychiatric: Not Present- Depression.   Endocrine: Not Present- Cold Intolerance, Heat Intolerance and Thyroid Problems. Hematology: Not Present- Abnormal Bleeding, Anemia, Blood Clots and Easy Bruising.       Physical Exam   The physical exam findings are as follows:       General   Mental Status - Alert. General Appearance - Not in acute distress. Chest and Lung Exam   Inspection: Accessory muscles - No use of accessory muscles in breathing. Auscultation:   Breath sounds: - Normal.      Cardiovascular   Inspection: Jugular vein - Bilateral - Inspection Normal.  Palpation/Percussion:   Apical Impulse: - Normal.  Auscultation: Rhythm - Regular. Heart Sounds - S1 WNL and S2 WNL. No S3 or S4. Murmurs & Other Heart Sounds: Auscultation of the heart reveals - No Murmurs. Carotid arteries - No Carotid bruit. Peripheral Vascular   Upper Extremity: Inspection - Bilateral - No Cyanotic nailbeds or Digital clubbing. Lower Extremity:   Palpation: Edema - Bilateral - No edema. Assessment:       ICD-10-CM ICD-9-CM    1. Essential hypertension I10 401.9 AMB POC EKG ROUTINE W/ 12 LEADS, INTER & REP   2. Smokeless tobacco use Z72.0 305.1    3. NICM (nonischemic cardiomyopathy) ([facility]) I42.8 425.4    4. Mixed hyperlipidemia E78.2 272.2    5. Chronic obstructive pulmonary disease, unspecified COPD type ([facility] J44.9 496    6. SOB (shortness of breath) R06.02 786.05        Plan:     1. PA: recommend stress test and if normal, then pulmonary evaluation, however he wants to think about it. Advised to stop using tobacco.   2. Nonischemic cardiomyopathy: resolved. Fully compensated ejection fraction is 60% on Echo 07/17.   3. AFL s/p atrial flutter ablation 08/17. In sinus rhythm. Off NOAC.    4. Hypertension. Well controlled. Continue current medications  5. HLD - on statin. Last FLP noted. 6. Non obstructive CAD per cardiac cath 02/14.

## 2018-10-30 NOTE — PROGRESS NOTES
Chief Complaint   Patient presents with    Shortness of Breath     6 MO. F/U     .1. Have you been to the ER, urgent care clinic since your last visit? Hospitalized since your last visit? NO    2. Have you seen or consulted any other health care providers outside of the [address] [place] [address] since your last visit? Include any pap smears or colon screening.  NO

## 2018-11-05 ENCOUNTER — OFFICE VISIT (OUTPATIENT)
Dept: INTERNAL MEDICINE CLINIC | Facility: CLINIC | Age: 78
End: 2018-11-05

## 2018-11-05 VITALS
TEMPERATURE: 98.4 F | WEIGHT: 228 LBS | BODY MASS INDEX: 33.77 KG/M2 | OXYGEN SATURATION: 97 % | RESPIRATION RATE: 18 BRPM | HEART RATE: 58 BPM | DIASTOLIC BLOOD PRESSURE: 66 MMHG | SYSTOLIC BLOOD PRESSURE: 123 MMHG | HEIGHT: 69 IN

## 2018-11-05 DIAGNOSIS — E78.2 MIXED HYPERLIPIDEMIA: ICD-10-CM

## 2018-11-05 DIAGNOSIS — Z71.89 ADVANCE CARE PLANNING: ICD-10-CM

## 2018-11-05 DIAGNOSIS — N18.4 STAGE 4 CHRONIC KIDNEY DISEASE (HCC): ICD-10-CM

## 2018-11-05 DIAGNOSIS — R73.03 PREDIABETES: ICD-10-CM

## 2018-11-05 DIAGNOSIS — J44.9 CHRONIC OBSTRUCTIVE PULMONARY DISEASE, UNSPECIFIED COPD TYPE (HCC): ICD-10-CM

## 2018-11-05 DIAGNOSIS — R06.09 DYSPNEA ON EXERTION: ICD-10-CM

## 2018-11-05 DIAGNOSIS — M54.50 CHRONIC BILATERAL LOW BACK PAIN WITHOUT SCIATICA: ICD-10-CM

## 2018-11-05 DIAGNOSIS — Z72.0 SMOKELESS TOBACCO USE: ICD-10-CM

## 2018-11-05 DIAGNOSIS — G89.29 CHRONIC BILATERAL LOW BACK PAIN WITHOUT SCIATICA: ICD-10-CM

## 2018-11-05 DIAGNOSIS — Z00.00 MEDICARE ANNUAL WELLNESS VISIT, SUBSEQUENT: Primary | ICD-10-CM

## 2018-11-05 DIAGNOSIS — C61 PROSTATE CANCER (HCC): ICD-10-CM

## 2018-11-05 DIAGNOSIS — Z13.31 SCREENING FOR DEPRESSION: ICD-10-CM

## 2018-11-05 DIAGNOSIS — B37.2 CANDIDAL INTERTRIGO: ICD-10-CM

## 2018-11-05 DIAGNOSIS — I42.8 NICM (NONISCHEMIC CARDIOMYOPATHY) (HCC): ICD-10-CM

## 2018-11-05 DIAGNOSIS — I10 ESSENTIAL HYPERTENSION: ICD-10-CM

## 2018-11-05 NOTE — PROGRESS NOTES
Twan Wayne  Identified pt with two pt identifiers(name and ). Chief Complaint Patient presents with  Hypertension  COPD Blaise.Jase Annual Wellness Visit 1. Have you been to the ER, urgent care clinic since your last visit? Hospitalized since your last visit? NO 
 
2. Has seen Dr Gato Enriquez. Today's provider has been notified of reason for visit, vitals and flowsheets obtained on patients. Patient received paperwork for advance directive during previous visit but has not completed at this time Reviewed record In preparation for visit, huddled with provider and have obtained necessary documentation Health Maintenance Due Topic  Shingrix Vaccine Age 50> (1 of 2)  MEDICARE YEARLY EXAM   
 
 
Wt Readings from Last 3 Encounters:  
18 228 lb (103.4 kg) 10/30/18 226 lb 12.8 oz (102.9 kg) 18 224 lb 1.6 oz (101.7 kg) Temp Readings from Last 3 Encounters:  
18 98.4 °F (36.9 °C) (Oral) 18 98.1 °F (36.7 °C) (Oral) 18 98.1 °F (36.7 °C) (Oral) BP Readings from Last 3 Encounters:  
18 123/66  
10/30/18 120/56  
18 146/76 Pulse Readings from Last 3 Encounters:  
18 (!) 58  
10/30/18 66  
18 (!) 59 Vitals:  
 18 0818 BP: 123/66 Pulse: (!) 58 Resp: 18 Temp: 98.4 °F (36.9 °C) TempSrc: Oral  
SpO2: 97% Weight: 228 lb (103.4 kg) Height: 5' 9\" (1.753 m) PainSc:   0 - No pain Learning Assessment: 
:  
 
Learning Assessment 10/5/2017 2014 PRIMARY LEARNER Patient Patient HIGHEST LEVEL OF EDUCATION - PRIMARY LEARNER  - DID NOT GRADUATE HIGH SCHOOL  
BARRIERS PRIMARY LEARNER - NONE  
CO-LEARNER CAREGIVER - No  
PRIMARY LANGUAGE ENGLISH ENGLISH  NEED - No  
LEARNER PREFERENCE PRIMARY LISTENING LISTENING  
  DEMONSTRATION -  
  READING -  
ANSWERED BY patient Patient RELATIONSHIP SELF SELF Depression Screening: 
:  
 
PHQ over the last two weeks 10/30/2018 Little interest or pleasure in doing things Not at all Feeling down, depressed, irritable, or hopeless Not at all Total Score PHQ 2 0 Fall Risk Assessment: 
:  
 
Fall Risk Assessment, last 12 mths 10/30/2018 Able to walk? Yes Fall in past 12 months? No  
Fall with injury? -  
 
 
Abuse Screening: 
:  
 
Abuse Screening Questionnaire 9/4/2018 7/3/2018 10/5/2017 Do you ever feel afraid of your partner? N N N Are you in a relationship with someone who physically or mentally threatens you? N N N Is it safe for you to go home? [facility]  
 
 
ADL Screening: 
:  
 
ADL Assessment 7/3/2018 Feeding yourself No Help Needed Getting from bed to chair No Help Needed Getting dressed No Help Needed Bathing or showering No Help Needed Walk across the room (includes cane/walker) No Help Needed Using the telphone No Help Needed Taking your medications No Help Needed Preparing meals No Help Needed Managing money (expenses/bills) No Help Needed Moderately strenuous housework (laundry) No Help Needed Shopping for personal items (toiletries/medicines) No Help Needed Shopping for groceries No Help Needed Driving No Help Needed Climbing a flight of stairs No Help Needed Getting to places beyond walking distances No Help Needed Medication reconciliation up to date and corrected with patient at this time.

## 2018-11-05 NOTE — PROGRESS NOTES
This is a Subsequent Medicare Annual Wellness Visit providing Personalized Prevention Plan Services (PPPS) (Performed 12 months after initial AWV and PPPS ) I have reviewed the patient's medical history in detail and updated the computerized patient record. History Lily Leon is a 70 y.o. male. Presents for Medicare AWV and 4 month follow up evaluation. He has HTN, hyperlipidemia, CKD stage 3-4, CHF (combined diastolic and systolic) with echo EF 98-06% in 7/17, non-ischemic cardiomyopathy, atrial flutter s/p AFL ablation on 8/22/17 now in NSR and off anticoagulation, prediabetes, COPD, and prostate cancer. Since last clinic visit, saw Cathleen Mcknight NP twice at this clinic for left otitis media (9/4/18) and headache (9/28/18). Reports he was seen at [facility] for left ear infection. Was prescribed a different antibiotic that helped his symptoms. Also had a CT neck ordered by Dr. Handy Thibodeaux to evaluate for patient's complaints of neck pain and fullness. CT neck returned normal. 
 
Saw Dr. Beau Rios on 10/30/18 for dyspnea on exertion. Stress test recommended but patient wanted to wait on this and instead work on stopping chewing tobacco.  He complains of low back pain. Has been following with Dr. Bianca Segura and receiving epidural steroid injections that are helping. Has appointment on 11/9/18 with Dr. Mat Rg. Was told he might need cataract surgery.    
  
Cardiovascular Review The patient has hypertension, CHF, non-ischemic cardiomyopathy, and atrial flutter s/p AFL ablation on 8/22/17.  Denies CP, SOB at rest, heart palpitations, leg swelling, orthopnea, or PND.  He reports taking medications as instructed, no medication side effects noted.  Diet and Lifestyle: generally follows a low sodium diet, no formal exercise but active during the day.  Lab review: labs reviewed and discussed with patient.   
  
Oncology Review Diagnosed with prostate cancer on 2/6/09.  Stage [id number].  Pre-treatment PSA was 24.8 ng/ml.  Had external beam radiation therapy in 2009.  Has been on Lupron since 12/22/16; receiving every 6 months, last received in 8/18.   
  
Review of Studies Cardio EKG 10/30/18: Sinus gaudencio with nonspecific T wave changes. Echo (TTE) 7/31/17: EF 55-60%. No regional wall motion abnormalities. LA moderately dilated. Ortho CT neck 10/4/18: no evidence of significant soft tissue abnormality. A 10 mm left thyroid nodule is noted. No evidence of pathologically enlarged cervical lymph nodes. Flowing ventral osteophytes in the cervical spine consistent with DISH. XR lumbar spine 5/3/18: mild to moderate multilevel degenerative disc disease. Pulmonary CXR 10/2/17: mild interstitial edema in the mid lower lung zones right greater than left Other Providers: Dr. Kristin Castaneda (Cardiology), Dr. Houston Gordon (Cardiology EPS), Dr. Carlito Jorgensen (Urology), Dr. Toribio Rosario (Nephrology), Dr. Nikko Coughlin (Ophthalmology), Dr. Romie Alcantara (ENT) 
   
Soc Hx 
. Has 2 children and 3 grandchildren. Retired . He chews tobacco.  Quit smoking 8/7/17; previously smoked 1.5-2 ppd for 50 yrs. Stopped drinking alcohol in 2008. Denies recreational drug use. Does not get much exercise. 
  
Health Maintenance Flu vaccine: 9/4/18 Pneumonia vaccine: PPSV-23 11/30/15, PCV-13 9/18/17 Tetanus vaccine: not indicated Shingles vaccine: declined Colonoscopy: 2/22/16, Dr. Trinity Collado, one sessile polyp, sigmoid diverticulosis, int hemorrhoids, repeat in 5 yrs (2021) Eye exam: 4/3/18, Dr. Eda Christy Lipids: 4/19/18 (tot chol 117, LDL 43) A1c: 4/19/18 (6.3%) Advanced Directives: given information End of Life: given information  
 
ROS A complete review of systems was performed and is negative except for those mentioned in the HPI. Past Medical History:  
Diagnosis Date  Acute respiratory failure with hypoxia ([facility] 02/08/2014  
 also 11/28/15, 2/17/16, 5/14/17  Atrial flutter ([address] 08/2017  
 saw Dr. Isabela Hagen; underwent a-flutter ablation  BPH (benign prostatic hyperplasia)  CHF (congestive heart failure) ([address] 02/11/2014 TTE 11/15: EF 40-45%, 2/14: EF 20%  Admitted for acute exacerbations 2/8/14, 11/28/15, 2/17/16, and 5/4/17)  Chronic low back pain LS spine X-ray 4/8/17: mild DDD  COPD (chronic obstructive pulmonary disease) ([facility])  ED (erectile dysfunction)  Elevated troponin 02/11/2014  
 also 11/28/15; due to cardiac strain  Esophagitis   
 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  
 Hypertension  Nonischemic cardiomyopathy ([address] with LVH  Pneumonia 02/08/2014 2/8/14 and 10/30/15  Prediabetes  Prostate cancer ([facility] [address] 2016  Pulmonary edema 11/28/2015  S/P cardiac cath 2/12/2014 2/12/14 no significant coronary disease, severe LV dysfunction  Shingles  Tinea cruris   
 also genital folliculitis  Vertigo Past Surgical History:  
Procedure Laterality Date  CARDIAC SURG PROCEDURE UNLIST  08/22/2017 SVT ablation  COLONOSCOPY,DIAGNOSTIC  02/22/2016 Dr. Howard Hoyos; single sessile polyp at descending colon, sigmoid diverticulosis, int hemorrhoids  COLONOSCOPY,REMV LESN,SNARE  2/22/2016  HX ORTHOPAEDIC    
 surgery on right index finger  HX ORTHOPAEDIC  2000  
 right shoulder surgery  HX OTHER SURGICAL  08/22/2017 Dr. Faviola Powell; atrial flutter ablation  HX TONSILLECTOMY Current Outpatient Medications Medication Sig Dispense Refill  furosemide (LASIX) 40 mg tablet TAKE ONE TABLET BY MOUTH DAILY - DOSE REDUSED 12/17/15 30 Tab 6  clotrimazole-betamethasone (LOTRISONE) topical cream Apply  to affected area two (2) times a day. 45 g 3  
 amLODIPine (NORVASC) 2.5 mg tablet TAKE 1 TABLET BY MOUTH ONCE DAILY 30 Tab 0  
 atorvastatin (LIPITOR) 40 mg tablet TAKE ONE TABLET BY MOUTH NIGHTLY 90 Tab 1  carvedilol (COREG) 25 mg tablet TAKE ONE TABLET BY MOUTH TWICE DAILY WITH MEALS 60 Tab 6  
 albuterol (PROVENTIL HFA, VENTOLIN HFA, PROAIR HFA) 90 mcg/actuation inhaler Take 2 Puffs by inhalation every four (4) hours as needed for Wheezing. 1 Inhaler 0  
 aspirin 81 mg chewable tablet Take 1 Tab by mouth daily. 10 Tab 0 Allergies Allergen Reactions  Oxycodone Contact Dermatitis Family History Adopted: Yes Family history unknown: Yes  
 
Social History Tobacco Use  Smoking status: Former Smoker Packs/day: 2.00 Years: 20.00 Pack years: 40.00 Types: Cigarettes Last attempt to quit: 2017 Years since quittin.2  Smokeless tobacco: Current User Types: Chew  Tobacco comment: Chewing tobacco  
Substance Use Topics  Alcohol use: No  
  Alcohol/week: 0.0 oz Patient Active Problem List  
Diagnosis Code  Combined systolic and diastolic congestive heart failure (HCC) I50.40  S/P cardiac cath Z98.890  
 NICM (nonischemic cardiomyopathy) ([facility] I42.8  Hypertension I10  
 Mixed hyperlipidemia E78.2  Smokeless tobacco use Z72.0  Atrial flutter (HCC) I48.92  
 Stage 3 chronic kidney disease (HCC) N18.3  Frequent hospital admissions Z78.9  Prediabetes R73.03  
 Prostate cancer ([facility] [address] C61  
 COPD (chronic obstructive pulmonary disease) ([facility] J44.9  Chronic low back pain M54.5, G89.29  
 Obesity (BMI 30-39. 9) E66.9  SOB (shortness of breath) R06.02  
 Candidal intertrigo B37.2 Depression Risk Factor Screening: PHQ over the last two weeks 10/30/2018 Little interest or pleasure in doing things Not at all Feeling down, depressed, irritable, or hopeless Not at all Total Score PHQ 2 0 Alcohol Risk Factor Screening: You do not drink alcohol or very rarely. Functional Ability and Level of Safety:  
 
Hearing Loss Normal hearing. Activities of Daily Living Self-care. Requires assistance with: no ADLs Fall Risk Fall Risk Assessment, last 12 mths 10/30/2018 Able to walk? Yes Fall in past 12 months? No  
Fall with injury? -  
 
Abuse Screen Patient is not abused Review of Systems Pertinent items are noted in HPI. Physical Examination Evaluation of Cognitive Function: 
Mood/affect:  neutral 
Appearance: age appropriate Family member/caregiver input: none Visit Vitals /66 (BP 1 Location: Left arm, BP Patient Position: Sitting) Pulse (!) 58 Temp 98.4 °F (36.9 °C) (Oral) Resp 18 Ht 5' 9\" (1.753 m) Wt 228 lb (103.4 kg) SpO2 97% BMI 33.67 kg/m² General: Obese, no distress. HEENT:  Head normocephalic/atraumatic, no scleral icterus Neck: Supple. No carotid bruits, JVD, lymphadenopathy, or thyromegaly. Lungs:  Clear to ausculation bilaterally. Good air movement. Heart:  Bradycardic, regular rhythm, normal S1 and S2, no murmur, gallop, or rub Extremities: No clubbing, cyanosis, or edema. Neurological: Alert and oriented. Psychiatric: Normal mood and affect. Behavior is normal.  
 
 
Patient Care Team: 
Janusz Jonas MD as PCP - General (Internal Medicine) Kristin Desir MD as Physician (Cardiology) Carolyn Ramirez RN as Nurse Navigator Bouchra Cerda MD (Cardiology) Leo Carlisle MD (Urology) Virginia Crum MD (Nephrology) Jaxson Burgess DO (Ophthalmology) Kathleen Black MD (Otolaryngology) Results for orders placed or performed during the hospital encounter of 10/04/18 POC CREATININE Result Value Ref Range Creatinine (POC) 1.3 0.6 - 1.3 mg/dL GFRAA, POC >60 >60 ml/min/1.73m2 GFRNA, POC 54 (L) >60 ml/min/1.73m2 Advice/Referrals/Counseling Education and counseling provided: 
Are appropriate based on today's review and evaluation End-of-Life planning (with patient's consent) Influenza Vaccine Assessment/Plan ICD-10-CM ICD-9-CM 1. Medicare annual wellness visit, subsequent Z00.00 V70.0 2. Essential hypertension I10 401.9 3. Mixed hyperlipidemia E78.2 272.2 4. NICM (nonischemic cardiomyopathy) ([facility]) I42.8 425.4 5. Smokeless tobacco use Z72.0 305.1 6. Prediabetes R73.03 790.29   
7. Chronic bilateral low back pain without sciatica M54.5 724.2 G89.29 338.29   
8. Chronic obstructive pulmonary disease, unspecified COPD type ([facility] J44.9 496 9. Dyspnea on exertion R06.09 786.09   
10. Prostate cancer ([facility] C61 185 11. Candidal intertrigo B37.2 112.3 12. Screening for depression Z13.31 V79.0 13. Advance care planning Z71.89 V65.49 Diagnoses and all orders for this visit: 
 
1. Medicare annual wellness visit, subsequent 2. Essential hypertension Controlled. /66 today. Continue 3. Mixed hyperlipidemia Controlled on atorvastatin 40 mg daily. 4. NICM (nonischemic cardiomyopathy) ([facility] Controlled on furosemide and carvedilol. 5. Smokeless tobacco use Encouraged to discontinue chewing tobacco. 
 
6. Prediabetes Asymptomatic. He declined having a HbA1c checked today. 7. Chronic bilateral low back pain without sciatica Stable. Continue to follow with Dr. Darci Dozier. 8. Chronic obstructive pulmonary disease, unspecified COPD type ([facility] Has not been using albuterol inhaler because he says it is too expensive. I gave him a Good Rx discount card. 9. Dyspnea on exertion Cardiac versus pulmonary in etiology. 10. Prostate cancer ([facility] On Lupron every 6 months. 11. Candidal intertrigo Continue clotrimazole cream as needed. 12. Screening for depression 13. Advance care planning Follow-up Disposition: 
Return in about 4 months (around 3/5/2019), or if symptoms worsen or fail to improve, for HTN, hyperlipidemia. Patient seen and had Medicare Annual Wellness Exam; Wellness Schedule printed, reviewed, and given to patient.

## 2018-11-08 NOTE — PROGRESS NOTES
Preoperative Evaluation    Date of Exam: 2018     Visit Vitals  /64 (BP 1 Location: Left arm, BP Patient Position: Sitting)   Pulse 61   Temp 97.8 °F (36.6 °C) (Oral)   Resp 18   Ht 5' 9\" (1.753 m)   Wt 232 lb 3.2 oz (105.3 kg)   SpO2 94%   BMI 34.29 kg/m²        Baltazar Miller is a 70 y.o. male (:1940) who presents for preoperative evaluation. Procedure/Surgery: bilateral cataract surgery  Date of Procedure/Surgery:  right eye , left eye   Surgeon: Dr. Liliya Perez: [id number] [facility]  Primary Physician: Johny Esparza MD    Questions:  -Normal state of health today? Yes  -Do you usually get chest pain or breathlessness when you climb up two flights of stairs at normal speed? Gets SOB with stairs, states chronic for him, denies CP. Latex Allergy: Denies     Pt c/o cough and runny nose for a few days. Reports chronic SOB. Took some OTC coricidin, didn't help. Takes albuterol for COPD, uses it a few times a day, no change since having a cold. Cough is mostly dry. Denies wheezing. Denies chest pain. Denies sore throat or body aches. Pt seen by Dr. Kee [facility] in October. Plan verbatim:  1. PA: recommend stress test and if normal, then pulmonary evaluation, however he wants to think about it. Advised to stop using tobacco.   2. Nonischemic cardiomyopathy: resolved. Fully compensated ejection fraction is 60% on Echo .   3. AFL s/p atrial flutter ablation . In sinus rhythm. Off NOAC.    4. Hypertension. Well controlled. Continue current medications  5. HLD - on statin. Last FLP noted.    6. Non obstructive CAD per cardiac cath .       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    S/P cardiac cath Z98.890    NICM (nonischemic cardiomyopathy) ([facility] [address] I42.8    Hypertension I10    Mixed hyperlipidemia E78.2    Smokeless tobacco use Z72.0    Atrial flutter ([facility] [address] I48.92  Stage 3 chronic kidney disease (HCC) N18.3    Frequent hospital admissions Z78.9    Prediabetes R73.03    Prostate cancer ([facility] [address] C61    COPD (chronic obstructive pulmonary disease) (HCC) J44.9    Chronic low back pain M54.5, G89.29    Obesity (BMI 30-39. 9) E66.9    SOB (shortness of breath) R06.02    Candidal intertrigo B37.2     Past Medical History:   Diagnosis Date    Acute respiratory failure with hypoxia ([facility] [address] 02/08/2014    also 11/28/15, 2/17/16, 5/14/17     Atrial flutter ([facility] [address] 08/2017    saw Dr. Ranjith Ortiz; underwent a-flutter ablation    BPH (benign prostatic hyperplasia)     CHF (congestive heart failure) ([facility] [address] 02/11/2014    TTE 11/15: EF 40-45%, 2/14: EF 20%  Admitted for acute exacerbations 2/8/14, 11/28/15, 2/17/16, and 5/4/17)    Chronic low back pain     LS spine X-ray 4/8/17: mild DDD    COPD (chronic obstructive pulmonary disease) ([facility] [address]     ED (erectile dysfunction)     Elevated troponin 02/11/2014    also 11/28/15; due to cardiac strain    Esophagitis     reflux, hiatal hernia    Femur fracture, right ([address]     Frequent hospital admissions     5/14-5/23/17: acute hypoxic resp failure due to CHF exac, ROBINSON on CKD 3, sepsis due to LE cellulitis, 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  2/17-2/22/16: acute hypoxic resp failure, acute diarrhea  11/28-11/30/15: acute hypoxic resp failure due to acute CHF exac, elevated troponin due to cardiac strain  2/8-2/12/14: acuter hypoxic resp failure due to CHF exac, pneumonia     Hand blister 10/8/2017    Bilateral    Hypertension     Nonischemic cardiomyopathy ([facility] [address]     with LVH    Pneumonia 02/08/2014 2/8/14 and 10/30/15    Prediabetes     Prostate cancer ([facility] [address] 2016    Pulmonary edema 11/28/2015    S/P cardiac cath 2/12/2014 2/12/14 no significant coronary disease, severe LV dysfunction    Shingles     Tinea cruris     also genital folliculitis    Vertigo      Past Surgical History:   Procedure Laterality Date    CARDIAC SURG PROCEDURE UNLIST  2017    SVT ablation    COLONOSCOPY,DIAGNOSTIC  2016    Dr. Slime Hendrickson; single sessile polyp at descending colon, sigmoid diverticulosis, int hemorrhoids    COLONOSCOPY,JOSE BENAVIDES,SNARE  2016         HX ORTHOPAEDIC      surgery on right index finger    HX ORTHOPAEDIC  2000    right shoulder surgery    HX OTHER SURGICAL  2017    Dr. Mario Lopez; atrial flutter ablation    HX TONSILLECTOMY       Social History     Socioeconomic History    Marital status:      Spouse name: Not on file    Number of children: Not on file    Years of education: Not on file    Highest education level: Not on file   Social Needs    Financial resource strain: Not on file    Food insecurity - worry: Not on file    Food insecurity - inability: Not on file   [employer] needs - medical: Not on file   [employer] needs - non-medical: Not on file   Occupational History    Not on file   Tobacco Use    Smoking status: Former Smoker     Packs/day: 2.00     Years: 20.00     Pack years: 40.00     Types: Cigarettes     Last attempt to quit: 2017     Years since quittin.2    Smokeless tobacco: Current User     Types: Chew    Tobacco comment: Chewing tobacco   Substance and Sexual Activity    Alcohol use: No     Alcohol/week: 0.0 oz    Drug use: No    Sexual activity: Not on file   Other Topics Concern    Not on file   Social History Narrative    Not on file     Family History   Adopted: Yes   Family history unknown: Yes     Allergies   Allergen Reactions    Oxycodone Contact Dermatitis       Current Outpatient Medications   Medication Sig    furosemide (LASIX) 40 mg tablet TAKE ONE TABLET BY MOUTH DAILY - DOSE REDUSED 12/17/15    clotrimazole-betamethasone (LOTRISONE) topical cream Apply  to affected area two (2) times a day.     amLODIPine (NORVASC) 2.5 mg tablet TAKE 1 TABLET BY MOUTH ONCE DAILY    atorvastatin (LIPITOR) 40 mg tablet TAKE ONE TABLET BY MOUTH NIGHTLY    carvedilol (COREG) 25 mg tablet TAKE ONE TABLET BY MOUTH TWICE DAILY WITH MEALS    albuterol (PROVENTIL HFA, VENTOLIN HFA, PROAIR HFA) 90 mcg/actuation inhaler Take 2 Puffs by inhalation every four (4) hours as needed for Wheezing.  aspirin 81 mg chewable tablet Take 1 Tab by mouth daily. No current facility-administered medications for this visit. Recent use of:  Daily aspirin 81mg      Anesthesia Complications: Denies  History of abnormal bleeding: Denies  History of Blood Transfusions: Denies  Health Care Directive or Living Will: Denies    REVIEW OF SYSTEMS:  Constitutional: Negative for recent fever and chills. Skin: Negative for rash or skin lesions. HENT: See HPI. Eyes: negative  Cardiovascular:  Negative for chest pain or palpitations. Respiratory: Negative for cough and hemoptysis, shortness of breath, orthopnea, PND. Chronic PA. Gastrointestinal: Negative for nausea and vomitting. Negative for abdominal pain   diarrhea or constipation. No melena. Genitourinary: Negative for dysuria, blood in the urine, frequency or burning. Lymphoreticular: No lymphadenopathy. Musculoskeletal: Negative  for arthralgias, back pain or neck pain. History of edema: Denies  Neurological: Negative for dizziness, seizures or syncopal episodes   Psychiatric: Negative.      Physical Examination:   Vital signs:   Visit Vitals  /64 (BP 1 Location: Left arm, BP Patient Position: Sitting)   Pulse 61   Temp 97.8 °F (36.6 °C) (Oral)   Resp 18   Ht 5' 9\" (1.753 m)   Wt 232 lb 3.2 oz (105.3 kg)   SpO2 94%   BMI 34.29 kg/m²     General appearance - alert, well appearing, and in no distress  Mental status - alert, oriented to person, place, and time, normal mood, behavior, speech, dress, motor activity, and thought processes  Eyes - pupils equal and reactive, extraocular eye movements intact  Ears - bilateral TM's and external ear canals normal  Nose - normal and patent, no erythema, discharge or polyps  Mouth - mucous membranes moist, pharynx normal without lesions  Neck - supple, no significant adenopathy, no thyromegaly  Chest - clear to auscultation, no wheezes, rales or rhonchi, symmetric air entry  Heart - normal rate, regular rhythm, normal S1, S2, no murmurs, rubs, clicks or gallops  Abdomen - soft, nontender, nondistended  Neurological - alert, oriented, normal speech, no focal findings or movement disorder noted, cranial nerves II through XII intact  Musculoskeletal - no joint tenderness, deformity or swelling  Extremities - peripheral pulses normal, no pedal edema, no clubbing or cyanosis  Skin - normal coloration and turgor, no rashes, no suspicious skin lesions noted       Lab Results   Component Value Date/Time    Sodium 144 04/19/2018 08:49 AM    Potassium 4.0 04/19/2018 08:49 AM    Chloride 102 04/19/2018 08:49 AM    CO2 21 04/19/2018 08:49 AM    Anion gap 8 10/02/2017 11:17 AM    Glucose 137 (H) 04/19/2018 08:49 AM    BUN 12 04/19/2018 08:49 AM    Creatinine 1.17 04/19/2018 08:49 AM    BUN/Creatinine ratio 10 04/19/2018 08:49 AM    GFR est AA 70 04/19/2018 08:49 AM    GFR est non-AA 60 04/19/2018 08:49 AM    Calcium 9.7 04/19/2018 08:49 AM    Bilirubin, total 0.3 04/19/2018 08:49 AM    AST (SGOT) 24 04/19/2018 08:49 AM    Alk.  phosphatase 98 04/19/2018 08:49 AM    Protein, total 8.0 04/19/2018 08:49 AM    Albumin 4.5 04/19/2018 08:49 AM    Globulin 4.8 (H) 10/02/2017 11:17 AM    A-G Ratio 1.3 04/19/2018 08:49 AM    ALT (SGPT) 20 04/19/2018 08:49 AM     Lab Results   Component Value Date/Time    WBC 4.9 04/19/2018 08:49 AM    HGB 10.9 (L) 04/19/2018 08:49 AM    HCT 35.4 (L) 04/19/2018 08:49 AM    PLATELET 942 (L) 22/03/0787 08:49 AM    MCV 81 04/19/2018 08:49 AM       Lab Results   Component Value Date/Time    Cholesterol, total 117 04/19/2018 08:49 AM    HDL Cholesterol 45 04/19/2018 08:49 AM    LDL, calculated 43 04/19/2018 08:49 AM    VLDL, calculated 29 04/19/2018 08:49 AM    Triglyceride 143 04/19/2018 08:49 AM             ASSESSMENT and PLAN  Diagnoses and all orders for this visit:    1. Pre-op evaluation  Based on ACC/AHA guidelines patient is at acceptable risk for scheduled level of surgery. 2. Age-related cataract of both eyes, unspecified age-related cataract type  See #1    3. Essential hypertension  stable    4. Chronic obstructive pulmonary disease, unspecified COPD type (HCC)  Stable, lungs CTA-B    5. Combined systolic and diastolic congestive heart failure, unspecified HF chronicity ([address]  Follows with Dr. Audra Lozano    6. Stage 3 chronic kidney disease ([address]    7. Seasonal allergic rhinitis, unspecified trigger  Recommend OTC zyrtec or allegra daily for nasal discharge                     Patient Instructions     May take over the counter allegra, zyrtec or Claritin for seasonal allergy symptoms (runny nose, post nasal drip, etc.). Cataract Surgery: Before Your Surgery  What is cataract surgery? Cataracts are cloudy areas in the lens of your eye. Your lens is behind the colored part of your eye (iris). Its job is to focus light onto the back of your eye. In some people, cataracts prevent light from reaching the back of the eye. This can cause vision problems. Cataract surgery helps you see better. It replaces your natural lens, which has become cloudy, with a clear artificial one. There are two types of cataract surgery. Phacoemulsification (say \"gyit-ow-za-sao-ckl-kmn-MARBIN-shun\") is the most common type. The doctor makes a small cut in your eye. This cut is called an incision. The doctor uses a special ultrasound tool to break your cloudy lens apart. Sometimes a laser is used too. Then he or she removes the small pieces of the lens through the incision. In most cases, the doctor then inserts an artificial lens through the incision. Most people do not need stitches, because the incision is so small.  If the doctor is not able to put in an artificial lens, you can wear a contact lens or thick glasses in place of your natural lens. Extracapsular extraction is a less common type of cataract surgery. The doctor makes a larger incision to remove the whole lens at once. After the doctor removes the lens, he or she stitches up the incision. Recovery from this type of surgery takes longer. Before either surgery, the doctor puts numbing drops in your eye. Some doctors use a shot instead. You may also get medicine to make you feel relaxed. You probably will not feel much pain. The surgery takes about 20 to 40 minutes. After surgery, you may have a bandage or shield on your eye. You will probably go home from surgery after 1 hour in the recovery room. Most people see better in 1 to 3 days. You may be able to go back to work or your normal routine in a few days. It could take 3 to 10 weeks for your eye to completely heal. After your eye heals, you may still need to wear glasses, especially for reading. Follow-up care is a key part of your treatment and safety. Be sure to make and go to all appointments, and call your doctor if you are having problems. It's also a good idea to know your test results and keep a list of the medicines you take. What happens before surgery?   Surgery can be stressful. This information will help you understand what you can expect. And it will help you safely prepare for surgery.   Preparing for surgery    · Understand exactly what surgery is planned, along with the risks, benefits, and other options. · Tell your doctors ALL the medicines, vitamins, supplements, and herbal remedies you take. Some of these can increase the risk of bleeding or interact with anesthesia.     · If you take blood thinners, such as warfarin (Coumadin), clopidogrel (Plavix), or aspirin, be sure to talk to your doctor. He or she will tell you if you should stop taking these medicines before your surgery.  Make sure that you understand exactly what your doctor wants you to do.     · Your doctor will tell you which medicines to take or stop before your surgery. You may need to stop taking certain medicines a week or more before surgery. So talk to your doctor as soon as you can.     · If you have an advance directive, let your doctor know. It may include a living will and a durable power of  for health care. Bring a copy to the hospital. If you don't have one, you may want to prepare one. It lets your doctor and loved ones know your health care wishes. Doctors advise that everyone prepare these papers before any type of surgery or procedure. What happens on the day of surgery? · Follow the instructions exactly about when to stop eating and drinking. If you don't, your surgery may be canceled. If your doctor told you to take your medicines on the day of surgery, take them with only a sip of water.     · Take a bath or shower before you come in for your surgery. Do not apply lotions, perfumes, deodorants, or nail polish.     · Take off all jewelry and piercings. And take out contact lenses, if you wear them.    At the hospital or surgery center   · Bring a picture ID.     · The area for surgery is often marked to make sure there are no errors.     · You will be kept comfortable and safe by your anesthesia provider. The anesthesia may make you sleep. Or it may just numb the area being worked on.     · The surgery will take about 20 to 40 minutes. Going home   · Be sure you have someone to drive you home. Anesthesia and pain medicine make it unsafe for you to drive.     · You will be given more specific instructions about recovering from your surgery. They will cover things like diet, wound care, follow-up care, driving, and getting back to your normal routine.     · You may have a bandage or patch over your eye. You may also have a clear shield over your eye. This prevents you from rubbing it. When should you call your doctor?    · You have questions or concerns.     · You don't understand how to prepare for your surgery.     · You become ill before the surgery (such as fever, flu, or a cold).     · You need to reschedule or have changed your mind about having the surgery. Where can you learn more? Go to http://bg-gurpreet.info/. Enter K474 in the search box to learn more about \"Cataract Surgery: Before Your Surgery. \"  Current as of: December 3, 2017  Content Version: 11.8  © 1635-3661 [employer]. Care instructions adapted under license by [employer] (which disclaims liability or warranty for this information). If you have questions about a medical condition or this instruction, always ask your healthcare professional. [place] [address] any warranty or liability for your use of this information. Please keep your follow-up appointment with Lacie Dias MD.     Follow-up Disposition:  Return if symptoms worsen or fail to improve. There are no preventive care reminders to display for this patient. I have discussed the diagnosis with the patient and the intended plan as seen in the above orders. Patient is in agreement. The patient has received an after-visit summary and questions were answered concerning future plans. I have discussed medication side effects and warnings with the patient as well. Warning signs for the above conditions were discussed including when to call our office or go to the emergency room. The nurse provided the patient and/or family with advanced directive information if needed and encouraged the patient to provide a copy to the office when available.

## 2018-11-09 RX ORDER — AMLODIPINE BESYLATE 2.5 MG/1
TABLET ORAL
Qty: 30 TAB | Refills: 0 | Status: SHIPPED | OUTPATIENT
Start: 2018-11-09 | End: 2018-11-29

## 2018-11-12 ENCOUNTER — OFFICE VISIT (OUTPATIENT)
Dept: INTERNAL MEDICINE CLINIC | Facility: CLINIC | Age: 78
End: 2018-11-12

## 2018-11-12 VITALS
HEIGHT: 69 IN | SYSTOLIC BLOOD PRESSURE: 134 MMHG | HEART RATE: 61 BPM | OXYGEN SATURATION: 94 % | WEIGHT: 232.2 LBS | BODY MASS INDEX: 34.39 KG/M2 | TEMPERATURE: 97.8 F | DIASTOLIC BLOOD PRESSURE: 64 MMHG | RESPIRATION RATE: 18 BRPM

## 2018-11-12 DIAGNOSIS — H25.9 AGE-RELATED CATARACT OF BOTH EYES, UNSPECIFIED AGE-RELATED CATARACT TYPE: ICD-10-CM

## 2018-11-12 DIAGNOSIS — N18.30 STAGE 3 CHRONIC KIDNEY DISEASE (HCC): ICD-10-CM

## 2018-11-12 DIAGNOSIS — Z01.818 PRE-OP EVALUATION: Primary | ICD-10-CM

## 2018-11-12 DIAGNOSIS — J44.9 CHRONIC OBSTRUCTIVE PULMONARY DISEASE, UNSPECIFIED COPD TYPE (HCC): ICD-10-CM

## 2018-11-12 DIAGNOSIS — J30.2 SEASONAL ALLERGIC RHINITIS, UNSPECIFIED TRIGGER: ICD-10-CM

## 2018-11-12 DIAGNOSIS — I50.40 COMBINED SYSTOLIC AND DIASTOLIC CONGESTIVE HEART FAILURE, UNSPECIFIED HF CHRONICITY (HCC): ICD-10-CM

## 2018-11-12 DIAGNOSIS — I10 ESSENTIAL HYPERTENSION: ICD-10-CM

## 2018-11-12 NOTE — PATIENT INSTRUCTIONS
May take over the counter allegra, zyrtec or Claritin for seasonal allergy symptoms (runny nose, post nasal drip, etc.). Cataract Surgery: Before Your Surgery  What is cataract surgery? Cataracts are cloudy areas in the lens of your eye. Your lens is behind the colored part of your eye (iris). Its job is to focus light onto the back of your eye. In some people, cataracts prevent light from reaching the back of the eye. This can cause vision problems. Cataract surgery helps you see better. It replaces your natural lens, which has become cloudy, with a clear artificial one. There are two types of cataract surgery. Phacoemulsification (say \"kgfy-gl-zt-hoc-qdu-yqq-MARBIN-shun\") is the most common type. The doctor makes a small cut in your eye. This cut is called an incision. The doctor uses a special ultrasound tool to break your cloudy lens apart. Sometimes a laser is used too. Then he or she removes the small pieces of the lens through the incision. In most cases, the doctor then inserts an artificial lens through the incision. Most people do not need stitches, because the incision is so small. If the doctor is not able to put in an artificial lens, you can wear a contact lens or thick glasses in place of your natural lens. Extracapsular extraction is a less common type of cataract surgery. The doctor makes a larger incision to remove the whole lens at once. After the doctor removes the lens, he or she stitches up the incision. Recovery from this type of surgery takes longer. Before either surgery, the doctor puts numbing drops in your eye. Some doctors use a shot instead. You may also get medicine to make you feel relaxed. You probably will not feel much pain. The surgery takes about 20 to 40 minutes. After surgery, you may have a bandage or shield on your eye. You will probably go home from surgery after 1 hour in the recovery room. Most people see better in 1 to 3 days.  You may be able to go back to work or your normal routine in a few days. It could take 3 to 10 weeks for your eye to completely heal. After your eye heals, you may still need to wear glasses, especially for reading. Follow-up care is a key part of your treatment and safety. Be sure to make and go to all appointments, and call your doctor if you are having problems. It's also a good idea to know your test results and keep a list of the medicines you take. What happens before surgery?   Surgery can be stressful. This information will help you understand what you can expect. And it will help you safely prepare for surgery.   Preparing for surgery    · Understand exactly what surgery is planned, along with the risks, benefits, and other options. · Tell your doctors ALL the medicines, vitamins, supplements, and herbal remedies you take. Some of these can increase the risk of bleeding or interact with anesthesia.     · If you take blood thinners, such as warfarin (Coumadin), clopidogrel (Plavix), or aspirin, be sure to talk to your doctor. He or she will tell you if you should stop taking these medicines before your surgery. Make sure that you understand exactly what your doctor wants you to do.     · Your doctor will tell you which medicines to take or stop before your surgery. You may need to stop taking certain medicines a week or more before surgery. So talk to your doctor as soon as you can.     · If you have an advance directive, let your doctor know. It may include a living will and a durable power of  for health care. Bring a copy to the hospital. If you don't have one, you may want to prepare one. It lets your doctor and loved ones know your health care wishes. Doctors advise that everyone prepare these papers before any type of surgery or procedure. What happens on the day of surgery? · Follow the instructions exactly about when to stop eating and drinking. If you don't, your surgery may be canceled.  If your doctor told you to take your medicines on the day of surgery, take them with only a sip of water.     · Take a bath or shower before you come in for your surgery. Do not apply lotions, perfumes, deodorants, or nail polish.     · Take off all jewelry and piercings. And take out contact lenses, if you wear them.    At the hospital or surgery center   · Bring a picture ID.     · The area for surgery is often marked to make sure there are no errors.     · You will be kept comfortable and safe by your anesthesia provider. The anesthesia may make you sleep. Or it may just numb the area being worked on.     · The surgery will take about 20 to 40 minutes. Going home   · Be sure you have someone to drive you home. Anesthesia and pain medicine make it unsafe for you to drive.     · You will be given more specific instructions about recovering from your surgery. They will cover things like diet, wound care, follow-up care, driving, and getting back to your normal routine.     · You may have a bandage or patch over your eye. You may also have a clear shield over your eye. This prevents you from rubbing it. When should you call your doctor? · You have questions or concerns.     · You don't understand how to prepare for your surgery.     · You become ill before the surgery (such as fever, flu, or a cold).     · You need to reschedule or have changed your mind about having the surgery. Where can you learn more? Go to http://bg-gurpreet.info/. Enter K474 in the search box to learn more about \"Cataract Surgery: Before Your Surgery. \"  Current as of: December 3, 2017  Content Version: 11.8  © 0446-3603 [employer]. Care instructions adapted under license by [employer] (which disclaims liability or warranty for this information).  If you have questions about a medical condition or this instruction, always ask your healthcare professional. Camryn Reyes disclaims any warranty or liability for your use of this information.

## 2018-11-12 NOTE — PROGRESS NOTES
Deepak Dougherty  Identified pt with two pt identifiers(name and ). Chief Complaint   Patient presents with   Ambrocio Eduardo Figures, right eye, , left eye     Cold Symptoms     cough, runny nose       Reviewed record In preparation for visit and have obtained necessary documentation. Has info on advanced directive but has not filled them out. 1. Have you been to the ER, urgent care clinic or hospitalized since your last visit? No     2. Have you seen or consulted any other health care providers outside of the [address] [place] [address] since your last visit? Include any pap smears or colon screening. No    Vitals reviewed with provider. Health Maintenance reviewed: There are no preventive care reminders to display for this patient.        Wt Readings from Last 3 Encounters:   18 232 lb 3.2 oz (105.3 kg)   18 228 lb (103.4 kg)   10/30/18 226 lb 12.8 oz (102.9 kg)        Temp Readings from Last 3 Encounters:   18 98.4 °F (36.9 °C) (Oral)   18 98.1 °F (36.7 °C) (Oral)   18 98.1 °F (36.7 °C) (Oral)        BP Readings from Last 3 Encounters:   18 123/66   10/30/18 120/56   18 146/76        Pulse Readings from Last 3 Encounters:   18 (!) 58   10/30/18 66   18 (!) 59        Vitals:    18 0935   Resp: 18   Weight: 232 lb 3.2 oz (105.3 kg)   Height: 5' 9\" (1.753 m)   PainSc:   0 - No pain          Learning Assessment:   :       Learning Assessment 10/5/2017 2014   PRIMARY LEARNER Patient Patient   HIGHEST LEVEL OF EDUCATION - PRIMARY LEARNER  - DID NOT GRADUATE HIGH SCHOOL   BARRIERS PRIMARY LEARNER - NONE   CO-LEARNER CAREGIVER - No   PRIMARY LANGUAGE ENGLISH ENGLISH    NEED - No   LEARNER PREFERENCE PRIMARY LISTENING LISTENING     DEMONSTRATION -     READING -   ANSWERED BY patient Patient   RELATIONSHIP SELF SELF        Depression Screening:   :       PHQ over the last two weeks 10/30/2018   Little interest or pleasure in doing things Not at all   Feeling down, depressed, irritable, or hopeless Not at all   Total Score PHQ 2 0        Fall Risk Assessment:   :       Fall Risk Assessment, last 12 mths 10/30/2018   Able to walk? Yes   Fall in past 12 months? No   Fall with injury? -        Abuse Screening:   :       Abuse Screening Questionnaire 9/4/2018 7/3/2018 10/5/2017   Do you ever feel afraid of your partner? N N N   Are you in a relationship with someone who physically or mentally threatens you? N N N   Is it safe for you to go home?  Y Y Y        ADL Screening:   :       ADL Assessment 7/3/2018   Feeding yourself No Help Needed   Getting from bed to chair No Help Needed   Getting dressed No Help Needed   Bathing or showering No Help Needed   Walk across the room (includes cane/walker) No Help Needed   Using the telphone No Help Needed   Taking your medications No Help Needed   Preparing meals No Help Needed   Managing money (expenses/bills) No Help Needed   Moderately strenuous housework (laundry) No Help Needed   Shopping for personal items (toiletries/medicines) No Help Needed   Shopping for groceries No Help Needed   Driving No Help Needed   Climbing a flight of stairs No Help Needed   Getting to places beyond walking distances No Help Needed

## 2018-11-27 RX ORDER — ATORVASTATIN CALCIUM 40 MG/1
TABLET, FILM COATED ORAL
Qty: 90 TAB | Refills: 1 | Status: SHIPPED | OUTPATIENT
Start: 2018-11-27 | End: 2019-05-23 | Stop reason: SDUPTHER

## 2018-11-28 PROBLEM — H25.811 COMBINED FORMS OF AGE-RELATED CATARACT OF RIGHT EYE: Status: ACTIVE | Noted: 2018-11-28

## 2018-11-28 PROBLEM — T44.6X5A TAMSULOSIN-ASSOCIATED INTRAOPERATIVE FLOPPY IRIS SYNDROME (IFIS): Chronic | Status: ACTIVE | Noted: 2018-11-28

## 2018-11-28 PROBLEM — H21.81 TAMSULOSIN-ASSOCIATED INTRAOPERATIVE FLOPPY IRIS SYNDROME (IFIS): Chronic | Status: ACTIVE | Noted: 2018-11-28

## 2018-11-29 NOTE — PERIOP NOTES
[facility] Ambulatory Surgery Unit Pre-operative Instructions Surgery/Procedure Date  12/5/18            Tentative Arrival Time 7541 1. On the day of your surgery/procedure, please report to the Ambulatory Surgery Unit Registration Desk and sign in at your designated time. The Ambulatory Surgery Unit is located in [facility] on the [facility] side of the [facility] across from the [address] [place] [address]. Please have all of your health insurance cards and a photo ID. 2. You must have someone with you to drive you home, as you should not drive a car for 24 hours following anesthesia. Please make arrangements for a responsible adult friend or family member to stay with you for at least the first 24 hours after your surgery. 3. Do not have anything to eat or drink (including water, gum, mints, coffee, juice) after 11:59 PM  12/4/18, Tuesday. This may not apply to medications prescribed by your physician. (Please note below the special instructions with medications to take the morning of surgery, if applicable.) 4. We recommend you do not drink any alcoholic beverages for 24 hours before and after your surgery. 5. Contact your surgeons office for instructions on the following medications: non-steroidal anti-inflammatory drugs (i.e. Advil, Aleve), vitamins, and supplements. (Some surgeons will want you to stop these medications prior to surgery and others may allow you to take them) **If you are currently taking Plavix, Coumadin, Aspirin and/or other blood-thinning agents, contact your surgeon for instructions. ** Your surgeon will partner with the physician prescribing these medications to determine if it is safe to stop or if you need to continue taking. Please do not stop taking these medications without instructions from your surgeon.  
 
6. In an effort to help prevent surgical site infection, we ask that you shower with an anti-bacterial soap (i.e. Dial/Safeguard, or the soap provided to you at your preadmission testing appointment) for 3 days prior to and on the morning of surgery, using a fresh towel after each shower. (Please begin this process with fresh bed linens.) Do not apply any lotions, powders, or deodorants after the shower on the day of your procedure. If applicable, please do not shave the operative site for 48 hours prior to surgery. 7. Wear comfortable clothes. Wear glasses instead of contacts. Do not bring any jewelry or money (other than copays or fees as instructed). Do not wear make-up, particularly mascara, the morning of your surgery. Do not wear nail polish, particularly if you are having foot /hand surgery. Wear your hair loose or down, no ponytails, buns, david pins or clips. All body piercings must be removed. 8. You should understand that if you do not follow these instructions your surgery may be cancelled. If your physical condition changes (i.e. fever, cold or flu) please contact your surgeon as soon as possible. 9. It is important that you be on time. If a situation occurs where you may be late, or if you have any questions or problems, please call [phone]. 
 
10. Your surgery time may be subject to change. You will receive a phone call the day prior to surgery to confirm your arrival time. 11. Pediatric patients: please bring a change of clothes, diapers, bottle/sippy cup, pacifier, etc. 
 
 
Special Instructions: Take all medications and inhalers, as prescribed, on the morning of surgery with a sip of water EXCEPT: None I understand a pre-operative phone call will be made to verify my surgery time. In the event that I am not available, I give permission for a message to be left on my answering service and/or with another person? Yes Nubia-op instructions given over the phone and the patient verbalized an understanding ___________________      ___________________      ________________ 
(Signature of Patient)          (Witness)                   (Date and Time)

## 2018-11-29 NOTE — PERIOP NOTES
PAT call to patient. Pt notified several times he could not drive for 24 hours post procedure and must be evaluated by Dr. Rogerio Fernandez after surgery about driving. Stressed to patient must remain in waiting room at all times during procedure and to drive home and to remain with patient for 24 hours following anesthesia.

## 2018-11-29 NOTE — PERIOP NOTES
Sheets faxed over 11/12/2018, H&P stated pt to have stress but he was thinking about it. Went over information with Aiden Reyes NP. Stress is a recommendation and low cardiac risk, okay to proceed.

## 2018-12-04 ENCOUNTER — ANESTHESIA EVENT (OUTPATIENT)
Dept: SURGERY | Age: 78
End: 2018-12-04
Payer: MEDICARE

## 2018-12-05 ENCOUNTER — ANESTHESIA (OUTPATIENT)
Dept: SURGERY | Age: 78
End: 2018-12-05
Payer: MEDICARE

## 2018-12-05 ENCOUNTER — HOSPITAL ENCOUNTER (OUTPATIENT)
Age: 78
Setting detail: OUTPATIENT SURGERY
Discharge: HOME | End: 2018-12-05
Attending: OPHTHALMOLOGY | Admitting: OPHTHALMOLOGY
Payer: MEDICARE

## 2018-12-05 VITALS
SYSTOLIC BLOOD PRESSURE: 158 MMHG | HEIGHT: 69 IN | HEART RATE: 61 BPM | DIASTOLIC BLOOD PRESSURE: 88 MMHG | OXYGEN SATURATION: 99 % | TEMPERATURE: 98 F | RESPIRATION RATE: 21 BRPM | WEIGHT: 233 LBS | BODY MASS INDEX: 34.51 KG/M2

## 2018-12-05 PROCEDURE — 74011000250 HC RX REV CODE- 250: Performed by: OPHTHALMOLOGY

## 2018-12-05 PROCEDURE — 76060000061 HC AMB SURG ANES 0.5 TO 1 HR: Performed by: OPHTHALMOLOGY

## 2018-12-05 PROCEDURE — 74011250636 HC RX REV CODE- 250/636

## 2018-12-05 PROCEDURE — 76030000000 HC AMB SURG OR TIME 0.5 TO 1: Performed by: OPHTHALMOLOGY

## 2018-12-05 PROCEDURE — 74011250637 HC RX REV CODE- 250/637: Performed by: OPHTHALMOLOGY

## 2018-12-05 PROCEDURE — 77030018846 HC SOL IRR STRL H20 ICUM -A: Performed by: OPHTHALMOLOGY

## 2018-12-05 PROCEDURE — V2632 POST CHMBR INTRAOCULAR LENS: HCPCS | Performed by: OPHTHALMOLOGY

## 2018-12-05 PROCEDURE — 74011000250 HC RX REV CODE- 250

## 2018-12-05 PROCEDURE — 76210000046 HC AMBSU PH II REC FIRST 0.5 HR: Performed by: OPHTHALMOLOGY

## 2018-12-05 PROCEDURE — 77030021352 HC CBL LD SYS DISP COVD -B: Performed by: OPHTHALMOLOGY

## 2018-12-05 PROCEDURE — 74011250636 HC RX REV CODE- 250/636: Performed by: OPHTHALMOLOGY

## 2018-12-05 DEVICE — LENS IOL POST 1-PC 6X13 22.0 -- ACRYSOF: Type: IMPLANTABLE DEVICE | Site: EYE | Status: FUNCTIONAL

## 2018-12-05 RX ORDER — CYCLOPENTOLATE HYDROCHLORIDE 20 MG/ML
1 SOLUTION/ DROPS OPHTHALMIC
Status: COMPLETED | OUTPATIENT
Start: 2018-12-05 | End: 2018-12-05

## 2018-12-05 RX ORDER — SODIUM CHLORIDE 0.9 % (FLUSH) 0.9 %
5-10 SYRINGE (ML) INJECTION AS NEEDED
Status: DISCONTINUED | OUTPATIENT
Start: 2018-12-05 | End: 2018-12-05 | Stop reason: HOSPADM

## 2018-12-05 RX ORDER — ONDANSETRON 2 MG/ML
4 INJECTION INTRAMUSCULAR; INTRAVENOUS AS NEEDED
Status: DISCONTINUED | OUTPATIENT
Start: 2018-12-05 | End: 2018-12-05 | Stop reason: HOSPADM

## 2018-12-05 RX ORDER — LIDOCAINE HYDROCHLORIDE 10 MG/ML
3 INJECTION, SOLUTION EPIDURAL; INFILTRATION; INTRACAUDAL; PERINEURAL ONCE
Status: COMPLETED | OUTPATIENT
Start: 2018-12-05 | End: 2018-12-05

## 2018-12-05 RX ORDER — ERYTHROMYCIN 5 MG/G
OINTMENT OPHTHALMIC
Status: COMPLETED | OUTPATIENT
Start: 2018-12-05 | End: 2018-12-05

## 2018-12-05 RX ORDER — DICLOFENAC SODIUM 1 MG/ML
1 SOLUTION/ DROPS OPHTHALMIC
Status: COMPLETED | OUTPATIENT
Start: 2018-12-05 | End: 2018-12-05

## 2018-12-05 RX ORDER — SODIUM CHLORIDE, SODIUM LACTATE, POTASSIUM CHLORIDE, CALCIUM CHLORIDE 600; 310; 30; 20 MG/100ML; MG/100ML; MG/100ML; MG/100ML
25 INJECTION, SOLUTION INTRAVENOUS CONTINUOUS
Status: DISCONTINUED | OUTPATIENT
Start: 2018-12-05 | End: 2018-12-05 | Stop reason: HOSPADM

## 2018-12-05 RX ORDER — LIDOCAINE HYDROCHLORIDE 10 MG/ML
0.1 INJECTION, SOLUTION EPIDURAL; INFILTRATION; INTRACAUDAL; PERINEURAL AS NEEDED
Status: DISCONTINUED | OUTPATIENT
Start: 2018-12-05 | End: 2018-12-05 | Stop reason: HOSPADM

## 2018-12-05 RX ORDER — LIDOCAINE HYDROCHLORIDE 20 MG/ML
5 INJECTION, SOLUTION EPIDURAL; INFILTRATION; INTRACAUDAL; PERINEURAL ONCE
Status: COMPLETED | OUTPATIENT
Start: 2018-12-05 | End: 2018-12-05

## 2018-12-05 RX ORDER — DICLOFENAC SODIUM 1 MG/ML
SOLUTION/ DROPS OPHTHALMIC
Status: COMPLETED
Start: 2018-12-05 | End: 2018-12-05

## 2018-12-05 RX ORDER — PHENYLEPHRINE HYDROCHLORIDE 25 MG/ML
1 SOLUTION/ DROPS OPHTHALMIC
Status: COMPLETED | OUTPATIENT
Start: 2018-12-05 | End: 2018-12-05

## 2018-12-05 RX ORDER — MIDAZOLAM HYDROCHLORIDE 1 MG/ML
INJECTION, SOLUTION INTRAMUSCULAR; INTRAVENOUS AS NEEDED
Status: DISCONTINUED | OUTPATIENT
Start: 2018-12-05 | End: 2018-12-05 | Stop reason: HOSPADM

## 2018-12-05 RX ORDER — FENTANYL CITRATE 50 UG/ML
25 INJECTION, SOLUTION INTRAMUSCULAR; INTRAVENOUS
Status: DISCONTINUED | OUTPATIENT
Start: 2018-12-05 | End: 2018-12-05 | Stop reason: HOSPADM

## 2018-12-05 RX ORDER — DIPHENHYDRAMINE HYDROCHLORIDE 50 MG/ML
12.5 INJECTION, SOLUTION INTRAMUSCULAR; INTRAVENOUS AS NEEDED
Status: DISCONTINUED | OUTPATIENT
Start: 2018-12-05 | End: 2018-12-05 | Stop reason: HOSPADM

## 2018-12-05 RX ORDER — SODIUM CHLORIDE 0.9 % (FLUSH) 0.9 %
5-10 SYRINGE (ML) INJECTION EVERY 8 HOURS
Status: DISCONTINUED | OUTPATIENT
Start: 2018-12-05 | End: 2018-12-05 | Stop reason: HOSPADM

## 2018-12-05 RX ORDER — SODIUM CHLORIDE 9 MG/ML
25 INJECTION, SOLUTION INTRAVENOUS CONTINUOUS
Status: DISCONTINUED | OUTPATIENT
Start: 2018-12-05 | End: 2018-12-05 | Stop reason: HOSPADM

## 2018-12-05 RX ORDER — PROPARACAINE HYDROCHLORIDE 5 MG/ML
1 SOLUTION/ DROPS OPHTHALMIC
Status: DISCONTINUED | OUTPATIENT
Start: 2018-12-05 | End: 2018-12-05 | Stop reason: HOSPADM

## 2018-12-05 RX ORDER — PREDNISOLONE ACETATE 10 MG/ML
1 SUSPENSION/ DROPS OPHTHALMIC 2 TIMES DAILY
Status: DISCONTINUED | OUTPATIENT
Start: 2018-12-05 | End: 2018-12-05 | Stop reason: HOSPADM

## 2018-12-05 RX ADMIN — PROPARACAINE HYDROCHLORIDE 1 DROP: 5 SOLUTION/ DROPS OPHTHALMIC at 07:57

## 2018-12-05 RX ADMIN — PROPARACAINE HYDROCHLORIDE 1 DROP: 5 SOLUTION/ DROPS OPHTHALMIC at 07:37

## 2018-12-05 RX ADMIN — PHENYLEPHRINE HYDROCHLORIDE 1 DROP: 25 SOLUTION/ DROPS OPHTHALMIC at 07:43

## 2018-12-05 RX ADMIN — CYCLOPENTOLATE HYDROCHLORIDE 1 DROP: 20 SOLUTION/ DROPS OPHTHALMIC at 07:43

## 2018-12-05 RX ADMIN — CYCLOPENTOLATE HYDROCHLORIDE 1 DROP: 20 SOLUTION/ DROPS OPHTHALMIC at 07:58

## 2018-12-05 RX ADMIN — PROPARACAINE HYDROCHLORIDE 1 DROP: 5 SOLUTION/ DROPS OPHTHALMIC at 07:49

## 2018-12-05 RX ADMIN — PHENYLEPHRINE HYDROCHLORIDE 1 DROP: 25 SOLUTION/ DROPS OPHTHALMIC at 07:58

## 2018-12-05 RX ADMIN — PROPARACAINE HYDROCHLORIDE 1 DROP: 5 SOLUTION/ DROPS OPHTHALMIC at 07:43

## 2018-12-05 RX ADMIN — CYCLOPENTOLATE HYDROCHLORIDE 1 DROP: 20 SOLUTION/ DROPS OPHTHALMIC at 07:49

## 2018-12-05 RX ADMIN — CYCLOPENTOLATE HYDROCHLORIDE 1 DROP: 20 SOLUTION/ DROPS OPHTHALMIC at 07:37

## 2018-12-05 RX ADMIN — DICLOFENAC SODIUM 1 DROP: 1 SOLUTION/ DROPS OPHTHALMIC at 07:49

## 2018-12-05 RX ADMIN — MIDAZOLAM HYDROCHLORIDE 1 MG: 1 INJECTION, SOLUTION INTRAMUSCULAR; INTRAVENOUS at 08:42

## 2018-12-05 RX ADMIN — SODIUM CHLORIDE 25 ML/HR: 900 INJECTION, SOLUTION INTRAVENOUS at 07:27

## 2018-12-05 RX ADMIN — PHENYLEPHRINE HYDROCHLORIDE 1 DROP: 25 SOLUTION/ DROPS OPHTHALMIC at 07:49

## 2018-12-05 RX ADMIN — MIDAZOLAM HYDROCHLORIDE 1 MG: 1 INJECTION, SOLUTION INTRAMUSCULAR; INTRAVENOUS at 08:51

## 2018-12-05 RX ADMIN — DICLOFENAC SODIUM 1 DROP: 1 SOLUTION/ DROPS OPHTHALMIC at 07:57

## 2018-12-05 RX ADMIN — DICLOFENAC SODIUM 1 DROP: 1 SOLUTION/ DROPS OPHTHALMIC at 07:43

## 2018-12-05 RX ADMIN — DICLOFENAC SODIUM 1 DROP: 1 SOLUTION OPHTHALMIC at 07:37

## 2018-12-05 RX ADMIN — DICLOFENAC SODIUM 1 DROP: 1 SOLUTION/ DROPS OPHTHALMIC at 07:37

## 2018-12-05 RX ADMIN — PHENYLEPHRINE HYDROCHLORIDE 1 DROP: 25 SOLUTION/ DROPS OPHTHALMIC at 07:38

## 2018-12-05 NOTE — DISCHARGE INSTRUCTIONS
Andres Crain D.O., JAR[facility]  [facility].  [phone]    Post-Operative Instructions for Cataract Surgery     Remove your eye shield and bandage at 12 noon the same day as surgery and start your eye drops. THROW AWAY THE GAUZE UNDER THE SHIELD.  Place the blue eye shield back on for one week while asleep, even if napping in the recliner. Use the tape included in your bag.  DO NOT RUB YOUR EYE EVER! DO NOT WIPE YOUR EYE! ONLY WIPE ON YOUR CHEEK!                DO NOT WEAR EYE MAKEUP FOR 1 WEEK!  You can shower starting tomorrow.  You may resume your previous diet.  If you use glaucoma drops in the operative eye, continue them as directed.  Common symptoms after surgery include a scratchy feeling, slight headache, red eye, and/or blurred vision. You may use Advil or Tylenol for any discomfort.  Avoid strenuous activities and driving until you see Dr. Milka Vasquez tomorrow. Please use care when walking, your depth perception may be altered.  : am   Bring your bag with your drops to your follow up appointment. Below are Instructions On How To Use Your Eye Drops. ON THE DAY OF SURGERY:     Use all three eye drops at 12 noon, 4pm, and 8pm.  Wait 10 minutes between drops. THE DAY AFTER SURGERY:     You will use the drops 4 times a day at 8am, 12 noon, 4pm, and 8pm.   Use the drops every day until bottles are empty. Vigamox (tan top) Put 1 drop in at 8am, 12 noon, 4pm, and 8pm.    Pred Acetate (pink top) Put 1 drop in at 8:10am, 12:10pm, 4:10pm, and 8:10pm. Shake before using. Ketorolac/Diclofenac Put 1 drop in at 8:20am, 12:20pm, 4:20pm, 8:20pm.    CONTINUE DROPS UNTIL ALL BOTTLES ARE EMPTY! Follow-up appointment tomorrow at Dr. Arthur George office:_______9:15 am_____________    Please call the office at [phone] if you experience severe pain or nausea.      The following personal items collected during your admission are returned to you: Dental Appliance: Dental Appliances: Uppers, With patient  Vision: Visual Aid: None  Hearing Aid: @FLOW  DISCHARGE SUMMARY from Nurse  (1341:LAST)@  Jewelry: Jewelry: Watch  Clothing: Clothing: With patient  Other Valuables: Other Valuables: Wallet, Keys(spouse)  Valuables sent to safe:        PATIENT INSTRUCTIONS:    After General Anesthesia or Intravenous Sedation, for 24 hours or while taking prescription Narcotics:        Someone should be with you for the next 24 hours. For your own safety, a responsible adult must drive you home. · Limit your activities  · Recommended activity: Rest today, up with assistance today. Do not climb stairs or shower unattended for the next 24 hours. · Please start with a soft bland diet and advance as tolerated (no nausea) to regular diet. · If you have a sore throat you should try the following: fluids, warm salt water gargles, or throat lozenges. If it does not improve after several days please follow up with your primary physician. · Do not drive and operate hazardous machinery  · Do not make important personal or business decisions  · Do  not drink alcoholic beverages  · If you have not urinated within 8 hours after discharge, please contact your surgeon on call. Report the following to your surgeon:  · Excessive pain, swelling, redness or odor of or around the surgical area  · Temperature over 100.5  · Any nausea or vomiting. · You will receive a Post Operative Call from one of the Recovery Room Nurses on the day after your surgery to check on you. It is very important for us to know how you are recovering after your surgery. If you have an issue or need to speak with someone, please call your surgeon, do not wait for the post operative call. · You may receive an e-mail or letter in the mail from [place] regarding your experience with us in the Ambulatory Surgery Unit.  Your feedback is valuable to us and we appreciate your participation in the survey. · If the above instructions are not adequate or you are having problems after your surgery, call the physician at their office number. · We wish you a speedy recovery ? What to do at Home:      *  Please give a list of your current medications to your Primary Care Provider. *  Please update this list whenever your medications are discontinued, doses are      changed, or new medications (including over-the-counter products) are added. *  Please carry medication information at all times in case of emergency situations. These are general instructions for a healthy lifestyle:    No smoking/ No tobacco products/ Avoid exposure to second hand smoke    Surgeon General's Warning:  Quitting smoking now greatly reduces serious risk to your health. Obesity, smoking, and sedentary lifestyle greatly increases your risk for illness    A healthy diet, regular physical exercise & weight monitoring are important for maintaining a healthy lifestyle    You may be retaining fluid if you have a history of heart failure or if you experience any of the following symptoms:  Weight gain of 3 pounds or more overnight or 5 pounds in a week, increased swelling in our hands or feet or shortness of breath while lying flat in bed. Please call your doctor as soon as you notice any of these symptoms; do not wait until your next office visit. Recognize signs and symptoms of STROKE:    B - Balance  E - Eyes    F-  Face looks uneven    A-  Arms unable to move or move even    S-  Speech slurred or non-existent    T-  Time-call 911 as soon as signs and symptoms begin-DO NOT go       Back to bed or wait to see if you get better-TIME IS BRAIN. If you have not received your influenza and/or pneumococcal vaccine, please follow up with your primary care physician. The discharge information has been reviewed with the patient and family. The patient and family verbalized understanding.     TO PREVENT AN INFECTION      1. [address] YOUR HANDS     To prevent infection, good handwashing is the most important thing you or your caregiver can do.  Wash your hands with soap and water or use the hand  we gave you before you touch any wounds. 2.  USE CLEAN SHEETS     Use freshly cleaned sheets on your bed after surgery.  To keep the surgery site clean, do not allow pets to sleep with you while your wound is still healing. 3. STOP SMOKING    Stop smoking, or at least cut back on smoking     Smoking slows your healing. 4.  CONTROL YOUR BLOOD SUGAR     High blood sugars slow wound healing.  If you are diabetic, control your blood sugar levels before and after your surgery.

## 2018-12-05 NOTE — PERIOP NOTES
0915: Patient received to PACU. VSS. Eye shield to right eye.  
 
0920: Patient tolerating liquids without issue. 4089: Patient's wife at bedside. Discharge instructions given. Patient and wife verbalize understanding of instructions and follow up appointment. 0940: Patient and wife state ready for discharge. Patient discharged at this time with belongings. Wife to provide transportation home.

## 2018-12-05 NOTE — OP NOTES
Name: [facility]        updated 3/1/2013  Description:  Machelle Perrin with intraocular lens implant    PREOPERATIVE DIAGNOSIS: Visually impairing combined cataract, Right eye. POSTOPERATIVE DIAGNOSIS: Visually impairing combined   cataract,Right eye. OPERATION: Procedure(s):  RIGHT EYE FIRST REFRATIVE CATARACT REMOVAL WITH LENS IMPLANTATION    ANESTHESIA: Topical with intravenous sedation    TYPE OF LENS IMPLANT USED:   Implant Name Type Inv. Item Serial No.  Lot No. LRB No. Used Action   LENS IOL POST 1-PC 6X13 22.0 -- [employer] - [id number]  LENS IOL POST 1-PC 6X13 22.0 -- [employer] [id number] [employer] N/A Right 1 Implanted       PHACO TIME:   66 seconds at an average power of 15%. Estimated blood loss: None    Complications:None    Specimen removed: None    DESCRIPTION OF PROCEDURE:  The patient was brought to the holding area, where an IV was begun at the keep open rate. The patient received several instillations of Clark-Synephrine 2.5%, Cyclogyl 2%, and tetracaine 0.5% until adequate pupillary dilatation was achieved. The patient was connected to cardiovascular monitoring. Prior to being brought back to the operating suite, the patient received 2 drops of Betadine 5% solution into the inferior cul-de-sac of the operated eye. The patient was then brought back to the operating suite, and prepped and draped in the usual sterile manner after being re-connnected to cardiovascular monitoring. One drop of 4% Xylocaine was then instilled onto the eye. The lid speculum was set into position and the operating microscope was focused on the patient. Two drops of 4% Xylocaine were again instilled onto the eye. Using the fixation ring, the sharp 15-degree blade was used to create a paracentesis site and 1% MPF Xylocaine was instilled into the anterior chamber for anesthesia. Viscoelastic was then instilled into the anterior chamber.  The 2.4 mm keratome was then utilized to create a clear corneal incision, and a circular capsulorrhexis was performed. BSS was utilized to perform careful hydrodissection of the lens. Viscoelastic was then instilled into the anterior chamber to protect the corneal endothelium. Phacoemulsification was then carried out. Any remaining cortical material was removed in irrigation/aspiration mode. Viscoelastic was then instilled into the capsular bag. The lens implant was inspected for any defects. After finding none, the lens was placed in its injector and inserted into the capsular bag. The lens implant was centered on the patient's visual/astigmatic axis. The viscoelastic was then removed from the eye. Miochol was instilled posterior to the iris plane to facilitate pupillary miosis. The wound was checked for any leaks, after finding none, Iopidine and Pred Forte drops were instilled into the inferior cul-de-sac, and gentamicin ointment was placed over the cornea. A semi-pressure dressing and shield were then placed for the patient. The patient tolerated the procedure very well, and was brought to the recovery room in an alert and stable and satisfactory postoperative condition. Instructions were given to the patient and their family for their immediate postoperative care.   [address] [place] [address],   12/5/2018

## 2018-12-05 NOTE — ANESTHESIA PREPROCEDURE EVALUATION
Anesthetic History No history of anesthetic complications Review of Systems / Medical History Patient summary reviewed, nursing notes reviewed and pertinent labs reviewed Pulmonary COPD Comments: Current Every Day Smoker Hx Acute respiratory failure with hypoxia Neuro/Psych Within defined limits Cardiovascular Hypertension CHF Dysrhythmias Exercise tolerance: >4 METS Comments: S/P cardiac cath 2/12/14 no significant coronary disease, severe LV dysfunction NICM (nonischemic cardiomyopathy) EF 55-60% Hx Pulmonary edema GI/Hepatic/Renal 
  
 
 
 
 
 
Comments: Diarrhea Endo/Other Obesity and arthritis Other Findings Comments: Cataract Hx prostate ca Physical Exam 
 
Airway Mallampati: III Neck ROM: normal range of motion Mouth opening: Normal 
 
 Cardiovascular Regular rate and rhythm,  S1 and S2 normal,  no murmur, click, rub, or gallop Dental 
 
Dentition: Edentulous and Full upper dentures Pulmonary Breath sounds clear to auscultation Abdominal 
GI exam deferred Other Findings Anesthetic Plan ASA: 3 Anesthesia type: MAC Induction: Intravenous Anesthetic plan and risks discussed with: Patient

## 2018-12-05 NOTE — ANESTHESIA POSTPROCEDURE EVALUATION
Procedure(s): RIGHT EYE FIRST REFRATIVE CATARACT REMOVAL WITH LENS IMPLANTATION. Anesthesia Post Evaluation Patient location during evaluation: PACU Note status: Adequate. Level of consciousness: responsive to verbal stimuli and sleepy but conscious Pain management: satisfactory to patient Airway patency: patent Anesthetic complications: no 
Cardiovascular status: acceptable Respiratory status: acceptable Hydration status: acceptable Comments: +Post-Anesthesia Evaluation and Assessment Patient: Jhon Burden MRN: [id number]  SSN: [id number] YOB: 1940  Age: 70 y.o. Sex: male Cardiovascular Function/Vital Signs /88 (BP 1 Location: Left arm, BP Patient Position: At rest)   Pulse 61   Temp 36.7 °C (98 °F)   Resp 21   Ht 5' 9\" (1.753 m)   Wt 105.7 kg (233 lb)   SpO2 99%   BMI 34.41 kg/m² Patient is status post Procedure(s): RIGHT EYE FIRST REFRATIVE CATARACT REMOVAL WITH LENS IMPLANTATION. Nausea/Vomiting: Controlled. Postoperative hydration reviewed and adequate. Pain: 
Pain Scale 1: Numeric (0 - 10) (12/05/18 0925) Pain Intensity 1: 0 (12/05/18 0925) Managed. Neurological Status:  
Neuro (WDL): Within Defined Limits (12/05/18 0925) At baseline. Mental Status and Level of Consciousness: Arousable. Pulmonary Status:  
O2 Device: Room air (12/05/18 0925) Adequate oxygenation and airway patent. Complications related to anesthesia: None Post-anesthesia assessment completed. No concerns. Signed By: Marlys Chowdhury MD  
 12/5/2018 Post anesthesia nausea and vomiting:  controlled Visit Vitals /88 (BP 1 Location: Left arm, BP Patient Position: At rest) Pulse 61 Temp 36.7 °C (98 °F) Resp 21 Ht 5' 9\" (1.753 m) Wt 105.7 kg (233 lb) SpO2 99% BMI 34.41 kg/m²

## 2018-12-05 NOTE — PERIOP NOTES
Permission received to review discharge instructions and discuss private health information with Amanda Carroll, wife.

## 2018-12-05 NOTE — PERIOP NOTES
[facility] 1940 
[id number] Situation: 
Verbal report given from: VERITO Lagos and Edwin Palomino CRNA Procedure: Procedure(s): RIGHT EYE FIRST REFRATIVE CATARACT REMOVAL WITH LENS IMPLANTATION Background: 
 
Preoperative diagnosis: Combined forms of age-related cataract of right eye [H25.811] Postoperative diagnosis: Combined forms of age-related cataract of right eye [H25.811] :  Dr. Tami Velasco Assistant(s): Circ-1: Houston Valadez RN Scrub Tech-1: Rober Osborn RT Specimens: * No specimens in log * Assessment: 
Intra-procedure medications Anesthesia gave intra-procedure sedation and medications, see anesthesia flow sheet Intravenous fluids: Jose Ramon Soria Vital signs stable Recommendation: 
 
Permission to share finding with wife Darya Martinez.

## 2018-12-07 RX ORDER — AMLODIPINE BESYLATE 2.5 MG/1
TABLET ORAL
Qty: 30 TAB | Refills: 0 | Status: SHIPPED | OUTPATIENT
Start: 2018-12-07 | End: 2019-01-07 | Stop reason: SDUPTHER

## 2018-12-10 RX ORDER — PREDNISOLONE ACETATE 10 MG/ML
1 SUSPENSION/ DROPS OPHTHALMIC 4 TIMES DAILY
COMMUNITY
End: 2019-02-11

## 2018-12-10 RX ORDER — MOXIFLOXACIN 5 MG/ML
1 SOLUTION/ DROPS OPHTHALMIC 3 TIMES DAILY
COMMUNITY
End: 2019-02-11

## 2018-12-10 RX ORDER — DICLOFENAC SODIUM 1 MG/ML
1 SOLUTION/ DROPS OPHTHALMIC 4 TIMES DAILY
COMMUNITY
End: 2019-02-11

## 2018-12-10 NOTE — PERIOP NOTES
[facility] Ambulatory Surgery Unit Pre-operative Instructions Surgery/Procedure Date  Wednesday, December 12, 2018            Tentative Arrival Time unable to give 1. On the day of your surgery/procedure, please report to the Ambulatory Surgery Unit Registration Desk and sign in at your designated time. The Ambulatory Surgery Unit is located in [facility] on the [facility] side of the [facility] across from the [address] [place] [address]. Please have all of your health insurance cards and a photo ID. 2. You must have someone with you to drive you home, as you should not drive a car for 24 hours following anesthesia. Please make arrangements for a responsible adult friend or family member to stay with you for at least the first 24 hours after your surgery. 3. Do not have anything to eat or drink (including water, gum, mints, coffee, juice) after 11:59 PM, Tuesday. This may not apply to medications prescribed by your physician. (Please note below the special instructions with medications to take the morning of surgery, if applicable.) 4. We recommend you do not drink any alcoholic beverages for 24 hours before and after your surgery. 5. Contact your surgeons office for instructions on the following medications: non-steroidal anti-inflammatory drugs (i.e. Advil, Aleve), vitamins, and supplements. (Some surgeons will want you to stop these medications prior to surgery and others may allow you to take them) **If you are currently taking Plavix, Coumadin, Aspirin and/or other blood-thinning agents, contact your surgeon for instructions. ** Your surgeon will partner with the physician prescribing these medications to determine if it is safe to stop or if you need to continue taking. Please do not stop taking these medications without instructions from your surgeon.  
 
6. In an effort to help prevent surgical site infection, we ask that you shower with an anti-bacterial soap (i.e. Dial/Safeguard, or the soap provided to you at your preadmission testing appointment) for 3 days prior to and on the morning of surgery, using a fresh towel after each shower. (Please begin this process with fresh bed linens.) Do not apply any lotions, powders, or deodorants after the shower on the day of your procedure. If applicable, please do not shave the operative site for 48 hours prior to surgery. 7. Wear comfortable clothes. Wear glasses instead of contacts. Do not bring any jewelry or money (other than copays or fees as instructed). Do not wear make-up, particularly mascara, the morning of your surgery. Do not wear nail polish, particularly if you are having foot /hand surgery. Wear your hair loose or down, no ponytails, buns, david pins or clips. All body piercings must be removed. 8. You should understand that if you do not follow these instructions your surgery may be cancelled. If your physical condition changes (i.e. fever, cold or flu) please contact your surgeon as soon as possible. 9. It is important that you be on time. If a situation occurs where you may be late, or if you have any questions or problems, please call [phone]. 
 
10. Your surgery time may be subject to change. You will receive a phone call the day prior to surgery to confirm your arrival time. 11. Pediatric patients: please bring a change of clothes, diapers, bottle/sippy cup, pacifier, etc. 
 
 
Special Instructions: Take all medications and inhalers, as prescribed, on the morning of surgery with a sip of water. I understand a pre-operative phone call will be made to verify my surgery time. In the event that I am not available, I give permission for a message to be left on my answering service and/or with another person? yes Preop instructions reviewed  Pt verbalized understanding.  
 
 ___________________      ___________________      ________________ (Signature of Patient)          (Witness)                   (Date and Time)

## 2018-12-11 ENCOUNTER — ANESTHESIA EVENT (OUTPATIENT)
Dept: SURGERY | Age: 78
End: 2018-12-11
Payer: MEDICARE

## 2018-12-12 ENCOUNTER — ANESTHESIA (OUTPATIENT)
Dept: SURGERY | Age: 78
End: 2018-12-12
Payer: MEDICARE

## 2018-12-12 ENCOUNTER — HOSPITAL ENCOUNTER (OUTPATIENT)
Age: 78
Setting detail: OUTPATIENT SURGERY
Discharge: HOME | End: 2018-12-12
Attending: OPHTHALMOLOGY | Admitting: OPHTHALMOLOGY
Payer: MEDICARE

## 2018-12-12 VITALS
DIASTOLIC BLOOD PRESSURE: 63 MMHG | SYSTOLIC BLOOD PRESSURE: 126 MMHG | BODY MASS INDEX: 34.36 KG/M2 | HEART RATE: 55 BPM | TEMPERATURE: 97.7 F | HEIGHT: 69 IN | OXYGEN SATURATION: 97 % | RESPIRATION RATE: 13 BRPM | WEIGHT: 232 LBS

## 2018-12-12 PROBLEM — H25.812 COMBINED FORMS OF AGE-RELATED CATARACT OF LEFT EYE: Status: ACTIVE | Noted: 2018-12-12

## 2018-12-12 PROCEDURE — 77030018846 HC SOL IRR STRL H20 ICUM -A: Performed by: OPHTHALMOLOGY

## 2018-12-12 PROCEDURE — 74011250636 HC RX REV CODE- 250/636: Performed by: OPHTHALMOLOGY

## 2018-12-12 PROCEDURE — 77030021352 HC CBL LD SYS DISP COVD -B: Performed by: OPHTHALMOLOGY

## 2018-12-12 PROCEDURE — 74011000250 HC RX REV CODE- 250

## 2018-12-12 PROCEDURE — 74011250636 HC RX REV CODE- 250/636

## 2018-12-12 PROCEDURE — 76060000073 HC AMB SURG ANES FIRST 0.5 HR: Performed by: OPHTHALMOLOGY

## 2018-12-12 PROCEDURE — 76210000050 HC AMBSU PH II REC 0.5 TO 1 HR: Performed by: OPHTHALMOLOGY

## 2018-12-12 PROCEDURE — 74011250637 HC RX REV CODE- 250/637: Performed by: OPHTHALMOLOGY

## 2018-12-12 PROCEDURE — V2632 POST CHMBR INTRAOCULAR LENS: HCPCS | Performed by: OPHTHALMOLOGY

## 2018-12-12 PROCEDURE — 74011000250 HC RX REV CODE- 250: Performed by: OPHTHALMOLOGY

## 2018-12-12 PROCEDURE — 76030000002 HC AMB SURG OR TIME FIRST 0.: Performed by: OPHTHALMOLOGY

## 2018-12-12 DEVICE — LENS IOL POST 1-PC 6X13 22.5 -- ACRYSOF: Type: IMPLANTABLE DEVICE | Site: EYE | Status: FUNCTIONAL

## 2018-12-12 RX ORDER — ONDANSETRON 2 MG/ML
INJECTION INTRAMUSCULAR; INTRAVENOUS AS NEEDED
Status: DISCONTINUED | OUTPATIENT
Start: 2018-12-12 | End: 2018-12-12 | Stop reason: HOSPADM

## 2018-12-12 RX ORDER — SODIUM CHLORIDE, SODIUM LACTATE, POTASSIUM CHLORIDE, CALCIUM CHLORIDE 600; 310; 30; 20 MG/100ML; MG/100ML; MG/100ML; MG/100ML
25 INJECTION, SOLUTION INTRAVENOUS CONTINUOUS
Status: DISCONTINUED | OUTPATIENT
Start: 2018-12-12 | End: 2018-12-12 | Stop reason: HOSPADM

## 2018-12-12 RX ORDER — SODIUM CHLORIDE 0.9 % (FLUSH) 0.9 %
5-10 SYRINGE (ML) INJECTION EVERY 8 HOURS
Status: DISCONTINUED | OUTPATIENT
Start: 2018-12-12 | End: 2018-12-12 | Stop reason: HOSPADM

## 2018-12-12 RX ORDER — DICLOFENAC SODIUM 1 MG/ML
SOLUTION/ DROPS OPHTHALMIC
Status: COMPLETED
Start: 2018-12-12 | End: 2018-12-12

## 2018-12-12 RX ORDER — PREDNISOLONE ACETATE 10 MG/ML
1 SUSPENSION/ DROPS OPHTHALMIC 2 TIMES DAILY
Status: DISCONTINUED | OUTPATIENT
Start: 2018-12-12 | End: 2018-12-12 | Stop reason: HOSPADM

## 2018-12-12 RX ORDER — SODIUM CHLORIDE 9 MG/ML
25 INJECTION, SOLUTION INTRAVENOUS CONTINUOUS
Status: DISCONTINUED | OUTPATIENT
Start: 2018-12-12 | End: 2018-12-12 | Stop reason: HOSPADM

## 2018-12-12 RX ORDER — SODIUM CHLORIDE 0.9 % (FLUSH) 0.9 %
5-10 SYRINGE (ML) INJECTION AS NEEDED
Status: DISCONTINUED | OUTPATIENT
Start: 2018-12-12 | End: 2018-12-12 | Stop reason: HOSPADM

## 2018-12-12 RX ORDER — ONDANSETRON 2 MG/ML
4 INJECTION INTRAMUSCULAR; INTRAVENOUS AS NEEDED
Status: DISCONTINUED | OUTPATIENT
Start: 2018-12-12 | End: 2018-12-12 | Stop reason: HOSPADM

## 2018-12-12 RX ORDER — DIPHENHYDRAMINE HYDROCHLORIDE 50 MG/ML
12.5 INJECTION, SOLUTION INTRAMUSCULAR; INTRAVENOUS AS NEEDED
Status: DISCONTINUED | OUTPATIENT
Start: 2018-12-12 | End: 2018-12-12 | Stop reason: HOSPADM

## 2018-12-12 RX ORDER — PROPARACAINE HYDROCHLORIDE 5 MG/ML
1 SOLUTION/ DROPS OPHTHALMIC
Status: DISCONTINUED | OUTPATIENT
Start: 2018-12-12 | End: 2018-12-12 | Stop reason: HOSPADM

## 2018-12-12 RX ORDER — CYCLOPENTOLATE HYDROCHLORIDE 20 MG/ML
1 SOLUTION/ DROPS OPHTHALMIC
Status: COMPLETED | OUTPATIENT
Start: 2018-12-12 | End: 2018-12-12

## 2018-12-12 RX ORDER — ERYTHROMYCIN 5 MG/G
OINTMENT OPHTHALMIC
Status: COMPLETED | OUTPATIENT
Start: 2018-12-12 | End: 2018-12-12

## 2018-12-12 RX ORDER — FENTANYL CITRATE 50 UG/ML
INJECTION, SOLUTION INTRAMUSCULAR; INTRAVENOUS AS NEEDED
Status: DISCONTINUED | OUTPATIENT
Start: 2018-12-12 | End: 2018-12-12 | Stop reason: HOSPADM

## 2018-12-12 RX ORDER — MIDAZOLAM HYDROCHLORIDE 1 MG/ML
INJECTION, SOLUTION INTRAMUSCULAR; INTRAVENOUS AS NEEDED
Status: DISCONTINUED | OUTPATIENT
Start: 2018-12-12 | End: 2018-12-12 | Stop reason: HOSPADM

## 2018-12-12 RX ORDER — FENTANYL CITRATE 50 UG/ML
25 INJECTION, SOLUTION INTRAMUSCULAR; INTRAVENOUS
Status: DISCONTINUED | OUTPATIENT
Start: 2018-12-12 | End: 2018-12-12 | Stop reason: HOSPADM

## 2018-12-12 RX ORDER — PHENYLEPHRINE HYDROCHLORIDE 25 MG/ML
1 SOLUTION/ DROPS OPHTHALMIC
Status: COMPLETED | OUTPATIENT
Start: 2018-12-12 | End: 2018-12-12

## 2018-12-12 RX ORDER — LIDOCAINE HYDROCHLORIDE 10 MG/ML
1 INJECTION, SOLUTION EPIDURAL; INFILTRATION; INTRACAUDAL; PERINEURAL ONCE
Status: COMPLETED | OUTPATIENT
Start: 2018-12-12 | End: 2018-12-12

## 2018-12-12 RX ORDER — DICLOFENAC SODIUM 1 MG/ML
1 SOLUTION/ DROPS OPHTHALMIC
Status: COMPLETED | OUTPATIENT
Start: 2018-12-12 | End: 2018-12-12

## 2018-12-12 RX ORDER — LIDOCAINE HYDROCHLORIDE 10 MG/ML
0.1 INJECTION, SOLUTION EPIDURAL; INFILTRATION; INTRACAUDAL; PERINEURAL AS NEEDED
Status: DISCONTINUED | OUTPATIENT
Start: 2018-12-12 | End: 2018-12-12 | Stop reason: HOSPADM

## 2018-12-12 RX ORDER — LIDOCAINE HYDROCHLORIDE 20 MG/ML
3 INJECTION, SOLUTION INFILTRATION; PERINEURAL ONCE
Status: COMPLETED | OUTPATIENT
Start: 2018-12-12 | End: 2018-12-12

## 2018-12-12 RX ADMIN — DICLOFENAC SODIUM 1 DROP: 1 SOLUTION/ DROPS OPHTHALMIC at 07:35

## 2018-12-12 RX ADMIN — ONDANSETRON 4 MG: 2 INJECTION INTRAMUSCULAR; INTRAVENOUS at 08:38

## 2018-12-12 RX ADMIN — PROPARACAINE HYDROCHLORIDE 1 DROP: 5 SOLUTION/ DROPS OPHTHALMIC at 07:46

## 2018-12-12 RX ADMIN — DICLOFENAC SODIUM 1 DROP: 1 SOLUTION/ DROPS OPHTHALMIC at 07:46

## 2018-12-12 RX ADMIN — CYCLOPENTOLATE HYDROCHLORIDE 1 DROP: 20 SOLUTION/ DROPS OPHTHALMIC at 07:35

## 2018-12-12 RX ADMIN — MIDAZOLAM HYDROCHLORIDE 1 MG: 1 INJECTION, SOLUTION INTRAMUSCULAR; INTRAVENOUS at 08:34

## 2018-12-12 RX ADMIN — PROPARACAINE HYDROCHLORIDE 1 DROP: 5 SOLUTION/ DROPS OPHTHALMIC at 07:40

## 2018-12-12 RX ADMIN — DICLOFENAC SODIUM 1 DROP: 1 SOLUTION/ DROPS OPHTHALMIC at 07:51

## 2018-12-12 RX ADMIN — DICLOFENAC SODIUM 1 DROP: 1 SOLUTION OPHTHALMIC at 07:35

## 2018-12-12 RX ADMIN — CYCLOPENTOLATE HYDROCHLORIDE 1 DROP: 20 SOLUTION/ DROPS OPHTHALMIC at 07:41

## 2018-12-12 RX ADMIN — CYCLOPENTOLATE HYDROCHLORIDE 1 DROP: 20 SOLUTION/ DROPS OPHTHALMIC at 07:46

## 2018-12-12 RX ADMIN — CYCLOPENTOLATE HYDROCHLORIDE 1 DROP: 20 SOLUTION/ DROPS OPHTHALMIC at 07:51

## 2018-12-12 RX ADMIN — DICLOFENAC SODIUM 1 DROP: 1 SOLUTION/ DROPS OPHTHALMIC at 07:41

## 2018-12-12 RX ADMIN — PHENYLEPHRINE HYDROCHLORIDE 1 DROP: 25 SOLUTION/ DROPS OPHTHALMIC at 07:46

## 2018-12-12 RX ADMIN — PHENYLEPHRINE HYDROCHLORIDE 1 DROP: 25 SOLUTION/ DROPS OPHTHALMIC at 07:35

## 2018-12-12 RX ADMIN — MIDAZOLAM HYDROCHLORIDE 1 MG: 1 INJECTION, SOLUTION INTRAMUSCULAR; INTRAVENOUS at 08:25

## 2018-12-12 RX ADMIN — PROPARACAINE HYDROCHLORIDE 1 DROP: 5 SOLUTION/ DROPS OPHTHALMIC at 07:35

## 2018-12-12 RX ADMIN — SODIUM CHLORIDE 25 ML/HR: 900 INJECTION, SOLUTION INTRAVENOUS at 07:43

## 2018-12-12 RX ADMIN — FENTANYL CITRATE 50 MCG: 50 INJECTION, SOLUTION INTRAMUSCULAR; INTRAVENOUS at 08:37

## 2018-12-12 RX ADMIN — PHENYLEPHRINE HYDROCHLORIDE 1 DROP: 25 SOLUTION/ DROPS OPHTHALMIC at 07:40

## 2018-12-12 RX ADMIN — PROPARACAINE HYDROCHLORIDE 1 DROP: 5 SOLUTION/ DROPS OPHTHALMIC at 07:51

## 2018-12-12 RX ADMIN — PHENYLEPHRINE HYDROCHLORIDE 1 DROP: 25 SOLUTION/ DROPS OPHTHALMIC at 07:51

## 2018-12-12 NOTE — ANESTHESIA POSTPROCEDURE EVALUATION
Procedure(s):  LEFT EYE SECOND REFRATIVE CATARACT REMOVAL WITH LENS IMPLANTATION. Anesthesia Post Evaluation        Patient location during evaluation: PACU  Note status: Adequate. Level of consciousness: responsive to verbal stimuli and sleepy but conscious  Pain management: satisfactory to patient  Airway patency: patent  Anesthetic complications: no  Cardiovascular status: acceptable  Respiratory status: acceptable  Hydration status: acceptable  Comments: +Post-Anesthesia Evaluation and Assessment    Patient: Rama Sylvester MRN: [id number]  SSN: [id number]   YOB: 1940  Age: 70 y.o. Sex: male      Cardiovascular Function/Vital Signs    /63   Pulse (!) 55   Temp 36.5 °C (97.7 °F)   Resp 13   Ht 5' 9\" (1.753 m)   Wt 105.2 kg (232 lb)   SpO2 97%   BMI 34.26 kg/m²     Patient is status post Procedure(s):  LEFT EYE SECOND REFRATIVE CATARACT REMOVAL WITH LENS IMPLANTATION. Nausea/Vomiting: Controlled. Postoperative hydration reviewed and adequate. Pain:  Pain Scale 1: Numeric (0 - 10) (12/12/18 0916)  Pain Intensity 1: 0 (12/12/18 0916)   Managed. Neurological Status:   Neuro (WDL): Exceptions to WDL (12/12/18 0852)   At baseline. Mental Status and Level of Consciousness: Arousable. Pulmonary Status:   O2 Device: Room air (12/12/18 0036)   Adequate oxygenation and airway patent. Complications related to anesthesia: None    Post-anesthesia assessment completed. No concerns.     Signed By: Francie Chaudhari MD    12/12/2018  Post anesthesia nausea and vomiting:  controlled      Visit Vitals  /63   Pulse (!) 55   Temp 36.5 °C (97.7 °F)   Resp 13   Ht 5' 9\" (1.753 m)   Wt 105.2 kg (232 lb)   SpO2 97%   BMI 34.26 kg/m²

## 2018-12-12 NOTE — PERIOP NOTES
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 Otis Mcfarland    Assistant(s): Circ-1: Zoe Wilkinson RN  Scrub Tech-1: Vitaly Vora     Specimens: * No specimens in log *    Assessment:  Intra-procedure medications         Anesthesia gave intra-procedure sedation and medications, see anesthesia flow sheet     Intravenous fluids: NS @ KVO     Vital signs stable       Recommendation:    Permission to share finding with wife María : yes

## 2018-12-12 NOTE — PERIOP NOTES
Permission received to review discharge instructions and discuss private health information with wife, Denise Spring.

## 2018-12-12 NOTE — DISCHARGE INSTRUCTIONS
Edin Virk D.O., P.CAshley  [facility] [address].  [phone]    Post-Operative Instructions for Cataract Surgery     Remove your eye shield and bandage at 12 noon the same day as surgery and start your eye drops. THROW AWAY THE GAUZE UNDER THE SHIELD.  Place the blue eye shield back on for one week while asleep, even if napping in the recliner. Use the tape included in your bag.  DO NOT RUB YOUR EYE EVER! DO NOT WIPE YOUR EYE! ONLY WIPE ON YOUR CHEEK!                DO NOT WEAR EYE MAKEUP FOR 1 WEEK!  You can shower starting tomorrow.  You may resume your previous diet.  If you use glaucoma drops in the operative eye, continue them as directed.  Common symptoms after surgery include a scratchy feeling, slight headache, red eye, and/or blurred vision. You may use Advil or Tylenol for any discomfort.  Avoid strenuous activities and driving until you see Dr. Howard Kay tomorrow. Please use care when walking, your depth perception may be altered.  Bring your bag with your drops to your follow up appointment. Below are Instructions On How To Use Your Eye Drops. ON THE DAY OF SURGERY:     Use all three eye drops at 12 noon, 4pm, and 8pm.  Wait 10 minutes between drops. THE DAY AFTER SURGERY:     You will use the drops 4 times a day at 8am, 12 noon, 4pm, and 8pm.   Use the drops every day until bottles are empty. Vigamox (tan top) Put 1 drop in at 8am, 12 noon, 4pm, and 8pm.    Pred Acetate (pink top) Put 1 drop in at 8:10am, 12:10pm, 4:10pm, and 8:10pm. Shake before using. Ketorolac/Diclofenac Put 1 drop in at 8:20am, 12:20pm, 4:20pm, 8:20pm.    CONTINUE DROPS UNTIL ALL BOTTLES ARE EMPTY! Follow-up appointment tomorrow at Dr. Danny Holley office:_______747 am_____________    Please call the office at [phone] if you experience severe pain or nausea.      The following personal items collected during your admission are returned to you: Dental Appliance: Dental Appliances: Uppers, With patient  Vision: Visual Aid: None  Hearing Aid: @FLOW  DISCHARGE SUMMARY from Nurse  (1341:LAST)@  Jewelry: Jewelry: Watch(in patient belonging bag)  Clothing: Clothing: With patient  Other Valuables: Other Valuables: None  Valuables sent to safe:        PATIENT INSTRUCTIONS:    After General Anesthesia or Intravenous Sedation, for 24 hours or while taking prescription Narcotics:        Someone should be with you for the next 24 hours. For your own safety, a responsible adult must drive you home. · Limit your activities  · Recommended activity: Rest today, up with assistance today. Do not climb stairs or shower unattended for the next 24 hours. · Please start with a soft bland diet and advance as tolerated (no nausea) to regular diet. · If you have a sore throat you should try the following: fluids, warm salt water gargles, or throat lozenges. If it does not improve after several days please follow up with your primary physician. · Do not drive and operate hazardous machinery  · Do not make important personal or business decisions  · Do  not drink alcoholic beverages  · If you have not urinated within 8 hours after discharge, please contact your surgeon on call. Report the following to your surgeon:  · Excessive pain, swelling, redness or odor of or around the surgical area  · Temperature over 100.5  · Any nausea or vomiting. · You will receive a Post Operative Call from one of the Recovery Room Nurses on the day after your surgery to check on you. It is very important for us to know how you are recovering after your surgery. If you have an issue or need to speak with someone, please call your surgeon, do not wait for the post operative call. · You may receive an e-mail or letter in the mail from Maribel regarding your experience with us in the Ambulatory Surgery Unit.  Your feedback is valuable to us and we appreciate your participation in the survey. · If the above instructions are not adequate or you are having problems after your surgery, call the physician at their office number. · We wish you a speedy recovery ? What to do at Home:      *  Please give a list of your current medications to your Primary Care Provider. *  Please update this list whenever your medications are discontinued, doses are      changed, or new medications (including over-the-counter products) are added. *  Please carry medication information at all times in case of emergency situations. These are general instructions for a healthy lifestyle:    No smoking/ No tobacco products/ Avoid exposure to second hand smoke    Surgeon General's Warning:  Quitting smoking now greatly reduces serious risk to your health. Obesity, smoking, and sedentary lifestyle greatly increases your risk for illness    A healthy diet, regular physical exercise & weight monitoring are important for maintaining a healthy lifestyle    You may be retaining fluid if you have a history of heart failure or if you experience any of the following symptoms:  Weight gain of 3 pounds or more overnight or 5 pounds in a week, increased swelling in our hands or feet or shortness of breath while lying flat in bed. Please call your doctor as soon as you notice any of these symptoms; do not wait until your next office visit. Recognize signs and symptoms of STROKE:    B - Balance  E - Eyes    F-  Face looks uneven    A-  Arms unable to move or move even    S-  Speech slurred or non-existent    T-  Time-call 911 as soon as signs and symptoms begin-DO NOT go       Back to bed or wait to see if you get better-TIME IS BRAIN. If you have not received your influenza and/or pneumococcal vaccine, please follow up with your primary care physician. The discharge information has been reviewed with the patient and family. The patient and family verbalized understanding.     TO PREVENT AN INFECTION      1. [address] YOUR HANDS     To prevent infection, good handwashing is the most important thing you or your caregiver can do.  Wash your hands with soap and water or use the hand  we gave you before you touch any wounds. 2.  USE CLEAN SHEETS     Use freshly cleaned sheets on your bed after surgery.  To keep the surgery site clean, do not allow pets to sleep with you while your wound is still healing. 3. STOP SMOKING    Stop smoking, or at least cut back on smoking     Smoking slows your healing. 4.  CONTROL YOUR BLOOD SUGAR     High blood sugars slow wound healing.  If you are diabetic, control your blood sugar levels before and after your surgery.

## 2018-12-12 NOTE — OP NOTES
Name: Gayla Arroyo MASC-4        updated 3/1/2013  Description:  Corby Lopez with intraocular lens implant    PREOPERATIVE DIAGNOSIS: Visually impairing combined cataract, Left eye. POSTOPERATIVE DIAGNOSIS: Visually impairing combined   cataract,Left eye. OPERATION: Procedure(s):  LEFT EYE SECOND REFRATIVE CATARACT REMOVAL WITH LENS IMPLANTATION    ANESTHESIA: Topical with intravenous sedation    TYPE OF LENS IMPLANT USED:   Implant Name Type Inv. Item Serial No.  Lot No. LRB No. Used Action   LENS IOL POST 1-PC 6X13 22.5 -- [employer] - [id number]  LENS IOL POST 1-PC 6X13 22.5 -- [employer] [id number] [employer] NA Left 1 Implanted       PHACO TIME:   67 seconds at an average power of 12.8%. Estimated blood loss: None    Complications:None    Specimen removed: None    DESCRIPTION OF PROCEDURE:  The patient was brought to the holding area, where an IV was begun at the keep open rate. The patient received several instillations of Clark-Synephrine 2.5%, Cyclogyl 2%, and tetracaine 0.5% until adequate pupillary dilatation was achieved. The patient was connected to cardiovascular monitoring. Prior to being brought back to the operating suite, the patient received 2 drops of Betadine 5% solution into the inferior cul-de-sac of the operated eye. The patient was then brought back to the operating suite, and prepped and draped in the usual sterile manner after being re-connnected to cardiovascular monitoring. One drop of 4% Xylocaine was then instilled onto the eye. The lid speculum was set into position and the operating microscope was focused on the patient. Two drops of 4% Xylocaine were again instilled onto the eye. Using the fixation ring, the sharp 15-degree blade was used to create a paracentesis site and 1% MPF Xylocaine was instilled into the anterior chamber for anesthesia. Viscoelastic was then instilled into the anterior chamber.  The 2.4 mm keratome was then utilized to create a clear corneal incision, and a circular capsulorrhexis was performed. BSS was utilized to perform careful hydrodissection of the lens. Viscoelastic was then instilled into the anterior chamber to protect the corneal endothelium. Phacoemulsification was then carried out. Any remaining cortical material was removed in irrigation/aspiration mode. Viscoelastic was then instilled into the capsular bag. The lens implant was inspected for any defects. After finding none, the lens was placed in its injector and inserted into the capsular bag. The lens implant was centered on the patient's visual/astigmatic axis. The viscoelastic was then removed from the eye. Miochol was instilled posterior to the iris plane to facilitate pupillary miosis. The wound was checked for any leaks, after finding none, Iopidine and Pred Forte drops were instilled into the inferior cul-de-sac, and gentamicin ointment was placed over the cornea. A semi-pressure dressing and shield were then placed for the patient. The patient tolerated the procedure very well, and was brought to the recovery room in an alert and stable and satisfactory postoperative condition. Instructions were given to the patient and their family for their immediate postoperative care.   [address] [place] [address],   12/12/2018

## 2019-01-07 RX ORDER — AMLODIPINE BESYLATE 2.5 MG/1
TABLET ORAL
Qty: 30 TAB | Refills: 0 | Status: SHIPPED | OUTPATIENT
Start: 2019-01-07 | End: 2019-02-11 | Stop reason: SDUPTHER

## 2019-02-05 RX ORDER — AMLODIPINE BESYLATE 2.5 MG/1
TABLET ORAL
Qty: 30 TAB | Refills: 0 | Status: SHIPPED | OUTPATIENT
Start: 2019-02-05 | End: 2019-02-11 | Stop reason: SDUPTHER

## 2019-02-11 ENCOUNTER — OFFICE VISIT (OUTPATIENT)
Dept: INTERNAL MEDICINE CLINIC | Facility: CLINIC | Age: 79
End: 2019-02-11

## 2019-02-11 VITALS
RESPIRATION RATE: 18 BRPM | TEMPERATURE: 98.6 F | BODY MASS INDEX: 33.62 KG/M2 | WEIGHT: 227 LBS | SYSTOLIC BLOOD PRESSURE: 130 MMHG | HEIGHT: 69 IN | DIASTOLIC BLOOD PRESSURE: 82 MMHG | HEART RATE: 79 BPM | OXYGEN SATURATION: 94 %

## 2019-02-11 DIAGNOSIS — H65.192 OTHER ACUTE NONSUPPURATIVE OTITIS MEDIA OF LEFT EAR, RECURRENCE NOT SPECIFIED: Primary | ICD-10-CM

## 2019-02-11 RX ORDER — AMOXICILLIN AND CLAVULANATE POTASSIUM 500; 125 MG/1; MG/1
1 TABLET, FILM COATED ORAL EVERY 8 HOURS
Qty: 21 TAB | Refills: 0 | Status: SHIPPED | OUTPATIENT
Start: 2019-02-11 | End: 2019-02-18

## 2019-02-11 NOTE — PROGRESS NOTES
Twan Wayne  Identified pt with two pt identifiers(name and ). Chief Complaint Patient presents with  Ear Pain  
  left ear 1. Have you been to the ER, urgent care clinic since your last visit? Hospitalized since your last visit? [address] for back 2. Have you seen or consulted any other health care providers outside of the [address] [place] [address] since your last visit? Include any pap smears or colon screening. Had back injection Dr Raheel Curran Today's provider has been notified of reason for visit, vitals and flowsheets obtained on patients. Patient received paperwork for advance directive during previous visit but has not completed at this time Reviewed record In preparation for visit, huddled with provider and have obtained necessary documentation There are no preventive care reminders to display for this patient. Wt Readings from Last 3 Encounters:  
19 227 lb (103 kg) 18 232 lb (105.2 kg) 18 233 lb (105.7 kg) Temp Readings from Last 3 Encounters:  
19 98.6 °F (37 °C) (Oral) 18 97.7 °F (36.5 °C) 18 98 °F (36.7 °C) BP Readings from Last 3 Encounters:  
19 130/82  
18 126/63  
18 158/88 Pulse Readings from Last 3 Encounters:  
19 79  
18 (!) 55  
18 61 Vitals:  
 19 1323 19 1327 BP: 144/81 130/82 Pulse: 79 Resp: 18 Temp: 98.6 °F (37 °C) TempSrc: Oral   
SpO2: 94% Weight: 227 lb (103 kg) Height: 5' 9\" (1.753 m) PainSc:   8 PainLoc: Ear   
 
 
 
Learning Assessment: 
:  
 
Learning Assessment 10/5/2017 2014 PRIMARY LEARNER Patient Patient HIGHEST LEVEL OF EDUCATION - PRIMARY LEARNER  - DID NOT GRADUATE HIGH SCHOOL  
BARRIERS PRIMARY LEARNER - NONE  
CO-LEARNER CAREGIVER - No  
PRIMARY LANGUAGE ENGLISH ENGLISH  NEED - No  
LEARNER PREFERENCE PRIMARY LISTENING LISTENING  
  DEMONSTRATION -  
  READING -  
 ANSWERED BY patient Patient RELATIONSHIP SELF SELF Depression Screening: 
:  
 
PHQ over the last two weeks 2/11/2019 Little interest or pleasure in doing things Not at all Feeling down, depressed, irritable, or hopeless Not at all Total Score PHQ 2 0 Fall Risk Assessment: 
:  
 
Fall Risk Assessment, last 12 mths 10/30/2018 Able to walk? Yes Fall in past 12 months? No  
Fall with injury? -  
 
 
Abuse Screening: 
:  
 
Abuse Screening Questionnaire 9/4/2018 7/3/2018 10/5/2017 Do you ever feel afraid of your partner? N N N Are you in a relationship with someone who physically or mentally threatens you? N N N Is it safe for you to go home? Mitzy Banda  
 
 
ADL Screening: 
:  
 
ADL Assessment 7/3/2018 Feeding yourself No Help Needed Getting from bed to chair No Help Needed Getting dressed No Help Needed Bathing or showering No Help Needed Walk across the room (includes cane/walker) No Help Needed Using the telphone No Help Needed Taking your medications No Help Needed Preparing meals No Help Needed Managing money (expenses/bills) No Help Needed Moderately strenuous housework (laundry) No Help Needed Shopping for personal items (toiletries/medicines) No Help Needed Shopping for groceries No Help Needed Driving No Help Needed Climbing a flight of stairs No Help Needed Getting to places beyond walking distances No Help Needed Medication reconciliation up to date and corrected with patient at this time.

## 2019-02-11 NOTE — PROGRESS NOTES
CC:  
Chief Complaint Patient presents with  Ear Pain  
  left ear HISTORY OF PRESENT ILLNESS Trish Conroy is a 70 y.o. male. Patient complains of  1 week history of left ear pain associated with mild dizziness. Denies ear discharge, decreased hearing, fever, chills, or sinus congestion. Is scheduled to start PT in 2 days. He has HTN, hyperlipidemia, CKD stage 3-4, CHF (combined diastolic and systolic) with echo EF 41-96% in 7/17, non-ischemic cardiomyopathy, atrial flutter s/p AFL ablation on 8/22/17 now in NSR and off anticoagulation, prediabetes, COPD, and prostate cancer.   
  
  
Patient Active Problem List  
Diagnosis Code  Combined systolic and diastolic congestive heart failure (HCC) I50.40  S/P cardiac cath Z98.890  
 NICM (nonischemic cardiomyopathy) ([facility] [address] I42.8  Hypertension I10  
 Mixed hyperlipidemia E78.2  Smokeless tobacco use Z72.0  Atrial flutter (HCC) I48.92  
 Stage 3 chronic kidney disease (HCC) N18.3  Frequent hospital admissions Z78.9  Prediabetes R73.03  
 Prostate cancer ([address] C61  
 COPD (chronic obstructive pulmonary disease) ([address] J44.9  Chronic low back pain M54.5, G89.29  
 Obesity (BMI 30-39. 9) E66.9  
 SOB (shortness of breath) R06.02  
 Candidal intertrigo B37.2  Combined forms of age-related cataract of right eye H25.811  
 Combined forms of age-related cataract of left eye H25.812 Past Medical History:  
Diagnosis Date  Acute respiratory failure with hypoxia ([address] 02/08/2014  
 also 11/28/15, 2/17/16, 5/14/17  Alcohol abuse As of 11/29/18 pt stated he quit 20 years  Arthritis Back and shoulder  Atrial flutter ([address] 08/2017  
 saw Dr. Ester Fuller; underwent a-flutter ablation  BPH (benign prostatic hyperplasia)  CHF (congestive heart failure) ([address] 02/11/2014 TTE 11/15: EF 40-45%, 2/14: EF 20%  Admitted for acute exacerbations 2/8/14, 11/28/15, 2/17/16, and 5/4/17)  Chronic low back pain LS spine X-ray 4/8/17: mild DDD  COPD (chronic obstructive pulmonary disease) [facility]  AP (dyspnea on exertion)  ED (erectile dysfunction)  Elevated troponin 02/11/2014  
 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  
 Hypertension  Kidney disease, chronic, stage III (GFR 30-59 ml/min) ([facility])  Leg fracture, right 1973  Nonischemic cardiomyopathy ([facility] [address] with LVH  Pneumonia 02/08/2014 2/8/14 and 10/30/15  Poor historian As of 11/29/18 pt reports 5th grade education, pt unable to give med list-obtained from wife per patient's permission  Prediabetes  Prostate cancer ([facility] [address] 2016 As of 11/29/18, pt states he gets two injections twice a year for this  Pulmonary edema 11/28/2015  S/P cardiac cath 2/12/2014 2/12/14 no significant coronary disease, severe LV dysfunction  Tinea cruris   
 also genital folliculitis  Vertigo Allergies Allergen Reactions  Oxycodone Contact Dermatitis Current Outpatient Medications Medication Sig Dispense Refill  atorvastatin (LIPITOR) 40 mg tablet TAKE 1 TABLET BY MOUTH NIGHTLY 90 Tab 1  
 furosemide (LASIX) 40 mg tablet TAKE ONE TABLET BY MOUTH DAILY - DOSE REDUSED 12/17/15 30 Tab 6  clotrimazole-betamethasone (LOTRISONE) topical cream Apply  to affected area two (2) times a day. 45 g 3  
 amLODIPine (NORVASC) 2.5 mg tablet TAKE 1 TABLET BY MOUTH ONCE DAILY 30 Tab 0  carvedilol (COREG) 25 mg tablet TAKE ONE TABLET BY MOUTH TWICE DAILY WITH MEALS 60 Tab 6  aspirin 81 mg chewable tablet Take 1 Tab by mouth daily. 10 Tab 0  
 moxifloxacin (VIGAMOX) 0.5 % ophthalmic solution Administer 1 Drop to right eye three (3) times daily.  moxifloxacin (VIGAMOX) 0.5 % ophthalmic solution Administer 1 Drop to left eye three (3) times daily.  diclofenac (VOLTAREN) 0.1 % ophthalmic solution Administer 1 Drop to right eye four (4) times daily.  prednisoLONE acetate (PRED FORTE) 1 % ophthalmic suspension Administer 1 Drop to right eye four (4) times daily.  albuterol (PROVENTIL HFA, VENTOLIN HFA, PROAIR HFA) 90 mcg/actuation inhaler Take 2 Puffs by inhalation every four (4) hours as needed for Wheezing. 1 Inhaler 0 PHYSICAL EXAM 
Visit Vitals /82 Pulse 79 Temp 98.6 °F (37 °C) (Oral) Resp 18 Ht 5' 9\" (1.753 m) Wt 227 lb (103 kg) SpO2 94% BMI 33.52 kg/m² General: Obese, no distress. HEENT:  Head normocephalic/atraumatic, no scleral icterus. Left TM with bulging and mild hyperemia. Right TM and bilateral ear canals normal. 
Neurological: Alert and oriented. Psychiatric: Normal mood and affect. Behavior is normal.  
 
 
 
ASSESSMENT AND PLAN 
  ICD-10-CM ICD-9-CM 1. Other acute nonsuppurative otitis media of left ear, recurrence not specified H65.192 381.00 amoxicillin-clavulanate (AUGMENTIN) 500-125 mg per tablet Diagnoses and all orders for this visit: 
 
1. Other acute nonsuppurative otitis media of left ear, recurrence not specified 
-     amoxicillin-clavulanate (AUGMENTIN) 500-125 mg per tablet; Take 1 Tab by mouth every eight (8) hours for 7 days. For ear infection. Follow-up Disposition: 
Return if symptoms worsen or fail to improve, for Scheduled appoitnment on 3/5/19. I have discussed the diagnosis with the patient and the intended plan as seen in the above orders. Patient is in agreement.   The patient has received an after-visit summary and questions were answered concerning future plans. I have discussed medication side effects and warnings with the patient as well.

## 2019-02-11 NOTE — PATIENT INSTRUCTIONS

## 2019-02-22 ENCOUNTER — OFFICE VISIT (OUTPATIENT)
Dept: INTERNAL MEDICINE CLINIC | Facility: CLINIC | Age: 79
End: 2019-02-22

## 2019-02-22 VITALS
WEIGHT: 227.2 LBS | HEART RATE: 72 BPM | OXYGEN SATURATION: 95 % | RESPIRATION RATE: 18 BRPM | TEMPERATURE: 98 F | HEIGHT: 69 IN | SYSTOLIC BLOOD PRESSURE: 134 MMHG | BODY MASS INDEX: 33.65 KG/M2 | DIASTOLIC BLOOD PRESSURE: 66 MMHG

## 2019-02-22 DIAGNOSIS — H81.12 BENIGN PAROXYSMAL POSITIONAL VERTIGO OF LEFT EAR: Primary | ICD-10-CM

## 2019-02-22 RX ORDER — MECLIZINE HCL 12.5 MG 12.5 MG/1
12.5 TABLET ORAL
Qty: 40 TAB | Refills: 0 | Status: SHIPPED | OUTPATIENT
Start: 2019-02-22 | End: 2019-03-04

## 2019-02-22 NOTE — PATIENT INSTRUCTIONS
Please contact our office if your symptoms worsen or do not improve. Please call 911 or go directly to the Emergency Department if you develop shortness of breath, chest pain, difficulty breathing or worsening of your symptoms. Epley Maneuver at Home for Vertigo: Exercises  Your Care Instructions  Vertigo is a spinning or whirling sensation when you move your head. Your doctor may have moved you in different positions to help your vertigo get better faster. This is called the Epley maneuver. Your doctor also may have asked you to do these exercises at home. Do the exercises as often as your doctor recommends. If your vertigo is getting worse, your doctor may have you change the exercise or stop it. Step 1  Step 1    1. Sit on the edge of a bed or sofa. Step 2    1. Turn your head 45 degrees in the direction your doctor told you to. This should be toward the ear that causes the most vertigo for you. In this picture, the woman is turning toward her left ear. Step 3    1. Tilt yourself backward until you are lying on your back. Your head should still be at a 45-degree turn. Your head should be about midway between looking straight ahead and looking out to your side. Hold for 30 seconds. If you have vertigo, stay in this position until it stops. Step 4    1. Turn your head 90 degrees toward the ear that has the least vertigo. In this picture, the woman is turning to the right because she has vertigo on her left side. The point of your chin should be raised and over your shoulder. Hold for 30 seconds. Step 5    1. Roll onto the side with the least vertigo. You should now be looking at the floor. Hold for 30 seconds. Follow-up care is a key part of your treatment and safety. Be sure to make and go to all appointments, and call your doctor if you are having problems. It's also a good idea to know your test results and keep a list of the medicines you take. Where can you learn more?   Go to http://bg-gurpreet.info/. Enter 470 [id number] in the search box to learn more about \"Epley Maneuver at Home for Vertigo: Exercises. \"  Current as of: Julee 3, 2018  Content Version: 11.9  © 8423-8405 [employer]. Care instructions adapted under license by [employer] (which disclaims liability or warranty for this information). If you have questions about a medical condition or this instruction, always ask your healthcare professional. [address] any warranty or liability for your use of this information. Vertigo: Care Instructions  Your Care Instructions    Vertigo is the feeling that you or your surroundings are moving when there is no actual movement. It is often described as a feeling of spinning, whirling, falling, or tilting. Vertigo may make you vomit or feel nauseated. You may have trouble standing or walking and may lose your balance. Vertigo is often related to an inner ear problem, but it can have other more serious causes. If vertigo continues, you may need more tests to find its cause. Follow-up care is a key part of your treatment and safety. Be sure to make and go to all appointments, and call your doctor if you are having problems. It's also a good idea to know your test results and keep a list of the medicines you take. How can you care for yourself at home? · Do not lie flat on your back. Prop yourself up slightly. This may reduce the spinning feeling. Keep your eyes open. · Move slowly so that you do not fall. · If your doctor recommends medicine, take it exactly as directed. · Do not drive while you are having vertigo. Certain exercises, called Brito-Daroff exercises, can help decrease vertigo. To do Brito-Daroff exercises:  · Sit on the edge of a bed or sofa and quickly lie down on the side that causes the worst vertigo. Lie on your side with your ear down.   · Stay in this position for at least 30 seconds or until the vertigo goes away. · Sit up. If this causes vertigo, wait for it to stop. · Repeat the procedure on the other side. · Repeat this 10 times. Do these exercises 2 times a day until the vertigo is gone. When should you call for help? Call 911 anytime you think you may need emergency care. For example, call if:    · You passed out (lost consciousness).     · You have symptoms of a stroke. These may include:  ? Sudden numbness, tingling, weakness, or loss of movement in your face, arm, or leg, especially on only one side of your body. ? Sudden vision changes. ? Sudden trouble speaking. ? Sudden confusion or trouble understanding simple statements. ? Sudden problems with walking or balance. ? A sudden, severe headache that is different from past headaches.    Call your doctor now or seek immediate medical care if:    · Vertigo occurs with a fever, a headache, or ringing in your ears.     · You have new or increased nausea and vomiting.    Watch closely for changes in your health, and be sure to contact your doctor if:    · Vertigo gets worse or happens more often.     · Vertigo has not gotten better after 2 weeks. Where can you learn more? Go to http://bg-gurpreet.info/. Enter Y599 in the search box to learn more about \"Vertigo: Care Instructions. \"  Current as of: March 27, 2018  Content Version: 11.9  © 9121-5442 [employer]. Care instructions adapted under license by [employer] (which disclaims liability or warranty for this information). If you have questions about a medical condition or this instruction, always ask your healthcare professional. [place] [address] any warranty or liability for your use of this information.

## 2019-02-22 NOTE — PROGRESS NOTES
HPI  Christian Hartley is a 70y.o. year old male patient of Olga De Los Santos MD who presents with c/o ear inf. Pt has history of has Combined systolic and diastolic congestive heart failure ([address] S/P cardiac cath, NICM (nonischemic cardiomyopathy) ([address] Hypertension, Mixed hyperlipidemia, Smokeless tobacco use, Atrial flutter ([address] Stage 3 chronic kidney disease ([address] Frequent hospital admissions, Prediabetes, Prostate cancer ([address] COPD (chronic obstructive pulmonary disease) ([address] Chronic low back pain, Obesity (BMI 30-39.9), SOB (shortness of breath), Candidal intertrigo, Combined forms of age-related cataract of right eye, and Combined forms of age-related cataract of left eye on their problem list..    C/o persistent left ear symptoms. Pain is mild. Makes him feel off balance. Feels like he might fall. Has had any falls. Denies hx of vertigo. Doesn't feel like room is spinning. When moves head certain ways gets dizzy, especially when lying down and changes positions in the bed. Feels like it's coming from left ear. No hearing loss. No drainage. Completed the abx. Hasn't taken anything else. Denies cp, sob or patrick. Seen by Dr. Cary Reynoso on 2-11 for acute nonsuppurative otitis media of left ear treated with augmentin. Pt has hx of recurrent ear infections, has seen Dr. Roge Wang ENT in the past. Had a CT neck in fall 2018 that was negative.         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    S/P cardiac cath Z98.890    NICM (nonischemic cardiomyopathy) (HCC) I42.8    Hypertension I10    Mixed hyperlipidemia E78.2    Smokeless tobacco use Z72.0    Atrial flutter (HCC) I48.92    Stage 3 chronic kidney disease ([address] N18.3    Frequent hospital admissions Z78.9    Prediabetes R73.03    Prostate cancer ([address] C61    COPD (chronic obstructive pulmonary disease) (HCC) J44.9    Chronic low back pain M54.5, G89.29    Obesity (BMI 30-39. 9) E66.9    SOB (shortness of breath) R06.02    Candidal intertrigo B37.2    Combined forms of age-related cataract of right eye H25.811    Combined forms of age-related cataract of left eye H25.812     Past Medical History:   Diagnosis Date    Acute respiratory failure with hypoxia ([facility] 02/08/2014    also 11/28/15, 2/17/16, 5/14/17     Alcohol abuse     As of 11/29/18 pt stated he quit 20 years    Arthritis     Back and shoulder    Atrial flutter ([address] 08/2017    saw Dr. Ester Fuller; underwent a-flutter ablation    BPH (benign prostatic hyperplasia)     CHF (congestive heart failure) ([facility] 02/11/2014    TTE 11/15: EF 40-45%, 2/14: EF 20%  Admitted for acute exacerbations 2/8/14, 11/28/15, 2/17/16, and 5/4/17)    Chronic low back pain     LS spine X-ray 4/8/17: mild DDD    COPD (chronic obstructive pulmonary disease) ([address]     PA (dyspnea on exertion)     ED (erectile dysfunction)     Elevated troponin 02/11/2014    also 11/28/15; due to cardiac strain    Frequent hospital admissions     5/14-5/23/17: acute hypoxic resp failure due to CHF exac, ROBINSON on CKD 3, sepsis due to LE cellulitis, 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  2/17-2/22/16: acute hypoxic resp failure, acute diarrhea  11/28-11/30/15: acute hypoxic resp failure due to acute CHF exac, elevated troponin due to cardiac strain  2/8-2/12/14: acuter hypoxic resp failure due to CHF exac, pneumonia     Hand blister 10/8/2017    Bilateral    Hypertension     Kidney disease, chronic, stage III (GFR 30-59 ml/min) ([facility])     Leg fracture, right 1973    Nonischemic cardiomyopathy ([address]     with LVH    Pneumonia 02/08/2014 2/8/14 and 10/30/15    Poor historian     As of 11/29/18 pt reports 5th grade education, pt unable to give med list-obtained from wife per patient's permission    Prediabetes     Prostate cancer ([address] 2016    As of 11/29/18, pt states he gets two injections twice a year for this    Pulmonary edema 11/28/2015  S/P cardiac cath 2014 no significant coronary disease, severe LV dysfunction    Tinea cruris     also genital folliculitis    Vertigo      Past Surgical History:   Procedure Laterality Date    CARDIAC SURG PROCEDURE UNLIST  2017    SVT ablation    COLONOSCOPY,DIAGNOSTIC  2016    Dr. Gisele Steven; single sessile polyp at descending colon, sigmoid diverticulosis, int hemorrhoids    COLONOSCOPY,REMV LESN,SNARE  2016         HX CATARACT REMOVAL Right 2018    Dr. Glory Caldwell Left 2018    Dr. Anant Watson. LEFT EYE SECOND REFRATIVE CATARACT REMOVAL WITH LENS IMPLANTATION    HX ORTHOPAEDIC Right 's    index finger    HX OTHER SURGICAL  2017    Dr. Renee Eckert; atrial flutter ablation    HX ROTATOR CUFF REPAIR Right     HX TONSILLECTOMY  194     Social History     Socioeconomic History    Marital status:      Spouse name: Not on file    Number of children: Not on file    Years of education: Not on file    Highest education level: Not on file   Tobacco Use    Smoking status: Former Smoker     Packs/day: 2.00     Years: 20.00     Pack years: 40.00     Types: Cigarettes     Last attempt to quit: 2017     Years since quittin.5    Smokeless tobacco: Former User     Types: Chew     Quit date: 2018    Tobacco comment: Chewing tobacco   Substance and Sexual Activity    Alcohol use: Yes     Alcohol/week: 0.0 oz     Comment: As of 20, pt quit 20 years ago    Drug use: No     Family History   Adopted: Yes   Family history unknown:  Yes     Allergies   Allergen Reactions    Oxycodone Contact Dermatitis       MEDICATIONS  Current Outpatient Medications   Medication Sig    atorvastatin (LIPITOR) 40 mg tablet TAKE 1 TABLET BY MOUTH NIGHTLY    furosemide (LASIX) 40 mg tablet TAKE ONE TABLET BY MOUTH DAILY - DOSE REDUSED 12/17/15    clotrimazole-betamethasone (LOTRISONE) topical cream Apply  to affected area two (2) times a day.    amLODIPine (NORVASC) 2.5 mg tablet TAKE 1 TABLET BY MOUTH ONCE DAILY    carvedilol (COREG) 25 mg tablet TAKE ONE TABLET BY MOUTH TWICE DAILY WITH MEALS    aspirin 81 mg chewable tablet Take 1 Tab by mouth daily. No current facility-administered medications for this visit. REVIEW OF SYSTEMS  Per HPI        Visit Vitals  /66 (BP 1 Location: Left arm, BP Patient Position: Sitting)   Pulse 72   Temp 98 °F (36.7 °C) (Oral)   Resp 18   Ht 5' 9\" (1.753 m)   Wt 227 lb 3.2 oz (103.1 kg)   SpO2 95%   BMI 33.55 kg/m²         General: Well-developed, well-nourished. In no distress. A&O x 3. Head: Normocephalic, atraumatic. Eyes: Conjunctiva clear. Pupils equal, round, reactive to light. Extraocular movements intact. Mild nystagmus in lateral gaze. Ears/Nose: TM's and ear canals normal bilaterally, no sign of infection, negative tug test bilat. Nares normal. Septum midline. Normal nasal mucosa. No drainage or sinus tenderness. Mouth/Throat: Lips, mucosa, and tongue normal. Oropharynx benign. Neck: Supple, symmetrical, trachea midline, no lymphadenopathy, no carotid bruits, no JVD, thyroid: not enlarged, symmetric, no tenderness/mass/nodules. Lungs: Clear to auscultation bilaterally. No crackles or wheezes. No use of accessory muscles. Speaks in full sentences without SOB. Chest Wall: No tenderness or deformity. Heart: RRR, normal S1 and S2, no murmur, click, rub, or gallop. Skin: No rashes or lesions. Neurological: CN II-XII intact. Musculoskeletal: Gait normal.   Psychiatric: Normal mood and affect. Behavior is normal.     ASSESSMENT and PLAN  Diagnoses and all orders for this visit:    1. Benign paroxysmal positional vertigo of left ear  -     meclizine (ANTIVERT) 12.5 mg tablet; Take 1 Tab by mouth three (3) times daily as needed for up to 10 days.  May take two pills (25mg) if one not effective.  -suspect vertigo as cause for symptoms  -recommend exercises below, Antivert at night time- cautioned sleepiness, do not drive while taking  -if symptoms persist f/u with Dr. Lennox Clear ENT who he has seen in the past          Patient Instructions       Please contact our office if your symptoms worsen or do not improve. Please call 911 or go directly to the Emergency Department if you develop shortness of breath, chest pain, difficulty breathing or worsening of your symptoms. Epley Maneuver at Home for Vertigo: Exercises  Your Care Instructions  Vertigo is a spinning or whirling sensation when you move your head. Your doctor may have moved you in different positions to help your vertigo get better faster. This is called the Epley maneuver. Your doctor also may have asked you to do these exercises at home. Do the exercises as often as your doctor recommends. If your vertigo is getting worse, your doctor may have you change the exercise or stop it. Step 1  Step 1    1. Sit on the edge of a bed or sofa. Step 2    1. Turn your head 45 degrees in the direction your doctor told you to. This should be toward the ear that causes the most vertigo for you. In this picture, the woman is turning toward her left ear. Step 3    1. Tilt yourself backward until you are lying on your back. Your head should still be at a 45-degree turn. Your head should be about midway between looking straight ahead and looking out to your side. Hold for 30 seconds. If you have vertigo, stay in this position until it stops. Step 4    1. Turn your head 90 degrees toward the ear that has the least vertigo. In this picture, the woman is turning to the right because she has vertigo on her left side. The point of your chin should be raised and over your shoulder. Hold for 30 seconds. Step 5    1. Roll onto the side with the least vertigo. You should now be looking at the floor. Hold for 30 seconds. Follow-up care is a key part of your treatment and safety.  Be sure to make and go to all appointments, and call your doctor if you are having problems. It's also a good idea to know your test results and keep a list of the medicines you take. Where can you learn more? Go to http://bg-gurpreet.info/. Enter 470 7499 in the search box to learn more about \"Epley Maneuver at Home for Vertigo: Exercises. \"  Current as of: Julee 3, 2018  Content Version: 11.9  © 2006-2018 [employer]. Care instructions adapted under license by [employer] (which disclaims liability or warranty for this information). If you have questions about a medical condition or this instruction, always ask your healthcare professional. [address] any warranty or liability for your use of this information. Vertigo: Care Instructions  Your Care Instructions    Vertigo is the feeling that you or your surroundings are moving when there is no actual movement. It is often described as a feeling of spinning, whirling, falling, or tilting. Vertigo may make you vomit or feel nauseated. You may have trouble standing or walking and may lose your balance. Vertigo is often related to an inner ear problem, but it can have other more serious causes. If vertigo continues, you may need more tests to find its cause. Follow-up care is a key part of your treatment and safety. Be sure to make and go to all appointments, and call your doctor if you are having problems. It's also a good idea to know your test results and keep a list of the medicines you take. How can you care for yourself at home? · Do not lie flat on your back. Prop yourself up slightly. This may reduce the spinning feeling. Keep your eyes open. · Move slowly so that you do not fall. · If your doctor recommends medicine, take it exactly as directed. · Do not drive while you are having vertigo. Certain exercises, called Brito-Daroff exercises, can help decrease vertigo.  To do Brito-Daroff exercises:  · Sit on the edge of a bed or sofa and quickly lie down on the side that causes the worst vertigo. Lie on your side with your ear down. · Stay in this position for at least 30 seconds or until the vertigo goes away. · Sit up. If this causes vertigo, wait for it to stop. · Repeat the procedure on the other side. · Repeat this 10 times. Do these exercises 2 times a day until the vertigo is gone. When should you call for help? Call 911 anytime you think you may need emergency care. For example, call if:    · You passed out (lost consciousness).     · You have symptoms of a stroke. These may include:  ? Sudden numbness, tingling, weakness, or loss of movement in your face, arm, or leg, especially on only one side of your body. ? Sudden vision changes. ? Sudden trouble speaking. ? Sudden confusion or trouble understanding simple statements. ? Sudden problems with walking or balance. ? A sudden, severe headache that is different from past headaches.    Call your doctor now or seek immediate medical care if:    · Vertigo occurs with a fever, a headache, or ringing in your ears.     · You have new or increased nausea and vomiting.    Watch closely for changes in your health, and be sure to contact your doctor if:    · Vertigo gets worse or happens more often.     · Vertigo has not gotten better after 2 weeks. Where can you learn more? Go to http://bg-gurpreet.info/. Enter B359 in the search box to learn more about \"Vertigo: Care Instructions. \"  Current as of: March 27, 2018  Content Version: 11.9  © 9392-5398 [employer]. Care instructions adapted under license by [employer] (which disclaims liability or warranty for this information). If you have questions about a medical condition or this instruction, always ask your healthcare professional. [address] any warranty or liability for your use of this information.           Please keep your follow-up appointment with Jenaro MD Claribel.     Follow-up Disposition:  Return if symptoms worsen or fail to improve. There are no preventive care reminders to display for this patient. I have discussed the diagnosis with the patient and the intended plan as seen in the above orders. Patient is in agreement. The patient has received an after-visit summary and questions were answered concerning future plans. I have discussed medication side effects and warnings with the patient as well. Warning signs for the above conditions were discussed including when to call our office or go to the emergency room. The nurse provided the patient and/or family with advanced directive information if needed and encouraged the patient to provide a copy to the office when available.

## 2019-02-22 NOTE — PROGRESS NOTES
Deepak Dougherty  Identified pt with two pt identifiers(name and ). Chief Complaint   Patient presents with    Ear Pain     left       Reviewed record In preparation for visit and have obtained necessary documentation. Has info on advanced directive but has not filled them out. 1. Have you been to the ER, urgent care clinic or hospitalized since your last visit? No     2. Have you seen or consulted any other health care providers outside of the [address] [place] [address] since your last visit? Include any pap smears or colon screening. No    Vitals reviewed with provider. Health Maintenance reviewed: There are no preventive care reminders to display for this patient.        Wt Readings from Last 3 Encounters:   19 227 lb 3.2 oz (103.1 kg)   19 227 lb (103 kg)   18 232 lb (105.2 kg)        Temp Readings from Last 3 Encounters:   19 98 °F (36.7 °C) (Oral)   19 98.6 °F (37 °C) (Oral)   18 97.7 °F (36.5 °C)        BP Readings from Last 3 Encounters:   19 134/66   19 130/82   18 126/63        Pulse Readings from Last 3 Encounters:   19 72   19 79   18 (!) 55        Vitals:    19 0827   BP: 134/66   Pulse: 72   Resp: 18   Temp: 98 °F (36.7 °C)   TempSrc: Oral   SpO2: 95%   Weight: 227 lb 3.2 oz (103.1 kg)   Height: 5' 9\" (1.753 m)   PainSc:   8   PainLoc: Ear          Learning Assessment:   :       Learning Assessment 10/5/2017 2014   PRIMARY LEARNER Patient Patient   HIGHEST LEVEL OF EDUCATION - PRIMARY LEARNER  - DID NOT GRADUATE HIGH SCHOOL   BARRIERS PRIMARY LEARNER - NONE   CO-LEARNER CAREGIVER - No   PRIMARY LANGUAGE ENGLISH ENGLISH    NEED - No   LEARNER PREFERENCE PRIMARY LISTENING LISTENING     DEMONSTRATION -     READING -   ANSWERED BY patient Patient   RELATIONSHIP SELF SELF        Depression Screening:   :       3 most recent PHQ Screens 2019   Little interest or pleasure in doing things Not at all   Feeling down, depressed, irritable, or hopeless Not at all   Total Score PHQ 2 0        Fall Risk Assessment:   :       Fall Risk Assessment, last 12 mths 10/30/2018   Able to walk? Yes   Fall in past 12 months? No   Fall with injury? -        Abuse Screening:   :       Abuse Screening Questionnaire 9/4/2018 7/3/2018 10/5/2017   Do you ever feel afraid of your partner? N N N   Are you in a relationship with someone who physically or mentally threatens you? N N N   Is it safe for you to go home?  Y Y Y        ADL Screening:   :       ADL Assessment 7/3/2018   Feeding yourself No Help Needed   Getting from bed to chair No Help Needed   Getting dressed No Help Needed   Bathing or showering No Help Needed   Walk across the room (includes cane/walker) No Help Needed   Using the telphone No Help Needed   Taking your medications No Help Needed   Preparing meals No Help Needed   Managing money (expenses/bills) No Help Needed   Moderately strenuous housework (laundry) No Help Needed   Shopping for personal items (toiletries/medicines) No Help Needed   Shopping for groceries No Help Needed   Driving No Help Needed   Climbing a flight of stairs No Help Needed   Getting to places beyond walking distances No Help Needed

## 2019-03-05 ENCOUNTER — OFFICE VISIT (OUTPATIENT)
Dept: INTERNAL MEDICINE CLINIC | Facility: CLINIC | Age: 79
End: 2019-03-05

## 2019-03-05 VITALS
DIASTOLIC BLOOD PRESSURE: 67 MMHG | SYSTOLIC BLOOD PRESSURE: 124 MMHG | WEIGHT: 230 LBS | HEIGHT: 69 IN | RESPIRATION RATE: 18 BRPM | BODY MASS INDEX: 34.07 KG/M2 | TEMPERATURE: 98 F | OXYGEN SATURATION: 96 % | HEART RATE: 72 BPM

## 2019-03-05 DIAGNOSIS — I50.40 COMBINED SYSTOLIC AND DIASTOLIC CONGESTIVE HEART FAILURE, UNSPECIFIED HF CHRONICITY (HCC): ICD-10-CM

## 2019-03-05 DIAGNOSIS — E78.2 MIXED HYPERLIPIDEMIA: ICD-10-CM

## 2019-03-05 DIAGNOSIS — R73.02 IGT (IMPAIRED GLUCOSE TOLERANCE): ICD-10-CM

## 2019-03-05 DIAGNOSIS — H66.92 LEFT OTITIS MEDIA, UNSPECIFIED OTITIS MEDIA TYPE: ICD-10-CM

## 2019-03-05 DIAGNOSIS — C61 PROSTATE CANCER (HCC): ICD-10-CM

## 2019-03-05 DIAGNOSIS — I10 ESSENTIAL HYPERTENSION: Primary | ICD-10-CM

## 2019-03-05 DIAGNOSIS — I42.8 NICM (NONISCHEMIC CARDIOMYOPATHY) (HCC): ICD-10-CM

## 2019-03-05 DIAGNOSIS — J44.9 CHRONIC OBSTRUCTIVE PULMONARY DISEASE, UNSPECIFIED COPD TYPE (HCC): ICD-10-CM

## 2019-03-05 DIAGNOSIS — I48.92 ATRIAL FLUTTER, UNSPECIFIED TYPE (HCC): ICD-10-CM

## 2019-03-05 DIAGNOSIS — Z12.5 SCREENING FOR PROSTATE CANCER: ICD-10-CM

## 2019-03-05 DIAGNOSIS — M54.50 CHRONIC BILATERAL LOW BACK PAIN WITHOUT SCIATICA: ICD-10-CM

## 2019-03-05 DIAGNOSIS — G89.29 CHRONIC BILATERAL LOW BACK PAIN WITHOUT SCIATICA: ICD-10-CM

## 2019-03-05 RX ORDER — AMLODIPINE BESYLATE 2.5 MG/1
TABLET ORAL
Qty: 30 TAB | Refills: 0 | Status: SHIPPED | OUTPATIENT
Start: 2019-03-05 | End: 2019-04-04 | Stop reason: SDUPTHER

## 2019-03-05 RX ORDER — AMOXICILLIN 500 MG/1
CAPSULE ORAL
COMMUNITY
Start: 2019-02-25 | End: 2019-04-30 | Stop reason: ALTCHOICE

## 2019-03-05 NOTE — PATIENT INSTRUCTIONS
Back Care and Preventing Injuries: Care Instructions  Your Care Instructions    You can hurt your back doing many everyday activities: lifting a heavy box, bending down to garden, exercising at the gym, and even getting out of bed. But you can keep your back strong and healthy by doing some exercises. You also can follow a few tips for sitting, sleeping, and lifting to avoid hurting your back again. Talk to your doctor before you start an exercise program. Ask for help if you want to learn more about keeping your back healthy. Follow-up care is a key part of your treatment and safety. Be sure to make and go to all appointments, and call your doctor if you are having problems. It's also a good idea to know your test results and keep a list of the medicines you take. How can you care for yourself at home? · Stay at a healthy weight to avoid strain on your lower back. · Do not smoke. Smoking increases the risk of osteoporosis, which weakens the spine. If you need help quitting, talk to your doctor about stop-smoking programs and medicines. These can increase your chances of quitting for good. · Make sure you sleep in a position that maintains your back's normal curves and on a mattress that feels comfortable. Sleep on your side with a pillow between your knees, or sleep on your back with a pillow under your knees. These positions can reduce strain on your back. · When you get out of bed, lie on your side and bend both knees. Drop your feet over the edge of the bed as you push up with both arms. Scoot to the edge of the bed. Make sure your feet are in line with your rear end (buttocks), and then stand up. · If you must stand for a long time, put one foot on a stool, ledge, or box. Exercise to strengthen your back and other muscles  · Get at least 30 minutes of exercise on most days of the week. Walking is a good choice.  You also may want to do other activities, such as running, swimming, cycling, or playing tennis or team sports. · Stretch your back muscles. Here are few exercises to try:  ? Lie on your back with your knees bent and your feet flat on the floor. Gently pull one bent knee to your chest. Put that foot back on the floor, and then pull the other knee to your chest. Hold for 15 to 30 seconds. Repeat 2 to 4 times. ? Do pelvic tilts. Lie on your back with your knees bent. Tighten your stomach muscles. Pull your belly button (navel) in and up toward your ribs. You should feel like your back is pressing to the floor and your hips and pelvis are slightly lifting off the floor. Hold for 6 seconds while breathing smoothly. · Keep your core muscles strong. The muscles of your back, belly (abdomen), and buttocks support your spine. ? Pull in your belly, and imagine pulling your navel toward your spine. Hold this for 6 seconds, then relax. Remember to keep breathing normally as you tense your muscles. ? Do curl-ups. Always do them with your knees bent. Keep your low back on the floor, and curl your shoulders toward your knees using a smooth, slow motion. Keep your arms folded across your chest. If this bothers your neck, try putting your hands behind your neck (not your head), with your elbows spread apart. ? Lie on your back with your knees bent and your feet flat on the floor. Tighten your belly muscles, and then push with your feet and raise your buttocks up a few inches. Hold this position 6 seconds as you continue to breathe normally, then lower yourself slowly to the floor. Repeat 8 to 12 times. ? If you like group exercise, try Pilates or yoga. These classes have poses that strengthen the core muscles. Protect your back when you sit  · Place a small pillow, a rolled-up towel, or a lumbar roll in the curve of your back if you need extra support. · Sit in a chair that is low enough to let you place both feet flat on the floor with both knees nearly level with your hips.  If your chair or desk is too high, use a foot rest to raise your knees. · When driving, keep your knees nearly level with your hips. Sit straight, and drive with both hands on the steering wheel. Your arms should be in a slightly bent position. · Try a kneeling chair, which helps tilt your hips forward. This takes pressure off your lower back. · Try sitting on an exercise ball. It can rock from side to side, which helps keep your back loose. Lift properly  · Squat down, bending at the hips and knees only. If you need to, put one knee to the floor and extend your other knee in front of you, bent at a right angle (half kneeling). · Press your chest straight forward. This helps keep your upper back straight while keeping a slight arch in your low back. · Hold the load as close to your body as possible, at the level of your navel. · Use your feet to change direction, taking small steps. · Lead with your hips as you change direction. Keep your shoulders in line with your hips as you move. Do not twist your body. · Set down your load carefully, squatting with your knees and hips only. When should you call for help? Watch closely for changes in your health, and be sure to contact your doctor if you have any problems. Where can you learn more? Go to http://bg-gurpreet.info/. Enter S810 in the search box to learn more about \"Back Care and Preventing Injuries: Care Instructions. \"  Current as of: September 20, 2018  Content Version: 11.9  © 7542-0964 [employer]. Care instructions adapted under license by [employer] (which disclaims liability or warranty for this information). If you have questions about a medical condition or this instruction, always ask your healthcare professional. [place] [address] any warranty or liability for your use of this information.

## 2019-03-25 RX ORDER — CARVEDILOL 25 MG/1
TABLET ORAL
Qty: 60 TAB | Refills: 6 | Status: SHIPPED | OUTPATIENT
Start: 2019-03-25 | End: 2019-03-25 | Stop reason: SDUPTHER

## 2019-03-25 RX ORDER — CARVEDILOL 25 MG/1
TABLET ORAL
Qty: 60 TAB | Refills: 0 | Status: SHIPPED | OUTPATIENT
Start: 2019-03-25 | End: 2019-04-09 | Stop reason: SDUPTHER

## 2019-03-29 DIAGNOSIS — I10 ESSENTIAL HYPERTENSION: ICD-10-CM

## 2019-03-29 DIAGNOSIS — E78.2 MIXED HYPERLIPIDEMIA: ICD-10-CM

## 2019-03-29 DIAGNOSIS — R73.02 IGT (IMPAIRED GLUCOSE TOLERANCE): ICD-10-CM

## 2019-03-29 DIAGNOSIS — Z12.5 SCREENING FOR PROSTATE CANCER: ICD-10-CM

## 2019-04-03 LAB
ALBUMIN SERPL-MCNC: 4.2 G/DL (ref 3.5–4.8)
ALBUMIN/GLOB SERPL: 1.6 {RATIO} (ref 1.2–2.2)
ALP SERPL-CCNC: 85 IU/L (ref 39–117)
ALT SERPL-CCNC: 18 IU/L (ref 0–44)
AST SERPL-CCNC: 22 IU/L (ref 0–40)
BASOPHILS # BLD AUTO: 0 X10E3/UL (ref 0–0.2)
BASOPHILS NFR BLD AUTO: 1 %
BILIRUB SERPL-MCNC: 0.4 MG/DL (ref 0–1.2)
BUN SERPL-MCNC: 12 MG/DL (ref 8–27)
BUN/CREAT SERPL: 11 (ref 10–24)
CALCIUM SERPL-MCNC: 9.6 MG/DL (ref 8.6–10.2)
CHLORIDE SERPL-SCNC: 105 MMOL/L (ref 96–106)
CHOLEST SERPL-MCNC: 77 MG/DL (ref 100–199)
CO2 SERPL-SCNC: 24 MMOL/L (ref 20–29)
CREAT SERPL-MCNC: 1.08 MG/DL (ref 0.76–1.27)
EOSINOPHIL # BLD AUTO: 0.2 X10E3/UL (ref 0–0.4)
EOSINOPHIL NFR BLD AUTO: 4 %
ERYTHROCYTE [DISTWIDTH] IN BLOOD BY AUTOMATED COUNT: 15 % (ref 12.3–15.4)
EST. AVERAGE GLUCOSE BLD GHB EST-MCNC: 143 MG/DL
GLOBULIN SER CALC-MCNC: 2.6 G/DL (ref 1.5–4.5)
GLUCOSE SERPL-MCNC: 107 MG/DL (ref 65–99)
HBA1C MFR BLD: 6.6 % (ref 4.8–5.6)
HCT VFR BLD AUTO: 34.8 % (ref 37.5–51)
HDLC SERPL-MCNC: 34 MG/DL
HGB BLD-MCNC: 10.9 G/DL (ref 13–17.7)
IMM GRANULOCYTES # BLD AUTO: 0 X10E3/UL (ref 0–0.1)
IMM GRANULOCYTES NFR BLD AUTO: 0 %
LDLC SERPL CALC-MCNC: 29 MG/DL (ref 0–99)
LYMPHOCYTES # BLD AUTO: 0.9 X10E3/UL (ref 0.7–3.1)
LYMPHOCYTES NFR BLD AUTO: 23 %
MCH RBC QN AUTO: 25.6 PG (ref 26.6–33)
MCHC RBC AUTO-ENTMCNC: 31.3 G/DL (ref 31.5–35.7)
MCV RBC AUTO: 82 FL (ref 79–97)
MONOCYTES # BLD AUTO: 0.2 X10E3/UL (ref 0.1–0.9)
MONOCYTES NFR BLD AUTO: 6 %
NEUTROPHILS # BLD AUTO: 2.5 X10E3/UL (ref 1.4–7)
NEUTROPHILS NFR BLD AUTO: 66 %
PLATELET # BLD AUTO: 127 X10E3/UL (ref 150–379)
POTASSIUM SERPL-SCNC: 4.7 MMOL/L (ref 3.5–5.2)
PROT SERPL-MCNC: 6.8 G/DL (ref 6–8.5)
PSA SERPL-MCNC: 0.3 NG/ML (ref 0–4)
RBC # BLD AUTO: 4.26 X10E6/UL (ref 4.14–5.8)
SODIUM SERPL-SCNC: 142 MMOL/L (ref 134–144)
TRIGL SERPL-MCNC: 68 MG/DL (ref 0–149)
TSH SERPL DL<=0.005 MIU/L-ACNC: 1.44 UIU/ML (ref 0.45–4.5)
VLDLC SERPL CALC-MCNC: 14 MG/DL (ref 5–40)
WBC # BLD AUTO: 3.8 X10E3/UL (ref 3.4–10.8)

## 2019-04-09 ENCOUNTER — OFFICE VISIT (OUTPATIENT)
Dept: INTERNAL MEDICINE CLINIC | Facility: CLINIC | Age: 79
End: 2019-04-09

## 2019-04-09 VITALS
BODY MASS INDEX: 32.91 KG/M2 | HEART RATE: 61 BPM | WEIGHT: 222.2 LBS | HEIGHT: 69 IN | DIASTOLIC BLOOD PRESSURE: 62 MMHG | TEMPERATURE: 98 F | SYSTOLIC BLOOD PRESSURE: 101 MMHG | OXYGEN SATURATION: 94 % | RESPIRATION RATE: 18 BRPM

## 2019-04-09 DIAGNOSIS — I50.40 COMBINED SYSTOLIC AND DIASTOLIC CONGESTIVE HEART FAILURE, UNSPECIFIED HF CHRONICITY (HCC): ICD-10-CM

## 2019-04-09 DIAGNOSIS — J44.9 CHRONIC OBSTRUCTIVE PULMONARY DISEASE, UNSPECIFIED COPD TYPE (HCC): ICD-10-CM

## 2019-04-09 DIAGNOSIS — E11.9 TYPE 2 DIABETES MELLITUS WITHOUT COMPLICATION, WITHOUT LONG-TERM CURRENT USE OF INSULIN (HCC): Primary | ICD-10-CM

## 2019-04-09 DIAGNOSIS — I10 ESSENTIAL HYPERTENSION: ICD-10-CM

## 2019-04-09 NOTE — PROGRESS NOTES
CC:  
Chief Complaint Patient presents with  Results  Hypertension HISTORY OF PRESENT ILLNESS Ruslan Hester is a 70 y.o. male. Presents for 1 month follow up on HTN and to discuss labs.  Today is his birthday. He has HTN, hyperlipidemia, CKD stage 3, CHF (combined diastolic and systolic) with echo EF 70-28% in 7/17, non-ischemic cardiomyopathy, atrial flutter s/p AFL ablation on 8/22/17 now in NSR and off anticoagulation, prediabetes, COPD, chronic low back pain, and prostate cancer.   
  
He has complaints. Follows with Dr. Pedro Jaime chronic low back pain, received epidural steroid injections that helped. Reports improved back pain once he got a new mattress. 
  
 Has 10-yr history of prediabetes and \"borderline\" diabetes. Has never been on medication. Admits to eating a lot of sweets. Denies polydipsia, polyuria, fatigue, or numbness/tingling/burning at feet. Other Providers: Dr. Citlali Zazueta (Cardiology), Dr. Dariusz Danielson (Cardiology EPS), Dr. Alfredo Mccormick (Urology), Dr. Leon Valencia (Nephrology), Dr. Selwyn Coughlin (Ophthalmology), Dr. Elner Moritz (ENT) 
   
Soc Hx 
. Tamia Atkinson 2 children and 3 grandchildren.  Retired .  He chews tobacco.  Quit smoking 8/7/17; previously smoked 1.5-2 ppd for 50 yrs. Stopped drinking alcohol in 2008.  Denies recreational drug use.  Does not get much exercise. 
  
ROS A complete review of systems was performed and is negative except for those mentioned in the HPI. 
  
Patient Active Problem List  
Diagnosis Code  Combined systolic and diastolic congestive heart failure (HCC) I50.40  S/P cardiac cath Z98.890  
 NICM (nonischemic cardiomyopathy) ([facility] I42.8  Hypertension I10  
 Mixed hyperlipidemia E78.2  Smokeless tobacco use Z72.0  Atrial flutter (HCC) I48.92  
 Stage 3 chronic kidney disease (HCC) N18.3  Prediabetes R73.03  
 Prostate cancer ([facility] [facility] C61  
 COPD (chronic obstructive pulmonary disease) ([facility] [facility] J44.9  Chronic low back pain M54.5, G89.29  
 Obesity (BMI 30-39. 9) E66.9  
 SOB (shortness of breath) R06.02  
 Candidal intertrigo B37.2  Combined forms of age-related cataract of right eye H25.811  
 Combined forms of age-related cataract of left eye H25.812 Past Medical History:  
Diagnosis Date  Acute respiratory failure with hypoxia ([facility] 02/08/2014  
 also 11/28/15, 2/17/16, 5/14/17  Alcohol abuse As of 11/29/18 pt stated he quit 20 years  Arthritis Back and shoulder  Atrial flutter ([address] 08/2017  
 saw Dr. Magdy Davis; underwent a-flutter ablation  BPH (benign prostatic hyperplasia)  CHF (congestive heart failure) ([facility] [address] 02/11/2014 TTE 11/15: EF 40-45%, 2/14: EF 20%  Admitted for acute exacerbations 2/8/14, 11/28/15, 2/17/16, and 5/4/17)  Chronic low back pain LS spine X-ray 4/8/17: mild DDD  COPD (chronic obstructive pulmonary disease) ([facility])  PA (dyspnea on exertion)  ED (erectile dysfunction)  Elevated troponin 02/11/2014  
 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  
 Hypertension  Kidney disease, chronic, stage III (GFR 30-59 ml/min) ([facility])  Leg fracture, right 1973  Nonischemic cardiomyopathy ([address] with LVH  Pneumonia 02/08/2014 2/8/14 and 10/30/15  Poor historian As of 11/29/18 pt reports 5th grade education, pt unable to give med list-obtained from wife per patient's permission  Prediabetes  Prostate cancer ([facility] 2016 As of 11/29/18, pt states he gets two injections twice a year for this  Pulmonary edema 11/28/2015  S/P cardiac cath 2/12/2014 2/12/14 no significant coronary disease, severe LV dysfunction  Tinea cruris   
 also genital folliculitis  Vertigo Allergies Allergen Reactions  Oxycodone Contact Dermatitis Current Outpatient Medications Medication Sig Dispense Refill  carvedilol (COREG) 25 mg tablet TAKE 1 TABLET BY MOUTH TWICE DAILY WITH MEALS 60 Tab 0  
 amoxicillin (AMOXIL) 500 mg capsule  OTHER Unknown ear drops  atorvastatin (LIPITOR) 40 mg tablet TAKE 1 TABLET BY MOUTH NIGHTLY 90 Tab 1  
 furosemide (LASIX) 40 mg tablet TAKE ONE TABLET BY MOUTH DAILY - DOSE REDUSED 12/17/15 30 Tab 6  clotrimazole-betamethasone (LOTRISONE) topical cream Apply  to affected area two (2) times a day. 45 g 3  
 amLODIPine (NORVASC) 2.5 mg tablet TAKE 1 TABLET BY MOUTH ONCE DAILY 30 Tab 0  carvedilol (COREG) 25 mg tablet TAKE ONE TABLET BY MOUTH TWICE DAILY WITH MEALS 60 Tab 6  
 aspirin 81 mg chewable tablet Take 1 Tab by mouth daily. 10 Tab 0 PHYSICAL EXAM 
Visit Vitals /62 (BP 1 Location: Left arm, BP Patient Position: Sitting) Pulse 61 Temp 98 °F (36.7 °C) (Oral) Resp 18 Ht 5' 9\" (1.753 m) Wt 222 lb 3.2 oz (100.8 kg) SpO2 94% BMI 32.81 kg/m² General: Pleasant, obese, no distress. HEENT:  Head normocephalic/atraumatic, no scleral icterus Lungs:  Clear to ausculation bilaterally. Good air movement. Heart:  Regular rate and rhythm, normal S1 and S2, no murmur, gallop, or rub Extremities: No clubbing, cyanosis, or edema. Neurological: Alert and oriented. Psychiatric: Normal mood and affect. Behavior is normal.  
Diabetic foot exam:  
 
Left Foot: 
 Visual Exam: callous - at plantar surface, dry feet, long toenails Pulse DP: 1+ (weak) Filament test: normal sensation Vibratory sensation: normal 
   
Right Foot: Visual Exam: callous - small callus, dry feet, long toenails Pulse DP: 1+ (weak) Filament test: normal sensation Vibratory sensation: normal 
 
 
 
Results for orders placed or performed in visit on 03/29/19 PSA SCREENING (SCREENING) Result Value Ref Range Prostate Specific Ag 0.3 0.0 - 4.0 ng/mL LIPID PANEL Result Value Ref Range Cholesterol, total 77 (L) 100 - 199 mg/dL Triglyceride 68 0 - 149 mg/dL HDL Cholesterol 34 (L) >39 mg/dL VLDL, calculated 14 5 - 40 mg/dL LDL, calculated 29 0 - 99 mg/dL HEMOGLOBIN A1C WITH EAG Result Value Ref Range Hemoglobin A1c 6.6 (H) 4.8 - 5.6 % Estimated average glucose 143 mg/dL CBC WITH AUTOMATED DIFF Result Value Ref Range WBC 3.8 3.4 - 10.8 x10E3/uL  
 RBC 4.26 4.14 - 5.80 x10E6/uL HGB 10.9 (L) 13.0 - 17.7 g/dL HCT 34.8 (L) 37.5 - 51.0 % MCV 82 79 - 97 fL  
 MCH 25.6 (L) 26.6 - 33.0 pg  
 MCHC 31.3 (L) 31.5 - 35.7 g/dL  
 RDW 15.0 12.3 - 15.4 % PLATELET 538 (L) 660 - 379 x10E3/uL NEUTROPHILS 66 Not Estab. % Lymphocytes 23 Not Estab. % MONOCYTES 6 Not Estab. % EOSINOPHILS 4 Not Estab. % BASOPHILS 1 Not Estab. %  
 ABS. NEUTROPHILS 2.5 1.4 - 7.0 x10E3/uL Abs Lymphocytes 0.9 0.7 - 3.1 x10E3/uL  
 ABS. MONOCYTES 0.2 0.1 - 0.9 x10E3/uL  
 ABS. EOSINOPHILS 0.2 0.0 - 0.4 x10E3/uL  
 ABS. BASOPHILS 0.0 0.0 - 0.2 x10E3/uL IMMATURE GRANULOCYTES 0 Not Estab. %  
 ABS. IMM. GRANS. 0.0 0.0 - 0.1 x10E3/uL METABOLIC PANEL, COMPREHENSIVE Result Value Ref Range Glucose 107 (H) 65 - 99 mg/dL BUN 12 8 - 27 mg/dL Creatinine 1.08 0.76 - 1.27 mg/dL GFR est non-AA 66 >59 mL/min/1.73 GFR est AA 77 >59 mL/min/1.73  
 BUN/Creatinine ratio 11 10 - 24 Sodium 142 134 - 144 mmol/L Potassium 4.7 3.5 - 5.2 mmol/L Chloride 105 96 - 106 mmol/L  
 CO2 24 20 - 29 mmol/L Calcium 9.6 8.6 - 10.2 mg/dL Protein, total 6.8 6.0 - 8.5 g/dL Albumin 4.2 3.5 - 4.8 g/dL GLOBULIN, TOTAL 2.6 1.5 - 4.5 g/dL A-G Ratio 1.6 1.2 - 2.2 Bilirubin, total 0.4 0.0 - 1.2 mg/dL Alk. phosphatase 85 39 - 117 IU/L  
 AST (SGOT) 22 0 - 40 IU/L  
 ALT (SGPT) 18 0 - 44 IU/L  
TSH RFX ON ABNORMAL TO FREE T4 Result Value Ref Range TSH 1.440 0.450 - 4.500 uIU/mL  
 
 
 
ASSESSMENT AND PLAN 
  ICD-10-CM ICD-9-CM 1. Type 2 diabetes mellitus without complication, without long-term current use of insulin (HCC) E11.9 250.00  DIABETES FOOT EXAM  
   MICROALBUMIN, UR, RAND W/ MICROALB/CREAT RATIO  
   REFERRAL TO PODIATRY 2. Essential hypertension I10 401.9 3. Combined systolic and diastolic congestive heart failure, unspecified HF chronicity (HCC) I50.40 428.40   
4. Chronic obstructive pulmonary disease, unspecified COPD type ([facility] J44.9 496 Discussed labs from 4/2/19 with patient. Normal CMP, TSH, FLP (tot chol 77, LDL 29), and PSA.  Mild anemia, unchanged from a yr ago. Willard Morrow diagnosed type 2 DM (prediabetes moved to DM) with A1c 6.6%, was 6.3% a yr ago. Diagnoses and all orders for this visit: 
 
1. Type 2 diabetes mellitus without complication, without long-term current use of insulin ([address] A1c 6.7% on 4/2/19. Counseled on diet, exercise, and weight loss. Will give 3 month trial of lifestyle modification. If no improvement plan to start on Jardiance or Invokana. -      DIABETES FOOT EXAM 
-     MICROALBUMIN, UR, RAND W/ MICROALB/CREAT RATIO 
-     REFERRAL TO PODIATRY 2. Essential hypertension Well-controlled. Continue present management. 3. Combined systolic and diastolic congestive heart failure, unspecified HF chronicity ([address] Stable. Continue present management. 4. Chronic obstructive pulmonary disease, unspecified COPD type ([address] Stable. Continue present management. Follow-up and Dispositions · Return in about 3 months (around 7/9/2019), or if symptoms worsen or fail to improve, for DM, HTN,, POC A1c. I have discussed the diagnosis with the patient and the intended plan as seen in the above orders. Patient is in agreement. The patient has received an after-visit summary and questions were answered concerning future plans. I have discussed medication side effects and warnings with the patient as well.

## 2019-04-09 NOTE — PROGRESS NOTES
Twan Wayne  Identified pt with two pt identifiers(name and ). Chief Complaint Patient presents with  Results  Hypertension 1. Have you been to the ER, urgent care clinic since your last visit? Hospitalized since your last visit? NO 
 
2. Have you seen or consulted any other health care providers outside of the [address] [place] [address] since your last visit? Include any pap smears or colon screening. NO Today's provider has been notified of reason for visit, vitals and flowsheets obtained on patients. Patient received paperwork for advance directive during previous visit but has not completed at this time Reviewed record In preparation for visit, huddled with provider and have obtained necessary documentation Health Maintenance Due Topic  FOOT EXAM Q1   
 MICROALBUMIN Q1   
 EYE EXAM RETINAL OR DILATED Wt Readings from Last 3 Encounters:  
19 222 lb 3.2 oz (100.8 kg) 19 230 lb (104.3 kg) 19 227 lb 3.2 oz (103.1 kg) Temp Readings from Last 3 Encounters:  
19 98 °F (36.7 °C) (Oral) 19 98 °F (36.7 °C) (Oral) 19 98 °F (36.7 °C) (Oral) BP Readings from Last 3 Encounters:  
19 101/62  
19 124/67  
19 134/66 Pulse Readings from Last 3 Encounters:  
19 61  
19 72  
19 72 Vitals:  
 19 7554 BP: 101/62 Pulse: 61 Resp: 18 Temp: 98 °F (36.7 °C) TempSrc: Oral  
SpO2: 94% Weight: 222 lb 3.2 oz (100.8 kg) Height: 5' 9\" (1.753 m) PainSc:   0 - No pain Learning Assessment: 
:  
 
Learning Assessment 10/5/2017 2014 PRIMARY LEARNER Patient Patient HIGHEST LEVEL OF EDUCATION - PRIMARY LEARNER  - DID NOT GRADUATE HIGH SCHOOL  
BARRIERS PRIMARY LEARNER - NONE  
CO-LEARNER CAREGIVER - No  
PRIMARY LANGUAGE ENGLISH ENGLISH  NEED - No  
LEARNER PREFERENCE PRIMARY LISTENING LISTENING  
  DEMONSTRATION -  
  READING -  
 ANSWERED BY patient Patient RELATIONSHIP SELF SELF Depression Screening: 
:  
 
3 most recent PHQ Screens 4/9/2019 Little interest or pleasure in doing things Not at all Feeling down, depressed, irritable, or hopeless Not at all Total Score PHQ 2 0 Fall Risk Assessment: 
:  
 
Fall Risk Assessment, last 12 mths 3/5/2019 Able to walk? Yes Fall in past 12 months? No  
Fall with injury? -  
 
 
Abuse Screening: 
:  
 
Abuse Screening Questionnaire 9/4/2018 7/3/2018 10/5/2017 Do you ever feel afraid of your partner? N N N Are you in a relationship with someone who physically or mentally threatens you? N N N Is it safe for you to go home? Arjun Goldberg  
 
 
ADL Screening: 
:  
 
ADL Assessment 7/3/2018 Feeding yourself No Help Needed Getting from bed to chair No Help Needed Getting dressed No Help Needed Bathing or showering No Help Needed Walk across the room (includes cane/walker) No Help Needed Using the telphone No Help Needed Taking your medications No Help Needed Preparing meals No Help Needed Managing money (expenses/bills) No Help Needed Moderately strenuous housework (laundry) No Help Needed Shopping for personal items (toiletries/medicines) No Help Needed Shopping for groceries No Help Needed Driving No Help Needed Climbing a flight of stairs No Help Needed Getting to places beyond walking distances No Help Needed Medication reconciliation up to date and corrected with patient at this time.

## 2019-04-09 NOTE — PROGRESS NOTES
Will discuss lab results at 4/9/19 clinic visit. Normal CMP, TSH, FLP (tot chol 77, LDL 29), and PSA. Mild anemia, unchanged from a yr ago. Newly diagnosed type 2 DM (prediabetes moved to DM) with A1c 6.6%, was 6.3% a yr ago.

## 2019-04-10 LAB
ALBUMIN/CREAT UR: <2.8 MG/G CREAT (ref 0–30)
CREAT UR-MCNC: 108.3 MG/DL
MICROALBUMIN UR-MCNC: <3 UG/ML

## 2019-04-11 PROBLEM — E11.9 TYPE 2 DIABETES MELLITUS WITHOUT COMPLICATION, WITHOUT LONG-TERM CURRENT USE OF INSULIN (HCC): Status: ACTIVE | Noted: 2017-10-08

## 2019-04-30 ENCOUNTER — OFFICE VISIT (OUTPATIENT)
Dept: CARDIOLOGY CLINIC | Age: 79
End: 2019-04-30

## 2019-04-30 VITALS
RESPIRATION RATE: 18 BRPM | SYSTOLIC BLOOD PRESSURE: 120 MMHG | WEIGHT: 223.9 LBS | HEIGHT: 69 IN | OXYGEN SATURATION: 98 % | DIASTOLIC BLOOD PRESSURE: 62 MMHG | BODY MASS INDEX: 33.16 KG/M2 | HEART RATE: 55 BPM

## 2019-04-30 DIAGNOSIS — I50.40 COMBINED SYSTOLIC AND DIASTOLIC CONGESTIVE HEART FAILURE, UNSPECIFIED HF CHRONICITY (HCC): ICD-10-CM

## 2019-04-30 DIAGNOSIS — I42.8 NICM (NONISCHEMIC CARDIOMYOPATHY) (HCC): Primary | ICD-10-CM

## 2019-04-30 DIAGNOSIS — E78.2 MIXED HYPERLIPIDEMIA: ICD-10-CM

## 2019-04-30 DIAGNOSIS — I10 ESSENTIAL HYPERTENSION: ICD-10-CM

## 2019-04-30 DIAGNOSIS — I48.92 ATRIAL FLUTTER, UNSPECIFIED TYPE (HCC): ICD-10-CM

## 2019-04-30 RX ORDER — ALBUTEROL SULFATE 90 UG/1
2 AEROSOL, METERED RESPIRATORY (INHALATION)
COMMUNITY
Start: 2016-12-29 | End: 2020-02-13

## 2019-04-30 RX ORDER — OFLOXACIN 3 MG/ML
SOLUTION/ DROPS OPHTHALMIC
COMMUNITY
Start: 2019-03-18 | End: 2019-05-07

## 2019-04-30 NOTE — PATIENT INSTRUCTIONS
Your colonoscopy is dated for May 9, 2019. Starting on May 4 through May 8 and the morning of May 9 you are not to take any aspirin during this 5-day period. You may resume your aspirin the evening of your colonoscopy which would be around dinnertime on May 9.

## 2019-04-30 NOTE — PROGRESS NOTES
1. Have you been to the ER, urgent care clinic since your last visit? Hospitalized since your last visit? No    2. Have you seen or consulted any other health care providers outside of the [address] [place] [address] since your last visit? Include any pap smears or colon screening.  No     Chief Complaint   Patient presents with    Cholesterol Problem     6 mo follow up    Hypertension     6 mo follow up

## 2019-04-30 NOTE — PROGRESS NOTES
Terry Herr DNP, ANP-BC  Subjective/HPI:     Levi Torres is a 70 y.o. male is here for routine f/u. The patient denies chest pain/ shortness of breath, orthopnea, PND, LE edema, palpitations, syncope, presyncope or fatigue. Patient reports feeling well in his usual state of health with the exception of some intermittent musculoskeletal back pain. To recall he is a 65-year-old male with a history of nonischemic cardiomyopathy cardiac catheterization in 2014 demonstrated a 20% proximal LAD lesion which she has been medically treated for with beta-blocker, aspirin and statin therapy. Atrial flutter ablation  reports doing well, no issues with medications. He has an upcoming colonoscopy and requesting clearance.       PCP Provider  Joseph Lopez MD  Past Medical History:   Diagnosis Date    Acute respiratory failure with hypoxia ([facility] 02/08/2014    also 11/28/15, 2/17/16, 5/14/17     Alcohol abuse     As of 11/29/18 pt stated he quit 20 years    Arthritis     Back and shoulder    Atrial flutter ([facility] 08/2017    saw Dr. Cavanaugh Parents; underwent a-flutter ablation    BPH (benign prostatic hyperplasia)     CHF (congestive heart failure) ([facility] [facility] 02/11/2014    TTE 11/15: EF 40-45%, 2/14: EF 20%  Admitted for acute exacerbations 2/8/14, 11/28/15, 2/17/16, and 5/4/17)    Chronic low back pain     LS spine X-ray 4/8/17: mild DDD    COPD (chronic obstructive pulmonary disease) ([address]     PA (dyspnea on exertion)     ED (erectile dysfunction)     Elevated troponin 02/11/2014    also 11/28/15; due to cardiac strain    Frequent hospital admissions     5/14-5/23/17: acute hypoxic resp failure due to CHF exac, ROBINSON on CKD 3, sepsis due to LE cellulitis, 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  2/17-2/22/16: acute hypoxic resp failure, acute diarrhea  11/28-11/30/15: acute hypoxic resp failure due to acute CHF exac, elevated troponin due to cardiac strain  2/8-2/12/14: acuter hypoxic resp failure due to CHF exac, pneumonia     Hand blister 10/8/2017    Bilateral    Hypertension     Kidney disease, chronic, stage III (GFR 30-59 ml/min) ([facility])     Leg fracture, right 1973    Nonischemic cardiomyopathy ([facility]     with LVH    Pneumonia 02/08/2014 2/8/14 and 10/30/15    Poor historian     As of 11/29/18 pt reports 5th grade education, pt unable to give med list-obtained from wife per patient's permission    Prediabetes     Prostate cancer ([facility] 2016    As of 11/29/18, pt states he gets two injections twice a year for this    Pulmonary edema 11/28/2015    S/P cardiac cath 2/12/2014 2/12/14 no significant coronary disease, severe LV dysfunction    Tinea cruris     also genital folliculitis    Vertigo       Past Surgical History:   Procedure Laterality Date    CARDIAC SURG PROCEDURE UNLIST  08/22/2017    SVT ablation    COLONOSCOPY,DIAGNOSTIC  02/22/2016    Dr. Rashad Aguirre; single sessile polyp at descending colon, sigmoid diverticulosis, int hemorrhoids    COLONOSCOPY,REMSUZI BENAVIDES,SNARE  2/22/2016         HX CATARACT REMOVAL Right 12/05/2018    Dr. Kellie Cardenas Left 12/12/2018    Dr. Ami Restrepo. LEFT EYE SECOND REFRATIVE CATARACT REMOVAL WITH LENS IMPLANTATION    HX ORTHOPAEDIC Right 1980's    index finger    HX OTHER SURGICAL  08/22/2017    Dr. La Nena Adkins; atrial flutter ablation    HX ROTATOR CUFF REPAIR Right 2000    HX TONSILLECTOMY  1948     Allergies   Allergen Reactions    Oxycodone Contact Dermatitis      Family History   Adopted: Yes   Family history unknown: Yes      Current Outpatient Medications   Medication Sig    albuterol (PROVENTIL HFA) 90 mcg/actuation inhaler Take 2 Puffs by inhalation.  atorvastatin (LIPITOR) 40 mg tablet TAKE 1 TABLET BY MOUTH NIGHTLY    furosemide (LASIX) 40 mg tablet TAKE ONE TABLET BY MOUTH DAILY - DOSE REDUSED 12/17/15    clotrimazole-betamethasone (LOTRISONE) topical cream Apply  to affected area two (2) times a day.     amLODIPine (NORVASC) 2.5 mg tablet TAKE 1 TABLET BY MOUTH ONCE DAILY    carvedilol (COREG) 25 mg tablet TAKE ONE TABLET BY MOUTH TWICE DAILY WITH MEALS    aspirin 81 mg chewable tablet Take 1 Tab by mouth daily.  ofloxacin (FLOXIN) 0.3 % ophthalmic solution     OTHER Unknown ear drops     No current facility-administered medications for this visit. Vitals:    19 0842 19 0851   BP: 114/58 120/62   Pulse: (!) 55    Resp: 18    SpO2: 98%    Weight: 223 lb 14.4 oz (101.6 kg)    Height: 5' 9\" (1.753 m)      Social History     Socioeconomic History    Marital status:      Spouse name: Not on file    Number of children: Not on file    Years of education: Not on file    Highest education level: Not on file   Occupational History    Not on file   Social Needs    Financial resource strain: Not on file    Food insecurity:     Worry: Not on file     Inability: Not on file    Transportation needs:     Medical: Not on file     Non-medical: Not on file   Tobacco Use    Smoking status: Former Smoker     Packs/day: 2.00     Years: 20.00     Pack years: 40.00     Types: Cigarettes     Last attempt to quit: 2017     Years since quittin.7    Smokeless tobacco: Former User     Types: Chew     Quit date: 2018    Tobacco comment: Chewing tobacco   Substance and Sexual Activity    Alcohol use:  Yes     Alcohol/week: 0.0 oz     Comment: As of 20, pt quit 20 years ago    Drug use: No    Sexual activity: Not on file   Lifestyle    Physical activity:     Days per week: Not on file     Minutes per session: Not on file    Stress: Not on file   Relationships    Social connections:     Talks on phone: Not on file     Gets together: Not on file     Attends Mandaen service: Not on file     Active member of club or organization: Not on file     Attends meetings of clubs or organizations: Not on file     Relationship status: Not on file    Intimate partner violence:     Fear of current or ex partner: Not on file     Emotionally abused: Not on file     Physically abused: Not on file     Forced sexual activity: Not on file   Other Topics Concern    Not on file   Social History Narrative    Not on file       I have reviewed the nurses notes, vitals, problem list, allergy list, medical history, family, social history and medications. Review of Symptoms:    General: Pt denies excessive weight gain or loss. Pt is able to conduct ADL's  HEENT: Denies blurred vision, headaches, epistaxis and difficulty swallowing. Respiratory: Denies shortness of breath, PA, wheezing or stridor. Cardiovascular: Denies precordial pain, palpitations, edema or PND  Gastrointestinal: Denies poor appetite, indigestion, abdominal pain or blood in stool  Musculoskeletal: Denies pain or swelling from muscles or joints the exception of back pain  Neurologic: Denies tremor, paresthesias, or sensory motor disturbance  Skin: Denies rash, itching or texture change. Physical Exam:      General: Well developed, in no acute distress, cooperative and alert  HEENT: No carotid bruits, no JVD, trach is midline. Neck Supple, PEERL, EOM intact. Heart:  Normal S1/S2 negative S3 or S4. Regular, no murmur, gallop or rub.   Respiratory: Clear bilaterally x 4, no wheezing or rales  Abdomen:   Soft, non-tender, no masses, bowel sounds are active.   Extremities:  No edema, normal cap refill, no cyanosis, atraumatic. Neuro: A&Ox3, speech clear, gait stable. Skin: Skin color is normal. No rashes or lesions.  Non diaphoretic  Vascular: 2+ pulses symmetric in all extremities    Cardiographics    ECG: Sinus bradycardia  Results for orders placed or performed during the hospital encounter of 10/02/17   EKG, 12 LEAD, INITIAL   Result Value Ref Range    Ventricular Rate 76 BPM    Atrial Rate 76 BPM    P-R Interval 196 ms    QRS Duration 120 ms    Q-T Interval 422 ms    QTC Calculation (Bezet) 474 ms    Calculated P Axis 57 degrees    Calculated R Axis 71 degrees Calculated T Axis 67 degrees    Diagnosis       Sinus rhythm with frequent and consecutive premature ventricular complexes  Possible Left atrial enlargement  Nonspecific intraventricular conduction delay    Confirmed by Deitra Or ([id number]) on 10/3/2017 9:56:33 PM           Cardiology Labs:  Lab Results   Component Value Date/Time    Cholesterol, total 77 (L) 04/02/2019 08:17 AM    HDL Cholesterol 34 (L) 04/02/2019 08:17 AM    LDL, calculated 29 04/02/2019 08:17 AM    Triglyceride 68 04/02/2019 08:17 AM       Lab Results   Component Value Date/Time    Sodium 142 04/02/2019 08:17 AM    Potassium 4.7 04/02/2019 08:17 AM    Chloride 105 04/02/2019 08:17 AM    CO2 24 04/02/2019 08:17 AM    Anion gap 8 10/02/2017 11:17 AM    Glucose 107 (H) 04/02/2019 08:17 AM    BUN 12 04/02/2019 08:17 AM    Creatinine 1.08 04/02/2019 08:17 AM    BUN/Creatinine ratio 11 04/02/2019 08:17 AM    GFR est AA 77 04/02/2019 08:17 AM    GFR est non-AA 66 04/02/2019 08:17 AM    Calcium 9.6 04/02/2019 08:17 AM    Bilirubin, total 0.4 04/02/2019 08:17 AM    AST (SGOT) 22 04/02/2019 08:17 AM    Alk. phosphatase 85 04/02/2019 08:17 AM    Protein, total 6.8 04/02/2019 08:17 AM    Albumin 4.2 04/02/2019 08:17 AM    Globulin 4.8 (H) 10/02/2017 11:17 AM    A-G Ratio 1.6 04/02/2019 08:17 AM    ALT (SGPT) 18 04/02/2019 08:17 AM           Assessment:     Assessment:     Diagnoses and all orders for this visit:    1. NICM (nonischemic cardiomyopathy) ([facility]    2. Mixed hyperlipidemia  -     AMB POC EKG ROUTINE W/ 12 LEADS, INTER & REP    3. Combined systolic and diastolic congestive heart failure, unspecified HF chronicity ([facility]    4. Essential hypertension    5. Atrial flutter, unspecified type ([facility]        ICD-10-CM ICD-9-CM    1. NICM (nonischemic cardiomyopathy) (HCC) I42.8 425.4    2. Mixed hyperlipidemia E78.2 272.2 AMB POC EKG ROUTINE W/ 12 LEADS, INTER & REP   3.  Combined systolic and diastolic congestive heart failure, unspecified HF chronicity ([facility] I50.40 428.40    4. Essential hypertension I10 401.9    5. Atrial flutter, unspecified type ([facility] I48.92 427.32      Orders Placed This Encounter    AMB POC EKG ROUTINE W/ 12 LEADS, INTER & REP     Order Specific Question:   Reason for Exam:     Answer:   Routine    albuterol (PROVENTIL HFA) 90 mcg/actuation inhaler     Sig: Take 2 Puffs by inhalation.  ofloxacin (FLOXIN) 0.3 % ophthalmic solution        Plan:     1. Nonischemic cardiomyopathy: Clinically stable [place] heart class I ejection fraction 60% 2017. Continue carvedilol has normalized ejection fraction without the use of an ACE inhibitor given history of CKD. Continue diuretic. 2.  Hypertension: Controlled 120/62  3. History of atrial flutter: Status post atrial flutter ablation successful has maintained sinus rhythm since. Maintain aspirin therapy. 4.  Hyperlipidemia: LDL 29 continue statin therapy  5. Nonobstructive atherosclerotic heart disease: 20% lesion proximal LAD continue aspirin statin beta-blocker, patient has quit smoking  In regards to upcoming colonoscopy patient is cleared, he has been instructed to hold aspirin 5 days prior to procedure and resume the evening of his colonoscopy. Vielka Morillo NP    This note was created using voice recognition software. Despite editing, there may be syntax errors. Patient seen and examined by me with nurse practitioner. Prasanth Loomis personally performed all components of the history, physical, and medical decision making and agree with the assessment and plan with minor modifications as noted.     Gladys Alegria MD

## 2019-05-23 RX ORDER — ATORVASTATIN CALCIUM 40 MG/1
TABLET, FILM COATED ORAL
Qty: 90 TAB | Refills: 1 | Status: SHIPPED | OUTPATIENT
Start: 2019-05-23 | End: 2019-11-25 | Stop reason: SDUPTHER

## 2019-07-09 ENCOUNTER — OFFICE VISIT (OUTPATIENT)
Dept: INTERNAL MEDICINE CLINIC | Facility: CLINIC | Age: 79
End: 2019-07-09

## 2019-07-09 VITALS
DIASTOLIC BLOOD PRESSURE: 70 MMHG | SYSTOLIC BLOOD PRESSURE: 128 MMHG | TEMPERATURE: 98 F | OXYGEN SATURATION: 98 % | WEIGHT: 227 LBS | RESPIRATION RATE: 18 BRPM | BODY MASS INDEX: 33.62 KG/M2 | HEART RATE: 61 BPM | HEIGHT: 69 IN

## 2019-07-09 DIAGNOSIS — M54.50 CHRONIC BILATERAL LOW BACK PAIN WITHOUT SCIATICA: ICD-10-CM

## 2019-07-09 DIAGNOSIS — H66.92 LEFT OTITIS MEDIA, UNSPECIFIED OTITIS MEDIA TYPE: ICD-10-CM

## 2019-07-09 DIAGNOSIS — N18.30 TYPE 2 DIABETES MELLITUS WITH STAGE 3 CHRONIC KIDNEY DISEASE, WITHOUT LONG-TERM CURRENT USE OF INSULIN (HCC): Primary | ICD-10-CM

## 2019-07-09 DIAGNOSIS — I42.8 NICM (NONISCHEMIC CARDIOMYOPATHY) (HCC): ICD-10-CM

## 2019-07-09 DIAGNOSIS — I50.40 COMBINED SYSTOLIC AND DIASTOLIC CONGESTIVE HEART FAILURE, UNSPECIFIED HF CHRONICITY (HCC): ICD-10-CM

## 2019-07-09 DIAGNOSIS — E11.22 TYPE 2 DIABETES MELLITUS WITH STAGE 3 CHRONIC KIDNEY DISEASE, WITHOUT LONG-TERM CURRENT USE OF INSULIN (HCC): Primary | ICD-10-CM

## 2019-07-09 DIAGNOSIS — J44.9 CHRONIC OBSTRUCTIVE PULMONARY DISEASE, UNSPECIFIED COPD TYPE (HCC): ICD-10-CM

## 2019-07-09 DIAGNOSIS — I48.92 ATRIAL FLUTTER, UNSPECIFIED TYPE (HCC): ICD-10-CM

## 2019-07-09 DIAGNOSIS — G89.29 CHRONIC BILATERAL LOW BACK PAIN WITHOUT SCIATICA: ICD-10-CM

## 2019-07-09 DIAGNOSIS — I10 ESSENTIAL HYPERTENSION: ICD-10-CM

## 2019-07-09 DIAGNOSIS — B37.2 INTERTRIGINOUS CANDIDIASIS: ICD-10-CM

## 2019-07-09 DIAGNOSIS — E78.2 MIXED HYPERLIPIDEMIA: ICD-10-CM

## 2019-07-09 LAB — HBA1C MFR BLD HPLC: 6.1 %

## 2019-07-09 RX ORDER — AMOXICILLIN 500 MG/1
CAPSULE ORAL
Refills: 0 | COMMUNITY
Start: 2019-06-30 | End: 2019-07-09 | Stop reason: SDUPTHER

## 2019-07-09 RX ORDER — AMOXICILLIN 500 MG/1
CAPSULE ORAL
Qty: 14 CAP | Refills: 0 | Status: SHIPPED | OUTPATIENT
Start: 2019-07-09 | End: 2019-10-09

## 2019-07-09 RX ORDER — CLOTRIMAZOLE AND BETAMETHASONE DIPROPIONATE 10; .64 MG/G; MG/G
CREAM TOPICAL 2 TIMES DAILY
Qty: 45 G | Refills: 3 | Status: SHIPPED | OUTPATIENT
Start: 2019-07-09 | End: 2020-07-19

## 2019-07-09 NOTE — PROGRESS NOTES
CC:   Chief Complaint   Patient presents with    Ear Pain     left ear    Diabetes    Hypertension       HISTORY OF PRESENT ILLNESS  Dhruv Bravo is a 70 y.o. male. Presents for 3 month follow up evaluation. He has HTN, diet-controlled type 2 DM, CKD stage 3, hyperlipidemia,  CHF (combined diastolic and systolic) with echo EF 82-22% in 7/17, non-ischemic cardiomyopathy, atrial flutter s/p AFL ablation on 8/22/17 now in NSR, non-obstructive atherosclerotic heart disease, COPD, chronic low back pain, and prostate cancer.       Today he complains of left ear pain. Recently seen at [facility] ED, diagnosed with left ear infection, and prescribed amoxicillin 500 mg BID for 10 days. He has 1 more day left. Has been having recurrent left ear infections. Has an appointment scheduled with Dr. Sheliah Harada May (ENT) on 7/26/19. Patient reports that cold air bothers his left ear and causing aching. Saw Dr. Alli Toro on 4/30/19; no changes made and was cleared for colonoscopy. Patient canceled the colonoscopy scheduled on 5/9/19 with Dr. Brandon Lloyd because he did not follow the prep instructions correctly. He plans to call and reschedule. Endocrine Review  He is seen for diabetes. Since last visit he reports no polyuria or polydipsia, no numbness, tingling or pain in extremities, no hypoglycemia. Home glucose monitoring: is not performed. He reports medication compliance: n/a - patient not on medications (diet controlled). Medication side effects: N/A. Diabetic diet compliance: compliant most of the time. Reports he cut down a lot on eating sweets. Lab review: A1c today (7/9/19) is 6.1%, was 6.6% on 4/2/19 . Eye exam: UTD. Cardiovascular Review  The patient has hyperlipidemia, CHF and Atrial Flutter. He reports taking medications as instructed, no medication side effects noted.   Diet and Lifestyle: generally follows a low fat low cholesterol diet, generally follows a low sodium diet, no formal exercise but active during the day. Lab review: labs reviewed and discussed with patient. Other Providers: Dr. Rob Willams (Cardiology), Dr. Branden Orozco (Cardiology EPS), Dr. Shelia Hsu (Urology), Dr. Christiano Christian (Nephrology), Dr. Casimiro Coughlin (Ophthalmology), Dr. Abdirahman Brizuela (ENT)      Soc Hx  . Gato Montero 2 children and 3 grandchildren.  Retired .  He chews tobacco.  Quit smoking 8/7/17; previously smoked 1.5-2 ppd for 50 yrs. Stopped drinking alcohol in 2008.  Denies recreational drug use.  Does not get much exercise.     Health Maintenance  Flu vaccine: 9/4/18                 Pneumonia vaccine: PPSV-23 11/30/15, PCV-13 9/18/17                                          Tetanus vaccine: not indicated                                     Shingles vaccine: declined  Colonoscopy: 2/22/16, Dr. Dre Reyes, one sessile polyp, sigmoid diverticulosis, int hemorrhoids, repeat in 5 yrs (2021)  Eye exam: 11/18, Dr. Cory Rowan (tot chol 77, LDL 29)  A1c: 7/9/29 (6.1%)  Advanced Directives: given information  End of Life: given information     ROS  A complete review of systems was performed and is negative except for those mentioned in the HPI. 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    S/P cardiac cath Z98.890    NICM (nonischemic cardiomyopathy) ([facility] I42.8    Hypertension I10    Mixed hyperlipidemia E78.2    Smokeless tobacco use Z72.0    Atrial flutter (HCC) I48.92    Stage 3 chronic kidney disease ([facility] N18.3    Type 2 diabetes mellitus without complication, without long-term current use of insulin (HCC) E11.9    Prostate cancer ([facility] C61    COPD (chronic obstructive pulmonary disease) (HCC) J44.9    Chronic low back pain M54.5, G89.29    Obesity (BMI 30-39. 9) E66.9    SOB (shortness of breath) R06.02    Candidal intertrigo B37.2    Combined forms of age-related cataract of right eye H25.811    Combined forms of age-related cataract of left eye H25.812     Past Medical History:   Diagnosis Date    Acute respiratory failure with hypoxia ([facility] 02/08/2014    also 11/28/15, 2/17/16, 5/14/17     Alcohol abuse     As of 11/29/18 pt stated he quit 20 years    Arthritis     Back and shoulder    Atrial flutter ([address] 08/2017    saw Dr. Bijal Chen; underwent a-flutter ablation    BPH (benign prostatic hyperplasia)     CHF (congestive heart failure) ([address] 02/11/2014    TTE 11/15: EF 40-45%, 2/14: EF 20%  Admitted for acute exacerbations 2/8/14, 11/28/15, 2/17/16, and 5/4/17)    Chronic low back pain     LS spine X-ray 4/8/17: mild DDD    COPD (chronic obstructive pulmonary disease) ([address]     PA (dyspnea on exertion)     ED (erectile dysfunction)     Elevated troponin 02/11/2014    also 11/28/15; due to cardiac strain    Frequent hospital admissions     5/14-5/23/17: acute hypoxic resp failure due to CHF exac, ROBINSON on CKD 3, sepsis due to LE cellulitis, 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)     Leg fracture, right 1973    Nonischemic cardiomyopathy ([address]     with LVH    Pneumonia 02/08/2014 2/8/14 and 10/30/15    Poor historian     As of 11/29/18 pt reports 5th grade education, pt unable to give med list-obtained from wife per patient's permission    Prediabetes     Prostate cancer ([facility] 2016    As of 11/29/18, pt states he gets two injections twice a year for this    Pulmonary edema 11/28/2015    S/P cardiac cath 2/12/2014 2/12/14 no significant coronary disease, severe LV dysfunction    Tinea cruris     also genital folliculitis    Vertigo      Allergies Allergen Reactions    Oxycodone Contact Dermatitis       Current Outpatient Medications   Medication Sig Dispense Refill    amoxicillin (AMOXIL) 500 mg capsule TAKE 1 CAPSULE BY MOUTH TWICE DAILY FOR 10 DAYS  0    atorvastatin (LIPITOR) 40 mg tablet TAKE 1 TABLET BY MOUTH ONCE DAILY AT NIGHT 90 Tab 1    albuterol (PROVENTIL HFA) 90 mcg/actuation inhaler Take 2 Puffs by inhalation.  furosemide (LASIX) 40 mg tablet TAKE ONE TABLET BY MOUTH DAILY - DOSE REDUSED 12/17/15 30 Tab 6    clotrimazole-betamethasone (LOTRISONE) topical cream Apply  to affected area two (2) times a day. 45 g 3    amLODIPine (NORVASC) 2.5 mg tablet TAKE 1 TABLET BY MOUTH ONCE DAILY 30 Tab 0    carvedilol (COREG) 25 mg tablet TAKE ONE TABLET BY MOUTH TWICE DAILY WITH MEALS 60 Tab 6    aspirin 81 mg chewable tablet Take 1 Tab by mouth daily. 10 Tab 0         PHYSICAL EXAM  Visit Vitals  /70 (BP 1 Location: Left arm, BP Patient Position: Sitting)   Pulse 61   Temp 98 °F (36.7 °C) (Oral)   Resp 18   Ht 5' 9\" (1.753 m)   Wt 227 lb (103 kg)   SpO2 98%   BMI 33.52 kg/m²       General: Pleasant, obese, no distress. HEENT:  Head normocephalic/atraumatic, no scleral icterus. Left TM bulging with scattered light reflex. Normal right TM and bilateral ear canals. Neck: Supple. No carotid bruits, JVD, lymphadenopathy, or thyromegaly. Lungs:  Clear to ausculation bilaterally. Good air movement. Heart:  Regular rate and rhythm, normal S1 and S2, no murmur, gallop, or rub  Extremities: No clubbing, cyanosis, or edema. 1+ pedal pulses bilaterally. Neurological: Alert and oriented. Psychiatric: Normal mood and affect.  Behavior is normal.    Results for orders placed or performed in visit on 07/09/19   AMB POC HEMOGLOBIN A1C   Result Value Ref Range    Hemoglobin A1c (POC) 6.1 %       Results for orders placed or performed in visit on 04/09/19   MICROALBUMIN, UR, RAND W/ MICROALB/CREAT RATIO   Result Value Ref Range    Creatinine, urine 108.3 Not Estab. mg/dL    Microalbumin, urine <3.0 Not Estab. ug/mL    Microalb/Creat ratio (ug/mg creat.) <2.8 0.0 - 30.0 mg/g creat         ASSESSMENT AND PLAN    ICD-10-CM ICD-9-CM    1. Type 2 diabetes mellitus with stage 3 chronic kidney disease, without long-term current use of insulin ([facility]) E11.22 250.40 AMB POC HEMOGLOBIN A1C    N18.3 585.3    2. Essential hypertension I10 401.9    3. Left otitis media, unspecified otitis media type H66.92 382.9 amoxicillin (AMOXIL) 500 mg capsule   4. Combined systolic and diastolic congestive heart failure, unspecified HF chronicity ([facility]) I50.40 428.40    5. NICM (nonischemic cardiomyopathy) ([facility]) I42.8 425.4    6. Chronic obstructive pulmonary disease, unspecified COPD type ([facility] J44.9 496    7. Atrial flutter, unspecified type ([facility]) I48.92 427.32    8. Mixed hyperlipidemia E78.2 272.2    9. Chronic bilateral low back pain without sciatica M54.5 724.2     G89.29 338.29    10. Intertriginous candidiasis B37.2 112.3 clotrimazole-betamethasone (LOTRISONE) topical cream     Diagnoses and all orders for this visit:    1. Type 2 diabetes mellitus with stage 3 chronic kidney disease, without long-term current use of insulin ([facility]  Diet-controlled. A1c 6.1% today. -     AMB POC HEMOGLOBIN A1C    2. Essential hypertension  Controlled. Continue amlodipine 2.5 mg daily and carvedilol. In the future, consider changing amlodipine to lisinopril. 3. Left otitis media, unspecified otitis media type  Keep scheduled appointment with Dr. Pacheco. Patient saw Dr. Marita Lee in 9/18 but did not follow up. -     Refill amoxicillin (AMOXIL) 500 mg capsule; TAKE 1 CAPSULE BY MOUTH TWICE DAILY FOR 7 DAYS    4. Combined systolic and diastolic congestive heart failure, unspecified HF chronicity ([address]  Controlled. Continue Lasix 40 mg daily and carvedilol 25 mg BID. 5. NICM (nonischemic cardiomyopathy) ([address]    6.  Chronic obstructive pulmonary disease, unspecified COPD type ([facility]  Controlled on albuterol inhaler as needed. 7. Atrial flutter, unspecified type ([address]  Remains in NSR. Continue ASA 81 mg daily. 8. Mixed hyperlipidemia  Well-controlled on atorvastatin 40 mg daily. 9. Chronic bilateral low back pain without sciatica  Controlled. 10. Intertriginous candidiasis  -     Refill clotrimazole-betamethasone (LOTRISONE) topical cream; Apply  to affected area two (2) times a day. Follow-up and Dispositions    · Return in about 3 months (around 10/9/2019), or if symptoms worsen or fail to improve, for HTN, DM, left ear pain. I have discussed the diagnosis with the patient and the intended plan as seen in the above orders. Patient is in agreement. The patient has received an after-visit summary and questions were answered concerning future plans. I have discussed medication side effects and warnings with the patient as well.

## 2019-10-09 ENCOUNTER — OFFICE VISIT (OUTPATIENT)
Dept: INTERNAL MEDICINE CLINIC | Facility: CLINIC | Age: 79
End: 2019-10-09

## 2019-10-09 VITALS
HEIGHT: 69 IN | SYSTOLIC BLOOD PRESSURE: 139 MMHG | WEIGHT: 231 LBS | RESPIRATION RATE: 18 BRPM | BODY MASS INDEX: 34.21 KG/M2 | TEMPERATURE: 98 F | HEART RATE: 64 BPM | DIASTOLIC BLOOD PRESSURE: 61 MMHG | OXYGEN SATURATION: 96 %

## 2019-10-09 DIAGNOSIS — I50.40 COMBINED SYSTOLIC AND DIASTOLIC CONGESTIVE HEART FAILURE, UNSPECIFIED HF CHRONICITY (HCC): ICD-10-CM

## 2019-10-09 DIAGNOSIS — N18.30 TYPE 2 DIABETES MELLITUS WITH STAGE 3 CHRONIC KIDNEY DISEASE, WITHOUT LONG-TERM CURRENT USE OF INSULIN (HCC): ICD-10-CM

## 2019-10-09 DIAGNOSIS — I10 ESSENTIAL HYPERTENSION: ICD-10-CM

## 2019-10-09 DIAGNOSIS — N18.30 STAGE 3 CHRONIC KIDNEY DISEASE (HCC): ICD-10-CM

## 2019-10-09 DIAGNOSIS — E11.22 TYPE 2 DIABETES MELLITUS WITH STAGE 3 CHRONIC KIDNEY DISEASE, WITHOUT LONG-TERM CURRENT USE OF INSULIN (HCC): ICD-10-CM

## 2019-10-09 DIAGNOSIS — Z00.00 MEDICARE ANNUAL WELLNESS VISIT, SUBSEQUENT: Primary | ICD-10-CM

## 2019-10-09 DIAGNOSIS — C61 PROSTATE CANCER (HCC): ICD-10-CM

## 2019-10-09 DIAGNOSIS — J44.9 CHRONIC OBSTRUCTIVE PULMONARY DISEASE, UNSPECIFIED COPD TYPE (HCC): ICD-10-CM

## 2019-10-09 DIAGNOSIS — Z13.31 SCREENING FOR DEPRESSION: ICD-10-CM

## 2019-10-09 DIAGNOSIS — Z23 ENCOUNTER FOR IMMUNIZATION: ICD-10-CM

## 2019-10-09 DIAGNOSIS — I48.92 ATRIAL FLUTTER, UNSPECIFIED TYPE (HCC): ICD-10-CM

## 2019-10-09 DIAGNOSIS — E78.2 MIXED HYPERLIPIDEMIA: ICD-10-CM

## 2019-10-09 PROBLEM — R06.02 SOB (SHORTNESS OF BREATH): Status: RESOLVED | Noted: 2018-10-30 | Resolved: 2019-10-09

## 2019-10-09 PROBLEM — H25.811 COMBINED FORMS OF AGE-RELATED CATARACT OF RIGHT EYE: Status: RESOLVED | Noted: 2018-11-28 | Resolved: 2019-10-09

## 2019-10-09 PROBLEM — H25.812 COMBINED FORMS OF AGE-RELATED CATARACT OF LEFT EYE: Status: RESOLVED | Noted: 2018-12-12 | Resolved: 2019-10-09

## 2019-10-09 LAB — HBA1C MFR BLD HPLC: 6.5 %

## 2019-10-09 NOTE — ACP (ADVANCE CARE PLANNING)
Advance Care Planning (ACP) Provider Conversation Snapshot    Date of ACP Conversation: 10/09/19  Persons included in Conversation:  patient  Length of ACP Conversation in minutes:  <16 minutes (Non-Billable)    Authorized Decision Maker (if patient is incapable of making informed decisions):    This person is:   Healthcare Agent/Medical Power of  under Advance Directive            For Patients with Decision Making Capacity:   Values/Goals: Exploration of values, goals, and preferences if recovery is not expected, even with continued medical treatment in the event of:  Imminent death  Severe, permanent brain injury    Conversation Outcomes / Follow-Up Plan:   Recommended completion of Advance Directive form after review of ACP materials and conversation with prospective healthcare agent

## 2019-10-09 NOTE — PROGRESS NOTES
Twan Wayne  Identified pt with two pt identifiers(name and ). Chief Complaint   Patient presents with    Hypertension    Diabetes    Immunization/Injection   Per Dr. Leanne Benavidez verbal order read back orders placed for flu vaccine. Influenza vaccine Fluad  0.5 ml given left deltoid IM. VIS given. Patient tolerated injection with no complications. 1. Have you been to the ER, urgent care clinic since your last visit? Hospitalized since your last visit? NO    2. Have you seen or consulted any other health care providers outside of the [address] [place] [address] since your last visit? Include any pap smears or colon screening. [place] back doctor out of state and he recommended a special cane    Today's provider has been notified of reason for visit, vitals and flowsheets obtained on patients.      Patient received paperwork for advance directive during previous visit but has not completed at this time     Reviewed record In preparation for visit, huddled with provider and have obtained necessary documentation      Health Maintenance Due   Topic    EYE EXAM RETINAL OR DILATED     Influenza Age 5 to Adult     MEDICARE YEARLY EXAM        Wt Readings from Last 3 Encounters:   10/09/19 231 lb (104.8 kg)   19 227 lb (103 kg)   19 223 lb 14.4 oz (101.6 kg)     Temp Readings from Last 3 Encounters:   10/09/19 98 °F (36.7 °C) (Oral)   19 98 °F (36.7 °C) (Oral)   19 98 °F (36.7 °C) (Oral)     BP Readings from Last 3 Encounters:   10/09/19 139/61   19 128/70   19 120/62     Pulse Readings from Last 3 Encounters:   10/09/19 64   19 61   19 (!) 55     Vitals:    10/09/19 0937   BP: 139/61   Pulse: 64   Resp: 18   Temp: 98 °F (36.7 °C)   TempSrc: Oral   SpO2: 96%   Weight: 231 lb (104.8 kg)   Height: 5' 9\" (1.753 m)   PainSc:   0 - No pain         Learning Assessment:  :     Learning Assessment 10/5/2017 2014   PRIMARY LEARNER Patient Patient   HIGHEST LEVEL OF EDUCATION - PRIMARY LEARNER  - DID NOT GRADUATE HIGH SCHOOL   BARRIERS PRIMARY LEARNER - NONE   CO-LEARNER CAREGIVER - No   PRIMARY LANGUAGE ENGLISH ENGLISH    NEED - No   LEARNER PREFERENCE PRIMARY LISTENING LISTENING     DEMONSTRATION -     READING -   ANSWERED BY patient Patient   RELATIONSHIP SELF SELF       Depression Screening:  :     3 most recent PHQ Screens 4/9/2019   Little interest or pleasure in doing things Not at all   Feeling down, depressed, irritable, or hopeless Not at all   Total Score PHQ 2 0       Fall Risk Assessment:  :     Fall Risk Assessment, last 12 mths 3/5/2019   Able to walk? Yes   Fall in past 12 months? No   Fall with injury? -       Abuse Screening:  :     Abuse Screening Questionnaire 10/9/2019 7/9/2019 9/4/2018 7/3/2018 10/5/2017   Do you ever feel afraid of your partner? N N N N N   Are you in a relationship with someone who physically or mentally threatens you? N N N N N   Is it safe for you to go home? Y Y Y Y Y       ADL Screening:  :     ADL Assessment 7/3/2018   Feeding yourself No Help Needed   Getting from bed to chair No Help Needed   Getting dressed No Help Needed   Bathing or showering No Help Needed   Walk across the room (includes cane/walker) No Help Needed   Using the telphone No Help Needed   Taking your medications No Help Needed   Preparing meals No Help Needed   Managing money (expenses/bills) No Help Needed   Moderately strenuous housework (laundry) No Help Needed   Shopping for personal items (toiletries/medicines) No Help Needed   Shopping for groceries No Help Needed   Driving No Help Needed   Climbing a flight of stairs No Help Needed   Getting to places beyond walking distances No Help Needed                 Medication reconciliation up to date and corrected with patient at this time.

## 2019-10-09 NOTE — PATIENT INSTRUCTIONS
Vaccine Information Statement Influenza (Flu) Vaccine (Inactivated or Recombinant): What You Need to Know Many Vaccine Information Statements are available in [place] and other languages. See www.immunize.org/vis Hojas de información sobre vacunas están disponibles en español y en muchos otros idiomas. Visite www.immunize.org/vis 1. Why get vaccinated? Influenza vaccine can prevent influenza (flu). Flu is a contagious disease that spreads around the [facility] every year, usually between October and May. Anyone can get the flu, but it is more dangerous for some people. Infants and young children, people 72years of age and older, pregnant women, and people with certain health conditions or a weakened immune system are at greatest risk of flu complications. Pneumonia, bronchitis, sinus infections and ear infections are examples of flu-related complications. If you have a medical condition, such as heart disease, cancer or diabetes, flu can make it worse. Flu can cause fever and chills, sore throat, muscle aches, fatigue, cough, headache, and runny or stuffy nose. Some people may have vomiting and diarrhea, though this is more common in children than adults. Each year thousands of people in the [facility] die from flu, and many more are hospitalized. Flu vaccine prevents millions of illnesses and flu-related visits to the doctor each year. 2. Influenza vaccines CDC recommends everyone 10months of age and older get vaccinated every flu season. Children 6 months through 6years of age may need 2 doses during a single flu season. Everyone else needs only 1 dose each flu season. It takes about 2 weeks for protection to develop after vaccination. There are many flu viruses, and they are always changing. Each year a new flu vaccine is made to protect against three or four viruses that are likely to cause disease in the upcoming flu season.  Even when the vaccine doesnt exactly match these viruses, it may still provide some protection. Influenza vaccine does not cause flu. Influenza vaccine may be given at the same time as other vaccines. 3. Talk with your health care provider Tell your vaccine provider if the person getting the vaccine: 
 Has had an allergic reaction after a previous dose of influenza vaccine, or has any severe, life-threatening allergies.  Has ever had Guillain-Barré Syndrome (also called GBS). In some cases, your health care provider may decide to postpone influenza vaccination to a future visit. People with minor illnesses, such as a cold, may be vaccinated. People who are moderately or severely ill should usually wait until they recover before getting influenza vaccine. Your health care provider can give you more information. 4. Risks of a reaction  Soreness, redness, and swelling where shot is given, fever, muscle aches, and headache can happen after influenza vaccine.  There may be a very small increased risk of Guillain-Barré Syndrome (GBS) after inactivated influenza vaccine (the flu shot). Judit Rivers children who get the flu shot along with pneumococcal vaccine (PCV13), and/or DTaP vaccine at the same time might be slightly more likely to have a seizure caused by fever. Tell your health care provider if a child who is getting flu vaccine has ever had a seizure. People sometimes faint after medical procedures, including vaccination. Tell your provider if you feel dizzy or have vision changes or ringing in the ears. As with any medicine, there is a very remote chance of a vaccine causing a severe allergic reaction, other serious injury, or death. 5. What if there is a serious problem? An allergic reaction could occur after the vaccinated person leaves the clinic.  If you see signs of a severe allergic reaction (hives, swelling of the face and throat, difficulty breathing, a fast heartbeat, dizziness, or weakness), call 9-1-1 and get the person to the nearest hospital. 
 
For other signs that concern you, call your health care provider. Adverse reactions should be reported to the Vaccine Adverse Event Reporting System (VAERS). Your health care provider will usually file this report, or you can do it yourself. Visit the VAERS website at www.vaers. hhs.gov or call 6-131.340.4299. VAERS is only for reporting reactions, and VAERS staff do not give medical advice. 6. The National Vaccine Injury Compensation Program 
 
The [facility] Vaccine Injury Compensation Program (VICP) is a federal program that was created to compensate people who may have been injured by certain vaccines. Visit the VICP website at www.[facility].gov/vaccinecompensation or call 4-568.281.3003 to learn about the program and about filing a claim. There is a time limit to file a claim for compensation. 7. How can I learn more?  Ask your health care provider.  Call your local or state health department.  Contact the Centers for Disease Control and Prevention (CDC): 
- Call 0-664.474.3864 (5-269-VVG-INFO) or 
- Visit CDCs influenza website at www.cdc.gov/flu Vaccine Information Statement (Interim) Inactivated Influenza Vaccine 8/15/2019 
42 PAULO Greciaeitan Huerta [id number]-[address] [facility] and two.42.solutions Centers for Disease Control and Prevention Office Use Only Medicare Wellness Visit, Male The best way to live healthy is to have a lifestyle where you eat a well-balanced diet, exercise regularly, limit alcohol use, and quit all forms of tobacco/nicotine, if applicable. Regular preventive services are another way to keep healthy. Preventive services (vaccines, screening tests, monitoring & exams) can help personalize your care plan, which helps you manage your own care. Screening tests can find health problems at the earliest stages, when they are easiest to treat. [address] follows the current, evidence-based guidelines published by the [facility] [place] [facility]) when recommending preventive services for our patients. Because we follow these guidelines, sometimes recommendations change over time as research supports it. (For example, a prostate screening blood test is no longer routinely recommended for men with no symptoms.) Of course, you and your doctor may decide to screen more often for some diseases, based on your risk and co-morbidities (chronic disease you are already diagnosed with). Preventive services for you include: - Medicare offers their members a free annual wellness visit, which is time for you and your primary care provider to discuss and plan for your preventive service needs. Take advantage of this benefit every year! 
-All adults over age 72 should receive the recommended pneumonia vaccines. Current USPSTF guidelines recommend a series of two vaccines for the best pneumonia protection.  
-All adults should have a flu vaccine yearly and an ECG. All adults age 61 and older should receive a shingles vaccine once in their lifetime.   
-All adults age 38-68 who are overweight should have a diabetes screening test once every three years.  
-Other screening tests & preventive services for persons with diabetes include: an eye exam to screen for diabetic retinopathy, a kidney function test, a foot exam, and stricter control over your cholesterol.  
-Cardiovascular screening for adults with routine risk involves an electrocardiogram (ECG) at intervals determined by the provider.  
-Colorectal cancer screening should be done for adults age 54-65 with no increased risk factors for colorectal cancer. There are a number of acceptable methods of screening for this type of cancer. Each test has its own benefits and drawbacks.  Discuss with your provider what is most appropriate for you during your annual wellness visit. The different tests include: colonoscopy (considered the best screening method), a fecal occult blood test, a fecal DNA test, and sigmoidoscopy. 
-All adults born between [facility] should be screened once for Hepatitis C. 
-An Abdominal Aortic Aneurysm (AAA) Screening is recommended for men age 73-68 who has ever smoked in their lifetime. Here is a list of your current Health Maintenance items (your personalized list of preventive services) with a due date: 
Health Maintenance Due Topic Date Due [facility] Eye Exam  04/09/1950

## 2019-10-09 NOTE — PROGRESS NOTES
This is the Subsequent Medicare Annual Wellness Exam, performed 12 months or more after the Initial AWV or the last Subsequent AWV    I have reviewed the patient's medical history in detail and updated the computerized patient record. History   Joe Martinez is a 70 y.o. male. Presents for Medicare AWV and 3 month follow up evaluation. He has HTN, diet-controlled type 2 DM, CKD stage 3, hyperlipidemia, CHF (combined diastolic and systolic) with echo EF 20-23% in 7/17, non-ischemic cardiomyopathy, atrial flutter s/p AFL ablation on 8/22/17 , non-obstructive atherosclerotic heart disease, COPD, chronic low back pain, and prostate cancer.       Today he has no complaints. Left ear is doing better. Still has not rescheduled a colonoscopy with Dr. Cindy Pineda  (canceled his last appointment on 5/9/19 due to not following the prep instructions correctly.     Endocrine Review  He is seen for diabetes.  Since last visit he reports no polyuria or polydipsia, no numbness, tingling or pain in extremities, no hypoglycemia.  Home glucose monitoring: is not performed.  He reports medication compliance: n/a - patient not on medications (diet controlled).  Medication side effects: N/A.  Diabetic diet compliance: compliant most of the time.   Lab review: A1c 6.5% today (10/9/19), was 6.1% on 7/9/19 and 6.6% on 4/2/19.  Eye exam: UTD.       Cardiovascular Review  The patient has hyperlipidemia, CHF and Atrial Flutter.  He reports taking medications as instructed, no medication side effects noted.  Diet and Lifestyle: generally follows a low fat low cholesterol diet, generally follows a low sodium diet, no formal exercise but active during the day.  Lab review: labs reviewed and discussed with patient.       Other Providers: Dr. Colby Hawk (Cardiology), Dr. Ngoc Rosales (Cardiology EPS), Dr. Ryan Llanos (Urology), Dr. Atul Jeffery (Nephrology), Dr. Marguerite Coughlin (Ophthalmology), Dr. Vicente Pacheco (ENT)      Soc Hx  . [place] 2 children and 3 grandchildren.  Retired .  He chews tobacco.  Quit smoking 8/7/17; previously smoked 1.5-2 ppd for 50 yrs. Stopped drinking alcohol in 2008. Denies recreational drug use.  Does not get much exercise.     Health Maintenance  Flu vaccine: 10/9/19                Pneumonia vaccine: PPSV-23 11/30/15, PCV-13 9/18/17                                          Tetanus vaccine: not indicated                                     Shingles vaccine: declined  Colonoscopy: 2/22/16, Dr. Nyla Mcdonough, one sessile polyp, sigmoid diverticulosis, int hemorrhoids, repeat in 5 TDV (6121)  Eye exam: 11/18, Dr. Lourena Olszewski (tot chol 77, LDL 29)  A1c: 7/9/29 (6.1%)  Advanced Directives: given information  End of Life: given information      ROS  A complete review of systems was performed and is negative except for those mentioned in the HPI.     Past Medical History:   Diagnosis Date    Acute respiratory failure with hypoxia ([facility] 02/08/2014    also 11/28/15, 2/17/16, 5/14/17     Alcohol abuse     As of 11/29/18 pt stated he quit 20 years    Arthritis     Back and shoulder    Atrial flutter ([address] 08/2017    saw Dr. Skyler James; underwent a-flutter ablation    BPH (benign prostatic hyperplasia)     CHF (congestive heart failure) ([address] 02/11/2014    TTE 11/15: EF 40-45%, 2/14: EF 20%  Admitted for acute exacerbations 2/8/14, 11/28/15, 2/17/16, and 5/4/17)    Chronic low back pain     LS spine X-ray 4/8/17: mild DDD    Combined forms of age-related cataract of left eye 12/12/2018    KPE/IOL OS    Combined forms of age-related cataract of right eye 11/28/2018    KPE/IOL OD    COPD (chronic obstructive pulmonary disease) ([address]     PA (dyspnea on exertion)     ED (erectile dysfunction)     Elevated troponin 02/11/2014    also 11/28/15; due to cardiac strain    Frequent hospital admissions     5/14-5/23/17: acute hypoxic resp failure due to CHF exac, ROBINSON on CKD 3, sepsis due to LE cellulitis, 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  2/17-2/22/16: acute hypoxic resp failure, acute diarrhea  11/28-11/30/15: acute hypoxic resp failure due to acute CHF exac, elevated troponin due to cardiac strain  2/8-2/12/14: acuter hypoxic resp failure due to CHF exac, pneumonia     Hand blister 10/8/2017    Bilateral    Hypertension     Kidney disease, chronic, stage III (GFR 30-59 ml/min) ([facility])     Leg fracture, right 1973    Nonischemic cardiomyopathy ([facility]     with LVH    Pneumonia 02/08/2014 2/8/14 and 10/30/15    Poor historian     As of 11/29/18 pt reports 5th grade education, pt unable to give med list-obtained from wife per patient's permission    Prediabetes     Prostate cancer ([facility] 2016    As of 11/29/18, pt states he gets two injections twice a year for this    Pulmonary edema 11/28/2015    S/P cardiac cath 2/12/2014 2/12/14 no significant coronary disease, severe LV dysfunction    Tinea cruris     also genital folliculitis    Vertigo       Past Surgical History:   Procedure Laterality Date    CARDIAC SURG PROCEDURE UNLIST  08/22/2017    SVT ablation    COLONOSCOPY,DIAGNOSTIC  02/22/2016    Dr. Jacki Matos; single sessile polyp at descending colon, sigmoid diverticulosis, int hemorrhoids    COLONOSCOPY,JOSE BENAVIDES,SNARE  2/22/2016         HX CATARACT REMOVAL Right 12/05/2018    Dr. Janice Steven Left 12/12/2018    Dr. Helen Greene. LEFT EYE SECOND REFRATIVE CATARACT REMOVAL WITH LENS IMPLANTATION    HX ORTHOPAEDIC Right 1980's    index finger    HX OTHER SURGICAL  08/22/2017    Dr. Ludy Styles; atrial flutter ablation    HX ROTATOR CUFF REPAIR Right 2000    HX TONSILLECTOMY  1948     Current Outpatient Medications   Medication Sig Dispense Refill    clotrimazole-betamethasone (LOTRISONE) topical cream Apply  to affected area two (2) times a day.  45 g 3    atorvastatin (LIPITOR) 40 mg tablet TAKE 1 TABLET BY MOUTH ONCE DAILY AT NIGHT 90 Tab 1    albuterol (PROVENTIL HFA) 90 mcg/actuation inhaler Take 2 Puffs by inhalation.  furosemide (LASIX) 40 mg tablet TAKE ONE TABLET BY MOUTH DAILY - DOSE REDUSED 12/17/15 30 Tab 6    amLODIPine (NORVASC) 2.5 mg tablet TAKE 1 TABLET BY MOUTH ONCE DAILY 30 Tab 0    carvedilol (COREG) 25 mg tablet TAKE ONE TABLET BY MOUTH TWICE DAILY WITH MEALS 60 Tab 6    aspirin 81 mg chewable tablet Take 1 Tab by mouth daily. 10 Tab 0     Allergies   Allergen Reactions    Oxycodone Contact Dermatitis     Family History   Adopted: Yes   Family history unknown: Yes     Social History     Tobacco Use    Smoking status: Former Smoker     Packs/day: 2.00     Years: 20.00     Pack years: 40.00     Types: Cigarettes     Last attempt to quit: 2017     Years since quittin.1    Smokeless tobacco: Former User     Types: Chew     Quit date: 2018    Tobacco comment: Chewing tobacco   Substance Use Topics    Alcohol use: Not Currently     Alcohol/week: 0.0 standard drinks     Comment: As of 20, pt quit 20 years ago     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    S/P cardiac cath Z98.890    [facility]mic [facility]) [facility] I42.8    Hypertension I10    Mixed hyperlipidemia E78.2    Smokeless tobacco use Z72.0    Atrial flutter ([facility] I48.92    Type 2 diabetes mellitus with stage 3 chronic kidney disease, without long-term current use of insulin ([facility]) E11.22, N18.3    Prostate cancer ([facility] C61    COPD (chronic obstructive pulmonary disease) ([facility]) J44.9    Chronic low back pain M54.5, G89.29    Obesity (BMI 30-39. 9) E66.9    Candidal intertrigo B37.2       Depression Risk Factor Screening:     3 most recent PHQ Screens 2019   Little interest or pleasure in doing things Not at all   Feeling down, depressed, irritable, or hopeless Not at all   Total Score PHQ 2 0     Alcohol Risk Factor Screening: You do not drink alcohol or very rarely.     Functional Ability and Level of Safety:   Hearing Loss  Hearing is good. Activities of Daily Living  The home contains: no safety equipment. Patient does total self care    Fall Risk  Fall Risk Assessment, last 12 mths 3/5/2019   Able to walk? Yes   Fall in past 12 months? No   Fall with injury? -       Abuse Screen  Patient is not abused    Cognitive Screening   Evaluation of Cognitive Function:  Has your family/caregiver stated any concerns about your memory: no  Normal    Visit Vitals  /61 (BP 1 Location: Left arm, BP Patient Position: Sitting)   Pulse 64   Temp 98 °F (36.7 °C) (Oral)   Resp 18   Ht 5' 9\" (1.753 m)   Wt 231 lb (104.8 kg)   SpO2 96%   BMI 34.11 kg/m²       General: Pleasant, obese, no distress. Ambulates with a cane. HEENT:  Head normocephalic/atraumatic, no scleral icterus  Lungs:  Clear to ausculation bilaterally. Good air movement. Heart:  Regular rate and rhythm, normal S1 and S2, no murmur, gallop, or rub  Extremities: No clubbing, cyanosis, or edema. Neurological: Alert and oriented. Psychiatric: Normal mood and affect. Behavior is normal.     Patient Care Team   Patient Care Team:  Taiwo Rodrigues MD as PCP - General (Internal Medicine)  Modesto Araujo MD as Physician (Cardiology)  Antonio Frias MD (Cardiology)  Rosaline Carter MD (Urology)  Uziel Cruz MD (Nephrology)  Elizabeth Yost DO (Ophthalmology)  Valerio Pacheco MD (Otolaryngology)    Assessment/Plan   Education and counseling provided:  Are appropriate based on today's review and evaluation  End-of-Life planning (with patient's consent)  Influenza Vaccine    Diagnoses and all orders for this visit:    1. Medicare annual wellness visit, subsequent    2. Type 2 diabetes mellitus with stage 3 chronic kidney disease, without long-term current use of insulin ([facility]  Controlled by diet. A1c 6.5% today. Continue on diabetic diet. -     AMB POC HEMOGLOBIN A1C    3. Essential hypertension  Controlled.   Continue amlodipine and carvedilol. At next clinic visit, consider changing amlodipine to an ARB for renal protection. 4. Stage 3 chronic kidney disease ([facility]  Keep scheduled appointment with Dr. Sherri Gurrola. 5. Mixed hyperlipidemia  Well-controlled with LDL 29 in 4/19. Continue atorvastatin. 6. Chronic obstructive pulmonary disease, unspecified COPD type ([facility]  Controlled. Continue present management. 7. Prostate cancer ([facility]    8. Atrial flutter, unspecified type ([facility]  Remains in NSR. 9. Combined systolic and diastolic congestive heart failure, unspecified HF chronicity (HCC)  Stable on furosemide and carvedilol. Follow up with Dr. Delfino Ibanez as scheduled. 10. Screening for depression  -     Yves Agarwal    11. Encounter for immunization  -     INFLUENZA VACCINE INACTIVATED (IIV), SUBUNIT, ADJUVANTED, IM  -     ADMIN INFLUENZA VIRUS VAC      Follow-up and Dispositions    · Return in about 4 months (around 2/9/2020), or if symptoms worsen or fail to improve, for HTN, DM.          Health Maintenance Due   Topic Date Due    EYE EXAM RETINAL OR DILATED  04/09/1950

## 2019-10-29 RX ORDER — CARVEDILOL 25 MG/1
TABLET ORAL
Qty: 60 TAB | Refills: 6 | Status: SHIPPED | OUTPATIENT
Start: 2019-10-29 | End: 2019-11-11 | Stop reason: SDUPTHER

## 2019-11-11 ENCOUNTER — OFFICE VISIT (OUTPATIENT)
Dept: CARDIOLOGY CLINIC | Age: 79
End: 2019-11-11

## 2019-11-11 VITALS
BODY MASS INDEX: 33.92 KG/M2 | HEIGHT: 69 IN | RESPIRATION RATE: 18 BRPM | WEIGHT: 229 LBS | OXYGEN SATURATION: 98 % | HEART RATE: 56 BPM | DIASTOLIC BLOOD PRESSURE: 76 MMHG | SYSTOLIC BLOOD PRESSURE: 120 MMHG

## 2019-11-11 DIAGNOSIS — E78.2 MIXED HYPERLIPIDEMIA: ICD-10-CM

## 2019-11-11 DIAGNOSIS — Z01.818 PRE-OP EVALUATION: ICD-10-CM

## 2019-11-11 DIAGNOSIS — I42.8 NICM (NONISCHEMIC CARDIOMYOPATHY) (HCC): Primary | ICD-10-CM

## 2019-11-11 DIAGNOSIS — I48.92 ATRIAL FLUTTER, UNSPECIFIED TYPE (HCC): ICD-10-CM

## 2019-11-11 NOTE — PROGRESS NOTES
1. Have you been to the ER, urgent care clinic since your last visit? Hospitalized since your last visit? Seen at [facility] EMERGENC[facility] for back and feet last week. 2. Have you seen or consulted any other health care providers outside of the [address] [place] [address] Nimesh since your last visit? Include any pap smears or colon screening. Seen at [facility] last week for foot/back pain.           Chief Complaint   Patient presents with    Follow-up     6 month- soboe    Surgical Clearance     pt wants to discuss clearance for prostate radiation with  at [facility]

## 2019-11-11 NOTE — PROGRESS NOTES
11/11/2019 10:12 AM      Subjective:     Roxann Thompson is seen in office today. Doing well from CV standpoint. He denies chest pain, chest pressure/discomfort, dyspnea, palpitations, irregular heart beats, near-syncope, syncope, fatigue, orthopnea, paroxysmal nocturnal dyspnea, exertional chest pressure/discomfort, claudication, lower extremity edema. Planning to have prostate surgery. Visit Vitals  /76 (BP 1 Location: Right arm, BP Patient Position: Sitting)   Pulse (!) 56   Resp 18   Ht 5' 9\" (1.753 m)   Wt 229 lb (103.9 kg)   SpO2 98%   BMI 33.82 kg/m²     Current Outpatient Medications   Medication Sig    clotrimazole-betamethasone (LOTRISONE) topical cream Apply  to affected area two (2) times a day.  atorvastatin (LIPITOR) 40 mg tablet TAKE 1 TABLET BY MOUTH ONCE DAILY AT NIGHT    albuterol (PROVENTIL HFA) 90 mcg/actuation inhaler Take 2 Puffs by inhalation every six (6) hours as needed.  furosemide (LASIX) 40 mg tablet TAKE ONE TABLET BY MOUTH DAILY - DOSE REDUSED 12/17/15 (Patient taking differently: Take 20 mg by mouth daily.)    amLODIPine (NORVASC) 2.5 mg tablet TAKE 1 TABLET BY MOUTH ONCE DAILY    carvedilol (COREG) 25 mg tablet TAKE ONE TABLET BY MOUTH TWICE DAILY WITH MEALS    aspirin 81 mg chewable tablet Take 1 Tab by mouth daily. No current facility-administered medications for this visit.           Objective:      Visit Vitals  /76 (BP 1 Location: Right arm, BP Patient Position: Sitting)   Pulse (!) 56   Resp 18   Ht 5' 9\" (1.753 m)   Wt 229 lb (103.9 kg)   SpO2 98%   BMI 33.82 kg/m²       Data Review:     EKG: sinus rhythm, 1st degree AV block, non specific T wave changes, unchanged    Reviewed and/or ordered active problem list, medication list tests    Past Medical History:   Diagnosis Date    Acute respiratory failure with hypoxia ([facility] [address] 02/08/2014    also 11/28/15, 2/17/16, 5/14/17     Alcohol abuse     As of 11/29/18 pt stated he quit 20 years    Arthritis     Back and shoulder    Atrial flutter ([address] 08/2017    saw Dr. Jamie Olivera; underwent a-flutter ablation    BPH (benign prostatic hyperplasia)     CHF (congestive heart failure) ([address] 02/11/2014    TTE 11/15: EF 40-45%, 2/14: EF 20%  Admitted for acute exacerbations 2/8/14, 11/28/15, 2/17/16, and 5/4/17)    Chronic low back pain     LS spine X-ray 4/8/17: mild DDD    Combined forms of age-related cataract of left eye 12/12/2018    KPE/IOL OS    Combined forms of age-related cataract of right eye 11/28/2018    KPE/IOL OD    COPD (chronic obstructive pulmonary disease) ([address]     PA (dyspnea on exertion)     ED (erectile dysfunction)     Elevated troponin 02/11/2014    also 11/28/15; due to cardiac strain    Frequent hospital admissions     5/14-5/23/17: acute hypoxic resp failure due to CHF exac, ROBINSON on CKD 3, sepsis due to LE cellulitis, 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  2/17-2/22/16: acute hypoxic resp failure, acute diarrhea  11/28-11/30/15: acute hypoxic resp failure due to acute CHF exac, elevated troponin due to cardiac strain  2/8-2/12/14: acuter hypoxic resp failure due to CHF exac, pneumonia     Hand blister 10/8/2017    Bilateral    Hypertension     Kidney disease, chronic, stage III (GFR 30-59 ml/min) ([facility])     Leg fracture, right 1973    Nonischemic cardiomyopathy ([address]     with LVH    Pneumonia 02/08/2014 2/8/14 and 10/30/15    Poor historian     As of 11/29/18 pt reports 5th grade education, pt unable to give med list-obtained from wife per patient's permission    Prediabetes     Prostate cancer ([address] 2016    As of 11/29/18, pt states he gets two injections twice a year for this    Pulmonary edema 11/28/2015    S/P cardiac cath 2/12/2014 2/12/14 no significant coronary disease, severe LV dysfunction    Tinea cruris     also genital folliculitis    Vertigo       Past Surgical History:   Procedure Laterality Date    CARDIAC SURG PROCEDURE UNLIST 2017    SVT ablation    COLONOSCOPY,DIAGNOSTIC  2016    Dr. Ingrid Shelton; single sessile polyp at descending colon, sigmoid diverticulosis, int hemorrhoids    COLONOSCOPY,JOSE BENAVIDES,SNARE  2016         HX CATARACT REMOVAL Right 2018    Dr. Denise Brownlee Left 2018    Dr. Roseanne Navas.  LEFT EYE SECOND REFRATIVE CATARACT REMOVAL WITH LENS IMPLANTATION    HX ORTHOPAEDIC Right 's    index finger    HX OTHER SURGICAL  2017    Dr. Dilshad Rodriguez; atrial flutter ablation    HX ROTATOR CUFF REPAIR Right     HX TONSILLECTOMY       Allergies   Allergen Reactions    Oxycodone Contact Dermatitis      Family History   Adopted: Yes   Family history unknown: Yes      Social History     Socioeconomic History    Marital status:      Spouse name: Not on file    Number of children: Not on file    Years of education: Not on file    Highest education level: Not on file   Occupational History    Not on file   Social Needs    Financial resource strain: Not on file    Food insecurity:     Worry: Not on file     Inability: Not on file    Transportation needs:     Medical: Not on file     Non-medical: Not on file   Tobacco Use    Smoking status: Former Smoker     Packs/day: 2.00     Years: 20.00     Pack years: 40.00     Types: Cigarettes     Last attempt to quit: 2017     Years since quittin.2    Smokeless tobacco: Current User     Types: Chew    Tobacco comment: Chewing tobacco   Substance and Sexual Activity    Alcohol use: Not Currently     Alcohol/week: 0.0 standard drinks     Comment: As of 20, pt quit 20 years ago    Drug use: No    Sexual activity: Not on file   Lifestyle    Physical activity:     Days per week: Not on file     Minutes per session: Not on file    Stress: Not on file   Relationships    Social connections:     Talks on phone: Not on file     Gets together: Not on file     Attends Protestant service: Not on file     Active member of club or organization: Not on file     Attends meetings of clubs or organizations: Not on file     Relationship status: Not on file    Intimate partner violence:     Fear of current or ex partner: Not on file     Emotionally abused: Not on file     Physically abused: Not on file     Forced sexual activity: Not on file   Other Topics Concern    Not on file   Social History Narrative    Not on file         Review of Systems     General: Not Present- Anorexia, Chills, Dietary Changes, Fatigue, Fever, Medication Changes, Night Sweats, Weight Gain > 10lbs. and Weight Loss > 10lbs. .  Skin: Not Present- Bruising and Excessive Sweating. HEENT: Not Present- Headache, Visual Loss and Vertigo. Respiratory: Not Present- Cough, Decreased Exercise Tolerance, Difficulty Breathing, Snoring and Wheezing. Cardiovascular: Not Present- Abnormal Blood Pressure, Chest Pain, Claudications, Difficulty Breathing On Exertion, Edema, Fainting / Blacking Out, Irregular Heart Beat, Night Cramps, Orthopnea, Palpitations, Paroxysmal Nocturnal Dyspnea, Rapid Heart Rate, Shortness of Breath and Swelling of Extremities. Gastrointestinal: Not Present- Black, Tarry Stool, Bloody Stool, Diarrhea, Hematemesis, Rectal Bleeding and Vomiting. Musculoskeletal: Not Present- Muscle Pain and Muscle Weakness. Neurological: Not Present- Dizziness. Psychiatric: Not Present- Depression. Endocrine: Not Present- Cold Intolerance, Heat Intolerance and Thyroid Problems. Hematology: Not Present- Abnormal Bleeding, Anemia, Blood Clots and Easy Bruising.       Physical Exam   The physical exam findings are as follows:       General   Mental Status - Alert. General Appearance - Not in acute distress. Chest and Lung Exam   Inspection: Accessory muscles - No use of accessory muscles in breathing.   Auscultation:   Breath sounds: - Normal.      Cardiovascular   Inspection: Jugular vein - Bilateral - Inspection Normal.  Palpation/Percussion:   Apical Impulse: - Normal.  Auscultation: Rhythm - Regular. Heart Sounds - S1 WNL and S2 WNL. No S3 or S4. Murmurs & Other Heart Sounds: Auscultation of the heart reveals - No Murmurs. Carotid arteries - No Carotid bruit. Peripheral Vascular   Upper Extremity: Inspection - Bilateral - No Cyanotic nailbeds or Digital clubbing. Lower Extremity:   Palpation: Edema - Bilateral - No edema. Assessment:       ICD-10-CM ICD-9-CM    1. NICM (nonischemic cardiomyopathy) ([facility]) I42.8 425.4 AMB POC EKG ROUTINE W/ 12 LEADS, INTER & REP   2. Mixed hyperlipidemia E78.2 272.2 AMB POC EKG ROUTINE W/ 12 LEADS, INTER & REP   3. Atrial flutter, unspecified type ([facility] [address] I48.92 427.32    4. Pre-op evaluation Z01.818 V72.84        Plan:     1. Nonischemic cardiomyopathy: Resolved. NYHA class I. Last ejection fraction 60% 2017. Continue carvedilol. Has normalized ejection fraction without the use of an ACE inhibitor given history of CKD. Continue diuretic. 2. Hypertension: Controlled. 3. History of atrial flutter: Status post atrial flutter ablation successful has maintained sinus rhythm since. 4. Hyperlipidemia: last FLP noted. On statin. 5. Nonobstructive atherosclerotic heart disease: 20% lesion proximal LAD on last cath.    6. No further cardiac workup is needed prior to non cardiac surgery.

## 2019-11-25 RX ORDER — ATORVASTATIN CALCIUM 40 MG/1
TABLET, FILM COATED ORAL
Qty: 90 TAB | Refills: 1 | Status: SHIPPED | OUTPATIENT
Start: 2019-11-25 | End: 2020-05-26

## 2019-12-03 RX ORDER — FUROSEMIDE 40 MG/1
20 TABLET ORAL DAILY
Qty: 45 TAB | Refills: 3 | Status: SHIPPED | OUTPATIENT
Start: 2019-12-03 | End: 2020-11-11 | Stop reason: CLARIF

## 2019-12-03 RX ORDER — FUROSEMIDE 40 MG/1
20 TABLET ORAL DAILY
OUTPATIENT
Start: 2019-12-03

## 2019-12-03 NOTE — TELEPHONE ENCOUNTER
Called pt,verified pt with two pt identifiers, pt looked at his bottle for Lasix and verified it is a 40 mg tab and he is taking a half tab daily. Pt advised it has been this way for quite a while. Advised we just wanted to verify with him. Pt verbalized understanding.

## 2019-12-04 NOTE — TELEPHONE ENCOUNTER
Called pharmacy to verify that pt's lasix is ready. They advised that it is. Called pt,verified pt with two pt identifiers, advised pt that his lasix is ready for p/u at pharmacy. Pt verbalized understanding.

## 2019-12-04 NOTE — TELEPHONE ENCOUNTER
Pt called saying the lasix was sent in yesterday but it was refused, please send in another prescription to [address] in [facility]

## 2019-12-11 PROBLEM — Z01.818 PRE-OP EVALUATION: Status: RESOLVED | Noted: 2019-11-11 | Resolved: 2019-12-11

## 2019-12-18 ENCOUNTER — DOCUMENTATION ONLY (OUTPATIENT)
Dept: INTERNAL MEDICINE CLINIC | Facility: CLINIC | Age: 79
End: 2019-12-18

## 2019-12-18 ENCOUNTER — OFFICE VISIT (OUTPATIENT)
Dept: INTERNAL MEDICINE CLINIC | Facility: CLINIC | Age: 79
End: 2019-12-18

## 2019-12-18 VITALS
HEIGHT: 69 IN | DIASTOLIC BLOOD PRESSURE: 72 MMHG | HEART RATE: 71 BPM | BODY MASS INDEX: 33.77 KG/M2 | TEMPERATURE: 98 F | OXYGEN SATURATION: 96 % | SYSTOLIC BLOOD PRESSURE: 162 MMHG | RESPIRATION RATE: 18 BRPM | WEIGHT: 228 LBS

## 2019-12-18 DIAGNOSIS — N30.00 ACUTE CYSTITIS WITHOUT HEMATURIA: ICD-10-CM

## 2019-12-18 DIAGNOSIS — N28.1 RENAL CYST, LEFT: Primary | ICD-10-CM

## 2019-12-18 DIAGNOSIS — G89.29 CHRONIC BILATERAL LOW BACK PAIN WITHOUT SCIATICA: ICD-10-CM

## 2019-12-18 DIAGNOSIS — N18.30 TYPE 2 DIABETES MELLITUS WITH STAGE 3 CHRONIC KIDNEY DISEASE, WITHOUT LONG-TERM CURRENT USE OF INSULIN (HCC): ICD-10-CM

## 2019-12-18 DIAGNOSIS — I10 ESSENTIAL HYPERTENSION: ICD-10-CM

## 2019-12-18 DIAGNOSIS — C61 PROSTATE CANCER (HCC): ICD-10-CM

## 2019-12-18 DIAGNOSIS — I50.42 CHRONIC COMBINED SYSTOLIC AND DIASTOLIC CONGESTIVE HEART FAILURE (HCC): ICD-10-CM

## 2019-12-18 DIAGNOSIS — E11.22 TYPE 2 DIABETES MELLITUS WITH STAGE 3 CHRONIC KIDNEY DISEASE, WITHOUT LONG-TERM CURRENT USE OF INSULIN (HCC): ICD-10-CM

## 2019-12-18 DIAGNOSIS — M54.50 CHRONIC BILATERAL LOW BACK PAIN WITHOUT SCIATICA: ICD-10-CM

## 2019-12-18 RX ORDER — CEPHALEXIN 500 MG/1
CAPSULE ORAL
COMMUNITY
Start: 2019-12-16 | End: 2020-02-13

## 2019-12-18 NOTE — PROGRESS NOTES
CC:   Chief Complaint   Patient presents with    Other     follow up [address] is a 70 y.o. male. Presents for ED follow up evaluation. He has HTN, diet-controlled type 2 DM (last A1c 6.5% on 10/9/19), CKD stage 3, hyperlipidemia, CHF (combined diastolic and systolic) with echo EF 05-07% in 7/17, non-ischemic cardiomyopathy, atrial flutter s/p AFL ablation on 8/22/17, non-obstructive atherosclerotic heart disease, COPD, chronic low back pain, and prostate cancer.      Seen at [facility] on 12/5/19 with left lower abdominal pain for 3 days and dysuria. Diagnosed with UTI and discharged on Keflex. Had CT abd/pelvis without contrast that showed no kidney stones. Did show left kidney lower pole 7.7 cm cyst and left retroperitoneal adenopathy (largest lymph node 1.9 x 1.1 cm). Was instructed to follow up with PCP and Nephrology regarding kidney cyst.  Urine culture with no growth to date. Today he complains of right lower abdominal pain. Is very worried about the kidney cyst.  Denies fevers, chills, nausea, vomiting, diarrhea, constipation, hematuria, CP, or SOB. Has seen Dr. Leslie Soria (Nephrology) in the past.  Bren Forward remember when he last saw him, but medical records show last appointment on 4/9/18.      Other Providers: Dr. Lawanda Cazares (Cardiology), Dr. Alfredo Woodruff (Cardiology EPS), Dr. Ashly Ariza (Urology), Dr. Leslie Soria (Nephrology), Dr. Jelena Coughlin (Ophthalmology), Dr. Dalton Pacheco (ENT)      Patient Active Problem List   Diagnosis Code    Combined systolic and diastolic congestive heart failure ([facility] [address] I50.40    S/P cardiac cath Z98.890    NICM (nonischemic cardiomyopathy) ([address] I42.8    Hypertension I10    Mixed hyperlipidemia E78.2    Smokeless tobacco use Z72.0    Atrial flutter ([address] I48.92    Type 2 diabetes mellitus with stage 3 chronic kidney disease, without long-term current use of insulin (HCC) E11.22, N18.3    Prostate cancer ([facility] C61    COPD (chronic obstructive pulmonary disease) ([facility]) J44.9    Chronic low back pain M54.5, G89.29    Obesity (BMI 30-39. 9) E66.9    Candidal intertrigo B37.2     Past Medical History:   Diagnosis Date    Acute respiratory failure with hypoxia ([facility] [address] 02/08/2014    also 11/28/15, 2/17/16, 5/14/17     Alcohol abuse     As of 11/29/18 pt stated he quit 20 years    Arthritis     Back and shoulder    Atrial flutter ([address] 08/2017    saw Dr. Mariana Buckley; underwent a-flutter ablation    BPH (benign prostatic hyperplasia)     CHF (congestive heart failure) ([facility] 02/11/2014    TTE 11/15: EF 40-45%, 2/14: EF 20%  Admitted for acute exacerbations 2/8/14, 11/28/15, 2/17/16, and 5/4/17)    Chronic low back pain     LS spine X-ray 4/8/17: mild DDD    Combined forms of age-related cataract of left eye 12/12/2018    KPE/IOL OS    Combined forms of age-related cataract of right eye 11/28/2018    KPE/IOL OD    COPD (chronic obstructive pulmonary disease) ([facility]     PA (dyspnea on exertion)     ED (erectile dysfunction)     Elevated troponin 02/11/2014    also 11/28/15; due to cardiac strain    Frequent hospital admissions     5/14-5/23/17: acute hypoxic resp failure due to CHF exac, ROBINSON on CKD 3, sepsis due to LE cellulitis, 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)     Leg fracture, right 1973    Nonischemic cardiomyopathy ([facility]     with LVH    Pneumonia 02/08/2014 2/8/14 and 10/30/15    Poor historian     As of 11/29/18 pt reports 5th grade education, pt unable to give med list-obtained from wife per patient's permission    Prediabetes     Prostate cancer ([facility] 2016    As of 11/29/18, pt states he gets two injections twice a year for this    Pulmonary edema 11/28/2015    S/P cardiac cath 2/12/2014 2/12/14 no significant coronary disease, severe LV dysfunction    Tinea cruris     also genital folliculitis    Vertigo      Allergies   Allergen Reactions    Oxycodone Contact Dermatitis       Current Outpatient Medications   Medication Sig Dispense Refill    cephALEXin (KEFLEX) 500 mg capsule TAKE 1 CAPSULE BY MOUTH 4 TIMES DAILY FOR 7 DAYS      furosemide (LASIX) 40 mg tablet Take 0.5 Tabs by mouth daily. 45 Tab 3    atorvastatin (LIPITOR) 40 mg tablet TAKE 1 TABLET BY MOUTH ONCE DAILY AT NIGHT 90 Tab 1    clotrimazole-betamethasone (LOTRISONE) topical cream Apply  to affected area two (2) times a day. 45 g 3    albuterol (PROVENTIL HFA) 90 mcg/actuation inhaler Take 2 Puffs by inhalation every six (6) hours as needed.  amLODIPine (NORVASC) 2.5 mg tablet TAKE 1 TABLET BY MOUTH ONCE DAILY 30 Tab 0    carvedilol (COREG) 25 mg tablet TAKE ONE TABLET BY MOUTH TWICE DAILY WITH MEALS 60 Tab 6    aspirin 81 mg chewable tablet Take 1 Tab by mouth daily. 10 Tab 0         PHYSICAL EXAM  Visit Vitals  /72   Pulse 71   Temp 98 °F (36.7 °C) (Oral)   Resp 18   Ht 5' 9\" (1.753 m)   Wt 228 lb (103.4 kg)   SpO2 96%   BMI 33.67 kg/m²       General: Obese, no distress. Ambulates with a cane. HEENT:  Head normocephalic/atraumatic, no scleral icterus  Lungs:  Clear to ausculation bilaterally. Good air movement. Heart:  Regular rate and rhythm, normal S1 and S2, no murmur, gallop, or rub  Abdomen: Soft, non-distended, normal bowel sounds, mild suprapubic tenderness, no guarding, masses, or rebound tenderness. Back: No CVA tenderness. Extremities: No clubbing, cyanosis, or edema. Neurological: Alert and oriented. Psychiatric: Anxious. Behavior is normal.         ASSESSMENT AND PLAN    ICD-10-CM ICD-9-CM    1.  Renal cyst, left N28.1 753.10 REFERRAL TO NEPHROLOGY   2. Acute cystitis without hematuria N30.00 595.0    3. Essential hypertension I10 401.9    4. Chronic bilateral low back pain without sciatica M54.5 724.2     G89.29 338.29    5. Type 2 diabetes mellitus with stage 3 chronic kidney disease, without long-term current use of insulin (HCC) E11.22 250.40     N18.3 585.3    6. Prostate cancer ([address] C61 185    7. Chronic combined systolic and diastolic congestive heart failure (HCC) I50.42 428.42      428.0      Diagnoses and all orders for this visit:    1. Renal cyst, left  7.7 cm cyst in left kidney. He likely needs a repeat CT abd/pelv with and without IV contrast or MRI to better characterize the cyst (wall thickening, septa, nodularity, enhancement). Would need IVF's to prevent contrast-induced nephropathy but would have to give cautiously due to CHF. Other options are percutaneous needle biopsy or surgery.   -     REFERRAL TO NEPHROLOGY (Dr. Missy Herring)    2. Acute cystitis without hematuria  Finish Keflex. 3. Essential hypertension  BP elevated at 162/72 today likely due to anxiety. Better controlled at previous visits. Continue present management and recheck at next clinic visit. 4. Chronic bilateral low back pain without sciatica    5. Type 2 diabetes mellitus with stage 3 chronic kidney disease, without long-term current use of insulin ([address]  Diet-controlled. Last A1c 6.5% in 10/19    6. Prostate cancer ([facility] [address]    7. Chronic combined systolic and diastolic congestive heart failure (HCC)  Controlled on furosemide and carvedilol. Will send this note to Dr. New Otto and Dr. Herbert Calvo. Follow-up and Dispositions    · Return if symptoms worsen or fail to improve, for Scheduled appointment on 2/13/20. I have discussed the diagnosis with the patient and the intended plan as seen in the above orders. Patient is in agreement. The patient has received an after-visit summary and questions were answered concerning future plans.   I have discussed medication side effects and warnings with the patient as well.

## 2019-12-18 NOTE — PROGRESS NOTES
Twan Wayne  Identified pt with two pt identifiers(name and ). Chief Complaint   Patient presents with    Other     follow up ER        1. Have you been to the ER, urgent care clinic since your last visit? Hospitalized since your last visit? [facility] EMERGENCY [facility]    2. Have you seen or consulted any other health care providers outside of the [address] [place] [address] since your last visit? Include any pap smears or colon screening. Dr Debbie Guillen provider has been notified of reason for visit, vitals and flowsheets obtained on patients.      Patient received paperwork for advance directive during previous visit but has not completed at this time     Reviewed record In preparation for visit, huddled with provider and have obtained necessary documentation      Health Maintenance Due   Topic    EYE EXAM RETINAL OR DILATED        Wt Readings from Last 3 Encounters:   19 228 lb (103.4 kg)   19 229 lb (103.9 kg)   10/09/19 231 lb (104.8 kg)     Temp Readings from Last 3 Encounters:   19 98 °F (36.7 °C) (Oral)   10/09/19 98 °F (36.7 °C) (Oral)   19 98 °F (36.7 °C) (Oral)     BP Readings from Last 3 Encounters:   19 162/72   19 120/76   10/09/19 139/61     Pulse Readings from Last 3 Encounters:   19 71   19 (!) 56   10/09/19 64     Vitals:    19 1015 19 1020   BP: 181/67 162/72   Pulse: 71    Resp: 18    Temp: 98 °F (36.7 °C)    TempSrc: Oral    SpO2: 96%    Weight: 228 lb (103.4 kg)    Height: 5' 9\" (1.753 m)    PainSc:   9    PainLoc: Back          Learning Assessment:  :     Learning Assessment 10/5/2017 2014   PRIMARY LEARNER Patient Patient   HIGHEST LEVEL OF EDUCATION - PRIMARY LEARNER  - DID NOT GRADUATE HIGH SCHOOL   BARRIERS PRIMARY LEARNER - NONE   CO-LEARNER CAREGIVER - No   PRIMARY LANGUAGE ENGLISH ENGLISH    NEED - No   LEARNER PREFERENCE PRIMARY LISTENING LISTENING     DEMONSTRATION -     READING -   ANSWERED BY patient Patient RELATIONSHIP SELF SELF       Depression Screening:  :     3 most recent PHQ Screens 4/9/2019   Little interest or pleasure in doing things Not at all   Feeling down, depressed, irritable, or hopeless Not at all   Total Score PHQ 2 0       Fall Risk Assessment:  :     Fall Risk Assessment, last 12 mths 3/5/2019   Able to walk? Yes   Fall in past 12 months? No   Fall with injury? -       Abuse Screening:  :     Abuse Screening Questionnaire 10/9/2019 7/9/2019 9/4/2018 7/3/2018 10/5/2017   Do you ever feel afraid of your partner? N N N N N   Are you in a relationship with someone who physically or mentally threatens you? N N N N N   Is it safe for you to go home? Y Y Y Y Y       ADL Screening:  :     ADL Assessment 7/3/2018   Feeding yourself No Help Needed   Getting from bed to chair No Help Needed   Getting dressed No Help Needed   Bathing or showering No Help Needed   Walk across the room (includes cane/walker) No Help Needed   Using the telphone No Help Needed   Taking your medications No Help Needed   Preparing meals No Help Needed   Managing money (expenses/bills) No Help Needed   Moderately strenuous housework (laundry) No Help Needed   Shopping for personal items (toiletries/medicines) No Help Needed   Shopping for groceries No Help Needed   Driving No Help Needed   Climbing a flight of stairs No Help Needed   Getting to places beyond walking distances No Help Needed                 Medication reconciliation up to date and corrected with patient at this time.

## 2019-12-19 ENCOUNTER — DOCUMENTATION ONLY (OUTPATIENT)
Dept: INTERNAL MEDICINE CLINIC | Facility: CLINIC | Age: 79
End: 2019-12-19

## 2019-12-19 NOTE — PROGRESS NOTES
Office note dated 12/18/19 and a copy of CT abd report faxed to: 1) Dr. Chapin Wang (Nephrology) and 2) Dr. Verner Lawn (Nephrology).

## 2020-01-21 ENCOUNTER — HOSPITAL ENCOUNTER (OUTPATIENT)
Dept: NUCLEAR MEDICINE | Age: 80
Discharge: HOME | End: 2020-01-21
Attending: UROLOGY
Payer: MEDICARE

## 2020-01-21 ENCOUNTER — HOSPITAL ENCOUNTER (OUTPATIENT)
Dept: CT IMAGING | Age: 80
Discharge: HOME | End: 2020-01-21
Attending: UROLOGY
Payer: MEDICARE

## 2020-01-21 DIAGNOSIS — C61 PROSTATE CANCER (HCC): ICD-10-CM

## 2020-01-21 PROCEDURE — 74177 CT ABD & PELVIS W/CONTRAST: CPT

## 2020-01-21 PROCEDURE — 78306 BONE IMAGING WHOLE BODY: CPT

## 2020-01-21 PROCEDURE — 74011636320 HC RX REV CODE- 636/320: Performed by: UROLOGY

## 2020-01-21 PROCEDURE — A9503 TC99M MEDRONATE: HCPCS

## 2020-01-21 RX ORDER — SODIUM CHLORIDE 0.9 % (FLUSH) 0.9 %
10 SYRINGE (ML) INJECTION
Status: COMPLETED | OUTPATIENT
Start: 2020-01-21 | End: 2020-01-21

## 2020-01-21 RX ADMIN — IOPAMIDOL 100 ML: 755 INJECTION, SOLUTION INTRAVENOUS at 12:15

## 2020-01-21 RX ADMIN — Medication 10 ML: at 12:15

## 2020-01-21 RX ADMIN — IOHEXOL 20 ML: 240 INJECTION, SOLUTION INTRATHECAL; INTRAVASCULAR; INTRAVENOUS; ORAL at 12:24

## 2020-02-13 ENCOUNTER — OFFICE VISIT (OUTPATIENT)
Dept: INTERNAL MEDICINE CLINIC | Facility: CLINIC | Age: 80
End: 2020-02-13

## 2020-02-13 ENCOUNTER — TELEPHONE (OUTPATIENT)
Dept: INTERNAL MEDICINE CLINIC | Facility: CLINIC | Age: 80
End: 2020-02-13

## 2020-02-13 VITALS
HEART RATE: 66 BPM | DIASTOLIC BLOOD PRESSURE: 72 MMHG | TEMPERATURE: 97.6 F | OXYGEN SATURATION: 97 % | RESPIRATION RATE: 16 BRPM | WEIGHT: 233 LBS | BODY MASS INDEX: 34.51 KG/M2 | HEIGHT: 69 IN | SYSTOLIC BLOOD PRESSURE: 134 MMHG

## 2020-02-13 DIAGNOSIS — I10 ESSENTIAL HYPERTENSION: Primary | ICD-10-CM

## 2020-02-13 DIAGNOSIS — C61 PROSTATE CANCER (HCC): ICD-10-CM

## 2020-02-13 DIAGNOSIS — I50.42 CHRONIC COMBINED SYSTOLIC AND DIASTOLIC CONGESTIVE HEART FAILURE (HCC): ICD-10-CM

## 2020-02-13 DIAGNOSIS — N18.30 STAGE 3 CHRONIC KIDNEY DISEASE (HCC): ICD-10-CM

## 2020-02-13 DIAGNOSIS — J44.9 CHRONIC OBSTRUCTIVE PULMONARY DISEASE, UNSPECIFIED COPD TYPE (HCC): ICD-10-CM

## 2020-02-13 DIAGNOSIS — N28.1 ACQUIRED RENAL CYST OF LEFT KIDNEY: ICD-10-CM

## 2020-02-13 DIAGNOSIS — I48.92 ATRIAL FLUTTER, UNSPECIFIED TYPE (HCC): ICD-10-CM

## 2020-02-13 DIAGNOSIS — R10.32 LEFT LOWER QUADRANT ABDOMINAL PAIN: ICD-10-CM

## 2020-02-13 DIAGNOSIS — M54.50 CHRONIC BILATERAL LOW BACK PAIN WITHOUT SCIATICA: ICD-10-CM

## 2020-02-13 DIAGNOSIS — E11.9 DIET-CONTROLLED DIABETES MELLITUS (HCC): ICD-10-CM

## 2020-02-13 DIAGNOSIS — G89.29 CHRONIC BILATERAL LOW BACK PAIN WITHOUT SCIATICA: ICD-10-CM

## 2020-02-13 DIAGNOSIS — Z13.5 SCREENING FOR GLAUCOMA: ICD-10-CM

## 2020-02-13 LAB
BILIRUB UR QL STRIP: NEGATIVE
GLUCOSE UR-MCNC: NEGATIVE MG/DL
HBA1C MFR BLD HPLC: 6.5 %
KETONES P FAST UR STRIP-MCNC: NEGATIVE MG/DL
PH UR STRIP: 5 [PH] (ref 4.6–8)
PROT UR QL STRIP: NEGATIVE
SP GR UR STRIP: 1.02 (ref 1–1.03)
UA UROBILINOGEN AMB POC: NORMAL (ref 0.2–1)
URINALYSIS CLARITY POC: CLEAR
URINALYSIS COLOR POC: YELLOW
URINE BLOOD POC: NORMAL
URINE LEUKOCYTES POC: NEGATIVE
URINE NITRITES POC: NEGATIVE

## 2020-02-13 NOTE — TELEPHONE ENCOUNTER
Dr. Carlito Tejada referred the pt to Dr. Alfonso Solorzano office (Nephrology) after today's visit. Pt requested that we call them to schedule. When I called their office, I was told that they will require the pt's last visit note and recent labs. The pt was seen in Dr. Mauro Cespedes office in October and was told to return in one year, so they will need to see updated info if they are to see him sooner than a 1 yr f/u. Dr. Mauro Cespedes office fax is [phone]. Pt was told that their office stated they would review the notes sent from [facility] and that they would call him directly to schedule.

## 2020-02-13 NOTE — PROGRESS NOTES
CC:   Chief Complaint   Patient presents with    Hypertension     Room 3C //     Diabetes       HISTORY OF PRESENT ILLNESS  Colonel Hobbs is a 70 y.o. male. Presents for 2 month follow up evaluation. He has HTN, diet-controlled type 2 DM (last A1c 6.5% on 10/9/19), CKD stage 3, hyperlipidemia, CHF (combined diastolic and systolic) with echo EF 03-12% in 7/17, non-ischemic cardiomyopathy, atrial flutter s/p AFL ablation on 8/22/17, non-obstructive atherosclerotic heart disease, COPD, chronic low back pain, and prostate cancer.      Complains of LLQ abdominal pain for the past 2 weeks. Denies fevers, chills, dysuria, nausea, vomiting, diarrhea, or constipation. Bilateral chronic low back pain. Still has not scheduled appointment with Dr. Odilia Clemons (Nephrology). Scheduled for colonoscopy on 3/2/20 with Dr. Ranjana Abbott. Seen at [facility] on 12/5/19 with left lower abdominal pain for 3 days and dysuria. Diagnosed with UTI and discharged on Keflex. Had CT abd/pelvis without contrast that showed no kidney stones, showed left kidney lower pole 7.7 cm cyst and left retroperitoneal adenopathy (largest lymph node 1.9 x 1.1 cm). Was instructed to follow up Nephrology regarding kidney cyst.  Urine culture showed no growth.        CT abd/pelvis 1/21/20: Left retroperitoneal lymph nodes which are mildly enlarged but unchanged from the prior study in 2017, large cyst in left kidney  NM bones scan: no evidence of bone mets    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    S/P cardiac cath Z98.890    N[facility]M [facility]nonischemic cardiomyopathy) ([facility] I42.8    Hypertension I10    Mixed hyperlipidemia E78.2    Smokeless tobacco use Z72.0    Atrial flutter (HCC) I48.92    Type 2 diabetes mellitus with stage 3 chronic kidney disease, without long-term current use of insulin (HCC) E11.22, N18.3    Prostate cancer ([facility] C61    COPD (chronic obstructive pulmonary disease) ([facility]) J44.9    Chronic low back pain M54.5, G89.29    Obesity (BMI 30-39. 9) E66.9    Candidal intertrigo B37.2     Past Medical History:   Diagnosis Date    Acute respiratory failure with hypoxia ([facility] 02/08/2014    also 11/28/15, 2/17/16, 5/14/17     Alcohol abuse     As of 11/29/18 pt stated he quit 20 years    Arthritis     Back and shoulder    Atrial flutter ([address] 08/2017    saw Dr. Cavanaugh Parents; underwent a-flutter ablation    BPH (benign prostatic hyperplasia)     CHF (congestive heart failure) ([facility] 02/11/2014    TTE 11/15: EF 40-45%, 2/14: EF 20%  Admitted for acute exacerbations 2/8/14, 11/28/15, 2/17/16, and 5/4/17)    Chronic low back pain     LS spine X-ray 4/8/17: mild DDD    Combined forms of age-related cataract of left eye 12/12/2018    KPE/IOL OS    Combined forms of age-related cataract of right eye 11/28/2018    KPE/IOL OD    COPD (chronic obstructive pulmonary disease) ([address]     PA (dyspnea on exertion)     ED (erectile dysfunction)     Elevated troponin 02/11/2014    also 11/28/15; due to cardiac strain    Frequent hospital admissions     5/14-5/23/17: acute hypoxic resp failure due to CHF exac, ROBINSON on CKD 3, sepsis due to LE cellulitis, 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  2/17-2/22/16: acute hypoxic resp failure, acute diarrhea  11/28-11/30/15: acute hypoxic resp failure due to acute CHF exac, elevated troponin due to cardiac strain  2/8-2/12/14: acuter hypoxic resp failure due to CHF exac, pneumonia     Hand blister 10/8/2017    Bilateral    Hypertension     Kidney disease, chronic, stage III (GFR 30-59 ml/min) ([facility])     Leg fracture, right 1973    Nonischemic cardiomyopathy ([address]     with LVH    Pneumonia 02/08/2014 2/8/14 and 10/30/15    Poor historian     As of 11/29/18 pt reports 5th grade education, pt unable to give med list-obtained from wife per patient's permission    Prediabetes     Prostate cancer ([facility] 2016    As of 11/29/18, pt states he gets two injections twice a year for this    Pulmonary edema 11/28/2015    S/P cardiac cath 2/12/2014 2/12/14 no significant coronary disease, severe LV dysfunction    Tinea cruris     also genital folliculitis    Vertigo      Allergies   Allergen Reactions    Oxycodone Contact Dermatitis       Current Outpatient Medications   Medication Sig Dispense Refill    atorvastatin (LIPITOR) 40 mg tablet TAKE 1 TABLET BY MOUTH ONCE DAILY AT NIGHT 90 Tab 1    clotrimazole-betamethasone (LOTRISONE) topical cream Apply  to affected area two (2) times a day. 45 g 3    amLODIPine (NORVASC) 2.5 mg tablet TAKE 1 TABLET BY MOUTH ONCE DAILY 30 Tab 0    carvedilol (COREG) 25 mg tablet TAKE ONE TABLET BY MOUTH TWICE DAILY WITH MEALS 60 Tab 6    aspirin 81 mg chewable tablet Take 1 Tab by mouth daily. 10 Tab 0    furosemide (LASIX) 40 mg tablet Take 0.5 Tabs by mouth daily. 45 Tab 3         PHYSICAL EXAM  Visit Vitals  /72 (BP 1 Location: Left arm, BP Patient Position: Sitting)   Pulse 66   Temp 97.6 °F (36.4 °C) (Oral)   Resp 16   Ht 5' 9\" (1.753 m)   Wt 233 lb (105.7 kg)   SpO2 97%   BMI 34.41 kg/m²       General: Obese, no distress. Ambulates with a cane. HEENT:  Head normocephalic/atraumatic, no scleral icterus  Lungs:  Clear to ausculation bilaterally. Good air movement. Heart:  Regular rate and rhythm, normal S1 and S2, no murmur, gallop, or rub  Abdomen: Soft, non-distended, normal bowel sounds, mild suprapubic tenderness, no guarding, masses, or rebound tenderness. Back: No CVA tenderness. Extremities: No clubbing, cyanosis, or edema. Neurological: Alert and oriented. Psychiatric: Normal mood and affect.  Behavior is normal.     Results for orders placed or performed in visit on 02/13/20   AMB POC HEMOGLOBIN A1C   Result Value Ref Range    Hemoglobin A1c (POC) 6.5 %   AMB POC URINALYSIS DIP STICK AUTO W/O MICRO   Result Value Ref Range    Color (UA POC) Yellow     Clarity (UA POC) Clear     Glucose (UA POC) Negative Negative    Bilirubin (UA POC) Negative Negative    Ketones (UA POC) Negative Negative    Specific gravity (UA POC) 1.020 1.001 - 1.035    Blood (UA POC) Trace Negative    pH (UA POC) 5.0 4.6 - 8.0    Protein (UA POC) Negative Negative    Urobilinogen (UA POC) 0.2 mg/dL 0.2 - 1    Nitrites (UA POC) Negative Negative    Leukocyte esterase (UA POC) Negative Negative         ASSESSMENT AND PLAN    ICD-10-CM ICD-9-CM    1. Essential hypertension I10 401.9    2. Diet-controlled diabetes mellitus (HCC) E11.9 250.00 AMB POC HEMOGLOBIN A1C   3. Stage 3 chronic kidney disease (HCC) N18.3 585.3 REFERRAL TO NEPHROLOGY   4. Left lower quadrant abdominal pain R10.32 789.04 AMB POC URINALYSIS DIP STICK AUTO W/O MICRO   5. Chronic bilateral low back pain without sciatica M54.5 724.2     G89.29 338.29    6. Chronic combined systolic and diastolic congestive heart failure (HCC) I50.42 428.42      428.0    7. Atrial flutter, unspecified type (HCC) I48.92 427.32    8. Chronic obstructive pulmonary disease, unspecified COPD type ([facility] J44.9 496    9. Prostate cancer ([facility] C61 185    10. Acquired renal cyst of left kidney N28.1 593.2 REFERRAL TO NEPHROLOGY   11. Screening for glaucoma Z13.5 V80.1 REFERRAL TO OPHTHALMOLOGY     Diagnoses and all orders for this visit:    1. Essential hypertension  Controlled. Continue amlodipine and carvedilol. 2. Diet-controlled diabetes mellitus ([facility]  Controlled. A1c 6.5% which is unchanged from 10/19.   -     AMB POC HEMOGLOBIN A1C    3. Stage 3 chronic kidney disease (HCC)  -     REFERRAL TO NEPHROLOGY    4. Left lower quadrant abdominal pain  No evidence of UTI. May be related to left renal cyst.  -     AMB POC URINALYSIS DIP STICK AUTO W/O MICRO    5. Chronic bilateral low back pain without sciatica    6. Chronic combined systolic and diastolic congestive heart failure (HCC)  Last EF normal at 60% in 2017. Continue furosemide and carvedilol. .    7. Atrial flutter, unspecified type ([facility]  In NSR since undergoing ablation. 8. Chronic obstructive pulmonary disease, unspecified COPD type ([facility]  Could not afford albuterol inhaler. 9. Prostate cancer ([facility]  Stable on Lupron therapy. Follow up with Dr. Olivia Velarde. 10. Acquired renal cyst of left kidney  -     REFERRAL TO NEPHROLOGY (Dr. Noemy Lanier)    11. Screening for glaucoma  -     REFERRAL TO OPHTHALMOLOGY (Dr. Mireya Tolbert, has appt scheduled in April)      Follow-up and Dispositions    · Return in about 4 months (around 6/13/2020), or if symptoms worsen or fail to improve, for HTN ,DM. I have discussed the diagnosis with the patient and the intended plan as seen in the above orders. Patient is in agreement. The patient has received an after-visit summary and questions were answered concerning future plans. I have discussed medication side effects and warnings with the patient as well.

## 2020-02-13 NOTE — PROGRESS NOTES
Deepak Dougherty  Identified pt with two pt identifiers(name and ). Chief Complaint   Patient presents with    Hypertension     Room 3C //     Diabetes    Abdominal Pain     Lower left side of abdomen       Reviewed record In preparation for visit and have obtained necessary documentation. 1. Have you been to the ER, urgent care clinic or hospitalized since your last visit? No     2. Have you seen or consulted any other health care providers outside of the [address] [place] [address] since your last visit? Include any pap smears or colon screening. Dr. Jenifer Caraballo    Patient declined receiving information on advance directives. Vitals reviewed with provider. Health Maintenance reviewed:     Health Maintenance Due   Topic    Eye Exam Retinal or Dilated    to go  Wt Readings from Last 3 Encounters:   20 233 lb (105.7 kg)   19 228 lb (103.4 kg)   19 229 lb (103.9 kg)    home?  Y Y Y Y Y Y                No Help Needed                                     N   Is it safe  3 most recent PHQ Screens 2020   Little interest or pleasure in doing things Not at all   Feeling down, depressed, irritable, or hopeless Not at all   Total Score PHQ 2 0

## 2020-03-02 ENCOUNTER — HOSPITAL ENCOUNTER (OUTPATIENT)
Age: 80
Setting detail: OUTPATIENT SURGERY
Discharge: HOME | End: 2020-03-02
Attending: INTERNAL MEDICINE | Admitting: INTERNAL MEDICINE
Payer: MEDICARE

## 2020-03-02 ENCOUNTER — ANESTHESIA (OUTPATIENT)
Dept: ENDOSCOPY | Age: 80
End: 2020-03-02
Payer: MEDICARE

## 2020-03-02 ENCOUNTER — ANESTHESIA EVENT (OUTPATIENT)
Dept: ENDOSCOPY | Age: 80
End: 2020-03-02
Payer: MEDICARE

## 2020-03-02 VITALS
HEIGHT: 69 IN | SYSTOLIC BLOOD PRESSURE: 171 MMHG | DIASTOLIC BLOOD PRESSURE: 78 MMHG | WEIGHT: 230 LBS | BODY MASS INDEX: 34.07 KG/M2 | OXYGEN SATURATION: 98 % | TEMPERATURE: 97.8 F | RESPIRATION RATE: 15 BRPM | HEART RATE: 64 BPM

## 2020-03-02 PROCEDURE — 74011250636 HC RX REV CODE- 250/636: Performed by: ANESTHESIOLOGY

## 2020-03-02 PROCEDURE — 77030013992 HC SNR POLYP ENDOSC BSC -B: Performed by: INTERNAL MEDICINE

## 2020-03-02 PROCEDURE — 76060000031 HC ANESTHESIA FIRST 0.5 HR: Performed by: INTERNAL MEDICINE

## 2020-03-02 PROCEDURE — 74011000250 HC RX REV CODE- 250: Performed by: ANESTHESIOLOGY

## 2020-03-02 PROCEDURE — 88305 TISSUE EXAM BY PATHOLOGIST: CPT

## 2020-03-02 PROCEDURE — 76040000019: Performed by: INTERNAL MEDICINE

## 2020-03-02 PROCEDURE — 74011250636 HC RX REV CODE- 250/636: Performed by: INTERNAL MEDICINE

## 2020-03-02 RX ORDER — EPINEPHRINE 0.1 MG/ML
1 INJECTION INTRACARDIAC; INTRAVENOUS
Status: DISCONTINUED | OUTPATIENT
Start: 2020-03-02 | End: 2020-03-02 | Stop reason: HOSPADM

## 2020-03-02 RX ORDER — ATROPINE SULFATE 0.1 MG/ML
0.5 INJECTION INTRAVENOUS
Status: DISCONTINUED | OUTPATIENT
Start: 2020-03-02 | End: 2020-03-02 | Stop reason: HOSPADM

## 2020-03-02 RX ORDER — NALOXONE HYDROCHLORIDE 0.4 MG/ML
0.4 INJECTION, SOLUTION INTRAMUSCULAR; INTRAVENOUS; SUBCUTANEOUS
Status: DISCONTINUED | OUTPATIENT
Start: 2020-03-02 | End: 2020-03-02 | Stop reason: HOSPADM

## 2020-03-02 RX ORDER — FLUMAZENIL 0.1 MG/ML
0.2 INJECTION INTRAVENOUS
Status: DISCONTINUED | OUTPATIENT
Start: 2020-03-02 | End: 2020-03-02 | Stop reason: HOSPADM

## 2020-03-02 RX ORDER — LIDOCAINE HYDROCHLORIDE 20 MG/ML
INJECTION, SOLUTION EPIDURAL; INFILTRATION; INTRACAUDAL; PERINEURAL AS NEEDED
Status: DISCONTINUED | OUTPATIENT
Start: 2020-03-02 | End: 2020-03-02 | Stop reason: HOSPADM

## 2020-03-02 RX ORDER — SODIUM CHLORIDE 0.9 % (FLUSH) 0.9 %
5-40 SYRINGE (ML) INJECTION EVERY 8 HOURS
Status: DISCONTINUED | OUTPATIENT
Start: 2020-03-02 | End: 2020-03-02 | Stop reason: HOSPADM

## 2020-03-02 RX ORDER — FENTANYL CITRATE 50 UG/ML
25 INJECTION, SOLUTION INTRAMUSCULAR; INTRAVENOUS
Status: DISCONTINUED | OUTPATIENT
Start: 2020-03-02 | End: 2020-03-02 | Stop reason: HOSPADM

## 2020-03-02 RX ORDER — DEXTROMETHORPHAN/PSEUDOEPHED 2.5-7.5/.8
1.2 DROPS ORAL
Status: DISCONTINUED | OUTPATIENT
Start: 2020-03-02 | End: 2020-03-02 | Stop reason: HOSPADM

## 2020-03-02 RX ORDER — SODIUM CHLORIDE 0.9 % (FLUSH) 0.9 %
5-40 SYRINGE (ML) INJECTION AS NEEDED
Status: DISCONTINUED | OUTPATIENT
Start: 2020-03-02 | End: 2020-03-02 | Stop reason: HOSPADM

## 2020-03-02 RX ORDER — PROPOFOL 10 MG/ML
INJECTION, EMULSION INTRAVENOUS AS NEEDED
Status: DISCONTINUED | OUTPATIENT
Start: 2020-03-02 | End: 2020-03-02 | Stop reason: HOSPADM

## 2020-03-02 RX ORDER — SODIUM CHLORIDE 9 MG/ML
75 INJECTION, SOLUTION INTRAVENOUS CONTINUOUS
Status: DISCONTINUED | OUTPATIENT
Start: 2020-03-02 | End: 2020-03-02 | Stop reason: HOSPADM

## 2020-03-02 RX ORDER — MIDAZOLAM HYDROCHLORIDE 1 MG/ML
.25-5 INJECTION, SOLUTION INTRAMUSCULAR; INTRAVENOUS
Status: DISCONTINUED | OUTPATIENT
Start: 2020-03-02 | End: 2020-03-02 | Stop reason: HOSPADM

## 2020-03-02 RX ADMIN — SODIUM CHLORIDE 75 ML/HR: 900 INJECTION, SOLUTION INTRAVENOUS at 07:51

## 2020-03-02 RX ADMIN — LIDOCAINE HYDROCHLORIDE 40 MG: 20 INJECTION, SOLUTION EPIDURAL; INFILTRATION; INTRACAUDAL; PERINEURAL at 07:55

## 2020-03-02 RX ADMIN — PROPOFOL 180 MG: 10 INJECTION, EMULSION INTRAVENOUS at 08:15

## 2020-03-02 NOTE — ANESTHESIA POSTPROCEDURE EVALUATION
Procedure(s):  COLONOSCOPY  ENDOSCOPIC POLYPECTOMY. total IV anesthesia    Anesthesia Post Evaluation        Patient location during evaluation: PACU  Note status: Adequate. Level of consciousness: responsive to verbal stimuli and sleepy but conscious  Pain management: satisfactory to patient  Airway patency: patent  Anesthetic complications: no  Cardiovascular status: acceptable  Respiratory status: acceptable  Hydration status: acceptable  Comments: +Post-Anesthesia Evaluation and Assessment    Patient: Laura Mcbride MRN: [id number]  SSN: [id number]   YOB: 1940  Age: 70 y.o. Sex: male      Cardiovascular Function/Vital Signs    /78   Pulse 64   Temp 36.6 °C (97.8 °F)   Resp 15   Ht 5' 9\" (1.753 m)   Wt 104.3 kg (230 lb)   SpO2 98%   BMI 33.97 kg/m²     Patient is status post Procedure(s):  COLONOSCOPY  ENDOSCOPIC POLYPECTOMY. Nausea/Vomiting: Controlled. Postoperative hydration reviewed and adequate. Pain:  Pain Scale 1: Numeric (0 - 10) (03/02/20 0825)  Pain Intensity 1: 0 (03/02/20 0825)   Managed. Neurological Status: At baseline. Mental Status and Level of Consciousness: Arousable. Pulmonary Status:   O2 Device: Room air (03/02/20 0830)   Adequate oxygenation and airway patent. Complications related to anesthesia: None    Post-anesthesia assessment completed. No concerns. Signed By: Heidi Garrido DO    3/2/2020  Post anesthesia nausea and vomiting:  controlled      Vitals Value Taken Time   /78 3/2/2020  8:30 AM   Temp 36.6 °C (97.8 °F) 3/2/2020  8:25 AM   Pulse 60 3/2/2020  8:33 AM   Resp 17 3/2/2020  8:33 AM   SpO2 98 % 3/2/2020  8:33 AM   Vitals shown include unvalidated device data.

## 2020-03-02 NOTE — DISCHARGE INSTRUCTIONS
Digna Ser  [id number]  1940    COLON DISCHARGE INSTRUCTIONS  Discomfort:  Redness at IV site- apply warm compress to area; if redness or soreness persist- contact your physician  There may be a slight amount of blood passed from the rectum  Gaseous discomfort- walking, belching will help relieve any discomfort  You may not operate a vehicle for 12 hours  You may not engage in an occupation involving machinery or appliances for rest of today  You may not drink alcoholic beverages for at least 12 hours  Avoid making any critical decisions for at least 24 hour  DIET:   High fiber diet. - however -  remember your colon is empty and a heavy meal will produce gas. Avoid these foods:  vegetables, fried / greasy foods, carbonated drinks for today  MEDICATION:         ACTIVITY:  You may not resume your normal daily activities until tomorrow AM; it is recommended that you spend the remainder of the day resting -  avoid any strenuous activity. CALL M.D.   ANY SIGN OF:   Increasing pain, nausea, vomiting  Abdominal distension (swelling)  New increased bleeding (oral or rectal)  Fever (chills)  Pain in chest area  Bloody discharge from nose or mouth  Shortness of breath    IMPRESSION:  -Single sessile 2mm cecal colon polyp at 90cm, removed with cold snare  -Single sessile 2mm asscending colon polyp at 85cm, removed with cold snare  -Sigmoid diverticulosis  -Small grade 1 internal hemorrhoids    Follow-up Instructions:   Call Dr. Rashad Aguirre for the results of procedure / biopsy in 7-10 days  Telephone # [phone]  Repeat colonoscopy in 5 years    Armando Pan MD

## 2020-03-02 NOTE — ANESTHESIA PREPROCEDURE EVALUATION
Anesthetic History   No history of anesthetic complications            Review of Systems / Medical History  Patient summary reviewed, nursing notes reviewed and pertinent labs reviewed    Pulmonary    COPD      Smoker (40 pk yr smoker, quit in 2017; still chews tobacco)      Comments: Current Every Day Smoker  Hx Acute respiratory failure with hypoxia     Neuro/Psych   Within defined limits           Cardiovascular    Hypertension        Dysrhythmias (s/p ablation) : atrial flutter and SVT  Hyperlipidemia    Exercise tolerance: >4 METS  Comments: 2017 ECHO: 55-60% EF      Hx Pulmonary edema     EKG: SB, first degree AV blk, nonspec T abnormality       GI/Hepatic/Renal         Renal disease (CKD stage 3)      Comments: Colon polyps Endo/Other        Obesity, arthritis and cancer (Prostate cancer)  Pertinent negatives: Diabetes: prediabetes, no meds.    Other Findings   Comments:            Physical Exam    Airway  Mallampati: III  TM Distance: 4 - 6 cm  Neck ROM: normal range of motion   Mouth opening: Normal     Cardiovascular  Regular rate and rhythm,  S1 and S2 normal,  no murmur, click, rub, or gallop             Dental    Dentition: Full upper dentures     Pulmonary  Breath sounds clear to auscultation              Comments: Decreased inspiratory effort Abdominal  GI exam deferred       Other Findings            Anesthetic Plan    ASA: 3  Anesthesia type: total IV anesthesia          Induction: Intravenous  Anesthetic plan and risks discussed with: Patient

## 2020-03-02 NOTE — PROCEDURES
NAME:  Ulices Talamantes   :   1940   MRN:   [id number]     Date/Time:  3/2/2020 8:17 AM    Colonoscopy Operative Report    Procedure Type:   Colonoscopy with polypectomy (cold snare)     Indications:     Personal history of colon polyps (screening only)  Pre-operative Diagnosis: see indication above  Post-operative Diagnosis:  See findings below  :  Mackenzie Fox MD  Referring Provider: Karma Trujillo MD    Exam:  Airway: clear, no airway problems anticipated  Heart: RRR, without gallops or rubs  Lungs: clear bilaterally without wheezes, crackles, or rhonchi  Abdomen: soft, nontender, nondistended, bowel sounds present  Mental Status: awake, alert and oriented to person, place and time    Sedation:  MAC anesthesia Propofol 180mg IV  Procedure Details:  After informed consent was obtained with all risks and benefits of procedure explained and preoperative exam completed, the patient was taken to the endoscopy suite and placed in the left lateral decubitus position. Upon sequential sedation as per above, a digital rectal exam was performed demonstrating internal hemorrhoids. The Olympus videocolonoscope  was inserted in the rectum and carefully advanced to the cecum, which was identified by the ileocecal valve and appendiceal orifice. The quality of preparation was excellent. The colonoscope was slowly withdrawn with careful evaluation between folds. Retroflexion in the rectum was completed demonstrating internal hemorrhoids.      Findings:     -Single sessile 2mm cecal colon polyp at 90cm, removed with cold snare  -Single sessile 2mm asscending colon polyp at 85cm, removed with cold snare  -Sigmoid diverticulosis  -Small grade 1 internal hemorrhoids     Specimen Removed:  #1 cecal and asc colon polyps (2)  Complications: None.    EBL:  None.     Impression:    -Single sessile 2mm cecal colon polyp at 90cm, removed with cold snare  -Single sessile 2mm asscending colon polyp at 85cm, removed with cold snare  -Sigmoid diverticulosis  -Small grade 1 internal hemorrhoids     Recommendations:   -Await pathology. ,   -Repeat colonoscopy in 5 years. ,   -High fiber diet.    -Resume normal medication(s). -You will receive a letter about the biopsy results in about 7-10 days.  -You may be asked to call your doctor's office for the results. Discharge Disposition:  To room after recovery in Endoscopy.     Muriel Blackwell MD

## 2020-03-02 NOTE — H&P
Gastroenterology Outpatient History and Physical    Patient: Zackery Blackburn    Physician: Muriel Blackwell MD    Chief Complaint: pers hx colon adenoma  History of Present Illness: 70yo M with pers hx colon adenoma. Last colonoscopy 2/2016.     History:  Past Medical History:   Diagnosis Date    Acute respiratory failure with hypoxia ([address] 02/08/2014    also 11/28/15, 2/17/16, 5/14/17     Alcohol abuse     As of 11/29/18 pt stated he quit 20 years    Arthritis     Back and shoulder    Atrial flutter ([address] 08/2017    saw Dr. Dhiraj Preston; underwent a-flutter ablation    BPH (benign prostatic hyperplasia)     CHF (congestive heart failure) ([address] 02/11/2014    TTE 11/15: EF 40-45%, 2/14: EF 20%  Admitted for acute exacerbations 2/8/14, 11/28/15, 2/17/16, and 5/4/17)    Chronic kidney disease     III    Chronic low back pain     LS spine X-ray 4/8/17: mild DDD    Combined forms of age-related cataract of left eye 12/12/2018    KPE/IOL OS    Combined forms of age-related cataract of right eye 11/28/2018    KPE/IOL OD    COPD (chronic obstructive pulmonary disease) ([address]     PA (dyspnea on exertion)     ED (erectile dysfunction)     Elevated troponin 02/11/2014    also 11/28/15; due to cardiac strain    Frequent hospital admissions     5/14-5/23/17: acute hypoxic resp failure due to CHF exac, ROBINSON on CKD 3, sepsis due to LE cellulitis, 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  2/17-2/22/16: acute hypoxic resp failure, acute diarrhea  11/28-11/30/15: acute hypoxic resp failure due to acute CHF exac, elevated troponin due to cardiac strain  2/8-2/12/14: acuter hypoxic resp failure due to CHF exac, pneumonia     Hand blister 10/8/2017    Bilateral    Hypertension     Kidney disease, chronic, stage III (GFR 30-59 ml/min) ([facility])     Leg fracture, right 1973    Nonischemic cardiomyopathy ([address]     with LVH    Pneumonia 02/08/2014 2/8/14 and 10/30/15    Poor historian     As of 11/29/18 pt reports 5th grade education, pt unable to give med list-obtained from wife per patient's permission    Prediabetes     Prostate cancer ([facility] 2016    As of 18, pt states he gets two injections twice a year for this    Pulmonary edema 2015    S/P cardiac cath 2014 no significant coronary disease, severe LV dysfunction    Tinea cruris     also genital folliculitis    Vertigo       Past Surgical History:   Procedure Laterality Date    CARDIAC SURG PROCEDURE UNLIST  2017    SVT ablation    COLONOSCOPY,DIAGNOSTIC  2016    Dr. Christian Peralta; single sessile polyp at descending colon, sigmoid diverticulosis, int hemorrhoids    COLONOSCOPY,REMV LESN,SNARE  2016         HX CATARACT REMOVAL Right 2018    Dr. Katty Clark Left 2018    Dr. Ita De Los Santos.  LEFT EYE SECOND REFRATIVE CATARACT REMOVAL WITH LENS IMPLANTATION    HX ORTHOPAEDIC Right 's    index finger    HX OTHER SURGICAL  2017    Dr. Samira Joseph; atrial flutter ablation    HX ROTATOR CUFF REPAIR Right     HX TONSILLECTOMY  1948      Social History     Socioeconomic History    Marital status:      Spouse name: Not on file    Number of children: Not on file    Years of education: Not on file    Highest education level: Not on file   Tobacco Use    Smoking status: Former Smoker     Packs/day: 2.00     Years: 20.00     Pack years: 40.00     Types: Cigarettes     Last attempt to quit: 2017     Years since quittin.5    Smokeless tobacco: Current User     Types: Chew    Tobacco comment: Chewing tobacco   Substance and Sexual Activity    Alcohol use: Not Currently     Alcohol/week: 0.0 standard drinks     Comment: As of 18, pt quit 20 years ago    Drug use: No      Family History   Adopted: Yes   Family history unknown: Yes      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    S/P cardiac cath Z98.890    NICM (nonischemic cardiomyopathy) ([facility]) I42.8    Hypertension I10    Mixed hyperlipidemia E78.2    Smokeless tobacco use Z72.0    Atrial flutter ([facility]) I48.92    Type 2 diabetes mellitus with stage 3 chronic kidney disease, without long-term current use of insulin ([facility]) E11.22, N18.3    Prostate cancer ([facility] C61    COPD (chronic obstructive pulmonary disease) ([facility]) J44.9    Chronic low back pain M54.5, G89.29    Obesity (BMI 30-39. 9) E66.9    Candidal intertrigo B37.2       Allergies: Allergies   Allergen Reactions    Oxycodone Contact Dermatitis     Medications:   Prior to Admission medications    Medication Sig Start Date End Date Taking? Authorizing Provider   furosemide (LASIX) 40 mg tablet Take 0.5 Tabs by mouth daily. 12/3/19  Yes Komal Dai MD   atorvastatin (LIPITOR) 40 mg tablet TAKE 1 TABLET BY MOUTH ONCE DAILY AT NIGHT 11/25/19  Yes Komal Dai MD   clotrimazole-betamethasone (LOTRISONE) topical cream Apply  to affected area two (2) times a day. Patient taking differently: Apply  to affected area as needed. 7/9/19  Yes Beau Eason MD   amLODIPine (NORVASC) 2.5 mg tablet TAKE 1 TABLET BY MOUTH ONCE DAILY 6/5/18  Yes Komal Dai MD   carvedilol (COREG) 25 mg tablet TAKE ONE TABLET BY MOUTH TWICE DAILY WITH MEALS 3/21/18  Yes Alison Ding NP   aspirin 81 mg chewable tablet Take 1 Tab by mouth daily. 3/3/16  Yes Beulah Valadez MD     Physical Exam:   Vital Signs: Blood pressure (!) 171/92, pulse 66, temperature 98 °F (36.7 °C), resp. rate 19, height 5' 9\" (1.753 m), weight 104.3 kg (230 lb), SpO2 99 %.   General: well developed, well nourished   HEENT: unremarkable   Heart: regular rhythm no mumur    Lungs: clear   Abdominal:  benign   Neurological: unremarkable   Extremities: no edema     Findings/Diagnosis: pers hx colon adenoma  Plan of Care/Planned Procedure: colonoscopy with conscious/deep sedation    Signed:  Edil Pratt MD 3/2/2020

## 2020-03-02 NOTE — PROGRESS NOTES
..[facility]  1940  [id number]    Situation:  Verbal report received from: DIANA Palmer  Procedure: Procedure(s):  COLONOSCOPY  ENDOSCOPIC POLYPECTOMY    Background:    Preoperative diagnosis: COLON POLYPS  Postoperative diagnosis: Polyps: Cecum and ascending  Hemorroids  Sigmoid diverticulosis    :  Dr. Christian Peralta  Assistant(s): Endoscopy RN-1: Heather Hinojosa    Specimens:   ID Type Source Tests Collected by Time Destination   1 : Cecum Polyp and AscendingPolyp Preservative Cecum  Brendan Boston MD 3/2/2020 6577 Pathology     H. Pylori  no    Assessment:  Intra-procedure medications     Anesthesia gave intra-procedure sedation and medications, see anesthesia flow sheet yes    Intravenous fluids: NS@ KVO     Vital signs stable     Abdominal assessment: round and soft     Recommendation:  Discharge patient per MD order.   Family or Friend   Permission to share finding with family or friend yes

## 2020-03-02 NOTE — PERIOP NOTES
Endoscope was pre-cleaned at bedside immediately following procedure by Bucky Paiz ET    Anesthesia reports 180mg Propofol, 40mg Lidocaine and 350mL NS given during procedure. Received report from anesthesia staff on vital signs and status of patient.

## 2020-03-03 ENCOUNTER — OFFICE VISIT (OUTPATIENT)
Dept: INTERNAL MEDICINE CLINIC | Facility: CLINIC | Age: 80
End: 2020-03-03

## 2020-03-03 VITALS
HEIGHT: 69 IN | HEART RATE: 72 BPM | SYSTOLIC BLOOD PRESSURE: 140 MMHG | DIASTOLIC BLOOD PRESSURE: 80 MMHG | WEIGHT: 230 LBS | RESPIRATION RATE: 14 BRPM | OXYGEN SATURATION: 97 % | TEMPERATURE: 98.1 F | BODY MASS INDEX: 34.07 KG/M2

## 2020-03-03 DIAGNOSIS — I10 ESSENTIAL HYPERTENSION: ICD-10-CM

## 2020-03-03 DIAGNOSIS — M26.609 TMJ (TEMPOROMANDIBULAR JOINT SYNDROME): Primary | ICD-10-CM

## 2020-03-03 DIAGNOSIS — R42 VERTIGO: ICD-10-CM

## 2020-03-03 RX ORDER — NICOTINE 11MG/24HR
PATCH, TRANSDERMAL 24 HOURS TRANSDERMAL
COMMUNITY
Start: 2020-02-27 | End: 2020-08-26

## 2020-03-03 RX ORDER — MECLIZINE HYDROCHLORIDE 25 MG/1
25 TABLET ORAL
Qty: 30 TAB | Refills: 0 | Status: SHIPPED | OUTPATIENT
Start: 2020-03-03 | End: 2020-03-13

## 2020-03-03 NOTE — PROGRESS NOTES
Deepak Dougherty  Identified pt with two pt identifiers(name and ). Chief Complaint   Patient presents with    Ear Pain     Room 2A // Left Ear // Started Saturday //        Reviewed record In preparation for visit and have obtained necessary documentation. 1. Have you been to the ER, urgent care clinic or hospitalized since your last visit? [address]    2. Have you seen or consulted any other health care providers outside of the [address] [place] [address] since your last visit? Include any pap smears or colon screening. No    Patient do not have an advance directive and declined receiving information. Vitals reviewed with provider.     Health Maintenance reviewed:     Health Maintenance Due   Topic    Eye Exam Retinal or Dilated           Wt Readings from Last 3 Encounters:   20 230 lb (104.3 kg)   20 230 lb (104.3 kg)   20 233 lb (105.7 kg)        Temp Readings from Last 3 Encounters:   20 98.1 °F (36.7 °C) (Oral)   20 97.8 °F (36.6 °C)   20 97.6 °F (36.4 °C) (Oral)        BP Readings from Last 3 Encounters:   20 160/74   20 171/78   20 134/72        Pulse Readings from Last 3 Encounters:   20 72   20 64   20 66        Vitals:    20 1447   BP: 160/74   Pulse: 72   Resp: 14   Temp: 98.1 °F (36.7 °C)   TempSrc: Oral   SpO2: 97%   Weight: 230 lb (104.3 kg)   Height: 5' 9\" (1.753 m)   PainSc:   8   PainLoc: Ear          Learning Assessment:   :       Learning Assessment 10/5/2017 2014   PRIMARY LEARNER Patient Patient   HIGHEST LEVEL OF EDUCATION - PRIMARY LEARNER  - DID NOT GRADUATE HIGH SCHOOL   BARRIERS PRIMARY LEARNER - NONE   CO-LEARNER CAREGIVER - No   PRIMARY LANGUAGE ENGLISH ENGLISH    NEED - No   LEARNER PREFERENCE PRIMARY LISTENING LISTENING     DEMONSTRATION -     READING -   ANSWERED BY patient Patient   RELATIONSHIP SELF SELF        Depression Screening:   :       3 most recent PHQ Screens 2/13/2020   Little interest or pleasure in doing things Not at all   Feeling down, depressed, irritable, or hopeless Not at all   Total Score PHQ 2 0        Fall Risk Assessment:   :       Fall Risk Assessment, last 12 mths 2/13/2020   Able to walk? Yes   Fall in past 12 months? No   Fall with injury? -        Abuse Screening:   :       Abuse Screening Questionnaire 2/13/2020 10/9/2019 7/9/2019 9/4/2018 7/3/2018 10/5/2017   Do you ever feel afraid of your partner? N N N N N N   Are you in a relationship with someone who physically or mentally threatens you? N N N N N N   Is it safe for you to go home?  Y Y Y Y Y Y        ADL Screening:   :       ADL Assessment 7/3/2018   Feeding yourself No Help Needed   Getting from bed to chair No Help Needed   Getting dressed No Help Needed   Bathing or showering No Help Needed   Walk across the room (includes cane/walker) No Help Needed   Using the telphone No Help Needed   Taking your medications No Help Needed   Preparing meals No Help Needed   Managing money (expenses/bills) No Help Needed   Moderately strenuous housework (laundry) No Help Needed   Shopping for personal items (toiletries/medicines) No Help Needed   Shopping for groceries No Help Needed   Driving No Help Needed   Climbing a flight of stairs No Help Needed   Getting to places beyond walking distances No Help Needed

## 2020-03-03 NOTE — PROGRESS NOTES
HPI  Mr. Jaida Kulkarni is a 70y.o. year old male, he is seen today for left ear discomfort. Says every year at this time will have pain in his ear but only when cold air gets in his ear. Went last year to ENT and says he was told nothing was wrong. Tells me was treated with antibiotics by ED physician and that's how it got better. Started bothering him Saturday night. No discharge. Chewing doesn't make it worse. No chest pain or sob. No issues when weather is warm. Says has some positional dizziness in morning when first wakes for past several days. Is convinced antibiotics will help his ear pain. Chief Complaint   Patient presents with    Ear Pain     Room 2A // Left Ear // Started Saturday //         Prior to Admission medications    Medication Sig Start Date End Date Taking? Authorizing Provider   VITAMIN D3 50 mcg (2,000 unit) cap capsule TAKE 1 CAPSULE BY MOUTH ONCE DAILY 2/27/20  Yes Provider, Historical   meclizine (ANTIVERT) 25 mg tablet Take 1 Tab by mouth three (3) times daily as needed for Dizziness for up to 10 days. 3/3/20 3/13/20 Yes Al Reid MD   furosemide (LASIX) 40 mg tablet Take 0.5 Tabs by mouth daily. 12/3/19  Yes Teo Rodas MD   atorvastatin (LIPITOR) 40 mg tablet TAKE 1 TABLET BY MOUTH ONCE DAILY AT NIGHT 11/25/19  Yes Teo Rodas MD   clotrimazole-betamethasone (LOTRISONE) topical cream Apply  to affected area two (2) times a day. Patient taking differently: Apply  to affected area as needed. 7/9/19  Yes Waqar Eason MD   amLODIPine (NORVASC) 2.5 mg tablet TAKE 1 TABLET BY MOUTH ONCE DAILY 6/5/18  Yes Teo Rodas MD   carvedilol (COREG) 25 mg tablet TAKE ONE TABLET BY MOUTH TWICE DAILY WITH MEALS 3/21/18  Yes Franchesca Rodriguez NP   aspirin 81 mg chewable tablet Take 1 Tab by mouth daily.  3/3/16  Yes Batsheva Bishop MD         Allergies   Allergen Reactions    Oxycodone Contact Dermatitis         REVIEW OF SYSTEMS:  Per HPI    PHYSICAL EXAM:  Visit Vitals  /80   Pulse 72   Temp 98.1 °F (36.7 °C) (Oral)   Resp 14   Ht 5' 9\" (1.753 m)   Wt 230 lb (104.3 kg)   SpO2 97%   BMI 33.97 kg/m²     Constitutional: Appears well-developed and well-nourished. No distress. HENT:   Head: Normocephalic and atraumatic. +pain left tm joint with opening and closing mouth  Eyes: No scleral icterus. Ears: tm's wnl   Mouth: OP clear without lesions, no pharyngeal exudate   Neck: no lad, no tm, supple   Cardiovascular: Normal S1/S2, regular rhythm. No murmurs, rubs, or gallops. Pulmonary/Chest: Effort normal and breath sounds normal. No respiratory distress. No wheezes, rhonchi, or rales. Ext: No edema. Neurological: Alert. Psychiatric: Normal mood and affect. Behavior is normal.     Lab Results   Component Value Date/Time    Sodium 142 04/02/2019 08:17 AM    Potassium 4.7 04/02/2019 08:17 AM    Chloride 105 04/02/2019 08:17 AM    CO2 24 04/02/2019 08:17 AM    Anion gap 8 10/02/2017 11:17 AM    Glucose 107 (H) 04/02/2019 08:17 AM    BUN 12 04/02/2019 08:17 AM    Creatinine 1.08 04/02/2019 08:17 AM    BUN/Creatinine ratio 11 04/02/2019 08:17 AM    GFR est AA 77 04/02/2019 08:17 AM    GFR est non-AA 66 04/02/2019 08:17 AM    Calcium 9.6 04/02/2019 08:17 AM    Bilirubin, total 0.4 04/02/2019 08:17 AM    AST (SGOT) 22 04/02/2019 08:17 AM    Alk.  phosphatase 85 04/02/2019 08:17 AM    Protein, total 6.8 04/02/2019 08:17 AM    Albumin 4.2 04/02/2019 08:17 AM    Globulin 4.8 (H) 10/02/2017 11:17 AM    A-G Ratio 1.6 04/02/2019 08:17 AM    ALT (SGPT) 18 04/02/2019 08:17 AM     Lab Results   Component Value Date/Time    Hemoglobin A1c 6.6 (H) 04/02/2019 08:17 AM    Hemoglobin A1c 6.3 (H) 04/19/2018 08:49 AM    Hemoglobin A1c 6.4 (H) 10/05/2017 09:35 AM      Lab Results   Component Value Date/Time    Cholesterol, total 77 (L) 04/02/2019 08:17 AM    HDL Cholesterol 34 (L) 04/02/2019 08:17 AM    LDL, calculated 29 04/02/2019 08:17 AM VLDL, calculated 14 04/02/2019 08:17 AM    Triglyceride 68 04/02/2019 08:17 AM          ASSESSMENT/PLAN  Diagnoses and all orders for this visit:    1. TMJ (temporomandibular joint syndrome)  Try heated cloth on area - reassured him no evidence of infection  2. Vertigo  -     meclizine (ANTIVERT) 25 mg tablet; Take 1 Tab by mouth three (3) times daily as needed for Dizziness for up to 10 days. May try above - be seen if worse  3. Essential hypertension  Slightly high - monitor        Health Maintenance Due   Topic Date Due    Eye Exam Retinal or Dilated  04/09/1950        Follow-up and Dispositions    · Return if symptoms worsen or fail to improve. Reviewed plan of care. Patient has provided input and agrees with goals. The nurse provided the patient and/or family with advanced directive information if needed and encouraged the patient to provide a copy to the office when available.

## 2020-03-03 NOTE — PATIENT INSTRUCTIONS
Temporomandibular Disorder: Care Instructions Your Care Instructions Temporomandibular (TM) disorders are a problem with the muscles and joints that connect your jaw to your skull. They cause pain when you open your mouth, chew, or yawn. You may feel this pain on one or both sides. TM disorders are often caused by tight jaw muscles. The tightness can be caused by clenching or grinding your teeth. This may happen when you have a lot of stress in your life. If you lower your stress, you may be able to stop clenching or grinding your teeth. This will help relax your jaw and reduce your pain. You may also be able to do some things at home to feel better. But if none of this works, your doctor may prescribe medicine to help relax your muscles and control the pain. Follow-up care is a key part of your treatment and safety. Be sure to make and go to all appointments, and call your doctor if you are having problems. It's also a good idea to know your test results and keep a list of the medicines you take. How can you care for yourself at home? · Put a warm, moist cloth or heating pad set on low on your jaw. Do this for 10 to 20 minutes at a time. Put a thin cloth between the heating pad and your skin. · Avoid hard or chewy foods that cause your jaws to work very hard. Examples include popcorn, jerky, tough meats, chewy breads, gum, and raw apples and carrots. · Choose softer foods that are easy to chew. These include eggs, yogurt, and soup. · Cut your food into small pieces. Chew slowly. · If your jaw gets too painful to chew, or if it locks, you may need to puree your food for a few days or weeks. · To relax your jaw, repeat this exercise for a few minutes every morning and evening. Watch yourself in a mirror. Gently open and close your mouth. Move your jaw straight up and down. But don't do this if it makes your pain worse.  
· Get at least 30 minutes of exercise on most days of the week to relieve stress. Walking is a good choice. You also may want to do other activities, such as running, swimming, cycling, or playing tennis or team sports. · Do not: 
? Hold a phone between your shoulder and your jaw. ? Open your mouth all the way, like when you sing loudly or yawn. ? Clench or grind your teeth, bite your lips, or chew your fingernails. ? Clench things such as pens, pipes, or cigars between your teeth. When should you call for help? Call your doctor now or seek immediate medical care if: 
  · Your jaw is locked open or shut or it is hard to move your jaw.  
 Watch closely for changes in your health, and be sure to contact your doctor if: 
  · Your jaw pain gets worse.  
  · Your face is swollen.  
  · You do not get better as expected. Where can you learn more? Go to http://bg-gurpreet.info/. Enter F028 in the search box to learn more about \"Temporomandibular Disorder: Care Instructions. \" Current as of: October 3, 2018 Content Version: 12.2 © 9032-9282 [employer]. Care instructions adapted under license by [employer] (which disclaims liability or warranty for this information). If you have questions about a medical condition or this instruction, always ask your healthcare professional. [address] any warranty or liability for your use of this information.

## 2020-04-06 RX ORDER — AMLODIPINE BESYLATE 2.5 MG/1
TABLET ORAL
Qty: 90 TAB | Refills: 0 | Status: SHIPPED | OUTPATIENT
Start: 2020-04-06 | End: 2020-07-06

## 2020-04-28 ENCOUNTER — PATIENT OUTREACH (OUTPATIENT)
Dept: OTHER | Age: 80
End: 2020-04-28

## 2020-04-28 NOTE — PROGRESS NOTES
Rosana Reyes was contacted to set up a video visit with Louann Higuera MD.     Lady Arnoldr with: Patient    Patient encouraged to sign up for MyChart in order to complete Virtual Visits, if MyChart status was not already active. Patient disagreed to sign up for a Virtual Visit with PCP within the next week. Reason for declining VV with PCP in the next week: Patient states he is doing fine, does not have a computer or a smart phone, wants to keep his 6/12 apt as scheduled.      Rajat Narayanan RN

## 2020-05-19 ENCOUNTER — OFFICE VISIT (OUTPATIENT)
Dept: CARDIOLOGY CLINIC | Age: 80
End: 2020-05-19

## 2020-05-19 VITALS
RESPIRATION RATE: 16 BRPM | HEART RATE: 64 BPM | BODY MASS INDEX: 33.87 KG/M2 | SYSTOLIC BLOOD PRESSURE: 140 MMHG | WEIGHT: 228.7 LBS | DIASTOLIC BLOOD PRESSURE: 76 MMHG | HEIGHT: 69 IN | OXYGEN SATURATION: 99 %

## 2020-05-19 DIAGNOSIS — I10 ESSENTIAL HYPERTENSION: ICD-10-CM

## 2020-05-19 DIAGNOSIS — I50.40 COMBINED SYSTOLIC AND DIASTOLIC CONGESTIVE HEART FAILURE, UNSPECIFIED HF CHRONICITY (HCC): ICD-10-CM

## 2020-05-19 DIAGNOSIS — E78.2 MIXED HYPERLIPIDEMIA: ICD-10-CM

## 2020-05-19 DIAGNOSIS — I42.8 NICM (NONISCHEMIC CARDIOMYOPATHY) (HCC): Primary | ICD-10-CM

## 2020-05-19 DIAGNOSIS — I48.92 ATRIAL FLUTTER, UNSPECIFIED TYPE (HCC): ICD-10-CM

## 2020-05-19 NOTE — PROGRESS NOTES
1. Have you been to the ER, urgent care clinic since your last visit? Hospitalized since your last visit? No.    2. Have you seen or consulted any other health care providers outside of the [address] [place] [address] since your last visit? Include any pap smears or colon screening. Seen at [facility] for elevated BP about 2--3 months ago. Chief Complaint   Patient presents with    Follow-up     6 month- BP been elevated- pt denies any cardiac symptoms     PT WILL NEED TO CALL ME BACK TO VERIFY HIS MEDICATIONS.

## 2020-05-19 NOTE — PROGRESS NOTES
[address] [place] [address]  [phone]     Subjective:      Otila Kocher is a 68 y.o. male is here for routine f/u. Last seen in clinic 11/19 for preop eval/clearance for prostate surgery but did not get it done. Since last OV, he reports 2 ED presentation at [facility] for high bp readings. He was eating a lot of food that are high in sodium. He will call us back for his medications as he cant remember the names. The patient denies chest pain/ shortness of breath, orthopnea, PND, LE edema, palpitations, syncope, or presyncope.        Patient Active Problem List    Diagnosis Date Noted    Candidal intertrigo 11/05/2018    Type 2 diabetes mellitus with stage 3 chronic kidney disease, without long-term current use of insulin ([address] 10/08/2017    Prostate cancer ([address] 10/08/2017    COPD (chronic obstructive pulmonary disease) ([address] 10/08/2017    Chronic low back pain 10/08/2017    Obesity (BMI 30-39.9) 10/08/2017    Atrial flutter ([address] 08/22/2017    Smokeless tobacco use 07/25/2017    Mixed hyperlipidemia 03/31/2016    Hypertension 05/20/2014    NICM (nonischemic cardiomyopathy) ([address] 02/20/2014    S/P cardiac cath 02/12/2014    Combined systolic and diastolic congestive heart failure ([address] 02/08/2014      Sacha Ceballos MD  Past Medical History:   Diagnosis Date    Acute respiratory failure with hypoxia ([address] 02/08/2014    also 11/28/15, 2/17/16, 5/14/17     Alcohol abuse     As of 11/29/18 pt stated he quit 20 years    Arthritis     Back and shoulder    Atrial flutter ([address] 08/2017    saw Dr. Beckie Pineda; underwent a-flutter ablation    BPH (benign prostatic hyperplasia)     CHF (congestive heart failure) ([address] 02/11/2014    TTE 11/15: EF 40-45%, 2/14: EF 20%  Admitted for acute exacerbations 2/8/14, 11/28/15, 2/17/16, and 5/4/17)    Chronic kidney disease     III    Chronic low back pain     LS spine X-ray 4/8/17: mild DDD    Combined forms of age-related cataract of left eye 12/12/2018    KPE/IOL OS    Combined forms of age-related cataract of right eye 11/28/2018    KPE/IOL OD    COPD [facility] obstructive [facility] disease) [facility]     PA (dyspnea on exertion)     ED (erectile dysfunction)     Elevated troponin 02/11/2014    also 11/28/15; due to cardiac strain    Frequent hospital admissions     5/14-5/23/17: acute hypoxic resp failure due to CHF exac, ROBINSON on CKD 3, sepsis due to LE cellulitis, 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  2/17-2/22/16: acute hypoxic resp failure, acute diarrhea  11/28-11/30/15: acute hypoxic resp failure due to acute CHF exac, elevated troponin due to cardiac strain  2/8-2/12/14: acuter hypoxic resp failure due to CHF exac, pneumonia     Hand blister 10/8/2017    Bilateral    Hypertension     Kidney disease, chronic, stage III (GFR 30-59 ml/min) ([facility])     Leg fracture, right 1973    Nonischemic cardiomyopathy ([facility]     with LVH    Pneumonia 02/08/2014 2/8/14 and 10/30/15    Poor historian     As of 11/29/18 pt reports 5th grade education, pt unable to give med list-obtained from wife per patient's permission    Prediabetes     Prostate cancer ([facility] 2016    As of 11/29/18, pt states he gets two injections twice a year for this    Pulmonary edema 11/28/2015    S/P cardiac cath 2/12/2014 2/12/14 no significant coronary disease, severe LV dysfunction    Tinea cruris     also genital folliculitis    Vertigo       Past Surgical History:   Procedure Laterality Date    CARDIAC SURG PROCEDURE UNLIST  08/22/2017    SVT ablation    COLONOSCOPY N/A 3/2/2020    COLONOSCOPY performed by Madison Magana MD at [facility]. 2 tubular adenomas, diverticulosis, int hemorrhoids, repeat in 5 yrs    COLONOSCOPY,DIAGNOSTIC  02/22/2016    Dr. Alex Santoro; single sessile polyp at descending colon, sigmoid diverticulosis, int hemorrhoids    COLONOSCOPY,REMV LESN,SNARE  2/22/2016         COLONOSCOPY,REMV LESN,SNARE  3/2/2020         HX CATARACT REMOVAL Right 2018    Dr. Pugh Franklin Left 2018    Dr. Orville Gagnon.  LEFT EYE SECOND REFRATIVE CATARACT REMOVAL WITH LENS IMPLANTATION    HX ORTHOPAEDIC Right 's    index finger    HX OTHER SURGICAL  2017    Dr. Elicia Jacobor; atrial flutter ablation    HX ROTATOR CUFF REPAIR Right     HX TONSILLECTOMY  194     Allergies   Allergen Reactions    Oxycodone Contact Dermatitis      Family History   Adopted: Yes   Family history unknown: Yes      Social History     Socioeconomic History    Marital status:      Spouse name: Not on file    Number of children: Not on file    Years of education: Not on file    Highest education level: Not on file   Occupational History    Not on file   Social Needs    Financial resource strain: Not on file    Food insecurity     Worry: Not on file     Inability: Not on file   [employer] needs     Medical: Not on file     Non-medical: Not on file   Tobacco Use    Smoking status: Former Smoker     Packs/day: 2.00     Years: 20.00     Pack years: 40.00     Types: Cigarettes     Last attempt to quit: 2017     Years since quittin.7    Smokeless tobacco: Current User     Types: Chew    Tobacco comment: Chewing tobacco   Substance and Sexual Activity    Alcohol use: Not Currently     Alcohol/week: 0.0 standard drinks     Comment: As of 20, pt quit 20 years ago    Drug use: No    Sexual activity: Not on file   Lifestyle    Physical activity     Days per week: Not on file     Minutes per session: Not on file    Stress: Not on file   Relationships    Social connections     Talks on phone: Not on file     Gets together: Not on file     Attends Sikhism service: Not on file     Active member of club or organization: Not on file     Attends meetings of clubs or organizations: Not on file     Relationship status: Not on file    Intimate partner violence     Fear of current or ex partner: Not on file Emotionally abused: Not on file     Physically abused: Not on file     Forced sexual activity: Not on file   Other Topics Concern    Not on file   Social History Narrative    Not on file      Current Outpatient Medications   Medication Sig    amLODIPine (NORVASC) 2.5 mg tablet Take 1 tablet by mouth once daily    VITAMIN D3 50 mcg (2,000 unit) cap capsule TAKE 1 CAPSULE BY MOUTH ONCE DAILY    furosemide (LASIX) 40 mg tablet Take 0.5 Tabs by mouth daily.  atorvastatin (LIPITOR) 40 mg tablet TAKE 1 TABLET BY MOUTH ONCE DAILY AT NIGHT    clotrimazole-betamethasone (LOTRISONE) topical cream Apply  to affected area two (2) times a day. (Patient taking differently: Apply  to affected area as needed.)    carvedilol (COREG) 25 mg tablet TAKE ONE TABLET BY MOUTH TWICE DAILY WITH MEALS    aspirin 81 mg chewable tablet Take 1 Tab by mouth daily. No current facility-administered medications for this visit. Review of Symptoms:  11 systems reviewed, negative other than as stated in the HPI    Physical ExamPhysical Exam:    There were no vitals filed for this visit. There is no height or weight on file to calculate BMI. General PE  Gen:  NAD  Mental Status - Alert. General Appearance - Not in acute distress. HEENT:  PERRL, no carotid bruits or JVD  Chest and Lung Exam   Inspection: Accessory muscles - No use of accessory muscles in breathing. Auscultation:   Breath sounds: - Normal.   Cardiovascular   Inspection: Jugular vein - Bilateral - Inspection Normal.   Palpation/Percussion:   Apical Impulse: - Normal.   Auscultation: Rhythm - Regular. Heart Sounds - S1 WNL and S2 WNL. No S3 or S4. Murmurs & Other Heart Sounds: Auscultation of the heart reveals - No Murmurs. Peripheral Vascular   Upper Extremity: Inspection - Bilateral - No Cyanotic nailbeds or Digital clubbing. Lower Extremity:   Palpation: Edema - Bilateral - No edema. Abdomen:   Soft, non-tender, bowel sounds are active.   Neuro: A&O times 3, CN and motor grossly WNL    Labs:   Lab Results   Component Value Date/Time    Cholesterol, total 77 (L) 04/02/2019 08:17 AM    Cholesterol, total 117 04/19/2018 08:49 AM    Cholesterol, total 87 (L) 10/05/2017 09:35 AM    HDL Cholesterol 34 (L) 04/02/2019 08:17 AM    HDL Cholesterol 45 04/19/2018 08:49 AM    HDL Cholesterol 14 (L) 10/05/2017 09:35 AM    LDL, calculated 29 04/02/2019 08:17 AM    LDL, calculated 43 04/19/2018 08:49 AM    LDL, calculated 51 10/05/2017 09:35 AM    Triglyceride 68 04/02/2019 08:17 AM    Triglyceride 143 04/19/2018 08:49 AM    Triglyceride 110 10/05/2017 09:35 AM     Lab Results   Component Value Date/Time    CK 51 05/17/2017 06:18 PM     Lab Results   Component Value Date/Time    Sodium 142 04/02/2019 08:17 AM    Potassium 4.7 04/02/2019 08:17 AM    Chloride 105 04/02/2019 08:17 AM    CO2 24 04/02/2019 08:17 AM    Anion gap 8 10/02/2017 11:17 AM    Glucose 107 (H) 04/02/2019 08:17 AM    BUN 12 04/02/2019 08:17 AM    Creatinine 1.08 04/02/2019 08:17 AM    BUN/Creatinine ratio 11 04/02/2019 08:17 AM    GFR est AA 77 04/02/2019 08:17 AM    GFR est non-AA 66 04/02/2019 08:17 AM    Calcium 9.6 04/02/2019 08:17 AM    Bilirubin, total 0.4 04/02/2019 08:17 AM    AST (SGOT) 22 04/02/2019 08:17 AM    Alk. phosphatase 85 04/02/2019 08:17 AM    Protein, total 6.8 04/02/2019 08:17 AM    Albumin 4.2 04/02/2019 08:17 AM    Globulin 4.8 (H) 10/02/2017 11:17 AM    A-G Ratio 1.6 04/02/2019 08:17 AM    ALT (SGPT) 18 04/02/2019 08:17 AM       EKG:  SB 1 AVB     Assessment:     Assessment:      1. NICM (nonischemic cardiomyopathy) ([address]    2. Mixed hyperlipidemia    3. Combined systolic and diastolic congestive heart failure, unspecified HF chronicity ([facility]    4. Essential hypertension    5. Atrial flutter, unspecified type ([facility]        No orders of the defined types were placed in this encounter. Plan:     Patient presents for follow up.  Last seen in clinic 11/19 for preop eval/clearance for prostate surgery but did not get it done. Since last OV, he reports 2 ED presentation at [facility] for high bp readings. He was eating a lot of food that are high in sodium. He will call us back for his medications as he cant remember the names.     1. Nonischemic cardiomyopathy: Resolved. NYHA class I. Last ejection fraction 60% 2017.  Wt stable since last OV. Continue carvedilol.  Continue diuretic. Stable kidney fxn 4/2020.  2. Hypertension: 140/76. Carvedilol 25 mg BID, Amlodipine 2.5 mg  Furosemide 20 mg  3. History of atrial flutter: Status post atrial flutter ablation successful has maintained sinus rhythm since.    4. Hyperlipidemia:  4/19 LDL at 29. On statin. Wants to Dr Guy Abreu to check his labs at his f/u with her next month  5. Nonobstructive atherosclerotic heart disease: 20% lesion proximal LAD on last cath 2/2014 On ASA statin  6. DM, new: A1C is 6.5 in Feb 2020.    7. Hx prostate ca: Followed by Dr Wilmer Valdes current care and f/u in     Bib Gonzales NP       Patient seen and examined by me with nurse practitioner. I personally performed all components of the history, physical, and medical decision making and agree with the assessment and plan as noted. BP stable today. Monitor. Will f/u in 1 year.      Andre Nice MD

## 2020-05-20 ENCOUNTER — TELEPHONE (OUTPATIENT)
Dept: CARDIOLOGY CLINIC | Age: 80
End: 2020-05-20

## 2020-05-20 NOTE — TELEPHONE ENCOUNTER
Pt was told to call in the medication he is taking.  Furosemide, 40mg, Carvedilol 25mg, Amloipine 2.5 mg.

## 2020-05-20 NOTE — TELEPHONE ENCOUNTER
Called pt,verified pt with pt with two pt identifiers, went over pt's med list with him. Advised I will send to  and if he advises any thing further I will call him back. If not he will not hear from me. Pt verbalized understanding. Updated list in cc.

## 2020-05-21 NOTE — TELEPHONE ENCOUNTER
Message   Received: Yesterday   Message Contents   MD Miguelina Coates, NAHOMI   Caller: Unspecified (Yesterday,  1:14 PM)             KAshley Del Rio.

## 2020-05-26 RX ORDER — ATORVASTATIN CALCIUM 40 MG/1
TABLET, FILM COATED ORAL
Qty: 90 TAB | Refills: 0 | Status: SHIPPED | OUTPATIENT
Start: 2020-05-26 | End: 2020-08-25

## 2020-05-29 RX ORDER — CARVEDILOL 25 MG/1
TABLET ORAL
Qty: 60 TAB | Refills: 0 | Status: SHIPPED | OUTPATIENT
Start: 2020-05-29 | End: 2020-06-29

## 2020-06-29 RX ORDER — CARVEDILOL 25 MG/1
TABLET ORAL
Qty: 60 TAB | Refills: 0 | Status: SHIPPED | OUTPATIENT
Start: 2020-06-29 | End: 2020-07-29

## 2020-07-06 RX ORDER — AMLODIPINE BESYLATE 2.5 MG/1
TABLET ORAL
Qty: 90 TAB | Refills: 0 | Status: SHIPPED | OUTPATIENT
Start: 2020-07-06 | End: 2020-09-29

## 2020-07-19 DIAGNOSIS — B37.2 INTERTRIGINOUS CANDIDIASIS: ICD-10-CM

## 2020-07-19 RX ORDER — CLOTRIMAZOLE AND BETAMETHASONE DIPROPIONATE 10; .64 MG/G; MG/G
CREAM TOPICAL
Qty: 45 G | Refills: 0 | Status: SHIPPED | OUTPATIENT
Start: 2020-07-19 | End: 2020-11-11 | Stop reason: CLARIF

## 2020-07-29 RX ORDER — CARVEDILOL 25 MG/1
TABLET ORAL
Qty: 60 TAB | Refills: 0 | Status: SHIPPED | OUTPATIENT
Start: 2020-07-29 | End: 2020-08-31

## 2020-08-25 RX ORDER — ATORVASTATIN CALCIUM 40 MG/1
TABLET, FILM COATED ORAL
Qty: 90 TAB | Refills: 0 | Status: SHIPPED | OUTPATIENT
Start: 2020-08-25 | End: 2020-12-10

## 2020-08-26 ENCOUNTER — OFFICE VISIT (OUTPATIENT)
Dept: INTERNAL MEDICINE CLINIC | Age: 80
End: 2020-08-26
Payer: MEDICARE

## 2020-08-26 VITALS
HEIGHT: 69 IN | TEMPERATURE: 98.1 F | DIASTOLIC BLOOD PRESSURE: 67 MMHG | SYSTOLIC BLOOD PRESSURE: 131 MMHG | HEART RATE: 51 BPM | OXYGEN SATURATION: 97 % | WEIGHT: 224 LBS | BODY MASS INDEX: 33.18 KG/M2 | RESPIRATION RATE: 12 BRPM

## 2020-08-26 DIAGNOSIS — I48.92 ATRIAL FLUTTER, UNSPECIFIED TYPE (HCC): ICD-10-CM

## 2020-08-26 DIAGNOSIS — E11.22 TYPE 2 DIABETES MELLITUS WITH STAGE 3 CHRONIC KIDNEY DISEASE, WITHOUT LONG-TERM CURRENT USE OF INSULIN (HCC): Primary | ICD-10-CM

## 2020-08-26 DIAGNOSIS — J44.9 CHRONIC OBSTRUCTIVE PULMONARY DISEASE, UNSPECIFIED COPD TYPE (HCC): ICD-10-CM

## 2020-08-26 DIAGNOSIS — C61 PROSTATE CANCER (HCC): ICD-10-CM

## 2020-08-26 DIAGNOSIS — N28.1 ACQUIRED RENAL CYST OF LEFT KIDNEY: ICD-10-CM

## 2020-08-26 DIAGNOSIS — I10 ESSENTIAL HYPERTENSION: ICD-10-CM

## 2020-08-26 DIAGNOSIS — E78.2 MIXED HYPERLIPIDEMIA: ICD-10-CM

## 2020-08-26 DIAGNOSIS — N18.30 TYPE 2 DIABETES MELLITUS WITH STAGE 3 CHRONIC KIDNEY DISEASE, WITHOUT LONG-TERM CURRENT USE OF INSULIN (HCC): Primary | ICD-10-CM

## 2020-08-26 DIAGNOSIS — I50.42 CHRONIC COMBINED SYSTOLIC AND DIASTOLIC CONGESTIVE HEART FAILURE (HCC): ICD-10-CM

## 2020-08-26 LAB — HBA1C MFR BLD HPLC: 6.4 %

## 2020-08-26 PROCEDURE — 83036 HEMOGLOBIN GLYCOSYLATED A1C: CPT | Performed by: INTERNAL MEDICINE

## 2020-08-26 PROCEDURE — G8754 DIAS BP LESS 90: HCPCS | Performed by: INTERNAL MEDICINE

## 2020-08-26 PROCEDURE — G8432 DEP SCR NOT DOC, RNG: HCPCS | Performed by: INTERNAL MEDICINE

## 2020-08-26 PROCEDURE — 99214 OFFICE O/P EST MOD 30 MIN: CPT | Performed by: INTERNAL MEDICINE

## 2020-08-26 PROCEDURE — G8427 DOCREV CUR MEDS BY ELIG CLIN: HCPCS | Performed by: INTERNAL MEDICINE

## 2020-08-26 PROCEDURE — G8752 SYS BP LESS 140: HCPCS | Performed by: INTERNAL MEDICINE

## 2020-08-26 PROCEDURE — G8536 NO DOC ELDER MAL SCRN: HCPCS | Performed by: INTERNAL MEDICINE

## 2020-08-26 PROCEDURE — G8417 CALC BMI ABV UP PARAM F/U: HCPCS | Performed by: INTERNAL MEDICINE

## 2020-08-26 PROCEDURE — 1101F PT FALLS ASSESS-DOCD LE1/YR: CPT | Performed by: INTERNAL MEDICINE

## 2020-08-26 NOTE — PROGRESS NOTES
Deepak Dougherty  Identified pt with two pt identifiers(name and ). Chief Complaint   Patient presents with    Diabetes    Hypertension       Reviewed record In preparation for visit and have obtained necessary documentation. 1. Have you been to the ER, urgent care clinic or hospitalized since your last visit? No     2. Have you seen or consulted any other health care providers outside of the [address] [place] [address] since your last visit? Include any pap smears or colon screening. No    Vitals reviewed with provider.     Health Maintenance reviewed:     Health Maintenance Due   Topic    Eye Exam Retinal or Dilated     Lipid Screen     Foot Exam Q1     MICROALBUMIN Q1           Wt Readings from Last 3 Encounters:   20 224 lb (101.6 kg)   20 228 lb 11.2 oz (103.7 kg)   20 230 lb (104.3 kg)        Temp Readings from Last 3 Encounters:   20 98.1 °F (36.7 °C) (Oral)   20 98.1 °F (36.7 °C) (Oral)   20 97.8 °F (36.6 °C)        BP Readings from Last 3 Encounters:   20 131/67   20 140/76   20 140/80        Pulse Readings from Last 3 Encounters:   20 (!) 51   20 64   20 72        Vitals:    20 0937   BP: 131/67   Pulse: (!) 51   Resp: 12   Temp: 98.1 °F (36.7 °C)   TempSrc: Oral   SpO2: 97%   Weight: 224 lb (101.6 kg)   Height: 5' 9\" (1.753 m)   PainSc:   0 - No pain          Learning Assessment:   :       Learning Assessment 2020 10/5/2017 2014   PRIMARY LEARNER Patient Patient Patient   HIGHEST LEVEL OF EDUCATION - PRIMARY LEARNER  - - DID NOT GRADUATE HIGH SCHOOL   BARRIERS PRIMARY LEARNER - - NONE   CO-LEARNER CAREGIVER - - No   PRIMARY LANGUAGE ENGLISH ENGLISH ENGLISH    NEED - - No   LEARNER PREFERENCE PRIMARY DEMONSTRATION LISTENING LISTENING     - DEMONSTRATION -     - READING -   ANSWERED BY self patient Patient   RELATIONSHIP SELF SELF SELF        Depression Screening:   :       3 most recent PHQ Screens 2/13/2020   Little interest or pleasure in doing things Not at all   Feeling down, depressed, irritable, or hopeless Not at all   Total Score PHQ 2 0        Fall Risk Assessment:   :       Fall Risk Assessment, last 12 mths 2/13/2020   Able to walk? Yes   Fall in past 12 months? No   Fall with injury? -        Abuse Screening:   :       Abuse Screening Questionnaire 2/13/2020 10/9/2019 7/9/2019 9/4/2018 7/3/2018 10/5/2017   Do you ever feel afraid of your partner? N N N N N N   Are you in a relationship with someone who physically or mentally threatens you? N N N N N N   Is it safe for you to go home?  Y Y Y Y Y Y        ADL Screening:   :       ADL Assessment 7/3/2018   Feeding yourself No Help Needed   Getting from bed to chair No Help Needed   Getting dressed No Help Needed   Bathing or showering No Help Needed   Walk across the room (includes cane/walker) No Help Needed   Using the telphone No Help Needed   Taking your medications No Help Needed   Preparing meals No Help Needed   Managing money (expenses/bills) No Help Needed   Moderately strenuous housework (laundry) No Help Needed   Shopping for personal items (toiletries/medicines) No Help Needed   Shopping for groceries No Help Needed   Driving No Help Needed   Climbing a flight of stairs No Help Needed   Getting to places beyond walking distances No Help Needed

## 2020-08-26 NOTE — PROGRESS NOTES
CC:   Chief Complaint   Patient presents with    Diabetes    Hypertension       HISTORY OF PRESENT ILLNESS  Jhoana Partida is a 68 y.o. male. Presents for 6 month follow up evaluation. He has HTN, diet-controlled type 2 DM, CKD stage 3, hyperlipidemia, CHF (combined diastolic and systolic) with echo EF 19-69% in 7/17, non-ischemic cardiomyopathy, atrial flutter s/p AFL ablation on 8/22/17, non-obstructive atherosclerotic heart disease, COPD, chronic low back pain, and prostate cancer.      No complaints today. Reports he saw Dr. Shaunna Noble about left renal cyst.  Thinks he is scheduled for a study but does not know what type. Endocrine Review  He is seen for diabetes. Since last visit he reports no polyuria or polydipsia, no hypoglycemia, no significant changes. Home glucose monitoring: is not performed. He reports medication compliance: n/a - patient not on medications (diet controlled). Diabetic diet compliance: noncompliant some of the time. Lab review: A1c is 6.4% today (8/26/20), was 6.5% on 2/13/20. Eye exam: UTD. Cardiovascular Review  The patient has hypertension and hyperlipidemia. Denies CP, SOB, or leg swelling. He reports taking medications as instructed, no medication side effects noted. Diet and Lifestyle: generally follows a low fat low cholesterol diet, generally follows a low sodium diet, no formal exercise but active during the day. Lab review: orders written for new lab studies as appropriate; see orders. Last saw Dr. Ana Muniz and Marlena Primrose NP on 5/19/20. ROS  A complete review of systems was performed and is negative except for those mentioned in the HPI.       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    S/P cardiac cath Z98.890    NICM (nonischemic cardiomyopathy) ([facility] I42.8    Hypertension I10    Mixed hyperlipidemia E78.2    Smokeless tobacco use Z72.0    Atrial flutter ([facility] I48.92  Type 2 diabetes mellitus with stage 3 chronic kidney disease, without long-term current use of insulin ([facility]) E11.22, N18.3    Prostate cancer ([address] C61    COPD (chronic obstructive pulmonary disease) ([facility]) J44.9    Chronic low back pain M54.5, G89.29    Obesity (BMI 30-39. 9) E66.9    Candidal intertrigo B37.2     Past Medical History:   Diagnosis Date    Acute respiratory failure with hypoxia ([address] 02/08/2014    also 11/28/15, 2/17/16, 5/14/17     Alcohol abuse     As of 11/29/18 pt stated he quit 20 years    Arthritis     Back and shoulder    Atrial flutter ([address] 08/2017    saw Dr. Goldie Barton; underwent a-flutter ablation    BPH (benign prostatic hyperplasia)     CHF (congestive heart failure) ([facility] 02/11/2014    TTE 11/15: EF 40-45%, 2/14: EF 20%  Admitted for acute exacerbations 2/8/14, 11/28/15, 2/17/16, and 5/4/17)    Chronic kidney disease     III    Chronic low back pain     LS spine X-ray 4/8/17: mild DDD    Combined forms of age-related cataract of left eye 12/12/2018    KPE/IOL OS    Combined forms of age-related cataract of right eye 11/28/2018    KPE/IOL OD    COPD (chronic obstructive pulmonary disease) ([address]     PA (dyspnea on exertion)     ED (erectile dysfunction)     Elevated troponin 02/11/2014    also 11/28/15; due to cardiac strain    Frequent hospital admissions     5/14-5/23/17: acute hypoxic resp failure due to CHF exac, ROBINSON on CKD 3, sepsis due to LE cellulitis, 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  2/17-2/22/16: acute hypoxic resp failure, acute diarrhea  11/28-11/30/15: acute hypoxic resp failure due to acute CHF exac, elevated troponin due to cardiac strain  2/8-2/12/14: acuter hypoxic resp failure due to CHF exac, pneumonia     Hand blister 10/8/2017    Bilateral    Hypertension     Kidney disease, chronic, stage III (GFR 30-59 ml/min) ([facility])     Leg fracture, right 1973    Nonischemic cardiomyopathy ([facility]     with LVH    Pneumonia 02/08/2014 2/8/14 and 10/30/15    Poor historian     As of 11/29/18 pt reports 5th grade education, pt unable to give med list-obtained from wife per patient's permission    Prediabetes     Prostate cancer ([facility] 2016    As of 11/29/18, pt states he gets two injections twice a year for this    Pulmonary edema 11/28/2015    S/P cardiac cath 2/12/2014 2/12/14 no significant coronary disease, severe LV dysfunction    Tinea cruris     also genital folliculitis    Vertigo      Allergies   Allergen Reactions    Oxycodone Contact Dermatitis       Current Outpatient Medications   Medication Sig Dispense Refill    atorvastatin (LIPITOR) 40 mg tablet TAKE 1 TABLET BY MOUTH ONCE DAILY AT NIGHT 90 Tab 0    carvediloL (COREG) 25 mg tablet TAKE 1 TABLET BY MOUTH TWICE DAILY WITH MEALS 60 Tab 0    clotrimazole-betamethasone (LOTRISONE) topical cream APPLY TO AFFECTED AREA TWICE DAILY 45 g 0    amLODIPine (NORVASC) 2.5 mg tablet Take 1 tablet by mouth once daily 90 Tab 0    furosemide (LASIX) 40 mg tablet Take 0.5 Tabs by mouth daily. 45 Tab 3    aspirin 81 mg chewable tablet Take 1 Tab by mouth daily. 10 Tab 0         PHYSICAL EXAM  Visit Vitals  /67 (BP 1 Location: Left arm, BP Patient Position: Sitting)   Pulse (!) 51   Temp 98.1 °F (36.7 °C) (Oral)   Resp 12   Ht 5' 9\" (1.753 m)   Wt 224 lb (101.6 kg)   SpO2 97%   BMI 33.08 kg/m²       General: Obese, no distress. HEENT:  Head normocephalic/atraumatic, no scleral icterus  Lungs:  Clear to ausculation bilaterally. Good air movement. Heart:  Bradycardic, regular rhythm, normal S1 and S2, no murmur, gallop, or rub  Extremities: No clubbing, cyanosis, or edema. Neurological: Alert and oriented. Psychiatric: Normal mood and affect.  Behavior is normal.   Diabetic foot exam:     Left Foot:   Visual Exam: normal    Pulse DP: 2+ (normal)   Filament test: normal sensation    Vibratory sensation: normal      Right Foot:   Visual Exam: normal    Pulse DP: 2+ (normal)   Filament test: normal sensation    Vibratory sensation: normal      Results for orders placed or performed in visit on 08/26/20   AMB POC HEMOGLOBIN A1C   Result Value Ref Range    Hemoglobin A1c (POC) 6.4 %           ASSESSMENT AND PLAN    ICD-10-CM ICD-9-CM    1. Type 2 diabetes mellitus with stage 3 chronic kidney disease, without long-term current use of insulin (HCC)  E11.22 250.40 AMB POC HEMOGLOBIN A1C    N18.3 585.3 MICROALBUMIN, UR, RAND W/ MICROALB/CREAT RATIO       DIABETES FOOT EXAM   2. Essential hypertension  O38 924.5 METABOLIC PANEL, COMPREHENSIVE      CBC WITH AUTOMATED DIFF   3. Mixed hyperlipidemia  E78.2 272.2 TSH RFX ON ABNORMAL TO FREE T4      LIPID PANEL   4. Chronic obstructive pulmonary disease, unspecified COPD type ([address]  J44.9 496    5. Chronic combined systolic and diastolic congestive heart failure (HCC)  I50.42 428.42      428.0    6. Atrial flutter, unspecified type ([facility])  I48.92 427.32    7. Prostate cancer ([facility]  C61 185    8. Acquired renal cyst of left kidney  N28.1 593.2      Diagnoses and all orders for this visit:    1. Type 2 diabetes mellitus with stage 3 chronic kidney disease, without long-term current use of insulin ([address]  Diet-controlled. A1c 6.4% today. Counseled on reducing sweets and starchy foods.  -     AMB POC HEMOGLOBIN A1C  -     MICROALBUMIN, UR, RAND W/ MICROALB/CREAT RATIO  -      DIABETES FOOT EXAM    2. Essential hypertension  Controlled. Continue amlodipine and carvedilol.  -     METABOLIC PANEL, COMPREHENSIVE  -     CBC WITH AUTOMATED DIFF    3. Mixed hyperlipidemia  Continue atorvastatin 40 mg daily pending results.  -     TSH RFX ON ABNORMAL TO FREE T4  -     LIPID PANEL    4. Chronic obstructive pulmonary disease, unspecified COPD type ([address]    5. Chronic combined systolic and diastolic congestive heart failure ([address]    6. Atrial flutter, unspecified type ([address]  In NSR since ablation. 7. Prostate cancer ([facility]  Follow up with Dr. Griffin as scheduled.      8. Acquired renal cyst of left kidney      Follow-up and Dispositions    · Return in about 4 months (around 12/26/2020), or if symptoms worsen or fail to improve, for HTN, DM. I have discussed the diagnosis with the patient and the intended plan as seen in the above orders. Patient is in agreement. The patient has received an after-visit summary and questions were answered concerning future plans. I have discussed medication side effects and warnings with the patient as well.

## 2020-08-31 RX ORDER — CARVEDILOL 25 MG/1
TABLET ORAL
Qty: 60 TAB | Refills: 0 | Status: SHIPPED | OUTPATIENT
Start: 2020-08-31 | End: 2020-09-29

## 2020-09-11 LAB
ALBUMIN SERPL-MCNC: 3.9 G/DL (ref 3.7–4.7)
ALBUMIN/CREAT UR: 7 MG/G CREAT (ref 0–29)
ALBUMIN/GLOB SERPL: 1.4 {RATIO} (ref 1.2–2.2)
ALP SERPL-CCNC: 93 IU/L (ref 39–117)
ALT SERPL-CCNC: 8 IU/L (ref 0–44)
AST SERPL-CCNC: 13 IU/L (ref 0–40)
BASOPHILS # BLD AUTO: 0 X10E3/UL (ref 0–0.2)
BASOPHILS NFR BLD AUTO: 1 %
BILIRUB SERPL-MCNC: 0.4 MG/DL (ref 0–1.2)
BUN SERPL-MCNC: 8 MG/DL (ref 8–27)
BUN/CREAT SERPL: 7 (ref 10–24)
CALCIUM SERPL-MCNC: 9 MG/DL (ref 8.6–10.2)
CHLORIDE SERPL-SCNC: 104 MMOL/L (ref 96–106)
CHOLEST SERPL-MCNC: 81 MG/DL (ref 100–199)
CO2 SERPL-SCNC: 24 MMOL/L (ref 20–29)
CREAT SERPL-MCNC: 1.19 MG/DL (ref 0.76–1.27)
CREAT UR-MCNC: 64.3 MG/DL
EOSINOPHIL # BLD AUTO: 0.2 X10E3/UL (ref 0–0.4)
EOSINOPHIL NFR BLD AUTO: 4 %
ERYTHROCYTE [DISTWIDTH] IN BLOOD BY AUTOMATED COUNT: 13 % (ref 11.6–15.4)
GLOBULIN SER CALC-MCNC: 2.8 G/DL (ref 1.5–4.5)
GLUCOSE SERPL-MCNC: 117 MG/DL (ref 65–99)
HCT VFR BLD AUTO: 34.5 % (ref 37.5–51)
HDLC SERPL-MCNC: 39 MG/DL
HGB BLD-MCNC: 11.1 G/DL (ref 13–17.7)
IMM GRANULOCYTES # BLD AUTO: 0 X10E3/UL (ref 0–0.1)
IMM GRANULOCYTES NFR BLD AUTO: 0 %
LDLC SERPL CALC-MCNC: 28 MG/DL (ref 0–99)
LYMPHOCYTES # BLD AUTO: 0.8 X10E3/UL (ref 0.7–3.1)
LYMPHOCYTES NFR BLD AUTO: 18 %
MCH RBC QN AUTO: 25.9 PG (ref 26.6–33)
MCHC RBC AUTO-ENTMCNC: 32.2 G/DL (ref 31.5–35.7)
MCV RBC AUTO: 80 FL (ref 79–97)
MICROALBUMIN UR-MCNC: 4.7 UG/ML
MONOCYTES # BLD AUTO: 0.3 X10E3/UL (ref 0.1–0.9)
MONOCYTES NFR BLD AUTO: 7 %
NEUTROPHILS # BLD AUTO: 3.2 X10E3/UL (ref 1.4–7)
NEUTROPHILS NFR BLD AUTO: 70 %
PLATELET # BLD AUTO: 135 X10E3/UL (ref 150–450)
POTASSIUM SERPL-SCNC: 3.7 MMOL/L (ref 3.5–5.2)
PROT SERPL-MCNC: 6.7 G/DL (ref 6–8.5)
RBC # BLD AUTO: 4.29 X10E6/UL (ref 4.14–5.8)
SODIUM SERPL-SCNC: 140 MMOL/L (ref 134–144)
TRIGL SERPL-MCNC: 56 MG/DL (ref 0–149)
TSH SERPL DL<=0.005 MIU/L-ACNC: 1.54 UIU/ML (ref 0.45–4.5)
VLDLC SERPL CALC-MCNC: 14 MG/DL (ref 5–40)
WBC # BLD AUTO: 4.5 X10E3/UL (ref 3.4–10.8)

## 2020-09-17 PROBLEM — D69.6 THROMBOCYTOPENIA (HCC): Status: ACTIVE | Noted: 2020-09-17

## 2020-09-17 PROBLEM — D63.8 ANEMIA OF CHRONIC DISEASE: Status: ACTIVE | Noted: 2020-09-17

## 2020-09-29 RX ORDER — CARVEDILOL 25 MG/1
TABLET ORAL
Qty: 60 TAB | Refills: 0 | Status: SHIPPED | OUTPATIENT
Start: 2020-09-29 | End: 2020-10-29

## 2020-09-29 RX ORDER — AMLODIPINE BESYLATE 2.5 MG/1
TABLET ORAL
Qty: 90 TAB | Refills: 0 | Status: SHIPPED | OUTPATIENT
Start: 2020-09-29 | End: 2020-11-18

## 2020-10-29 RX ORDER — CARVEDILOL 25 MG/1
TABLET ORAL
Qty: 60 TAB | Refills: 0 | Status: SHIPPED | OUTPATIENT
Start: 2020-10-29 | End: 2020-11-11 | Stop reason: SINTOL

## 2020-11-02 ENCOUNTER — TELEPHONE (OUTPATIENT)
Dept: INTERNAL MEDICINE CLINIC | Age: 80
End: 2020-11-02

## 2020-11-02 NOTE — TELEPHONE ENCOUNTER
Verified patients name and date of birth. Patient states he was seen at [address] [facility], [place], operated by [facility] for shortness of breath. He has an appt with Dr Claudia Marques but it is not until 12/14/2020. He was advised if he wants to have Dr Marci Carver prescribe a new medication for him in regards to his shortness of breath he will need to be seen. Patient stated understanding. Sent message to [facility] to schedule appt for patient. Patient scheduled at next available appt on 11/12/2020.

## 2020-11-02 NOTE — TELEPHONE ENCOUNTER
Verified patients name and date of birth. Patient states he was seen at [facility] ER for shortness of breath. He has an appt with Dr Lashawn Benitez but it is not until 12/14/2020. He was advised if he wants to have Dr Vania Dillon prescribe a new medication for him in regards to his shortness of breath he will need to be seen. Patient stated understanding. Sent message to [facility] to schedule appt for patient.

## 2020-11-03 ENCOUNTER — TELEPHONE (OUTPATIENT)
Dept: CARDIOLOGY CLINIC | Age: 80
End: 2020-11-03

## 2020-11-03 NOTE — TELEPHONE ENCOUNTER
Please call patient can't afford Xarelto can we prescribe something cheaper    Danyelle Cox      [phone]    Thanks  Ryann Huston

## 2020-11-03 NOTE — TELEPHONE ENCOUNTER
Returned call,verified pt with two pt identifiers, also spoke to wife on phone as pt did not understand what to explain to me what was going on. Pt was seen in [facility] on 10/30/20 for sob, irregular HR. They put him on Xarelto 20 mg daily but the cost will be too much. It will cost 260.00 dollars. The hospital gave him 15 pills. We are unable to get pt in office until 12/14/20 but he has a vv with PCP on 11/12/20. Advised I will call the pharmacy and see what is going on and call back once I know something. Wife and pt verbalized understanding.

## 2020-11-03 NOTE — TELEPHONE ENCOUNTER
Called pharmacy to check on Xarelto script. She did not have current Xarelto script. She advised she would need a script before she could advise on how much it would be or if it needed a prior auth. The pharmacist got on the phone with me and I gave a verbal order for the Xarelto 20 mg daily. She advised the Xarelto does not need a prior auth pt is just in the donut hole with his insurance. I advised to disregard the Xarelto script called in. She advised she would and she understood everything.

## 2020-11-04 ENCOUNTER — TRANSCRIBE ORDER (OUTPATIENT)
Dept: SCHEDULING | Age: 80
End: 2020-11-04

## 2020-11-04 DIAGNOSIS — C61 PROSTATIC CANCER (HCC): Primary | ICD-10-CM

## 2020-11-04 DIAGNOSIS — R97.21 RISING PSA FOLLOWING TREATMENT FOR MALIGNANT NEOPLASM OF PROSTATE: ICD-10-CM

## 2020-11-05 ENCOUNTER — VIRTUAL VISIT (OUTPATIENT)
Dept: INTERNAL MEDICINE CLINIC | Age: 80
End: 2020-11-05
Payer: MEDICARE

## 2020-11-05 DIAGNOSIS — I50.42 CHRONIC COMBINED SYSTOLIC AND DIASTOLIC CONGESTIVE HEART FAILURE (HCC): ICD-10-CM

## 2020-11-05 DIAGNOSIS — C61 PROSTATE CANCER (HCC): ICD-10-CM

## 2020-11-05 DIAGNOSIS — I48.0 PAROXYSMAL ATRIAL FIBRILLATION (HCC): Primary | ICD-10-CM

## 2020-11-05 PROBLEM — D69.6 THROMBOCYTOPENIA (HCC): Status: RESOLVED | Noted: 2020-09-17 | Resolved: 2020-11-05

## 2020-11-05 PROCEDURE — 99442 PR PHYS/QHP TELEPHONE EVALUATION 11-20 MIN: CPT | Performed by: INTERNAL MEDICINE

## 2020-11-05 RX ORDER — MECLIZINE HYDROCHLORIDE 25 MG/1
TABLET ORAL
COMMUNITY
Start: 2020-10-30 | End: 2020-11-11 | Stop reason: CLARIF

## 2020-11-05 RX ORDER — RIVAROXABAN 20 MG/1
TABLET, FILM COATED ORAL
COMMUNITY
Start: 2020-11-01 | End: 2020-11-11 | Stop reason: CLARIF

## 2020-11-05 NOTE — PROGRESS NOTES
Lowell Jones is a 68 y.o. male, evaluated via audio-only technology on 11/5/2020 for Breathing Problem  . Assessment & Plan:     Diagnoses and all orders for this visit:    1. Paroxysmal atrial fibrillation [facility])  May be contributing to his PA. Has past history of atrial flutter treated with ablation in 2017. CHADSVASC2 score is 5, indicating moderate to high stroke risk; should be on anticoagulation. Continue carvedilol 25 mg BID. 2. Chronic combined systolic and diastolic congestive heart failure ([facility] [address]  Having exacerbation that seems to be improving. 3. Prostate cancer ([facility] [address]  Rising PSA. Scheduled for PET scan on 12/3/20. Follow up with Dr. Faraz Cosby. Follow-up and Dispositions    · Return if symptoms worsen or fail to improve, for Schedule for in person visit next week. 12  Subjective:     Patient is difficult to understand over the phone; also says he cannot hear me well. Presents for ED follow up visit. Has HTN, diet-controlled type 2 DM, CKD stage 3, hyperlipidemia, CHF (combined diastolic and systolic) with echo EF 46-27% in 7/17, non-ischemic cardiomyopathy, atrial flutter s/p AFL ablation on 8/22/17, non-obstructive atherosclerotic heart disease, COPD, chronic low back pain, and prostate cancer.      Seen at [facility] at 10/30 and 10/31/20 for SOB and PA. On 10/30, cardiac enzymes normal, BNP elevated at 2060, rapid COVID-19 test negative, and EKG nl. Was instructed to increase Lasix 40 mg from 1/2 tab daily to 1 tab daily. Also prescribed meclizine for dizziness and referred to ENT for left ear pain. Returned on 10/31 with same complaint of SOB/PA. BNP elevated at 4133. HR irreg irregular on exam.  EKG showed a-fib at 78 bpm.  CTA chest negative for PE. Cardiology consult by Dr. Kolton Baum; started on Eliquis 5 mg BID and instructed to follow up with his cardiologist within a week.     Today he says he was given a few blood thinner tablets in the ED but cannot afford to get more because it is too expensive. Was sent a Xarelto discount card in the mail by Cardiology but patient unclear as to whether or not he is taking it. Still with PA but not as bad as before. Appointment with Dr. Marisa Hernadez not until 20. He is frustrated with this phone visit and requests an in-person visit. Review of his records also shows he saw Dr. Raulito Duarte yesterday; diagnosed with rising PSA following treatment for prostate cancer. Scheduled for PET scan on 12/3/20. Atrial Fibrillation CHADSVASC2 Score Stroke Risk:   68 y.o. > 76        +4    male Male     +0   CHF HX: Yes    +1   HTN HX: Yes    +1   Stroke/TIA/Thromboembolism No    +0   Vascular Disease HX: No    + 0   Diabetes Mellitus Yes    +1   CHADSVASC 2 Score 5      Annual Stroke Risk 7.2% - moderate-high        Prior to Admission medications    Medication Sig Start Date End Date Taking? Authorizing Provider   Xarelto 20 mg tab tablet TAKE 1 TABLET BY MOUTH ONCE DAILY 20   Provider, Historical   meclizine (ANTIVERT) 25 mg tablet TAKE 1 TABLET BY MOUTH THREE TIMES DAILY AS NEEDED FOR DIZZINESS 10/30/20   Provider, Historical   carvediloL (COREG) 25 mg tablet TAKE 1 TABLET BY MOUTH TWICE DAILY WITH MEALS 10/29/20   Jaycee Dacosta MD   amLODIPine (NORVASC) 2.5 mg tablet Take 1 tablet by mouth once daily 20   Mertie Meckel, MD   atorvastatin (LIPITOR) 40 mg tablet TAKE 1 TABLET BY MOUTH ONCE DAILY AT NIGHT 20   Jaycee Dacosta MD   clotrimazole-betamethasone (LOTRISONE) topical cream APPLY TO AFFECTED AREA TWICE DAILY 20   Yahaira Ny MD   furosemide (LASIX) 40 mg tablet Take 0.5 Tabs by mouth daily. 12/3/19   Mertie Meckel, MD   aspirin 81 mg chewable tablet Take 1 Tab by mouth daily.  3/3/16   Minerva Jaime MD     Patient Active Problem List   Diagnosis Code    Combined systolic and diastolic congestive heart failure ([facility] [address] I50.40    S/P cardiac cath Z98.890  NICM (nonischemic cardiomyopathy) ([facility]) I42.8    Hypertension I10    Mixed hyperlipidemia E78.2    Smokeless tobacco use Z72.0    Atrial flutter (HCC) I48.92    Type 2 diabetes mellitus with stage 3 chronic kidney disease, without long-term current use of insulin (HCC) E11.22, N18.30    Prostate cancer ([facility] [address] C61    COPD (chronic obstructive pulmonary disease) ([facility]) J44.9    Chronic low back pain M54.5, G89.29    Obesity (BMI 30-39. 9) E66.9    Candidal intertrigo B37.2    Anemia of chronic disease D63.8       Patient-Reported Vitals 11/5/2020   Patient-Reported Weight 224lb        Isaíasjoe Villa, who was evaluated through a patient-initiated, synchronous (real-time) audio only encounter, and/or her healthcare decision maker, is aware that it is a billable service, with coverage as determined by his insurance carrier. He provided verbal consent to proceed: Yes. He has not had a related appointment within my department in the past 7 days or scheduled within the next 24 hours.       Total Time: minutes: 11-20 minutes    Serena Murillo MD

## 2020-11-05 NOTE — PROGRESS NOTES
Twan Wayne  Identified pt with two pt identifiers(name and ). Chief Complaint   Patient presents with    Breathing Problem       Reviewed record In preparation for visit and have obtained necessary documentation. 1. Have you been to the ER, urgent care clinic or hospitalized since your last visit? Yes. [facility]    2. Have you seen or consulted any other health care providers outside of the [address] [place] [address] since your last visit? Include any pap smears or colon screening. No    Patient does not have an advance directive. Vitals reviewed with provider. Health Maintenance reviewed:     Health Maintenance Due   Topic    Eye Exam Retinal or Dilated     Medicare Yearly Exam           Wt Readings from Last 3 Encounters:   20 224 lb (101.6 kg)   20 228 lb 11.2 oz (103.7 kg)   20 230 lb (104.3 kg)        Temp Readings from Last 3 Encounters:   20 98.1 °F (36.7 °C) (Oral)   20 98.1 °F (36.7 °C) (Oral)   20 97.8 °F (36.6 °C)        BP Readings from Last 3 Encounters:   20 131/67   20 140/76   20 140/80        Pulse Readings from Last 3 Encounters:   20 (!) 51   20 64   20 72      There were no vitals filed for this visit.        Learning Assessment:   :       Learning Assessment 2020 10/5/2017 2014   PRIMARY LEARNER Patient Patient Patient   HIGHEST LEVEL OF EDUCATION - PRIMARY LEARNER  - - DID NOT GRADUATE HIGH SCHOOL   BARRIERS PRIMARY LEARNER - - NONE   CO-LEARNER CAREGIVER - - No   PRIMARY LANGUAGE ENGLISH ENGLISH ENGLISH    NEED - - No   LEARNER PREFERENCE PRIMARY DEMONSTRATION LISTENING LISTENING     - DEMONSTRATION -     - READING -   ANSWERED BY self patient Patient   RELATIONSHIP SELF SELF SELF        Depression Screening:   :       3 most recent PHQ Screens 2020   Little interest or pleasure in doing things Not at all   Feeling down, depressed, irritable, or hopeless Not at all Total Score PHQ 2 0        Fall Risk Assessment:   :       Fall Risk Assessment, last 12 mths 2/13/2020   Able to walk? Yes   Fall in past 12 months? No   Fall with injury? -        Abuse Screening:   :       Abuse Screening Questionnaire 2/13/2020 10/9/2019 7/9/2019 9/4/2018 7/3/2018 10/5/2017   Do you ever feel afraid of your partner? N N N N N N   Are you in a relationship with someone who physically or mentally threatens you? N N N N N N   Is it safe for you to go home?  Y Y Y Y Y Y        ADL Screening:   :       ADL Assessment 7/3/2018   Feeding yourself No Help Needed   Getting from bed to chair No Help Needed   Getting dressed No Help Needed   Bathing or showering No Help Needed   Walk across the room (includes cane/walker) No Help Needed   Using the telphone No Help Needed   Taking your medications No Help Needed   Preparing meals No Help Needed   Managing money (expenses/bills) No Help Needed   Moderately strenuous housework (laundry) No Help Needed   Shopping for personal items (toiletries/medicines) No Help Needed   Shopping for groceries No Help Needed   Driving No Help Needed   Climbing a flight of stairs No Help Needed   Getting to places beyond walking distances No Help Needed

## 2020-11-05 NOTE — TELEPHONE ENCOUNTER
Returned call,verified pt with two pt identifiers, pt put his wife on the phone with him. I advised I have a free 30 day card for Xarelto. Advised to take to the pharmacy and they will activate it. It will be enough medication until his appt with . she asked I put it in the mail to the pt as she did not want him to drive right now. Advised I would do that. Pt and wife both verbalized understanding. Put xarelto card in mail to pt.

## 2020-11-10 ENCOUNTER — HOSPITAL ENCOUNTER (INPATIENT)
Age: 80
LOS: 8 days | Discharge: HOME HEALTH | DRG: 315 | End: 2020-11-18
Attending: EMERGENCY MEDICINE | Admitting: INTERNAL MEDICINE
Payer: MEDICARE

## 2020-11-10 ENCOUNTER — APPOINTMENT (OUTPATIENT)
Dept: GENERAL RADIOLOGY | Age: 80
DRG: 315 | End: 2020-11-10
Attending: EMERGENCY MEDICINE
Payer: MEDICARE

## 2020-11-10 ENCOUNTER — APPOINTMENT (OUTPATIENT)
Dept: CT IMAGING | Age: 80
DRG: 315 | End: 2020-11-10
Attending: EMERGENCY MEDICINE
Payer: MEDICARE

## 2020-11-10 ENCOUNTER — APPOINTMENT (OUTPATIENT)
Dept: CT IMAGING | Age: 80
DRG: 315 | End: 2020-11-10
Attending: INTERNAL MEDICINE
Payer: MEDICARE

## 2020-11-10 ENCOUNTER — OFFICE VISIT (OUTPATIENT)
Dept: CARDIOLOGY CLINIC | Age: 80
DRG: 315 | End: 2020-11-10
Payer: MEDICARE

## 2020-11-10 VITALS
WEIGHT: 208.4 LBS | HEART RATE: 120 BPM | SYSTOLIC BLOOD PRESSURE: 90 MMHG | RESPIRATION RATE: 29 BRPM | OXYGEN SATURATION: 94 % | HEIGHT: 69 IN | DIASTOLIC BLOOD PRESSURE: 60 MMHG | BODY MASS INDEX: 30.87 KG/M2

## 2020-11-10 DIAGNOSIS — R06.09 DYSPNEA ON EXERTION: ICD-10-CM

## 2020-11-10 DIAGNOSIS — I48.0 PAF (PAROXYSMAL ATRIAL FIBRILLATION) (HCC): ICD-10-CM

## 2020-11-10 DIAGNOSIS — I42.8 NICM (NONISCHEMIC CARDIOMYOPATHY) (HCC): Primary | ICD-10-CM

## 2020-11-10 DIAGNOSIS — I10 ESSENTIAL HYPERTENSION: ICD-10-CM

## 2020-11-10 DIAGNOSIS — R50.9 FEVER, UNSPECIFIED FEVER CAUSE: Primary | ICD-10-CM

## 2020-11-10 DIAGNOSIS — I50.9 CONGESTIVE HEART FAILURE, UNSPECIFIED HF CHRONICITY, UNSPECIFIED HEART FAILURE TYPE (HCC): ICD-10-CM

## 2020-11-10 DIAGNOSIS — I25.10 CORONARY ARTERY DISEASE DUE TO LIPID RICH PLAQUE: ICD-10-CM

## 2020-11-10 DIAGNOSIS — I48.92 ATRIAL FLUTTER, UNSPECIFIED TYPE (HCC): ICD-10-CM

## 2020-11-10 DIAGNOSIS — E87.70 HYPERVOLEMIA, UNSPECIFIED HYPERVOLEMIA TYPE: ICD-10-CM

## 2020-11-10 DIAGNOSIS — I31.39 PERICARDIAL EFFUSION: ICD-10-CM

## 2020-11-10 DIAGNOSIS — I25.83 CORONARY ARTERY DISEASE DUE TO LIPID RICH PLAQUE: ICD-10-CM

## 2020-11-10 DIAGNOSIS — E78.2 MIXED HYPERLIPIDEMIA: ICD-10-CM

## 2020-11-10 DIAGNOSIS — I48.91 ATRIAL FIBRILLATION, UNSPECIFIED TYPE (HCC): ICD-10-CM

## 2020-11-10 LAB
ALBUMIN SERPL-MCNC: 2.8 G/DL (ref 3.5–5)
ALBUMIN/GLOB SERPL: 0.5 {RATIO} (ref 1.1–2.2)
ALP SERPL-CCNC: 85 U/L (ref 45–117)
ALT SERPL-CCNC: 13 U/L (ref 12–78)
ANION GAP SERPL CALC-SCNC: 10 MMOL/L (ref 5–15)
APPEARANCE UR: ABNORMAL
AST SERPL-CCNC: 16 U/L (ref 15–37)
BACTERIA URNS QL MICRO: NEGATIVE /HPF
BASOPHILS # BLD: 0 K/UL (ref 0–0.1)
BASOPHILS NFR BLD: 0 % (ref 0–1)
BILIRUB SERPL-MCNC: 1.3 MG/DL (ref 0.2–1)
BILIRUB UR QL CFM: NEGATIVE
BNP SERPL-MCNC: 4439 PG/ML
BUN SERPL-MCNC: 21 MG/DL (ref 6–20)
BUN/CREAT SERPL: 9 (ref 12–20)
CALCIUM SERPL-MCNC: 9.2 MG/DL (ref 8.5–10.1)
CHLORIDE SERPL-SCNC: 105 MMOL/L (ref 97–108)
CK SERPL-CCNC: 107 U/L (ref 39–308)
CO2 SERPL-SCNC: 23 MMOL/L (ref 21–32)
COLOR UR: ABNORMAL
COVID-19 RAPID TEST, COVR: NOT DETECTED
CREAT SERPL-MCNC: 2.41 MG/DL (ref 0.7–1.3)
DIFFERENTIAL METHOD BLD: ABNORMAL
EOSINOPHIL # BLD: 0 K/UL (ref 0–0.4)
EOSINOPHIL NFR BLD: 0 % (ref 0–7)
EPITH CASTS URNS QL MICRO: ABNORMAL /LPF
ERYTHROCYTE [DISTWIDTH] IN BLOOD BY AUTOMATED COUNT: 13.9 % (ref 11.5–14.5)
FLUAV AG NPH QL IA: NEGATIVE
FLUBV AG NOSE QL IA: NEGATIVE
GLOBULIN SER CALC-MCNC: 5.4 G/DL (ref 2–4)
GLUCOSE SERPL-MCNC: 120 MG/DL (ref 65–100)
GLUCOSE UR STRIP.AUTO-MCNC: NEGATIVE MG/DL
HCT VFR BLD AUTO: 33.9 % (ref 36.6–50.3)
HEALTH STATUS, XMCV2T: NORMAL
HGB BLD-MCNC: 10.4 G/DL (ref 12.1–17)
HGB UR QL STRIP: ABNORMAL
HYALINE CASTS URNS QL MICRO: ABNORMAL /LPF (ref 0–5)
IMM GRANULOCYTES # BLD AUTO: 0 K/UL (ref 0–0.04)
IMM GRANULOCYTES NFR BLD AUTO: 0 % (ref 0–0.5)
KETONES UR QL STRIP.AUTO: ABNORMAL MG/DL
LACTATE BLD-SCNC: 1.48 MMOL/L (ref 0.4–2)
LEUKOCYTE ESTERASE UR QL STRIP.AUTO: NEGATIVE
LYMPHOCYTES # BLD: 0.7 K/UL (ref 0.8–3.5)
LYMPHOCYTES NFR BLD: 6 % (ref 12–49)
MCH RBC QN AUTO: 24.8 PG (ref 26–34)
MCHC RBC AUTO-ENTMCNC: 30.7 G/DL (ref 30–36.5)
MCV RBC AUTO: 80.9 FL (ref 80–99)
MONOCYTES # BLD: 0.9 K/UL (ref 0–1)
MONOCYTES NFR BLD: 8 % (ref 5–13)
NEUTS SEG # BLD: 9.6 K/UL (ref 1.8–8)
NEUTS SEG NFR BLD: 86 % (ref 32–75)
NITRITE UR QL STRIP.AUTO: NEGATIVE
NRBC # BLD: 0 K/UL (ref 0–0.01)
NRBC BLD-RTO: 0 PER 100 WBC
PH UR STRIP: 5 [PH] (ref 5–8)
PLATELET # BLD AUTO: 176 K/UL (ref 150–400)
PMV BLD AUTO: 11.4 FL (ref 8.9–12.9)
POTASSIUM SERPL-SCNC: 3.6 MMOL/L (ref 3.5–5.1)
PROT SERPL-MCNC: 8.2 G/DL (ref 6.4–8.2)
PROT UR STRIP-MCNC: 30 MG/DL
RBC # BLD AUTO: 4.19 M/UL (ref 4.1–5.7)
RBC #/AREA URNS HPF: ABNORMAL /HPF (ref 0–5)
RBC MORPH BLD: ABNORMAL
SODIUM SERPL-SCNC: 138 MMOL/L (ref 136–145)
SOURCE, COVRS: NORMAL
SP GR UR REFRACTOMETRY: 1.02 (ref 1–1.03)
SPECIMEN SOURCE, FCOV2M: NORMAL
SPECIMEN TYPE, XMCV1T: NORMAL
TROPONIN I SERPL-MCNC: <0.05 NG/ML
TROPONIN I SERPL-MCNC: <0.05 NG/ML
UA: UC IF INDICATED,UAUC: ABNORMAL
UROBILINOGEN UR QL STRIP.AUTO: 1 EU/DL (ref 0.2–1)
WBC # BLD AUTO: 11.2 K/UL (ref 4.1–11.1)
WBC URNS QL MICRO: ABNORMAL /HPF (ref 0–4)

## 2020-11-10 PROCEDURE — 83880 ASSAY OF NATRIURETIC PEPTIDE: CPT

## 2020-11-10 PROCEDURE — 93005 ELECTROCARDIOGRAM TRACING: CPT | Performed by: INTERNAL MEDICINE

## 2020-11-10 PROCEDURE — G8752 SYS BP LESS 140: HCPCS | Performed by: INTERNAL MEDICINE

## 2020-11-10 PROCEDURE — 80076 HEPATIC FUNCTION PANEL: CPT

## 2020-11-10 PROCEDURE — 84484 ASSAY OF TROPONIN QUANT: CPT

## 2020-11-10 PROCEDURE — 83690 ASSAY OF LIPASE: CPT

## 2020-11-10 PROCEDURE — 36415 COLL VENOUS BLD VENIPUNCTURE: CPT

## 2020-11-10 PROCEDURE — G8427 DOCREV CUR MEDS BY ELIG CLIN: HCPCS | Performed by: INTERNAL MEDICINE

## 2020-11-10 PROCEDURE — 96367 TX/PROPH/DG ADDL SEQ IV INF: CPT

## 2020-11-10 PROCEDURE — 80048 BASIC METABOLIC PNL TOTAL CA: CPT

## 2020-11-10 PROCEDURE — G8417 CALC BMI ABV UP PARAM F/U: HCPCS | Performed by: INTERNAL MEDICINE

## 2020-11-10 PROCEDURE — 71275 CT ANGIOGRAPHY CHEST: CPT

## 2020-11-10 PROCEDURE — 83735 ASSAY OF MAGNESIUM: CPT

## 2020-11-10 PROCEDURE — 85025 COMPLETE CBC W/AUTO DIFF WBC: CPT

## 2020-11-10 PROCEDURE — 87635 SARS-COV-2 COVID-19 AMP PRB: CPT

## 2020-11-10 PROCEDURE — 74011000258 HC RX REV CODE- 258: Performed by: EMERGENCY MEDICINE

## 2020-11-10 PROCEDURE — 82550 ASSAY OF CK (CPK): CPT

## 2020-11-10 PROCEDURE — 81001 URINALYSIS AUTO W/SCOPE: CPT

## 2020-11-10 PROCEDURE — G8432 DEP SCR NOT DOC, RNG: HCPCS | Performed by: INTERNAL MEDICINE

## 2020-11-10 PROCEDURE — 99285 EMERGENCY DEPT VISIT HI MDM: CPT

## 2020-11-10 PROCEDURE — G0463 HOSPITAL OUTPT CLINIC VISIT: HCPCS | Performed by: INTERNAL MEDICINE

## 2020-11-10 PROCEDURE — 93005 ELECTROCARDIOGRAM TRACING: CPT

## 2020-11-10 PROCEDURE — 74011000636 HC RX REV CODE- 636: Performed by: EMERGENCY MEDICINE

## 2020-11-10 PROCEDURE — 96365 THER/PROPH/DIAG IV INF INIT: CPT

## 2020-11-10 PROCEDURE — 74011250637 HC RX REV CODE- 250/637: Performed by: INTERNAL MEDICINE

## 2020-11-10 PROCEDURE — G8754 DIAS BP LESS 90: HCPCS | Performed by: INTERNAL MEDICINE

## 2020-11-10 PROCEDURE — 80053 COMPREHEN METABOLIC PANEL: CPT

## 2020-11-10 PROCEDURE — 83605 ASSAY OF LACTIC ACID: CPT

## 2020-11-10 PROCEDURE — 74011250636 HC RX REV CODE- 250/636: Performed by: EMERGENCY MEDICINE

## 2020-11-10 PROCEDURE — 96366 THER/PROPH/DIAG IV INF ADDON: CPT

## 2020-11-10 PROCEDURE — 1101F PT FALLS ASSESS-DOCD LE1/YR: CPT | Performed by: INTERNAL MEDICINE

## 2020-11-10 PROCEDURE — 71045 X-RAY EXAM CHEST 1 VIEW: CPT

## 2020-11-10 PROCEDURE — 99214 OFFICE O/P EST MOD 30 MIN: CPT | Performed by: INTERNAL MEDICINE

## 2020-11-10 PROCEDURE — 74011250636 HC RX REV CODE- 250/636: Performed by: INTERNAL MEDICINE

## 2020-11-10 PROCEDURE — 87040 BLOOD CULTURE FOR BACTERIA: CPT

## 2020-11-10 PROCEDURE — 65660000000 HC RM CCU STEPDOWN

## 2020-11-10 PROCEDURE — G8536 NO DOC ELDER MAL SCRN: HCPCS | Performed by: INTERNAL MEDICINE

## 2020-11-10 PROCEDURE — 93010 ELECTROCARDIOGRAM REPORT: CPT | Performed by: INTERNAL MEDICINE

## 2020-11-10 PROCEDURE — 74176 CT ABD & PELVIS W/O CONTRAST: CPT

## 2020-11-10 PROCEDURE — 87804 INFLUENZA ASSAY W/OPTIC: CPT

## 2020-11-10 RX ORDER — ATORVASTATIN CALCIUM 40 MG/1
40 TABLET, FILM COATED ORAL
Status: DISCONTINUED | OUTPATIENT
Start: 2020-11-10 | End: 2020-11-18 | Stop reason: HOSPADM

## 2020-11-10 RX ORDER — CARVEDILOL 3.12 MG/1
6.25 TABLET ORAL 2 TIMES DAILY WITH MEALS
Status: DISCONTINUED | OUTPATIENT
Start: 2020-11-10 | End: 2020-11-11

## 2020-11-10 RX ORDER — ONDANSETRON 2 MG/ML
4 INJECTION INTRAMUSCULAR; INTRAVENOUS
Status: DISCONTINUED | OUTPATIENT
Start: 2020-11-10 | End: 2020-11-18 | Stop reason: HOSPADM

## 2020-11-10 RX ORDER — SODIUM CHLORIDE 0.9 % (FLUSH) 0.9 %
5-40 SYRINGE (ML) INJECTION AS NEEDED
Status: DISCONTINUED | OUTPATIENT
Start: 2020-11-10 | End: 2020-11-18 | Stop reason: HOSPADM

## 2020-11-10 RX ORDER — PROMETHAZINE HYDROCHLORIDE 25 MG/1
12.5 TABLET ORAL
Status: DISCONTINUED | OUTPATIENT
Start: 2020-11-10 | End: 2020-11-18 | Stop reason: HOSPADM

## 2020-11-10 RX ORDER — SODIUM CHLORIDE 0.9 % (FLUSH) 0.9 %
5-40 SYRINGE (ML) INJECTION EVERY 8 HOURS
Status: DISCONTINUED | OUTPATIENT
Start: 2020-11-10 | End: 2020-11-18 | Stop reason: HOSPADM

## 2020-11-10 RX ORDER — SODIUM CHLORIDE 9 MG/ML
50 INJECTION, SOLUTION INTRAVENOUS CONTINUOUS
Status: DISCONTINUED | OUTPATIENT
Start: 2020-11-10 | End: 2020-11-12

## 2020-11-10 RX ORDER — ACETAMINOPHEN 650 MG/1
650 SUPPOSITORY RECTAL
Status: DISCONTINUED | OUTPATIENT
Start: 2020-11-10 | End: 2020-11-18 | Stop reason: HOSPADM

## 2020-11-10 RX ORDER — SODIUM CHLORIDE 0.9 % (FLUSH) 0.9 %
10 SYRINGE (ML) INJECTION
Status: COMPLETED | OUTPATIENT
Start: 2020-11-10 | End: 2020-11-10

## 2020-11-10 RX ORDER — VANCOMYCIN 2 GRAM/500 ML IN 0.9 % SODIUM CHLORIDE INTRAVENOUS
2000
Status: COMPLETED | OUTPATIENT
Start: 2020-11-10 | End: 2020-11-10

## 2020-11-10 RX ORDER — POLYETHYLENE GLYCOL 3350 17 G/17G
17 POWDER, FOR SOLUTION ORAL DAILY PRN
Status: DISCONTINUED | OUTPATIENT
Start: 2020-11-10 | End: 2020-11-18 | Stop reason: HOSPADM

## 2020-11-10 RX ORDER — AMLODIPINE BESYLATE 2.5 MG/1
2.5 TABLET ORAL DAILY
Status: DISCONTINUED | OUTPATIENT
Start: 2020-11-11 | End: 2020-11-11

## 2020-11-10 RX ORDER — ACETAMINOPHEN 325 MG/1
650 TABLET ORAL
Status: DISCONTINUED | OUTPATIENT
Start: 2020-11-10 | End: 2020-11-18 | Stop reason: HOSPADM

## 2020-11-10 RX ORDER — GUAIFENESIN 100 MG/5ML
81 LIQUID (ML) ORAL DAILY
Status: DISCONTINUED | OUTPATIENT
Start: 2020-11-11 | End: 2020-11-18 | Stop reason: HOSPADM

## 2020-11-10 RX ORDER — HEPARIN SODIUM 5000 [USP'U]/ML
5000 INJECTION, SOLUTION INTRAVENOUS; SUBCUTANEOUS EVERY 8 HOURS
Status: DISCONTINUED | OUTPATIENT
Start: 2020-11-10 | End: 2020-11-13

## 2020-11-10 RX ADMIN — SODIUM CHLORIDE 1000 ML: 900 INJECTION, SOLUTION INTRAVENOUS at 13:03

## 2020-11-10 RX ADMIN — HEPARIN SODIUM 5000 UNITS: 5000 INJECTION INTRAVENOUS; SUBCUTANEOUS at 22:36

## 2020-11-10 RX ADMIN — IOPAMIDOL 100 ML: 755 INJECTION, SOLUTION INTRAVENOUS at 13:49

## 2020-11-10 RX ADMIN — Medication 10 ML: at 22:36

## 2020-11-10 RX ADMIN — CEFEPIME HYDROCHLORIDE 2 G: 2 INJECTION, POWDER, FOR SOLUTION INTRAVENOUS at 13:21

## 2020-11-10 RX ADMIN — VANCOMYCIN HYDROCHLORIDE 2000 MG: 100 INJECTION, POWDER, LYOPHILIZED, FOR SOLUTION INTRAVENOUS at 14:07

## 2020-11-10 RX ADMIN — ATORVASTATIN CALCIUM 40 MG: 40 TABLET, FILM COATED ORAL at 22:36

## 2020-11-10 RX ADMIN — Medication 10 ML: at 13:50

## 2020-11-10 RX ADMIN — SODIUM CHLORIDE 50 ML/HR: 900 INJECTION, SOLUTION INTRAVENOUS at 18:44

## 2020-11-10 NOTE — Clinical Note
TRANSFER - OUT REPORT:     Verbal report given to: LA NENA MORROW. Report consisted of patient's Situation, Background, Assessment and   Recommendations(SBAR). Opportunity for questions and clarification was provided. Patient transported with a Registered Nurse. Patient transported to: [address]

## 2020-11-10 NOTE — H&P
Hospitalist Admission Note    NAME: Ankita Evangelista   :  1940   MRN:  [id number]     Date/Time:  11/10/2020 6:12 PM    Patient PCP: Dinora Turk MD  ______________________________________________________________________  Given the patient's current clinical presentation, I have a high level of concern for decompensation if discharged from the emergency department. Complex decision making was performed, which includes reviewing the patient's available past medical records, laboratory results, and x-ray films. My assessment of this patient's clinical condition and my plan of care is as follows.     Assessment / Plan:  Small to moderate pericardial effusion POA  Fever, unknown source  Concern for loculated pericardial effusion, as per ED physician  COVID-19 rule out  Hypotension  Chest Pain, pleuritic    -2 weeks history of pleuritic chest pain/dyspnea exertion/cough/fever  -Rapid Covid 19 negative  -CTA with small to moderate pericardial fusion, and large mediastinal lymph nodes  -ED physician did bedside echo, concern for possible complex fluid collection on the posterior wall  -BC 11.2, with neutrophils 86%, UA clear, creatinine 2.41, bilirubin 1.3, troponin negative, proBNP 4439    -Admit to telemetry  -Dr. Miroslava Waller has been notified from the ED  -Get echo to evaluate pericardial effusion, blood culture pending  -Check COVID-19, respiratory panel PCR to rule out viral infection  -Empiric Vanco and cefepime for fever  -Trend troponin, EKG with A. Fib, rule out ACS  -Gentle hydration for hypotension, no edema on CXR    ROBINSON  Abdominal pain, reports left upper quadrant pain  -Get CT abdomen  -IVF  -Hold nephrotoxic medication  -Check CK    History of nonobstructive atherosclerosis heart disease  Nonischemic cardiomyopathy  -Last echo , EF 60%  -Cath , no significant CAD  Continue aspirin, statin, beta-blocker    History of paroxysmal A. fib, currently in A. fib, rate controlled  -Blood pressure borderline, Coreg if blood pressure tolerates  -On Xarelto, holding for now, resume tomorrow if no procedure planned  -Low-dose Coreg, with holding parameters    History of hyperlipidemia        Code Status: Full code      DVT Prophylaxis: Heparin  GI Prophylaxis: not indicated    Baseline: Ambulatory      Subjective:   CHIEF COMPLAINT: Chest pain    HISTORY OF PRESENT ILLNESS:     Suzy Macdonald is a 68 y.o.  male who presents with past medical history of CHF, CKD stage III, A. fib presented to ED with a chief complaint of chest pain.  -Patient reports that he has been having chest pain since past 2 weeks. Pain is worse with breathing, he also endorses dyspnea on exertion. His last episode of chest pain was hours ago he was seen in the ED multiple times including at [facility] and was sent home. He was checked for COVID-19, which was negative  -Patient reports fever at home and cough with expectoration. He was seen by Dr. Lucas Conley in his office, his systolic was 73M, and for further evaluation he was sent here. In the ED his temperature was 100.5, pulse 109, he was given Vanco and cefepime in the ED. ED physician did bedside echo, and was concern for complex fluid collection in pericardial space. We were asked to admit for work up and evaluation of the above problems.      Past Medical History:   Diagnosis Date    Acute respiratory failure with hypoxia ([address] 02/08/2014    also 11/28/15, 2/17/16, 5/14/17     Alcohol abuse     As of 11/29/18 pt stated he quit 20 years    Arthritis     Back and shoulder    Atrial flutter ([address] 08/2017    saw Dr. Nia Gonsalves; underwent a-flutter ablation    BPH (benign prostatic hyperplasia)     CHF (congestive heart failure) ([address] 02/11/2014    TTE 11/15: EF 40-45%, 2/14: EF 20%  Admitted for acute exacerbations 2/8/14, 11/28/15, 2/17/16, and 5/4/17)    Chronic kidney disease     III    Chronic low back pain     LS spine X-ray 4/8/17: mild DDD    Combined forms of age-related cataract of left eye 12/12/2018    KPE/IOL OS    Combined forms of age-related cataract of right eye 11/28/2018    KPE/IOL OD    COPD (chronic obstructive pulmonary disease) [facility]     PA (dyspnea on exertion)     ED (erectile dysfunction)     Elevated troponin 02/11/2014    also 11/28/15; due to cardiac strain    Frequent hospital admissions     5/14-5/23/17: acute hypoxic resp failure due to CHF exac, ROBINSON on CKD 3, sepsis due to LE cellulitis, 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  2/17-2/22/16: acute hypoxic resp failure, acute diarrhea  11/28-11/30/15: acute hypoxic resp failure due to acute CHF exac, elevated troponin due to cardiac strain  2/8-2/12/14: acuter hypoxic resp failure due to CHF exac, pneumonia     Hand blister 10/8/2017    Bilateral    Hypertension     Kidney disease, chronic, stage III (GFR 30-59 ml/min)     Leg fracture, right 1973    Nonischemic cardiomyopathy ([address]     with LVH    Pneumonia 02/08/2014 2/8/14 and 10/30/15    Poor historian     As of 11/29/18 pt reports 5th grade education, pt unable to give med list-obtained from wife per patient's permission    Prediabetes     Prostate cancer ([facility] 2016    As of 11/29/18, pt states he gets two injections twice a year for this    Pulmonary edema 11/28/2015    S/P cardiac cath 2/12/2014 2/12/14 no significant coronary disease, severe LV dysfunction    Tinea cruris     also genital folliculitis    Vertigo         Past Surgical History:   Procedure Laterality Date    CARDIAC SURG PROCEDURE UNLIST  08/22/2017    SVT ablation    COLONOSCOPY N/A 3/2/2020    COLONOSCOPY performed by Indigo Green MD at [facility] ENDOSCOPY. 2 tubular adenomas, diverticulosis, int hemorrhoids, repeat in 5 yrs    COLONOSCOPY,DIAGNOSTIC  02/22/2016    Dr. Dung Baez; single sessile polyp at descending colon, sigmoid diverticulosis, int hemorrhoids    COLONOSCOPY,REMV KENNEDY,SNARE  2/22/2016         Ted Amin  3/2/2020         HX CATARACT REMOVAL Right 12/05/2018    Dr. Jeanna Reilly Left 12/12/2018    Dr. Conley Sandhoff. LEFT EYE SECOND REFRATIVE CATARACT REMOVAL WITH LENS IMPLANTATION    HX ORTHOPAEDIC Right 1980's    index finger    HX OTHER SURGICAL  08/22/2017    Dr. Nia Gonsalves; atrial flutter ablation    HX ROTATOR CUFF REPAIR Right 2000    HX TONSILLECTOMY  1948       Social History     Tobacco Use    Smoking status: Current Every Day Smoker     Packs/day: 2.00     Years: 20.00     Pack years: 40.00     Types: Cigarettes, Cigars     Last attempt to quit: 8/7/2017     Years since quitting: 3.2    Smokeless tobacco: Former User     Types: Chew    Tobacco comment: 3 cigars daily   Substance Use Topics    Alcohol use: Not Currently     Alcohol/week: 0.0 standard drinks     Comment: As of 11/29/18, pt quit 20 years ago        Family History   Adopted: Yes   Family history unknown: Yes     Allergies   Allergen Reactions    Oxycodone Contact Dermatitis        Prior to Admission medications    Medication Sig Start Date End Date Taking? Authorizing Provider   Xarelto 20 mg tab tablet TAKE 1 TABLET BY MOUTH ONCE DAILY 11/1/20   Provider, Historical   meclizine (ANTIVERT) 25 mg tablet TAKE 1 TABLET BY MOUTH THREE TIMES DAILY AS NEEDED FOR DIZZINESS 10/30/20   Provider, Historical   carvediloL (COREG) 25 mg tablet TAKE 1 TABLET BY MOUTH TWICE DAILY WITH MEALS 10/29/20   Pardeep Dacosta MD   amLODIPine (NORVASC) 2.5 mg tablet Take 1 tablet by mouth once daily 9/29/20   Franchesca Bliss MD   atorvastatin (LIPITOR) 40 mg tablet TAKE 1 TABLET BY MOUTH ONCE DAILY AT NIGHT 8/25/20   Pardeep Dacosta MD   clotrimazole-betamethasone (LOTRISONE) topical cream APPLY TO AFFECTED AREA TWICE DAILY  Patient not taking: Reported on 11/10/2020 7/19/20   Ricky Sousa MD   furosemide (LASIX) 40 mg tablet Take 0.5 Tabs by mouth daily.   Patient taking differently: Take 40 mg by mouth daily. 12/3/19   Candi Guardado MD   aspirin 81 mg chewable tablet Take 1 Tab by mouth daily. 3/3/16   Ya Gamez MD       REVIEW OF SYSTEMS:     I am not able to complete the review of systems because: The patient is intubated and sedated    The patient has altered mental status due to his acute medical problems    The patient has baseline aphasia from prior stroke(s)    The patient has baseline dementia and is not reliable historian    The patient is in acute medical distress and unable to provide information           Total of 12 systems reviewed as follows:       POSITIVE= underlined text  Negative = text not underlined  General:  fever, chills, sweats, generalized weakness, weight loss/gain,      loss of appetite   Eyes:    blurred vision, eye pain, loss of vision, double vision  ENT:    rhinorrhea, pharyngitis   Respiratory:   cough, sputum production, SOB, PA, wheezing, pleuritic pain   Cardiology:   chest pain, palpitations, orthopnea, PND, edema, syncope   Gastrointestinal:  abdominal pain , N/V, diarrhea, dysphagia, constipation, bleeding   Genitourinary:  frequency, urgency, dysuria, hematuria, incontinence   Muskuloskeletal :  arthralgia, myalgia, back pain  Hematology:  easy bruising, nose or gum bleeding, lymphadenopathy   Dermatological: rash, ulceration, pruritis, color change / jaundice  Endocrine:   hot flashes or polydipsia   Neurological:  headache, dizziness, confusion, focal weakness, paresthesia,     Speech difficulties, memory loss, gait difficulty  Psychological: Feelings of anxiety, depression, agitation    Objective:   VITALS:    Visit Vitals  BP (!) 109/54 (BP 1 Location: Left arm, BP Patient Position: At rest)   Pulse (!) 103   Temp 99.3 °F (37.4 °C)   Resp 20   Ht 5' 9\" (1.753 m)   Wt 103.5 kg (228 lb 2.8 oz)   SpO2 97%   BMI 33.70 kg/m²       PHYSICAL EXAM:    General:    Alert, cooperative, no distress, appears stated age. HEENT: Atraumatic, anicteric sclerae, pink conjunctivae  Neck:  Supple, symmetrical,  thyroid: non tender  Lungs:   Clear to auscultation bilaterally. No Wheezing or Rhonchi. No rales. Chest wall:  No tenderness  No Accessory muscle use. Heart:   Irregular rhythm,  No  murmur   trace edema bilaterally  Abdomen:   Soft, non-tender. Not distended. Bowel sounds normal  Extremities: No cyanosis. No clubbing,    Skin:     Not pale. Not Jaundiced  No rashes   Psych:  Fair insight  Neurologic: EOMs intact. No facial asymmetry. No aphasia or slurred speech. Symmetrical strength, Sensation grossly intact. Alert and oriented X 4.     _______________________________________________________________________  Care Plan discussed with:    Comments   Patient x    Family      RN x    Care Manager                    Consultant:      _______________________________________________________________________  Expected  Disposition:   Home with Family x   HH/PT/OT/RN    SNF/LTC    FRANCO    ________________________________________________________________________  TOTAL TIME: 27 Minutes    Critical Care Provided     Minutes non procedure based      Comments     Reviewed previous records   >50% of visit spent in counseling and coordination of care  Discussion with patient and/or family and questions answered       ________________________________________________________________________  Signed: Jose Hernandez MD    Procedures: see electronic medical records for all procedures/Xrays and details which were not copied into this note but were reviewed prior to creation of Plan.     LAB DATA REVIEWED:    Recent Results (from the past 24 hour(s))   EKG, 12 LEAD, INITIAL    Collection Time: 11/10/20 11:16 AM   Result Value Ref Range    Ventricular Rate 118 BPM    Atrial Rate 131 BPM    QRS Duration 120 ms    Q-T Interval 312 ms    QTC Calculation (Bezet) 437 ms    Calculated R Axis 75 degrees    Calculated T Axis 48 degrees    Diagnosis Atrial fibrillation with rapid ventricular response with premature   ventricular or aberrantly conducted complexes  Anterior infarct , age undetermined  When compared with ECG of 02-OCT-2017 10:01,  Atrial fibrillation has replaced Sinus rhythm  Vent. rate has increased BY  42 BPM  ST elevation now present in Lateral leads     CBC WITH AUTOMATED DIFF    Collection Time: 11/10/20 12:10 PM   Result Value Ref Range    WBC 11.2 (H) 4.1 - 11.1 K/uL    RBC 4.19 4. 10 - 5.70 M/uL    HGB 10.4 (L) 12.1 - 17.0 g/dL    HCT 33.9 (L) 36.6 - 50.3 %    MCV 80.9 80.0 - 99.0 FL    MCH 24.8 (L) 26.0 - 34.0 PG    MCHC 30.7 30.0 - 36.5 g/dL    RDW 13.9 11.5 - 14.5 %    PLATELET 139 439 - 787 K/uL    MPV 11.4 8.9 - 12.9 FL    NRBC 0.0 0  WBC    ABSOLUTE NRBC 0.00 0.00 - 0.01 K/uL    NEUTROPHILS 86 (H) 32 - 75 %    LYMPHOCYTES 6 (L) 12 - 49 %    MONOCYTES 8 5 - 13 %    EOSINOPHILS 0 0 - 7 %    BASOPHILS 0 0 - 1 %    IMMATURE GRANULOCYTES 0 0.0 - 0.5 %    ABS. NEUTROPHILS 9.6 (H) 1.8 - 8.0 K/UL    ABS. LYMPHOCYTES 0.7 (L) 0.8 - 3.5 K/UL    ABS. MONOCYTES 0.9 0.0 - 1.0 K/UL    ABS. EOSINOPHILS 0.0 0.0 - 0.4 K/UL    ABS. BASOPHILS 0.0 0.0 - 0.1 K/UL    ABS. IMM. GRANS. 0.0 0.00 - 0.04 K/UL    DF SMEAR SCANNED      RBC COMMENTS NORMOCYTIC, NORMOCHROMIC     METABOLIC PANEL, COMPREHENSIVE    Collection Time: 11/10/20 12:10 PM   Result Value Ref Range    Sodium 138 136 - 145 mmol/L    Potassium 3.6 3.5 - 5.1 mmol/L    Chloride 105 97 - 108 mmol/L    CO2 23 21 - 32 mmol/L    Anion gap 10 5 - 15 mmol/L    Glucose 120 (H) 65 - 100 mg/dL    BUN 21 (H) 6 - 20 MG/DL    Creatinine 2.41 (H) 0.70 - 1.30 MG/DL    BUN/Creatinine ratio 9 (L) 12 - 20      GFR est AA 32 (L) >60 ml/min/1.73m2    GFR est non-AA 26 (L) >60 ml/min/1.73m2    Calcium 9.2 8.5 - 10.1 MG/DL    Bilirubin, total 1.3 (H) 0.2 - 1.0 MG/DL    ALT (SGPT) 13 12 - 78 U/L    AST (SGOT) 16 15 - 37 U/L    Alk.  phosphatase 85 45 - 117 U/L    Protein, total 8.2 6.4 - 8.2 g/dL    Albumin 2.8 (L) 3.5 - 5.0 g/dL    Globulin 5.4 (H) 2.0 - 4.0 g/dL    A-G Ratio 0.5 (L) 1.1 - 2.2     TROPONIN I    Collection Time: 11/10/20 12:10 PM   Result Value Ref Range    Troponin-I, Qt. <0.05 <0.05 ng/mL   NT-PRO BNP    Collection Time: 11/10/20 12:10 PM   Result Value Ref Range    NT pro-BNP 4,439 (H) <450 PG/ML   POC LACTIC ACID    Collection Time: 11/10/20  1:04 PM   Result Value Ref Range    Lactic Acid (POC) 1.48 0.40 - 2.00 mmol/L   SARS-COV-2    Collection Time: 11/10/20  2:14 PM   Result Value Ref Range    Specimen source Nasopharyngeal      Specimen source Nasopharyngeal      COVID-19 rapid test Not detected NOTD      Specimen type NP Swab      Health status Symptomatic Testing     INFLUENZA A+B VIRAL AGS    Collection Time: 11/10/20  2:14 PM   Result Value Ref Range    Influenza A Antigen Negative NEG      Influenza B Antigen Negative NEG     URINALYSIS W/ REFLEX CULTURE    Collection Time: 11/10/20  4:39 PM    Specimen: Urine    Urine specimen   Result Value Ref Range    Color DARK YELLOW      Appearance CLOUDY (A) CLEAR      Specific gravity 1.020 1.003 - 1.030      pH (UA) 5.0 5.0 - 8.0      Protein 30 (A) NEG mg/dL    Glucose Negative NEG mg/dL    Ketone TRACE (A) NEG mg/dL    Blood TRACE (A) NEG      Urobilinogen 1.0 0.2 - 1.0 EU/dL    Nitrites Negative NEG      Leukocyte Esterase Negative NEG      WBC 0-4 0 - 4 /hpf    RBC 0-5 0 - 5 /hpf    Epithelial cells FEW FEW /lpf    Bacteria Negative NEG /hpf    UA:UC IF INDICATED CULTURE NOT INDICATED BY UA RESULT CNI      Hyaline cast 2-5 0 - 5 /lpf   BILIRUBIN, CONFIRM    Collection Time: 11/10/20  4:39 PM   Result Value Ref Range    Bilirubin UA, confirm Negative NEG

## 2020-11-10 NOTE — ED PROVIDER NOTES
EMERGENCY DEPARTMENT HISTORY AND PHYSICAL EXAM      Date: 11/10/2020  Patient Name: Sanford Hashimoto    History of Presenting Illness     Chief Complaint   Patient presents with    Chest Pain       History Provided By: Patient    HPI: Sanford Hashimoto, 68 y.o. male presents to the ED with cc of shortness of breath and chest pain. Patient saw Dr. Skyler Fitzpatrick, sent to ED for further evaluation. Patient reports PA that began two weeks ago. Has been seen at multiple ED's prior for this. Reports symptoms continued at rest. Patient reports episodes of chest pain, intermittent, last episode hours ago. These occur when walking and have been present since this week. He also reports a pleuritic component. History of afib, on xarelto. No missed doses. This was just started 1 week ago. Was tested for COVID one day ago and negative, tested last night and pending. + fevers at home x2 days. + cough with mucous. Seen at cardiologist, Dr. Skyler Fitzpatrick, found to be in afib and hypotensive, 92V systolic, improved. Transferred to ED. There are no other complaints, changes, or physical findings at this time. PCP: Babs Barnes MD    No current facility-administered medications on file prior to encounter.       Current Outpatient Medications on File Prior to Encounter   Medication Sig Dispense Refill    Xarelto 20 mg tab tablet TAKE 1 TABLET BY MOUTH ONCE DAILY      meclizine (ANTIVERT) 25 mg tablet TAKE 1 TABLET BY MOUTH THREE TIMES DAILY AS NEEDED FOR DIZZINESS      carvediloL (COREG) 25 mg tablet TAKE 1 TABLET BY MOUTH TWICE DAILY WITH MEALS 60 Tab 0    amLODIPine (NORVASC) 2.5 mg tablet Take 1 tablet by mouth once daily 90 Tab 0    atorvastatin (LIPITOR) 40 mg tablet TAKE 1 TABLET BY MOUTH ONCE DAILY AT NIGHT 90 Tab 0    clotrimazole-betamethasone (LOTRISONE) topical cream APPLY TO AFFECTED AREA TWICE DAILY (Patient not taking: Reported on 11/10/2020) 45 g 0    furosemide (LASIX) 40 mg tablet Take 0.5 Tabs by mouth daily. (Patient taking differently: Take 40 mg by mouth daily.) 45 Tab 3    aspirin 81 mg chewable tablet Take 1 Tab by mouth daily.  10 Tab 0       Past History     Past Medical History:  Past Medical History:   Diagnosis Date    Acute respiratory failure with hypoxia ([address] 02/08/2014    also 11/28/15, 2/17/16, 5/14/17     Alcohol abuse     As of 11/29/18 pt stated he quit 20 years    Arthritis     Back and shoulder    Atrial flutter ([address] 08/2017    saw Dr. Savanna Paulson; underwent a-flutter ablation    BPH (benign prostatic hyperplasia)     CHF (congestive heart failure) ([address] 02/11/2014    TTE 11/15: EF 40-45%, 2/14: EF 20%  Admitted for acute exacerbations 2/8/14, 11/28/15, 2/17/16, and 5/4/17)    Chronic kidney disease     III    Chronic low back pain     LS spine X-ray 4/8/17: mild DDD    Combined forms of age-related cataract of left eye 12/12/2018    KPE/IOL OS    Combined forms of age-related cataract of right eye 11/28/2018    KPE/IOL OD    COPD (chronic obstructive pulmonary disease) ([address]     PA (dyspnea on exertion)     ED (erectile dysfunction)     Elevated troponin 02/11/2014    also 11/28/15; due to cardiac strain    Frequent hospital admissions     5/14-5/23/17: acute hypoxic resp failure due to CHF exac, ROBINSON on CKD 3, sepsis due to LE cellulitis, 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  2/17-2/22/16: acute hypoxic resp failure, acute diarrhea  11/28-11/30/15: acute hypoxic resp failure due to acute CHF exac, elevated troponin due to cardiac strain  2/8-2/12/14: acuter hypoxic resp failure due to CHF exac, pneumonia     Hand blister 10/8/2017    Bilateral    Hypertension     Kidney disease, chronic, stage III (GFR 30-59 ml/min)     Leg fracture, right 1973    Nonischemic cardiomyopathy ([address]     with LVH    Pneumonia 02/08/2014 2/8/14 and 10/30/15    Poor historian     As of 11/29/18 pt reports 5th grade education, pt unable to give med list-obtained from wife per patient's permission    Prediabetes     Prostate cancer ([facility] 2016    As of 11/29/18, pt states he gets two injections twice a year for this    Pulmonary edema 11/28/2015    S/P cardiac cath 2/12/2014 2/12/14 no significant coronary disease, severe LV dysfunction    Tinea cruris     also genital folliculitis    Vertigo        Past Surgical History:  Past Surgical History:   Procedure Laterality Date    CARDIAC SURG PROCEDURE UNLIST  08/22/2017    SVT ablation    COLONOSCOPY N/A 3/2/2020    COLONOSCOPY performed by Annemarie Kelly MD at [facility] END[facility]COPY. 2 tubular adenomas, diverticulosis, int hemorrhoids, repeat in 5 yrs    COLONOSCOPY,DIAGNOSTIC  02/22/2016    Dr. Sowmya Cabrera; single sessile polyp at descending colon, sigmoid diverticulosis, int hemorrhoids    COLONOSCOPY,REMV LESN,SNARE  2/22/2016         COLONOSCOPY,REMV LESN,SNARE  3/2/2020         HX CATARACT REMOVAL Right 12/05/2018    Dr. Terry Wall Left 12/12/2018    Dr. Agnes Gregory. LEFT EYE SECOND REFRATIVE CATARACT REMOVAL WITH LENS IMPLANTATION    HX ORTHOPAEDIC Right 1980's    index finger    HX OTHER SURGICAL  08/22/2017    Dr. Oscar Arambula; atrial flutter ablation    HX ROTATOR CUFF REPAIR Right 2000    HX TONSILLECTOMY  1948       Family History:  Family History   Adopted: Yes   Family history unknown: Yes       Social History:  Social History     Tobacco Use    Smoking status: Current Every Day Smoker     Packs/day: 2.00     Years: 20.00     Pack years: 40.00     Types: Cigarettes, Cigars     Last attempt to quit: 8/7/2017     Years since quitting: 3.2    Smokeless tobacco: Former User     Types: Chew    Tobacco comment: 3 cigars daily   Substance Use Topics    Alcohol use: Not Currently     Alcohol/week: 0.0 standard drinks     Comment: As of 11/29/18, pt quit 20 years ago    Drug use: Yes     Types: Marijuana     Comment: once a week       Allergies:   Allergies   Allergen Reactions    Oxycodone Contact Dermatitis         Review of Systems   Review of Systems   Constitutional: Positive for fatigue and fever. HENT: Negative for voice change. Eyes: Negative for pain and redness. Respiratory: Positive for cough and chest tightness. Cardiovascular: Positive for chest pain. Negative for leg swelling. Gastrointestinal: Negative for diarrhea, nausea and vomiting. Genitourinary: Negative for hematuria. Musculoskeletal: Negative for gait problem. Skin: Negative for color change, pallor and rash. Neurological: Negative for facial asymmetry, weakness and headaches. Hematological: Does not bruise/bleed easily. Psychiatric/Behavioral: Negative for behavioral problems. All other systems reviewed and are negative. Physical Exam   Physical Exam  Vitals signs and nursing note reviewed. Constitutional:       Comments: 47-year-old male, resting in bed, no acute distress   HENT:      Head: Normocephalic. Right Ear: External ear normal.      Left Ear: External ear normal.      Nose: Nose normal.   Eyes:      Conjunctiva/sclera: Conjunctivae normal.   Cardiovascular:      Rate and Rhythm: Tachycardia present. Rhythm irregular. Heart sounds: No murmur. No friction rub. No gallop. Pulmonary:      Effort: Pulmonary effort is normal. No tachypnea. Breath sounds: Normal breath sounds. No wheezing, rhonchi or rales. Abdominal:      Palpations: Abdomen is soft. Tenderness: There is no abdominal tenderness. Musculoskeletal: Normal range of motion. Right lower leg: No edema. Left lower leg: No edema. Skin:     General: Skin is warm. Capillary Refill: Capillary refill takes less than 2 seconds. Neurological:      Mental Status: He is alert. Mental status is at baseline.    Psychiatric:         Mood and Affect: Mood normal.         Behavior: Behavior normal.         Diagnostic Study Results     Labs -     Recent Results (from the past 12 hour(s))   EKG, 12 LEAD, INITIAL    Collection Time: 11/10/20 11:16 AM   Result Value Ref Range    Ventricular Rate 118 BPM    Atrial Rate 131 BPM    QRS Duration 120 ms    Q-T Interval 312 ms    QTC Calculation (Bezet) 437 ms    Calculated R Axis 75 degrees    Calculated T Axis 48 degrees    Diagnosis       Atrial fibrillation with rapid ventricular response with premature   ventricular or aberrantly conducted complexes  Anterior infarct , age undetermined  When compared with ECG of 02-OCT-2017 10:01,  Atrial fibrillation has replaced Sinus rhythm  Vent. rate has increased BY  42 BPM  ST elevation now present in Lateral leads     CBC WITH AUTOMATED DIFF    Collection Time: 11/10/20 12:10 PM   Result Value Ref Range    WBC 11.2 (H) 4.1 - 11.1 K/uL    RBC 4.19 4. 10 - 5.70 M/uL    HGB 10.4 (L) 12.1 - 17.0 g/dL    HCT 33.9 (L) 36.6 - 50.3 %    MCV 80.9 80.0 - 99.0 FL    MCH 24.8 (L) 26.0 - 34.0 PG    MCHC 30.7 30.0 - 36.5 g/dL    RDW 13.9 11.5 - 14.5 %    PLATELET 959 686 - 252 K/uL    MPV 11.4 8.9 - 12.9 FL    NRBC 0.0 0  WBC    ABSOLUTE NRBC 0.00 0.00 - 0.01 K/uL    NEUTROPHILS 86 (H) 32 - 75 %    LYMPHOCYTES 6 (L) 12 - 49 %    MONOCYTES 8 5 - 13 %    EOSINOPHILS 0 0 - 7 %    BASOPHILS 0 0 - 1 %    IMMATURE GRANULOCYTES 0 0.0 - 0.5 %    ABS. NEUTROPHILS 9.6 (H) 1.8 - 8.0 K/UL    ABS. LYMPHOCYTES 0.7 (L) 0.8 - 3.5 K/UL    ABS. MONOCYTES 0.9 0.0 - 1.0 K/UL    ABS. EOSINOPHILS 0.0 0.0 - 0.4 K/UL    ABS. BASOPHILS 0.0 0.0 - 0.1 K/UL    ABS. IMM.  GRANS. 0.0 0.00 - 0.04 K/UL    DF SMEAR SCANNED      RBC COMMENTS NORMOCYTIC, NORMOCHROMIC     METABOLIC PANEL, COMPREHENSIVE    Collection Time: 11/10/20 12:10 PM   Result Value Ref Range    Sodium 138 136 - 145 mmol/L    Potassium 3.6 3.5 - 5.1 mmol/L    Chloride 105 97 - 108 mmol/L    CO2 23 21 - 32 mmol/L    Anion gap 10 5 - 15 mmol/L    Glucose 120 (H) 65 - 100 mg/dL    BUN 21 (H) 6 - 20 MG/DL    Creatinine 2.41 (H) 0.70 - 1.30 MG/DL    BUN/Creatinine ratio 9 (L) 12 - 20      GFR est AA 32 (L) >60 ml/min/1.73m2    GFR est non-AA 26 (L) >60 ml/min/1.73m2    Calcium 9.2 8.5 - 10.1 MG/DL    Bilirubin, total 1.3 (H) 0.2 - 1.0 MG/DL    ALT (SGPT) 13 12 - 78 U/L    AST (SGOT) 16 15 - 37 U/L    Alk. phosphatase 85 45 - 117 U/L    Protein, total 8.2 6.4 - 8.2 g/dL    Albumin 2.8 (L) 3.5 - 5.0 g/dL    Globulin 5.4 (H) 2.0 - 4.0 g/dL    A-G Ratio 0.5 (L) 1.1 - 2.2     TROPONIN I    Collection Time: 11/10/20 12:10 PM   Result Value Ref Range    Troponin-I, Qt. <0.05 <0.05 ng/mL   NT-PRO BNP    Collection Time: 11/10/20 12:10 PM   Result Value Ref Range    NT pro-BNP 4,439 (H) <450 PG/ML   POC LACTIC ACID    Collection Time: 11/10/20  1:04 PM   Result Value Ref Range    Lactic Acid (POC) 1.48 0.40 - 2.00 mmol/L   SARS-COV-2    Collection Time: 11/10/20  2:14 PM   Result Value Ref Range    Specimen source Nasopharyngeal      Specimen source Nasopharyngeal      COVID-19 rapid test Not detected NOTD      Specimen type NP Swab      Health status Symptomatic Testing     INFLUENZA A+B VIRAL AGS    Collection Time: 11/10/20  2:14 PM   Result Value Ref Range    Influenza A Antigen Negative NEG      Influenza B Antigen Negative NEG         Radiologic Studies -   XR CHEST PORT   Final Result   IMPRESSION:    Please see chest CT report from 30 minutes prior. Please do not bill for this   chest radiograph. CTA CHEST W OR W WO CONT   Final Result   IMPRESSION:    1. Small to moderate pericardial effusion. 2. Two enlarged mediastinal lymph nodes. 3. Trace pleural effusions. CT Results  (Last 48 hours)               11/10/20 1350  CTA CHEST W OR W WO CONT Final result    Impression:  IMPRESSION:    1. Small to moderate pericardial effusion. 2. Two enlarged mediastinal lymph nodes. 3. Trace pleural effusions. Narrative:  CT ANGIOGRAPHY CHEST. 11/10/2020 1:50 PM        INDICATION: Pulmonary embolism, new onset atrial fibrillation. COMPARISON: None.        TECHNIQUE: CT angiography of the chest was performed after the administration of 100 cc IV contrast (Isovue 370). Coronal and sagittal, and coronal MIP   reconstructions were performed. CT dose reduction was achieved through use of a   standardized protocol tailored for this examination and automatic exposure   control for dose modulation. FINDINGS:   There is no pulmonary embolism. Heart: There is a small to moderate pericardial effusion. The heart size is   normal. Left main, left anterior descending, and left circumflex calcifications   are moderate. Mediastinum: There are two enlarged mediastinal lymph nodes:   Right paratracheal: 11 mm short axis on image [id number]. Right subcarinal: 16 mm on 301-97. Trace loculated right pleural fluid posterior to the right atrium (301-71)   mimics a mildly enlarged lymph node. Calcified prevascular and pretracheal lymph   nodes are consistent with old granulomatous disease. Lungs: Mild passive atelectasis in the left lower lobe and lingula are   associated with pericardial effusion. There is scarring in the medial basal   segment of the right lower lobe. There are trace bilateral pleural effusions. No   pneumothorax. The central airways are patent. Upper abdomen: Mild gynecomastia is symmetric. Incidental note is made of a left   interpolar renal cyst. The left kidney is rotated into the axial plane. A   subcentimeter segment 6 hepatic lesion is too small to characterize, but likely   represents a cyst. A third portion duodenal diverticulum is partially imaged. CXR Results  (Last 48 hours)               11/10/20 1407  XR CHEST PORT Final result    Impression:  IMPRESSION:    Please see chest CT report from 30 minutes prior. Please do not bill for this   chest radiograph. Narrative:  PORTABLE CHEST RADIOGRAPH/S: 11/10/2020 2:07 PM   [Billing note only, not part of diagnostic report: DMPI - please do not bill for   this procedure.]       INDICATION: Chest pain.        COMPARISON: Chest CT 30 minutes prior. 10/2/2017. TECHNIQUE: Portable frontal upright radiograph/s of the chest.                 Medical Decision Making   I am the first provider for this patient. I reviewed the vital signs, available nursing notes, past medical history, past surgical history, family history and social history. Vital Signs-Reviewed the patient's vital signs. Patient Vitals for the past 12 hrs:   Temp Pulse Resp BP SpO2   11/10/20 1445  100 25 (!) 117/58 98 %   11/10/20 1415 99.3 °F (37.4 °C) (!) 110 23 103/79 98 %   11/10/20 1337     97 %   11/10/20 1128 (!) 100.5 °F (38.1 °C) (!) 109 28 108/72 97 %       EKG interpretation: (Preliminary)    EKG interpreted by me. Shows atrial fibrillation with a HR of 118. No STEMI, normal intervals. Records Reviewed: Nursing Notes, Old Medical Records, Previous Radiology Studies and Previous Laboratory Studies    Provider Notes (Medical Decision Making):     44-year-old male presents emergency department with shortness of breath and chest pain sent from Dr. Carlin Men office. Vitals notable for an irregularly tachycardic rhythm and mildly hypotensive. Patient is febrile. Regarding patient's atrial fibrillation, this is not acutely new he is anticoagulated, but states he began this 1 week ago. Given his shortness of breath and dyspnea on exertion, a CTA was obtained. Shows pericardial effusion and pleural effusions bilaterally. Patient's EKG shows A. fib with RVR, no STEMI. Troponin is negative despite 2 weeks of symptoms. BNP is elevated. There is also acute on chronic kidney injury. Given patient's fever, will check for flu and Covid. Concern for fever of unknown origin with severe sepsis given tachycardia and evidence of acute renal failure. Please see update notes as below, sepsis alert was called. Patient given cefepime and vancomycin empirically and cultured. Overall anticipate patient will be admitted. ED Course:   Initial assessment performed. The patients presenting problems have been discussed, and they are in agreement with the care plan formulated and outlined with them. I have encouraged them to ask questions as they arise throughout their visit. ED Course as of Nov 10 1606   Tue Nov 10, 2020   1427 Labs notable for a mild leukocytosis. Stable anemia. There is a acute kidney injury, patient has been diuresed. BNP mildly elevated. Lactate is reassuring. Patient received antibiotics. His blood pressure is improving with fluids. We will not complete full 30 cc/kg of fluids given history of CHF and normal lactate. [MB]   1533 Supervised and reviewed bedside echo performed by PGY 4. This shows a mild to moderately sized pericardial effusion. Anteriorly appears simple, however does appear complex posteriorly. There is no signs of tamponade. Blood pressure remained stable. Will place a consult to Dr. Stephen Herrera    [MB]   [phone] Spoke to Dr. Yamilet Harrington, recommends following up formal echocardiogram, no intervention planned at this point given patient is anticoagulated. [MB]   8318 Sepsis reassessment performed    [MB]      ED Course User Index  [MB] Xi Baca MD     Critical Care Time:   CRITICAL CARE NOTE :    12:22 PM    IMPENDING DETERIORATION -Cardiovascular and Metabolic  ASSOCIATED RISK FACTORS - Hypotension, Dysrhythmia and Metabolic changes  MANAGEMENT- Bedside Assessment  INTERPRETATION -  Xrays, CT Scan, ECG and Blood Pressure  INTERVENTIONS - hemodynamic mngmt, Metobolic interventions and antibiotic administration  CASE REVIEW - Hospitalist, Medical Sub-Specialist, Nursing and Family  TREATMENT RESPONSE -Stable  PERFORMED BY - Self    NOTES   :  I have spent 40 minutes of critical care time involved in lab review, consultations with specialist, family decision- making, bedside attention and documentation. This time excludes time spent in any separate billed procedures.   During this entire length of time I was immediately available to the patient . Mabel Baker MD      Disposition:    Admitted      Diagnosis     Clinical Impression:   1. Fever, unspecified fever cause    2. Pericardial effusion    3. Atrial fibrillation, unspecified type ([address]    4. Dyspnea on exertion    5. Hypervolemia, unspecified hypervolemia type        Attestations:    Mabel Baker MD    Please note that this dictation was completed with [employer], the computer voice recognition software. Quite often unanticipated grammatical, syntax, homophones, and other interpretive errors are inadvertently transcribed by the computer software. Please disregard these errors. Please excuse any errors that have escaped final proofreading. Thank you.

## 2020-11-10 NOTE — PROGRESS NOTES
[address] [place] [address] [address] [facility]  [phone]     Subjective:      Cesilia Harman is a 68 y.o. male is here for routine f/u. He has pmhx CAD NICM HTN AFL s/p AFL ablation in 2017 and HLD. Last seen by us in 5/2020. Had multiple ED visits [facility]) since. Today he c/o patrick, chest soreness and dizziness. EKG showed AF. He is on Xarelto for DOAC, started at [facility] ED per patient. The patient denies orthopnea, PND, LE edema, palpitations, syncope, or presyncope.        Patient Active Problem List    Diagnosis Date Noted    Anemia of chronic disease 09/17/2020    Candidal intertrigo 11/05/2018    Type 2 diabetes mellitus with stage 3 chronic kidney disease, without long-term current use of insulin ([address] 10/08/2017    Prostate cancer ([address] 10/08/2017    COPD (chronic obstructive pulmonary disease) ([address] 10/08/2017    Chronic low back pain 10/08/2017    Obesity (BMI 30-39.9) 10/08/2017    Atrial flutter ([address] 08/22/2017    Smokeless tobacco use 07/25/2017    Mixed hyperlipidemia 03/31/2016    Hypertension 05/20/2014    NICM (nonischemic cardiomyopathy) ([address] 02/20/2014    S/P cardiac cath 02/12/2014    Combined systolic and diastolic congestive heart failure ([address] 02/08/2014      Cole León MD  Past Medical History:   Diagnosis Date    Acute respiratory failure with hypoxia ([address] 02/08/2014    also 11/28/15, 2/17/16, 5/14/17     Alcohol abuse     As of 11/29/18 pt stated he quit 20 years    Arthritis     Back and shoulder    Atrial flutter ([address] 08/2017    saw Dr. Benigno Borjas; underwent a-flutter ablation    BPH (benign prostatic hyperplasia)     CHF (congestive heart failure) ([address] 02/11/2014    TTE 11/15: EF 40-45%, 2/14: EF 20%  Admitted for acute exacerbations 2/8/14, 11/28/15, 2/17/16, and 5/4/17)    Chronic kidney disease     III    Chronic low back pain     LS spine X-ray 4/8/17: mild DDD    Combined forms of age-related cataract of left eye 12/12/2018 KPE/IOL OS    Combined forms of age-related cataract of right eye 11/28/2018    KPE/IOL OD    COPD (chronic obstructive pulmonary disease) (HCC)     PA (dyspnea on exertion)     ED (erectile dysfunction)     Elevated troponin 02/11/2014    also 11/28/15; due to cardiac strain    Frequent hospital admissions     5/14-5/23/17: acute hypoxic resp failure due to CHF exac, ROBINSON on CKD 3, sepsis due to LE cellulitis, 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  2/17-2/22/16: acute hypoxic resp failure, acute diarrhea  11/28-11/30/15: acute hypoxic resp failure due to acute CHF exac, elevated troponin due to cardiac strain  2/8-2/12/14: acuter hypoxic resp failure due to CHF exac, pneumonia     Hand blister 10/8/2017    Bilateral    Hypertension     Kidney disease, chronic, stage III (GFR 30-59 ml/min)     Leg fracture, right 1973    Nonischemic cardiomyopathy ([facility]     with LVH    Pneumonia 02/08/2014 2/8/14 and 10/30/15    Poor historian     As of 11/29/18 pt reports 5th grade education, pt unable to give med list-obtained from wife per patient's permission    Prediabetes     Prostate cancer ([facility] 2016    As of 11/29/18, pt states he gets two injections twice a year for this    Pulmonary edema 11/28/2015    S/P cardiac cath 2/12/2014 2/12/14 no significant coronary disease, severe LV dysfunction    Tinea cruris     also genital folliculitis    Vertigo       Past Surgical History:   Procedure Laterality Date    CARDIAC SURG PROCEDURE UNLIST  08/22/2017    SVT ablation    COLONOSCOPY N/A 3/2/2020    COLONOSCOPY performed by Maria M Carvalho MD at \[facility]\"" ENDOSCOPY. 2 tubular adenomas, diverticulosis, int hemorrhoids, repeat in 5 yrs    COLONOSCOPY,DIAGNOSTIC  02/22/2016    Dr. Devika Romero; single sessile polyp at descending colon, sigmoid diverticulosis, int hemorrhoids    COLONOSCOPY,REMV LESN,SNARE  2/22/2016         COLONOSCOPY,REMV LESN,SNARE  3/2/2020         HX CATARACT REMOVAL Right 12/05/2018     Ced    HX CATARACT REMOVAL Left 12/12/2018    Dr. Luc Davidson.  LEFT EYE SECOND REFRATIVE CATARACT REMOVAL WITH LENS IMPLANTATION    HX ORTHOPAEDIC Right 1980's    index finger    HX OTHER SURGICAL  08/22/2017    Dr. Jarad Rodríguez; atrial flutter ablation    HX ROTATOR CUFF REPAIR Right 2000    HX TONSILLECTOMY  1948     Allergies   Allergen Reactions    Oxycodone Contact Dermatitis      Family History   Adopted: Yes   Family history unknown: Yes      Social History     Socioeconomic History    Marital status:      Spouse name: Not on file    Number of children: Not on file    Years of education: Not on file    Highest education level: Not on file   Occupational History    Not on file   Social Needs    Financial resource strain: Not on file    Food insecurity     Worry: Not on file     Inability: Not on file    Transportation needs     Medical: Not on file     Non-medical: Not on file   Tobacco Use    Smoking status: Current Every Day Smoker     Packs/day: 2.00     Years: 20.00     Pack years: 40.00     Types: Cigarettes, Cigars     Last attempt to quit: 8/7/2017     Years since quitting: 3.2    Smokeless tobacco: Former User     Types: Chew    Tobacco comment: 3 cigars daily   Substance and Sexual Activity    Alcohol use: Not Currently     Alcohol/week: 0.0 standard drinks     Comment: As of 11/29/18, pt quit 20 years ago    Drug use: Yes     Types: Marijuana     Comment: once a week    Sexual activity: Not on file   Lifestyle    Physical activity     Days per week: Not on file     Minutes per session: Not on file    Stress: Not on file   Relationships    Social connections     Talks on phone: Not on file     Gets together: Not on file     Attends [employer] service: Not on file     Active member of club or organization: Not on file     Attends meetings of clubs or organizations: Not on file     Relationship status: Not on file    Intimate partner violence     Fear of current or ex partner: Not on file     Emotionally abused: Not on file     Physically abused: Not on file     Forced sexual activity: Not on file   Other Topics Concern    Not on file   Social History Narrative    Not on file      Current Outpatient Medications   Medication Sig    Xarelto 20 mg tab tablet TAKE 1 TABLET BY MOUTH ONCE DAILY    carvediloL (COREG) 25 mg tablet TAKE 1 TABLET BY MOUTH TWICE DAILY WITH MEALS    amLODIPine (NORVASC) 2.5 mg tablet Take 1 tablet by mouth once daily    atorvastatin (LIPITOR) 40 mg tablet TAKE 1 TABLET BY MOUTH ONCE DAILY AT NIGHT    furosemide (LASIX) 40 mg tablet Take 0.5 Tabs by mouth daily. (Patient taking differently: Take 40 mg by mouth daily.)    aspirin 81 mg chewable tablet Take 1 Tab by mouth daily.  meclizine (ANTIVERT) 25 mg tablet TAKE 1 TABLET BY MOUTH THREE TIMES DAILY AS NEEDED FOR DIZZINESS    clotrimazole-betamethasone (LOTRISONE) topical cream APPLY TO AFFECTED AREA TWICE DAILY (Patient not taking: Reported on 11/10/2020)     No current facility-administered medications for this visit. Review of Symptoms:  11 systems reviewed, negative other than as stated in the HPI    Physical ExamPhysical Exam:    Vitals:    11/10/20 1022 11/10/20 1030   BP: (!) 70/40 90/60   Pulse: (!) 120    Resp: 29    SpO2: 94%    Weight: 208 lb 6.4 oz (94.5 kg)    Height: 5' 9\" (1.753 m)      Body mass index is 30.78 kg/m². General PE  Gen:  NAD  Mental Status - Alert. General Appearance - Not in acute distress. HEENT:  PERRL, no carotid bruits or JVD  Chest and Lung Exam   Inspection: Accessory muscles - No use of accessory muscles in breathing. Auscultation:   Breath sounds: - Normal.   Cardiovascular   Inspection: Jugular vein - Bilateral - Inspection Normal.   Palpation/Percussion:   Apical Impulse: - Normal.   Auscultation: Rhythm - IRRegular. Heart Sounds - S1 WNL and S2 WNL. No S3 or S4. Murmurs & Other Heart Sounds: Auscultation of the heart reveals - No Murmurs. Peripheral Vascular   Upper Extremity: Inspection - Bilateral - No Cyanotic nailbeds or Digital clubbing. Lower Extremity:   Palpation: Edema - Bilateral - No edema. Abdomen:   Soft, non-tender, bowel sounds are active. Neuro: A&O times 3, CN and motor grossly WNL    Labs:   Lab Results   Component Value Date/Time    Cholesterol, total 81 (L) 09/10/2020 08:04 AM    Cholesterol, total 77 (L) 04/02/2019 08:17 AM    Cholesterol, total 117 04/19/2018 08:49 AM    Cholesterol, total 87 (L) 10/05/2017 09:35 AM    HDL Cholesterol 39 (L) 09/10/2020 08:04 AM    HDL Cholesterol 34 (L) 04/02/2019 08:17 AM    HDL Cholesterol 45 04/19/2018 08:49 AM    HDL Cholesterol 14 (L) 10/05/2017 09:35 AM    LDL, calculated 29 04/02/2019 08:17 AM    LDL, calculated 43 04/19/2018 08:49 AM    LDL, calculated 51 10/05/2017 09:35 AM    LDL Chol Calc (NIH) 28 09/10/2020 08:04 AM    Triglyceride 56 09/10/2020 08:04 AM    Triglyceride 68 04/02/2019 08:17 AM    Triglyceride 143 04/19/2018 08:49 AM    Triglyceride 110 10/05/2017 09:35 AM     Lab Results   Component Value Date/Time    CK 51 05/17/2017 06:18 PM     Lab Results   Component Value Date/Time    Sodium 140 09/10/2020 08:04 AM    Potassium 3.7 09/10/2020 08:04 AM    Chloride 104 09/10/2020 08:04 AM    CO2 24 09/10/2020 08:04 AM    Anion gap 8 10/02/2017 11:17 AM    Glucose 117 (H) 09/10/2020 08:04 AM    BUN 8 09/10/2020 08:04 AM    Creatinine 1.19 09/10/2020 08:04 AM    BUN/Creatinine ratio 7 (L) 09/10/2020 08:04 AM    GFR est AA 67 09/10/2020 08:04 AM    GFR est non-AA 58 (L) 09/10/2020 08:04 AM    Calcium 9.0 09/10/2020 08:04 AM    Bilirubin, total 0.4 09/10/2020 08:04 AM    Alk. phosphatase 93 09/10/2020 08:04 AM    Protein, total 6.7 09/10/2020 08:04 AM    Albumin 3.9 09/10/2020 08:04 AM    Globulin 4.8 (H) 10/02/2017 11:17 AM    A-G Ratio 1.4 09/10/2020 08:04 AM    ALT (SGPT) 8 09/10/2020 08:04 AM       EKG:  AF     Assessment:     Assessment:      1.  NICM (nonischemic cardiomyopathy) ([facility]    2. Mixed hyperlipidemia    3. Essential hypertension    4. Atrial flutter, unspecified type ([facility]    5. Coronary artery disease due to lipid rich plaque    6. PAF (paroxysmal atrial fibrillation) ([facility]        Orders Placed This Encounter    AMB POC EKG ROUTINE W/ 12 LEADS, INTER & REP     Order Specific Question:   Reason for Exam:     Answer:   routine        Plan:     Nonobstructive atherosclerotic heart disease:   Nonischemic cardiomyopathy:   Resolved. NYHA class I. Last ejection fraction 60% 2017.    20% lesion proximal LAD on last cath 2/2014   On ASA BB CCB and statin    Paroxysmal atrial fibrillation   Has past history of atrial flutter s/p ablation in 2017  May be contributing to his PATRICK. CHADSVASC2 score is 5, now on Xarelto for CVA risk reduction  On BB    Hypertension:   90/60 c/o dizziness  Will hold ALL BP meds for now  He is on: Carvedilol 25 mg BID, Amlodipine 2.5 mg  Furosemide 40 mg    Hyperlipidemia:    9/2020 LDL 28 On statin Labs and lipids per PCP    C/o patrick, chest soreness, dizziness; back in AF. Will send to ED for further work up / possible inpatient admission    Beti Osullivan NP       Patient seen and examined by me with nurse practitioner. I personally performed all components of the history, physical, and medical decision making and agree with the assessment and plan as noted. Low BP in office and appears fatigue and tired. Need evaluation at hospital. Will send him to ER. D/w Dr. Jessica Willett in ER.      Finn Love MD

## 2020-11-10 NOTE — PROGRESS NOTES
Chief Complaint   Patient presents with    Follow-up    Cardiomyopathy    Coronary Artery Disease    Irregular Heart Beat    Cholesterol Problem    Hypertension       1. Have you been to the ER, urgent care clinic since your last visit? Hospitalized since your last visit? Yes [facility] due not responding due to irregular heart beat and dizziness    2. Have you seen or consulted any other health care providers outside of the [address] [place] [address] since your last visit? Include any pap smears or colon screening.      Patient C/O dizziness ,faitigue , SOB and chest soreness

## 2020-11-10 NOTE — ED TRIAGE NOTES
Assumed care of pt via EMS stretcher. Pt is A&O x 4 and reports CC of chest pain for two weeks now. Pt says at this time he does not have CP only when he is walking and also has SOB whenever pt exerts himself. Pt arrives today from [facility]. The nurse there reported to EMS pt had a systolic pressure that was in the 70's. Pt blood pressure on arrival today is 108/72. Pt was recently seen at [facility] on Saturday for the same thing as well as pt was tested for COVID. Pt test came back negative. Pt was seen again at [facility] doctors for the same symptoms he has been having. Pt does have a fever today of 100.5. Pt is in Afib with RVR but does have a hx of afib. Pt resting on stretcher in POC with call bell in reach. Pt placed on monitor x 3. VSS at this time. EKG done on arrival. Pt wife at bedside. 1304: Delay in antibiotics due to pt being a difficult stick and getting blood cultures. 1327: Pt off the floor to CT.     1347: Pt back from CT.     1402: X-ray at bedside. 1641: Pt resting on stretcher in POC with call bell in reach. Pt remains on monitor x 3. VSS at this time. Urine sample able to be obtained at this time. 1651: Pt provided with food at this time. Pt has no other requests at this time. Attempted to call pt wife but no answer. Voicemail left. 1830: This RN went in to swab pt for COVID with PCR test. Pt refusing to let this RN swab pt for COVID because \"it hurt last time and he has had two at [facility]. \" This RN got Dr. Felicita Hale to bedside to explain to pt why it is important to have pt be swabbed for COVID. Also let Nurse Manager Lissy Xavier know about situation. Another staff member will attempt to swab pt at a later time. 1900: Pt off the floor to CT.

## 2020-11-10 NOTE — PROGRESS NOTES
Pharmacy Automatic Renal Dosing Protocol - Antimicrobials    Indication for Antimicrobials: Sepsis of Unknown Origin      Current Regimen of Each Antimicrobial:  Vancomycin 2000 mg x 1 then 1000 mg Q24H (Start Date 11/10; Day # 1)  Cefepime 2 grams Q24H (Start Date 11/10, Day 1)    Previous Antimicrobial Therapy:    Vancomycin Goal Level: 15-20 mcg/ml    Vancomycin Levels  Date Dose & Interval Measured (mcg/mL) Steady State (mcg/mL)                       Date & time of next level:     Significant Cultures:     Radiology / Imaging results: (X-ray, CT scan or MRI):       Labs:  Recent Labs     11/10/20  1210   CREA 2.41*   BUN 21*   WBC 11.2*     Temp (24hrs), Av.7 °F (37.6 °C), Min:99.3 °F (37.4 °C), Max:100.5 °F (38.1 °C)      Paralysis, amputations, malnutrition:   Creatinine Clearance (mL/min) or Dialysis: 25    Impression/Plan:   Antibiotics as above  BMP in am.     Pharmacy will follow daily and adjust medications as appropriate for renal function and/or serum levels. Thank you,  Jin Dunham, [facility]      Recommended duration of therapy  http://[facility]/[place]/Pharmacy/Clinical%20Companion/Duration%20of%20ABX%20therapy. docx    Renal Dosing  http://[facility]/[place]/Pharmacy/Clinical%20Companion/Renal%[email or web address]. pdf

## 2020-11-11 ENCOUNTER — APPOINTMENT (OUTPATIENT)
Dept: NON INVASIVE DIAGNOSTICS | Age: 80
DRG: 315 | End: 2020-11-11
Attending: EMERGENCY MEDICINE
Payer: MEDICARE

## 2020-11-11 LAB
ALBUMIN SERPL-MCNC: 2.5 G/DL (ref 3.5–5)
ALBUMIN/GLOB SERPL: 0.5 {RATIO} (ref 1.1–2.2)
ALP SERPL-CCNC: 74 U/L (ref 45–117)
ALT SERPL-CCNC: 14 U/L (ref 12–78)
ANION GAP SERPL CALC-SCNC: 8 MMOL/L (ref 5–15)
AST SERPL-CCNC: 18 U/L (ref 15–37)
ATRIAL RATE: 131 BPM
BASOPHILS # BLD: 0 K/UL (ref 0–0.1)
BASOPHILS NFR BLD: 0 % (ref 0–1)
BILIRUB DIRECT SERPL-MCNC: 0.3 MG/DL (ref 0–0.2)
BILIRUB SERPL-MCNC: 0.7 MG/DL (ref 0.2–1)
BUN SERPL-MCNC: 25 MG/DL (ref 6–20)
BUN/CREAT SERPL: 11 (ref 12–20)
CALCIUM SERPL-MCNC: 8.4 MG/DL (ref 8.5–10.1)
CALCULATED R AXIS, ECG10: 75 DEGREES
CALCULATED T AXIS, ECG11: 48 DEGREES
CHLORIDE SERPL-SCNC: 105 MMOL/L (ref 97–108)
CO2 SERPL-SCNC: 24 MMOL/L (ref 21–32)
CREAT SERPL-MCNC: 2.2 MG/DL (ref 0.7–1.3)
DIAGNOSIS, 93000: NORMAL
DIFFERENTIAL METHOD BLD: ABNORMAL
ECHO EST RA PRESSURE: 10 MMHG
ECHO LA MAJOR AXIS: 3.89 CM
ECHO LA MINOR AXIS: 1.78 CM
ECHO LV E' LATERAL VELOCITY: 6.83 CM/S
ECHO LV INTERNAL DIMENSION DIASTOLIC MMODE: 4.48 CM
ECHO LV INTERNAL DIMENSION DIASTOLIC: 4.15 CM (ref 4.2–5.9)
ECHO LV INTERNAL DIMENSION SYSTOLIC MMODE: 3.01 CM
ECHO LV INTERNAL DIMENSION SYSTOLIC: 3.4 CM
ECHO LV IVSD MMODE: 1.98 CM
ECHO LV IVSD: 1.66 CM (ref 0.6–1)
ECHO LV IVSS MMODE: 1.82 CM
ECHO LV IVSS: 1.59 CM
ECHO LV MASS 2D: 318 G (ref 88–224)
ECHO LV MASS INDEX 2D: 145.6 G/M2 (ref 49–115)
ECHO LV POSTERIOR WALL DIASTOLIC MMODE: 1.7 CM
ECHO LV POSTERIOR WALL DIASTOLIC: 1.87 CM (ref 0.6–1)
ECHO LV POSTERIOR WALL SYSTOLIC: 2.11 CM
ECHO LVOT DIAM: 2.11 CM
ECHO RIGHT VENTRICULAR SYSTOLIC PRESSURE (RVSP): 34.42 MMHG
ECHO TV REGURGITANT MAX VELOCITY: 247.1 CM/S
ECHO TV REGURGITANT PEAK GRADIENT: 24.42 MMHG
EOSINOPHIL # BLD: 0 K/UL (ref 0–0.4)
EOSINOPHIL NFR BLD: 0 % (ref 0–7)
ERYTHROCYTE [DISTWIDTH] IN BLOOD BY AUTOMATED COUNT: 14 % (ref 11.5–14.5)
GLOBULIN SER CALC-MCNC: 4.8 G/DL (ref 2–4)
GLUCOSE SERPL-MCNC: 135 MG/DL (ref 65–100)
HCT VFR BLD AUTO: 29.7 % (ref 36.6–50.3)
HEALTH STATUS, XMCV2T: NORMAL
HGB BLD-MCNC: 9.5 G/DL (ref 12.1–17)
IMM GRANULOCYTES # BLD AUTO: 0.1 K/UL (ref 0–0.04)
IMM GRANULOCYTES NFR BLD AUTO: 1 % (ref 0–0.5)
LIPASE SERPL-CCNC: 26 U/L (ref 73–393)
LYMPHOCYTES # BLD: 0.6 K/UL (ref 0.8–3.5)
LYMPHOCYTES NFR BLD: 6 % (ref 12–49)
MAGNESIUM SERPL-MCNC: 2 MG/DL (ref 1.6–2.4)
MCH RBC QN AUTO: 25.6 PG (ref 26–34)
MCHC RBC AUTO-ENTMCNC: 32 G/DL (ref 30–36.5)
MCV RBC AUTO: 80.1 FL (ref 80–99)
MONOCYTES # BLD: 0.9 K/UL (ref 0–1)
MONOCYTES NFR BLD: 9 % (ref 5–13)
NEUTS SEG # BLD: 8.8 K/UL (ref 1.8–8)
NEUTS SEG NFR BLD: 84 % (ref 32–75)
NRBC # BLD: 0 K/UL (ref 0–0.01)
NRBC BLD-RTO: 0 PER 100 WBC
PLATELET # BLD AUTO: 150 K/UL (ref 150–400)
PMV BLD AUTO: 11.3 FL (ref 8.9–12.9)
POTASSIUM SERPL-SCNC: 3.7 MMOL/L (ref 3.5–5.1)
PROT SERPL-MCNC: 7.3 G/DL (ref 6.4–8.2)
Q-T INTERVAL, ECG07: 312 MS
QRS DURATION, ECG06: 120 MS
QTC CALCULATION (BEZET), ECG08: 437 MS
RBC # BLD AUTO: 3.71 M/UL (ref 4.1–5.7)
RBC MORPH BLD: ABNORMAL
SARS-COV-2, COV2: NOT DETECTED
SODIUM SERPL-SCNC: 137 MMOL/L (ref 136–145)
SOURCE, COVRS: NORMAL
SPECIMEN SOURCE, FCOV2M: NORMAL
SPECIMEN TYPE, XMCV1T: NORMAL
TROPONIN I SERPL-MCNC: <0.05 NG/ML
VENTRICULAR RATE, ECG03: 118 BPM
WBC # BLD AUTO: 10.4 K/UL (ref 4.1–11.1)
WBC MORPH BLD: ABNORMAL

## 2020-11-11 PROCEDURE — 0100U RESPIRATORY PANEL,PCR,NASOPHARYNGEAL: CPT

## 2020-11-11 PROCEDURE — 74011000258 HC RX REV CODE- 258: Performed by: INTERNAL MEDICINE

## 2020-11-11 PROCEDURE — 74011250637 HC RX REV CODE- 250/637: Performed by: INTERNAL MEDICINE

## 2020-11-11 PROCEDURE — 74011250637 HC RX REV CODE- 250/637: Performed by: NURSE PRACTITIONER

## 2020-11-11 PROCEDURE — 93306 TTE W/DOPPLER COMPLETE: CPT | Performed by: INTERNAL MEDICINE

## 2020-11-11 PROCEDURE — 65660000001 HC RM ICU INTERMED STEPDOWN

## 2020-11-11 PROCEDURE — 99223 1ST HOSP IP/OBS HIGH 75: CPT | Performed by: INTERNAL MEDICINE

## 2020-11-11 PROCEDURE — 74011250636 HC RX REV CODE- 250/636: Performed by: INTERNAL MEDICINE

## 2020-11-11 PROCEDURE — 93306 TTE W/DOPPLER COMPLETE: CPT

## 2020-11-11 RX ORDER — FUROSEMIDE 20 MG/1
20 TABLET ORAL DAILY
COMMUNITY
End: 2020-12-17 | Stop reason: SDUPTHER

## 2020-11-11 RX ORDER — CLOTRIMAZOLE AND BETAMETHASONE DIPROPIONATE 10; .64 MG/G; MG/G
CREAM TOPICAL 2 TIMES DAILY
COMMUNITY
End: 2021-05-14

## 2020-11-11 RX ORDER — COLCHICINE 0.6 MG/1
0.6 TABLET ORAL 2 TIMES DAILY
Status: DISCONTINUED | OUTPATIENT
Start: 2020-11-11 | End: 2020-11-18 | Stop reason: HOSPADM

## 2020-11-11 RX ADMIN — ATORVASTATIN CALCIUM 40 MG: 40 TABLET, FILM COATED ORAL at 21:57

## 2020-11-11 RX ADMIN — Medication 10 ML: at 13:21

## 2020-11-11 RX ADMIN — HEPARIN SODIUM 5000 UNITS: 5000 INJECTION INTRAVENOUS; SUBCUTANEOUS at 21:57

## 2020-11-11 RX ADMIN — ASPIRIN 81 MG CHEWABLE TABLET 81 MG: 81 TABLET CHEWABLE at 08:26

## 2020-11-11 RX ADMIN — COLCHICINE 0.6 MG: 0.6 TABLET ORAL at 21:57

## 2020-11-11 RX ADMIN — HEPARIN SODIUM 5000 UNITS: 5000 INJECTION INTRAVENOUS; SUBCUTANEOUS at 14:58

## 2020-11-11 RX ADMIN — VANCOMYCIN HYDROCHLORIDE 1000 MG: 1 INJECTION, POWDER, LYOPHILIZED, FOR SOLUTION INTRAVENOUS at 14:58

## 2020-11-11 RX ADMIN — Medication 10 ML: at 06:00

## 2020-11-11 RX ADMIN — Medication 10 ML: at 21:56

## 2020-11-11 RX ADMIN — CEFEPIME HYDROCHLORIDE 2 G: 2 INJECTION, POWDER, FOR SOLUTION INTRAVENOUS at 13:21

## 2020-11-11 RX ADMIN — HEPARIN SODIUM 5000 UNITS: 5000 INJECTION INTRAVENOUS; SUBCUTANEOUS at 06:00

## 2020-11-11 NOTE — PROGRESS NOTES
Bedside shift change report given to Jason Cueva RN by Negrita Steele. Report included the following information SBAR, ED Summary, Intake/Output, MAR, Recent Results and Cardiac Rhythm AFib. 12 Patient's son arrived at bedside, called writing RN into room very upset that we were not giving his father his \"fluid pills\" referring to his home diuretics. Writing RN attempted to explain that patient just had an echo done and we were waiting for results to have a plan of care. Patient's son did not care to listen to the plan and continued to get louder and louder, speaking over RN, saying it doesn't make any sense to why we aren't giving his \"fluid pills\". Writing RN attempted to educate again on why we weren't at this time (patient's BP, trying to find the source of existing problem, etc.) and patient's son continued to get aggravated, stating \"I want to talk to a doctor now\". Dr Asad Brower was paged x2 and Perfect serve message was sent with no response. Writing RN asked Dr. Shaquille Corona if he could reach out to Dr. Asad Brower and writing RN was advised that Dr Asad Brower was so behind that he (Dr. Shaquille Corona) would be rounding on patient and would be in \"shortly\" to see him. Patient's son was advised. 18 Patient's son wandering around ED, went to another POD and asked another RN to get the doctor. RN explained that patient is not being seen by the ED doctor because his father is admitted and is being seen by a hospitalist.  Patient's son still aggravated and went back to patient's room. 1320 Writing RN reached out to Renata Kilgore NP to ask if she could come discuss plan of care with patient's son and she was agreeable and would be down to ED shortly after conversation. [address] [facility] Dr. Shaquille Corona at bedside, followed by Renata Kilgore NP. Patient's son left shortly after MD and NP spoke with him. 1430 Dr. Norma Hernadez at bedside. 1500 Bedside shift change report given to Valentina Tran RN by Jason Cueva RN.  Report included the following information SBAR, ED Summary, Intake/Output, MAR, Recent Results and Cardiac Rhythm AFib.

## 2020-11-11 NOTE — PROGRESS NOTES
Pharmacy Clarification of the Prior to Admission Medication Regimen Retrospective to the Admission Medication Reconciliation    The patient was interviewed regarding clarification of the prior to admission medication regimen. Patient was questioned regarding use of any other inhalers, topical products, over the counter medications, herbal medications, vitamin products or ophthalmic/nasal/otic medication use. Information Obtained From: query, patient    Recommendations/Findings: The following amendments were made to the patient's active medication list on file at [facility]:     1) Additions:       2) Removals:   Meclizine  Xarelto        3) Changes:      4) Pertinent Pharmacy Findings: Per patient he is not taking Xarelto    Updated patients preferred outpatient pharmacy to: [facility]       PTA medication list was corrected to the following:     Prior to Admission Medications   Prescriptions Last Dose Informant Taking? amLODIPine (NORVASC) 2.5 mg tablet 11/9/2020 at Unknown time Self Yes   Sig: Take 1 tablet by mouth once daily   aspirin 81 mg chewable tablet 11/9/2020 at Unknown time Self Yes   Sig: Take 1 Tab by mouth daily. atorvastatin (LIPITOR) 40 mg tablet 11/9/2020 at Unknown time Self Yes   Sig: TAKE 1 TABLET BY MOUTH ONCE DAILY AT NIGHT   carvediloL (COREG) 25 mg tablet 11/9/2020 at Unknown time Self Yes   Sig: TAKE 1 TABLET BY MOUTH TWICE DAILY WITH MEALS   clotrimazole-betamethasone (LOTRISONE) topical cream 10/11/2020 at Unknown time Self Yes   Sig: Apply  to affected area two (2) times a day. furosemide (LASIX) 20 mg tablet 11/9/2020 at Unknown time Self Yes   Sig: Take 20 mg by mouth daily.       Facility-Administered Medications: None          Thank you,  Sara Fitzpatrick CPHT  Medication History Pharmacy Technician

## 2020-11-11 NOTE — ED NOTES
TRANSFER - OUT REPORT:    Verbal report given to [facility] RN(name) on [employer]  being transferred to [facility](unit) for routine progression of care       Report consisted of patients Situation, Background, Assessment and   Recommendations(SBAR). Information from the following report(s) SBAR and ED Summary I&O MAR was reviewed with the receiving nurse. Lines:   Peripheral IV 11/10/20 Right Antecubital (Active)   Site Assessment Clean, dry, & intact 11/11/20 0800   Phlebitis Assessment 0 11/11/20 0800   Infiltration Assessment 0 11/11/20 0800   Dressing Status Clean, dry, & intact 11/11/20 0800   Dressing Type Tape;Transparent 11/11/20 0800   Hub Color/Line Status Pink; Infusing 11/11/20 0800   Action Taken Blood drawn 11/10/20 1209        Opportunity for questions and clarification was provided.       Patient transported with:   [employer]

## 2020-11-11 NOTE — CONSULTS
[address] [facility]     Date of  Admission: 11/10/2020 11:11 AM     Admission type:Emergency    Consult for: SOB, pericardial effusion? Consult by: Dr. Stephanie Gutierrez    This virtual visit was conducted with audio telephone services and visualization through patient window. Pursuant to the emergency declaration under the [address] [place] [address] authority and the [employer] Act, this Virtual Visit was conducted, with patient's consent, to reduce the patient's risk of exposure to COVID-19 and provide continuity of care for an established patient. Services were provided through a video synchronous discussion virtually to substitute for in-person visit. Subjective:     Mari Padilla is a 68 y.o. male with a PMH significant for CAD, NICM, HTN, AFL s/p AFL ablation in 2017, and HLD admitted for Pericardial effusion [I31.3]. Per ED provider note, Mr. Francis Phoenix presented to ED with cc of shortness of breath and chest pain. He was sent directly from cardiology office for further evaluation. Patient reported PA that began two weeks ago, multiple ED visits. Hx Afib on xarelto, reporting no missed doses. Upon assessment, the patient reports his chest hurts when he coughs or moves around. He denies associated diaphoresis, shooting/stabbing pain, syncope, near syncope. He states I have been short of breath for a while now \"I don't know if it come from my kidney's or not\". He states \"They say I have fluid around my heart\". He also states he doesn't feel any palpitations. He reports he had a fever \"coming in\", but not now. Denies leg edema, nausea or vomiting, endorses a dry, non-productive cough. Established patient of Dr. Davin Melgar, last seen yesterday in office. Last cardiac cath 2014 (-), ECHO 2017 EF 55-60%, normal valve structures. AFL ablation 2017.      Cardiac risk factors: family history, dyslipidemia, sedentary life style, male gender, hypertension, stress.       Patient Active Problem List    Diagnosis Date Noted    Pericardial effusion 11/10/2020    Anemia of chronic disease 09/17/2020    Candidal intertrigo 11/05/2018    Type 2 diabetes mellitus with stage 3 chronic kidney disease, without long-term current use of insulin ([address] 10/08/2017    Prostate cancer ([address] 10/08/2017    COPD (chronic obstructive pulmonary disease) ([address] 10/08/2017    Chronic low back pain 10/08/2017    Obesity (BMI 30-39.9) 10/08/2017    Atrial flutter ([address] 08/22/2017    Smokeless tobacco use 07/25/2017    Mixed hyperlipidemia 03/31/2016    Hypertension 05/20/2014    NICM (nonischemic cardiomyopathy) ([address] 02/20/2014    S/P cardiac cath 02/12/2014    Combined systolic and diastolic congestive heart failure ([address] 02/08/2014      Shannan Yi MD  Past Medical History:   Diagnosis Date    Acute respiratory failure with hypoxia ([address] 02/08/2014    also 11/28/15, 2/17/16, 5/14/17     Alcohol abuse     As of 11/29/18 pt stated he quit 20 years    Arthritis     Back and shoulder    Atrial flutter ([address] 08/2017    saw Dr. Eula Johnson; underwent a-flutter ablation    BPH (benign prostatic hyperplasia)     CHF (congestive heart failure) ([address] 02/11/2014    TTE 11/15: EF 40-45%, 2/14: EF 20%  Admitted for acute exacerbations 2/8/14, 11/28/15, 2/17/16, and 5/4/17)    Chronic kidney disease     III    Chronic low back pain     LS spine X-ray 4/8/17: mild DDD    Combined forms of age-related cataract of left eye 12/12/2018    KPE/IOL OS    Combined forms of age-related cataract of right eye 11/28/2018    KPE/IOL OD    COPD (chronic obstructive pulmonary disease) ([address]     PA (dyspnea on exertion)     ED (erectile dysfunction)     Elevated troponin 02/11/2014    also 11/28/15; due to cardiac strain    Frequent hospital admissions     5/14-5/23/17: acute hypoxic resp failure due to CHF exac, ROBINSON on CKD 3, sepsis due to LE cellulitis, 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  2/17-2/22/16: acute hypoxic resp failure, acute diarrhea  11/28-11/30/15: acute hypoxic resp failure due to acute CHF exac, elevated troponin due to cardiac strain  2/8-2/12/14: acuter hypoxic resp failure due to CHF exac, pneumonia     Hand blister 10/8/2017    Bilateral    Hypertension     Kidney disease, chronic, stage III (GFR 30-59 ml/min)     Leg fracture, right 1973    Nonischemic cardiomyopathy ([facility]     with LVH    Pneumonia 02/08/2014 2/8/14 and 10/30/15    Poor historian     As of 11/29/18 pt reports 5th grade education, pt unable to give med list-obtained from wife per patient's permission    Prediabetes     Prostate cancer ([facility] 2016    As of 11/29/18, pt states he gets two injections twice a year for this    Pulmonary edema 11/28/2015    S/P cardiac cath 2/12/2014 2/12/14 no significant coronary disease, severe LV dysfunction    Tinea cruris     also genital folliculitis    Vertigo       Social History     Socioeconomic History    Marital status:      Spouse name: Not on file    Number of children: Not on file    Years of education: Not on file    Highest education level: Not on file   Tobacco Use    Smoking status: Current Every Day Smoker     Packs/day: 2.00     Years: 20.00     Pack years: 40.00     Types: Cigarettes, Cigars     Last attempt to quit: 8/7/2017     Years since quitting: 3.2    Smokeless tobacco: Former User     Types: Chew    Tobacco comment: 3 cigars daily   Substance and Sexual Activity    Alcohol use: Not Currently     Alcohol/week: 0.0 standard drinks     Comment: As of 11/29/18, pt quit 20 years ago    Drug use: Yes     Types: Marijuana     Comment: once a week     Allergies   Allergen Reactions    Oxycodone Contact Dermatitis      Family History   Adopted: Yes   Family history unknown: Yes      Current Facility-Administered Medications   Medication Dose Route Frequency    sodium chloride (NS) flush 5-40 mL  5-40 mL IntraVENous Q8H    sodium chloride (NS) flush 5-40 mL  5-40 mL IntraVENous PRN    acetaminophen (TYLENOL) tablet 650 mg  650 mg Oral Q6H PRN    Or    acetaminophen (TYLENOL) suppository 650 mg  650 mg Rectal Q6H PRN    polyethylene glycol (MIRALAX) packet 17 g  17 g Oral DAILY PRN    promethazine (PHENERGAN) tablet 12.5 mg  12.5 mg Oral Q6H PRN    Or    ondansetron (ZOFRAN) injection 4 mg  4 mg IntraVENous Q6H PRN    heparin (porcine) injection 5,000 Units  5,000 Units SubCUTAneous Q8H    [Held by provider] amLODIPine (NORVASC) tablet 2.5 mg  2.5 mg Oral DAILY    aspirin chewable tablet 81 mg  81 mg Oral DAILY    atorvastatin (LIPITOR) tablet 40 mg  40 mg Oral QHS    carvediloL (COREG) tablet 6.25 mg  6.25 mg Oral BID WITH MEALS    0.9% sodium chloride infusion  50 mL/hr IntraVENous CONTINUOUS    vancomycin (VANCOCIN) 1,000 mg in 0.9% sodium chloride 250 mL (VIAL-MATE)  1,000 mg IntraVENous Q24H    cefepime (MAXIPIME) 2 g in 0.9% sodium chloride (MBP/ADV) 100 mL MBP  2 g IntraVENous Q24H     Current Outpatient Medications   Medication Sig    Xarelto 20 mg tab tablet TAKE 1 TABLET BY MOUTH ONCE DAILY    meclizine (ANTIVERT) 25 mg tablet TAKE 1 TABLET BY MOUTH THREE TIMES DAILY AS NEEDED FOR DIZZINESS    carvediloL (COREG) 25 mg tablet TAKE 1 TABLET BY MOUTH TWICE DAILY WITH MEALS    amLODIPine (NORVASC) 2.5 mg tablet Take 1 tablet by mouth once daily    atorvastatin (LIPITOR) 40 mg tablet TAKE 1 TABLET BY MOUTH ONCE DAILY AT NIGHT    clotrimazole-betamethasone (LOTRISONE) topical cream APPLY TO AFFECTED AREA TWICE DAILY (Patient not taking: Reported on 11/10/2020)    furosemide (LASIX) 40 mg tablet Take 0.5 Tabs by mouth daily. (Patient taking differently: Take 40 mg by mouth daily.)    aspirin 81 mg chewable tablet Take 1 Tab by mouth daily.         Review of Symptoms:   11 systems reviewed, negative other than as stated in the HPI        Objective: Visit Vitals  BP 91/75   Pulse (!) 113   Temp 99.9 °F (37.7 °C)   Resp 24   Ht 5' 9\" (1.753 m)   Wt 103.5 kg (228 lb 2.8 oz)   SpO2 95%   BMI 33.70 kg/m²       Physical: NP DID NOT PERFORM PHYSICAL EXAM   Gen:  NAD  Mental Status - Alert. General Appearance - Not in acute distress. HEENT:  PERRL, no carotid bruits or JVD  Chest and Lung Exam   Inspection: Accessory muscles - No use of accessory muscles in breathing. Auscultation:   Breath sounds: - Normal.   Cardiovascular   Inspection: Jugular vein - Bilateral - Inspection Normal.   Palpation/Percussion:   Apical Impulse: - Normal.   Auscultation: Rhythm - IRRegular. Heart Sounds - S1 WNL and S2 WNL. No S3 or S4. Murmurs & Other Heart Sounds: Auscultation of the heart reveals - No Murmurs. Peripheral Vascular   Upper Extremity: Inspection - Bilateral - No Cyanotic nailbeds or Digital clubbing. Lower Extremity:   Palpation: Edema - Bilateral - No edema. Abdomen:   Soft, non-tender, bowel sounds are active. Neuro: A&O times 3, CN and motor grossly WNL    Data Review:   Recent Labs     11/10/20  2357 11/10/20  1210   WBC 10.4 11.2*   HGB 9.5* 10.4*   HCT 29.7* 33.9*    176     Recent Labs     11/10/20  2357 11/10/20  1210    138   K 3.7 3.6    105   CO2 24 23   * 120*   BUN 25* 21*   CREA 2.20* 2.41*   CA 8.4* 9.2   MG 2.0  --    ALB 2.5* 2.8*   TBILI 0.7 1.3*   ALT 14 13       Recent Labs     11/10/20  2357 11/10/20  1849 11/10/20  1210   TROIQ <0.05 <0.05 <0.05   CPK  --  107  --          Intake/Output Summary (Last 24 hours) at 11/11/2020 0827  Last data filed at 11/11/2020 2181  Gross per 24 hour   Intake 1600 ml   Output 350 ml   Net 1250 ml        Cardiographics    Telemetry: Afib 80-90's  ECG: Afib  Echocardiogram: pending, prior result as above   Chest CT:   1. Small to moderate pericardial effusion. 2. Two enlarged mediastinal lymph nodes. 3. Trace pleural effusions.   4. No pulmonary embolus     Assessment: Active Problems:    Pericardial effusion (11/10/2020)         Plan:   Sanford Hashimoto is a 68 y.o. male with a PMH significant for CAD NICM HTN AFL s/p AFL ablation in 2017 and HLD admitted for Pericardial effusion [I31.3]. Cardiology consulted to evaluate the pericardial effusion with ECHO. Initial Covid-19 rapid (-), PCR pending. WBC 10.4, H/H 9.5/29.7, K 3.7, Cr 2.2, Mg 2.     ? Pericardial effusion as seen on CT:  -Awaiting ECHO today.  -Initial rapid covid (-), awaiting PCR results. Atrial Fibriliation: rate controlled 80-90's   CHADVASc=5. On xeralto for CVA risk reduction-on hold for now until determine if needs tap or not. Has past history of atrial flutter s/p ablation in 2017  May be contributing to his PA. On BB    Nonobstructive atherosclerotic heart disease:   NICM:   Resolved. NYHA class I. Last ejection fraction 60% 2017.    20% lesion proximal LAD on last cath 2/2014   On ASA BB CCB and statin PTA. CCB on hold d/t borderline hypotension. New ECHO pending     Hypertension:   90/60 marginal.   -Restarted on low dose BB at 6.25, with hold parameters.   -On IVF per internal medicine      Hyperlipidemia:    9/2020 LDL 28 continue statin     Thank you for consulting RCA, will follow.        Francisco J Foley NP   MSN,RN,AG-ACNP-[facility]    11/11/2020         Patient seen, examined by me personally. Plan discussed as detailed. Agree with note as outlined by  NP. I confirm findings in history and physical exam. No additional findings noted. Agree with plan as outlined above. Rate control, check echo. If significant effusion, may need pericardiocentesis.      Cora Marques MD

## 2020-11-11 NOTE — PROGRESS NOTES
Hospitalist Progress Note    NAME: Mary Arzate   :  1940   MRN:  [id number]     Admit date: 11/10/2020    Today's date: 20    PCP: Tressa Birmingham, MD Elnor Gilford, M.D. Cell [phone]      Assessment / Plan:    Probable acute pericarditis POA  Moderate to large pericardial effusion without tamponade POA  Hypotension POA systolic blood pressure in 70s at cardiology office  Acute kidney injury POA creatinine 2.41, baseline creatinine 1.0-1.2  Chest Pain, pleuritic POA likely due to pericarditis, no PE  New acute on chronic systolic congestive heart failure POA  -2 weeks history of pleuritic chest pain, SOB, low grade fevers, cough  -Outside ER, Lasix increased from 20 to 40 mg  -Patient began to develop lightheadedness and generalized weakness  -Multiple SARS-CoV-2 test negative including a rapid and PCR test last night  -CTA with moderate pericardial fusion, and large mediastinal lymph nodes             No PE or ASD except atelectasis due to the effusion  -Hypotensive at cardiology office, baseline normally takes multiple BP medications  -Suspect hypovolemia and decreased cardiac output from the Lasix and the effusion         Led to the hypotension and acute kidney injury  -Gentle IV fluids and holding BP meds with improvement in blood pressure and creatinine  - key question is what is causing the pericardial effusion  - with CP and low grade fevers, likely acute pericarditis          ? Viral = most likely          ? Bacterial seems less likely          ? Medication seems less likely          ? Malignancy with the ANNEMARIE          ?  Autoimmune like SLE  - ECHO LVEF 30-35%, normal RV function, Mod to large circumfrential pericardial effusion without tamponade  - Last echo , EF 60%  -Cath , no significant CAD  - Empiric Vanco and cefepime, wean once pericardial fluid studies are back  - check procalcitonin, BC remain negative  -continue to hold lasix, norvasc, coreg 25 mg  - stop IVF  - D/W cardiology, Dr. Crista Mims, question is whether to do pericardiocentesis or treat symptomatically for viral pericarditis with colchicine( avoid NSAIDs with ROBINSON) and follow  -Unclear why the LVEF is down, questionable rate related tachycardia with the atrial fibrillation  -We will watch her blood pressure overnight, make decision about pericardiocentesis in a.m.  -Care plan discussed with son at length, questions answered    Abdominal pain, reports left upper quadrant pain  - CT abdomen/pelvis unremarkable  - Normal lipase, doubt acute pancreatitis  - suspect pain from the pericardial process     History of paroxysmal A. fib, currently in A. fib, rate controlled  -Blood pressure borderline, Coreg on hold  -holding xarelto in case drainage needed     History of hyperlipidemia    Obesity POA Body mass index is 33.7 kg/m².       Code Status: Full code     DVT Prophylaxis: Heparin        Subjective:     Chief Complaint / Reason for Physician Visit follow-up chest pain, pericardial effusion  \"My chest still hurts when I breathe\". Discussed with RN events overnight. Still with pleuritic chest pain, shortness of breath when he exerts himself  Lying flat in bed in no respiratory distress when I saw  Echo today with moderate to large pericardial effusion with no tamponade  Blood pressure improved with holding meds and gentle IV fluids overnight  Spoke with son in room at length about probable pericarditis and pericardial effusion  SARS-CoV-2 rapid test and PCR both came back negative last night    Review of Systems:  Symptom Y/N Comments  Symptom Y/N Comments   Fever/Chills y   Chest Pain y    Poor Appetite    Edema     Cough y   Abdominal Pain y    Sputum    Joint Pain     SOB/PA n   Headache     Nausea/vomit n   Tolerating PT/OT     Diarrhea n   Tolerating Diet y    Constipation    Other       Could NOT obtain due to:      Objective:     VITALS:   Last 24hrs VS reviewed since prior progress note. Most recent are:  Patient Vitals for the past 24 hrs:   Temp Pulse Resp BP SpO2   11/11/20 1141    98/66    11/11/20 1115 99.6 °F (37.6 °C) 92 21 98/66 95 %   11/11/20 0824 99.9 °F (37.7 °C) (!) 113 24 91/75 95 %   11/11/20 0408 98.2 °F (36.8 °C) (!) 101 24 (!) 100/59 95 %   11/11/20 0000  95 30 111/66 97 %   11/10/20 2236 98.9 °F (37.2 °C) 96 25 109/68 96 %   11/10/20 2000  92 30 102/69 97 %   11/10/20 1900 99.6 °F (37.6 °C) (!) 104 26 113/63 97 %   11/10/20 1815  98 26 (!) 90/59 96 %   11/10/20 1630 99.3 °F (37.4 °C) (!) 103 20 (!) 109/54 97 %   11/10/20 1445  100 25 (!) 117/58 98 %   11/10/20 1415 99.3 °F (37.4 °C) (!) 110 23 103/79 98 %   11/10/20 1337     97 %       Intake/Output Summary (Last 24 hours) at 11/11/2020 1251  Last data filed at 11/11/2020 0552  Gross per 24 hour   Intake 1600 ml   Output 350 ml   Net 1250 ml        Wt Readings from Last 12 Encounters:   11/10/20 103.5 kg (228 lb 2.8 oz)   11/10/20 94.5 kg (208 lb 6.4 oz)   08/26/20 101.6 kg (224 lb)   05/19/20 103.7 kg (228 lb 11.2 oz)   03/03/20 104.3 kg (230 lb)   03/02/20 104.3 kg (230 lb)   02/13/20 105.7 kg (233 lb)   12/18/19 103.4 kg (228 lb)   11/11/19 103.9 kg (229 lb)   10/09/19 104.8 kg (231 lb)   07/09/19 103 kg (227 lb)   04/30/19 101.6 kg (223 lb 14.4 oz)       PHYSICAL EXAM:  General: WD, WN. Alert, cooperative, no acute distress    EENT:  PERRL. Anicteric sclerae. MMM  Neck:  No meningismus, no thyromegaly  Resp:  Few crackles at bases bilaterally, no wheezing. No accessory muscle use  CV:  Irregular  rhythm,  No edema, no friction rub  GI:  Soft, Non distended, Non tender. +Bowel sounds, no rebound  Neurologic:  Alert normal speech, non focal motor exam  Psych:    Not anxious nor agitated  Skin:  No rashes.   No jaundice    Reviewed most current lab test results and cultures  YES  Reviewed most current radiology test results   YES  Review and summation of old records today    NO  Reviewed patient's current orders and MAR    YES  PMH/SH reviewed - no change compared to H&P  ________________________________________________________________________  Care Plan discussed with:    Comments   Patient y    Family  y Son at bedside   RN y    Care Manager     Consultant  ibrahima Olsen team rounds were held today with , nursing, pharmacist and clinical coordinator. Patient's plan of care was discussed; medications were reviewed and discharge planning was addressed. ________________________________________________________________________      Comments   >50% of visit spent in counseling and coordination of care     ________________________________________________________________________  Mesha Hernandez MD     Procedures: see electronic medical records for all procedures/Xrays and details which were not copied into this note but were reviewed prior to creation of Plan. LABS:  I reviewed today's most current labs and imaging studies.   Pertinent labs include:  Recent Labs     11/10/20  2357 11/10/20  1210   WBC 10.4 11.2*   HGB 9.5* 10.4*   HCT 29.7* 33.9*    176     Recent Labs     11/10/20  2357 11/10/20  1210    138   K 3.7 3.6    105   CO2 24 23   * 120*   BUN 25* 21*   CREA 2.20* 2.41*   CA 8.4* 9.2   MG 2.0  --    ALB 2.5* 2.8*   TBILI 0.7 1.3*   ALT 14 13

## 2020-11-11 NOTE — PROGRESS NOTES
GENARO Plan:  - Home with spouse  - Spouse to provide transportation home at d/c  - OP F/U: PCP  - 2nd IMM letter      Reason for Admission:   Chest pain                  RUR Score:  21% moderate risk of readmission                 PCP: First and Last name:  Adia Kimball MD    Name of Practice: [facility]   Are you a current patient: Yes/No: Yes   Approximate date of last visit: Last month per pt's wife    Can you participate in a virtual visit if needed: Yes, telephonic     Do you (patient/family) have any concerns for transition/discharge? Pt's wife voices no concerns at this time with transition of care plan                Plan for utilizing home health: No HH hx reported by pt's wife. No HH needs identified at this time. Current Advanced Directive/Advance Care Plan: No ACP documents on file. Pt's wife is legal NOK. Pt is a FULL code. Transition of Care Plan: Home with spouse, outpatient follow up appointments      CM unable to reach pt via room phone or cell phone. CM talked with pt's wife via phone to introduce self, verify demographics, and discuss discharge planning. Pt's transition of care plan is to return home with spouse. Pt lives with spouse in a 2 level home with 2 CLOVIS. Pt's wife reports that pt was independent with adls/iadls, and denied pt having any difficulty with ambulating stairs. Pt's wife reports that pt is very independent and drives. Pt's wife to transport home at d/c. Pt or wife to transport to follow up appointments. Pt has no DME per wife. Pt's pharmacy preference is [facility] in [place], [place]. Denies SNF/IRF/HH hx. Pt is insured with VA Medicare and Blue Cross supplement. Pt's wife voices no concerns at this time with [facility] plan. CM to continue to follow for discharge planning needs. Care Management Interventions  PCP Verified by CM: Yes  Mode of Transport at Discharge:  Other (see comment)(Wife)  Transition of Care Consult (CM Consult): Discharge Planning  Discharge Durable Medical Equipment: No(Pt has no DME at home )  Physical Therapy Consult: No  Occupational Therapy Consult: No  Speech Therapy Consult: No  Current Support Network: Lives with Spouse  Confirm Follow Up Transport: Self(Self/wife )  Discharge Location  Discharge Placement: Home with outpatient services    Gerri Garner, [address] [place] [address]

## 2020-11-11 NOTE — PROGRESS NOTES
1813: TRANSFER - IN REPORT:    Verbal report received from Mich Rios RN(name) on [employer]  being received from ED(unit) for routine progression of care      Report consisted of patients Situation, Background, Assessment and   Recommendations(SBAR). Information from the following report(s) SBAR and Kardex was reviewed with the receiving nurse. Opportunity for questions and clarification was provided. Assessment completed upon patients arrival to unit and care assumed.

## 2020-11-11 NOTE — PROGRESS NOTES
11/11/20 1850   Vital Signs   Temp 99.3 °F (37.4 °C)   Temp Source Oral   Pulse (Heart Rate) (!) 112   Heart Rate Source Monitor   Resp Rate 22   O2 Sat (%) 94 %   Level of Consciousness Alert   /78   MAP (Calculated) 93   BP 1 Method Automatic   BP 1 Location Left arm   BP Patient Position At rest   MEWS Score 4     CLL aware, no intervention needed.

## 2020-11-11 NOTE — PROGRESS NOTES
Pharmacy Automatic Renal Dosing Protocol - Antimicrobials    Indication for Antimicrobials: Sepsis of Unknown Origin      Current Regimen of Each Antimicrobial:  Vancomycin 2000 mg x 1 then 1000 mg Q24H (Start Date 11/10; Day # 2)  Cefepime 2 grams Q24H (Start Date 11/10, Day 2)    Previous Antimicrobial Therapy:    Vancomycin Goal Level: 15-20 mcg/ml    Vancomycin Levels  Date Dose & Interval Measured (mcg/mL) Steady State (mcg/mL)                       Date & time of next level:     Significant Cultures:   11/10: blood - NGTD -prelim    Radiology / Imaging results: (X-ray, CT scan or MRI):   11/10: cta chest: IMPRESSION:   1. Small to moderate pericardial effusion. 2. Two enlarged mediastinal lymph nodes. 3. Trace pleural effusions. Labs:  Recent Labs     11/10/20  2357 11/10/20  1210   CREA 2.20* 2.41*   BUN 25* 21*   WBC 10.4 11.2*     Temp (24hrs), Av.4 °F (37.4 °C), Min:98.2 °F (36.8 °C), Max:100.5 °F (38.1 °C)      Paralysis, amputations, malnutrition:   Creatinine Clearance (mL/min) or Dialysis: 27.7     Impression/Plan:   Antibiotics as above  SCR improving  BMP daily  Stop date: to be determined     Pharmacy will follow daily and adjust medications as appropriate for renal function and/or serum levels. Thank you,  BRANDON Mcclure      Recommended duration of therapy  [email or web address]/Pharmacy/Clinical%20Companion/[email or web address]%[email or web address]. docx    Renal Dosing  [email or web address]/[facility]/Pharmacy/Clinical%20Companion/Renal%[email or web address]. pdf

## 2020-11-12 ENCOUNTER — APPOINTMENT (OUTPATIENT)
Dept: NON INVASIVE DIAGNOSTICS | Age: 80
DRG: 315 | End: 2020-11-12
Attending: INTERNAL MEDICINE
Payer: MEDICARE

## 2020-11-12 LAB
ALBUMIN SERPL-MCNC: 2.3 G/DL (ref 3.5–5)
ALBUMIN/GLOB SERPL: 0.4 {RATIO} (ref 1.1–2.2)
ALP SERPL-CCNC: 89 U/L (ref 45–117)
ALT SERPL-CCNC: 24 U/L (ref 12–78)
ANION GAP SERPL CALC-SCNC: 11 MMOL/L (ref 5–15)
ANION GAP SERPL CALC-SCNC: 12 MMOL/L (ref 5–15)
AST SERPL-CCNC: 45 U/L (ref 15–37)
B PERT DNA SPEC QL NAA+PROBE: NOT DETECTED
BASOPHILS # BLD: 0 K/UL (ref 0–0.1)
BASOPHILS NFR BLD: 0 % (ref 0–1)
BILIRUB SERPL-MCNC: 0.8 MG/DL (ref 0.2–1)
BORDETELLA PARAPERTUSSIS PCR, BORPAR: NOT DETECTED
BUN SERPL-MCNC: 26 MG/DL (ref 6–20)
BUN SERPL-MCNC: 27 MG/DL (ref 6–20)
BUN/CREAT SERPL: 15 (ref 12–20)
BUN/CREAT SERPL: 16 (ref 12–20)
C PNEUM DNA SPEC QL NAA+PROBE: NOT DETECTED
CALCIUM SERPL-MCNC: 8.9 MG/DL (ref 8.5–10.1)
CALCIUM SERPL-MCNC: 9.2 MG/DL (ref 8.5–10.1)
CALCULATED R AXIS, ECG10: 73 DEGREES
CALCULATED T AXIS, ECG11: 5 DEGREES
CHLORIDE SERPL-SCNC: 105 MMOL/L (ref 97–108)
CHLORIDE SERPL-SCNC: 105 MMOL/L (ref 97–108)
CK SERPL-CCNC: 281 U/L (ref 39–308)
CO2 SERPL-SCNC: 19 MMOL/L (ref 21–32)
CO2 SERPL-SCNC: 21 MMOL/L (ref 21–32)
COMMENT, HOLDF: NORMAL
CREAT SERPL-MCNC: 1.63 MG/DL (ref 0.7–1.3)
CREAT SERPL-MCNC: 1.75 MG/DL (ref 0.7–1.3)
DIAGNOSIS, 93000: NORMAL
DIFFERENTIAL METHOD BLD: ABNORMAL
EOSINOPHIL # BLD: 0.1 K/UL (ref 0–0.4)
EOSINOPHIL NFR BLD: 1 % (ref 0–7)
ERYTHROCYTE [DISTWIDTH] IN BLOOD BY AUTOMATED COUNT: 13.9 % (ref 11.5–14.5)
FLUAV H1 2009 PAND RNA SPEC QL NAA+PROBE: NOT DETECTED
FLUAV H1 RNA SPEC QL NAA+PROBE: NOT DETECTED
FLUAV H3 RNA SPEC QL NAA+PROBE: NOT DETECTED
FLUAV SUBTYP SPEC NAA+PROBE: NOT DETECTED
FLUBV RNA SPEC QL NAA+PROBE: NOT DETECTED
GLOBULIN SER CALC-MCNC: 5.3 G/DL (ref 2–4)
GLUCOSE SERPL-MCNC: 103 MG/DL (ref 65–100)
GLUCOSE SERPL-MCNC: 141 MG/DL (ref 65–100)
HADV DNA SPEC QL NAA+PROBE: NOT DETECTED
HCOV 229E RNA SPEC QL NAA+PROBE: NOT DETECTED
HCOV HKU1 RNA SPEC QL NAA+PROBE: NOT DETECTED
HCOV NL63 RNA SPEC QL NAA+PROBE: NOT DETECTED
HCOV OC43 RNA SPEC QL NAA+PROBE: NOT DETECTED
HCT VFR BLD AUTO: 29.1 % (ref 36.6–50.3)
HGB BLD-MCNC: 9.2 G/DL (ref 12.1–17)
HMPV RNA SPEC QL NAA+PROBE: NOT DETECTED
HPIV1 RNA SPEC QL NAA+PROBE: NOT DETECTED
HPIV2 RNA SPEC QL NAA+PROBE: NOT DETECTED
HPIV3 RNA SPEC QL NAA+PROBE: NOT DETECTED
HPIV4 RNA SPEC QL NAA+PROBE: NOT DETECTED
IMM GRANULOCYTES # BLD AUTO: 0 K/UL (ref 0–0.04)
IMM GRANULOCYTES NFR BLD AUTO: 0 % (ref 0–0.5)
LACTATE SERPL-SCNC: 1.4 MMOL/L (ref 0.4–2)
LYMPHOCYTES # BLD: 0.8 K/UL (ref 0.8–3.5)
LYMPHOCYTES NFR BLD: 10 % (ref 12–49)
M PNEUMO DNA SPEC QL NAA+PROBE: NOT DETECTED
MAGNESIUM SERPL-MCNC: 2.2 MG/DL (ref 1.6–2.4)
MCH RBC QN AUTO: 25.3 PG (ref 26–34)
MCHC RBC AUTO-ENTMCNC: 31.6 G/DL (ref 30–36.5)
MCV RBC AUTO: 79.9 FL (ref 80–99)
MONOCYTES # BLD: 0.8 K/UL (ref 0–1)
MONOCYTES NFR BLD: 10 % (ref 5–13)
NEUTS SEG # BLD: 6.7 K/UL (ref 1.8–8)
NEUTS SEG NFR BLD: 79 % (ref 32–75)
NRBC # BLD: 0 K/UL (ref 0–0.01)
NRBC BLD-RTO: 0 PER 100 WBC
PLATELET # BLD AUTO: 166 K/UL (ref 150–400)
PMV BLD AUTO: 12 FL (ref 8.9–12.9)
POTASSIUM SERPL-SCNC: 3.7 MMOL/L (ref 3.5–5.1)
POTASSIUM SERPL-SCNC: 3.9 MMOL/L (ref 3.5–5.1)
PROCALCITONIN SERPL-MCNC: 2.67 NG/ML
PROT SERPL-MCNC: 7.6 G/DL (ref 6.4–8.2)
Q-T INTERVAL, ECG07: 386 MS
QRS DURATION, ECG06: 120 MS
QTC CALCULATION (BEZET), ECG08: 503 MS
RBC # BLD AUTO: 3.64 M/UL (ref 4.1–5.7)
RSV RNA SPEC QL NAA+PROBE: NOT DETECTED
RV+EV RNA SPEC QL NAA+PROBE: NOT DETECTED
SAMPLES BEING HELD,HOLD: NORMAL
SODIUM SERPL-SCNC: 135 MMOL/L (ref 136–145)
SODIUM SERPL-SCNC: 138 MMOL/L (ref 136–145)
TROPONIN I SERPL-MCNC: <0.05 NG/ML
TROPONIN I SERPL-MCNC: <0.05 NG/ML
VENTRICULAR RATE, ECG03: 102 BPM
WBC # BLD AUTO: 8.5 K/UL (ref 4.1–11.1)

## 2020-11-12 PROCEDURE — 77010033678 HC OXYGEN DAILY

## 2020-11-12 PROCEDURE — 93005 ELECTROCARDIOGRAM TRACING: CPT

## 2020-11-12 PROCEDURE — 83735 ASSAY OF MAGNESIUM: CPT

## 2020-11-12 PROCEDURE — 74011250637 HC RX REV CODE- 250/637: Performed by: NURSE PRACTITIONER

## 2020-11-12 PROCEDURE — 99233 SBSQ HOSP IP/OBS HIGH 50: CPT | Performed by: INTERNAL MEDICINE

## 2020-11-12 PROCEDURE — 80053 COMPREHEN METABOLIC PANEL: CPT

## 2020-11-12 PROCEDURE — 74011250636 HC RX REV CODE- 250/636: Performed by: INTERNAL MEDICINE

## 2020-11-12 PROCEDURE — 84145 PROCALCITONIN (PCT): CPT

## 2020-11-12 PROCEDURE — 74011000258 HC RX REV CODE- 258: Performed by: INTERNAL MEDICINE

## 2020-11-12 PROCEDURE — 93308 TTE F-UP OR LMTD: CPT

## 2020-11-12 PROCEDURE — 74011250637 HC RX REV CODE- 250/637: Performed by: INTERNAL MEDICINE

## 2020-11-12 PROCEDURE — 83605 ASSAY OF LACTIC ACID: CPT

## 2020-11-12 PROCEDURE — 36415 COLL VENOUS BLD VENIPUNCTURE: CPT

## 2020-11-12 PROCEDURE — 84484 ASSAY OF TROPONIN QUANT: CPT

## 2020-11-12 PROCEDURE — 82550 ASSAY OF CK (CPK): CPT

## 2020-11-12 PROCEDURE — 85025 COMPLETE CBC W/AUTO DIFF WBC: CPT

## 2020-11-12 PROCEDURE — 93308 TTE F-UP OR LMTD: CPT | Performed by: INTERNAL MEDICINE

## 2020-11-12 PROCEDURE — 65660000001 HC RM ICU INTERMED STEPDOWN

## 2020-11-12 RX ORDER — CARVEDILOL 3.12 MG/1
3.12 TABLET ORAL 2 TIMES DAILY WITH MEALS
Status: DISCONTINUED | OUTPATIENT
Start: 2020-11-12 | End: 2020-11-13

## 2020-11-12 RX ORDER — CARVEDILOL 3.12 MG/1
3.12 TABLET ORAL 2 TIMES DAILY WITH MEALS
Status: DISCONTINUED | OUTPATIENT
Start: 2020-11-12 | End: 2020-11-12

## 2020-11-12 RX ADMIN — ASPIRIN 81 MG CHEWABLE TABLET 81 MG: 81 TABLET CHEWABLE at 09:09

## 2020-11-12 RX ADMIN — COLCHICINE 0.6 MG: 0.6 TABLET ORAL at 09:09

## 2020-11-12 RX ADMIN — Medication 10 ML: at 06:00

## 2020-11-12 RX ADMIN — POLYETHYLENE GLYCOL 3350 17 G: 17 POWDER, FOR SOLUTION ORAL at 09:41

## 2020-11-12 RX ADMIN — Medication 10 ML: at 13:35

## 2020-11-12 RX ADMIN — HEPARIN SODIUM 5000 UNITS: 5000 INJECTION INTRAVENOUS; SUBCUTANEOUS at 22:45

## 2020-11-12 RX ADMIN — Medication 10 ML: at 22:46

## 2020-11-12 RX ADMIN — ATORVASTATIN CALCIUM 40 MG: 40 TABLET, FILM COATED ORAL at 22:45

## 2020-11-12 RX ADMIN — CARVEDILOL 3.12 MG: 3.12 TABLET, FILM COATED ORAL at 12:50

## 2020-11-12 RX ADMIN — VANCOMYCIN HYDROCHLORIDE 1000 MG: 1 INJECTION, POWDER, LYOPHILIZED, FOR SOLUTION INTRAVENOUS at 15:14

## 2020-11-12 RX ADMIN — COLCHICINE 0.6 MG: 0.6 TABLET ORAL at 22:45

## 2020-11-12 RX ADMIN — ACETAMINOPHEN 650 MG: 325 TABLET ORAL at 00:21

## 2020-11-12 RX ADMIN — HEPARIN SODIUM 5000 UNITS: 5000 INJECTION INTRAVENOUS; SUBCUTANEOUS at 15:14

## 2020-11-12 RX ADMIN — CEFEPIME 2 G: 2 INJECTION, POWDER, FOR SOLUTION INTRAVENOUS at 13:34

## 2020-11-12 RX ADMIN — CARVEDILOL 3.12 MG: 3.12 TABLET, FILM COATED ORAL at 17:29

## 2020-11-12 RX ADMIN — HEPARIN SODIUM 5000 UNITS: 5000 INJECTION INTRAVENOUS; SUBCUTANEOUS at 06:00

## 2020-11-12 NOTE — PROGRESS NOTES
Physician Progress Note      PATIENTRobbie Delay  [facility] #:                  [id number]  :                       1940  ADMIT DATE:       11/10/2020 11:11 AM  DISCH DATE:  RESPONDING  PROVIDER #:        Elmira HYOT MD          QUERY TEXT:    Pt admitted with Pericarditis. Pt noted to have T 101, hr , rr , bp 91/75, procal 2.67, plt 150, bili 1.3, crea 2.4. If possible, please document in the progress notes and discharge summary if you are evaluating and /or treating any of the following: The medical record reflects the following:  Risk Factors:Suspect acute pericarditis POA, flakita  Clinical Indicators: T 101, hr , rr 20-24, bp 91/75, procal 2.67, plt 150, bili 1.3, crea 2.4  Treatment: Vanco and cefepime, colchicine, hold lasix, Norvasc, ivfs  Thanks,  Octavia Mendenhall RN/CDI   Options provided:  -- Sepsis, present on admission  -- No Sepsis, (specify localized infection such as pneumonia or cellulitis) only  -- Other - I will add my own diagnosis  -- Disagree - Not applicable / Not valid  -- Disagree - Clinically unable to determine / Unknown  -- Refer to Clinical Documentation Reviewer    PROVIDER RESPONSE TEXT:    This patient has (specify localized infection such as pneumonia or cellulitis) only, patient is not septic.     Query created by: Barbara Prabhakar on 2020 3:22 PM      Electronically signed by:  Best Ambrosio MD 2020 5:06 PM

## 2020-11-12 NOTE — PROGRESS NOTES
11/12/2020 7:53 AM    Admit Date: 11/10/2020    Admit Diagnosis: Pericardial effusion [I31.3]    Subjective:     Opal Seeds c/o abdominal full ness. Chest pain and SOB slightly better. Yesterday chest pain characteristic of pericarditis. Visit Vitals  /75   Pulse 94   Temp 98.8 °F (37.1 °C)   Resp 18   Ht 5' 9\" (1.753 m)   Wt 223 lb 1.7 oz (101.2 kg)   SpO2 94%   BMI 32.95 kg/m²     Current Facility-Administered Medications   Medication Dose Route Frequency    colchicine tablet 0.6 mg  0.6 mg Oral BID    sodium chloride (NS) flush 5-40 mL  5-40 mL IntraVENous Q8H    sodium chloride (NS) flush 5-40 mL  5-40 mL IntraVENous PRN    acetaminophen (TYLENOL) tablet 650 mg  650 mg Oral Q6H PRN    Or    acetaminophen (TYLENOL) suppository 650 mg  650 mg Rectal Q6H PRN    polyethylene glycol (MIRALAX) packet 17 g  17 g Oral DAILY PRN    promethazine (PHENERGAN) tablet 12.5 mg  12.5 mg Oral Q6H PRN    Or    ondansetron (ZOFRAN) injection 4 mg  4 mg IntraVENous Q6H PRN    heparin (porcine) injection 5,000 Units  5,000 Units SubCUTAneous Q8H    aspirin chewable tablet 81 mg  81 mg Oral DAILY    atorvastatin (LIPITOR) tablet 40 mg  40 mg Oral QHS    0.9% sodium chloride infusion  50 mL/hr IntraVENous CONTINUOUS    vancomycin (VANCOCIN) 1,000 mg in 0.9% sodium chloride 250 mL (VIAL-MATE)  1,000 mg IntraVENous Q24H    cefepime (MAXIPIME) 2 g in 0.9% sodium chloride (MBP/ADV) 100 mL MBP  2 g IntraVENous Q24H         Objective:      Visit Vitals  /75   Pulse 94   Temp 98.8 °F (37.1 °C)   Resp 18   Ht 5' 9\" (1.753 m)   Wt 223 lb 1.7 oz (101.2 kg)   SpO2 94%   BMI 32.95 kg/m²       Physical Exam:  Resting comfortably. No resp distress. Abdomen: soft, non-tender.  Bowel sounds normal. No masses,  no organomegaly  Extremities: extremities normal, atraumatic, no cyanosis or edema  Heart: Irregular rate and rhythm, S1, S2 normal, no murmur, click, rub or gallop  Lungs: clear to auscultation bilaterally  Neurologic: Grossly normal    Data Review:   Labs:    Recent Results (from the past 24 hour(s))   ECHO ADULT COMPLETE    Collection Time: 11/11/20 12:15 PM   Result Value Ref Range    LV Mass .0 88 - 224 g    LV Mass AL Index 145. 6 49 - 115 g/m2    Left Atrium Minor Axis 1.78 cm    IVSd 1.66 (A) 0.6 - 1.0 cm    IVSs 1.59 cm    LVIDd 4.15 (A) 4.2 - 5.9 cm    LVIDs 3.40 cm    LVOT d 2.11 cm    LVPWd 1.87 (A) 0.6 - 1.0 cm    LVPWs 2.11 cm    IVSd (M-mode) 1.98 (A) cm    IVSs (M-mode) 1.82 cm    LVIDd (M-mode) 4.48 cm    LVIDs (M-mode) 3.01 cm    LVPWd (M-mode) 1.70 (A) cm    RVSP 34.42 mmHg    Left Atrium Major Axis 3.89 cm    Est. RA Pressure 10.00 mmHg    LV E' Lateral Velocity 6.83 cm/s    Triscuspid Valve Regurgitation Peak Gradient 24.42 mmHg    TR Max Velocity 247.10 cm/s   RESPIRATORY PANEL,PCR,NASOPHARYNGEAL    Collection Time: 11/11/20  6:24 PM    Specimen: Nasopharyngeal   Result Value Ref Range    Adenovirus Not detected NOTD      Coronavirus 229E Not detected NOTD      Coronavirus HKU1 Not detected NOTD      Coronavirus CVNL63 Not detected NOTD      Coronavirus OC43 Not detected NOTD      Metapneumovirus Not detected NOTD      Rhinovirus and Enterovirus Not detected NOTD      Influenza A Not detected NOTD      Influenza A, subtype H1 Not detected NOTD      Influenza A, subtype H3 Not detected NOTD      INFLUENZA A H1N1 PCR Not detected NOTD      Influenza B Not detected NOTD      Parainfluenza 1 Not detected NOTD      Parainfluenza 2 Not detected NOTD      Parainfluenza 3 Not detected NOTD      Parainfluenza virus 4 Not detected NOTD      RSV by PCR Not detected NOTD      B. parapertussis, PCR Not detected NOTD      Bordetella pertussis - PCR Not detected NOTD      Chlamydophila pneumoniae DNA, QL, PCR Not detected NOTD      Mycoplasma pneumoniae DNA, QL, PCR Not detected NOTD     CBC WITH AUTOMATED DIFF    Collection Time: 11/12/20  3:12 AM   Result Value Ref Range    WBC 8.5 4.1 - 11.1 K/uL    RBC 3.64 (L) 4.10 - 5.70 M/uL    HGB 9.2 (L) 12.1 - 17.0 g/dL    HCT 29.1 (L) 36.6 - 50.3 %    MCV 79.9 (L) 80.0 - 99.0 FL    MCH 25.3 (L) 26.0 - 34.0 PG    MCHC 31.6 30.0 - 36.5 g/dL    RDW 13.9 11.5 - 14.5 %    PLATELET 615 298 - 794 K/uL    MPV 12.0 8.9 - 12.9 FL    NRBC 0.0 0  WBC    ABSOLUTE NRBC 0.00 0.00 - 0.01 K/uL    NEUTROPHILS 79 (H) 32 - 75 %    LYMPHOCYTES 10 (L) 12 - 49 %    MONOCYTES 10 5 - 13 %    EOSINOPHILS 1 0 - 7 %    BASOPHILS 0 0 - 1 %    IMMATURE GRANULOCYTES 0 0.0 - 0.5 %    ABS. NEUTROPHILS 6.7 1.8 - 8.0 K/UL    ABS. LYMPHOCYTES 0.8 0.8 - 3.5 K/UL    ABS. MONOCYTES 0.8 0.0 - 1.0 K/UL    ABS. EOSINOPHILS 0.1 0.0 - 0.4 K/UL    ABS. BASOPHILS 0.0 0.0 - 0.1 K/UL    ABS. IMM. GRANS. 0.0 0.00 - 0.04 K/UL    DF AUTOMATED     METABOLIC PANEL, BASIC    Collection Time: 11/12/20  3:12 AM   Result Value Ref Range    Sodium 138 136 - 145 mmol/L    Potassium 3.7 3.5 - 5.1 mmol/L    Chloride 105 97 - 108 mmol/L    CO2 21 21 - 32 mmol/L    Anion gap 12 5 - 15 mmol/L    Glucose 103 (H) 65 - 100 mg/dL    BUN 27 (H) 6 - 20 MG/DL    Creatinine 1.75 (H) 0.70 - 1.30 MG/DL    BUN/Creatinine ratio 15 12 - 20      GFR est AA 46 (L) >60 ml/min/1.73m2    GFR est non-AA 38 (L) >60 ml/min/1.73m2    Calcium 9.2 8.5 - 10.1 MG/DL   MAGNESIUM    Collection Time: 11/12/20  3:12 AM   Result Value Ref Range    Magnesium 2.2 1.6 - 2.4 mg/dL   PROCALCITONIN    Collection Time: 11/12/20  3:12 AM   Result Value Ref Range    Procalcitonin 2.67 ng/mL       Telemetry: a. Fib. Assessment:     Active Problems:    Pericardial effusion (11/10/2020)        Plan:     Pericardial effusion   Echo noted. Started on colchicine yesterday due to possibility of pericarditis. Clinically better today. Continue colchicine. Will f/u limited Echo tomorrow. Since BP better and pain is better, hold off pericardiocentesis for today.      Atrial Fibriliation: rate controlled 80-90's   CHADVASc=5.  Was on xeralto for CVA risk reduction-on hold for now until determine if needs tap or not and due to pericardial effusion. Has past history of atrial flutter s/p ablation in 2017  May be contributing to his cardiomyopathy. Will determine further intervention based upon pericardial effusion resolution. BB on hold due to low BP.      Nonobstructive atherosclerotic heart disease:   Last ejection fraction 60% 2017.    20% lesion proximal LAD on last cath 2/2014   Plan as above.     Hypertension:   Home coreg and amlodipine on hold due to low BP. BP better now. Monitor. Renal insuff:   Cr improving.  Monitor.      Hyperlipidemia:    9/2020 LDL 28 continue statin

## 2020-11-12 NOTE — PROGRESS NOTES
Patient had Rapid Response this morning for chest pain. ECG SR with no  acute changes. Troponin negative      Stat bedside echo pericardial effusion appears unchanged from echo 11/10/2020 (EF30-35%) per Dr. Cornelius Piedra    CT scan 11/10/2020  1. Small to moderate pericardial effusion. 2. Two enlarged mediastinal lymph nodes. 3. Trace pleural effusions. Patient continues on colchicine for pericarditis which is likely r/t chest discomfort  Patient had been on Lasix PTA, was discontinued due to ROBINSON which is slowly improving. He continues with c/o abd distension, SOB. Consider restarting low dose diuretic. Remains in Afib rate controlled. [address]  Cardiology    11/12/2020         Patient seen, examined by me personally. Plan discussed as detailed. Agree with note as outlined by  NP. I confirm findings in history and physical exam. No additional findings noted. Agree with plan as outlined above. Stat echo shows no significant change in effusion. Will benefit from NSAIDS but has ROBINSON. Continue colchicine. Discussed with Dr. Kang Phelan.     Cora Marques MD

## 2020-11-12 NOTE — PROGRESS NOTES
Pharmacy Automatic Renal Dosing Protocol - Antimicrobials    Indication for Antimicrobials: Sepsis of Unknown Origin    Current Regimen of Each Antimicrobial:  Vancomycin 1000 mg IV every 24 hours (Start Date 11/10; Day # 4)  Cefepime 2 g IV every 24 hours (Start Date 11/10, Day 3)    Previous Antimicrobial Therapy:  None    Vancomycin Goal Level: 15-20 mcg/ml  Vancomycin Levels  Date Dose & Interval Measured (mcg/mL) Steady State (mcg/mL)                       Date & time of next level:  @ 0200    Significant Cultures:   11/10 blood: NGTD x 2 days -prelim    Radiology / Imaging results: (X-ray, CT scan or MRI):   11/10: cta chest: Small to moderate pericardial effusion. Two enlarged mediastinal lymph nodes. Trace pleural effusions. Paralysis, amputations, malnutrition: None    Labs:  Recent Labs     20  1055 20  0312 11/10/20  2357 11/10/20  1210   CREA 1.63* 1.75* 2.20* 2.41*   BUN 26* 27* 25* 21*   WBC  --  8.5 10.4 11.2*     Temp (24hrs), Av.4 °F (37.4 °C), Min:98.5 °F (36.9 °C), Max:101.3 °F (38.5 °C)    Creatinine Clearance (mL/min) or Dialysis: 37.4 mL/min    Impression/Plan:   Will change vancomycin 1000 mg IV every 18 hours for an estimated trough of 16.6 mcg/mL. Will change cefepime to 2 g IV every 12 hours for CrCl 30-60 mL/min per renal dosing protocol. Stop date pending     Pharmacy will follow daily and adjust medications as appropriate for renal function and/or serum levels. Thank you,  Brenda Barnes, PHARMD      Recommended duration of therapy  [email or web address]/Pharmacy/Clinical%20Companion/Duration%20of%[email or web address]. docx    Renal Dosing  [email or web address]/[facility]/Pharmacy/Clinical%20Companion/Renal%[email or web address]. pdf

## 2020-11-12 NOTE — PROGRESS NOTES
RRT Chest Pain called -- RRT  Arrived to unit at [phone], primary nurse, charge nurse and nurse leader at bedside. Pt complaining of nonradiating chest pain in L breast area. VS and EKG in progress on arrival. VSS at this time     1048: Dr. Landaverde Morning at bedside, verbal orders for lab work received.      1110: RRT ended, pt stable at this time

## 2020-11-12 NOTE — PROGRESS NOTES
End of Shift Note    Bedside shift change report given to Ryann Huston RN (oncoming nurse) by Itz Honeycutt (offgoing nurse). Report included the following information SBAR and Kardex    Shift worked:  Day   Shift summary and any significant changes:     did not go for pericardiocentesis, RRT called for chest pain, had 3 large bowel movements today. Concerns for physician to address:  none   Zone phone for oncoming shift:   [phone]     Activity:  Activity Level: Up with Assistance  Number times ambulated in hallways past shift: 0  Number of times OOB to chair past shift: 0    Cardiac:   Cardiac Monitoring: Yes      Cardiac Rhythm: Atrial fibrillation    Access:   Current line(s): PIV     Genitourinary:   Urinary status: voiding    Respiratory:   O2 Device: Nasal cannula  Chronic home O2 use?: NO  Incentive spirometer at bedside: NO     GI:  Last Bowel Movement Date: 11/12/20  Current diet:  DIET NPO Except Meds  DIET CARDIAC Regular  Passing flatus: YES  Tolerating current diet: YES       Pain Management:   Patient states pain is manageable on current regimen: YES    Skin:  Crow Score: 19  Interventions: increase time out of bed and PT/OT consult    Patient Safety:  Fall Score:  Total Score: 3  Interventions: bed/chair alarm and gripper socks  High Fall Risk: Yes    Length of Stay:  Expected LOS: 2d 19h  Actual LOS: [address]

## 2020-11-12 NOTE — PROGRESS NOTES
Problem: Falls - Risk of  Goal: *Absence of Falls  Description: Document Alan Mccullough Fall Risk and appropriate interventions in the flowsheet.   Outcome: Progressing Towards Goal  Note: Fall Risk Interventions:  Mobility Interventions: Assess mobility with egress test, Bed/chair exit alarm, Patient to call before getting OOB, PT Consult for mobility concerns, Utilize walker, cane, or other assistive device         Medication Interventions: Assess postural VS orthostatic hypotension, Bed/chair exit alarm, Patient to call before getting OOB, Teach patient to arise slowly    Elimination Interventions: Bed/chair exit alarm, Call light in reach, Patient to call for help with toileting needs, Stay With Me (per policy)    History of Falls Interventions: Bed/chair exit alarm, Door open when patient unattended, Investigate reason for fall, Room close to nurse's station         Problem: Patient Education: Go to Patient Education Activity  Goal: Patient/Family Education  Outcome: Progressing Towards Goal     Problem: Breathing Pattern - Ineffective  Goal: *Absence of hypoxia  Outcome: Progressing Towards Goal  Goal: *Use of effective breathing techniques  Outcome: Progressing Towards Goal  Goal: *PALLIATIVE CARE:  Alleviation of Dyspnea  Outcome: Progressing Towards Goal     Problem: Patient Education: Go to Patient Education Activity  Goal: Patient/Family Education  Outcome: Progressing Towards Goal

## 2020-11-12 NOTE — PROGRESS NOTES
Spiritual Care Assessment/Progress Note  [facility]      NAME: Isaías Villa      MRN: [id number]  AGE: 68 y.o. SEX: male  Restorationist Affiliation: Roman Catholic   Language: English     11/12/2020     Total Time (in minutes): 9     Spiritual Assessment begun in [facility] through conversation with:         []Patient        [x] Family    [] Friend(s)        Reason for Consult: Rapid response team     Spiritual beliefs: (Please include comment if needed)     [] Identifies with a wallace tradition:         [] Supported by a wallace community:            [] Claims no spiritual orientation:           [] Seeking spiritual identity:                [] Adheres to an individual form of spirituality:           [x] Not able to assess:                           Identified resources for coping:      [] Prayer                               [] Music                  [] Guided Imagery     [x] Family/friends                 [] Pet visits     [] Devotional reading                         [] Unknown     [] Other:                                               Interventions offered during this visit: (See comments for more details)    Patient Interventions: Other (comment)(Pt unavailable, being assessed by medical team)     Family/Friend(s):  Affirmation of emotions/emotional suffering, Initial Assessment     Plan of Care:     [] Support spiritual and/or cultural needs    [] Support AMD and/or advance care planning process      [] Support grieving process   [] Coordinate Rites and/or Rituals    [] Coordination with community clergy   [] No spiritual needs identified at this time   [] Detailed Plan of Care below (See Comments)  [] Make referral to Music Therapy  [] Make referral to Pet Therapy     [] Make referral to Addiction services  [] Make referral to [facility]  [] Make referral to Spiritual Care Partner  [] No future visits requested        [x] Follow up visits as needed     Comments: Responded to page for chest pain regarding pt Tone in [address]. Provided pastoral presence outside room as medical team was in room assessing patient. Family was present. As room cleared, approached family to assess needs and affirmed her presence as gift. Advised of  services. Chaplains will follow as able and/or needed.     Tara 1 MUNA Dejesus 1 Provider   Paging Service [phone]

## 2020-11-12 NOTE — ROUTINE PROCESS
End of Shift Note Bedside shift change report given to Rufus (oncoming nurse) by Jonathan Ortiz RN (offgoing nurse). Report included the following information SBAR Shift worked:  7p-7a Shift summary and any significant changes:  
  patient with temp overnight, c/o abd distention and discomfort. Concerns for physician to address:    
Zone phone for oncoming shift:   [phone] Activity: 
Activity Level: Up with Assistance Number times ambulated in hallways past shift: 0 Number of times OOB to chair past shift: 0 Cardiac:  
Cardiac Monitoring: Yes     
Cardiac Rhythm: Atrial fibrillation Access:  
Current line(s): PIV Genitourinary:  
Urinary status: voiding Respiratory:  
O2 Device: Room air Chronic home O2 use?: NO Incentive spirometer at bedside: NO 
  
GI: 
Last Bowel Movement Date: 11/09/20(per pt) Current diet:  DIET NPO Passing flatus: YES Tolerating current diet: NO 
  
 
Pain Management:  
Patient states pain is manageable on current regimen: N/A Skin: 
Crow Score: 19 Interventions: float heels, increase time out of bed and PT/OT consult Patient Safety: 
Fall Score: Total Score: 3 Interventions: bed/chair alarm, gripper socks, pt to call before getting OOB and stay with me (per policy) High Fall Risk: Yes Length of Stay: 
Expected LOS: 2d 19h Actual LOS: 2 Jonathan Ortiz RN

## 2020-11-12 NOTE — PROGRESS NOTES
11/11/20 2347   Vital Signs   Temp (!) 101.3 °F (38.5 °C)   Temp Source Oral   Pulse (Heart Rate) (!) 105   Heart Rate Source Monitor   Cardiac Rhythm A Fib   Resp Rate 21   O2 Sat (%) 93 %   Level of Consciousness Alert   /84   MAP (Calculated) 93   BP 1 Method Automatic   BP 1 Location Left arm   BP Patient Position At rest   MEWS Score 5   Pain 1   Pain Scale 1 Numeric (0 - 10)   Pain Intensity 1 0   Tylenol given for elevated temp. Charge nurse made aware of mews.

## 2020-11-12 NOTE — PROGRESS NOTES
Hospitalist Progress Note    NAME: Butch Blackwell   :  1940   MRN:  [id number]     Admit date: 11/10/2020    Today's date: 20    PCP: MD Allie Randle M.D. Cell [phone]      Assessment / Plan:  Chest Pains  Responded to Rapid response chest pain  Pt having chest pain worsening with breathing.  Suspect due to pericarditis / pericardial effusion, r/o ACS  Received ASA in am  I spoke to cardiology Dr Mikki Canas who will assess the patient  Serial Tn  Stop VF as BP trending up  Holding diuretics due to renal failure    Suspect acute pericarditis POA  Moderate to large pericardial effusion without tamponade POA  Hypotension POA systolic blood pressure in 70s at cardiology office  Acute kidney injury POA   Chest Pain, pleuritic POA likely due to pericarditis, no PE  Chronic Systolic Heart Failure / Cardiomyopathy (40% in past, recovered, now 30%)  Lasix recently increased from 20 to 40mg, ow held due to ROBINSON  S/P IVF, will hold due to high BP  Multiple SARS-CoV-2 test negative including a rapid and PCR test this admission  CTA with moderate pericardial fusion, and large mediastinal lymph nodes, No PE or ASD except atelectasis due to the effusion  Hypotensive at cardiology office, baseline normally takes multiple BP medications  ECHO LVEF 30-35%, normal RV function, Mod to large circumfrential pericardial effusion without tamponade  Was plan for pericardial effusion but plan now held per cardiology  Started on colchicine this admission  Unable to give NSAID due to ROBINSON  Empiric Vanco and cefepime, wean depending on plan with pericardiocenthesis  Pro calcitonin 2.67, will trend  Continue to hold lasix, norvasc  Resume coreg at lower dose    Abdominal pain, reports left upper quadrant pain  CT abdomen/pelvis unremarkable  Normal lipase, doubt acute pancreatitis  Suspect pain from the pericardial process     History of paroxysmal A. fib, currently in A. fib, rate controlled  Resume coreg at lower dose as above  Holding xarelto in case drainage needed     History of hyperlipidemia    Obesity POA Body mass index is 32.95 kg/m².       Code Status: Full code     DVT Prophylaxis: Heparin        Subjective: Pt seen and examined at bedside. Having Chest Pain. Overnight events d/w RN     Chief Complaint / Reason for Physician Visit: \"follow-up chestpain, pericardial effusion\"  Review of Systems:  Symptom Y/N Comments  Symptom Y/N Comments   Fever/Chills y   Chest Pain y    Poor Appetite    Edema     Cough y   Abdominal Pain     Sputum    Joint Pain     SOB/PA n   Headache     Nausea/vomit n   Tolerating PT/OT     Diarrhea n   Tolerating Diet y    Constipation    Other       Could NOT obtain due to:      Objective:     VITALS:   Last 24hrs VS reviewed since prior progress note.  Most recent are:  Patient Vitals for the past 24 hrs:   Temp Pulse Resp BP SpO2   11/12/20 1051  (!) 104   96 %   11/12/20 1046  98  (!) 185/83 97 %   11/12/20 1041  100   92 %   11/12/20 1036  (!) 108  (!) 145/88 93 %   11/12/20 1031  (!) 111   93 %   11/12/20 1026  (!) 102   94 %   11/12/20 1021  96   92 %   11/12/20 1016  88   92 %   11/12/20 1011  94   93 %   11/12/20 1006  (!) 105   93 %   11/12/20 1001  (!) 104   92 %   11/12/20 0956  88   93 %   11/12/20 0951  100   94 %   11/12/20 0946  93   94 %   11/12/20 0941  88   94 %   11/12/20 0936  (!) 108   94 %   11/12/20 0931  98   93 %   11/12/20 0926  99   94 %   11/12/20 0921  (!) 107   93 %   11/12/20 0916  90   94 %   11/12/20 0911  99   94 %   11/12/20 0906  99   94 %   11/12/20 0901  (!) 104   93 %   11/12/20 0856  99   93 %   11/12/20 0851  98   94 %   11/12/20 0846  (!) 105   92 %   11/12/20 0841  97   (!) 84 %   11/12/20 0836  (!) 111   93 %   11/12/20 0831  (!) 106   93 %   11/12/20 0826  93   93 %   11/12/20 0821  (!) 101   93 %   11/12/20 0816  97   95 %   11/12/20 0811  99   93 %   11/12/20 0806  (!) 101   94 %   11/12/20 0801  100   94 %   11/12/20 0756  100   94 %   11/12/20 0751  97   94 %   11/12/20 0734     94 %   11/12/20 0721  93   93 %   11/12/20 0716  (!) 106   93 %   11/12/20 0711  (!) 104   92 %   11/12/20 0706  (!) 122   90 %   11/12/20 0701  (!) 106   93 %   11/12/20 0647 98.8 °F (37.1 °C) 94 18 113/75 93 %   11/12/20 0307 98.5 °F (36.9 °C) 97 20 119/71 94 %   11/11/20 2347 (!) 101.3 °F (38.5 °C) (!) 105 21 111/84 93 %   11/11/20 1850 99.3 °F (37.4 °C) (!) 112 22 122/78 94 %   11/11/20 1600 99.7 °F (37.6 °C) 100 24 133/70 96 %   11/11/20 1141    98/66    11/11/20 1115 99.6 °F (37.6 °C) 92 21 98/66 95 %       Intake/Output Summary (Last 24 hours) at 11/12/2020 1054  Last data filed at 11/12/2020 0326  Gross per 24 hour   Intake 100 ml   Output 725 ml   Net -625 ml        Wt Readings from Last 12 Encounters:   11/12/20 101.2 kg (223 lb 1.7 oz)   11/10/20 94.5 kg (208 lb 6.4 oz)   08/26/20 101.6 kg (224 lb)   05/19/20 103.7 kg (228 lb 11.2 oz)   03/03/20 104.3 kg (230 lb)   03/02/20 104.3 kg (230 lb)   02/13/20 105.7 kg (233 lb)   12/18/19 103.4 kg (228 lb)   11/11/19 103.9 kg (229 lb)   10/09/19 104.8 kg (231 lb)   07/09/19 103 kg (227 lb)   04/30/19 101.6 kg (223 lb 14.4 oz)       PHYSICAL EXAM:  General: WD, WN. Alert, cooperative, no acute distress    EENT:  PERRL. Anicteric sclerae. MMM  Neck:  No meningismus, no thyromegaly  Resp:  Few crackles at bases bilaterally, no wheezing. No accessory muscle use  CV:  Irregular  rhythm,  No edema, no friction rub  GI:  Soft, Non distended, Non tender. +Bowel sounds, no rebound  Neurologic:  Alert normal speech, non focal motor exam  Psych:    Not anxious nor agitated  Skin:  No rashes.   No jaundice    Reviewed most current lab test results and cultures  YES  Reviewed most current radiology test results   YES  Review and summation of old records today    NO  Reviewed patient's current orders and MAR    YES  PMH/SH reviewed - no change compared to H&P  ________________________________________________________________________  Care Plan discussed with:    Comments   Patient y    Family  y family at bedside   RN y    Care Manager     Consultant  y Dr Rosana Patterson team rounds were held today with , nursing, pharmacist and clinical coordinator. Patient's plan of care was discussed; medications were reviewed and discharge planning was addressed. ________________________________________________________________________    Time Spent: 35 minutes in critical care responding to rapid response      Comments   >50% of visit spent in counseling and coordination of care     ________________________________________________________________________  Sharyn Lau MD     Procedures: see electronic medical records for all procedures/Xrays and details which were not copied into this note but were reviewed prior to creation of Plan. LABS:  I reviewed today's most current labs and imaging studies.   Pertinent labs include:  Recent Labs     11/12/20  0312 11/10/20  2357 11/10/20  1210   WBC 8.5 10.4 11.2*   HGB 9.2* 9.5* 10.4*   HCT 29.1* 29.7* 33.9*    150 176     Recent Labs     11/12/20  0312 11/10/20  2357 11/10/20  1210    137 138   K 3.7 3.7 3.6    105 105   CO2 21 24 23   * 135* 120*   BUN 27* 25* 21*   CREA 1.75* 2.20* 2.41*   CA 9.2 8.4* 9.2   MG 2.2 2.0  --    ALB  --  2.5* 2.8*   TBILI  --  0.7 1.3*   ALT  --  14 13

## 2020-11-12 NOTE — PROGRESS NOTES
0700: Bedside shift change report given to Rufus RN (oncoming nurse) by Marcella Shepherd RN (offgoing nurse). Report included the following information SBAR and Kardex. 0725: Pt taken serafin by cath for pericardiocentesis. 6417: Pt back to room, Dr. Fatmata Singh will not be doing the pericardiocentesis and said it is okay for pt to eat. Asking Dr. Karilyn Pallas for a diet order. Pt complaining of distended abdomen. 1040: RRT called pt complaining of chest pain. EKG completed and labs drawn. Dr. Karilyn Pallas put in orders for labs and Dr. Colton Heller was paged.

## 2020-11-13 LAB
ANION GAP SERPL CALC-SCNC: 9 MMOL/L (ref 5–15)
APTT PPP: 28.6 SEC (ref 22.1–31)
APTT PPP: 38.2 SEC (ref 22.1–31)
BASOPHILS # BLD: 0 K/UL (ref 0–0.1)
BASOPHILS NFR BLD: 0 % (ref 0–1)
BUN SERPL-MCNC: 24 MG/DL (ref 6–20)
BUN/CREAT SERPL: 18 (ref 12–20)
CALCIUM SERPL-MCNC: 9 MG/DL (ref 8.5–10.1)
CHLORIDE SERPL-SCNC: 105 MMOL/L (ref 97–108)
CO2 SERPL-SCNC: 22 MMOL/L (ref 21–32)
CREAT SERPL-MCNC: 1.36 MG/DL (ref 0.7–1.3)
DIFFERENTIAL METHOD BLD: ABNORMAL
EOSINOPHIL # BLD: 0.1 K/UL (ref 0–0.4)
EOSINOPHIL NFR BLD: 2 % (ref 0–7)
ERYTHROCYTE [DISTWIDTH] IN BLOOD BY AUTOMATED COUNT: 14.1 % (ref 11.5–14.5)
GLUCOSE SERPL-MCNC: 105 MG/DL (ref 65–100)
HCT VFR BLD AUTO: 29.3 % (ref 36.6–50.3)
HGB BLD-MCNC: 9.1 G/DL (ref 12.1–17)
IMM GRANULOCYTES # BLD AUTO: 0.1 K/UL (ref 0–0.04)
IMM GRANULOCYTES NFR BLD AUTO: 1 % (ref 0–0.5)
LYMPHOCYTES # BLD: 0.7 K/UL (ref 0.8–3.5)
LYMPHOCYTES NFR BLD: 9 % (ref 12–49)
MCH RBC QN AUTO: 24.9 PG (ref 26–34)
MCHC RBC AUTO-ENTMCNC: 31.1 G/DL (ref 30–36.5)
MCV RBC AUTO: 80.3 FL (ref 80–99)
MONOCYTES # BLD: 0.7 K/UL (ref 0–1)
MONOCYTES NFR BLD: 10 % (ref 5–13)
NEUTS SEG # BLD: 5.7 K/UL (ref 1.8–8)
NEUTS SEG NFR BLD: 78 % (ref 32–75)
NRBC # BLD: 0 K/UL (ref 0–0.01)
NRBC BLD-RTO: 0 PER 100 WBC
PLATELET # BLD AUTO: 227 K/UL (ref 150–400)
PMV BLD AUTO: 12.3 FL (ref 8.9–12.9)
POTASSIUM SERPL-SCNC: 3.8 MMOL/L (ref 3.5–5.1)
PROCALCITONIN SERPL-MCNC: 1.56 NG/ML
RBC # BLD AUTO: 3.65 M/UL (ref 4.1–5.7)
RBC MORPH BLD: ABNORMAL
SODIUM SERPL-SCNC: 136 MMOL/L (ref 136–145)
THERAPEUTIC RANGE,PTTT: ABNORMAL SECS (ref 58–77)
THERAPEUTIC RANGE,PTTT: NORMAL SECS (ref 58–77)
WBC # BLD AUTO: 7.3 K/UL (ref 4.1–11.1)

## 2020-11-13 PROCEDURE — 65660000001 HC RM ICU INTERMED STEPDOWN

## 2020-11-13 PROCEDURE — 99233 SBSQ HOSP IP/OBS HIGH 50: CPT | Performed by: INTERNAL MEDICINE

## 2020-11-13 PROCEDURE — 84145 PROCALCITONIN (PCT): CPT

## 2020-11-13 PROCEDURE — 85730 THROMBOPLASTIN TIME PARTIAL: CPT

## 2020-11-13 PROCEDURE — 85025 COMPLETE CBC W/AUTO DIFF WBC: CPT

## 2020-11-13 PROCEDURE — 74011250637 HC RX REV CODE- 250/637: Performed by: INTERNAL MEDICINE

## 2020-11-13 PROCEDURE — 74011250636 HC RX REV CODE- 250/636: Performed by: INTERNAL MEDICINE

## 2020-11-13 PROCEDURE — 74011000258 HC RX REV CODE- 258: Performed by: INTERNAL MEDICINE

## 2020-11-13 PROCEDURE — 36415 COLL VENOUS BLD VENIPUNCTURE: CPT

## 2020-11-13 PROCEDURE — 80048 BASIC METABOLIC PNL TOTAL CA: CPT

## 2020-11-13 PROCEDURE — 74011250637 HC RX REV CODE- 250/637: Performed by: NURSE PRACTITIONER

## 2020-11-13 PROCEDURE — 77010033678 HC OXYGEN DAILY

## 2020-11-13 RX ORDER — FUROSEMIDE 10 MG/ML
20 INJECTION INTRAMUSCULAR; INTRAVENOUS ONCE
Status: COMPLETED | OUTPATIENT
Start: 2020-11-13 | End: 2020-11-13

## 2020-11-13 RX ORDER — CARVEDILOL 6.25 MG/1
6.25 TABLET ORAL 2 TIMES DAILY WITH MEALS
Status: DISCONTINUED | OUTPATIENT
Start: 2020-11-13 | End: 2020-11-14

## 2020-11-13 RX ORDER — HEPARIN SODIUM 10000 [USP'U]/100ML
9-25 INJECTION, SOLUTION INTRAVENOUS
Status: DISCONTINUED | OUTPATIENT
Start: 2020-11-13 | End: 2020-11-16

## 2020-11-13 RX ORDER — HEPARIN SODIUM 5000 [USP'U]/ML
4000 INJECTION, SOLUTION INTRAVENOUS; SUBCUTANEOUS AS NEEDED
Status: DISCONTINUED | OUTPATIENT
Start: 2020-11-13 | End: 2020-11-16

## 2020-11-13 RX ORDER — HEPARIN SODIUM 5000 [USP'U]/ML
2000 INJECTION, SOLUTION INTRAVENOUS; SUBCUTANEOUS AS NEEDED
Status: DISCONTINUED | OUTPATIENT
Start: 2020-11-13 | End: 2020-11-16

## 2020-11-13 RX ADMIN — COLCHICINE 0.6 MG: 0.6 TABLET ORAL at 09:00

## 2020-11-13 RX ADMIN — Medication 10 ML: at 06:00

## 2020-11-13 RX ADMIN — VANCOMYCIN HYDROCHLORIDE 1000 MG: 1 INJECTION, POWDER, LYOPHILIZED, FOR SOLUTION INTRAVENOUS at 09:57

## 2020-11-13 RX ADMIN — HEPARIN SODIUM AND DEXTROSE 9 UNITS/KG/HR: 10000; 5 INJECTION INTRAVENOUS at 17:00

## 2020-11-13 RX ADMIN — Medication 10 ML: at 15:40

## 2020-11-13 RX ADMIN — COLCHICINE 0.6 MG: 0.6 TABLET ORAL at 22:28

## 2020-11-13 RX ADMIN — Medication 10 ML: at 22:28

## 2020-11-13 RX ADMIN — HEPARIN SODIUM 5000 UNITS: 5000 INJECTION INTRAVENOUS; SUBCUTANEOUS at 06:00

## 2020-11-13 RX ADMIN — ASPIRIN 81 MG CHEWABLE TABLET 81 MG: 81 TABLET CHEWABLE at 09:55

## 2020-11-13 RX ADMIN — CEFEPIME 2 G: 2 INJECTION, POWDER, FOR SOLUTION INTRAVENOUS at 02:42

## 2020-11-13 RX ADMIN — ACETAMINOPHEN 650 MG: 325 TABLET ORAL at 15:40

## 2020-11-13 RX ADMIN — CARVEDILOL 6.25 MG: 6.25 TABLET, FILM COATED ORAL at 17:00

## 2020-11-13 RX ADMIN — FUROSEMIDE 20 MG: 10 INJECTION, SOLUTION INTRAMUSCULAR; INTRAVENOUS at 12:48

## 2020-11-13 RX ADMIN — CARVEDILOL 3.12 MG: 3.12 TABLET, FILM COATED ORAL at 09:59

## 2020-11-13 RX ADMIN — ATORVASTATIN CALCIUM 40 MG: 40 TABLET, FILM COATED ORAL at 22:28

## 2020-11-13 RX ADMIN — ACETAMINOPHEN 650 MG: 325 TABLET ORAL at 04:05

## 2020-11-13 NOTE — PROGRESS NOTES
Problem: Falls - Risk of  Goal: *Absence of Falls  Description: Document Gabriel Bernal Fall Risk and appropriate interventions in the flowsheet.   Outcome: Progressing Towards Goal  Note: Fall Risk Interventions:  Mobility Interventions: Bed/chair exit alarm, Patient to call before getting OOB, Utilize walker, cane, or other assistive device         Medication Interventions: Bed/chair exit alarm, Patient to call before getting OOB, Teach patient to arise slowly    Elimination Interventions: Bed/chair exit alarm, Call light in reach, Patient to call for help with toileting needs, Toileting schedule/hourly rounds    History of Falls Interventions: Bed/chair exit alarm, Door open when patient unattended, Room close to nurse's station

## 2020-11-13 NOTE — PROGRESS NOTES
11/13/2020 10:51 AM    Admit Date: 11/10/2020    Admit Diagnosis: Pericardial effusion [I31.3]    Subjective:     Ashley Bagley feels slightly better today. Chest pain improving. Appears less dyspneic. He denies palpitations, irregular heart beats, near-syncope, syncope, orthopnea, paroxysmal nocturnal dyspnea, exertional chest pressure/discomfort.     Visit Vitals  BP (!) 137/96   Pulse (!) 103   Temp 97.5 °F (36.4 °C)   Resp 20   Ht 5' 9\" (1.753 m)   Wt 225 lb 15.5 oz (102.5 kg)   SpO2 97%   BMI 33.37 kg/m²     Current Facility-Administered Medications   Medication Dose Route Frequency    [START ON 11/14/2020] Vancomycin Trough- Please draw prior to 0200 dose on 11/14  1 Each Other ONCE    furosemide (LASIX) injection 20 mg  20 mg IntraVENous ONCE    carvediloL (COREG) tablet 3.125 mg  3.125 mg Oral BID WITH MEALS    vancomycin (VANCOCIN) 1,000 mg in 0.9% sodium chloride 250 mL (VIAL-MATE)  1,000 mg IntraVENous Q18H    cefepime (MAXIPIME) 2 g in 0.9% sodium chloride (MBP/ADV) 100 mL MBP  2 g IntraVENous Q12H    colchicine tablet 0.6 mg  0.6 mg Oral BID    sodium chloride (NS) flush 5-40 mL  5-40 mL IntraVENous Q8H    sodium chloride (NS) flush 5-40 mL  5-40 mL IntraVENous PRN    acetaminophen (TYLENOL) tablet 650 mg  650 mg Oral Q6H PRN    Or    acetaminophen (TYLENOL) suppository 650 mg  650 mg Rectal Q6H PRN    polyethylene glycol (MIRALAX) packet 17 g  17 g Oral DAILY PRN    promethazine (PHENERGAN) tablet 12.5 mg  12.5 mg Oral Q6H PRN    Or    ondansetron (ZOFRAN) injection 4 mg  4 mg IntraVENous Q6H PRN    heparin (porcine) injection 5,000 Units  5,000 Units SubCUTAneous Q8H    aspirin chewable tablet 81 mg  81 mg Oral DAILY    atorvastatin (LIPITOR) tablet 40 mg  40 mg Oral QHS         Objective:      Visit Vitals  BP (!) 137/96   Pulse (!) 103   Temp 97.5 °F (36.4 °C)   Resp 20   Ht 5' 9\" (1.753 m)   Wt 225 lb 15.5 oz (102.5 kg)   SpO2 97%   BMI 33.37 kg/m²       Physical Exam:  Resting comfortably. No resp distress. Extremities: extremities normal, atraumatic, no cyanosis or edema  Heart: Irregular rate and rhythm, S1, S2 normal, no murmur, click, rub or gallop  Lungs: clear to auscultation bilaterally  Neurologic: Grossly normal    Data Review:   Labs:    Recent Results (from the past 24 hour(s))   TROPONIN I    Collection Time: 11/12/20 10:55 AM   Result Value Ref Range    Troponin-I, Qt. <0.05 <0.13 ng/mL   METABOLIC PANEL, COMPREHENSIVE    Collection Time: 11/12/20 10:55 AM   Result Value Ref Range    Sodium 135 (L) 136 - 145 mmol/L    Potassium 3.9 3.5 - 5.1 mmol/L    Chloride 105 97 - 108 mmol/L    CO2 19 (L) 21 - 32 mmol/L    Anion gap 11 5 - 15 mmol/L    Glucose 141 (H) 65 - 100 mg/dL    BUN 26 (H) 6 - 20 MG/DL    Creatinine 1.63 (H) 0.70 - 1.30 MG/DL    BUN/Creatinine ratio 16 12 - 20      GFR est AA 50 (L) >60 ml/min/1.73m2    GFR est non-AA 41 (L) >60 ml/min/1.73m2    Calcium 8.9 8.5 - 10.1 MG/DL    Bilirubin, total 0.8 0.2 - 1.0 MG/DL    ALT (SGPT) 24 12 - 78 U/L    AST (SGOT) 45 (H) 15 - 37 U/L    Alk. phosphatase 89 45 - 117 U/L    Protein, total 7.6 6.4 - 8.2 g/dL    Albumin 2.3 (L) 3.5 - 5.0 g/dL    Globulin 5.3 (H) 2.0 - 4.0 g/dL    A-G Ratio 0.4 (L) 1.1 - 2.2     LACTIC ACID    Collection Time: 11/12/20 10:55 AM   Result Value Ref Range    Lactic acid 1.4 0.4 - 2.0 MMOL/L   SAMPLES BEING HELD    Collection Time: 11/12/20 10:55 AM   Result Value Ref Range    SAMPLES BEING HELD  LAV     COMMENT        Add-on orders for these samples will be processed based on acceptable specimen integrity and analyte stability, which may vary by analyte.    CK    Collection Time: 11/12/20 10:55 AM   Result Value Ref Range     39 - 308 U/L   TROPONIN I    Collection Time: 11/12/20  8:21 PM   Result Value Ref Range    Troponin-I, Qt. <0.05 <9.32 ng/mL   METABOLIC PANEL, BASIC    Collection Time: 11/13/20  2:51 AM   Result Value Ref Range    Sodium 136 136 - 145 mmol/L Potassium 3.8 3.5 - 5.1 mmol/L    Chloride 105 97 - 108 mmol/L    CO2 22 21 - 32 mmol/L    Anion gap 9 5 - 15 mmol/L    Glucose 105 (H) 65 - 100 mg/dL    BUN 24 (H) 6 - 20 MG/DL    Creatinine 1.36 (H) 0.70 - 1.30 MG/DL    BUN/Creatinine ratio 18 12 - 20      GFR est AA >60 >60 ml/min/1.73m2    GFR est non-AA 51 (L) >60 ml/min/1.73m2    Calcium 9.0 8.5 - 10.1 MG/DL   PROCALCITONIN    Collection Time: 11/13/20  2:51 AM   Result Value Ref Range    Procalcitonin 1.56 ng/mL       Telemetry: a. rashid      Assessment:     Active Problems:    Pericardial effusion (11/10/2020)        Plan:     Pericardial effusion   Continue colchicine. Clinically better today. Yesterday events noted. Avoid NSAID's due to renal insuff. If renal function normalizes, will consider it. Due to pericardial effusion hold off TAZ/DCCV today. Hopefully Monday.      Cardiomyopathy:   On coreg now. Will try one time lasix dose today since Cr improving. TAZ/DCCV on Monday. Atrial Fibriliation: rate controlled 80-90's   CHADVASc=5. Was on xeralto for CVA risk reduction-on hold for now until determine if needs tap or not and due to pericardial effusion. Has past history of atrial flutter s/p ablation in 2017  May be contributing to his cardiomyopathy. Will determine further intervention based upon pericardial effusion resolution. Now on low dose coreg and tolerating.      Nonobstructive atherosclerotic heart disease:   Last ejection fraction 60% 2017.    20% lesion proximal LAD on last cath 2/2014   Plan as above.     Hypertension:   On coreg now.      Renal insuff:   Cr improving. Monitor.      Hyperlipidemia:    9/2020 LDL 28 continue statin     D/w pt.

## 2020-11-13 NOTE — PROGRESS NOTES
RAPID RESPONSE TEAM - follow up    Rounded on patient due to recent RRT. Discussed with primary RN. No acute concerns, VSS, MEWS 1. No RRT interventions indicated at this time. Please call with any questions or concerns.      Jude Bolanos

## 2020-11-13 NOTE — PROGRESS NOTES
Hospitalist Progress Note    NAME: Sigifredo Livingston   :  1940   MRN:  [id number]     Admit date: 11/10/2020    Today's date: 20    PCP: MD Shanika Quiros M.D. Cell [phone]      Assessment / Plan:  Chest Pains due to acute pericarditis with pericardial effusion without temponade  Slowly improving  Continue colchicine  unable to use NSAIDs due to renal insufficiency  Holding diuretics due to renal failure  No plan for pericardiocentesis at this time per cardiology  Repeat echo without change in pericardial effusion  Plan for TAZ on Monday as below  D/c Emperic IV Abx considering seems viral and no plan for pericardiocentesis and blood cultures negative    Hypotension POA systolic blood pressure in 70s at cardiology office  Acute kidney injury POA   Chronic Systolic Heart Failure / Cardiomyopathy (40% in past, recovered, now 30%)  Lasix recently increased from 20 to 40mg, ow held due to ROBINSON  S/P IVF, now held  Multiple SARS-CoV-2 test negative including a rapid and PCR test this admission  CTA with moderate pericardial fusion, and large mediastinal lymph nodes, No PE or ASD except atelectasis due to the effusion  Hypotensive at cardiology office, baseline normally takes multiple BP medications  ECHO LVEF 30-35%, normal RV function, Mod to large circumfrential pericardial effusion without tamponade  Empiric Vanco and cefepime, wean depending on plan with pericardiocenthesis  Pro calcitonin 2.67, will trend  Continue to hold lasix, norvasc  BB as below    Abdominal pain, reports left upper quadrant pain  CT abdomen/pelvis unremarkable  Normal lipase, doubt acute pancreatitis  Suspect pain from the pericardial process     History of paroxysmal A. fib, currently in persistent A.  Fib with mild RVR  Resumde coreg at lower dose as above  Holding Xarelto  Start Heparin GGT for now as per discussion with Dr Nhi Huynh for TAZ with cardioversion on Monday    Hyperlipidemia  Continue statins    Obesity POA Body mass index is 33.37 kg/m².       Code Status: Full code     DVT Prophylaxis: Heparin        Subjective: Pt seen and examined at bedside. Chest pain improving. Overnight events d/w RN     Chief Complaint / Reason for Physician Visit: \"follow-up chestpain, pericardial effusion\"  Review of Systems:  Symptom Y/N Comments  Symptom Y/N Comments   Fever/Chills y   Chest Pain y    Poor Appetite    Edema     Cough y   Abdominal Pain     Sputum    Joint Pain     SOB/PA n   Headache     Nausea/vomit n   Tolerating PT/OT     Diarrhea n   Tolerating Diet y    Constipation    Other       Could NOT obtain due to:      Objective:     VITALS:   Last 24hrs VS reviewed since prior progress note. Most recent are:  Patient Vitals for the past 24 hrs:   Temp Pulse Resp BP SpO2   11/13/20 1248 97.8 °F (36.6 °C) 100 20 123/89 94 %   11/13/20 0957  (!) 103  (!) 137/96    11/13/20 0645 97.5 °F (36.4 °C) 96 20 136/84 97 %   11/13/20 0250 98.3 °F (36.8 °C) (!) 104 20 (!) 139/91 97 %   11/12/20 2208 99.9 °F (37.7 °C) 97 18 (!) 148/89 99 %   11/12/20 1847 98.4 °F (36.9 °C) 94 18 (!) 105/49 93 %   11/12/20 1729  93      11/12/20 1528 98.1 °F (36.7 °C) 97 18 121/75 98 %       Intake/Output Summary (Last 24 hours) at 11/13/2020 1405  Last data filed at 11/13/2020 1312  Gross per 24 hour   Intake 100 ml   Output 795 ml   Net -695 ml        Wt Readings from Last 12 Encounters:   11/13/20 102.5 kg (225 lb 15.5 oz)   11/10/20 94.5 kg (208 lb 6.4 oz)   08/26/20 101.6 kg (224 lb)   05/19/20 103.7 kg (228 lb 11.2 oz)   03/03/20 104.3 kg (230 lb)   03/02/20 104.3 kg (230 lb)   02/13/20 105.7 kg (233 lb)   12/18/19 103.4 kg (228 lb)   11/11/19 103.9 kg (229 lb)   10/09/19 104.8 kg (231 lb)   07/09/19 103 kg (227 lb)   04/30/19 101.6 kg (223 lb 14.4 oz)       PHYSICAL EXAM:  General: WD, WN. Alert, cooperative, no acute distress    EENT:  PERRL. Anicteric sclerae.  MMM  Neck:  No meningismus, no thyromegaly  Resp:  Few crackles at bases bilaterally, no wheezing. No accessory muscle use  CV:  Irregular  rhythm,  No edema, no friction rub  GI:  Soft, Non distended, Non tender. +Bowel sounds, no rebound  Neurologic:  Alert normal speech, non focal motor exam  Psych:    Not anxious nor agitated  Skin:  No rashes. No jaundice    Reviewed most current lab test results and cultures  YES  Reviewed most current radiology test results   YES  Review and summation of old records today    NO  Reviewed patient's current orders and MAR    YES  PMH/SH reviewed - no change compared to H&P  ________________________________________________________________________  Care Plan discussed with:    Comments   Patient y    Family      RN y    Care Manager     Consultant  y Dr Amish Huston team rounds were held today with , nursing, pharmacist and clinical coordinator. Patient's plan of care was discussed; medications were reviewed and discharge planning was addressed. ________________________________________________________________________    Time Spent: 30 minutes      Comments   >50% of visit spent in counseling and coordination of care     ________________________________________________________________________  Therese Randall MD     Procedures: see electronic medical records for all procedures/Xrays and details which were not copied into this note but were reviewed prior to creation of Plan. LABS:  I reviewed today's most current labs and imaging studies.   Pertinent labs include:  Recent Labs     11/12/20  0312 11/10/20  2357   WBC 8.5 10.4   HGB 9.2* 9.5*   HCT 29.1* 29.7*    150     Recent Labs     11/13/20  0251 11/12/20  1055 11/12/20  0312 11/10/20  2357    135* 138 137   K 3.8 3.9 3.7 3.7    105 105 105   CO2 22 19* 21 24   * 141* 103* 135*   BUN 24* 26* 27* 25*   CREA 1.36* 1.63* 1.75* 2.20*   CA 9.0 8.9 9.2 8.4*   MG  -- --  2.2 2.0   ALB  --  2.3*  --  2.5*   TBILI  --  0.8  --  0.7   ALT  --  24  --  14

## 2020-11-13 NOTE — PROGRESS NOTES
GENARO Plan:  - Home with spouse  - Spouse to provide transportation home at d/c  - OP F/U: PCP  - 2nd IMM letter    2:15pm- CM met with pt to discuss d/c plan. CM introduced self and role. No new needs at this time. CM will continue to follow pt for d/c planning needs.      Dayami Small, efrem [address]  [phone]

## 2020-11-13 NOTE — PROGRESS NOTES
Problem: Falls - Risk of  Goal: *Absence of Falls  Description: Document Raymundo Cerna Fall Risk and appropriate interventions in the flowsheet.   Outcome: Progressing Towards Goal  Note: Fall Risk Interventions:  Mobility Interventions: Bed/chair exit alarm, OT consult for ADLs, Patient to call before getting OOB, PT Consult for mobility concerns, PT Consult for assist device competence         Medication Interventions: Bed/chair exit alarm, Evaluate medications/consider consulting pharmacy, Patient to call before getting OOB, Teach patient to arise slowly    Elimination Interventions: Bed/chair exit alarm, Call light in reach, Elevated toilet seat, Patient to call for help with toileting needs, Stay With Me (per policy), Toilet paper/wipes in reach, Toileting schedule/hourly rounds, Urinal in reach    History of Falls Interventions: Bed/chair exit alarm, Door open when patient unattended, Evaluate medications/consider consulting pharmacy, Investigate reason for fall, Room close to nurse's station, Utilize gait belt for transfer/ambulation, Assess for delayed presentation/identification of injury for 48 hrs (comment for end date)         Problem: Patient Education: Go to Patient Education Activity  Goal: Patient/Family Education  Outcome: Progressing Towards Goal     Problem: Breathing Pattern - Ineffective  Goal: *Absence of hypoxia  Outcome: Progressing Towards Goal  Goal: *Use of effective breathing techniques  Outcome: Progressing Towards Goal  Goal: *PALLIATIVE CARE:  Alleviation of Dyspnea  Outcome: Progressing Towards Goal     Problem: Patient Education: Go to Patient Education Activity  Goal: Patient/Family Education  Outcome: Progressing Towards Goal

## 2020-11-13 NOTE — PROGRESS NOTES
1900: End of Shift Note    Bedside shift change report given to Daphne Moreira RN (oncoming nurse) by Jeff Castaneda (offgoing nurse). Report included the following information SBAR    Shift worked:  7a-7p   Shift summary and any significant changes:     Heparin gtt started, TAZ cancelled, NPO after midnight tonight       Concerns for physician to address:     [employer] phone for oncoming shift:   [phone]     Activity:  Activity Level: Up with Assistance  Number times ambulated in hallways past shift: 0  Number of times OOB to chair past shift: 0    Cardiac:   Cardiac Monitoring: Yes      Cardiac Rhythm: Atrial fibrillation    Access:   Current line(s): PIV     Genitourinary:   Urinary status: voiding    Respiratory:   O2 Device: Nasal cannula  Chronic home O2 use?: NO  Incentive spirometer at bedside: NO     GI:  Last Bowel Movement Date: 11/12/20  Current diet:  DIET CARDIAC Regular  DIET NPO  Passing flatus: YES  Tolerating current diet: YES       Pain Management:   Patient states pain is manageable on current regimen: YES    Skin:  Crow Score: 19  Interventions: float heels, increase time out of bed and nutritional support     Patient Safety:  Fall Score:  Total Score: 3  Interventions: bed/chair alarm, assistive device (walker, cane, etc), gripper socks and pt to call before getting OOB  High Fall Risk: Yes    Length of Stay:  Expected LOS: 2d 19h  Actual LOS: [address]

## 2020-11-13 NOTE — ROUTINE PROCESS
End of Shift Note Bedside shift change report given to [facility] (oncoming nurse) by Sohan Trejo RN (offgoing nurse). Report included the following information SBAR Shift worked:  7a-7p Shift summary and any significant changes:  
   
 
  
Concerns for physician to address: Patient continues to have SOB, increasing with exertion. Zone phone for oncoming shift:   [phone] Activity: 
Activity Level: Up with Assistance Number times ambulated in hallways past shift: 0 Number of times OOB to chair past shift: 0 Cardiac:  
Cardiac Monitoring: Yes     
Cardiac Rhythm: Atrial fibrillation Access:  
Current line(s): PIV Genitourinary:  
Urinary status: voiding Respiratory:  
O2 Device: Nasal cannula Chronic home O2 use?: NO Incentive spirometer at bedside: NO 
  
GI: 
Last Bowel Movement Date: 11/12/20 Current diet:  DIET NPO Except Meds DIET CARDIAC Regular Passing flatus: NO Tolerating current diet: NO 
  
 
Pain Management:  
Patient states pain is manageable on current regimen: NO 
 
Skin: 
Crow Score: 19 Interventions: bed alarm. Gripper socks Patient Safety: 
Fall Score: Total Score: 3 Interventions: bed/chair alarm, assistive device (walker, cane, etc), gripper socks, pt to call before getting OOB and stay with me (per policy) High Fall Risk: Yes Length of Stay: 
Expected LOS: 2d 19h Actual LOS: 2 Sohan Trejo, RN

## 2020-11-14 LAB
ANION GAP SERPL CALC-SCNC: 8 MMOL/L (ref 5–15)
APTT PPP: 48.2 SEC (ref 22.1–31)
APTT PPP: 56.1 SEC (ref 22.1–31)
APTT PPP: 56.4 SEC (ref 22.1–31)
BUN SERPL-MCNC: 22 MG/DL (ref 6–20)
BUN/CREAT SERPL: 17 (ref 12–20)
CALCIUM SERPL-MCNC: 9.7 MG/DL (ref 8.5–10.1)
CHLORIDE SERPL-SCNC: 104 MMOL/L (ref 97–108)
CO2 SERPL-SCNC: 25 MMOL/L (ref 21–32)
CREAT SERPL-MCNC: 1.29 MG/DL (ref 0.7–1.3)
GLUCOSE SERPL-MCNC: 103 MG/DL (ref 65–100)
POTASSIUM SERPL-SCNC: 3.6 MMOL/L (ref 3.5–5.1)
SODIUM SERPL-SCNC: 137 MMOL/L (ref 136–145)
THERAPEUTIC RANGE,PTTT: ABNORMAL SECS (ref 58–77)

## 2020-11-14 PROCEDURE — 74011250637 HC RX REV CODE- 250/637: Performed by: NURSE PRACTITIONER

## 2020-11-14 PROCEDURE — 74011250637 HC RX REV CODE- 250/637: Performed by: INTERNAL MEDICINE

## 2020-11-14 PROCEDURE — 85730 THROMBOPLASTIN TIME PARTIAL: CPT

## 2020-11-14 PROCEDURE — 77010033678 HC OXYGEN DAILY

## 2020-11-14 PROCEDURE — 80048 BASIC METABOLIC PNL TOTAL CA: CPT

## 2020-11-14 PROCEDURE — 65660000001 HC RM ICU INTERMED STEPDOWN

## 2020-11-14 PROCEDURE — 36415 COLL VENOUS BLD VENIPUNCTURE: CPT

## 2020-11-14 PROCEDURE — 99233 SBSQ HOSP IP/OBS HIGH 50: CPT | Performed by: INTERNAL MEDICINE

## 2020-11-14 PROCEDURE — 74011250636 HC RX REV CODE- 250/636: Performed by: INTERNAL MEDICINE

## 2020-11-14 RX ORDER — CARVEDILOL 6.25 MG/1
6.25 TABLET ORAL ONCE
Status: COMPLETED | OUTPATIENT
Start: 2020-11-14 | End: 2020-11-14

## 2020-11-14 RX ORDER — CARVEDILOL 12.5 MG/1
12.5 TABLET ORAL 2 TIMES DAILY WITH MEALS
Status: DISCONTINUED | OUTPATIENT
Start: 2020-11-14 | End: 2020-11-18 | Stop reason: HOSPADM

## 2020-11-14 RX ORDER — FUROSEMIDE 10 MG/ML
20 INJECTION INTRAMUSCULAR; INTRAVENOUS ONCE
Status: COMPLETED | OUTPATIENT
Start: 2020-11-14 | End: 2020-11-14

## 2020-11-14 RX ADMIN — HEPARIN SODIUM AND DEXTROSE 14 UNITS/KG/HR: 10000; 5 INJECTION INTRAVENOUS at 12:21

## 2020-11-14 RX ADMIN — HEPARIN SODIUM 4000 UNITS: 5000 INJECTION INTRAVENOUS; SUBCUTANEOUS at 00:11

## 2020-11-14 RX ADMIN — CARVEDILOL 6.25 MG: 6.25 TABLET, FILM COATED ORAL at 08:15

## 2020-11-14 RX ADMIN — ATORVASTATIN CALCIUM 40 MG: 40 TABLET, FILM COATED ORAL at 21:13

## 2020-11-14 RX ADMIN — Medication 10 ML: at 07:26

## 2020-11-14 RX ADMIN — ACETAMINOPHEN 650 MG: 325 TABLET ORAL at 09:00

## 2020-11-14 RX ADMIN — Medication 10 ML: at 15:04

## 2020-11-14 RX ADMIN — CARVEDILOL 12.5 MG: 12.5 TABLET, FILM COATED ORAL at 17:22

## 2020-11-14 RX ADMIN — ASPIRIN 81 MG CHEWABLE TABLET 81 MG: 81 TABLET CHEWABLE at 08:15

## 2020-11-14 RX ADMIN — COLCHICINE 0.6 MG: 0.6 TABLET ORAL at 12:26

## 2020-11-14 RX ADMIN — FUROSEMIDE 20 MG: 10 INJECTION, SOLUTION INTRAMUSCULAR; INTRAVENOUS at 10:23

## 2020-11-14 RX ADMIN — CARVEDILOL 6.25 MG: 6.25 TABLET, FILM COATED ORAL at 10:00

## 2020-11-14 RX ADMIN — Medication 10 ML: at 21:17

## 2020-11-14 RX ADMIN — COLCHICINE 0.6 MG: 0.6 TABLET ORAL at 21:14

## 2020-11-14 NOTE — PROGRESS NOTES
Hospitalist Progress Note    NAME: Mari Padilla   :  1940   MRN:  [id number]     Admit date: 11/10/2020    Today's date: 20    PCP: MD Josephine Manzo M.D. Cell [phone]      Assessment / Plan:  Hypotension POA systolic blood pressure in 70s at cardiology office  Acute kidney injury POA   Chronic Systolic Heart Failure / Cardiomyopathy (40% in past, recovered, now 30%)  Lasix recently increased from 20 to 40mg, ow held due to ROBINSON  S/P IVF, now held  Multiple SARS-CoV-2 test negative including a rapid and PCR test this admission  CTA with moderate pericardial fusion, and large mediastinal lymph nodes, No PE or ASD except atelectasis due to the effusion  Hypotensive at cardiology office, baseline normally takes multiple BP medications  ECHO LVEF 30-35%, normal RV function, Mod to large circumfrential pericardial effusion without tamponade  Empiric Vanco and cefepime, wean depending on plan with pericardiocenthesis  Pro calcitonin 2.67, will trend  Lasix was on hold but now on diuretics as needed per cardiology  BB as below    Chest Pains due to acute pericarditis with pericardial effusion without temponade  Slowly Improving  Continue Colchicine  unable to use NSAIDs due to renal insufficiency  Holding diuretics due to renal failure  No plan for pericardiocentesis at this time per cardiology  Repeat echo without change in pericardial effusion  Plan for TAZ on Monday as below  D/camelia Emperic IV Abx considering seems viral and no plan for pericardiocentesis and blood cultures negative    Abdominal pain, reports left upper quadrant pain  CT abdomen/pelvis unremarkable  Normal lipase, doubt acute pancreatitis  Suspect pain from the pericardial process     History of paroxysmal A. fib, currently in persistent A.  Fib with mild RVR  Resumde coreg at lower dose as above  Holding Xarelto  continue Heparin GGT for now as per discussion with Dr Dustin Florez for TAZ with cardioversion on Monday    Hyperlipidemia  Continue statins    Obesity POA Body mass index is 32.39 kg/m².       Code Status: Full code     DVT Prophylaxis: Heparin        Subjective: Pt seen and examined at bedside. Chest pain improving. Overnight events d/w RN     Chief Complaint / Reason for Physician Visit: \"follow-up chestpain, pericardial effusion\"  Review of Systems:  Symptom Y/N Comments  Symptom Y/N Comments   Fever/Chills y   Chest Pain y    Poor Appetite    Edema     Cough y   Abdominal Pain     Sputum    Joint Pain     SOB/PA n   Headache     Nausea/vomit n   Tolerating PT/OT     Diarrhea n   Tolerating Diet y    Constipation    Other       Could NOT obtain due to:      Objective:     VITALS:   Last 24hrs VS reviewed since prior progress note. Most recent are:  Patient Vitals for the past 24 hrs:   Temp Pulse Resp BP SpO2   11/14/20 1125 97.3 °F (36.3 °C) 93 20 111/68 94 %   11/14/20 1023  (!) 107  133/69    11/14/20 0815  (!) 112  120/70    11/14/20 0653 98.1 °F (36.7 °C) (!) 102 20 133/66 94 %   11/14/20 0412 98.9 °F (37.2 °C) 96 20 103/72 92 %   11/13/20 2339     98 %   11/13/20 2303 97.9 °F (36.6 °C) 98 20 (!) 149/90 98 %   11/13/20 1846 98 °F (36.7 °C) 94 20 109/60 92 %   11/13/20 1652  (!) 104  (!) 163/117    11/13/20 1540 98.6 °F (37 °C) 97 20 (!) 147/92 91 %       Intake/Output Summary (Last 24 hours) at 11/14/2020 1424  Last data filed at 11/14/2020 0900  Gross per 24 hour   Intake 240 ml   Output 860 ml   Net -620 ml        Wt Readings from Last 12 Encounters:   11/14/20 99.5 kg (219 lb 5.7 oz)   11/10/20 94.5 kg (208 lb 6.4 oz)   08/26/20 101.6 kg (224 lb)   05/19/20 103.7 kg (228 lb 11.2 oz)   03/03/20 104.3 kg (230 lb)   03/02/20 104.3 kg (230 lb)   02/13/20 105.7 kg (233 lb)   12/18/19 103.4 kg (228 lb)   11/11/19 103.9 kg (229 lb)   10/09/19 104.8 kg (231 lb)   07/09/19 103 kg (227 lb)   04/30/19 101.6 kg (223 lb 14.4 oz)       PHYSICAL EXAM:  General: WD, WN.  Alert, cooperative, no acute distress    EENT:  PERRL. Anicteric sclerae. MMM  Neck:  No meningismus, no thyromegaly  Resp:  Few crackles at bases bilaterally, no wheezing. No accessory muscle use  CV:  Irregular  rhythm,  No edema, no friction rub  GI:  Soft, Non distended, Non tender. +Bowel sounds, no rebound  Neurologic:  Alert normal speech, non focal motor exam  Psych:    Not anxious nor agitated  Skin:  No rashes. No jaundice    Reviewed most current lab test results and cultures  YES  Reviewed most current radiology test results   YES  Review and summation of old records today    NO  Reviewed patient's current orders and MAR    YES  PMH/SH reviewed - no change compared to H&P  ________________________________________________________________________  Care Plan discussed with:    Comments   Patient y    Family      RN y    Care Manager     Consultant  y Dr Semaj Murillo team rounds were held today with , nursing, pharmacist and clinical coordinator. Patient's plan of care was discussed; medications were reviewed and discharge planning was addressed. ________________________________________________________________________    Time Spent: 30 minutes      Comments   >50% of visit spent in counseling and coordination of care     ________________________________________________________________________  Diane Baer MD     Procedures: see electronic medical records for all procedures/Xrays and details which were not copied into this note but were reviewed prior to creation of Plan. LABS:  I reviewed today's most current labs and imaging studies.   Pertinent labs include:  Recent Labs     11/13/20  1637 11/12/20  0312   WBC 7.3 8.5   HGB 9.1* 9.2*   HCT 29.3* 29.1*    166     Recent Labs     11/14/20  0558 11/13/20  0251 11/12/20  1055 11/12/20 0312    136 135* 138   K 3.6 3.8 3.9 3.7    105 105 105   CO2 25 22 19* 21   * 105* 141* 103*   BUN 22* 24* 26* 27*   CREA 1.29 1.36* 1.63* 1.75*   CA 9.7 9.0 8.9 9.2   MG  --   --   --  2.2   ALB  --   --  2.3*  --    TBILI  --   --  0.8  --    ALT  --   --  24  --

## 2020-11-14 NOTE — PROGRESS NOTES
Problem: Falls - Risk of  Goal: *Absence of Falls  Description: Document Stan Vidya Fall Risk and appropriate interventions in the flowsheet.   Outcome: Progressing Towards Goal  Note: Fall Risk Interventions:  Mobility Interventions: Bed/chair exit alarm         Medication Interventions: Bed/chair exit alarm    Elimination Interventions: Bed/chair exit alarm    History of Falls Interventions: Bed/chair exit alarm         Problem: Breathing Pattern - Ineffective  Goal: *Absence of hypoxia  Outcome: Progressing Towards Goal     Problem: Breathing Pattern - Ineffective  Goal: *Absence of hypoxia  Outcome: Progressing Towards Goal

## 2020-11-14 NOTE — PROGRESS NOTES
11/14/2020 9:28 AM    Admit Date: 11/10/2020    Admit Diagnosis: Pericardial effusion [I31.3]    Subjective:     Alina Cousin feels much better. Remains in a. Fib. Visit Vitals  /70   Pulse (!) 112   Temp 98.1 °F (36.7 °C)   Resp 20   Ht 5' 9\" (1.753 m)   Wt 219 lb 5.7 oz (99.5 kg)   SpO2 94%   BMI 32.39 kg/m²     Current Facility-Administered Medications   Medication Dose Route Frequency    furosemide (LASIX) injection 20 mg  20 mg IntraVENous ONCE    carvediloL (COREG) tablet 12.5 mg  12.5 mg Oral BID WITH MEALS    heparin 25,000 units in D5W 250 ml infusion  9-25 Units/kg/hr IntraVENous TITRATE    heparin (porcine) injection 2,000 Units  2,000 Units IntraVENous PRN    Or    heparin (porcine) injection 4,000 Units  4,000 Units IntraVENous PRN    colchicine tablet 0.6 mg  0.6 mg Oral BID    sodium chloride (NS) flush 5-40 mL  5-40 mL IntraVENous Q8H    sodium chloride (NS) flush 5-40 mL  5-40 mL IntraVENous PRN    acetaminophen (TYLENOL) tablet 650 mg  650 mg Oral Q6H PRN    Or    acetaminophen (TYLENOL) suppository 650 mg  650 mg Rectal Q6H PRN    polyethylene glycol (MIRALAX) packet 17 g  17 g Oral DAILY PRN    promethazine (PHENERGAN) tablet 12.5 mg  12.5 mg Oral Q6H PRN    Or    ondansetron (ZOFRAN) injection 4 mg  4 mg IntraVENous Q6H PRN    aspirin chewable tablet 81 mg  81 mg Oral DAILY    atorvastatin (LIPITOR) tablet 40 mg  40 mg Oral QHS         Objective:      Visit Vitals  /70   Pulse (!) 112   Temp 98.1 °F (36.7 °C)   Resp 20   Ht 5' 9\" (1.753 m)   Wt 219 lb 5.7 oz (99.5 kg)   SpO2 94%   BMI 32.39 kg/m²       Physical Exam:  Resting comfortably. No resp distress.    Extremities: extremities normal, atraumatic, no cyanosis or edema  Heart: Irregular rate and rhythm, S1, S2 normal, no murmur, click, rub or gallop  Lungs: clear to auscultation bilaterally  Neurologic: Grossly normal    Data Review:   Labs:    Recent Results (from the past 24 hour(s)) PTT    Collection Time: 11/13/20  4:37 PM   Result Value Ref Range    aPTT 28.6 22.1 - 31.0 sec    aPTT, therapeutic range     58.0 - 77.0 SECS   CBC WITH AUTOMATED DIFF    Collection Time: 11/13/20  4:37 PM   Result Value Ref Range    WBC 7.3 4.1 - 11.1 K/uL    RBC 3.65 (L) 4.10 - 5.70 M/uL    HGB 9.1 (L) 12.1 - 17.0 g/dL    HCT 29.3 (L) 36.6 - 50.3 %    MCV 80.3 80.0 - 99.0 FL    MCH 24.9 (L) 26.0 - 34.0 PG    MCHC 31.1 30.0 - 36.5 g/dL    RDW 14.1 11.5 - 14.5 %    PLATELET 371 043 - 732 K/uL    MPV 12.3 8.9 - 12.9 FL    NRBC 0.0 0  WBC    ABSOLUTE NRBC 0.00 0.00 - 0.01 K/uL    NEUTROPHILS 78 (H) 32 - 75 %    LYMPHOCYTES 9 (L) 12 - 49 %    MONOCYTES 10 5 - 13 %    EOSINOPHILS 2 0 - 7 %    BASOPHILS 0 0 - 1 %    IMMATURE GRANULOCYTES 1 (H) 0.0 - 0.5 %    ABS. NEUTROPHILS 5.7 1.8 - 8.0 K/UL    ABS. LYMPHOCYTES 0.7 (L) 0.8 - 3.5 K/UL    ABS. MONOCYTES 0.7 0.0 - 1.0 K/UL    ABS. EOSINOPHILS 0.1 0.0 - 0.4 K/UL    ABS. BASOPHILS 0.0 0.0 - 0.1 K/UL    ABS. IMM.  GRANS. 0.1 (H) 0.00 - 0.04 K/UL    DF SMEAR SCANNED      RBC COMMENTS DOLLY CELLS  1+        RBC COMMENTS OVALOCYTES  1+        RBC COMMENTS MICROCYTOSIS  PRESENT        RBC COMMENTS HYPOCHROMIA  PRESENT       PTT    Collection Time: 11/13/20 10:59 PM   Result Value Ref Range    aPTT 38.2 (H) 22.1 - 31.0 sec    aPTT, therapeutic range     58.0 - 77.0 SECS   PTT    Collection Time: 11/14/20  5:58 AM   Result Value Ref Range    aPTT 56.4 (H) 22.1 - 31.0 sec    aPTT, therapeutic range     58.0 - 21.8 SECS   METABOLIC PANEL, BASIC    Collection Time: 11/14/20  5:58 AM   Result Value Ref Range    Sodium 137 136 - 145 mmol/L    Potassium 3.6 3.5 - 5.1 mmol/L    Chloride 104 97 - 108 mmol/L    CO2 25 21 - 32 mmol/L    Anion gap 8 5 - 15 mmol/L    Glucose 103 (H) 65 - 100 mg/dL    BUN 22 (H) 6 - 20 MG/DL    Creatinine 1.29 0.70 - 1.30 MG/DL    BUN/Creatinine ratio 17 12 - 20      GFR est AA >60 >60 ml/min/1.73m2    GFR est non-AA 54 (L) >60 ml/min/1.73m2 Calcium 9.7 8.5 - 10.1 MG/DL       Telemetry: elise Rodarte Assessment:     Active Problems:    Pericardial effusion (11/10/2020)        Plan:     Pericardial effusion:   Continue colchicine. Clinically better today. Chest pain better.      Cardiomyopathy:   Increase coreg dose. -ve fluid balance. Will try another IV dose of lasix today since Cr improving.      Atrial Fibriliation: rate controlled 80-90's   CHADVASc=5. Was on xeralto for CVA risk reduction. On IV heparin for now. Has past history of atrial flutter s/p ablation in 2017  May be contributing to his cardiomyopathy. Plans for TAZ/DCCV Monday.      Nonobstructive atherosclerotic heart disease:   Last ejection fraction 60% 2017.    20% lesion proximal LAD on last cath 2/2014   Plan as above.     Hypertension:   Controlled.       Renal insuff:   Cr improving.  Monitor.      Hyperlipidemia:    9/2020 LDL 28 continue statin

## 2020-11-14 NOTE — PROGRESS NOTES
RAPID RESPONSE TEAM- Follow Up    Rounded on patient due to recent rapid response for chest pain. Discussed with primary RN, José Verdugo. No acute concerns, VSS, MEWS 1. Patient Vitals for the past 12 hrs:   Temp Pulse Resp BP SpO2   11/13/20 1846 98 °F (36.7 °C) 94 20 109/60 92 %   11/13/20 1652  (!) 104  (!) 163/117    11/13/20 1540 98.6 °F (37 °C) 97 20 (!) 147/92 91 %   11/13/20 1248 97.8 °F (36.6 °C) 100 20 123/89 94 %   11/13/20 0957  (!) 103  (!) 137/96      Lab Results   Component Value Date/Time     11/12/2020 10:55 AM    CK - MB 1.3 11/28/2015 03:09 AM    CK-MB Index 1.5 11/28/2015 03:09 AM    Troponin-I, Qt. <0.05 11/12/2020 08:21 PM     No RRT interventions indicated at this time. Please call with any questions or concerns.      Marcio Ortiz  Rapid Response RN  Amilcar Peña

## 2020-11-15 LAB
APTT PPP: 59.8 SEC (ref 22.1–31)
APTT PPP: 65.4 SEC (ref 22.1–31)
BACTERIA SPEC CULT: NORMAL
SERVICE CMNT-IMP: NORMAL
THERAPEUTIC RANGE,PTTT: ABNORMAL SECS (ref 58–77)
THERAPEUTIC RANGE,PTTT: ABNORMAL SECS (ref 58–77)

## 2020-11-15 PROCEDURE — 74011250637 HC RX REV CODE- 250/637: Performed by: INTERNAL MEDICINE

## 2020-11-15 PROCEDURE — 74011250637 HC RX REV CODE- 250/637: Performed by: NURSE PRACTITIONER

## 2020-11-15 PROCEDURE — 74011250636 HC RX REV CODE- 250/636: Performed by: INTERNAL MEDICINE

## 2020-11-15 PROCEDURE — 65660000001 HC RM ICU INTERMED STEPDOWN

## 2020-11-15 PROCEDURE — 36415 COLL VENOUS BLD VENIPUNCTURE: CPT

## 2020-11-15 PROCEDURE — 99233 SBSQ HOSP IP/OBS HIGH 50: CPT | Performed by: INTERNAL MEDICINE

## 2020-11-15 PROCEDURE — 85730 THROMBOPLASTIN TIME PARTIAL: CPT

## 2020-11-15 RX ORDER — FUROSEMIDE 10 MG/ML
20 INJECTION INTRAMUSCULAR; INTRAVENOUS ONCE
Status: COMPLETED | OUTPATIENT
Start: 2020-11-15 | End: 2020-11-15

## 2020-11-15 RX ADMIN — Medication 10 ML: at 05:10

## 2020-11-15 RX ADMIN — CARVEDILOL 12.5 MG: 12.5 TABLET, FILM COATED ORAL at 08:55

## 2020-11-15 RX ADMIN — CARVEDILOL 12.5 MG: 12.5 TABLET, FILM COATED ORAL at 17:26

## 2020-11-15 RX ADMIN — Medication 10 ML: at 14:00

## 2020-11-15 RX ADMIN — COLCHICINE 0.6 MG: 0.6 TABLET ORAL at 10:21

## 2020-11-15 RX ADMIN — ASPIRIN 81 MG CHEWABLE TABLET 81 MG: 81 TABLET CHEWABLE at 08:55

## 2020-11-15 RX ADMIN — ATORVASTATIN CALCIUM 40 MG: 40 TABLET, FILM COATED ORAL at 20:40

## 2020-11-15 RX ADMIN — HEPARIN SODIUM AND DEXTROSE 17 UNITS/KG/HR: 10000; 5 INJECTION INTRAVENOUS at 03:49

## 2020-11-15 RX ADMIN — FUROSEMIDE 20 MG: 10 INJECTION, SOLUTION INTRAMUSCULAR; INTRAVENOUS at 08:55

## 2020-11-15 RX ADMIN — COLCHICINE 0.6 MG: 0.6 TABLET ORAL at 20:39

## 2020-11-15 RX ADMIN — HEPARIN SODIUM AND DEXTROSE 17 UNITS/KG/HR: 10000; 5 INJECTION INTRAVENOUS at 17:48

## 2020-11-15 NOTE — PROGRESS NOTES
Hospitalist Progress Note    NAME: Sissy Will   :  1940   MRN:  [id number]     Admit date: 11/10/2020    Today's date: 11/15/20    PCP: MD Dakota Romero M.D. Cell [phone]      Assessment / Plan:  Chest Pains due to acute pericarditis with pericardial effusion without temponade  Slowly Improving  Continue Colchicine  unable to use NSAIDs due to renal insufficiency  No plan for pericardiocentesis at this time per cardiology  Repeat echo without change in pericardial effusion  Plan for TAZ on Monday as below  D/camelia Emperic IV Abx considering seems viral and no plan for pericardiocentesis and blood cultures negative    Hypotension POA systolic blood pressure in 70s at cardiology office  Acute kidney injury POA   Chronic Systolic Heart Failure / Cardiomyopathy (40% in past, recovered, now 30%)  Lasix recently increased from 20 to 40mg, now held due to ROBINSON  S/P IVF, now held  Multiple SARS-CoV-2 test negative including a rapid and PCR test this admission  CTA with moderate pericardial fusion, and large mediastinal lymph nodes, No PE or ASD except atelectasis due to the effusion  Hypotensive at cardiology office, baseline normally takes multiple BP medications  ECHO LVEF 30-35%, normal RV function, Mod to large circumfrential pericardial effusion without tamponade  Empiric Vanco and cefepime, wean depending on plan with pericardiocenthesis  Pro calcitonin 2.67, will trend  Lasix was on hold but now on diuretics as needed per cardiology  BB as below    Abdominal pain, reports left upper quadrant pain  CT abdomen/pelvis unremarkable  Normal lipase, doubt acute pancreatitis  Suspect pain from the pericardial process     History of paroxysmal A. fib, currently in persistent A.  Fib with mild RVR  Resumde coreg at lower dose as above  Holding Xarelto  continue Heparin GGT for now as per discussion with Dr Jah Saha for TAZ with cardioversion on Monday    Hyperlipidemia  Continue statins    Obesity POA Body mass index is 32.2 kg/m².       Code Status: Full code     DVT Prophylaxis: Heparin        Subjective: Pt seen and examined at bedside. Chest pain improving. Overnight events d/w RN     Chief Complaint / Reason for Physician Visit: \"follow-up chestpain, pericardial effusion\"  Review of Systems:  Symptom Y/N Comments  Symptom Y/N Comments   Fever/Chills y   Chest Pain y    Poor Appetite    Edema     Cough y   Abdominal Pain     Sputum    Joint Pain     SOB/PA n   Headache     Nausea/vomit n   Tolerating PT/OT     Diarrhea n   Tolerating Diet y    Constipation    Other       Could NOT obtain due to:      Objective:     VITALS:   Last 24hrs VS reviewed since prior progress note. Most recent are:  Patient Vitals for the past 24 hrs:   Temp Pulse Resp BP SpO2   11/15/20 1059 97.8 °F (36.6 °C) 100 16 116/73 94 %   11/15/20 0855  (!) 106  123/68    11/15/20 0631 98 °F (36.7 °C) (!) 108 18 (!) 112/57 93 %   11/15/20 0438 97.9 °F (36.6 °C) (!) 103 18 131/77 93 %   11/14/20 2319 99.8 °F (37.7 °C)       11/14/20 2252 99.3 °F (37.4 °C) 93 20 119/74 93 %   11/14/20 1722  (!) 103  139/76    11/14/20 1510 98.1 °F (36.7 °C) 94 20 125/77 94 %       Intake/Output Summary (Last 24 hours) at 11/15/2020 1358  Last data filed at 11/15/2020 0439  Gross per 24 hour   Intake 980 ml   Output 200 ml   Net 780 ml        Wt Readings from Last 12 Encounters:   11/15/20 98.9 kg (218 lb 0.6 oz)   11/10/20 94.5 kg (208 lb 6.4 oz)   08/26/20 101.6 kg (224 lb)   05/19/20 103.7 kg (228 lb 11.2 oz)   03/03/20 104.3 kg (230 lb)   03/02/20 104.3 kg (230 lb)   02/13/20 105.7 kg (233 lb)   12/18/19 103.4 kg (228 lb)   11/11/19 103.9 kg (229 lb)   10/09/19 104.8 kg (231 lb)   07/09/19 103 kg (227 lb)   04/30/19 101.6 kg (223 lb 14.4 oz)       PHYSICAL EXAM:  General: WD, WN. Alert, cooperative, no acute distress    EENT:  PERRL. Anicteric sclerae.  MMM  Neck:  No meningismus, no thyromegaly  Resp:  Few crackles at bases bilaterally, no wheezing. No accessory muscle use  CV:  Irregular  rhythm,  No edema, no friction rub  GI:  Soft, Non distended, Non tender. +Bowel sounds, no rebound  Neurologic:  Alert normal speech, non focal motor exam  Psych:    Not anxious nor agitated  Skin:  No rashes. No jaundice    Reviewed most current lab test results and cultures  YES  Reviewed most current radiology test results   YES  Review and summation of old records today    NO  Reviewed patient's current orders and MAR    YES  PMH/SH reviewed - no change compared to H&P  ________________________________________________________________________  Care Plan discussed with:    Comments   Patient y    Family      RN y    Care Manager     Consultant  y Dr Amilcar Almanzar team rounds were held today with , nursing, pharmacist and clinical coordinator. Patient's plan of care was discussed; medications were reviewed and discharge planning was addressed. ________________________________________________________________________    Time Spent: 30 minutes      Comments   >50% of visit spent in counseling and coordination of care     ________________________________________________________________________  Tamia Summers MD     Procedures: see electronic medical records for all procedures/Xrays and details which were not copied into this note but were reviewed prior to creation of Plan. LABS:  I reviewed today's most current labs and imaging studies.   Pertinent labs include:  Recent Labs     11/13/20  1637   WBC 7.3   HGB 9.1*   HCT 29.3*        Recent Labs     11/14/20  0558 11/13/20  0251    136   K 3.6 3.8    105   CO2 25 22   * 105*   BUN 22* 24*   CREA 1.29 1.36*   CA 9.7 9.0

## 2020-11-15 NOTE — PROGRESS NOTES
Problem: Falls - Risk of  Goal: *Absence of Falls  Description: Document Zulema Georgeann Fall Risk and appropriate interventions in the flowsheet.   Outcome: Progressing Towards Goal  Note: Fall Risk Interventions:  Mobility Interventions: Bed/chair exit alarm         Medication Interventions: Bed/chair exit alarm    Elimination Interventions: Bed/chair exit alarm    History of Falls Interventions: Bed/chair exit alarm         Problem: Breathing Pattern - Ineffective  Goal: *Absence of hypoxia  Outcome: Progressing Towards Goal     Problem: Breathing Pattern - Ineffective  Goal: *Absence of hypoxia  Outcome: Progressing Towards Goal

## 2020-11-15 NOTE — PROGRESS NOTES
[facility] [facility] w/ Yvonne Cabrera from pharmacy about 2nd therapeutic PTT. Next lab draw for be at 0400 PTT.

## 2020-11-15 NOTE — PROGRESS NOTES
11/15/2020 7:52 AM    Admit Date: 11/10/2020    Admit Diagnosis: Pericardial effusion [I31.3]    Subjective:     Sherrie Chamberlain reports chest pain and breathing is much better. Visit Vitals  BP (!) 112/57   Pulse (!) 108   Temp 98 °F (36.7 °C)   Resp 18   Ht 5' 9\" (1.753 m)   Wt 218 lb 0.6 oz (98.9 kg)   SpO2 93%   BMI 32.20 kg/m²     Current Facility-Administered Medications   Medication Dose Route Frequency    furosemide (LASIX) injection 20 mg  20 mg IntraVENous ONCE    carvediloL (COREG) tablet 12.5 mg  12.5 mg Oral BID WITH MEALS    heparin 25,000 units in D5W 250 ml infusion  9-25 Units/kg/hr IntraVENous TITRATE    heparin (porcine) injection 2,000 Units  2,000 Units IntraVENous PRN    Or    heparin (porcine) injection 4,000 Units  4,000 Units IntraVENous PRN    colchicine tablet 0.6 mg  0.6 mg Oral BID    sodium chloride (NS) flush 5-40 mL  5-40 mL IntraVENous Q8H    sodium chloride (NS) flush 5-40 mL  5-40 mL IntraVENous PRN    acetaminophen (TYLENOL) tablet 650 mg  650 mg Oral Q6H PRN    Or    acetaminophen (TYLENOL) suppository 650 mg  650 mg Rectal Q6H PRN    polyethylene glycol (MIRALAX) packet 17 g  17 g Oral DAILY PRN    promethazine (PHENERGAN) tablet 12.5 mg  12.5 mg Oral Q6H PRN    Or    ondansetron (ZOFRAN) injection 4 mg  4 mg IntraVENous Q6H PRN    aspirin chewable tablet 81 mg  81 mg Oral DAILY    atorvastatin (LIPITOR) tablet 40 mg  40 mg Oral QHS         Objective:      Visit Vitals  BP (!) 112/57   Pulse (!) 108   Temp 98 °F (36.7 °C)   Resp 18   Ht 5' 9\" (1.753 m)   Wt 218 lb 0.6 oz (98.9 kg)   SpO2 93%   BMI 32.20 kg/m²       Physical Exam:  Resting comfortably. No resp distress.    Extremities: extremities normal, atraumatic, no cyanosis or edema  Heart: Irregular rate and rhythm, S1, S2 normal, no murmur, click, rub or gallop  Lungs: clear to auscultation bilaterally  Neurologic: Grossly normal    Data Review:   Labs:    Recent Results (from the past 24 hour(s))   PTT    Collection Time: 11/14/20  2:02 PM   Result Value Ref Range    aPTT 48.2 (H) 22.1 - 31.0 sec    aPTT, therapeutic range     58.0 - 77.0 SECS   PTT    Collection Time: 11/14/20  9:00 PM   Result Value Ref Range    aPTT 56.1 (H) 22.1 - 31.0 sec    aPTT, therapeutic range     58.0 - 77.0 SECS   PTT    Collection Time: 11/15/20  5:11 AM   Result Value Ref Range    aPTT 65.4 (H) 22.1 - 31.0 sec    aPTT, therapeutic range     58.0 - 77.0 SECS       Telemetry: a. fib      Assessment:     Active Problems:    Pericardial effusion (11/10/2020)        Plan:     Pericardial effusion:   Continue colchicine. Clinically better today. Chest pain almost resolved.      Cardiomyopathy:   Increase coreg dose. Will try another IV dose of lasix today since Cr improving.      Atrial Fibriliation: rate controlled 80-90's   CHADVASc=5. Was on xeralto for CVA risk reduction. On IV heparin for now. Has past history of atrial flutter s/p ablation in 2017  May be contributing to his cardiomyopathy. Plans for TAZ/DCCV tomorrow.     Nonobstructive atherosclerotic heart disease:   Last ejection fraction 60% 2017.    20% lesion proximal LAD on last cath 2/2014   Plan as above.     Hypertension:   Controlled.       Renal insuff:   Cr improving.  Monitor.      Hyperlipidemia:    9/2020 LDL 28 continue statin

## 2020-11-16 ENCOUNTER — APPOINTMENT (OUTPATIENT)
Dept: NON INVASIVE DIAGNOSTICS | Age: 80
DRG: 315 | End: 2020-11-16
Attending: INTERNAL MEDICINE
Payer: MEDICARE

## 2020-11-16 LAB
APTT PPP: 65.3 SEC (ref 22.1–31)
ATRIAL RATE: 89 BPM
CALCULATED P AXIS, ECG09: 57 DEGREES
CALCULATED R AXIS, ECG10: 72 DEGREES
CALCULATED T AXIS, ECG11: 27 DEGREES
DIAGNOSIS, 93000: NORMAL
P-R INTERVAL, ECG05: 202 MS
Q-T INTERVAL, ECG07: 408 MS
QRS DURATION, ECG06: 128 MS
QTC CALCULATION (BEZET), ECG08: 496 MS
THERAPEUTIC RANGE,PTTT: ABNORMAL SECS (ref 58–77)
VENTRICULAR RATE, ECG03: 89 BPM

## 2020-11-16 PROCEDURE — 99233 SBSQ HOSP IP/OBS HIGH 50: CPT | Performed by: INTERNAL MEDICINE

## 2020-11-16 PROCEDURE — 5A2204Z RESTORATION OF CARDIAC RHYTHM, SINGLE: ICD-10-PCS | Performed by: INTERNAL MEDICINE

## 2020-11-16 PROCEDURE — 93312 ECHO TRANSESOPHAGEAL: CPT

## 2020-11-16 PROCEDURE — 97116 GAIT TRAINING THERAPY: CPT

## 2020-11-16 PROCEDURE — 99152 MOD SED SAME PHYS/QHP 5/>YRS: CPT

## 2020-11-16 PROCEDURE — B24BZZ4 ULTRASONOGRAPHY OF HEART WITH AORTA, TRANSESOPHAGEAL: ICD-10-PCS | Performed by: INTERNAL MEDICINE

## 2020-11-16 PROCEDURE — 74011250637 HC RX REV CODE- 250/637: Performed by: INTERNAL MEDICINE

## 2020-11-16 PROCEDURE — 74011250636 HC RX REV CODE- 250/636: Performed by: INTERNAL MEDICINE

## 2020-11-16 PROCEDURE — 65660000001 HC RM ICU INTERMED STEPDOWN

## 2020-11-16 PROCEDURE — 85730 THROMBOPLASTIN TIME PARTIAL: CPT

## 2020-11-16 PROCEDURE — 92960 CARDIOVERSION ELECTRIC EXT: CPT | Performed by: INTERNAL MEDICINE

## 2020-11-16 PROCEDURE — 93005 ELECTROCARDIOGRAM TRACING: CPT

## 2020-11-16 PROCEDURE — 77030018729 HC ELECTRD DEFIB PAD CARD -B

## 2020-11-16 PROCEDURE — 93325 DOPPLER ECHO COLOR FLOW MAPG: CPT | Performed by: INTERNAL MEDICINE

## 2020-11-16 PROCEDURE — 97161 PT EVAL LOW COMPLEX 20 MIN: CPT

## 2020-11-16 PROCEDURE — 93312 ECHO TRANSESOPHAGEAL: CPT | Performed by: INTERNAL MEDICINE

## 2020-11-16 PROCEDURE — 74011250637 HC RX REV CODE- 250/637: Performed by: NURSE PRACTITIONER

## 2020-11-16 PROCEDURE — 36415 COLL VENOUS BLD VENIPUNCTURE: CPT

## 2020-11-16 PROCEDURE — 74011000250 HC RX REV CODE- 250: Performed by: INTERNAL MEDICINE

## 2020-11-16 PROCEDURE — 93320 DOPPLER ECHO COMPLETE: CPT | Performed by: INTERNAL MEDICINE

## 2020-11-16 RX ORDER — LIDOCAINE HYDROCHLORIDE 20 MG/ML
15 SOLUTION OROPHARYNGEAL ONCE
Status: COMPLETED | OUTPATIENT
Start: 2020-11-16 | End: 2020-11-16

## 2020-11-16 RX ORDER — FUROSEMIDE 20 MG/1
20 TABLET ORAL DAILY
Status: DISCONTINUED | OUTPATIENT
Start: 2020-11-16 | End: 2020-11-18 | Stop reason: HOSPADM

## 2020-11-16 RX ORDER — FENTANYL CITRATE 50 UG/ML
25-50 INJECTION, SOLUTION INTRAMUSCULAR; INTRAVENOUS
Status: DISCONTINUED | OUTPATIENT
Start: 2020-11-16 | End: 2020-11-16

## 2020-11-16 RX ORDER — MIDAZOLAM HYDROCHLORIDE 1 MG/ML
.5-2 INJECTION, SOLUTION INTRAMUSCULAR; INTRAVENOUS
Status: DISCONTINUED | OUTPATIENT
Start: 2020-11-16 | End: 2020-11-16

## 2020-11-16 RX ADMIN — MIDAZOLAM 1 MG: 1 INJECTION INTRAMUSCULAR; INTRAVENOUS at 10:26

## 2020-11-16 RX ADMIN — Medication 10 ML: at 14:30

## 2020-11-16 RX ADMIN — HEPARIN SODIUM AND DEXTROSE 17 UNITS/KG/HR: 10000; 5 INJECTION INTRAVENOUS at 08:26

## 2020-11-16 RX ADMIN — RIVAROXABAN 20 MG: 20 TABLET, FILM COATED ORAL at 13:50

## 2020-11-16 RX ADMIN — MIDAZOLAM 1 MG: 1 INJECTION INTRAMUSCULAR; INTRAVENOUS at 10:32

## 2020-11-16 RX ADMIN — LIDOCAINE HYDROCHLORIDE 15 ML: 20 SOLUTION ORAL; TOPICAL at 10:21

## 2020-11-16 RX ADMIN — BENZOCAINE, BUTAMBEN, AND TETRACAINE HYDROCHLORIDE 1 SPRAY: .028; .004; .004 AEROSOL, SPRAY TOPICAL at 10:22

## 2020-11-16 RX ADMIN — COLCHICINE 0.6 MG: 0.6 TABLET ORAL at 12:20

## 2020-11-16 RX ADMIN — ASPIRIN 81 MG CHEWABLE TABLET 81 MG: 81 TABLET CHEWABLE at 12:14

## 2020-11-16 RX ADMIN — MIDAZOLAM 1 MG: 1 INJECTION INTRAMUSCULAR; INTRAVENOUS at 10:43

## 2020-11-16 RX ADMIN — CARVEDILOL 12.5 MG: 12.5 TABLET, FILM COATED ORAL at 18:50

## 2020-11-16 RX ADMIN — FENTANYL CITRATE 25 MCG: 50 INJECTION, SOLUTION INTRAMUSCULAR; INTRAVENOUS at 10:43

## 2020-11-16 RX ADMIN — COLCHICINE 0.6 MG: 0.6 TABLET ORAL at 21:26

## 2020-11-16 RX ADMIN — ATORVASTATIN CALCIUM 40 MG: 40 TABLET, FILM COATED ORAL at 21:26

## 2020-11-16 RX ADMIN — CARVEDILOL 12.5 MG: 12.5 TABLET, FILM COATED ORAL at 12:14

## 2020-11-16 RX ADMIN — Medication 10 ML: at 21:26

## 2020-11-16 RX ADMIN — FUROSEMIDE 20 MG: 20 TABLET ORAL at 12:14

## 2020-11-16 RX ADMIN — FENTANYL CITRATE 25 MCG: 50 INJECTION, SOLUTION INTRAMUSCULAR; INTRAVENOUS at 10:32

## 2020-11-16 RX ADMIN — FENTANYL CITRATE 25 MCG: 50 INJECTION, SOLUTION INTRAMUSCULAR; INTRAVENOUS at 10:26

## 2020-11-16 NOTE — PROGRESS NOTES
TRANSFER - IN REPORT:    Verbal report received from [facility], [facility]) on [place]  being received from [facility] (unit) for routine progression of care      Report consisted of patients Situation, Background, Assessment and   Recommendations(SBAR). Information from the following report(s) SBAR, Kardex, ED Summary, Intake/Output, MAR, Recent Results and Cardiac Rhythm NSR was reviewed with the receiving nurse. Opportunity for questions and clarification was provided. Assessment completed upon patients arrival to unit and care assumed.

## 2020-11-16 NOTE — PROGRESS NOTES
0730 Bedside shift change report given to Shu Camara (oncoming nurse) by Carlos Manuel Burleson 69 (offgoing nurse). Report included the following information SBAR, Kardex, ED Summary, Intake/Output, MAR, Recent Results and Cardiac Rhythm A. FIb.

## 2020-11-16 NOTE — PROGRESS NOTES
Patient arrived to Non-Invasive Cardiology Lab for Inpatient TAZ Procedure. Staff introduced to patient. Patient identifiers verified with Name and Date of Birth. Procedure verified with patient. Consent forms reviewed and signed by patient or authorized representative and verified. Allergies verified. Patient informed of procedure and plan of care. Questions answered with review. Patient on cardiac monitor, non-invasive blood pressure, SPO2 monitor. On room air. Patient is A&Ox3. Patient reports no complaints. Patient in bed, in low position, with side rails up. Patient instructed to call for assistance as needed.     Wife in pts room

## 2020-11-16 NOTE — PROGRESS NOTES
TRANSFER - OUT REPORT:    Verbal report given to [facility]  RN (name) on Ela Piper  being transferred to  (unit) for routine progression of care       Report consisted of patients Situation, Background, Assessment and   Recommendations(SBAR). Information from the following report(s) SBAR, Kardex, Procedure Summary, Intake/Output, MAR and Cardiac Rhythm NSR was reviewed with the receiving nurse. Lines:   Peripheral IV 11/13/20 Right Forearm (Active)   Site Assessment Clean, dry, & intact 11/16/20 0409   Phlebitis Assessment 0 11/16/20 0409   Infiltration Assessment 0 11/16/20 0409   Dressing Status Clean, dry, & intact 11/16/20 0409   Dressing Type Tape;Transparent 11/16/20 0409   Hub Color/Line Status Flushed;End cap changed;Blue 11/16/20 0409   Action Taken Open ports on tubing capped 11/14/20 2000   Alcohol Cap Used Yes 11/16/20 0409       Peripheral IV 11/16/20 Proximal;Right Forearm (Active)   Site Assessment Clean, dry, & intact 11/16/20 0409   Phlebitis Assessment 0 11/16/20 0409   Infiltration Assessment 0 11/16/20 0409   Dressing Status Clean, dry, & intact 11/16/20 0409   Dressing Type Tape;Transparent 11/16/20 0409   Hub Color/Line Status Pink; Infusing 11/16/20 0409        Opportunity for questions and clarification was provided.       Patient transported with:   Registered Nurse

## 2020-11-16 NOTE — PROGRESS NOTES
Bedside shift change report given to LA NENA Garcia (oncoming nurse) by Lucille Gilbert RN (offgoing nurse). Report included the following information SBAR, Kardex, ED Summary, Procedure Summary, Intake/Output, Recent Results and Cardiac Rhythm AFIB.

## 2020-11-16 NOTE — PROGRESS NOTES
11/16/2020: 1052 addendum to progress note. Patient is NSR now s/p TAZ/Cardioversion. Stop heparin, Restart xeralto. Pericardial effusion improved. Continue Colchicine 0.6 mg PO BID x 3 months post-discharge. Will pursue ischemic evaluation with stress test at later time as outpatient.      Nikos NAVARRO,HX  MSN,RN,AG-ACNP-BC

## 2020-11-16 NOTE — PROGRESS NOTES
Problem: Falls - Risk of  Goal: *Absence of Falls  Description: Document Jill Marielianna Fall Risk and appropriate interventions in the flowsheet.   Outcome: Progressing Towards Goal  Note: Fall Risk Interventions:  Mobility Interventions: Bed/chair exit alarm         Medication Interventions: Bed/chair exit alarm    Elimination Interventions: Bed/chair exit alarm, Call light in reach    History of Falls Interventions: Bed/chair exit alarm

## 2020-11-16 NOTE — PROGRESS NOTES
11/16/2020 0920 AM    Admit Date: 11/10/2020    Admit Diagnosis: Pericardial effusion [I31.3]    Subjective:     Uriel Mustard reports mild chest pain and breathing is much better. VSS. Heparin gtt infusing  For TAZ/cardioversion today    Visit Vitals  BP (!) 148/96 (BP 1 Location: Left arm, BP Patient Position: At rest)   Pulse 100   Temp 98.9 °F (37.2 °C)   Resp 18   Ht 5' 9\" (1.753 m)   Wt 99.1 kg (218 lb 7.6 oz)   SpO2 95%   BMI 32.26 kg/m²     Current Facility-Administered Medications   Medication Dose Route Frequency    furosemide (LASIX) tablet 20 mg  20 mg Oral DAILY    carvediloL (COREG) tablet 12.5 mg  12.5 mg Oral BID WITH MEALS    heparin 25,000 units in D5W 250 ml infusion  9-25 Units/kg/hr IntraVENous TITRATE    heparin (porcine) injection 2,000 Units  2,000 Units IntraVENous PRN    Or    heparin (porcine) injection 4,000 Units  4,000 Units IntraVENous PRN    colchicine tablet 0.6 mg  0.6 mg Oral BID    sodium chloride (NS) flush 5-40 mL  5-40 mL IntraVENous Q8H    sodium chloride (NS) flush 5-40 mL  5-40 mL IntraVENous PRN    acetaminophen (TYLENOL) tablet 650 mg  650 mg Oral Q6H PRN    Or    acetaminophen (TYLENOL) suppository 650 mg  650 mg Rectal Q6H PRN    polyethylene glycol (MIRALAX) packet 17 g  17 g Oral DAILY PRN    promethazine (PHENERGAN) tablet 12.5 mg  12.5 mg Oral Q6H PRN    Or    ondansetron (ZOFRAN) injection 4 mg  4 mg IntraVENous Q6H PRN    aspirin chewable tablet 81 mg  81 mg Oral DAILY    atorvastatin (LIPITOR) tablet 40 mg  40 mg Oral QHS         Objective:      Visit Vitals  BP (!) 148/96 (BP 1 Location: Left arm, BP Patient Position: At rest)   Pulse 100   Temp 98.9 °F (37.2 °C)   Resp 18   Ht 5' 9\" (1.753 m)   Wt 99.1 kg (218 lb 7.6 oz)   SpO2 95%   BMI 32.26 kg/m²       Physical Exam:  Resting comfortably. No resp distress.    Extremities: extremities normal, atraumatic, no cyanosis or edema  Heart: Irregular rate and rhythm, S1, S2 normal, no murmur, click, rub or gallop  Lungs: clear to auscultation bilaterally  Neurologic: Grossly normal    Data Review:   Labs:    Recent Results (from the past 24 hour(s))   PTT    Collection Time: 11/15/20 11:25 AM   Result Value Ref Range    aPTT 59.8 (H) 22.1 - 31.0 sec    aPTT, therapeutic range     58.0 - 77.0 SECS   PTT    Collection Time: 11/16/20  3:32 AM   Result Value Ref Range    aPTT 65.3 (H) 22.1 - 31.0 sec    aPTT, therapeutic range     58.0 - 77.0 SECS       Telemetry: a. fib      Assessment:     Active Problems:    Pericardial effusion (11/10/2020)        Plan:     Pericardial effusion:   Continue colchicine. Clinically better. Chest pain minimal.      Cardiomyopathy:   Contine increased coreg dose. Start PO lasix 20 mg today, strict I/O, labs, daily weights.      Atrial Fibriliation: rate controlled 80-90's   CHADVASc=5. Was on xeralto for CVA risk reduction. On IV heparin for now. Has past history of atrial flutter s/p ablation in 2017  May be contributing to his cardiomyopathy. Plans for TAZ/DCCV today.     Nonobstructive atherosclerotic heart disease:   Last ejection fraction 60% 2017.    20% lesion proximal LAD on last cath 2/2014   Plan for potential outpatient stress test.      Hypertension:   Controlled.       Renal insuff:   Cr improving. Monitor. No new labs today. BMP tomorrow am.     Hyperlipidemia:    9/2020 LDL 28 continue statin     Ward Borjas GISAVVFXWSTEFFANY  MSN,RN,AG-ACNP-BC      Patient seen and examined by me with nurse practitioner. I personally performed all components of the history, physical, and medical decision making and agree with the assessment and plan as noted.     Maria Isabel Ding MD

## 2020-11-16 NOTE — PROGRESS NOTES
Hospitalist Progress Note    NAME: Dipti Walter   :  1940   MRN:  [id number]     Admit date: 11/10/2020    Today's date: 20    PCP: MD Frantz Fox M.D. Cell [phone]      Assessment / Plan:  Persistent A.fib  History of paroxysmal A. fib  Continue BB  Holding Xarelto, on heparin GGT for now  Plan for TAZ today    Chest Pains due to acute pericarditis with pericardial effusion without temponade  Slowly Improving  Continue Colchicine  unable to use NSAIDs due to renal insufficiency  No plan for pericardiocentesis at this time per cardiology  Repeat echo without change in pericardial effusion  Plan for TAZ on Monday as below    Hypotension POA systolic blood pressure in 70s at cardiology office  Acute kidney injury POA   Chronic Systolic Heart Failure / Cardiomyopathy (40% in past, recovered, now 30%)  Lasix recently increased from 20 to 40mg, now held due to ROBINSON  S/P IVF, now held  Multiple SARS-CoV-2 test negative including a rapid and PCR test this admission  CTA with moderate pericardial fusion, and large mediastinal lymph nodes, No PE or ASD except atelectasis due to the effusion  Hypotensive at cardiology office, baseline normally takes multiple BP medications  ECHO LVEF 30-35%, normal RV function, Mod to large circumfrential pericardial effusion without tamponade  Empiric Vanco and cefepime, wean depending on plan with pericardiocenthesis  Pro calcitonin 2.67, will trend  Cr better  Diuretics as per cardiology  BB as below    Abdominal pain, reports left upper quadrant pain  CT abdomen/pelvis unremarkable  Normal lipase, doubt acute pancreatitis  Suspect pain from the pericardial process     Hyperlipidemia  Continue statins    Obesity POA Body mass index is 32.26 kg/m².       Code Status: Full code     DVT Prophylaxis: Heparin        Subjective: Pt seen and examined at bedside. Chest pain improving.  Overnight events d/w RN     Chief Complaint / Reason for Physician Visit: \"follow-up chestpain, pericardial effusion\"  Review of Systems:  Symptom Y/N Comments  Symptom Y/N Comments   Fever/Chills y   Chest Pain y    Poor Appetite    Edema     Cough y   Abdominal Pain     Sputum    Joint Pain     SOB/PA n   Headache     Nausea/vomit n   Tolerating PT/OT     Diarrhea n   Tolerating Diet y    Constipation    Other       Could NOT obtain due to:      Objective:     VITALS:   Last 24hrs VS reviewed since prior progress note. Most recent are:  Patient Vitals for the past 24 hrs:   Temp Pulse Resp BP SpO2   11/16/20 0650 98.9 °F (37.2 °C) 100 18 (!) 148/96 95 %   11/16/20 0600 98.9 °F (37.2 °C) 100 18 (!) 148/96 95 %   11/16/20 0342 98.4 °F (36.9 °C) 96 18 134/78 94 %   11/15/20 2335 99.1 °F (37.3 °C) 94 18 (!) 152/86 94 %   11/15/20 1841 98.8 °F (37.1 °C) 99 18 (!) 145/80 94 %   11/15/20 1726  (!) 102  (!) 152/97    11/15/20 1446 98.7 °F (37.1 °C) 96 18 105/69 92 %   11/15/20 1059 97.8 °F (36.6 °C) 100 16 116/73 94 %       Intake/Output Summary (Last 24 hours) at 11/16/2020 0940  Last data filed at 11/16/2020 0017  Gross per 24 hour   Intake    Output 150 ml   Net -150 ml        Wt Readings from Last 12 Encounters:   11/16/20 99.1 kg (218 lb 7.6 oz)   11/10/20 94.5 kg (208 lb 6.4 oz)   08/26/20 101.6 kg (224 lb)   05/19/20 103.7 kg (228 lb 11.2 oz)   03/03/20 104.3 kg (230 lb)   03/02/20 104.3 kg (230 lb)   02/13/20 105.7 kg (233 lb)   12/18/19 103.4 kg (228 lb)   11/11/19 103.9 kg (229 lb)   10/09/19 104.8 kg (231 lb)   07/09/19 103 kg (227 lb)   04/30/19 101.6 kg (223 lb 14.4 oz)       PHYSICAL EXAM:  General: WD, WN. Alert, cooperative, no acute distress    EENT:  PERRL. Anicteric sclerae. MMM  Neck:  No meningismus, no thyromegaly  Resp:  Few crackles at bases bilaterally, no wheezing. No accessory muscle use  CV:  Irregular  rhythm,  No edema, no friction rub  GI:  Soft, Non distended, Non tender.   +Bowel sounds, no rebound  Neurologic:  Alert normal speech, non focal motor exam  Psych:    Not anxious nor agitated  Skin:  No rashes. No jaundice    Reviewed most current lab test results and cultures  YES  Reviewed most current radiology test results   YES  Review and summation of old records today    NO  Reviewed patient's current orders and MAR    YES  PMH/SH reviewed - no change compared to H&P  ________________________________________________________________________  Care Plan discussed with:    Comments   Patient y    Family      RN y    Care Manager     Consultant                        Multidiciplinary team rounds were held today with , nursing, pharmacist and clinical coordinator. Patient's plan of care was discussed; medications were reviewed and discharge planning was addressed. ________________________________________________________________________    Time Spent: 25 minutes      Comments   >50% of visit spent in counseling and coordination of care     ________________________________________________________________________  Nancie MD Manoj     Procedures: see electronic medical records for all procedures/Xrays and details which were not copied into this note but were reviewed prior to creation of Plan. LABS:  I reviewed today's most current labs and imaging studies.   Pertinent labs include:  Recent Labs     11/13/20  1637   WBC 7.3   HGB 9.1*   HCT 29.3*        Recent Labs     11/14/20  0558      K 3.6      CO2 25   *   BUN 22*   CREA 1.29   CA 9.7

## 2020-11-16 NOTE — PROGRESS NOTES
Problem: Mobility Impaired (Adult and Pediatric)  Goal: *Acute Goals and Plan of Care (Insert Text)  Description: FUNCTIONAL STATUS PRIOR TO ADMISSION: Patient was modified independent using a single point cane for functional mobility. HOME SUPPORT PRIOR TO ADMISSION: The patient lived with his 80 y.o. wife but did not require assist.    Physical Therapy Goals  Initiated 11/16/2020  1. Patient will move from supine to sit and sit to supine  in bed with modified independence within 7 day(s). 2.  Patient will transfer from bed to chair and chair to bed with modified independence using the least restrictive device within 7 day(s). 3.  Patient will perform sit to stand with modified independence within 7 day(s). 4.  Patient will ambulate with modified independence for 120 feet with the least restrictive device within 7 day(s). 5.  Patient will ascend/descend 4 stairs with 1 handrail(s) with modified independence within 7 day(s). Outcome: Progressing Towards Goal  PHYSICAL THERAPY EVALUATION  Patient: Seymour Valentine (50 y.o. male)  Date: 11/16/2020  Primary Diagnosis: Pericardial effusion [I31.3]  Procedure(s) (LRB):  PERICARDIOCENTESIS (N/A) 4 Days Post-Op   Precautions:        ASSESSMENT  Based on the objective data described below, the patient presents with severe PA and standing balance deficits following admission for a pericardia effusion. He required additional time for mobility but min-CGA for bed mobility. He c/o dizziness with initial stance but BP stable and sx decreased after time to accommodate. Gait training x40' using a SPC requiring CGA for balance. Noted significant path deviations, , and patient reached for external support in addition to cane. He c/o strong dyspnea during activity and noted an elevated respiratory rate but stable HR and SpO2 on RA. Anticipate good progress towards goals but he may require use of a RW immediately following discharge.  Recommend a trial of RW use next visit. Recommend follow up with HHPT after discharge. Current Level of Function Impacting Discharge (mobility/balance): moderate assist sit to stand from low chair       Patient will benefit from skilled therapy intervention to address the above noted impairments. PLAN :  Recommendations and Planned Interventions: bed mobility training, transfer training, gait training, therapeutic exercises, and neuromuscular re-education      Frequency/Duration: Patient will be followed by physical therapy:  5 times a week to address goals. Recommendation for discharge: (in order for the patient to meet his/her long term goals)  Physical therapy at least 2 days/week in the home     This discharge recommendation:  Has been made in collaboration with the attending provider and/or case management    IF patient discharges home will need the following DME: to be determined (TBD); rolling walker? SUBJECTIVE:   Patient stated This breathing is really the problem.     OBJECTIVE DATA SUMMARY:   HISTORY:    Past Medical History:   Diagnosis Date    Acute respiratory failure with hypoxia ([address] 02/08/2014    also 11/28/15, 2/17/16, 5/14/17     Alcohol abuse     As of 11/29/18 pt stated he quit 20 years    Arthritis     Back and shoulder    Atrial flutter ([address] 08/2017    saw Dr. Kelley Richardson; underwent a-flutter ablation    BPH (benign prostatic hyperplasia)     CHF (congestive heart failure) ([address] 02/11/2014    TTE 11/15: EF 40-45%, 2/14: EF 20%  Admitted for acute exacerbations 2/8/14, 11/28/15, 2/17/16, and 5/4/17)    Chronic kidney disease     III    Chronic low back pain     LS spine X-ray 4/8/17: mild DDD    Combined forms of age-related cataract of left eye 12/12/2018    KPE/IOL OS    Combined forms of age-related cataract of right eye 11/28/2018    KPE/IOL OD    COPD (chronic obstructive pulmonary disease) ([address]     PA (dyspnea on exertion)     ED (erectile dysfunction)     Elevated troponin 02/11/2014 also 11/28/15; due to cardiac strain    Frequent hospital admissions     5/14-5/23/17: acute hypoxic resp failure due to CHF exac, ROBINSON on CKD 3, sepsis due to LE cellulitis, 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  2/17-2/22/16: acute hypoxic resp failure, acute diarrhea  11/28-11/30/15: acute hypoxic resp failure due to acute CHF exac, elevated troponin due to cardiac strain  2/8-2/12/14: acuter hypoxic resp failure due to CHF exac, pneumonia     Hand blister 10/8/2017    Bilateral    Hypertension     Kidney disease, chronic, stage III (GFR 30-59 ml/min)     Leg fracture, right 1973    Nonischemic cardiomyopathy ([facility]     with LVH    Pneumonia 02/08/2014 2/8/14 and 10/30/15    Poor historian     As of 11/29/18 pt reports 5th grade education, pt unable to give med list-obtained from wife per patient's permission    Prediabetes     Prostate cancer ([facility] 2016    As of 11/29/18, pt states he gets two injections twice a year for this    Pulmonary edema 11/28/2015    S/P cardiac cath 2/12/2014 2/12/14 no significant coronary disease, severe LV dysfunction    Tinea cruris     also genital folliculitis    Vertigo      Past Surgical History:   Procedure Laterality Date    CARDIAC SURG PROCEDURE UNLIST  08/22/2017    SVT ablation    COLONOSCOPY N/A 3/2/2020    COLONOSCOPY performed by Merlene Sifuentes MD at [facility] ENDOSCOPY. 2 tubular adenomas, diverticulosis, int hemorrhoids, repeat in 5 yrs    COLONOSCOPY,DIAGNOSTIC  02/22/2016    Dr. Trinidad Brown; single sessile polyp at descending colon, sigmoid diverticulosis, int hemorrhoids    COLONOSCOPY,REMV LESN,SNARE  2/22/2016         COLONOSCOPY,REMV LESN,SNARE  3/2/2020         HX CATARACT REMOVAL Right 12/05/2018    Dr. Maria A Lovell Left 12/12/2018    Dr. Walter Grimes.  LEFT EYE SECOND REFRATIVE CATARACT REMOVAL WITH LENS IMPLANTATION    HX ORTHOPAEDIC Right 1980's    index finger    HX OTHER SURGICAL  08/22/2017    Dr. Gifty Stock; atrial flutter ablation    HX ROTATOR CUFF REPAIR Right 2000    HX TONSILLECTOMY  1948       Personal factors and/or comorbidities impacting plan of care:     Home Situation  Home Environment: Private residence  # Steps to Enter: 2  One/Two Story Residence: Two story  # of Interior Steps: 14  Living Alone: No  Support Systems: Spouse/Significant Other/Partner  Patient Expects to be Discharged to[de-identified] Private residence  Current DME Used/Available at Home: Saeid Score, straight, Shower chair    EXAMINATION/PRESENTATION/DECISION MAKING:   Critical Behavior:  Neurologic State: Alert  Orientation Level: Oriented X4  Cognition: Appropriate decision making     Hearing: Auditory  Auditory Impairment: None  Skin:    Edema:   Range Of Motion:  AROM: Generally decreased, functional                       Strength:    Strength: Generally decreased, functional                    Tone & Sensation:   Tone: Normal              Sensation: Intact               Coordination:  Coordination: Generally decreased, functional  Vision:      Functional Mobility:  Bed Mobility:     Supine to Sit: Contact guard assistance        Transfers:  Sit to Stand: Minimum assistance  Stand to Sit: Minimum assistance                       Balance:   Sitting: Intact  Standing: Impaired  Standing - Static: Fair  Standing - Dynamic : Fair  Ambulation/Gait Training:  Distance (ft): 40 Feet (ft)  Assistive Device: Gait belt;Cane, straight  Ambulation - Level of Assistance: Contact guard assistance     Gait Description (WDL): Exceptions to WDL  Gait Abnormalities: Decreased step clearance; Path deviations; Shuffling gait        Base of Support: Widened     Speed/Ama: Shuffled           Physical Therapy Evaluation Charge Determination   History Examination Presentation Decision-Making   MEDIUM  Complexity : 1-2 comorbidities / personal factors will impact the outcome/ POC  LOW Complexity : 1-2 Standardized tests and measures addressing body structure, function, activity limitation and / or participation in recreation  LOW Complexity : Stable, uncomplicated  LOW Complexity : FOTO score of       Based on the above components, the patient evaluation is determined to be of the following complexity level: LOW     Pain Rating:      Activity Tolerance:   Fair    After treatment patient left in no apparent distress:   Sitting in chair, Call bell within reach, and Bed / chair alarm activated    COMMUNICATION/EDUCATION:   The patients plan of care was discussed with: Registered nurse. Fall prevention education was provided and the patient/caregiver indicated understanding., Patient/family have participated as able in goal setting and plan of care. , and Patient/family agree to work toward stated goals and plan of care.     Thank you for this referral.  Chandra Hobbs, PT, DPT   Time Calculation: 28 mins

## 2020-11-16 NOTE — INTERDISCIPLINARY ROUNDS
[facility] INTERDISCIPLINARY ROUNDS Cardiopulmonary Care Interdisciplinary Rounds were held today to discuss patient's plan of care and outcomes. The following members were present: NP/Physician, Pharmacy, Nursing and Case Management. PLAN OF CARE:  
Continue current treatment plan Expected Length of Stay:  2d 19h

## 2020-11-16 NOTE — PROGRESS NOTES
Pt sedated with 2mg Versed and 50mcg Fentanyl for TAZ (monitored sedation from 1023 to 1045)    Pt sedated with an additional 1mg Versed and 25mcg Fentanyl, given 1 synchronized shock(s) at 360 Joules, Afib converted to NSR.(monitored sedation from 1023 to 1045)

## 2020-11-17 LAB
ANION GAP SERPL CALC-SCNC: 7 MMOL/L (ref 5–15)
BUN SERPL-MCNC: 23 MG/DL (ref 6–20)
BUN/CREAT SERPL: 20 (ref 12–20)
CALCIUM SERPL-MCNC: 9.3 MG/DL (ref 8.5–10.1)
CHLORIDE SERPL-SCNC: 105 MMOL/L (ref 97–108)
CO2 SERPL-SCNC: 27 MMOL/L (ref 21–32)
CREAT SERPL-MCNC: 1.15 MG/DL (ref 0.7–1.3)
ECHO EST RA PRESSURE: 10 MMHG
ECHO RIGHT VENTRICULAR SYSTOLIC PRESSURE (RVSP): 61.87 MMHG
ECHO TV REGURGITANT MAX VELOCITY: 360.09 CM/S
ECHO TV REGURGITANT PEAK GRADIENT: 51.87 MMHG
GLUCOSE SERPL-MCNC: 105 MG/DL (ref 65–100)
POTASSIUM SERPL-SCNC: 3.6 MMOL/L (ref 3.5–5.1)
SODIUM SERPL-SCNC: 139 MMOL/L (ref 136–145)

## 2020-11-17 PROCEDURE — 51798 US URINE CAPACITY MEASURE: CPT

## 2020-11-17 PROCEDURE — 93325 DOPPLER ECHO COLOR FLOW MAPG: CPT | Performed by: INTERNAL MEDICINE

## 2020-11-17 PROCEDURE — 36415 COLL VENOUS BLD VENIPUNCTURE: CPT

## 2020-11-17 PROCEDURE — 80048 BASIC METABOLIC PNL TOTAL CA: CPT

## 2020-11-17 PROCEDURE — 93320 DOPPLER ECHO COMPLETE: CPT | Performed by: INTERNAL MEDICINE

## 2020-11-17 PROCEDURE — 99233 SBSQ HOSP IP/OBS HIGH 50: CPT | Performed by: INTERNAL MEDICINE

## 2020-11-17 PROCEDURE — 92960 CARDIOVERSION ELECTRIC EXT: CPT | Performed by: INTERNAL MEDICINE

## 2020-11-17 PROCEDURE — 93312 ECHO TRANSESOPHAGEAL: CPT | Performed by: INTERNAL MEDICINE

## 2020-11-17 PROCEDURE — 74011250637 HC RX REV CODE- 250/637: Performed by: NURSE PRACTITIONER

## 2020-11-17 PROCEDURE — 97116 GAIT TRAINING THERAPY: CPT | Performed by: PHYSICAL THERAPIST

## 2020-11-17 PROCEDURE — 65660000001 HC RM ICU INTERMED STEPDOWN

## 2020-11-17 PROCEDURE — 97530 THERAPEUTIC ACTIVITIES: CPT | Performed by: PHYSICAL THERAPIST

## 2020-11-17 PROCEDURE — 74011250637 HC RX REV CODE- 250/637: Performed by: INTERNAL MEDICINE

## 2020-11-17 PROCEDURE — 2709999900 HC NON-CHARGEABLE SUPPLY

## 2020-11-17 PROCEDURE — 97535 SELF CARE MNGMENT TRAINING: CPT | Performed by: OCCUPATIONAL THERAPIST

## 2020-11-17 PROCEDURE — 97165 OT EVAL LOW COMPLEX 30 MIN: CPT | Performed by: OCCUPATIONAL THERAPIST

## 2020-11-17 RX ADMIN — CARVEDILOL 12.5 MG: 12.5 TABLET, FILM COATED ORAL at 18:38

## 2020-11-17 RX ADMIN — CARVEDILOL 12.5 MG: 12.5 TABLET, FILM COATED ORAL at 10:08

## 2020-11-17 RX ADMIN — RIVAROXABAN 20 MG: 20 TABLET, FILM COATED ORAL at 10:08

## 2020-11-17 RX ADMIN — Medication 10 ML: at 14:13

## 2020-11-17 RX ADMIN — FUROSEMIDE 20 MG: 20 TABLET ORAL at 10:08

## 2020-11-17 RX ADMIN — COLCHICINE 0.6 MG: 0.6 TABLET ORAL at 10:09

## 2020-11-17 RX ADMIN — Medication 10 ML: at 04:20

## 2020-11-17 RX ADMIN — ASPIRIN 81 MG CHEWABLE TABLET 81 MG: 81 TABLET CHEWABLE at 10:09

## 2020-11-17 RX ADMIN — ATORVASTATIN CALCIUM 40 MG: 40 TABLET, FILM COATED ORAL at 21:09

## 2020-11-17 RX ADMIN — Medication 10 ML: at 21:09

## 2020-11-17 RX ADMIN — COLCHICINE 0.6 MG: 0.6 TABLET ORAL at 21:11

## 2020-11-17 NOTE — PROGRESS NOTES
GENARO: Home with Home Health and Follow-up Appointments    5:00pm- CM met with pt and pt's daughter at bedside to discuss d/c plan. CM discussed with pt about home health. Pt was in agreement to [facility]. CM provided pt with [facility] list. Pt selected [address] [place] [address] document signed and placed in pt's bedside chart. Referral sent to [facility] via [facility]. Medicare pt has received, reviewed, and signed 2nd IM letter informing them of their right to appeal the discharge. Signed copy has been placed on pt bedside chart. CM will continue to follow patient for discharge planning needs and arrange for services as deemed necessary.     Dayami Host, Wendy Burleson [address] [place] [address] [facility]  [phone]

## 2020-11-17 NOTE — PROGRESS NOTES
Problem: Mobility Impaired (Adult and Pediatric)  Goal: *Acute Goals and Plan of Care (Insert Text)  Description: FUNCTIONAL STATUS PRIOR TO ADMISSION: Patient was modified independent using a single point cane for functional mobility. HOME SUPPORT PRIOR TO ADMISSION: The patient lived with his 80 y.o. wife but did not require assist.    Physical Therapy Goals  Initiated 11/16/2020  1. Patient will move from supine to sit and sit to supine  in bed with modified independence within 7 day(s). 2.  Patient will transfer from bed to chair and chair to bed with modified independence using the least restrictive device within 7 day(s). 3.  Patient will perform sit to stand with modified independence within 7 day(s). 4.  Patient will ambulate with modified independence for 120 feet with the least restrictive device within 7 day(s). 5.  Patient will ascend/descend 4 stairs with 1 handrail(s) with modified independence within 7 day(s). Outcome: Progressing Towards Goal   PHYSICAL THERAPY TREATMENT  Patient: Opal Mcmullen (29 y.o. male)  Date: 11/17/2020  Diagnosis: Pericardial effusion [I31.3]   <principal problem not specified>  Procedure(s) (LRB):  PERICARDIOCENTESIS (N/A) 5 Days Post-Op  Precautions:    Chart, physical therapy assessment, plan of care and goals were reviewed. ASSESSMENT  Patient continues with skilled PT services and is progressing towards goals. Patient overall limited by decreased activity tolerance, gait instability and impaired balance. Patient reports SOB throughout session but SpO2 91% and above throughout session on room air. CGA for transfers and able to amb approx 20 feet with RW and CGA with intermittent standing rest breaks secondary to reports of SOB. Patient may benefit from RW for home but he declines this as he does not feel that he has space in his home. Will continue to follow for mobility progression.      Other factors to consider for discharge: at risk for falls, below functional baseline         PLAN :  Patient continues to benefit from skilled intervention to address the above impairments. Continue treatment per established plan of care. to address goals. Recommendation for discharge: (in order for the patient to meet his/her long term goals)  Physical therapy at least 2 days/week in the home       IF patient discharges home will need the following DME: may benefit from RW but patient does not feel he will use it and does not have space in the house       SUBJECTIVE:   Patient stated I don't need a walker. What I need is to make it so I can breathe better.     OBJECTIVE DATA SUMMARY:   Critical Behavior:  Neurologic State: Alert, Appropriate for age  Orientation Level: Oriented X4  Cognition: Appropriate decision making     Functional Mobility Training:  Bed Mobility:      Not tested              Transfers:  Sit to Stand: Contact guard assistance  Stand to Sit: Contact guard assistance                             Balance:  Sitting: Intact  Standing: Impaired  Standing - Static: Constant support;Good  Standing - Dynamic : Fair  Ambulation/Gait Training:  Distance (ft): 25 Feet (ft)  Assistive Device: Gait belt;Walker, rolling  Ambulation - Level of Assistance: Contact guard assistance        Gait Abnormalities: Decreased step clearance;Shuffling gait        Base of Support: Widened     Speed/Ama: Shuffled; Slow      Therapeutic Exercises:   Instructed in seated HEP  Pain Rating:  No c/o pain    Activity Tolerance:   Fair and requires rest breaks    After treatment patient left in no apparent distress:   Sitting in chair, Call bell within reach, and Caregiver / family present    COMMUNICATION/COLLABORATION:   The patients plan of care was discussed with: Physical therapist, Occupational therapist, and Registered nurse.      Casey Dye, PT, DPT   Time Calculation: 23 mins

## 2020-11-17 NOTE — PROGRESS NOTES
Hospitalist Progress Note    NAME: Love Dacosta   :  1940   MRN:  [id number]     Admit date: 11/10/2020    Today's date: 20    PCP: MD Nazario Roth M.D. Cell [phone]      Assessment / Plan:  Oliguria  Pt peed only 50ml since morning despite lasix  Pt reported prostate cancer, check Bladder scan and if Retaining > 400ml then place rocha's  Urine looks concentrated, may be dehydrated ? ?  Recommended to drink more fluids today and monitor urine output  If remain issue then consider discussion with cardiology about adjusting diuretics (recently went into renal failure with increasing diuretics)    Persistent A.fib  History of paroxysmal A. fib  Continue BB  S/p TAZ and cardioversion yesterday, now in sinus  Restarted Xarelto  PT/OT > HH    Chest Pains due to acute pericarditis with pericardial effusion without temponade  Slowly Improving  Continue Colchicine  unable to use NSAIDs due to renal insufficiency  No plan for pericardiocentesis at this time per cardiology  Repeat echo without change in pericardial effusion  Effusion continued to improve on TAZ as per cardiology    Hypotension POA systolic blood pressure in 70s at cardiology office  Acute kidney injury POA   Chronic Systolic Heart Failure / Cardiomyopathy (40% in past, recovered, now 30%)  Lasix recently increased from 20 to 40mg, now held due to ROBINSON  S/P IVF, now held  Multiple SARS-CoV-2 test negative including a rapid and PCR test this admission  CTA with moderate pericardial fusion, and large mediastinal lymph nodes, No PE or ASD except atelectasis due to the effusion  Hypotensive at cardiology office, baseline normally takes multiple BP medications  ECHO LVEF 30-35%, normal RV function, Mod to large circumfrential pericardial effusion without tamponade  Empiric Vanco and cefepime, wean depending on plan with pericardiocenthesis  Pro calcitonin 2.67, will trend  Cr better  Diuretics as per cardiology  BB as below    Abdominal pain, reports left upper quadrant pain  CT abdomen/pelvis unremarkable  Normal lipase, doubt acute pancreatitis  Suspect pain from the pericardial process     Hyperlipidemia  Continue statins    Obesity POA Body mass index is 32.49 kg/m².       Code Status: Full code     DVT Prophylaxis: Heparin        Subjective: Pt seen and examined at bedside. Chest pain improving. Complaining of not urinating today. Overnight events d/w RN     Chief Complaint / Reason for Physician Visit: \"follow-up chestpain, pericardial effusion\"  Review of Systems:  Symptom Y/N Comments  Symptom Y/N Comments   Fever/Chills y   Chest Pain y    Poor Appetite    Edema     Cough y   Abdominal Pain     Sputum    Joint Pain     SOB/PA n   Headache     Nausea/vomit n   Tolerating PT/OT     Diarrhea n   Tolerating Diet y    Constipation    Other       Could NOT obtain due to:      Objective:     VITALS:   Last 24hrs VS reviewed since prior progress note.  Most recent are:  Patient Vitals for the past 24 hrs:   Temp Pulse Resp BP SpO2   11/17/20 1555 98.6 °F (37 °C) 78 18 135/68 96 %   11/17/20 1043 99 °F (37.2 °C) 78 18 126/68 95 %   11/17/20 1008  73      11/17/20 0637 98.4 °F (36.9 °C) 70 18 (!) 145/59 94 %   11/17/20 0304 98.5 °F (36.9 °C) 72 18 136/63 93 %   11/16/20 2239 98.9 °F (37.2 °C) 85 18 (!) 148/83 94 %   11/16/20 1909 99 °F (37.2 °C) 86 18 (!) 143/78 94 %       Intake/Output Summary (Last 24 hours) at 11/17/2020 1624  Last data filed at 11/17/2020 0305  Gross per 24 hour   Intake 340 ml   Output 825 ml   Net -485 ml        Wt Readings from Last 12 Encounters:   11/17/20 99.8 kg (220 lb 0.3 oz)   11/10/20 94.5 kg (208 lb 6.4 oz)   08/26/20 101.6 kg (224 lb)   05/19/20 103.7 kg (228 lb 11.2 oz)   03/03/20 104.3 kg (230 lb)   03/02/20 104.3 kg (230 lb)   02/13/20 105.7 kg (233 lb)   12/18/19 103.4 kg (228 lb)   11/11/19 103.9 kg (229 lb)   10/09/19 104.8 kg (231 lb)   07/09/19 103 kg (227 lb) 04/30/19 101.6 kg (223 lb 14.4 oz)       PHYSICAL EXAM:  General: WD, WN. Alert, cooperative, no acute distress    EENT:  PERRL. Anicteric sclerae. MMM  Neck:  No meningismus, no thyromegaly  Resp:  Few crackles at bases bilaterally, no wheezing. No accessory muscle use  CV:  Irregular  rhythm,  No edema, no friction rub  GI:  Soft, Non distended, Non tender. +Bowel sounds, no rebound  Neurologic:  Alert normal speech, non focal motor exam  Psych:    Not anxious nor agitated  Skin:  No rashes. No jaundice    Reviewed most current lab test results and cultures  YES  Reviewed most current radiology test results   YES  Review and summation of old records today    NO  Reviewed patient's current orders and MAR    YES  PMH/SH reviewed - no change compared to H&P  ________________________________________________________________________  Care Plan discussed with:    Comments   Patient y    Family      RN y    Care Manager     Consultant                        Multidiciplinary team rounds were held today with , nursing, pharmacist and clinical coordinator. Patient's plan of care was discussed; medications were reviewed and discharge planning was addressed. ________________________________________________________________________    Time Spent: 30 minutes      Comments   >50% of visit spent in counseling and coordination of care     ________________________________________________________________________  Cheryl Sahu MD     Procedures: see electronic medical records for all procedures/Xrays and details which were not copied into this note but were reviewed prior to creation of Plan. LABS:  I reviewed today's most current labs and imaging studies. Pertinent labs include:  No results for input(s): WBC, HGB, HCT, PLT, HGBEXT, HCTEXT, PLTEXT, HGBEXT, HCTEXT, PLTEXT in the last 72 hours.   Recent Labs     11/17/20  0315      K 3.6      CO2 27   *   BUN 23*   CREA 1.15   CA 9.3

## 2020-11-17 NOTE — PROGRESS NOTES
End of Shift Note    Bedside shift change report given to [address] (oncoming nurse) by Radhika Brannon (offgoing nurse). Report included the following information SBAR, Kardex, ED Summary, Intake/Output, MAR, Recent Results and Cardiac Rhythm NSr    Shift worked:  Day   Shift summary and any significant changes:     TAZ/Cardioversion completed Pt in NSR. Xarelto added     Concerns for physician to address:     Zone phone for oncoming shift:        Activity:  Activity Level: Up with Assistance  Number times ambulated in hallways past shift: 1  Number of times OOB to chair past shift: 3    Cardiac:   Cardiac Monitoring: Yes      Cardiac Rhythm: Normal sinus rhythm    Access:   Current line(s): PIV     Genitourinary:   Urinary status: voiding    Respiratory:   O2 Device: Room air  Chronic home O2 use?: NO  Incentive spirometer at bedside: NO     GI:  Last Bowel Movement Date: 11/14/20  Current diet:  DIET CARDIAC Regular  DIET ONE TIME MESSAGE  Passing flatus: YES  Tolerating current diet: YES  % Diet Eaten: 100 %    Pain Management:   Patient states pain is manageable on current regimen: N/A    Skin:  Crow Score: 20  Interventions: increase time out of bed and PT/OT consult    Patient Safety:  Fall Score:  Total Score: 3  Interventions: bed/chair alarm, assistive device (walker, cane, etc), gripper socks and pt to call before getting OOB  High Fall Risk: Yes    Length of Stay:  Expected LOS: 2d 19h  Actual LOS: [address] [facility]

## 2020-11-17 NOTE — PROGRESS NOTES
Patient evaluated for OT needs. He will benefit from acute OT and will need HHOT after discharge. Full evaluation note to follow.

## 2020-11-17 NOTE — PROGRESS NOTES
Comprehensive Nutrition Assessment    Type and Reason for Visit: Initial, RD nutrition re-screen/LOS    Nutrition Recommendations/Plan:   Continue cardiac diet  RD provided basic heart-healthy nutrition information. Will likely require f/u for reinforcement    Nutrition Assessment:      11/17: Chart reviewed for LOS. Pt noted for pericardial effusion, cardiomyopathy, COPD, afib, HTN, HLD. S/p TAZ/cardioversion. Cardiac diet ordered, 100% intakes recorded. Pt reports good appetite. Usual intake includes 2-3 meals/day. Most meals are from fast food drive through. RD and pt discussed diet changes to promote better heart health. Pt reports some walking as exercise but recently has had to stop more frequently d/t shortness of breath. Pt reports usual weight ~225lb, current weight 220lb. Will continue monitoring. Estimated Daily Nutrient Needs:  Energy (kcal): 1971 kcal (BMR x 1.3 - 250); Weight Used for Energy Requirements: Current  Protein (g): 80g (0.8g/kg); Weight Used for Protein Requirements: Current  Fluid (ml/day): 2000mL; Method Used for Fluid Requirements: 1 ml/kcal      Nutrition Related Findings:  Labs: H&H 9.1/29. 3. Meds: colchicine, lasix. Edema: trace BLE. BM 11/14 watery. Wounds:    None       Current Nutrition Therapies:  DIET CARDIAC Regular  DIET ONE TIME MESSAGE    Anthropometric Measures:  · Height:  5' 9\" (175.3 cm)  · Current Body Wt:  99.8 kg (220 lb 0.3 oz)   · BMI Category:  Obese class 1 (BMI 30.0-34. 9)       Nutrition Diagnosis:   · Food & nutrition-related knowledge deficit related to (lack of nutrition related education) as evidenced by (basic nutrition education provided d/t unhealthy eating pattern consisting of mostly fast food)      Nutrition Interventions:   Food and/or Nutrient Delivery: Continue current diet  Nutrition Education and Counseling: Survival skills/brief education completed  Coordination of Nutrition Care: Continue to monitor while inpatient    Goals:  Continue PO intake >70% on cardiac diet next 5-7 days       Nutrition Monitoring and Evaluation:   Behavioral-Environmental Outcomes: Knowledge or skill, Readiness for change  Food/Nutrient Intake Outcomes: Food and nutrient intake  Physical Signs/Symptoms Outcomes: Biochemical data, Fluid status or edema, Weight, GI status    Discharge Planning:    Recommend pursue outpatient nutrition counseling, Continue current diet     Electronically signed by Faisal Kwong RD on 11/17/2020 at 2:57 PM    Contact: [phone]  Pager [phone]

## 2020-11-17 NOTE — PROGRESS NOTES
[facility]- SNF vs Home with Home Health    11:30am- CM met with pt and pt's wife at bedside to discuss d/c plan. CM discussed with pt about SNF. Pt stated that he would like to work with PT/OT before making a decision. Pt's wife was in an agreement to that. CM will continue to follow patient for discharge planning needs and arrange for services as deemed necessary.     Wendy Jaquez [address]  [phone]

## 2020-11-17 NOTE — PROGRESS NOTES
Problem: Falls - Risk of  Goal: *Absence of Falls  Description: Document Jill Marques Fall Risk and appropriate interventions in the flowsheet.   Outcome: Progressing Towards Goal  Note: Fall Risk Interventions:  Mobility Interventions: Bed/chair exit alarm, Patient to call before getting OOB         Medication Interventions: Bed/chair exit alarm, Patient to call before getting OOB, Teach patient to arise slowly    Elimination Interventions: Bed/chair exit alarm, Call light in reach, Toileting schedule/hourly rounds, Urinal in reach    History of Falls Interventions: Bed/chair exit alarm

## 2020-11-17 NOTE — PROGRESS NOTES
End of Shift Note    Bedside shift change report given to Kavitha Messer (oncoming nurse) by Supa Leslie RN (offgoing nurse). Report included the following information SBAR, Kardex and Recent Results    Shift worked:  7p-7a   Shift summary and any significant changes:            Concerns for physician to address:    Zone phone for oncoming shift:        Activity:  Activity Level: Up with Assistance  Number times ambulated in hallways past shift: 0  Number of times OOB to chair past shift: 0    Cardiac:   Cardiac Monitoring: Yes      Cardiac Rhythm: Normal sinus rhythm    Access:   Current line(s): PIV     Genitourinary:   Urinary status: voiding    Respiratory:   O2 Device: Room air  Chronic home O2 use?: NO  Incentive spirometer at bedside: NO     GI:  Last Bowel Movement Date: 11/14/20  Current diet:  DIET CARDIAC Regular  DIET ONE TIME MESSAGE  Passing flatus: YES  Tolerating current diet: YES  % Diet Eaten: 100 %    Pain Management:   Patient states pain is manageable on current regimen: YES    Skin:  Crow Score: 20  Interventions: float heels and limit briefs    Patient Safety:  Fall Score:  Total Score: 3  Interventions: bed/chair alarm, gripper socks and pt to call before getting OOB  High Fall Risk: Yes    Length of Stay:  Expected LOS: 2d 19h  Actual LOS: Rosaura Hood RN

## 2020-11-17 NOTE — PROGRESS NOTES
11/17/2020 9:55 AM    Admit Date: 11/10/2020    Admit Diagnosis: Pericardial effusion [I31.3]    Subjective:     Dariusz Quinn feels better. Denies any symptoms. Visit Vitals  BP (!) 145/59   Pulse 70   Temp 98.4 °F (36.9 °C)   Resp 18   Ht 5' 9\" (1.753 m)   Wt 220 lb 0.3 oz (99.8 kg)   SpO2 94%   BMI 32.49 kg/m²     Current Facility-Administered Medications   Medication Dose Route Frequency    furosemide (LASIX) tablet 20 mg  20 mg Oral DAILY    rivaroxaban (XARELTO) tablet 20 mg  20 mg Oral DAILY    carvediloL (COREG) tablet 12.5 mg  12.5 mg Oral BID WITH MEALS    colchicine tablet 0.6 mg  0.6 mg Oral BID    sodium chloride (NS) flush 5-40 mL  5-40 mL IntraVENous Q8H    sodium chloride (NS) flush 5-40 mL  5-40 mL IntraVENous PRN    acetaminophen (TYLENOL) tablet 650 mg  650 mg Oral Q6H PRN    Or    acetaminophen (TYLENOL) suppository 650 mg  650 mg Rectal Q6H PRN    polyethylene glycol (MIRALAX) packet 17 g  17 g Oral DAILY PRN    promethazine (PHENERGAN) tablet 12.5 mg  12.5 mg Oral Q6H PRN    Or    ondansetron (ZOFRAN) injection 4 mg  4 mg IntraVENous Q6H PRN    aspirin chewable tablet 81 mg  81 mg Oral DAILY    atorvastatin (LIPITOR) tablet 40 mg  40 mg Oral QHS         Objective:      Visit Vitals  BP (!) 145/59   Pulse 70   Temp 98.4 °F (36.9 °C)   Resp 18   Ht 5' 9\" (1.753 m)   Wt 220 lb 0.3 oz (99.8 kg)   SpO2 94%   BMI 32.49 kg/m²       Physical Exam:  Resting comfortably. No resp distress.    Extremities: extremities normal, atraumatic, no cyanosis or edema  Heart: regular rate and rhythm, S1, S2 normal, no murmur, click, rub or gallop  Lungs: clear to auscultation bilaterally  Neurologic: Grossly normal    Data Review:   Labs:    Recent Results (from the past 24 hour(s))   EKG, 12 LEAD, INITIAL    Collection Time: 11/16/20 10:57 AM   Result Value Ref Range    Ventricular Rate 89 BPM    Atrial Rate 89 BPM    P-R Interval 202 ms    QRS Duration 128 ms    Q-T Interval 408 ms    QTC Calculation (Bezet) 496 ms    Calculated P Axis 57 degrees    Calculated R Axis 72 degrees    Calculated T Axis 27 degrees    Diagnosis       Normal sinus rhythm  Nonspecific intraventricular block  Minimal voltage criteria for LVH, may be normal variant ( Atif product )  Poor R wave progression   Abnormal ECG  When compared with ECG of 12-NOV-2020 10:40,  Sinus rhythm has replaced Atrial fibrillation  Confirmed by Flora Olsen ([id number]) on 11/16/2020 1:45:30 PM     ECHO TAZ W POSSIBLE CARDIOVERSION    Collection Time: 11/16/20 11:15 AM   Result Value Ref Range    RVSP 61.87 mmHg    Est. RA Pressure 10.00 mmHg    Triscuspid Valve Regurgitation Peak Gradient 51.87 mmHg    TR Max Velocity 886.16 cm/s   METABOLIC PANEL, BASIC    Collection Time: 11/17/20  3:15 AM   Result Value Ref Range    Sodium 139 136 - 145 mmol/L    Potassium 3.6 3.5 - 5.1 mmol/L    Chloride 105 97 - 108 mmol/L    CO2 27 21 - 32 mmol/L    Anion gap 7 5 - 15 mmol/L    Glucose 105 (H) 65 - 100 mg/dL    BUN 23 (H) 6 - 20 MG/DL    Creatinine 1.15 0.70 - 1.30 MG/DL    BUN/Creatinine ratio 20 12 - 20      GFR est AA >60 >60 ml/min/1.73m2    GFR est non-AA >60 >60 ml/min/1.73m2    Calcium 9.3 8.5 - 10.1 MG/DL       Telemetry: SR    Assessment:     Active Problems:    Pericardial effusion (11/10/2020)        Plan:     Pericardial effusion:   Continue colchicine. On TAZ pericardial effusion much better.      Cardiomyopathy:   Continue coreg. Hold ACEI due to elevated Cr. Will repeat Echo in few wk as out pt to f/u EF. 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 Further work up based upon f/u Echo.       Atrial Fibriliation: rate controlled 80-90's   CHADVASc=5. Was on xeralto for CVA risk reduction. On IV heparin for now.   Has past history of atrial flutter s/p ablation in 2017  S/p DCCV.  Remains in SR.      Nonobstructive atherosclerotic heart disease:   Last ejection fraction 60% 2017.    20% lesion proximal LAD on last cath 2/2014      Hypertension:   Controlled.       Renal insuff:   Resolved.      Hyperlipidemia:    9/2020 LDL 28 continue statin  Calm

## 2020-11-17 NOTE — PROGRESS NOTES
Problem: Self Care Deficits Care Plan (Adult)  Goal: *Acute Goals and Plan of Care (Insert Text)  Description:     FUNCTIONAL STATUS PRIOR TO ADMISSION: Patient was independent with ADLs, performing IADLs and ambulating with a cane    HOME SUPPORT: The patient lived with his wife and reported having son who lives near him. Occupational Therapy Goals  Initiated 11/17/2020  1. Patient will perform grooming standing at sink with  modified independence within 7 day(s). 2.  Patient will perform upper body dressing with supervision/set-up within 7 day(s). 3.  Patient will perform lower body dressing with supervision/set-up within 7 day(s). 4.  Patient will perform toilet transfers with modified independence within 7 day(s). 5.  Patient will perform all aspects of toileting with modified independence within 7 day(s). 6.  Patient will perform sponge bathing with supervision/set-up within 7 day(s). Outcome: Progressing Towards Goal    OCCUPATIONAL THERAPY EVALUATION  Patient: Ankita Evangelista (84 y.o. male)  Date: 11/17/2020  Primary Diagnosis: Pericardial effusion [I31.3]  Procedure(s) (LRB):  PERICARDIOCENTESIS (N/A) 5 Days Post-Op   Precautions: Falls       ASSESSMENT  Based on the objective data described below, the patient presents with GW, decreased activity tolerance, decreased safety awareness and decreased balance which is impairing his functional independence. The patient is functioning below his independent to mod I baseline, now performing ADLs at an independent to min A level and is CGA for functional mobility. The patient will benefit from acute OT intervention and will need HHOT and PT after discharge. Functional Outcome Measure: The patient scored 50/100 on the Barthel Index outcome measure which is indicative of a 50% impairment in function.           PLAN :  Recommendations and Planned Interventions: self care training, functional mobility training, therapeutic exercise, therapeutic activities, endurance activities, patient education, home safety training, and family training/education    Frequency/Duration: Patient will be followed by occupational therapy 4 times a week to address goals.     Recommendation for discharge: Huan Harrell PT and supervision      Equipment recommendations for successful discharge (if) home: walker: rolling       OBJECTIVE DATA SUMMARY:   HISTORY:   Past Medical History:   Diagnosis Date    Acute respiratory failure with hypoxia ([facility] [address] 02/08/2014    also 11/28/15, 2/17/16, 5/14/17     Alcohol abuse     As of 11/29/18 pt stated he quit 20 years    Arthritis     Back and shoulder    Atrial flutter ([address] 08/2017    saw Dr. Rhonda Jose; underwent a-flutter ablation    BPH (benign prostatic hyperplasia)     CHF (congestive heart failure) ([facility] [address] 02/11/2014    TTE 11/15: EF 40-45%, 2/14: EF 20%  Admitted for acute exacerbations 2/8/14, 11/28/15, 2/17/16, and 5/4/17)    Chronic kidney disease     III    Chronic low back pain     LS spine X-ray 4/8/17: mild DDD    Combined forms of age-related cataract of left eye 12/12/2018    KPE/IOL OS    Combined forms of age-related cataract of right eye 11/28/2018    KPE/IOL OD    COPD (chronic obstructive pulmonary disease) ([address]     PA (dyspnea on exertion)     ED (erectile dysfunction)     Elevated troponin 02/11/2014    also 11/28/15; due to cardiac strain    Frequent hospital admissions     5/14-5/23/17: acute hypoxic resp failure due to CHF exac, ROBINSON on CKD 3, sepsis due to LE cellulitis, 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  2/17-2/22/16: acute hypoxic resp failure, acute diarrhea  11/28-11/30/15: acute hypoxic resp failure due to acute CHF exac, elevated troponin due to cardiac strain  2/8-2/12/14: acuter hypoxic resp failure due to CHF exac, pneumonia     Hand blister 10/8/2017    Bilateral    Hypertension     Kidney disease, chronic, stage III (GFR 30-59 ml/min)     Leg fracture, right 1973    Nonischemic cardiomyopathy ([address]     with LVH    Pneumonia 02/08/2014 2/8/14 and 10/30/15    Poor historian     As of 11/29/18 pt reports 5th grade education, pt unable to give med list-obtained from wife per patient's permission    Prediabetes     Prostate cancer ([facility] [address] 2016    As of 11/29/18, pt states he gets two injections twice a year for this    Pulmonary edema 11/28/2015    S/P cardiac cath 2/12/2014 2/12/14 no significant coronary disease, severe LV dysfunction    Tinea cruris     also genital folliculitis    Vertigo      Past Surgical History:   Procedure Laterality Date    CARDIAC SURG PROCEDURE UNLIST  08/22/2017    SVT ablation    COLONOSCOPY N/A 3/2/2020    COLONOSCOPY performed by Yvrose Watson MD at [facility] ENDOSCOPY. 2 tubular adenomas, diverticulosis, int hemorrhoids, repeat in 5 yrs    COLONOSCOPY,DIAGNOSTIC  02/22/2016    Dr. Barb Rogers; single sessile polyp at descending colon, sigmoid diverticulosis, int hemorrhoids    COLONOSCOPY,REMV LESN,SNARE  2/22/2016         COLONOSCOPY,REMV LESN,SNARE  3/2/2020         HX CATARACT REMOVAL Right 12/05/2018    Dr. Jose D Yang Left 12/12/2018    Dr. Radha Ibarra.  LEFT EYE SECOND REFRATIVE CATARACT REMOVAL WITH LENS IMPLANTATION    HX ORTHOPAEDIC Right 1980's    index finger    HX OTHER SURGICAL  08/22/2017    Dr. Juan Rebollar; atrial flutter ablation    HX ROTATOR CUFF REPAIR Right 2000    [address]       Expanded or extensive additional review of patient history:     Home Situation  Home Environment: Private residence  # Steps to Enter: 2  One/Two Story Residence: Two story  # of Interior Steps: 14  Living Alone: No  Support Systems: Spouse/Significant Other/Partner  Patient Expects to be Discharged to[de-identified] Private residence  Current DME Used/Available at Home: Cane, straight, Shower chair    Hand dominance: Right    EXAMINATION OF PERFORMANCE DEFICITS:  Cognitive/Behavioral Status:  Neurologic State: Alert  Orientation Level: Oriented X4  Cognition: Appropriate for age attention/concentration; Follows commands        Safety/Judgement: Decreased awareness of need for safety      Hearing: Auditory  Auditory Impairment: None    Vision/Perceptual:    Acuity: Within Defined Limits         Range of Motion:  AROM: Generally decreased, functional    Strength:  Strength: Generally decreased, functional    Coordination:  Coordination: Within functional limits            Tone & Sensation:  Tone: Normal  Sensation: Intact       Balance:  Sitting: Intact  Standing: Impaired  Standing - Static: Constant support;Good  Standing - Dynamic : Fair    Functional Mobility and Transfers for ADLs:  Bed Mobility:   Presented up in chair    Transfers:  Sit to Stand: Contact guard assistance  Stand to Sit: Contact guard assistance  Bed to Chair: Contact guard assistance(ambulating with a RW)  Bathroom Mobility: Contact guard assistance(ambulating with a RW)  Toilet Transfer : Contact guard assistance    ADL Assessment:  Feeding: Independent    Oral Facial Hygiene/Grooming: Contact guard assistance    Bathing: Minimum assistance    Upper Body Dressing: Minimum assistance    Lower Body Dressing: Minimum assistance    Toileting: Minimum assistance             Functional Measure:  Barthel Index:    Bathin  Bladder: 10  Bowels: 10  Groomin  Dressin  Feeding: 10  Mobility: 0  Stairs: 0  Toilet Use: 5  Transfer (Bed to Chair and Back): 10  Total: 50/100        Percentage of impairment   0%   1-19%   20-39%   40-59%   60-79%   80-99%   100%   Barthel Score 0-100 100 99-80 79-60 59-40 20-39 1-19   0     The Barthel ADL Index: Guidelines  1. The index should be used as a record of what a patient does, not as a record of what a patient could do. 2. The main aim is to establish degree of independence from any help, physical or verbal, however minor and for whatever reason. 3. The need for supervision renders the patient not independent.   4. A patient's performance should be established using the best available evidence. Asking the patient, friends/relatives and nurses are the usual sources, but direct observation and common sense are also important. However direct testing is not needed. 5. Usually the patient's performance over the preceding 24-48 hours is important, but occasionally longer periods will be relevant. 6. Middle categories imply that the patient supplies over 50 per cent of the effort. 7. Use of aids to be independent is allowed. Leandrew Cellar., Barthel, D.W. (8757). Functional evaluation: the Barthel Index. [address] [facility] (14)2. Luci Thomas skylar KRZYSZTOF [place], Bailey Herrera., Judit Earl., [place], [facility] (1999). Measuring the change indisability after inpatient rehabilitation; comparison of the responsiveness of the Barthel Index and Functional [place] Measure. Journal of Neurology, Neurosurgery, and Psychiatry, 66(4), 182-180. CATRINA ClancyA, BISHOP Santos, & Paulina Hodge, MAshleyA. (2004.) Assessment of post-stroke quality of life in cost-effectiveness studies: The usefulness of the Barthel Index and the EuroQoL-5D. Quality of Life Research, 13, 675-93        Activity Tolerance:   Poor, but VSS on RA      After treatment patient left in no apparent distress:    Sitting in chair, Call bell within reach, and Bed / chair alarm activated    COMMUNICATION/EDUCATION:   The patients plan of care was discussed with: Physical Therapist and Registered Nurse. Patient is unable to participate in goal setting and plan of care. This patients plan of care is appropriate for delegation to [facility].     Thank you for this referral.  Richard Ford, OTR/L  Time Calculation: 23 mins

## 2020-11-18 ENCOUNTER — HOME HEALTH ADMISSION (OUTPATIENT)
Dept: HOME HEALTH SERVICES | Facility: HOME HEALTH | Age: 80
End: 2020-11-18
Payer: MEDICARE

## 2020-11-18 ENCOUNTER — TELEPHONE (OUTPATIENT)
Dept: INTERNAL MEDICINE CLINIC | Age: 80
End: 2020-11-18

## 2020-11-18 VITALS
WEIGHT: 219.8 LBS | TEMPERATURE: 98.8 F | RESPIRATION RATE: 18 BRPM | HEART RATE: 89 BPM | BODY MASS INDEX: 32.56 KG/M2 | DIASTOLIC BLOOD PRESSURE: 59 MMHG | SYSTOLIC BLOOD PRESSURE: 147 MMHG | HEIGHT: 69 IN | OXYGEN SATURATION: 95 %

## 2020-11-18 LAB
ANION GAP SERPL CALC-SCNC: 5 MMOL/L (ref 5–15)
BUN SERPL-MCNC: 19 MG/DL (ref 6–20)
BUN/CREAT SERPL: 18 (ref 12–20)
CALCIUM SERPL-MCNC: 9 MG/DL (ref 8.5–10.1)
CHLORIDE SERPL-SCNC: 106 MMOL/L (ref 97–108)
CO2 SERPL-SCNC: 26 MMOL/L (ref 21–32)
CREAT SERPL-MCNC: 1.03 MG/DL (ref 0.7–1.3)
ERYTHROCYTE [DISTWIDTH] IN BLOOD BY AUTOMATED COUNT: 14.4 % (ref 11.5–14.5)
GLUCOSE SERPL-MCNC: 102 MG/DL (ref 65–100)
HCT VFR BLD AUTO: 26.6 % (ref 36.6–50.3)
HGB BLD-MCNC: 8.2 G/DL (ref 12.1–17)
MCH RBC QN AUTO: 24.7 PG (ref 26–34)
MCHC RBC AUTO-ENTMCNC: 30.8 G/DL (ref 30–36.5)
MCV RBC AUTO: 80.1 FL (ref 80–99)
NRBC # BLD: 0 K/UL (ref 0–0.01)
NRBC BLD-RTO: 0 PER 100 WBC
PLATELET # BLD AUTO: 271 K/UL (ref 150–400)
PMV BLD AUTO: 10.8 FL (ref 8.9–12.9)
POTASSIUM SERPL-SCNC: 3.7 MMOL/L (ref 3.5–5.1)
RBC # BLD AUTO: 3.32 M/UL (ref 4.1–5.7)
SODIUM SERPL-SCNC: 137 MMOL/L (ref 136–145)
WBC # BLD AUTO: 4.4 K/UL (ref 4.1–11.1)

## 2020-11-18 PROCEDURE — 74011250637 HC RX REV CODE- 250/637: Performed by: NURSE PRACTITIONER

## 2020-11-18 PROCEDURE — 80048 BASIC METABOLIC PNL TOTAL CA: CPT

## 2020-11-18 PROCEDURE — 99233 SBSQ HOSP IP/OBS HIGH 50: CPT | Performed by: INTERNAL MEDICINE

## 2020-11-18 PROCEDURE — 74011250637 HC RX REV CODE- 250/637: Performed by: INTERNAL MEDICINE

## 2020-11-18 PROCEDURE — 36415 COLL VENOUS BLD VENIPUNCTURE: CPT

## 2020-11-18 PROCEDURE — 85027 COMPLETE CBC AUTOMATED: CPT

## 2020-11-18 RX ORDER — COLCHICINE 0.6 MG/1
0.6 TABLET ORAL 2 TIMES DAILY
Qty: 60 TAB | Refills: 2 | Status: SHIPPED | OUTPATIENT
Start: 2020-11-18 | End: 2021-02-16

## 2020-11-18 RX ORDER — CARVEDILOL 12.5 MG/1
12.5 TABLET ORAL 2 TIMES DAILY WITH MEALS
Qty: 60 TAB | Refills: 0 | Status: SHIPPED | OUTPATIENT
Start: 2020-11-18 | End: 2020-12-17 | Stop reason: SDUPTHER

## 2020-11-18 RX ADMIN — Medication 10 ML: at 06:37

## 2020-11-18 RX ADMIN — FUROSEMIDE 20 MG: 20 TABLET ORAL at 09:08

## 2020-11-18 RX ADMIN — COLCHICINE 0.6 MG: 0.6 TABLET ORAL at 09:08

## 2020-11-18 RX ADMIN — ASPIRIN 81 MG CHEWABLE TABLET 81 MG: 81 TABLET CHEWABLE at 09:07

## 2020-11-18 RX ADMIN — CARVEDILOL 12.5 MG: 12.5 TABLET, FILM COATED ORAL at 09:07

## 2020-11-18 RX ADMIN — RIVAROXABAN 20 MG: 20 TABLET, FILM COATED ORAL at 09:07

## 2020-11-18 NOTE — ROUTINE PROCESS
PCP GENARO apt scheduled with Dr. Yanni Saba on 12/2/20(next avail apt) at 10:50AM.  Apt added to AVS

## 2020-11-18 NOTE — PROGRESS NOTES
Problem: Falls - Risk of  Goal: *Absence of Falls  Description: Document Sravan Samaniego Fall Risk and appropriate interventions in the flowsheet.   Outcome: Progressing Towards Goal  Note: Fall Risk Interventions:  Mobility Interventions: Bed/chair exit alarm, Patient to call before getting OOB         Medication Interventions: Bed/chair exit alarm, Patient to call before getting OOB, Teach patient to arise slowly    Elimination Interventions: Bed/chair exit alarm, Call light in reach, Toileting schedule/hourly rounds, Urinal in reach    History of Falls Interventions: Bed/chair exit alarm

## 2020-11-18 NOTE — TELEPHONE ENCOUNTER
Verified patients name and date of birth. Alon Hollins that Dr America Castellanos will follow patient for [facility]. Patient has hospital follow up scheduled for 12/2.

## 2020-11-18 NOTE — PROGRESS NOTES
GENARO: Home with Home Health [facility] Follow-up Appointments    9:07am-No further CM needs identified. CM notified pt's nurse of d/c.    9:03am-CM met with pt at bedside to discuss d/c plan. CM informed Pt stated that his wife will transport at discharge. 8:55am-CM sent emailed to [facility] Specialist to schedule pt's follow-up appointment. Care Management Interventions  PCP Verified by CM: Yes  Mode of Transport at Discharge: Other (see comment)(Pt's wife will transport at d/c. )  Transition of Care Consult (CM Consult): Discharge Planning(Home with [facility]))  Discharge Durable Medical Equipment: No  Physical Therapy Consult: Yes  Occupational Therapy Consult: Yes  Speech Therapy Consult: No  Current Support Network: Lives with Spouse, Own Home  Confirm Follow Up Transport: Self  [place] Resource Information Provided?: No  Discharge Location  Discharge Placement: Home with home health(Home with [facility]))    CM will continue to follow patient for discharge planning needs and arrange for services as deemed necessary.     Wendy Harris [address] [place] [address]  [phone]

## 2020-11-18 NOTE — PROGRESS NOTES
Problem: Falls - Risk of  Goal: *Absence of Falls  Description: Document Yury Fernando Fall Risk and appropriate interventions in the flowsheet.   Outcome: Progressing Towards Goal  Note: Fall Risk Interventions:  Mobility Interventions: Bed/chair exit alarm, Patient to call before getting OOB         Medication Interventions: Bed/chair exit alarm, Patient to call before getting OOB, Teach patient to arise slowly    Elimination Interventions: Bed/chair exit alarm, Call light in reach, Toileting schedule/hourly rounds, Urinal in reach    History of Falls Interventions: Bed/chair exit alarm

## 2020-11-18 NOTE — PROGRESS NOTES
End of Shift Note    Bedside shift change report given to Sam Langford (oncoming nurse) by Demetrius Adams RN (offgoing nurse). Report included the following information SBAR, Kardex, Intake/Output, MAR and Recent Results    Shift worked:  7p-7a   Shift summary and any significant changes:            Concerns for physician to address:     Zone phone for oncoming shift:        Activity:  Activity Level: Up with Assistance  Number times ambulated in hallways past shift: 0  Number of times OOB to chair past shift: 1    Cardiac:   Cardiac Monitoring: Yes      Cardiac Rhythm: Normal sinus rhythm    Access:   Current line(s): PIV     Genitourinary:   Urinary status: voiding    Respiratory:   O2 Device: Room air  Chronic home O2 use?: NO  Incentive spirometer at bedside: NO     GI:  Last Bowel Movement Date: 11/14/20  Current diet:  DIET CARDIAC Regular  DIET ONE TIME MESSAGE  Passing flatus: YES  Tolerating current diet: YES  % Diet Eaten: 100 %    Pain Management:   Patient states pain is manageable on current regimen: YES    Skin:  Crow Score: 20  Interventions: float heels and limit briefs    Patient Safety:  Fall Score:  Total Score: 3  Interventions: bed/chair alarm, gripper socks and pt to call before getting OOB  High Fall Risk: Yes    Length of Stay:  Expected LOS: 2d 19h  Actual LOS: [address] [facility], RN

## 2020-11-18 NOTE — PROGRESS NOTES
0700  Received bedside report from Mike Arambula, [address] [place] [address]. SBAR, Kardex, and MAR were discussed. End of Shift Note    Bedside shift change report given to Aaron Iqbal (oncoming nurse) by Talha Herron RN (offgoing nurse). Report included the following information SBAR, Kardex, ED Summary, Intake/Output, MAR, Recent Results and Cardiac Rhythm NSR    Shift worked:  [id number]   Shift summary and any significant changes:     Bladder scanned at 1350 for 0mL and 1530 for 9mL       Concerns for physician to address:      Zone phone for oncoming shift:         Activity:  Activity Level: Up with Assistance  Number times ambulated in hallways past shift: 0  Number of times OOB to chair past shift: 1    Cardiac:   Cardiac Monitoring: Yes      Cardiac Rhythm: Normal sinus rhythm    Access:   Current line(s): PIV     Genitourinary:   Urinary status: voiding    Respiratory:   O2 Device: Room air  Chronic home O2 use?: NO  Incentive spirometer at bedside: NO     GI:  Last Bowel Movement Date: 11/17/20  Current diet:  DIET CARDIAC Regular  DIET ONE TIME MESSAGE  Passing flatus: YES  Tolerating current diet: YES  % Diet Eaten: 100 %    Pain Management:   Patient states pain is manageable on current regimen: YES    Skin:  Crow Score: 20  Interventions: increase time out of bed    Patient Safety:  Fall Score:  Total Score: 3  Interventions: bed/chair alarm, assistive device (walker, cane, etc), gripper socks, pt to call before getting OOB and stay with me (per policy)  High Fall Risk: Yes    Length of Stay:  Expected LOS: 2d 19h  Actual LOS: 12 Reggie Judge RN

## 2020-11-18 NOTE — DISCHARGE SUMMARY
Hospitalist Discharge Summary     Patient ID:  Ashley Bagley  [id number]  33 y.o.  1940  11/10/2020    PCP on record: Isaac Velasquez MD    Admit date: 11/10/2020  Discharge date and time: 11/18/2020    DISCHARGE DIAGNOSIS:    Persistent A.fib POA s/p DCCV by Dr Maurizio Sheriff- now in sinus rhythm, cont coreg, Xarelto per recommendations  History of paroxysmal A. Fib POA  Chest Pains due to acute pericarditis with pericardial effusion without tamponade POA- improved, cont colchicine x 3 months as recommended, repeat Echo in office with Dr Maurizio Sheriff  Hypotension POA - resolved  Acute kidney injury POA - resolved  Chronic Systolic Heart Failure / Cardiomyopathy (40% in past, recovered, now 30%) POA- cont Lasix low dose at 20 mg daily per cardiology, no room for ACE/ARB due to low BP- consider as outpatient as able  Abdominal pain, reports left upper quadrant pain- resolved  Oliguria (11/17) - resolved before discharge  Hyperlipidemia  Full code      CONSULTATIONS:  IP CONSULT TO CARDIOLOGY  IP CONSULT TO CARDIOLOGY  IP CONSULT TO HOSPITALIST    Excerpted HPI from H&P of Sarah Beth Gabriel MD:  \" 68 y.o.  male who presents with past medical history of CHF, CKD stage III, A. fib presented to ED with a chief complaint of chest pain.  -Patient reports that he has been having chest pain since past 2 weeks. Pain is worse with breathing, he also endorses dyspnea on exertion. His last episode of chest pain was hours ago he was seen in the ED multiple times including at [place] [address] and was sent home. He was checked for COVID-19, which was negative  -Patient reports fever at home and cough with expectoration. He was seen by Dr. Fatmata Singh in his office, his systolic was 29G, and for further evaluation he was sent here. In the ED his temperature was 100.5, pulse 109, he was given Vanco and cefepime in the ED.  ED physician did bedside echo, and was concern for complex fluid collection in pericardial space.        We were asked to admit for work up and evaluation of the above problems. \"    ______________________________________________________________________  DISCHARGE SUMMARY/HOSPITAL COURSE:  for full details see H&P, daily progress notes, labs, consult notes.      Oliguria- resolved     Persistent A.fib  History of paroxysmal A. fib  Continue BB  S/p TAZ and cardioversion yesterday, now in sinus  Restarted Xarelto  PT/OT > HH     Chest Pains due to acute pericarditis with pericardial effusion without temponade  Slowly Improving  Continue Colchicine  unable to use NSAIDs due to renal insufficiency  No plan for pericardiocentesis at this time per cardiology  Repeat echo without change in pericardial effusion  Effusion continued to improve on TAZ as per cardiology     Hypotension POA systolic blood pressure in 70s at cardiology office  Acute kidney injury POA   Chronic Systolic Heart Failure / Cardiomyopathy (40% in past, recovered, now 30%)  Lasix recently increased from 20 to 40mg, now held due to ROBINSON  S/P IVF, now held  Multiple SARS-CoV-2 test negative including a rapid and PCR test this admission  CTA with moderate pericardial fusion, and large mediastinal lymph nodes, No PE or ASD except atelectasis due to the effusion  Hypotensive at cardiology office, baseline normally takes multiple BP medications  ECHO LVEF 30-35%, normal RV function, Mod to large circumfrential pericardial effusion without tamponade  Empiric Vanco and cefepime, wean depending on plan with pericardiocenthesis  Pro calcitonin 2.67, will trend  Cr better  Diuretics as per cardiology  BB as below     Abdominal pain, reports left upper quadrant pain  CT abdomen/pelvis unremarkable  Normal lipase, doubt acute pancreatitis  Suspect pain from the pericardial process     Hyperlipidemia  Continue statins     Obesity POA Body mass index is 32.49 kg/m².        Code Status: Full code        _______________________________________________________________________  Patient seen and examined by me on discharge day. Pertinent Findings:  Gen:    Not in distress  Chest: Clear lungs  CVS:   Regular rhythm. No edema  Abd:  Soft, not distended, not tender  Neuro:  Alert, oriented x 3  _______________________________________________________________________  DISCHARGE MEDICATIONS:   Current Discharge Medication List      START taking these medications    Details   rivaroxaban (XARELTO) 20 mg tab tablet Take 1 Tab by mouth daily. Qty: 30 Tab, Refills: 1      colchicine 0.6 mg tablet Take 1 Tab by mouth two (2) times a day for 90 days. Indications: inflammation of the covering of the heart or pericardium  Qty: 60 Tab, Refills: 2         CONTINUE these medications which have CHANGED    Details   carvediloL (COREG) 12.5 mg tablet Take 1 Tab by mouth two (2) times daily (with meals). Qty: 60 Tab, Refills: 0         CONTINUE these medications which have NOT CHANGED    Details   furosemide (LASIX) 20 mg tablet Take 20 mg by mouth daily. clotrimazole-betamethasone (LOTRISONE) topical cream Apply  to affected area two (2) times a day. atorvastatin (LIPITOR) 40 mg tablet TAKE 1 TABLET BY MOUTH ONCE DAILY AT NIGHT  Qty: 90 Tab, Refills: 0      aspirin 81 mg chewable tablet Take 1 Tab by mouth daily. Qty: 10 Tab, Refills: 0         STOP taking these medications       amLODIPine (NORVASC) 2.5 mg tablet Comments:   Reason for Stopping:                 Patient Follow Up Instructions:    Activity: Activity as tolerated  Diet: Cardiac Diet and Low fat, Low cholesterol    Follow-up with Dr Davin Melgar ([facility] cardiology)  in 2 weeks   Follow-up tests/labs : repeat Echo as recommended by cardiology    Follow-up Information     Follow up With Specialties Details [address] Chong Abel MD Internal Medicine   [place] [id number] [phone] ________________________________________________________________    Risk of deterioration: Low    Condition at Discharge:  Stable  __________________________________________________________________    Disposition  Home with family and home health services    ____________________________________________________________________    Code Status: Full Code  ___________________________________________________________________      Total time in minutes spent coordinating this discharge (includes going over instructions, follow-up, prescriptions, and preparing report for sign off to her PCP) :  >30 minutes    Signed:  [place] MD

## 2020-11-18 NOTE — DISCHARGE INSTRUCTIONS
HOSPITALIST DISCHARGE INSTRUCTIONS    NAME: Lowell Jones   :  1940   MRN:  [id number]     Date/Time:  2020 8:57 AM    ADMIT DATE: 11/10/2020     DISCHARGE DATE: 2020     DISCHARGE DIAGNOSIS:  Persistent A.fib POA s/p DCCV by Dr Seena Goltz- now in sinus rhythm, cont coreg, Xarelto per recommendations  History of paroxysmal A. Fib POA  Chest Pains due to acute pericarditis with pericardial effusion without tamponade POA- improved, cont colchicine x 3 months as recommended, repeat Echo in office with Dr Seena Goltz  Hypotension POA - resolved  Acute kidney injury POA - resolved  Chronic Systolic Heart Failure / Cardiomyopathy (40% in past, recovered, now 30%) POA- cont Lasix low dose at 20 mg daily per cardiology, no room for ACE/ARB due to low BP- consider as outpatient as able  Abdominal pain, reports left upper quadrant pain- resolved  Oliguria () - resolved before discharge  Hyperlipidemia  Full code    Active Problems:    Pericardial effusion (11/10/2020)         MEDICATIONS:  As per medication reconciliation  list  · It is important that you take the medication exactly as they are prescribed. · Keep your medication in the bottles provided by the pharmacist and keep a list of the medication names, dosages, and times to be taken in your wallet. · Do not take other medications without consulting your doctor. Pain Management: per above medications    What to do at Home    Recommended diet:  Cardiac Diet and Low fat, Low cholesterol    Recommended activity: Activity as tolerated    If you have questions regarding the hospital related prescriptions or hospital related issues please call [facility] at .     If you experience any of the following symptoms then please call your primary care physician or return to the emergency room if you cannot get hold of your doctor:  Fever, chills, nausea, vomiting, diarrhea, change in mentation, falling, bleeding, shortness of breath,     Follow Up:  Dr. Robert Torres MD  you are to call and set up an appointment to see them in 7-10 days. Dr Esa Ritchie ([address] [place] [address] cardiology) in 2 -4 weeks    Information obtained by :  I understand that if any problems occur once I am at home I am to contact my physician. I understand and acknowledge receipt of the instructions indicated above.                                                                                                                                            Physician's or R.N.'s Signature                                                                  Date/Time                                                                                                                                              Patient or Representative Signature                                                          Date/Time

## 2020-11-18 NOTE — PROGRESS NOTES
11/18/2020 8:26 AM    Admit Date: 11/10/2020    Admit Diagnosis: Pericardial effusion [I31.3]    Subjective:     Sherrie Chamberlain feels much better. Sitting in chair. Visit Vitals  BP (!) 147/59   Pulse 80   Temp 98.8 °F (37.1 °C)   Resp 18   Ht 5' 9\" (1.753 m)   Wt 219 lb 12.8 oz (99.7 kg)   SpO2 95%   BMI 32.46 kg/m²     Current Facility-Administered Medications   Medication Dose Route Frequency    furosemide (LASIX) tablet 20 mg  20 mg Oral DAILY    rivaroxaban (XARELTO) tablet 20 mg  20 mg Oral DAILY    carvediloL (COREG) tablet 12.5 mg  12.5 mg Oral BID WITH MEALS    colchicine tablet 0.6 mg  0.6 mg Oral BID    sodium chloride (NS) flush 5-40 mL  5-40 mL IntraVENous Q8H    sodium chloride (NS) flush 5-40 mL  5-40 mL IntraVENous PRN    acetaminophen (TYLENOL) tablet 650 mg  650 mg Oral Q6H PRN    Or    acetaminophen (TYLENOL) suppository 650 mg  650 mg Rectal Q6H PRN    polyethylene glycol (MIRALAX) packet 17 g  17 g Oral DAILY PRN    promethazine (PHENERGAN) tablet 12.5 mg  12.5 mg Oral Q6H PRN    Or    ondansetron (ZOFRAN) injection 4 mg  4 mg IntraVENous Q6H PRN    aspirin chewable tablet 81 mg  81 mg Oral DAILY    atorvastatin (LIPITOR) tablet 40 mg  40 mg Oral QHS         Objective:      Visit Vitals  BP (!) 147/59   Pulse 80   Temp 98.8 °F (37.1 °C)   Resp 18   Ht 5' 9\" (1.753 m)   Wt 219 lb 12.8 oz (99.7 kg)   SpO2 95%   BMI 32.46 kg/m²       Physical Exam:  Resting comfortably. No resp distress.    Extremities: extremities normal, atraumatic, no cyanosis or edema  Heart: regular rate and rhythm, S1, S2 normal, no murmur, click, rub or gallop  Lungs: clear to auscultation bilaterally  Neurologic: Grossly normal    Data Review:   Labs:    Recent Results (from the past 24 hour(s))   METABOLIC PANEL, BASIC    Collection Time: 11/18/20  3:32 AM   Result Value Ref Range    Sodium 137 136 - 145 mmol/L    Potassium 3.7 3.5 - 5.1 mmol/L    Chloride 106 97 - 108 mmol/L    CO2 26 21 - 32 mmol/L    Anion gap 5 5 - 15 mmol/L    Glucose 102 (H) 65 - 100 mg/dL    BUN 19 6 - 20 MG/DL    Creatinine 1.03 0.70 - 1.30 MG/DL    BUN/Creatinine ratio 18 12 - 20      GFR est AA >60 >60 ml/min/1.73m2    GFR est non-AA >60 >60 ml/min/1.73m2    Calcium 9.0 8.5 - 10.1 MG/DL   CBC W/O DIFF    Collection Time: 11/18/20  3:32 AM   Result Value Ref Range    WBC 4.4 4.1 - 11.1 K/uL    RBC 3.32 (L) 4.10 - 5.70 M/uL    HGB 8.2 (L) 12.1 - 17.0 g/dL    HCT 26.6 (L) 36.6 - 50.3 %    MCV 80.1 80.0 - 99.0 FL    MCH 24.7 (L) 26.0 - 34.0 PG    MCHC 30.8 30.0 - 36.5 g/dL    RDW 14.4 11.5 - 14.5 %    PLATELET 134 701 - 262 K/uL    MPV 10.8 8.9 - 12.9 FL    NRBC 0.0 0  WBC    ABSOLUTE NRBC 0.00 0.00 - 0.01 K/uL       Telemetry: SR      Assessment:     Active Problems:    Pericardial effusion (11/10/2020)        Plan:     Pericardial effusion:   Continue colchicine. Colchicine for 3 months. On TAZ pericardial effusion much better.      Cardiomyopathy:   Continue coreg. Hold ACEI due to elevated Cr. Will repeat Echo in few wk as out pt to f/u EF. 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 Further work up based upon f/u Echo.       Atrial Fibriliation: rate controlled 80-90's   CHADVASc=5. Was on xeralto for CVA risk reduction. On IV heparin for now.   Has past history of atrial flutter s/p ablation in 2017  S/p DCCV. Remains in SR.      Nonobstructive atherosclerotic heart disease:   Last ejection fraction 60% 2017.    20% lesion proximal LAD on last cath 2/2014      Hypertension:   Controlled.       Renal insuff:   Resolved.      Hyperlipidemia:    9/2020 LDL 28 continue statin     Will f/u as out pt. Please call if can be of any further assistance.

## 2020-11-19 ENCOUNTER — PATIENT OUTREACH (OUTPATIENT)
Dept: CASE MANAGEMENT | Age: 80
End: 2020-11-19

## 2020-11-19 ENCOUNTER — TELEPHONE (OUTPATIENT)
Dept: CARDIOLOGY CLINIC | Age: 80
End: 2020-11-19

## 2020-11-19 NOTE — PROGRESS NOTES
Patient was admitted to [facility] on 11/10/2020 and discharged on 2020 for A-fib. Outreach made within 2 business days of discharge: Yes    Top Discharge Challenges to be reviewed by the provider   Additional needs identified to be addressed with provider no  home health care- angel BRAGA and medications- xarelto, colcichine  Discussed COVID-19 related testing which was available at this time. Test results were negative. Patient informed of results, if available? no   Method of communication with provider : none       Advance Care Planning:   Does patient have an Advance Directive:  currently not on file; education provided     Inpatient Readmission Risk score:   Was this a readmission? no   Patient stated reason for the admission: SOB  Patients top risk factors for readmission: medical condition  Interventions to address risk factors: schedule and attend follow up appointments, take medication as prescribed, participate with [facility] Monitor hydration    Care Transition Nurse (CTN) contacted the patient by telephone to perform post hospital discharge assessment. Verified name and  with patient as identifiers. Provided introduction to self, and explanation of the CTN role. CTN reviewed discharge instructions, medical action plan and red flags with patient who verbalized understanding. Patient given an opportunity to ask questions and does not have any further questions or concerns at this time. The patient agrees to contact the PCP office for questions related to their healthcare. Medication reconciliation was performed with patient, who verbalizes understanding of administration of home medications. Advised obtaining a 90-day supply of all daily and as-needed medications.    Referral to Pharm D needed: no     Home Health/Outpatient orders at discharge: PT and [address]: [facility]  Date of initial visit: 2020    Durable Medical Equipment ordered at discharge: none  [address]: n/a  Durable Medical Equipment received: n/a    Covid Risk Education    Patient has following risk factors of: heart failure, COPD, diabetes, chronic kidney disease and cancer. Education provided regarding infection prevention, and signs and symptoms of COVID-19 and when to seek medical attention with patient who verbalized understanding. Discussed exposure protocols and quarantine From CDC: Are you at higher risk for severe illness?  and given an opportunity for questions and concerns. The patient agrees to contact the COVID-19 hotline [phone] or PCP office for questions related to COVID-19. For more information on steps you can take to protect yourself, see CDC's How to Protect Yourself     Patient/family/caregiver given information for [employer] and agrees to enroll n/a  Patient's preferred e-mail: n/a  Patient's preferred phone number: n/a    Discussed follow-up appointments. If no appointment was previously scheduled, appointment scheduling offered: [facility] follow up appointment(s):   Future Appointments   Date Time Provider Diana Avila   11/20/2020 To Be Determined [place] NENA Ascencio   11/23/2020 To Be Determined Betsy Persons [address]   12/2/2020 10:50 AM Yoan Wolff MD [facility]   12/3/2020  1:30 PM [address] [place] [address] RCR PET 1 SMHRCHPELULA HAHN SPENCER   12/9/2020  2:40 PM Frankie ORTIZ, NP [facility]   1/5/2021  9:10 AM MD ANNY Randle [facility]   5/20/2021  1:15 PM Estella Dacosta MD [facility]     Non-[facility] follow up appointment(s):   Plan for follow-up call in 10-14 days based on severity of symptoms and risk factors. CTN provided contact information for future needs. Goals Addressed                 This Visit's Progress     Prevent complications post hospitalization.         11/19/2020 Patient reports diarrhea which is was present at discharge from hospital. Spouse will call cardiology to inquire of medication can be adjusted or changed and will report at next outreach. [facility] to begin services 11/20/2020, PCP 12/2/2020 and cardio 12/9/2020.  GILBERT

## 2020-11-19 NOTE — TELEPHONE ENCOUNTER
Patient wife Osmin Diaz would like to speak to the nurse in reference to her  new medication that is causing to run to the bathroom a lot.             Thanks,

## 2020-11-19 NOTE — PROGRESS NOTES
DISCHARGE SUMMARY FROM NURSE    The patient is stable for discharge. I have reviewed the discharge instructions with the patient and spouse. The patient and spouse verbalized understanding. All questions were fully answered. The patient and spouse verbalized no complaints. Hard scripts and medication handouts were given and reviewed with the patient and spouse. Appropriate educational materials and medication side effects teaching were provided also provided. Cardiac monitor and IV line(s) were removed. The following personal items collected during your admission were returned to the patient/family  Home medications:    Dental Appliance: Dental Appliances: None  Vision: Visual Aid: None  Hearing Aid:    Jewelry: Jewelry: None  Clothing: Clothing: At bedside, Footwear, Hat, Pants, Shirt, Socks, Undergarments  Other Valuables: Other Valuables: None  Valuables sent to safe: Personal Items Sent to Safe: none    OR _________________________________________________________________________________________    There were no personal belongings, valuables or home medications left at patient's bedside,  or safe.

## 2020-11-20 ENCOUNTER — HOME CARE VISIT (OUTPATIENT)
Dept: SCHEDULING | Facility: HOME HEALTH | Age: 80
End: 2020-11-20
Payer: MEDICARE

## 2020-11-20 VITALS
DIASTOLIC BLOOD PRESSURE: 80 MMHG | TEMPERATURE: 97.2 F | OXYGEN SATURATION: 92 % | RESPIRATION RATE: 22 BRPM | HEART RATE: 108 BPM | SYSTOLIC BLOOD PRESSURE: 150 MMHG

## 2020-11-20 PROCEDURE — 3331090002 HH PPS REVENUE DEBIT

## 2020-11-20 PROCEDURE — 3331090001 HH PPS REVENUE CREDIT

## 2020-11-20 PROCEDURE — G0299 HHS/HOSPICE OF RN EA 15 MIN: HCPCS

## 2020-11-20 PROCEDURE — 400013 HH SOC

## 2020-11-20 NOTE — TELEPHONE ENCOUNTER
Left message on Identified VM to call office at [phone]. Advised I can only speak with the patient as no one else is on his [address] phone.

## 2020-11-21 PROCEDURE — 3331090001 HH PPS REVENUE CREDIT

## 2020-11-21 PROCEDURE — 3331090002 HH PPS REVENUE DEBIT

## 2020-11-22 PROCEDURE — 3331090002 HH PPS REVENUE DEBIT

## 2020-11-22 PROCEDURE — 3331090001 HH PPS REVENUE CREDIT

## 2020-11-23 ENCOUNTER — TELEPHONE (OUTPATIENT)
Dept: CARDIOLOGY CLINIC | Age: 80
End: 2020-11-23

## 2020-11-23 ENCOUNTER — HOME CARE VISIT (OUTPATIENT)
Dept: SCHEDULING | Facility: HOME HEALTH | Age: 80
End: 2020-11-23
Payer: MEDICARE

## 2020-11-23 ENCOUNTER — TELEPHONE (OUTPATIENT)
Dept: INTERNAL MEDICINE CLINIC | Age: 80
End: 2020-11-23

## 2020-11-23 VITALS
DIASTOLIC BLOOD PRESSURE: 70 MMHG | SYSTOLIC BLOOD PRESSURE: 124 MMHG | TEMPERATURE: 98.3 F | HEART RATE: 65 BPM | OXYGEN SATURATION: 94 %

## 2020-11-23 PROCEDURE — 3331090001 HH PPS REVENUE CREDIT

## 2020-11-23 PROCEDURE — G0300 HHS/HOSPICE OF LPN EA 15 MIN: HCPCS

## 2020-11-23 PROCEDURE — G0152 HHCP-SERV OF OT,EA 15 MIN: HCPCS

## 2020-11-23 PROCEDURE — 3331090002 HH PPS REVENUE DEBIT

## 2020-11-23 NOTE — TELEPHONE ENCOUNTER
Increase Lasix today to 2 tabs. Can repeat again tomorrow. If his symptoms persist he should go to ED for evaluation.

## 2020-11-23 NOTE — TELEPHONE ENCOUNTER
[place] with [facility] dept called, [phone], pt [phone]. Verified patient with two patient identifiers. States d/c from hospital last week, Pericarditis, pericardial effusion. .    C/O ^ SOB with minimal exertion. Walked 10 ft, very SOB, SPO2 88, HR unknown. No ankle edema. Has scale, however has not weighed. Advised to weight qam, after urinating, in his underwear, and keep diary. Is on Lasix 20 mg qam, Coreg 12.5 mg BID. Is Off Amlodipine. Please advise.

## 2020-11-23 NOTE — TELEPHONE ENCOUNTER
Spoke with patient's wife and [facility] nurse. Verified patient with two patient identifiers. Advised to take an extra Lasix 20 mg now for a total of 40 mg today, and may repeat again tomorrow. If symptoms persist or increase, go to ER for eval.  She verbalized understanding.

## 2020-11-23 NOTE — TELEPHONE ENCOUNTER
Pt and wife have since talked to another nurse in office today. Will close out this encounter as the problem was addressed.

## 2020-11-23 NOTE — PROGRESS NOTES
Physician Progress Note      Anderson Vera  YECENIA #:                  [id number]  :                       1940  ADMIT DATE:       11/10/2020 11:11 AM  DISCH DATE:        2020 10:41 AM  RESPONDING  PROVIDER #:        Billy Murray MD          QUERY TEXT:    Patient admitted with acute pericarditis, noted to have chf. If possible, please document in progress notes and discharge summary if Lasix was treating any of the following:     The medical record reflects the following:  Risk Factors: cardiomyopathy  Clinical Indicators: bnp over 4000, H&P notes acute on chronic systolic heart failure, dcs:  Chronic Systolic Heart Failure / Cardiomyopathy (40% in past, recovered, now 30%) POA  Treatment: IV Lasix, Cardiology cx  Thanks,  Donnie Chun RN/CDI   Options provided:  -- treatment for acute on chronic systolic heart failure  -- treatment of cardiomyopathy only  -- other  -- unable to provide any further information;; unable to provide any further information  -- Other - I will add my own diagnosis  -- Disagree - Not applicable / Not valid  -- Disagree - Clinically unable to determine / Unknown  -- Refer to Clinical Documentation Reviewer    PROVIDER RESPONSE TEXT:    treatment of cardiomyopathy only    Query created by: Jason Shine on 2020 3:53 PM      Electronically signed by:  Billy Murray MD 2020 3:58 PM

## 2020-11-23 NOTE — TELEPHONE ENCOUNTER
Verified patients name and date of birth. Shin Atkinsly that it is okay per Dr Stephanie Figueroa for patient to continue OT twice a week times 4 weeks.

## 2020-11-24 ENCOUNTER — HOME CARE VISIT (OUTPATIENT)
Dept: SCHEDULING | Facility: HOME HEALTH | Age: 80
End: 2020-11-24
Payer: MEDICARE

## 2020-11-24 PROCEDURE — G0151 HHCP-SERV OF PT,EA 15 MIN: HCPCS

## 2020-11-24 PROCEDURE — 3331090001 HH PPS REVENUE CREDIT

## 2020-11-24 PROCEDURE — 3331090002 HH PPS REVENUE DEBIT

## 2020-11-25 ENCOUNTER — HOME CARE VISIT (OUTPATIENT)
Dept: SCHEDULING | Facility: HOME HEALTH | Age: 80
End: 2020-11-25
Payer: MEDICARE

## 2020-11-25 VITALS
OXYGEN SATURATION: 95 % | SYSTOLIC BLOOD PRESSURE: 124 MMHG | HEART RATE: 74 BPM | DIASTOLIC BLOOD PRESSURE: 74 MMHG | TEMPERATURE: 98.6 F

## 2020-11-25 VITALS
HEART RATE: 63 BPM | BODY MASS INDEX: 31.01 KG/M2 | TEMPERATURE: 98.1 F | SYSTOLIC BLOOD PRESSURE: 110 MMHG | WEIGHT: 210 LBS | OXYGEN SATURATION: 97 % | DIASTOLIC BLOOD PRESSURE: 76 MMHG | RESPIRATION RATE: 18 BRPM

## 2020-11-25 PROCEDURE — G0152 HHCP-SERV OF OT,EA 15 MIN: HCPCS

## 2020-11-25 PROCEDURE — 3331090002 HH PPS REVENUE DEBIT

## 2020-11-25 PROCEDURE — 3331090001 HH PPS REVENUE CREDIT

## 2020-11-26 PROCEDURE — 3331090002 HH PPS REVENUE DEBIT

## 2020-11-26 PROCEDURE — 3331090001 HH PPS REVENUE CREDIT

## 2020-11-27 ENCOUNTER — HOME CARE VISIT (OUTPATIENT)
Dept: HOME HEALTH SERVICES | Facility: HOME HEALTH | Age: 80
End: 2020-11-27
Payer: MEDICARE

## 2020-11-27 ENCOUNTER — HOME CARE VISIT (OUTPATIENT)
Dept: SCHEDULING | Facility: HOME HEALTH | Age: 80
End: 2020-11-27
Payer: MEDICARE

## 2020-11-27 VITALS
TEMPERATURE: 98.4 F | OXYGEN SATURATION: 98 % | RESPIRATION RATE: 17 BRPM | DIASTOLIC BLOOD PRESSURE: 80 MMHG | HEART RATE: 79 BPM | SYSTOLIC BLOOD PRESSURE: 130 MMHG

## 2020-11-27 PROCEDURE — 3331090002 HH PPS REVENUE DEBIT

## 2020-11-27 PROCEDURE — 3331090001 HH PPS REVENUE CREDIT

## 2020-11-27 PROCEDURE — G0300 HHS/HOSPICE OF LPN EA 15 MIN: HCPCS

## 2020-11-28 PROCEDURE — 3331090002 HH PPS REVENUE DEBIT

## 2020-11-28 PROCEDURE — 3331090001 HH PPS REVENUE CREDIT

## 2020-11-29 PROCEDURE — 3331090002 HH PPS REVENUE DEBIT

## 2020-11-29 PROCEDURE — 3331090001 HH PPS REVENUE CREDIT

## 2020-11-30 ENCOUNTER — HOME CARE VISIT (OUTPATIENT)
Dept: SCHEDULING | Facility: HOME HEALTH | Age: 80
End: 2020-11-30
Payer: MEDICARE

## 2020-11-30 VITALS — RESPIRATION RATE: 18 BRPM | HEART RATE: 93 BPM | TEMPERATURE: 99 F | OXYGEN SATURATION: 94 %

## 2020-11-30 PROCEDURE — G0300 HHS/HOSPICE OF LPN EA 15 MIN: HCPCS

## 2020-11-30 PROCEDURE — 3331090001 HH PPS REVENUE CREDIT

## 2020-11-30 PROCEDURE — 3331090002 HH PPS REVENUE DEBIT

## 2020-11-30 PROCEDURE — G0152 HHCP-SERV OF OT,EA 15 MIN: HCPCS

## 2020-12-01 ENCOUNTER — HOME CARE VISIT (OUTPATIENT)
Dept: SCHEDULING | Facility: HOME HEALTH | Age: 80
End: 2020-12-01
Payer: MEDICARE

## 2020-12-01 VITALS
RESPIRATION RATE: 20 BRPM | DIASTOLIC BLOOD PRESSURE: 70 MMHG | SYSTOLIC BLOOD PRESSURE: 130 MMHG | TEMPERATURE: 99.4 F | OXYGEN SATURATION: 97 % | HEART RATE: 85 BPM

## 2020-12-01 LAB
BASOPHILS # BLD AUTO: NORMAL 10*3/UL
EOSINOPHIL # BLD AUTO: NORMAL 10*3/UL
EOSINOPHIL NFR BLD AUTO: NORMAL %
HCT VFR BLD AUTO: NORMAL %
HGB BLD-MCNC: NORMAL G/DL
LYMPHOCYTES # BLD AUTO: NORMAL 10*3/UL
LYMPHOCYTES NFR BLD AUTO: NORMAL %
MONOCYTES NFR BLD AUTO: NORMAL %
NEUTROPHILS NFR BLD AUTO: NORMAL %
PLATELET # BLD AUTO: NORMAL 10*3/UL
RBC # BLD AUTO: NORMAL 10*6/UL
WBC # BLD AUTO: NORMAL X10E3/UL

## 2020-12-01 PROCEDURE — 3331090001 HH PPS REVENUE CREDIT

## 2020-12-01 PROCEDURE — G0157 HHC PT ASSISTANT EA 15: HCPCS

## 2020-12-01 PROCEDURE — 3331090002 HH PPS REVENUE DEBIT

## 2020-12-02 ENCOUNTER — OFFICE VISIT (OUTPATIENT)
Dept: INTERNAL MEDICINE CLINIC | Age: 80
End: 2020-12-02
Payer: MEDICARE

## 2020-12-02 ENCOUNTER — HOME CARE VISIT (OUTPATIENT)
Dept: SCHEDULING | Facility: HOME HEALTH | Age: 80
End: 2020-12-02
Payer: MEDICARE

## 2020-12-02 VITALS
TEMPERATURE: 98.4 F | OXYGEN SATURATION: 98 % | RESPIRATION RATE: 18 BRPM | SYSTOLIC BLOOD PRESSURE: 122 MMHG | DIASTOLIC BLOOD PRESSURE: 66 MMHG | HEART RATE: 84 BPM

## 2020-12-02 VITALS
BODY MASS INDEX: 30.66 KG/M2 | OXYGEN SATURATION: 93 % | WEIGHT: 207 LBS | SYSTOLIC BLOOD PRESSURE: 117 MMHG | RESPIRATION RATE: 16 BRPM | HEIGHT: 69 IN | TEMPERATURE: 98.6 F | HEART RATE: 98 BPM | DIASTOLIC BLOOD PRESSURE: 81 MMHG

## 2020-12-02 DIAGNOSIS — C61 PROSTATE CANCER (HCC): ICD-10-CM

## 2020-12-02 DIAGNOSIS — N18.32 TYPE 2 DIABETES MELLITUS WITH STAGE 3B CHRONIC KIDNEY DISEASE, WITHOUT LONG-TERM CURRENT USE OF INSULIN (HCC): ICD-10-CM

## 2020-12-02 DIAGNOSIS — I50.42 CHRONIC COMBINED SYSTOLIC AND DIASTOLIC CONGESTIVE HEART FAILURE (HCC): ICD-10-CM

## 2020-12-02 DIAGNOSIS — Z09 HOSPITAL DISCHARGE FOLLOW-UP: Primary | ICD-10-CM

## 2020-12-02 DIAGNOSIS — I30.9 ACUTE PERICARDITIS, UNSPECIFIED TYPE: ICD-10-CM

## 2020-12-02 DIAGNOSIS — E11.22 TYPE 2 DIABETES MELLITUS WITH STAGE 3B CHRONIC KIDNEY DISEASE, WITHOUT LONG-TERM CURRENT USE OF INSULIN (HCC): ICD-10-CM

## 2020-12-02 DIAGNOSIS — I48.0 PAROXYSMAL ATRIAL FIBRILLATION (HCC): ICD-10-CM

## 2020-12-02 DIAGNOSIS — I10 ESSENTIAL HYPERTENSION: ICD-10-CM

## 2020-12-02 PROCEDURE — 3331090002 HH PPS REVENUE DEBIT

## 2020-12-02 PROCEDURE — 1111F DSCHRG MED/CURRENT MED MERGE: CPT | Performed by: INTERNAL MEDICINE

## 2020-12-02 PROCEDURE — 99495 TRANSJ CARE MGMT MOD F2F 14D: CPT | Performed by: INTERNAL MEDICINE

## 2020-12-02 PROCEDURE — G8427 DOCREV CUR MEDS BY ELIG CLIN: HCPCS | Performed by: INTERNAL MEDICINE

## 2020-12-02 PROCEDURE — 3331090001 HH PPS REVENUE CREDIT

## 2020-12-02 PROCEDURE — G0152 HHCP-SERV OF OT,EA 15 MIN: HCPCS

## 2020-12-02 RX ORDER — FUROSEMIDE 40 MG/1
TABLET ORAL
COMMUNITY
Start: 2020-10-30 | End: 2020-12-02

## 2020-12-02 NOTE — PROGRESS NOTES
Dipti Walter is a 68 y.o. male  Chief Complaint   Patient presents with   [address]     From [address] [facility]. Maintenance Due   Topic Date Due    Eye Exam Retinal or Dilated  04/09/1950     Visit Vitals  /81 (BP 1 Location: Left arm, BP Patient Position: Sitting)   Pulse 98   Temp 98.6 °F (37 °C) (Oral)   Resp 16   Ht 5' 9\" (1.753 m)   Wt 207 lb (93.9 kg)   SpO2 93%   BMI 30.57 kg/m²     1. Have you been to the ER, urgent care clinic since your last visit? Hospitalized since your last visit? Yes, ED [facility] and [address] [place] [address]    2. Have you seen or consulted any other health care providers outside of the [address] [place] [address] since your last visit? Include any pap smears or colon screening.  No

## 2020-12-02 NOTE — PROGRESS NOTES
Transitional Care Management Progress Note    Patient: Yehuda Lantigua  : 1940  PCP: Maciel Mills MD    Date of admission: 11/10/20  Date of discharge: 20    Patient was contacted by Transitional Care Management services within two days after his discharge: Yes. This encounter and supporting documentation was reviewed if available. Medication reconciliation was performed today (2020). Assessment/Plan:     Diagnoses and all orders for this visit:    1. Hospital discharge follow-up  -     [place] DISCHARGE MEDS RECONCILED W/ CURRENT OUTPATIENT MED LIST    2. Chronic combined systolic and diastolic congestive heart failure (HCC)  LVEF 25-30%, lower than it was in 2017. Has PA but otherwise stable. Continue low-dose Lasix at 20 mg and carvedilol with no ACE or ARB due to low BP readings. 3. Acute pericarditis, unspecified type  Stable on colchicine; having some diarrhea. Plan is to have a repeat echo as an outpatient to assess pericardial effusion. 4. Paroxysmal atrial fibrillation (HCC)  Remains in NSR following DC cardioversion on 20. Continue Xarelto and carvedilol. 5. Essential hypertension  Controlled. /81. Continue carvedilol. 6. Type 2 diabetes mellitus with stage 3b chronic kidney disease, without long-term current use of insulin ([facility]  Diet-controlled. A1c 6.4% on 20.    7. Prostate cancer ([facility]  Has rising PSA. Scheduled for PET scan on 12/3/20. Follow-up and Dispositions    · Return if symptoms worsen or fail to improve, for Scheduled appt on 21. Subjective:   Yehuda Lantigua is a 68 y.o. male presenting today for follow-up after being discharged from [facility]. The discharge summary was reviewed. The main problem requiring admission was atrial fibrillation and acute pericarditis. Complications during admission: none.   Has HTN, diet-controlled type 2 DM, CKD stage 3, hyperlipidemia, CHF (combined diastolic and systolic) with echo EF 17-83% in 7/17, non-ischemic cardiomyopathy, atrial flutter s/p AFL ablation on 8/22/17, non-obstructive atherosclerotic heart disease, COPD, chronic low back pain, and prostate cancer.      Accompanied by his wife today. [facility] for persistent atrial fibrillation and chest pain due to acute pericarditis with pericardial effusion without tamponade seen on CTA and TTE. Underwent TAZ and cardioversion on 11/16/20, converted to NSR. TAZ: LVEF 25-30%, moderate pulmonary HTN with PASP 61 mmHg. Discharged on colchicine for pericarditis (no NSAID due to CKD). Today he reports still having PA and cough productive of white sputum. Denies fevers, chills, CP, heart palpitations, dizziness, or fatigue. Has home health nurse coming 2-3 times a week. Complains of diarrhea on colchicine. Cardiology FU appointment scheduled on 12/9/20. Scheduled for PET scan on 12/3/20; ordered by Dr. Kay Casarez rising PSA and restaging treated prostate cancer. Medications marked \"taking\" at this time:  Home Medications    Medication Sig Start Date End Date Taking? Authorizing Provider   rivaroxaban (XARELTO) 20 mg tab tablet Take 1 Tab by mouth daily. 11/18/20  Yes Sangita Lawton MD   carvediloL (COREG) 12.5 mg tablet Take 1 Tab by mouth two (2) times daily (with meals). 11/18/20  Yes Sangita Lawton MD   colchicine 0.6 mg tablet Take 1 Tab by mouth two (2) times a day for 90 days. Indications: inflammation of the covering of the heart or pericardium 11/18/20 2/16/21 Yes Sangita Lawton MD   furosemide (LASIX) 20 mg tablet Take 20 mg by mouth daily. Yes Provider, Historical   clotrimazole-betamethasone (LOTRISONE) topical cream Apply  to affected area two (2) times a day.    Yes Provider, Historical   atorvastatin (LIPITOR) 40 mg tablet TAKE 1 TABLET BY MOUTH ONCE DAILY AT NIGHT 8/25/20  Yes Tayler Hubbard MD   aspirin 81 mg chewable tablet Take 1 Tab by mouth daily. 3/3/16  Yes Ellen Mclain MD        Patient Active Problem List   Diagnosis Code    Combined systolic and diastolic congestive heart failure ([facility]) I50.40    S/P cardiac cath Z98.890    NICM (nonischemic cardiomyopathy) ([facility] I42.8    Hypertension I10    Mixed hyperlipidemia E78.2    Smokeless tobacco use Z72.0    Atrial flutter ([facility]) I48.92    Type 2 diabetes mellitus with stage 3 chronic kidney disease, without long-term current use of insulin ([facility]) E11.22, N18.30    Prostate cancer ([facility] C61    COPD (chronic obstructive pulmonary disease) ([facility]) J44.9    Chronic low back pain M54.5, G89.29    Obesity (BMI 30-39. 9) E66.9    Candidal intertrigo B37.2    Anemia of chronic disease D63.8    Pericardial effusion I31.3          Objective:     Visit Vitals  /81 (BP 1 Location: Left arm, BP Patient Position: Sitting)   Pulse 98   Temp 98.6 °F (37 °C) (Oral)   Resp 16   Ht 5' 9\" (1.753 m)   Wt 207 lb (93.9 kg)   SpO2 93%   BMI 30.57 kg/m²       General: Well-developed and well-nourished, no distress. In wheelchair. HEENT:  Head normocephalic/atraumatic, no scleral icterus  Lungs:  Clear to auscultation bilaterally. Good air movement. Heart:  Regular rate and rhythm, normal S1 and S2, no murmur, gallop, or rub  Extremities: No clubbing, cyanosis. Trace RLE edema, no LLE edema. Neurological: Alert and oriented x 3. Psychiatric: Normal mood and affect. Behavior is normal.       We discussed the expected course, resolution and complications of the diagnosis(es) in detail. Medication risks, benefits, costs, interactions, and alternatives were discussed as indicated. I advised him to contact the office if his condition worsens, changes or fails to improve as anticipated. He expressed understanding with the diagnosis(es) and plan.      Ray Calabrese MD

## 2020-12-02 NOTE — PATIENT INSTRUCTIONS
Learning About Self-Care for Heart Failure  What is self-care for heart failure? Heart failure usually gets worse over time. But there are many things you can do to feel better, avoid the hospital, and live longer. Self-care means managing your health by doing certain things every day, like weighing yourself. It's about knowing which symptoms to watch for so you can avoid getting worse. When you practice good self-care, you know when it's time to call your doctor and when your heart failure has turned into an emergency. The list below can help. Top 5 self-care tips for every day   1. Take your medicines as prescribed. This gives them the best chance of helping you. 2. Weigh yourself every day. This helps you watch for signs that you're getting worse. Weight gain may be a sign that your body is holding on to too much fluid. Weigh yourself at the same time each day, using the same scale, on a hard, flat surface. The best time is in the morning after you go to the bathroom and before you eat or drink anything. 3. Keep a daily record of your symptoms. Checking your symptoms helps you see what symptoms are normal for you and if they change or get worse. 4. Limit sodium. This helps keep fluid from building up and may help you feel better. Your doctor can tell you how much sodium is right for you. An example is less than 3,000 milligrams (mg) a day. Try limiting the salt you eat at home, and by watching for \"hidden\" sodium when you eat out or shop for food. 5. Try to exercise throughout the week. Exercise makes your heart stronger and can help you avoid symptoms. Walking is a great way to get exercise. If your doctor says it's safe, start out with some short walks. Then slowly build up to longer ones. Some people with heart failure also may need to limit how much fluid they drink each day. Ask your doctor if this is true for you and, if it is, how much fluid is safe for you to drink each day.   When should you act? Try to become familiar with signs that mean your heart failure is getting worse. If you need help, talk with your doctor about making a personal plan. Here are some things to watch for as you practice your daily self-care. Call your doctor if:  · You have sudden weight gain, such as more than 2 to 3 pounds in a day or 5 pounds in a week. (Your doctor may suggest a different range of weight gain.)  · You have new or worse swelling in your feet, ankles, or legs. · Your breathing gets worse. Activities that did not make you short of breath before are hard for you now. · Your breathing when you lie down is worse than usual, or you wake up at night needing to catch your breath. Be sure to make and go to all of your follow-up appointments. And it's always a good idea to call your doctor anytime you have a sudden change in symptoms. When is it an emergency? Sometimes the symptoms get worse very quickly. This is called sudden heart failure. It causes fluid to build up in your lungs. Sudden heart failure is an emergency. If you have any of these symptoms, you need care right away. Call 911 if:   · You have severe shortness of breath. · You have an irregular or fast heartbeat. · You cough up foamy, pink mucus. What else can you do to stay healthy? There are other things you can do to take care of your body and your heart. These things will help you feel better. And they will also reduce your risk of heart attack and stroke. · Try to stay at a healthy weight. Eat a healthy diet with lots of fresh fruit, vegetables, and whole grains. · If you smoke, quit. · Limit the amount of alcohol you drink. · Keep high blood pressure and diabetes under control. If you need help, talk with your doctor. If your doctor has not set you up with a cardiac rehabilitation (rehab) program, talk to him or her about whether that is right for you.  Cardiac rehab includes exercise, help with diet and lifestyle changes, and emotional support. Also let your doctor know if:  · You're having trouble sleeping. Sleep is important to your well-being. It also helps your heart work the way it's supposed to. Your doctor can help you decide if you need treatment for sleep problems. · You're feeling sad or hopeless much of the time, or you are worried and anxious. Heart failure can be hard on your emotions. Treatment with counseling and medicine can help. And when you feel better, you're more likely to take care of yourself. · You think you may have a problem with alcohol or drug use. You and your doctor can decide if you have a problem and what type of treatment might help you. Follow-up care is a key part of your treatment and safety. Be sure to make and go to all appointments, and call your doctor if you are having problems. It's also a good idea to know your test results and keep a list of the medicines you take. Where can you learn more? Go to http://www.gray.com/  Enter H262 in the search box to learn more about \"Learning About Self-Care for Heart Failure. \"  Current as of: December 16, 2019               Content Version: 12.6  © 5804-3877 [employer]. Care instructions adapted under license by [employer] (which disclaims liability or warranty for this information). If you have questions about a medical condition or this instruction, always ask your healthcare professional. [address] any warranty or liability for your use of this information.

## 2020-12-03 ENCOUNTER — HOSPITAL ENCOUNTER (OUTPATIENT)
Dept: PET IMAGING | Age: 80
Discharge: HOME | End: 2020-12-03
Attending: UROLOGY
Payer: MEDICARE

## 2020-12-03 ENCOUNTER — HOME CARE VISIT (OUTPATIENT)
Dept: SCHEDULING | Facility: HOME HEALTH | Age: 80
End: 2020-12-03
Payer: MEDICARE

## 2020-12-03 VITALS
DIASTOLIC BLOOD PRESSURE: 60 MMHG | OXYGEN SATURATION: 96 % | HEART RATE: 74 BPM | TEMPERATURE: 98.4 F | SYSTOLIC BLOOD PRESSURE: 110 MMHG | RESPIRATION RATE: 18 BRPM

## 2020-12-03 VITALS
SYSTOLIC BLOOD PRESSURE: 117 MMHG | TEMPERATURE: 98.6 F | RESPIRATION RATE: 16 BRPM | OXYGEN SATURATION: 93 % | DIASTOLIC BLOOD PRESSURE: 81 MMHG | HEART RATE: 98 BPM

## 2020-12-03 DIAGNOSIS — R97.21 RISING PSA FOLLOWING TREATMENT FOR MALIGNANT NEOPLASM OF PROSTATE: ICD-10-CM

## 2020-12-03 DIAGNOSIS — C61 PROSTATIC CANCER (HCC): ICD-10-CM

## 2020-12-03 PROCEDURE — 3331090001 HH PPS REVENUE CREDIT

## 2020-12-03 PROCEDURE — 78815 PET IMAGE W/CT SKULL-THIGH: CPT

## 2020-12-03 PROCEDURE — G0157 HHC PT ASSISTANT EA 15: HCPCS

## 2020-12-03 PROCEDURE — G0300 HHS/HOSPICE OF LPN EA 15 MIN: HCPCS

## 2020-12-03 PROCEDURE — 3331090002 HH PPS REVENUE DEBIT

## 2020-12-04 PROCEDURE — 3331090002 HH PPS REVENUE DEBIT

## 2020-12-04 PROCEDURE — 3331090001 HH PPS REVENUE CREDIT

## 2020-12-05 VITALS
HEART RATE: 74 BPM | RESPIRATION RATE: 18 BRPM | DIASTOLIC BLOOD PRESSURE: 60 MMHG | BODY MASS INDEX: 30.27 KG/M2 | SYSTOLIC BLOOD PRESSURE: 110 MMHG | TEMPERATURE: 98.8 F | OXYGEN SATURATION: 97 % | WEIGHT: 205 LBS

## 2020-12-05 PROCEDURE — 3331090001 HH PPS REVENUE CREDIT

## 2020-12-05 PROCEDURE — 3331090002 HH PPS REVENUE DEBIT

## 2020-12-06 PROCEDURE — 3331090001 HH PPS REVENUE CREDIT

## 2020-12-06 PROCEDURE — 3331090002 HH PPS REVENUE DEBIT

## 2020-12-07 ENCOUNTER — HOME CARE VISIT (OUTPATIENT)
Dept: SCHEDULING | Facility: HOME HEALTH | Age: 80
End: 2020-12-07
Payer: MEDICARE

## 2020-12-07 VITALS
HEART RATE: 84 BPM | TEMPERATURE: 99.3 F | SYSTOLIC BLOOD PRESSURE: 124 MMHG | DIASTOLIC BLOOD PRESSURE: 76 MMHG | OXYGEN SATURATION: 97 %

## 2020-12-07 PROCEDURE — G0152 HHCP-SERV OF OT,EA 15 MIN: HCPCS

## 2020-12-07 PROCEDURE — 3331090002 HH PPS REVENUE DEBIT

## 2020-12-07 PROCEDURE — 3331090001 HH PPS REVENUE CREDIT

## 2020-12-07 NOTE — PROGRESS NOTES
[address], [address] [facility]  [phone]     Subjective:      Opal Mcmullen is a 68 y.o. male is here for a hospital f/u. He has pmhx CAD NICM HTN AFL s/p AFL ablation in 2017 and HLD. He was admitted to the hospital 11/10 to 11/18 with c/o patrick, chest soreness and dizziness. EKG showed AF. He was hypotensive. He was diagnosed with acute pericarditis with pericardial effusion per echo 11/11. EF 30-35% with mod to large effusion. Started on Colchicine with improvement in pain. He had [facility] for afib 11/16 and converted to SR. TAZ at that time showed EF 25-30%, trivial to small pericardial effusion. He maintained SR and was d/c on Coreg and Xarelto. He reports he is having some SOB with exertion. He thinks it has something to do with when he tries to do things like walking at a faster pace. He admits to sob with exertion doing house chores as well. He has some swelling in his legs. The patient denies CP, orthopnea, PND, palpitations, syncope, or presyncope.        Patient Active Problem List    Diagnosis Date Noted    Pericardial effusion 11/10/2020    Anemia of chronic disease 09/17/2020    Candidal intertrigo 11/05/2018    Type 2 diabetes mellitus with stage 3 chronic kidney disease, without long-term current use of insulin ([address] 10/08/2017    Prostate cancer ([address] 10/08/2017    COPD (chronic obstructive pulmonary disease) ([address] 10/08/2017    Chronic low back pain 10/08/2017    Obesity (BMI 30-39.9) 10/08/2017    Atrial flutter ([address] 08/22/2017    Smokeless tobacco use 07/25/2017    Mixed hyperlipidemia 03/31/2016    Hypertension 05/20/2014    NICM (nonischemic cardiomyopathy) ([address] 02/20/2014    S/P cardiac cath 02/12/2014    Combined systolic and diastolic congestive heart failure ([address] 02/08/2014      Anant Edouard MD  Past Medical History:   Diagnosis Date    Acute respiratory failure with hypoxia ([address] 02/08/2014    also 11/28/15, 2/17/16, 5/14/17     Alcohol abuse     As of 11/29/18 pt stated he quit 20 years    Arthritis     Back and shoulder    Atrial flutter ([address] 08/2017    saw Dr. Jorje Gaona; underwent a-flutter ablation    BPH (benign prostatic hyperplasia)     CHF (congestive heart failure) ([address] 02/11/2014    TTE 11/15: EF 40-45%, 2/14: EF 20%  Admitted for acute exacerbations 2/8/14, 11/28/15, 2/17/16, and 5/4/17)    Chronic kidney disease     III    Chronic low back pain     LS spine X-ray 4/8/17: mild DDD    Combined forms of age-related cataract of left eye 12/12/2018    KPE/IOL OS    Combined forms of age-related cataract of right eye 11/28/2018    KPE/IOL OD    COPD (chronic obstructive pulmonary disease) ([address]     PA (dyspnea on exertion)     ED (erectile dysfunction)     Elevated troponin 02/11/2014    also 11/28/15; due to cardiac strain    Frequent hospital admissions     5/14-5/23/17: acute hypoxic resp failure due to CHF exac, ROBINSON on CKD 3, sepsis due to LE cellulitis, 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  2/17-2/22/16: acute hypoxic resp failure, acute diarrhea  11/28-11/30/15: acute hypoxic resp failure due to acute CHF exac, elevated troponin due to cardiac strain  2/8-2/12/14: acuter hypoxic resp failure due to CHF exac, pneumonia     Hand blister 10/8/2017    Bilateral    Hypertension     Kidney disease, chronic, stage III (GFR 30-59 ml/min)     Leg fracture, right 1973    Nonischemic cardiomyopathy ([address]     with LVH    Pneumonia 02/08/2014 2/8/14 and 10/30/15    Poor historian     As of 11/29/18 pt reports 5th grade education, pt unable to give med list-obtained from wife per patient's permission    Prediabetes     [facility] 2016    As of 11/29/18, pt states he gets two injections twice a year for this    Pulmonary edema 11/28/2015    S/P cardiac cath 2/12/2014 2/12/14 no significant coronary disease, severe LV dysfunction    Tinea cruris     also genital folliculitis    Vertigo       Past Surgical History:   Procedure Laterality Date    CARDIAC SURG PROCEDURE UNLIST  2017    SVT ablation    COLONOSCOPY N/A 3/2/2020    COLONOSCOPY performed by Sukhdeep Prasad MD at [facility]COPY. 2 tubular adenomas, diverticulosis, int hemorrhoids, repeat in 5 yrs    COLONOSCOPY,DIAGNOSTIC  2016    Dr. Desmond Silveira; single sessile polyp at descending colon, sigmoid diverticulosis, int hemorrhoids    COLONOSCOPY,REMV LESN,SNARE  2016         COLONOSCOPY,REMV LESN,SNARE  3/2/2020         HX CATARACT REMOVAL Right 2018    Dr. Kelsie Harvey Left 2018    Dr. Davy Mcelroy.  LEFT EYE SECOND REFRATIVE CATARACT REMOVAL WITH LENS IMPLANTATION    HX ORTHOPAEDIC Right 's    index finger    HX OTHER SURGICAL  2017    Dr. Eden Peoples; atrial flutter ablation    HX ROTATOR CUFF REPAIR Right     HX TONSILLECTOMY       Allergies   Allergen Reactions    Oxycodone Contact Dermatitis      Family History   Adopted: Yes   Family history unknown: Yes      Social History     Socioeconomic History    Marital status:      Spouse name: Not on file    Number of children: Not on file    Years of education: Not on file    Highest education level: Not on file   Occupational History    Not on file   Social Needs    Financial resource strain: Not on file    Food insecurity     Worry: Not on file     Inability: Not on file   [employer] needs     Medical: Not on file     Non-medical: Not on file   Tobacco Use    Smoking status: Former Smoker     Packs/day: 2.00     Years: 20.00     Pack years: 40.00     Types: Cigarettes, Cigars     Last attempt to quit: 2019     Years since quittin.3    Smokeless tobacco: Former User     Types: Chew    Tobacco comment: 3 cigars daily   Substance and Sexual Activity    Alcohol use: Not Currently     Alcohol/week: 0.0 standard drinks     Comment: As of 20, pt quit 20 years ago    Drug use: Yes     Types: Marijuana Comment: not recently    Sexual activity: Not on file   Lifestyle    Physical activity     Days per week: Not on file     Minutes per session: Not on file    Stress: Not on file   Relationships    Social connections     Talks on phone: Not on file     Gets together: Not on file     Attends Mu-ism service: Not on file     Active member of club or organization: Not on file     Attends meetings of clubs or organizations: Not on file     Relationship status: Not on file    Intimate partner violence     Fear of current or ex partner: Not on file     Emotionally abused: Not on file     Physically abused: Not on file     Forced sexual activity: Not on file   Other Topics Concern    Not on file   Social History Narrative    Not on file      Current Outpatient Medications   Medication Sig    rivaroxaban (XARELTO) 20 mg tab tablet Take 1 Tab by mouth daily.  carvediloL (COREG) 12.5 mg tablet Take 1 Tab by mouth two (2) times daily (with meals).  colchicine 0.6 mg tablet Take 1 Tab by mouth two (2) times a day for 90 days. Indications: inflammation of the covering of the heart or pericardium    furosemide (LASIX) 20 mg tablet Take 20 mg by mouth daily.  clotrimazole-betamethasone (LOTRISONE) topical cream Apply  to affected area two (2) times a day.  atorvastatin (LIPITOR) 40 mg tablet TAKE 1 TABLET BY MOUTH ONCE DAILY AT NIGHT    aspirin 81 mg chewable tablet Take 1 Tab by mouth daily. No current facility-administered medications for this visit. Review of Symptoms:  11 systems reviewed, negative other than as stated in the HPI    Physical ExamPhysical Exam:    Vitals:    12/09/20 1359   BP: 128/78   Pulse: 78   Resp: 18   SpO2: 97%   Weight: 208 lb 12.8 oz (94.7 kg)   Height: 5' 9\" (1.753 m)     Body mass index is 30.83 kg/m². General PE  Gen:  NAD  Mental Status - Alert. General Appearance - Not in acute distress.    HEENT:  PERRL, no carotid bruits or JVD  Chest and Lung Exam Inspection: Accessory muscles - No use of accessory muscles in breathing. Auscultation:   Breath sounds: - Normal.   Cardiovascular   Inspection: Jugular vein - Bilateral - Inspection Normal.   Palpation/Percussion:   Apical Impulse: - Normal.   Auscultation: Rhythm - IRRegular. Heart Sounds - S1 WNL and S2 WNL. No S3 or S4. Murmurs & Other Heart Sounds: Auscultation of the heart reveals - No Murmurs. Peripheral Vascular   Upper Extremity: Inspection - Bilateral - No Cyanotic nailbeds or Digital clubbing. Lower Extremity:   Palpation: Edema - Bilateral - trace katelyn pedal edema. Abdomen:   Soft, non-tender, bowel sounds are active.   Neuro: A&O times 3, CN and motor grossly WNL    Labs:   Lab Results   Component Value Date/Time    Cholesterol, total 81 (L) 09/10/2020 08:04 AM    Cholesterol, total 77 (L) 04/02/2019 08:17 AM    Cholesterol, total 117 04/19/2018 08:49 AM    Cholesterol, total 87 (L) 10/05/2017 09:35 AM    HDL Cholesterol 39 (L) 09/10/2020 08:04 AM    HDL Cholesterol 34 (L) 04/02/2019 08:17 AM    HDL Cholesterol 45 04/19/2018 08:49 AM    HDL Cholesterol 14 (L) 10/05/2017 09:35 AM    LDL, calculated 29 04/02/2019 08:17 AM    LDL, calculated 43 04/19/2018 08:49 AM    LDL, calculated 51 10/05/2017 09:35 AM    LDL Chol Calc (NIH) 28 09/10/2020 08:04 AM    Triglyceride 56 09/10/2020 08:04 AM    Triglyceride 68 04/02/2019 08:17 AM    Triglyceride 143 04/19/2018 08:49 AM    Triglyceride 110 10/05/2017 09:35 AM     Lab Results   Component Value Date/Time     11/12/2020 10:55 AM     Lab Results   Component Value Date/Time    Sodium 137 11/18/2020 03:32 AM    Potassium 3.7 11/18/2020 03:32 AM    Chloride 106 11/18/2020 03:32 AM    CO2 26 11/18/2020 03:32 AM    Anion gap 5 11/18/2020 03:32 AM    Glucose 102 (H) 11/18/2020 03:32 AM    BUN 19 11/18/2020 03:32 AM    Creatinine 1.03 11/18/2020 03:32 AM    BUN/Creatinine ratio 18 11/18/2020 03:32 AM    GFR est AA >60 11/18/2020 03:32 AM    GFR est non-AA >60 11/18/2020 03:32 AM    Calcium 9.0 11/18/2020 03:32 AM    Bilirubin, total 0.8 11/12/2020 10:55 AM    Alk. phosphatase 89 11/12/2020 10:55 AM    Protein, total 7.6 11/12/2020 10:55 AM    Albumin 2.3 (L) 11/12/2020 10:55 AM    Globulin 5.3 (H) 11/12/2020 10:55 AM    A-G Ratio 0.4 (L) 11/12/2020 10:55 AM    ALT (SGPT) 24 11/12/2020 10:55 AM       EKG: SR with extensive T wave inversions       Assessment:     Assessment:      1. Pericardial effusion    2. Coronary artery disease involving native coronary artery of native heart without angina pectoris    3. NICM (nonischemic cardiomyopathy) ([facility]    4. Essential hypertension    5. Mixed hyperlipidemia    6. PAF (paroxysmal atrial fibrillation) ([facility]        Orders Placed This Encounter    AMB POC EKG ROUTINE W/ 12 LEADS, INTER & REP     Order Specific Question:   Reason for Exam:     Answer:   routine        Plan:     Pericardial effusion:   Acute chest pains d/t acute pericarditis with pericardial effusion without tamponade. D/C on Colchicine for 3 months. On TAZ pericardial effusion much better. reports PA today. EKG with diffuse T wave inversions which may be related to the effusion. Evaluate with limited echo.     Cardiomyopathy:   40% in past recovered, now 25-30%. Continued coreg. Held ACEI due to elevated Cr and low BP. Plans to repeat Echo in few wks as out pt to f/u EF. 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 Reporting SOB/PA and edema. He does have some pedal edema, otherwise appears euvolemic. Hold on addition of ACEI at this time. He is having difficulty understanding his current regimen and evaluation, doesn't wish to add addition treatment/meds at this time. Evaluate with limited echo as planned which he agrees to.      Atrial Fibriliation: rate controlled 80-90's   CHADVASc=5.  history of atrial flutter s/p ablation in 2017  S/p DCCV in hospital. Remained in SR. D/C on Xarelto and Coreg. He is in SR today.  Continue current medications.      Nonobstructive atherosclerotic heart disease:   Last ejection fraction 60% 2017.    20% lesion proximal LAD on last cath 2/2014   New extensive T wave inversions, may be related to effusion, however with his PA will evaluate with lexiscan stress test.      Hypertension:   Trended low in hospital. Improved today       Hyperlipidemia:    9/2020 LDL 28 continue statin     F/U with Dr Herny Meyers after testing.       Tatianna Singh NP

## 2020-12-08 ENCOUNTER — HOME CARE VISIT (OUTPATIENT)
Dept: SCHEDULING | Facility: HOME HEALTH | Age: 80
End: 2020-12-08
Payer: MEDICARE

## 2020-12-08 PROCEDURE — G0157 HHC PT ASSISTANT EA 15: HCPCS

## 2020-12-08 PROCEDURE — G0300 HHS/HOSPICE OF LPN EA 15 MIN: HCPCS

## 2020-12-08 PROCEDURE — 3331090002 HH PPS REVENUE DEBIT

## 2020-12-08 PROCEDURE — 3331090001 HH PPS REVENUE CREDIT

## 2020-12-09 ENCOUNTER — OFFICE VISIT (OUTPATIENT)
Dept: CARDIOLOGY CLINIC | Age: 80
End: 2020-12-09
Payer: MEDICARE

## 2020-12-09 VITALS
BODY MASS INDEX: 30.93 KG/M2 | HEIGHT: 69 IN | OXYGEN SATURATION: 97 % | SYSTOLIC BLOOD PRESSURE: 128 MMHG | DIASTOLIC BLOOD PRESSURE: 78 MMHG | WEIGHT: 208.8 LBS | RESPIRATION RATE: 18 BRPM | HEART RATE: 78 BPM

## 2020-12-09 VITALS
OXYGEN SATURATION: 97 % | WEIGHT: 204 LBS | RESPIRATION RATE: 18 BRPM | SYSTOLIC BLOOD PRESSURE: 116 MMHG | BODY MASS INDEX: 30.13 KG/M2 | HEART RATE: 82 BPM | TEMPERATURE: 97.6 F | DIASTOLIC BLOOD PRESSURE: 66 MMHG

## 2020-12-09 VITALS
HEART RATE: 82 BPM | DIASTOLIC BLOOD PRESSURE: 66 MMHG | TEMPERATURE: 97.9 F | RESPIRATION RATE: 18 BRPM | SYSTOLIC BLOOD PRESSURE: 116 MMHG | OXYGEN SATURATION: 97 %

## 2020-12-09 DIAGNOSIS — I42.8 NICM (NONISCHEMIC CARDIOMYOPATHY) (HCC): ICD-10-CM

## 2020-12-09 DIAGNOSIS — I25.10 CORONARY ARTERY DISEASE INVOLVING NATIVE CORONARY ARTERY OF NATIVE HEART WITHOUT ANGINA PECTORIS: ICD-10-CM

## 2020-12-09 DIAGNOSIS — I10 ESSENTIAL HYPERTENSION: ICD-10-CM

## 2020-12-09 DIAGNOSIS — I31.39 PERICARDIAL EFFUSION: ICD-10-CM

## 2020-12-09 DIAGNOSIS — I31.39 PERICARDIAL EFFUSION: Primary | ICD-10-CM

## 2020-12-09 DIAGNOSIS — E78.2 MIXED HYPERLIPIDEMIA: ICD-10-CM

## 2020-12-09 DIAGNOSIS — I48.0 PAF (PAROXYSMAL ATRIAL FIBRILLATION) (HCC): ICD-10-CM

## 2020-12-09 PROCEDURE — G8754 DIAS BP LESS 90: HCPCS | Performed by: NURSE PRACTITIONER

## 2020-12-09 PROCEDURE — 3331090001 HH PPS REVENUE CREDIT

## 2020-12-09 PROCEDURE — 1101F PT FALLS ASSESS-DOCD LE1/YR: CPT | Performed by: NURSE PRACTITIONER

## 2020-12-09 PROCEDURE — G8432 DEP SCR NOT DOC, RNG: HCPCS | Performed by: NURSE PRACTITIONER

## 2020-12-09 PROCEDURE — 3331090002 HH PPS REVENUE DEBIT

## 2020-12-09 PROCEDURE — G8752 SYS BP LESS 140: HCPCS | Performed by: NURSE PRACTITIONER

## 2020-12-09 PROCEDURE — 1111F DSCHRG MED/CURRENT MED MERGE: CPT | Performed by: NURSE PRACTITIONER

## 2020-12-09 PROCEDURE — G8536 NO DOC ELDER MAL SCRN: HCPCS | Performed by: NURSE PRACTITIONER

## 2020-12-09 PROCEDURE — G8427 DOCREV CUR MEDS BY ELIG CLIN: HCPCS | Performed by: NURSE PRACTITIONER

## 2020-12-09 PROCEDURE — G8417 CALC BMI ABV UP PARAM F/U: HCPCS | Performed by: NURSE PRACTITIONER

## 2020-12-09 PROCEDURE — 93010 ELECTROCARDIOGRAM REPORT: CPT | Performed by: NURSE PRACTITIONER

## 2020-12-09 PROCEDURE — G0463 HOSPITAL OUTPT CLINIC VISIT: HCPCS | Performed by: NURSE PRACTITIONER

## 2020-12-09 PROCEDURE — 99214 OFFICE O/P EST MOD 30 MIN: CPT | Performed by: NURSE PRACTITIONER

## 2020-12-09 PROCEDURE — 93005 ELECTROCARDIOGRAM TRACING: CPT | Performed by: NURSE PRACTITIONER

## 2020-12-09 NOTE — LETTER
12/9/2020 3:16 PM 
 
Patient:  Shirlene Bland YOB: 1940 Date of Visit: 12/9/2020 Dear Wyatt Sanchez MD 
[address] [place] [address]. [id number] VIA In Basket: Thank you for referring Mr. Damaris Paulson to me for evaluation/treatment. Below are the relevant portions of my assessment and plan of care. I saw him in office today for f/u. New extensive T wave inversions. PA reported. Some mild pedal edema. Evaluating with limited echo as planned, lexiscan stress test. 
 
Let me know if you have any addition thoughts Sincerely, Sanaz Durán, LALA

## 2020-12-09 NOTE — LETTER
12/9/20 Patient: Elaine Slater YOB: 1940 Date of Visit: 12/9/2020 Yudelka Wheat MD 
[address] [address] VIA In Basket Dear Yudelka Wheat MD, Thank you for referring Mr. Saskia Verdugo to [address] [place] [address] for evaluation. My notes for this consultation are attached. If you have questions, please do not hesitate to call me. I look forward to following your patient along with you. Sincerely, Miroslava Gomes NP

## 2020-12-09 NOTE — PROGRESS NOTES
1. Have you been to the ER, urgent care clinic since your last visit? Hospitalized since your last visit? Seen on 11/10/20.    2. Have you seen or consulted any other health care providers outside of the [address] [place] [address] since your last visit? Include any pap smears or colon screening. No.      Chief Complaint   Patient presents with   [facility] Follow Up     soboe, some swelling in legs     NEEDS REFILL ON LIPITOR.

## 2020-12-10 ENCOUNTER — HOME CARE VISIT (OUTPATIENT)
Dept: SCHEDULING | Facility: HOME HEALTH | Age: 80
End: 2020-12-10
Payer: MEDICARE

## 2020-12-10 ENCOUNTER — TELEPHONE (OUTPATIENT)
Dept: CARDIOLOGY CLINIC | Age: 80
End: 2020-12-10

## 2020-12-10 VITALS
SYSTOLIC BLOOD PRESSURE: 118 MMHG | DIASTOLIC BLOOD PRESSURE: 70 MMHG | OXYGEN SATURATION: 94 % | HEART RATE: 96 BPM | TEMPERATURE: 98.6 F

## 2020-12-10 VITALS
TEMPERATURE: 98.3 F | HEART RATE: 77 BPM | DIASTOLIC BLOOD PRESSURE: 70 MMHG | OXYGEN SATURATION: 97 % | SYSTOLIC BLOOD PRESSURE: 120 MMHG | RESPIRATION RATE: 18 BRPM

## 2020-12-10 DIAGNOSIS — I48.0 PAF (PAROXYSMAL ATRIAL FIBRILLATION) (HCC): ICD-10-CM

## 2020-12-10 DIAGNOSIS — I25.10 CORONARY ARTERY DISEASE INVOLVING NATIVE CORONARY ARTERY OF NATIVE HEART WITHOUT ANGINA PECTORIS: ICD-10-CM

## 2020-12-10 DIAGNOSIS — I42.8 NICM (NONISCHEMIC CARDIOMYOPATHY) (HCC): ICD-10-CM

## 2020-12-10 DIAGNOSIS — I10 ESSENTIAL HYPERTENSION: ICD-10-CM

## 2020-12-10 DIAGNOSIS — I31.39 PERICARDIAL EFFUSION: ICD-10-CM

## 2020-12-10 DIAGNOSIS — E78.2 MIXED HYPERLIPIDEMIA: ICD-10-CM

## 2020-12-10 PROCEDURE — 3331090002 HH PPS REVENUE DEBIT

## 2020-12-10 PROCEDURE — 3331090001 HH PPS REVENUE CREDIT

## 2020-12-10 PROCEDURE — G0300 HHS/HOSPICE OF LPN EA 15 MIN: HCPCS

## 2020-12-10 PROCEDURE — G0152 HHCP-SERV OF OT,EA 15 MIN: HCPCS

## 2020-12-10 PROCEDURE — G0157 HHC PT ASSISTANT EA 15: HCPCS

## 2020-12-10 RX ORDER — ATORVASTATIN CALCIUM 40 MG/1
TABLET, FILM COATED ORAL
Qty: 90 TAB | Refills: 0 | Status: SHIPPED | OUTPATIENT
Start: 2020-12-10 | End: 2021-03-08

## 2020-12-10 NOTE — TELEPHONE ENCOUNTER
Pt's wife calling on pt's behalf; pt's wife state that pt is out of the RX and that it was suppose to be filled yesterday after they had came in yesterday; please call pt's wife back please     atorvastatin (LIPITOR) 40 mg tablet    Thanks

## 2020-12-10 NOTE — TELEPHONE ENCOUNTER
Returned call to pt,verified pt with two pt identifiers, advised pt his lipitor was sent to pharmacy in [place]. Pt verbalized understanding.

## 2020-12-11 PROCEDURE — 3331090002 HH PPS REVENUE DEBIT

## 2020-12-11 PROCEDURE — 3331090001 HH PPS REVENUE CREDIT

## 2020-12-12 PROCEDURE — 3331090001 HH PPS REVENUE CREDIT

## 2020-12-12 PROCEDURE — 3331090002 HH PPS REVENUE DEBIT

## 2020-12-13 PROCEDURE — 3331090001 HH PPS REVENUE CREDIT

## 2020-12-13 PROCEDURE — 3331090002 HH PPS REVENUE DEBIT

## 2020-12-14 ENCOUNTER — HOME CARE VISIT (OUTPATIENT)
Dept: SCHEDULING | Facility: HOME HEALTH | Age: 80
End: 2020-12-14
Payer: MEDICARE

## 2020-12-14 VITALS
OXYGEN SATURATION: 92 % | HEART RATE: 84 BPM | DIASTOLIC BLOOD PRESSURE: 78 MMHG | SYSTOLIC BLOOD PRESSURE: 120 MMHG | TEMPERATURE: 99 F

## 2020-12-14 PROCEDURE — 3331090001 HH PPS REVENUE CREDIT

## 2020-12-14 PROCEDURE — 3331090002 HH PPS REVENUE DEBIT

## 2020-12-14 PROCEDURE — G0151 HHCP-SERV OF PT,EA 15 MIN: HCPCS

## 2020-12-14 PROCEDURE — G0152 HHCP-SERV OF OT,EA 15 MIN: HCPCS

## 2020-12-15 ENCOUNTER — HOME CARE VISIT (OUTPATIENT)
Dept: HOME HEALTH SERVICES | Facility: HOME HEALTH | Age: 80
End: 2020-12-15
Payer: MEDICARE

## 2020-12-15 PROCEDURE — 3331090001 HH PPS REVENUE CREDIT

## 2020-12-15 PROCEDURE — 3331090002 HH PPS REVENUE DEBIT

## 2020-12-16 PROCEDURE — 3331090002 HH PPS REVENUE DEBIT

## 2020-12-16 PROCEDURE — 3331090001 HH PPS REVENUE CREDIT

## 2020-12-17 ENCOUNTER — HOME CARE VISIT (OUTPATIENT)
Dept: HOME HEALTH SERVICES | Facility: HOME HEALTH | Age: 80
End: 2020-12-17
Payer: MEDICARE

## 2020-12-17 ENCOUNTER — HOME CARE VISIT (OUTPATIENT)
Dept: SCHEDULING | Facility: HOME HEALTH | Age: 80
End: 2020-12-17
Payer: MEDICARE

## 2020-12-17 ENCOUNTER — TELEPHONE (OUTPATIENT)
Dept: INTERNAL MEDICINE CLINIC | Age: 80
End: 2020-12-17

## 2020-12-17 VITALS
TEMPERATURE: 97.7 F | SYSTOLIC BLOOD PRESSURE: 104 MMHG | OXYGEN SATURATION: 97 % | HEART RATE: 77 BPM | DIASTOLIC BLOOD PRESSURE: 60 MMHG

## 2020-12-17 PROCEDURE — 3331090002 HH PPS REVENUE DEBIT

## 2020-12-17 PROCEDURE — 3331090001 HH PPS REVENUE CREDIT

## 2020-12-17 PROCEDURE — G0152 HHCP-SERV OF OT,EA 15 MIN: HCPCS

## 2020-12-17 RX ORDER — CARVEDILOL 12.5 MG/1
12.5 TABLET ORAL 2 TIMES DAILY WITH MEALS
Qty: 60 TAB | Refills: 5 | Status: SHIPPED | OUTPATIENT
Start: 2020-12-17 | End: 2021-06-21

## 2020-12-17 RX ORDER — FUROSEMIDE 20 MG/1
20 TABLET ORAL DAILY
Qty: 30 TAB | Refills: 5 | Status: SHIPPED | OUTPATIENT
Start: 2020-12-17 | End: 2021-06-16

## 2020-12-17 NOTE — TELEPHONE ENCOUNTER
Please call patient he needs help with his medication and can't read; he needs refills but unable to tell me which ones. [phone]    Also patient stated it cost $198.00 can he get something cheaper.     Thanks  Regina Ko

## 2020-12-17 NOTE — TELEPHONE ENCOUNTER
Josse Perez with [facility] notified that OT can be continued.

## 2020-12-17 NOTE — TELEPHONE ENCOUNTER
Patient is calling needs refill by 11:00am furosemide and carvedilol. Needs to to go to [facility].  Pharmacy gave him emergency pills last night

## 2020-12-18 PROCEDURE — 3331090001 HH PPS REVENUE CREDIT

## 2020-12-18 PROCEDURE — 3331090002 HH PPS REVENUE DEBIT

## 2020-12-19 PROCEDURE — 3331090002 HH PPS REVENUE DEBIT

## 2020-12-19 PROCEDURE — 3331090001 HH PPS REVENUE CREDIT

## 2020-12-20 PROCEDURE — 3331090001 HH PPS REVENUE CREDIT

## 2020-12-20 PROCEDURE — 3331090002 HH PPS REVENUE DEBIT

## 2020-12-21 ENCOUNTER — HOME CARE VISIT (OUTPATIENT)
Dept: SCHEDULING | Facility: HOME HEALTH | Age: 80
End: 2020-12-21
Payer: MEDICARE

## 2020-12-21 VITALS
TEMPERATURE: 98.6 F | HEART RATE: 115 BPM | OXYGEN SATURATION: 95 % | SYSTOLIC BLOOD PRESSURE: 116 MMHG | DIASTOLIC BLOOD PRESSURE: 70 MMHG

## 2020-12-21 PROCEDURE — G0152 HHCP-SERV OF OT,EA 15 MIN: HCPCS

## 2020-12-21 PROCEDURE — 3331090002 HH PPS REVENUE DEBIT

## 2020-12-21 PROCEDURE — 3331090001 HH PPS REVENUE CREDIT

## 2020-12-21 PROCEDURE — 400013 HH SOC

## 2020-12-22 ENCOUNTER — ANCILLARY PROCEDURE (OUTPATIENT)
Dept: CARDIOLOGY CLINIC | Age: 80
End: 2020-12-22
Payer: MEDICARE

## 2020-12-22 VITALS
BODY MASS INDEX: 29.62 KG/M2 | HEIGHT: 69 IN | SYSTOLIC BLOOD PRESSURE: 124 MMHG | DIASTOLIC BLOOD PRESSURE: 70 MMHG | WEIGHT: 200 LBS

## 2020-12-22 VITALS
WEIGHT: 208 LBS | HEIGHT: 69 IN | DIASTOLIC BLOOD PRESSURE: 70 MMHG | SYSTOLIC BLOOD PRESSURE: 116 MMHG | BODY MASS INDEX: 30.81 KG/M2

## 2020-12-22 LAB
STRESS BASELINE DIAS BP: 70 MMHG
STRESS BASELINE HR: 84 BPM
STRESS BASELINE SYS BP: 124 MMHG
STRESS PEAK DIAS BP: 64 MMHG
STRESS PEAK SYS BP: 132 MMHG
STRESS PERCENT HR ACHIEVED: 82 %
STRESS POST PEAK HR: 116 BPM
STRESS RATE PRESSURE PRODUCT: NORMAL BPM*MMHG
STRESS ST DEPRESSION: 0 MM
STRESS ST ELEVATION: 0 MM
STRESS TARGET HR: 142 BPM

## 2020-12-22 PROCEDURE — 3331090001 HH PPS REVENUE CREDIT

## 2020-12-22 PROCEDURE — 93017 CV STRESS TEST TRACING ONLY: CPT | Performed by: INTERNAL MEDICINE

## 2020-12-22 PROCEDURE — 78452 HT MUSCLE IMAGE SPECT MULT: CPT | Performed by: INTERNAL MEDICINE

## 2020-12-22 PROCEDURE — 93018 CV STRESS TEST I&R ONLY: CPT | Performed by: INTERNAL MEDICINE

## 2020-12-22 PROCEDURE — 93308 TTE F-UP OR LMTD: CPT | Performed by: INTERNAL MEDICINE

## 2020-12-22 PROCEDURE — 93016 CV STRESS TEST SUPVJ ONLY: CPT | Performed by: INTERNAL MEDICINE

## 2020-12-22 PROCEDURE — A9502 TC99M TETROFOSMIN: HCPCS | Performed by: INTERNAL MEDICINE

## 2020-12-22 PROCEDURE — 3331090002 HH PPS REVENUE DEBIT

## 2020-12-22 RX ORDER — AMLODIPINE BESYLATE 2.5 MG/1
TABLET ORAL
Qty: 90 TAB | Refills: 0 | Status: SHIPPED | OUTPATIENT
Start: 2020-12-22 | End: 2021-04-13

## 2020-12-22 RX ADMIN — REGADENOSON 0.4 MG: 0.08 INJECTION, SOLUTION INTRAVENOUS at 13:54

## 2020-12-22 RX ADMIN — TETROFOSMIN 33.9 MILLICURIE: 1.38 INJECTION, POWDER, LYOPHILIZED, FOR SOLUTION INTRAVENOUS at 13:54

## 2020-12-22 RX ADMIN — TETROFOSMIN 10.3 MILLICURIE: 1.38 INJECTION, POWDER, LYOPHILIZED, FOR SOLUTION INTRAVENOUS at 13:07

## 2020-12-23 ENCOUNTER — HOME CARE VISIT (OUTPATIENT)
Dept: SCHEDULING | Facility: HOME HEALTH | Age: 80
End: 2020-12-23
Payer: MEDICARE

## 2020-12-23 VITALS
TEMPERATURE: 97 F | HEART RATE: 103 BPM | OXYGEN SATURATION: 97 % | SYSTOLIC BLOOD PRESSURE: 114 MMHG | DIASTOLIC BLOOD PRESSURE: 70 MMHG

## 2020-12-23 LAB
ECHO AO ROOT DIAM: 3.44 CM
ECHO LA AREA 4C: 30.53 CM2
ECHO LA MAJOR AXIS: 4.25 CM
ECHO LA MINOR AXIS: 2.02 CM
ECHO LA VOL 2C: 113.83 ML (ref 18–58)
ECHO LA VOL 4C: 101.1 ML (ref 18–58)
ECHO LA VOL BP: 117.08 ML (ref 18–58)
ECHO LA VOL/BSA BIPLANE: 55.74 ML/M2 (ref 16–28)
ECHO LA VOLUME INDEX A2C: 54.2 ML/M2 (ref 16–28)
ECHO LA VOLUME INDEX A4C: 48.13 ML/M2 (ref 16–28)
ECHO LV INTERNAL DIMENSION DIASTOLIC: 4.36 CM (ref 4.2–5.9)
ECHO LV INTERNAL DIMENSION SYSTOLIC: 3.7 CM
ECHO LV IVSD: 1.52 CM (ref 0.6–1)
ECHO LV MASS 2D: 237 G (ref 88–224)
ECHO LV MASS INDEX 2D: 112.9 G/M2 (ref 49–115)
ECHO LV POSTERIOR WALL DIASTOLIC: 1.28 CM (ref 0.6–1)
ECHO LVOT DIAM: 2.01 CM

## 2020-12-23 PROCEDURE — 3331090002 HH PPS REVENUE DEBIT

## 2020-12-23 PROCEDURE — G0152 HHCP-SERV OF OT,EA 15 MIN: HCPCS

## 2020-12-23 PROCEDURE — 3331090001 HH PPS REVENUE CREDIT

## 2020-12-23 PROCEDURE — G0299 HHS/HOSPICE OF RN EA 15 MIN: HCPCS

## 2020-12-24 ENCOUNTER — TELEPHONE (OUTPATIENT)
Dept: CARDIOLOGY CLINIC | Age: 80
End: 2020-12-24

## 2020-12-24 PROCEDURE — 3331090001 HH PPS REVENUE CREDIT

## 2020-12-24 PROCEDURE — 3331090002 HH PPS REVENUE DEBIT

## 2020-12-24 NOTE — TELEPHONE ENCOUNTER
----- Message from Vanessa Wagner NP sent at 12/23/2020 11:52 AM EST -----  Please contact pt to see if he can come see Dr Huber Herbert next Wed am. His stress test is neg for blockages, however it did show afib. Additionally his echo is showing a continued reduced heart pumping strength.

## 2020-12-25 PROCEDURE — 3331090001 HH PPS REVENUE CREDIT

## 2020-12-25 PROCEDURE — 3331090002 HH PPS REVENUE DEBIT

## 2020-12-26 PROCEDURE — 3331090001 HH PPS REVENUE CREDIT

## 2020-12-26 PROCEDURE — 3331090002 HH PPS REVENUE DEBIT

## 2020-12-27 VITALS
TEMPERATURE: 98.4 F | OXYGEN SATURATION: 97 % | RESPIRATION RATE: 18 BRPM | HEART RATE: 68 BPM | SYSTOLIC BLOOD PRESSURE: 108 MMHG | DIASTOLIC BLOOD PRESSURE: 60 MMHG

## 2020-12-27 PROCEDURE — 3331090001 HH PPS REVENUE CREDIT

## 2020-12-27 PROCEDURE — 3331090002 HH PPS REVENUE DEBIT

## 2020-12-28 ENCOUNTER — HOME CARE VISIT (OUTPATIENT)
Dept: SCHEDULING | Facility: HOME HEALTH | Age: 80
End: 2020-12-28
Payer: MEDICARE

## 2020-12-28 VITALS
TEMPERATURE: 99.4 F | DIASTOLIC BLOOD PRESSURE: 70 MMHG | HEART RATE: 81 BPM | OXYGEN SATURATION: 94 % | SYSTOLIC BLOOD PRESSURE: 118 MMHG

## 2020-12-28 PROCEDURE — 3331090001 HH PPS REVENUE CREDIT

## 2020-12-28 PROCEDURE — 3331090002 HH PPS REVENUE DEBIT

## 2020-12-28 PROCEDURE — G0152 HHCP-SERV OF OT,EA 15 MIN: HCPCS

## 2020-12-29 PROCEDURE — 3331090001 HH PPS REVENUE CREDIT

## 2020-12-29 PROCEDURE — 3331090002 HH PPS REVENUE DEBIT

## 2020-12-30 PROCEDURE — 3331090001 HH PPS REVENUE CREDIT

## 2020-12-30 PROCEDURE — 3331090002 HH PPS REVENUE DEBIT

## 2020-12-31 ENCOUNTER — HOME CARE VISIT (OUTPATIENT)
Dept: SCHEDULING | Facility: HOME HEALTH | Age: 80
End: 2020-12-31
Payer: MEDICARE

## 2020-12-31 VITALS
SYSTOLIC BLOOD PRESSURE: 120 MMHG | OXYGEN SATURATION: 98 % | TEMPERATURE: 98.1 F | HEART RATE: 77 BPM | DIASTOLIC BLOOD PRESSURE: 70 MMHG

## 2020-12-31 PROCEDURE — G0299 HHS/HOSPICE OF RN EA 15 MIN: HCPCS

## 2020-12-31 PROCEDURE — 3331090001 HH PPS REVENUE CREDIT

## 2020-12-31 PROCEDURE — 3331090002 HH PPS REVENUE DEBIT

## 2020-12-31 PROCEDURE — G0152 HHCP-SERV OF OT,EA 15 MIN: HCPCS

## 2021-01-01 PROCEDURE — 3331090002 HH PPS REVENUE DEBIT

## 2021-01-01 PROCEDURE — 3331090001 HH PPS REVENUE CREDIT

## 2021-01-02 VITALS
WEIGHT: 190 LBS | SYSTOLIC BLOOD PRESSURE: 110 MMHG | HEART RATE: 71 BPM | BODY MASS INDEX: 28.06 KG/M2 | RESPIRATION RATE: 18 BRPM | DIASTOLIC BLOOD PRESSURE: 60 MMHG | TEMPERATURE: 98.4 F | OXYGEN SATURATION: 97 %

## 2021-01-02 PROCEDURE — 3331090001 HH PPS REVENUE CREDIT

## 2021-01-02 PROCEDURE — 3331090002 HH PPS REVENUE DEBIT

## 2021-01-03 PROCEDURE — 3331090002 HH PPS REVENUE DEBIT

## 2021-01-03 PROCEDURE — 3331090001 HH PPS REVENUE CREDIT

## 2021-01-04 ENCOUNTER — HOME CARE VISIT (OUTPATIENT)
Dept: SCHEDULING | Facility: HOME HEALTH | Age: 81
End: 2021-01-04
Payer: MEDICARE

## 2021-01-04 VITALS
TEMPERATURE: 98.6 F | OXYGEN SATURATION: 94 % | DIASTOLIC BLOOD PRESSURE: 80 MMHG | SYSTOLIC BLOOD PRESSURE: 106 MMHG | HEART RATE: 60 BPM

## 2021-01-04 PROCEDURE — 3331090002 HH PPS REVENUE DEBIT

## 2021-01-04 PROCEDURE — G0152 HHCP-SERV OF OT,EA 15 MIN: HCPCS

## 2021-01-04 PROCEDURE — 3331090001 HH PPS REVENUE CREDIT

## 2021-01-04 NOTE — PROGRESS NOTES
CC:   Chief Complaint   Patient presents with    Hypertension    Diabetes       HISTORY OF PRESENT ILLNESS  Zina Malone is a 68 y.o. male. Presents for 1 month follow up evaluation. He has HTN, diet-controlled type 2 DM, CKD stage 3, hyperlipidemia, CHF (combined diastolic and systolic) with echo EF 39-62% in 7/17, non-ischemic cardiomyopathy, atrial flutter s/p AFL ablation on 8/22/17, non-obstructive atherosclerotic heart disease, COPD, chronic low back pain, and prostate cancer.      Seen last month after hospitalization at [facility] in 11/20 for persistent atrial fibrillation and acute pericarditis with pericardial effusion. Underwent TAZ and cardioversion on 11/16/20, converted to NSR. TAZ: LVEF 25-30%, moderate pulmonary HTN with PASP 61 mmHg. Discharged on colchicine for pericarditis (no NSAID due to CKD). Had nuclear stress test on 12/22/20: normal but was in a-fib. TTE 12/22/20: LVEF 25-30%. Complains of PA. Denies fevers, CP, heart palpitations, leg swelling, or PND. Has Cardiology appointment scheduled on 1/7/21. Had PET scan on 12/3/20 for restaging treated prostate ca due to rising PSA: indeterminate mild uptake in the prostate bed, no uptake in lymph nodes or bones. Now on chemotherapy with darolutamide for prostate cancer. He has diet-controlled type 2 DM. Since last visit he reports no polyuria or polydipsia, no hypoglycemia. Home glucose monitoring: is not performed. Diabetic diet compliance: compliant most of the time. Lab review: A1c is 6.0% today (1/5/21), was 6.4% on 8/26/20. Eye exam: overdue. ROS  A complete review of systems was performed and is negative except for those mentioned in the HPI.      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    S/P cardiac cath Z98.890    NICM (nonischemic cardiomyopathy) ([facility] I42.8    Hypertension I10    Mixed hyperlipidemia E78.2    Smokeless tobacco use Z72.0    Atrial flutter ([facility]) I48.92    Type 2 diabetes mellitus with stage 3 chronic kidney disease, without long-term current use of insulin (HCC) E11.22, N18.30    Prostate cancer ([facility] C61    COPD (chronic obstructive pulmonary disease) ([facility]) J44.9    Chronic low back pain M54.5, G89.29    Obesity (BMI 30-39. 9) E66.9    Candidal intertrigo B37.2    Anemia of chronic disease D63.8    Pericardial effusion I31.3     Past Medical History:   Diagnosis Date    Acute respiratory failure with hypoxia ([facility] 02/08/2014    also 11/28/15, 2/17/16, 5/14/17     Alcohol abuse     As of 11/29/18 pt stated he quit 20 years    Arthritis     Back and shoulder    Atrial flutter ([facility] 08/2017    saw Dr. Shannan Foster; underwent a-flutter ablation    BPH (benign prostatic hyperplasia)     CHF (congestive heart failure) ([facility] 02/11/2014    TTE 11/15: EF 40-45%, 2/14: EF 20%  Admitted for acute exacerbations 2/8/14, 11/28/15, 2/17/16, and 5/4/17)    Chronic kidney disease     III    Chronic low back pain     LS spine X-ray 4/8/17: mild DDD    Combined forms of age-related cataract of left eye 12/12/2018    KPE/IOL OS    Combined forms of age-related cataract of right eye 11/28/2018    KPE/IOL OD    COPD (chronic obstructive pulmonary disease) ([facility]     PA (dyspnea on exertion)     ED (erectile dysfunction)     Elevated troponin 02/11/2014    also 11/28/15; due to cardiac strain    Frequent hospital admissions     5/14-5/23/17: acute hypoxic resp failure due to CHF exac, ROBINSON on CKD 3, sepsis due to LE cellulitis, 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  2/17-2/22/16: acute hypoxic resp failure, acute diarrhea  11/28-11/30/15: acute hypoxic resp failure due to acute CHF exac, elevated troponin due to cardiac strain  2/8-2/12/14: acuter hypoxic resp failure due to CHF exac, pneumonia     Hand blister 10/8/2017    Bilateral    Hypertension     Kidney disease, chronic, stage III (GFR 30-59 ml/min)     Leg fracture, right 1973    Nonischemic cardiomyopathy ([facility]     with LVH    Pneumonia 02/08/2014 2/8/14 and 10/30/15    Poor historian     As of 11/29/18 pt reports 5th grade education, pt unable to give med list-obtained from wife per patient's permission    Prediabetes     Prostate cancer ([facility] 2016    As of 11/29/18, pt states he gets two injections twice a year for this    Pulmonary edema 11/28/2015    S/P cardiac cath 2/12/2014 2/12/14 no significant coronary disease, severe LV dysfunction    Tinea cruris     also genital folliculitis    Vertigo      Allergies   Allergen Reactions    Oxycodone Contact Dermatitis       Current Outpatient Medications   Medication Sig Dispense Refill    darolutamide (NUBEQA PO) 1,200 mg by Mouth/Throat route daily.  amLODIPine (NORVASC) 2.5 mg tablet Take 1 tablet by mouth once daily 90 Tab 0    carvediloL (COREG) 12.5 mg tablet Take 1 Tab by mouth two (2) times daily (with meals). 60 Tab 5    furosemide (LASIX) 20 mg tablet Take 1 Tab by mouth daily. 30 Tab 5    atorvastatin (LIPITOR) 40 mg tablet TAKE 1 TABLET BY MOUTH ONCE DAILY AT NIGHT 90 Tab 0    rivaroxaban (XARELTO) 20 mg tab tablet Take 1 Tab by mouth daily. 30 Tab 1    colchicine 0.6 mg tablet Take 1 Tab by mouth two (2) times a day for 90 days. Indications: inflammation of the covering of the heart or pericardium 60 Tab 2    clotrimazole-betamethasone (LOTRISONE) topical cream Apply  to affected area two (2) times a day.  aspirin 81 mg chewable tablet Take 1 Tab by mouth daily. 10 Tab 0         PHYSICAL EXAM  Visit Vitals  /76 (BP 1 Location: Left arm, BP Patient Position: Sitting)   Pulse 76   Temp 98.2 °F (36.8 °C) (Oral)   Resp 16   Ht 5' 9\" (1.753 m)   Wt 197 lb (89.4 kg)   SpO2 97%   BMI 29.09 kg/m²   Weight decrease of 10 lbs since last clinic visit 1 month ago    General: Well-developed and well-nourished, no distress.   HEENT:  Head normocephalic/atraumatic, no scleral icterus  Lungs:  Clear to ausculation bilaterally. Good air movement. Heart:  Irregularly irregular, normal S1 and S2, no murmur, gallop, or rub  Extremities: No clubbing, cyanosis, or edema. Neurological: Alert and oriented. Psychiatric: Normal mood and affect. Behavior is normal.         ASSESSMENT AND PLAN    ICD-10-CM ICD-9-CM    1. Essential hypertension  I10 401.9    2. Type 2 diabetes mellitus with stage 3b chronic kidney disease, without long-term current use of insulin ([facility])  E11.21 250.40 AMB POC HEMOGLOBIN A1C    N18.32 585.3 REFERRAL TO OPHTHALMOLOGY   3. Mixed hyperlipidemia  E78.2 272.2    4. Chronic combined systolic and diastolic congestive heart failure ([facility])  I50.42 428.42      428.0    5. Persistent atrial fibrillation ([facility])  I48.19 427.31      Diagnoses and all orders for this visit:    1. Essential hypertension  Well-controlled. Continue amlodipine and carvedilol. 2. Type 2 diabetes mellitus with stage 3b chronic kidney disease, without long-term current use of insulin ([address]  Well-controlled by diet alone. A1c 6.0% today. -     AMB POC HEMOGLOBIN A1C  -     REFERRAL TO OPHTHALMOLOGY (Dr. Janetta Cogan)    3. Mixed hyperlipidemia  Controlled. 9/10/20: Tot chol 81, LDL 28. Continue atorvastatin 40 mg daily. 4. Chronic combined systolic and diastolic congestive heart failure ([address]  Recent TTE done 12/22/20 shows EF 25-30%, a decline compared to TTE 11/11/20 (EF 30-35%). Will likely need AICD. Keep scheduled appointment with Cardiology on 1/7/21. 5. Persistent atrial fibrillation ([address]  Back in a-fib after cardioversion in Nov.  Is rate-controlled. Continue carvedilol and Xarelto. He complains of Xarelto being too expensive. Follow-up and Dispositions    · Return in about 4 months (around 5/5/2021), or if symptoms worsen or fail to improve, for HTN, DM, chol. I have discussed the diagnosis with the patient and the intended plan as seen in the above orders.  Patient is in agreement. The patient has received an after-visit summary and questions were answered concerning future plans. I have discussed medication side effects and warnings with the patient as well.

## 2021-01-05 ENCOUNTER — OFFICE VISIT (OUTPATIENT)
Dept: INTERNAL MEDICINE CLINIC | Age: 81
End: 2021-01-05
Payer: MEDICARE

## 2021-01-05 VITALS
TEMPERATURE: 98.2 F | OXYGEN SATURATION: 97 % | WEIGHT: 197 LBS | RESPIRATION RATE: 16 BRPM | BODY MASS INDEX: 29.18 KG/M2 | HEART RATE: 76 BPM | DIASTOLIC BLOOD PRESSURE: 76 MMHG | HEIGHT: 69 IN | SYSTOLIC BLOOD PRESSURE: 120 MMHG

## 2021-01-05 DIAGNOSIS — I48.19 PERSISTENT ATRIAL FIBRILLATION (HCC): ICD-10-CM

## 2021-01-05 DIAGNOSIS — E78.2 MIXED HYPERLIPIDEMIA: ICD-10-CM

## 2021-01-05 DIAGNOSIS — E11.22 TYPE 2 DIABETES MELLITUS WITH STAGE 3B CHRONIC KIDNEY DISEASE, WITHOUT LONG-TERM CURRENT USE OF INSULIN (HCC): ICD-10-CM

## 2021-01-05 DIAGNOSIS — I50.42 CHRONIC COMBINED SYSTOLIC AND DIASTOLIC CONGESTIVE HEART FAILURE (HCC): ICD-10-CM

## 2021-01-05 DIAGNOSIS — N18.32 TYPE 2 DIABETES MELLITUS WITH STAGE 3B CHRONIC KIDNEY DISEASE, WITHOUT LONG-TERM CURRENT USE OF INSULIN (HCC): ICD-10-CM

## 2021-01-05 DIAGNOSIS — I10 ESSENTIAL HYPERTENSION: Primary | ICD-10-CM

## 2021-01-05 LAB — HBA1C MFR BLD HPLC: 6 %

## 2021-01-05 PROCEDURE — 1101F PT FALLS ASSESS-DOCD LE1/YR: CPT | Performed by: INTERNAL MEDICINE

## 2021-01-05 PROCEDURE — G8752 SYS BP LESS 140: HCPCS | Performed by: INTERNAL MEDICINE

## 2021-01-05 PROCEDURE — 3331090002 HH PPS REVENUE DEBIT

## 2021-01-05 PROCEDURE — G8510 SCR DEP NEG, NO PLAN REQD: HCPCS | Performed by: INTERNAL MEDICINE

## 2021-01-05 PROCEDURE — G8417 CALC BMI ABV UP PARAM F/U: HCPCS | Performed by: INTERNAL MEDICINE

## 2021-01-05 PROCEDURE — 3331090001 HH PPS REVENUE CREDIT

## 2021-01-05 PROCEDURE — G8754 DIAS BP LESS 90: HCPCS | Performed by: INTERNAL MEDICINE

## 2021-01-05 PROCEDURE — G8427 DOCREV CUR MEDS BY ELIG CLIN: HCPCS | Performed by: INTERNAL MEDICINE

## 2021-01-05 PROCEDURE — 99214 OFFICE O/P EST MOD 30 MIN: CPT | Performed by: INTERNAL MEDICINE

## 2021-01-05 PROCEDURE — 83036 HEMOGLOBIN GLYCOSYLATED A1C: CPT | Performed by: INTERNAL MEDICINE

## 2021-01-05 PROCEDURE — G8536 NO DOC ELDER MAL SCRN: HCPCS | Performed by: INTERNAL MEDICINE

## 2021-01-05 NOTE — PROGRESS NOTES
Steffen Livingston is a 68 y.o. male  Chief Complaint   Patient presents with    Hypertension    Diabetes     Health Maintenance Due   Topic Date Due    Eye Exam Retinal or Dilated  04/09/1950     Visit Vitals  /76 (BP 1 Location: Left arm, BP Patient Position: Sitting)   Pulse 76   Temp 98.2 °F (36.8 °C) (Oral)   Resp 16   Ht 5' 9\" (1.753 m)   Wt 197 lb (89.4 kg)   SpO2 97%   BMI 29.09 kg/m²     1. Have you been to the ER, urgent care clinic since your last visit? Hospitalized since your last visit? [facility] pericarditis 11/10/20    2. Have you seen or consulted any other health care providers outside of the [address] [place] [address] since your last visit? Include any pap smears or colon screening.  No

## 2021-01-06 ENCOUNTER — HOME CARE VISIT (OUTPATIENT)
Dept: SCHEDULING | Facility: HOME HEALTH | Age: 81
End: 2021-01-06
Payer: MEDICARE

## 2021-01-06 VITALS
HEART RATE: 81 BPM | OXYGEN SATURATION: 98 % | TEMPERATURE: 97.8 F | SYSTOLIC BLOOD PRESSURE: 122 MMHG | DIASTOLIC BLOOD PRESSURE: 76 MMHG | BODY MASS INDEX: 28.65 KG/M2 | WEIGHT: 194 LBS | RESPIRATION RATE: 18 BRPM

## 2021-01-06 PROCEDURE — 3331090002 HH PPS REVENUE DEBIT

## 2021-01-06 PROCEDURE — G0300 HHS/HOSPICE OF LPN EA 15 MIN: HCPCS

## 2021-01-06 PROCEDURE — 3331090001 HH PPS REVENUE CREDIT

## 2021-01-07 ENCOUNTER — OFFICE VISIT (OUTPATIENT)
Dept: CARDIOLOGY CLINIC | Age: 81
End: 2021-01-07
Payer: MEDICARE

## 2021-01-07 VITALS
WEIGHT: 197.6 LBS | HEART RATE: 91 BPM | HEIGHT: 69 IN | SYSTOLIC BLOOD PRESSURE: 110 MMHG | OXYGEN SATURATION: 98 % | DIASTOLIC BLOOD PRESSURE: 70 MMHG | BODY MASS INDEX: 29.27 KG/M2 | RESPIRATION RATE: 18 BRPM

## 2021-01-07 DIAGNOSIS — I48.0 PAF (PAROXYSMAL ATRIAL FIBRILLATION) (HCC): ICD-10-CM

## 2021-01-07 DIAGNOSIS — I10 ESSENTIAL HYPERTENSION: ICD-10-CM

## 2021-01-07 DIAGNOSIS — I42.8 NICM (NONISCHEMIC CARDIOMYOPATHY) (HCC): Primary | ICD-10-CM

## 2021-01-07 DIAGNOSIS — I25.10 CORONARY ARTERY DISEASE INVOLVING NATIVE CORONARY ARTERY OF NATIVE HEART WITHOUT ANGINA PECTORIS: ICD-10-CM

## 2021-01-07 DIAGNOSIS — E78.2 MIXED HYPERLIPIDEMIA: ICD-10-CM

## 2021-01-07 DIAGNOSIS — I31.39 PERICARDIAL EFFUSION: ICD-10-CM

## 2021-01-07 PROCEDURE — 3331090001 HH PPS REVENUE CREDIT

## 2021-01-07 PROCEDURE — 99214 OFFICE O/P EST MOD 30 MIN: CPT | Performed by: INTERNAL MEDICINE

## 2021-01-07 PROCEDURE — 1101F PT FALLS ASSESS-DOCD LE1/YR: CPT | Performed by: INTERNAL MEDICINE

## 2021-01-07 PROCEDURE — G0463 HOSPITAL OUTPT CLINIC VISIT: HCPCS | Performed by: INTERNAL MEDICINE

## 2021-01-07 PROCEDURE — G8417 CALC BMI ABV UP PARAM F/U: HCPCS | Performed by: INTERNAL MEDICINE

## 2021-01-07 PROCEDURE — G8754 DIAS BP LESS 90: HCPCS | Performed by: INTERNAL MEDICINE

## 2021-01-07 PROCEDURE — G8752 SYS BP LESS 140: HCPCS | Performed by: INTERNAL MEDICINE

## 2021-01-07 PROCEDURE — G8536 NO DOC ELDER MAL SCRN: HCPCS | Performed by: INTERNAL MEDICINE

## 2021-01-07 PROCEDURE — 3331090002 HH PPS REVENUE DEBIT

## 2021-01-07 PROCEDURE — G8427 DOCREV CUR MEDS BY ELIG CLIN: HCPCS | Performed by: INTERNAL MEDICINE

## 2021-01-07 PROCEDURE — G8432 DEP SCR NOT DOC, RNG: HCPCS | Performed by: INTERNAL MEDICINE

## 2021-01-07 RX ORDER — LISINOPRIL 2.5 MG/1
2.5 TABLET ORAL DAILY
Qty: 90 TAB | Refills: 1 | Status: SHIPPED | OUTPATIENT
Start: 2021-01-07 | End: 2021-06-28

## 2021-01-07 NOTE — PROGRESS NOTES
1. Have you been to the ER, urgent care clinic since your last visit? Hospitalized since your last visit? No.    2. Have you seen or consulted any other health care providers outside of the [address] [place] [address] since your last visit? Include any pap smears or colon screening.    No.      Chief Complaint   Patient presents with    Results     discuss stress test and echo results- pt denies any cardiac symptoms

## 2021-01-07 NOTE — PROGRESS NOTES
[address], [address] [facility]  [phone]     Subjective:      Vitaly Linares is a 68 y.o. male is here for to discuss test result: echo and NST. He has pmhx CAD NICM HTN AFL s/p AFL ablation in 2017 and HLD. Recall: hospital admission 11/10 -11/18 for AF, acute pericarditis with pericardial effusion EF 30-35%  Started on Colchicine with improvement in pain. He had [facility] for afib 11/16 and converted to SR. TAZ at that time showed EF 25-30%, trivial to small pericardial effusion. He maintained SR and was d/c on Coreg and Xarelto. Today, wt down almost 10 lbs. His sob is improved but not resolved. No cp. BP is better     The patient denies chest pain, orthopnea, PND, LE edema, palpitations, syncope, or presyncope. NST 12/22/2020  · Baseline ECG: Atrial fibrillation, occasional PVCs, right bundle branch block. Inverted t-wave  · There was no ST segment deviation noted during stress. · Gated SPECT: Left ventricular function post-stress was abnormal. Calculated ejection fraction is 24%. There is no evidence of transient ischemic dilation (TID). · Left ventricular ejection fraction may be inaccurate due to gating artifact from atrial fibrillaiton. Correlation with echocardiogram is recommended. · Left ventricular perfusion is normal.     Limited Echo 12/22/2020  · LV: Estimated LVEF is 25 - 30%. Biplane method used to measure ejection fraction. Normal cavity size. Upper normal wall thickness. Moderate-to-severely and globally reduced systolic function. · LA: Moderately dilated left atrium. Left Atrium volume index is 55.75 mL/m2. · RA: Moderately dilated right atrium.     Patient Active Problem List    Diagnosis Date Noted    PAF (paroxysmal atrial fibrillation) ([facility] 01/07/2021    Pericardial effusion 11/10/2020    Anemia of chronic disease 09/17/2020    Candidal intertrigo 11/05/2018    Type 2 diabetes mellitus with stage 3 chronic kidney disease, without long-term current use of insulin ([facility] 10/08/2017    Prostate cancer ([address] 10/08/2017    COPD (chronic obstructive pulmonary disease) ([address] 10/08/2017    Chronic low back pain 10/08/2017    Obesity (BMI 30-39.9) 10/08/2017    Atrial flutter ([address] 08/22/2017    Smokeless tobacco use 07/25/2017    Mixed hyperlipidemia 03/31/2016    Hypertension 05/20/2014    NICM (nonischemic cardiomyopathy) ([address] 02/20/2014    S/P cardiac cath 02/12/2014    Combined systolic and diastolic congestive heart failure ([address] 02/08/2014      Jenni Akins MD  Past Medical History:   Diagnosis Date    Acute respiratory failure with hypoxia ([address] 02/08/2014    also 11/28/15, 2/17/16, 5/14/17     Alcohol abuse     As of 11/29/18 pt stated he quit 20 years    Arthritis     Back and shoulder    Atrial flutter ([address] 08/2017    saw Dr. Danell Dance; underwent a-flutter ablation    BPH (benign prostatic hyperplasia)     CHF (congestive heart failure) ([facility] 02/11/2014    TTE 11/15: EF 40-45%, 2/14: EF 20%  Admitted for acute exacerbations 2/8/14, 11/28/15, 2/17/16, and 5/4/17)    Chronic kidney disease     III    Chronic low back pain     LS spine X-ray 4/8/17: mild DDD    Combined forms of age-related cataract of left eye 12/12/2018    KPE/IOL OS    Combined forms of age-related cataract of right eye 11/28/2018    KPE/IOL OD    COPD (chronic obstructive pulmonary disease) ([facility]     PA (dyspnea on exertion)     ED (erectile dysfunction)     Elevated troponin 02/11/2014    also 11/28/15; due to cardiac strain    Frequent hospital admissions     5/14-5/23/17: acute hypoxic resp failure due to CHF exac, ROBINSON on CKD 3, sepsis due to LE cellulitis, 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  2/17-2/22/16: acute hypoxic resp failure, acute diarrhea  11/28-11/30/15: acute hypoxic resp failure due to acute CHF exac, elevated troponin due to cardiac strain  2/8-2/12/14: acuter hypoxic resp failure due to CHF exac, pneumonia     Hand blister 10/8/2017    Bilateral    Hypertension     Kidney disease, chronic, stage III (GFR 30-59 ml/min)     Leg fracture, right 1973    Nonischemic cardiomyopathy ([facility]     with LVH    Pneumonia 02/08/2014 2/8/14 and 10/30/15    Poor historian     As of 11/29/18 pt reports 5th grade education, pt unable to give med list-obtained from wife per patient's permission    Prediabetes     Prostate cancer ([facility] 2016    As of 11/29/18, pt states he gets two injections twice a year for this    Pulmonary edema 11/28/2015    S/P cardiac cath 2/12/2014 2/12/14 no significant coronary disease, severe LV dysfunction    Tinea cruris     also genital folliculitis    Vertigo       Past Surgical History:   Procedure Laterality Date    COLONOSCOPY N/A 3/2/2020    COLONOSCOPY performed by Qi Farmer MD at [facility] ENDOSCOPY. 2 tubular adenomas, diverticulosis, int hemorrhoids, repeat in 5 yrs    COLONOSCOPY,DIAGNOSTIC  02/22/2016    Dr. An Grimaldo; single sessile polyp at descending colon, sigmoid diverticulosis, int hemorrhoids    COLONOSCOPY,REMV LESN,SNARE  2/22/2016         COLONOSCOPY,REMV LESN,SNARE  3/2/2020         HX CATARACT REMOVAL Right 12/05/2018    Dr. Deysi Woods Left 12/12/2018    Dr. Tarik Amaor.  LEFT EYE SECOND REFRATIVE CATARACT REMOVAL WITH LENS IMPLANTATION    HX ORTHOPAEDIC Right 1980's    index finger    HX OTHER SURGICAL  08/22/2017    Dr. Bud Ayala; atrial flutter ablation    HX ROTATOR CUFF REPAIR Right 2000    HX TONSILLECTOMY  1948    AL CARDIAC SURG PROCEDURE UNLIST  08/22/2017    SVT ablation     Allergies   Allergen Reactions    Oxycodone Contact Dermatitis      Family History   Adopted: Yes   Family history unknown: Yes      Social History     Socioeconomic History    Marital status:      Spouse name: Not on file    Number of children: Not on file    Years of education: Not on file    Highest education level: Not on file   Occupational History    Not on file Social Needs    Financial resource strain: Not on file    Food insecurity     Worry: Not on file     Inability: Not on file    Transportation needs     Medical: Not on file     Non-medical: Not on file   Tobacco Use    Smoking status: Former Smoker     Packs/day: 2.00     Years: 20.00     Pack years: 40.00     Types: Cigarettes, Cigars     Quit date: 2019     Years since quittin.4    Smokeless tobacco: Former User     Types: Chew   Substance and Sexual Activity    Alcohol use: Not Currently     Alcohol/week: 0.0 standard drinks     Comment: As of 20, pt quit 20 years ago    Drug use: Yes     Types: Marijuana     Comment: not recently    Sexual activity: Not on file   Lifestyle    Physical activity     Days per week: Not on file     Minutes per session: Not on file    Stress: Not on file   Relationships    Social connections     Talks on phone: Not on file     Gets together: Not on file     Attends Taoist service: Not on file     Active member of club or organization: Not on file     Attends meetings of clubs or organizations: Not on file     Relationship status: Not on file    Intimate partner violence     Fear of current or ex partner: Not on file     Emotionally abused: Not on file     Physically abused: Not on file     Forced sexual activity: Not on file   Other Topics Concern    Not on file   Social History Narrative    Not on file      Current Outpatient Medications   Medication Sig    lisinopriL (PRINIVIL, ZESTRIL) 2.5 mg tablet Take 1 Tab by mouth daily.  darolutamide (NUBEQA PO) 1,200 mg by Mouth/Throat route daily.  amLODIPine (NORVASC) 2.5 mg tablet Take 1 tablet by mouth once daily    carvediloL (COREG) 12.5 mg tablet Take 1 Tab by mouth two (2) times daily (with meals).  furosemide (LASIX) 20 mg tablet Take 1 Tab by mouth daily.     atorvastatin (LIPITOR) 40 mg tablet TAKE 1 TABLET BY MOUTH ONCE DAILY AT NIGHT    rivaroxaban (XARELTO) 20 mg tab tablet Take 1 Tab by mouth daily.   • colchicine 0.6 mg tablet Take 1 Tab by mouth two (2) times a day for 90 days. Indications: inflammation of the covering of the heart or pericardium   • clotrimazole-betamethasone (LOTRISONE) topical cream Apply  to affected area two (2) times a day.   • aspirin 81 mg chewable tablet Take 1 Tab by mouth daily.     No current facility-administered medications for this visit.          Review of Symptoms:  11 systems reviewed, negative other than as stated in the HPI    Physical ExamPhysical Exam:    Vitals:    01/07/21 1459 01/07/21 1516   BP: 108/66 110/70   Pulse: 91    Resp: 18    SpO2: 98%    Weight: 197 lb 9.6 oz (89.6 kg)    Height: 5' 9\" (1.753 m)      Body mass index is 29.18 kg/m².  General PE  Gen:  NAD  Mental Status - Alert. General Appearance - Not in acute distress.   HEENT:  PERRL, no carotid bruits or JVD  Chest and Lung Exam   Inspection: Accessory muscles - No use of accessory muscles in breathing.   Auscultation:   Breath sounds: - Normal.   Cardiovascular   Inspection: Jugular vein - Bilateral - Inspection Normal.   Palpation/Percussion:   Apical Impulse: - Normal.   Auscultation: Rhythm - Regular. Heart Sounds - S1 WNL and S2 WNL. No S3 or S4.   Murmurs & Other Heart Sounds: Auscultation of the heart reveals - No Murmurs.   Peripheral Vascular   Upper Extremity: Inspection - Bilateral - No Cyanotic nailbeds or Digital clubbing.   Lower Extremity:   Palpation: Edema - Bilateral - No edema.  Abdomen:   Soft, non-tender, bowel sounds are active.  Neuro: A&O times 3, CN and motor grossly WNL    Labs:   Lab Results   Component Value Date/Time    Cholesterol, total 81 (L) 09/10/2020 08:04 AM    Cholesterol, total 77 (L) 04/02/2019 08:17 AM    Cholesterol, total 117 04/19/2018 08:49 AM    Cholesterol, total 87 (L) 10/05/2017 09:35 AM    HDL Cholesterol 39 (L) 09/10/2020 08:04 AM    HDL Cholesterol 34 (L) 04/02/2019 08:17 AM    HDL Cholesterol 45 04/19/2018 08:49 AM    HDL  Cholesterol 14 (L) 10/05/2017 09:35 AM    LDL, calculated 28 09/10/2020 08:04 AM    LDL, calculated 29 04/02/2019 08:17 AM    LDL, calculated 43 04/19/2018 08:49 AM    LDL, calculated 51 10/05/2017 09:35 AM    Triglyceride 56 09/10/2020 08:04 AM    Triglyceride 68 04/02/2019 08:17 AM    Triglyceride 143 04/19/2018 08:49 AM    Triglyceride 110 10/05/2017 09:35 AM     Lab Results   Component Value Date/Time     11/12/2020 10:55 AM     Lab Results   Component Value Date/Time    Sodium 137 11/18/2020 03:32 AM    Potassium 3.7 11/18/2020 03:32 AM    Chloride 106 11/18/2020 03:32 AM    CO2 26 11/18/2020 03:32 AM    Anion gap 5 11/18/2020 03:32 AM    Glucose 102 (H) 11/18/2020 03:32 AM    BUN 19 11/18/2020 03:32 AM    Creatinine 1.03 11/18/2020 03:32 AM    BUN/Creatinine ratio 18 11/18/2020 03:32 AM    GFR est AA >60 11/18/2020 03:32 AM    GFR est non-AA >60 11/18/2020 03:32 AM    Calcium 9.0 11/18/2020 03:32 AM    Bilirubin, total 0.8 11/12/2020 10:55 AM    Alk. phosphatase 89 11/12/2020 10:55 AM    Protein, total 7.6 11/12/2020 10:55 AM    Albumin 2.3 (L) 11/12/2020 10:55 AM    Globulin 5.3 (H) 11/12/2020 10:55 AM    A-G Ratio 0.4 (L) 11/12/2020 10:55 AM    ALT (SGPT) 24 11/12/2020 10:55 AM        Assessment:     Assessment:      1. NICM (nonischemic cardiomyopathy) ([facility]    2. Essential hypertension    3. Coronary artery disease involving native coronary artery of native heart without angina pectoris    4. PAF (paroxysmal atrial fibrillation) ([facility]    5. Mixed hyperlipidemia    6. Pericardial effusion        Orders Placed This Encounter    lisinopriL (PRINIVIL, ZESTRIL) 2.5 mg tablet     Sig: Take 1 Tab by mouth daily. Dispense:  90 Tab     Refill:  1        Plan:        Pericardial effusion:   Acute chest pains d/t acute pericarditis with pericardial effusion without tamponade.    Still on colchicine started 11/2020, to be taken x 3 mos    Cardiomyopathy:   EF 25-30% per limited echo 12/2020  55-60% in 2017; 25-30% per TAZ in 11/2020  Wt down 10 lbs since last OV  Continue coreg. 11/2020 Cr 1.03, bp 110/70---Add low dose ace-I  Will repeat echo in 3 mos then f/u with us. Atrial Fibriliation: rate controlled 80-90's   CHADVASc=5.  history of atrial flutter s/p ablation in 2017; S/p DCCV  11/2020  Continue BB     Nonobstructive atherosclerotic heart disease:   Negative NST 12/2020   20% lesion proximal LAD on last cath 2/2014   On ASA, BB, statin    Hypertension:   Controlled with current therapy      Hyperlipidemia:    9/2020 LDL 28 continue statin      Continue current care and f/u in 3 mos with echo prior to stephenie Unger NP       Patient seen and examined by me with nurse practitioner. I personally performed all components of the history, physical, and medical decision making and agree with the assessment and plan as noted. xarelto is cost prohibitive and wants to switch to coumadin. Echo in 3 months. If EF remains < 35% then will refer to EP for ICD.      Ahsan Hines MD

## 2021-01-08 ENCOUNTER — HOME CARE VISIT (OUTPATIENT)
Dept: SCHEDULING | Facility: HOME HEALTH | Age: 81
End: 2021-01-08
Payer: MEDICARE

## 2021-01-08 VITALS
TEMPERATURE: 98.3 F | OXYGEN SATURATION: 95 % | SYSTOLIC BLOOD PRESSURE: 118 MMHG | DIASTOLIC BLOOD PRESSURE: 80 MMHG | HEART RATE: 76 BPM

## 2021-01-08 PROCEDURE — 3331090002 HH PPS REVENUE DEBIT

## 2021-01-08 PROCEDURE — G0152 HHCP-SERV OF OT,EA 15 MIN: HCPCS

## 2021-01-08 PROCEDURE — 3331090001 HH PPS REVENUE CREDIT

## 2021-01-09 PROCEDURE — 3331090002 HH PPS REVENUE DEBIT

## 2021-01-09 PROCEDURE — 3331090001 HH PPS REVENUE CREDIT

## 2021-01-10 PROCEDURE — 3331090002 HH PPS REVENUE DEBIT

## 2021-01-10 PROCEDURE — 3331090001 HH PPS REVENUE CREDIT

## 2021-01-11 PROCEDURE — 3331090002 HH PPS REVENUE DEBIT

## 2021-01-11 PROCEDURE — 3331090001 HH PPS REVENUE CREDIT

## 2021-01-12 PROCEDURE — 3331090002 HH PPS REVENUE DEBIT

## 2021-01-12 PROCEDURE — 3331090001 HH PPS REVENUE CREDIT

## 2021-01-13 ENCOUNTER — HOME CARE VISIT (OUTPATIENT)
Dept: SCHEDULING | Facility: HOME HEALTH | Age: 81
End: 2021-01-13
Payer: MEDICARE

## 2021-01-13 PROCEDURE — 3331090002 HH PPS REVENUE DEBIT

## 2021-01-13 PROCEDURE — 3331090001 HH PPS REVENUE CREDIT

## 2021-01-13 PROCEDURE — G0299 HHS/HOSPICE OF RN EA 15 MIN: HCPCS

## 2021-01-13 PROCEDURE — 3331090003 HH PPS REVENUE ADJ

## 2021-01-14 VITALS
BODY MASS INDEX: 28.65 KG/M2 | SYSTOLIC BLOOD PRESSURE: 126 MMHG | DIASTOLIC BLOOD PRESSURE: 78 MMHG | RESPIRATION RATE: 16 BRPM | WEIGHT: 194 LBS | OXYGEN SATURATION: 97 % | TEMPERATURE: 98 F | HEART RATE: 81 BPM

## 2021-01-14 PROCEDURE — 3331090002 HH PPS REVENUE DEBIT

## 2021-01-14 PROCEDURE — 3331090001 HH PPS REVENUE CREDIT

## 2021-01-22 ENCOUNTER — TELEPHONE (OUTPATIENT)
Dept: CARDIOLOGY CLINIC | Age: 81
End: 2021-01-22

## 2021-01-22 NOTE — TELEPHONE ENCOUNTER
Called pt,verified pt with two pt identifiers, pt advised that the Xarelto is too expensive and he wants to know what he can do. Advised pt of Coumadin per last office note. Pt does not want to go on Coumadin because of diet restrictions. Advised pt he needs to call his insurance and see what medication will be covered at the best cost for him regarding the blood thinner. Advised to ask if his deductible has been meant also, as the medication will be cheaper if deductible will be met. Advised pt to call me on Monday with the results of his medication. Pt verbalized understanding.

## 2021-01-22 NOTE — TELEPHONE ENCOUNTER
Patient called about medication refill for Hayes.     Carlos Manuel [address] 71   Lacretiines Maldonado .

## 2021-01-25 NOTE — TELEPHONE ENCOUNTER
Pt called in, verified pt with two pt identifiers, he advised he called his insurance and it is a deductible he has to pay before the price of the Xarelto will go down. He has to pay 450 dollars. He asked that we go ahead and send to pharmacy in [place]. He will stay on Xarelto. I advised 24 hours for pharmacy to receive. Pt verbalized understanding. Sent refill for Xarelto to  for approval and to send to pharmacy.

## 2021-03-02 ENCOUNTER — TELEPHONE (OUTPATIENT)
Dept: CARDIOLOGY CLINIC | Age: 81
End: 2021-03-02

## 2021-03-02 NOTE — TELEPHONE ENCOUNTER
Latasha Aguilar from [facility] called in and advised he would like to put pt on Calcium 600 mg bid for his osteopenia due to prostate cancer. Pt wants to make sure okay with you since he has irregular heartbeat.  advised to call him back with the answer. Message  Received: Today  Message Contents   Ethel Chapa MD sent to Brandon Perez LPN   Caller: Unspecified (Today,  4:03 PM)             It's k. Called and left message on voice mail pt is okay per  to take calcium bid. Advised to call me back if any questions.

## 2021-03-09 ENCOUNTER — OFFICE VISIT (OUTPATIENT)
Dept: INTERNAL MEDICINE CLINIC | Age: 81
End: 2021-03-09
Payer: MEDICARE

## 2021-03-09 VITALS
DIASTOLIC BLOOD PRESSURE: 68 MMHG | HEART RATE: 82 BPM | HEIGHT: 69 IN | OXYGEN SATURATION: 96 % | RESPIRATION RATE: 16 BRPM | TEMPERATURE: 98.3 F | SYSTOLIC BLOOD PRESSURE: 111 MMHG | WEIGHT: 197.4 LBS | BODY MASS INDEX: 29.24 KG/M2

## 2021-03-09 DIAGNOSIS — C61 PROSTATIC CANCER (HCC): ICD-10-CM

## 2021-03-09 DIAGNOSIS — J44.9 CHRONIC OBSTRUCTIVE PULMONARY DISEASE, UNSPECIFIED COPD TYPE (HCC): ICD-10-CM

## 2021-03-09 DIAGNOSIS — H66.90 ACUTE OTITIS MEDIA, UNSPECIFIED OTITIS MEDIA TYPE: Primary | ICD-10-CM

## 2021-03-09 PROCEDURE — G8432 DEP SCR NOT DOC, RNG: HCPCS | Performed by: INTERNAL MEDICINE

## 2021-03-09 PROCEDURE — 1101F PT FALLS ASSESS-DOCD LE1/YR: CPT | Performed by: INTERNAL MEDICINE

## 2021-03-09 PROCEDURE — G8536 NO DOC ELDER MAL SCRN: HCPCS | Performed by: INTERNAL MEDICINE

## 2021-03-09 PROCEDURE — 99213 OFFICE O/P EST LOW 20 MIN: CPT | Performed by: INTERNAL MEDICINE

## 2021-03-09 PROCEDURE — G8752 SYS BP LESS 140: HCPCS | Performed by: INTERNAL MEDICINE

## 2021-03-09 PROCEDURE — G8754 DIAS BP LESS 90: HCPCS | Performed by: INTERNAL MEDICINE

## 2021-03-09 PROCEDURE — G8417 CALC BMI ABV UP PARAM F/U: HCPCS | Performed by: INTERNAL MEDICINE

## 2021-03-09 PROCEDURE — G8427 DOCREV CUR MEDS BY ELIG CLIN: HCPCS | Performed by: INTERNAL MEDICINE

## 2021-03-09 RX ORDER — AMOXICILLIN AND CLAVULANATE POTASSIUM 875; 125 MG/1; MG/1
1 TABLET, FILM COATED ORAL EVERY 12 HOURS
Qty: 20 TAB | Refills: 0 | Status: SHIPPED | OUTPATIENT
Start: 2021-03-09 | End: 2021-03-19

## 2021-03-09 RX ORDER — NEOMYCIN SULFATE, POLYMYXIN B SULFATE AND HYDROCORTISONE 10; 3.5; 1 MG/ML; MG/ML; [USP'U]/ML
3 SUSPENSION/ DROPS AURICULAR (OTIC) 3 TIMES DAILY
Qty: 10 ML | Refills: 0 | Status: SHIPPED | OUTPATIENT
Start: 2021-03-09 | End: 2021-03-19

## 2021-03-09 RX ORDER — MELATONIN
DAILY
COMMUNITY
End: 2021-12-27

## 2021-03-09 NOTE — PROGRESS NOTES
Twan MADI Wayne  Identified pt with two pt identifiers(name and ). Chief Complaint   Patient presents with    Ear Pain     Pt states that he noticed for 2 weeks. Pt went to [facility] finished antibiotics and still having sx.  Dizziness     Noticed when ear pain started, 2 weeks ago       Reviewed record In preparation for visit and have obtained necessary documentation. 1. Have you been to the ER, urgent care clinic or hospitalized since your last visit? No     2. Have you seen or consulted any other health care providers outside of the [address] [place] [address] since your last visit? Include any pap smears or colon screening. No    Patient does not have an advance directive. Vitals reviewed with provider.     Health Maintenance reviewed:     Health Maintenance Due   Topic    Hepatitis C Screening     Eye Exam Retinal or Dilated     COVID-19 Vaccine (1 of 2)    GLAUCOMA SCREENING Q2Y           Wt Readings from Last 3 Encounters:   21 197 lb 6.4 oz (89.5 kg)   21 194 lb (88 kg)   21 197 lb 9.6 oz (89.6 kg)        Temp Readings from Last 3 Encounters:   21 98.3 °F (36.8 °C) (Oral)   21 98 °F (36.7 °C) (Temporal)   21 98.3 °F (36.8 °C)        BP Readings from Last 3 Encounters:   21 111/68   21 126/78   21 118/80        Pulse Readings from Last 3 Encounters:   21 82   21 81   21 76        Vitals:    21 1321   BP: 111/68   Pulse: 82   Resp: 16   Temp: 98.3 °F (36.8 °C)   TempSrc: Oral   SpO2: 96%   Weight: 197 lb 6.4 oz (89.5 kg)   Height: 5' 9\" (1.753 m)   PainSc:   0 - No pain          Learning Assessment:   :       Learning Assessment 2020 10/5/2017 2014   PRIMARY LEARNER Patient Patient Patient   HIGHEST LEVEL OF EDUCATION - PRIMARY LEARNER  - - DID NOT GRADUATE HIGH SCHOOL   BARRIERS PRIMARY LEARNER - - [address] CAREGIVER - - No   PRIMARY LANGUAGE ENGLISH ENGLISH ENGLISH    NEED - - No   LEARNER PREFERENCE PRIMARY DEMONSTRATION LISTENING LISTENING     - DEMONSTRATION -     - READING -   ANSWERED BY self patient Patient   RELATIONSHIP SELF SELF SELF        Depression Screening:   :       3 most recent PHQ Screens 1/5/2021   Little interest or pleasure in doing things Not at all   Feeling down, depressed, irritable, or hopeless Not at all   Total Score PHQ 2 0        Fall Risk Assessment:   :       Fall Risk Assessment, last 12 mths 1/5/2021   Able to walk? Yes   Fall in past 12 months? 0   Do you feel unsteady? 1   Are you worried about falling 0   Fall with injury? -        Abuse Screening:   :       Abuse Screening Questionnaire 1/7/2021 2/13/2020 10/9/2019 7/9/2019 9/4/2018 7/3/2018 10/5/2017   Do you ever feel afraid of your partner? N N N N N N N   Are you in a relationship with someone who physically or mentally threatens you? N N N N N N N   Is it safe for you to go home?  Y Y Y Y Y Y Y        ADL Screening:   :       ADL Assessment 7/3/2018   Feeding yourself No Help Needed   Getting from bed to chair No Help Needed   Getting dressed No Help Needed   Bathing or showering No Help Needed   Walk across the room (includes cane/walker) No Help Needed   Using the telphone No Help Needed   Taking your medications No Help Needed   Preparing meals No Help Needed   Managing money (expenses/bills) No Help Needed   Moderately strenuous housework (laundry) No Help Needed   Shopping for personal items (toiletries/medicines) No Help Needed   Shopping for groceries No Help Needed   Driving No Help Needed   Climbing a flight of stairs No Help Needed   Getting to places beyond walking distances No Help Needed

## 2021-03-09 NOTE — PROGRESS NOTES
CC:   Chief Complaint   Patient presents with    Ear Pain     Pt states that he noticed for 2 weeks. Pt went to [facility] finished antibiotics and still having sx.  Dizziness     Noticed when ear pain started, 2 weeks ago       Adan is a 68 y.o. male. Presents for evaluation of left ear pain. For the past 8-9 years, has ear pain in cold weather (Jan- March)) or air-conditioned areas. Does not bother him in summer time. Diagnosed with left OM at [facility] 2 weeks ago. Associated dizziness. Treated with Augmentin and Cortisporin ear drops; ear pain and dizziness improved while taking then came back once he finished. Saw Dr. Phoenix May (ENT) on 7/26/19; left ear exam normal,  prescribed fluocinolone ear drops for left ear pain due to hypersensitivity to cold. Patient cannot remember if this helped. Saw Dr. Nasrin Leon 9/26/18; also diagnosed with normal left ear. Reports he is on new injections for metastatic prostate cancer but was told the medication could affect his bones. Patient Active Problem List   Diagnosis Code    Combined systolic and diastolic congestive heart failure ([facility]) I50.40    S/P cardiac cath Z98.890    NICM (nonischemic cardiomyopathy) ([facility] I42.8    Hypertension I10    Mixed hyperlipidemia E78.2    Smokeless tobacco use Z72.0    Atrial flutter ([facility]) I48.92    Type 2 diabetes mellitus with stage 3 chronic kidney disease, without long-term current use of insulin ([facility]) E11.22, N18.30    Prostate cancer ([facility] C61    COPD (chronic obstructive pulmonary disease) ([facility]) J44.9    Chronic low back pain M54.5, G89.29    Obesity (BMI 30-39. 9) E66.9    Candidal intertrigo B37.2    Anemia of chronic disease D63.8    Pericardial effusion I31.3    PAF (paroxysmal atrial fibrillation) ([facility]) I48.0     Past Medical History:   Diagnosis Date    Acute respiratory failure with hypoxia ([facility] 02/08/2014    also 11/28/15, 2/17/16, 5/14/17     Alcohol abuse     As of 11/29/18 pt stated he quit 20 years    Arthritis     Back and shoulder    Atrial flutter ([address] 08/2017    saw Dr. Johanny Blackman; underwent a-flutter ablation    BPH (benign prostatic hyperplasia)     CHF (congestive heart failure) ([facility] 02/11/2014    TTE 11/15: EF 40-45%, 2/14: EF 20%  Admitted for acute exacerbations 2/8/14, 11/28/15, 2/17/16, and 5/4/17)    Chronic kidney disease     III    Chronic low back pain     LS spine X-ray 4/8/17: mild DDD    Combined forms of age-related cataract of left eye 12/12/2018    KPE/IOL OS    Combined forms of age-related cataract of right eye 11/28/2018    KPE/IOL OD    COPD (chronic obstructive pulmonary disease) ([facility]     PA (dyspnea on exertion)     ED (erectile dysfunction)     Elevated troponin 02/11/2014    also 11/28/15; due to cardiac strain    Frequent hospital admissions     5/14-5/23/17: acute hypoxic resp failure due to CHF exac, ROBINSON on CKD 3, sepsis due to LE cellulitis, 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  2/17-2/22/16: acute hypoxic resp failure, acute diarrhea  11/28-11/30/15: acute hypoxic resp failure due to acute CHF exac, elevated troponin due to cardiac strain  2/8-2/12/14: acuter hypoxic resp failure due to CHF exac, pneumonia     Hand blister 10/8/2017    Bilateral    Hypertension     Kidney disease, chronic, stage III (GFR 30-59 ml/min)     Leg fracture, right 1973    Nonischemic cardiomyopathy ([address]     with LVH    Pneumonia 02/08/2014 2/8/14 and 10/30/15    Poor historian     As of 11/29/18 pt reports 5th grade education, pt unable to give med list-obtained from wife per patient's permission    Prediabetes     Prostate cancer ([facility] 2016    As of 11/29/18, pt states he gets two injections twice a year for this    Pulmonary edema 11/28/2015    S/P cardiac cath 2/12/2014 2/12/14 no significant coronary disease, severe LV dysfunction    Tinea cruris     also genital folliculitis    Vertigo Allergies   Allergen Reactions    Oxycodone Contact Dermatitis       Current Outpatient Medications   Medication Sig Dispense Refill    cholecalciferol (Vitamin D3) (1000 Units /25 mcg) tablet Take  by mouth daily.  calcium-cholecalciferol, d3, 600-125 mg-unit tab Take  by mouth.  docusate sodium (STOOL SOFTENER PO) Take  by mouth.  atorvastatin (LIPITOR) 40 mg tablet TAKE 1 TABLET BY MOUTH ONCE DAILY AT NIGHT 90 Tab 0    rivaroxaban (XARELTO) 20 mg tab tablet Take 1 Tab by mouth daily. 30 Tab 5    lisinopriL (PRINIVIL, ZESTRIL) 2.5 mg tablet Take 1 Tab by mouth daily. 90 Tab 1    amLODIPine (NORVASC) 2.5 mg tablet Take 1 tablet by mouth once daily 90 Tab 0    carvediloL (COREG) 12.5 mg tablet Take 1 Tab by mouth two (2) times daily (with meals). 60 Tab 5    furosemide (LASIX) 20 mg tablet Take 1 Tab by mouth daily. 30 Tab 5    clotrimazole-betamethasone (LOTRISONE) topical cream Apply  to affected area two (2) times a day.  aspirin 81 mg chewable tablet Take 1 Tab by mouth daily. 10 Tab 0    darolutamide (NUBEQA PO) 1,200 mg by Mouth/Throat route daily. PHYSICAL EXAM  Visit Vitals  /68 (BP 1 Location: Left arm, BP Patient Position: Sitting, BP Cuff Size: Adult)   Pulse 82   Temp 98.3 °F (36.8 °C) (Oral)   Resp 16   Ht 5' 9\" (1.753 m)   Wt 197 lb 6.4 oz (89.5 kg)   SpO2 96%   BMI 29.15 kg/m²       General: Well-developed and well-nourished, no distress. HEENT:  Head normocephalic/atraumatic, no scleral icterus. Both TM's and ear canals normal.  Lungs:  Clear to ausculation bilaterally. Good air movement. Heart:  Regular rate and rhythm, normal S1 and S2, no murmur, gallop, or rub  Extremities: No clubbing, cyanosis, or edema. Neurological: Alert and oriented. Psychiatric: Normal mood and affect. Behavior is normal.       ASSESSMENT AND PLAN    ICD-10-CM ICD-9-CM    1.  Acute otitis media, unspecified otitis media type  H66.90 382.9 amoxicillin-clavulanate (AUGMENTIN) 875-125 mg per tablet      neomycin-polymyxin-hydrocortisone, buffered, (PEDIOTIC) 3.5-10,000-1 mg/mL-unit/mL-% otic suspension   2. Prostatic cancer ([facility]  C61 185    3. Chronic obstructive pulmonary disease, unspecified COPD type ([facility]  J44.9 496      Diagnoses and all orders for this visit:    1. Acute otitis media, unspecified otitis media type  Will treat with same treatment as prescribed in ED since these helped his symptoms. -     amoxicillin-clavulanate (AUGMENTIN) 875-125 mg per tablet; Take 1 Tab by mouth every twelve (12) hours for 10 days. -     neomycin-polymyxin-hydrocortisone, buffered, (PEDIOTIC) 3.5-10,000-1 mg/mL-unit/mL-% otic suspension; Administer 3 Drops in left ear three (3) times daily for 10 days. 2. Prostatic cancer ([facility]  On [place]. Continue following with Dr. Neeru Cartwright. 3. Chronic obstructive pulmonary disease, unspecified COPD type ([facility]      Follow-up and Dispositions    · Return if symptoms worsen or fail to improve, for Scheduled appointment on 5/5/21. I have discussed the diagnosis with the patient and the intended plan as seen in the above orders. Patient is in agreement. The patient has received an after-visit summary and questions were answered concerning future plans. I have discussed medication side effects and warnings with the patient as well.

## 2021-03-09 NOTE — PATIENT INSTRUCTIONS
Ear Infection (Otitis Media): Care Instructions Overview An ear infection may start with a cold and affect the middle ear (otitis media). It can hurt a lot. Most ear infections clear up on their own in a couple of days and do not need antibiotics. Also, antibiotics do not work against viruses, which may be the cause of your infection. Regular doses of pain relievers are the best way to reduce your fever and help you feel better. Follow-up care is a key part of your treatment and safety. Be sure to make and go to all appointments, and call your doctor if you are having problems. It's also a good idea to know your test results and keep a list of the medicines you take. How can you care for yourself at home? · Take pain medicines exactly as directed. ? If the doctor gave you a prescription medicine for pain, take it as prescribed. ? If you are not taking a prescription pain medicine, take an over-the-counter medicine, such as acetaminophen (Tylenol), ibuprofen (Advil, Motrin), or naproxen (Aleve). Read and follow all instructions on the label. ? Do not take two or more pain medicines at the same time unless the doctor told you to. Many pain medicines have acetaminophen, which is Tylenol. Too much acetaminophen (Tylenol) can be harmful. · Plan to take a full dose of pain reliever before bedtime. Getting enough sleep will help you get better. · Try a warm, moist washcloth on the ear. It may help relieve pain. · If your doctor prescribed antibiotics, take them as directed. Do not stop taking them just because you feel better. You need to take the full course of antibiotics. When should you call for help? Call your doctor now or seek immediate medical care if: 
  · You have new or increasing ear pain.  
  · You have new or increasing pus or blood draining from your ear.  
  · You have a fever with a stiff neck or a severe headache. Watch closely for changes in your health, and be sure to contact your doctor if: 
  · You have new or worse symptoms.  
  · You are not getting better after taking an antibiotic for 2 days. Where can you learn more? Go to http://www.gray.com/ Enter Q450 in the search box to learn more about \"Ear Infection (Otitis Media): Care Instructions. \" Current as of: April 15, 2020               Content Version: 12.6 © 2006-2020 [employer]. Care instructions adapted under license by [employer] (which disclaims liability or warranty for this information). If you have questions about a medical condition or this instruction, always ask your healthcare professional. [address] any warranty or liability for your use of this information.

## 2021-03-23 ENCOUNTER — TRANSCRIBE ORDER (OUTPATIENT)
Dept: SCHEDULING | Age: 81
End: 2021-03-23

## 2021-03-23 DIAGNOSIS — C61 MALIGNANT NEOPLASM OF PROSTATE (HCC): Primary | ICD-10-CM

## 2021-04-04 PROBLEM — M85.89 OSTEOPENIA OF MULTIPLE SITES: Status: ACTIVE | Noted: 2021-04-04

## 2021-04-07 DIAGNOSIS — I10 ESSENTIAL HYPERTENSION: ICD-10-CM

## 2021-04-07 DIAGNOSIS — I25.10 CORONARY ARTERY DISEASE INVOLVING NATIVE CORONARY ARTERY OF NATIVE HEART WITHOUT ANGINA PECTORIS: ICD-10-CM

## 2021-04-07 DIAGNOSIS — E78.2 MIXED HYPERLIPIDEMIA: ICD-10-CM

## 2021-04-07 DIAGNOSIS — I42.8 NICM (NONISCHEMIC CARDIOMYOPATHY) (HCC): ICD-10-CM

## 2021-04-07 DIAGNOSIS — I31.39 PERICARDIAL EFFUSION: ICD-10-CM

## 2021-04-07 DIAGNOSIS — I48.0 PAF (PAROXYSMAL ATRIAL FIBRILLATION) (HCC): ICD-10-CM

## 2021-04-13 ENCOUNTER — OFFICE VISIT (OUTPATIENT)
Dept: CARDIOLOGY CLINIC | Age: 81
End: 2021-04-13
Payer: MEDICARE

## 2021-04-13 ENCOUNTER — HOSPITAL ENCOUNTER (OUTPATIENT)
Dept: LAB | Age: 81
Discharge: HOME | End: 2021-04-13
Payer: MEDICARE

## 2021-04-13 VITALS
RESPIRATION RATE: 16 BRPM | SYSTOLIC BLOOD PRESSURE: 80 MMHG | WEIGHT: 199.9 LBS | DIASTOLIC BLOOD PRESSURE: 40 MMHG | HEIGHT: 69 IN | OXYGEN SATURATION: 96 % | HEART RATE: 56 BPM | BODY MASS INDEX: 29.61 KG/M2

## 2021-04-13 DIAGNOSIS — I10 ESSENTIAL HYPERTENSION: Primary | ICD-10-CM

## 2021-04-13 DIAGNOSIS — I95.1 ORTHOSTATIC HYPOTENSION: ICD-10-CM

## 2021-04-13 DIAGNOSIS — I42.8 NICM (NONISCHEMIC CARDIOMYOPATHY) (HCC): ICD-10-CM

## 2021-04-13 DIAGNOSIS — E78.2 MIXED HYPERLIPIDEMIA: ICD-10-CM

## 2021-04-13 DIAGNOSIS — I48.0 PAF (PAROXYSMAL ATRIAL FIBRILLATION) (HCC): ICD-10-CM

## 2021-04-13 PROCEDURE — G8510 SCR DEP NEG, NO PLAN REQD: HCPCS | Performed by: INTERNAL MEDICINE

## 2021-04-13 PROCEDURE — G8752 SYS BP LESS 140: HCPCS | Performed by: INTERNAL MEDICINE

## 2021-04-13 PROCEDURE — 84443 ASSAY THYROID STIM HORMONE: CPT

## 2021-04-13 PROCEDURE — 99214 OFFICE O/P EST MOD 30 MIN: CPT | Performed by: INTERNAL MEDICINE

## 2021-04-13 PROCEDURE — 1101F PT FALLS ASSESS-DOCD LE1/YR: CPT | Performed by: INTERNAL MEDICINE

## 2021-04-13 PROCEDURE — 93005 ELECTROCARDIOGRAM TRACING: CPT | Performed by: INTERNAL MEDICINE

## 2021-04-13 PROCEDURE — G0463 HOSPITAL OUTPT CLINIC VISIT: HCPCS | Performed by: INTERNAL MEDICINE

## 2021-04-13 PROCEDURE — 36415 COLL VENOUS BLD VENIPUNCTURE: CPT

## 2021-04-13 PROCEDURE — G8754 DIAS BP LESS 90: HCPCS | Performed by: INTERNAL MEDICINE

## 2021-04-13 PROCEDURE — 93010 ELECTROCARDIOGRAM REPORT: CPT | Performed by: INTERNAL MEDICINE

## 2021-04-13 PROCEDURE — 85027 COMPLETE CBC AUTOMATED: CPT

## 2021-04-13 PROCEDURE — G8427 DOCREV CUR MEDS BY ELIG CLIN: HCPCS | Performed by: INTERNAL MEDICINE

## 2021-04-13 PROCEDURE — G8417 CALC BMI ABV UP PARAM F/U: HCPCS | Performed by: INTERNAL MEDICINE

## 2021-04-13 PROCEDURE — G8536 NO DOC ELDER MAL SCRN: HCPCS | Performed by: INTERNAL MEDICINE

## 2021-04-13 PROCEDURE — 80053 COMPREHEN METABOLIC PANEL: CPT

## 2021-04-13 RX ORDER — DAROLUTAMIDE 300 MG/1
600 TABLET, FILM COATED ORAL 2 TIMES DAILY WITH MEALS
COMMUNITY
Start: 2021-03-22

## 2021-04-13 NOTE — PROGRESS NOTES
Chief Complaint   Patient presents with    Follow-up    Cardiomyopathy    Hypertension    Cholesterol Problem    Irregular Heart Beat     1. Have you been to the ER, urgent care clinic since your last visit? No Hospitalized since your last visit? No    2. Have you seen or consulted any other health care providers outside of the [address] [place] [address] since your last visit? No Include any pap smears or colon screening. NO    Patient C/O lightheadedness at times and fatigue.

## 2021-04-13 NOTE — PROGRESS NOTES
4/13/2021 10:48 AM      Subjective:     Helen Hathaway is seen in office today for f/u visit. Main complaint is occasional postural dizziness. He denies chest pain, chest pressure/discomfort, dyspnea, palpitations, irregular heart beats, syncope, fatigue, orthopnea, paroxysmal nocturnal dyspnea, exertional chest pressure/discomfort, claudication, lower extremity edema. Visit Vitals  BP (!) 80/40 (BP 1 Location: Right upper arm, BP Patient Position: Standing, BP Cuff Size: Large adult)   Pulse (!) 56   Resp 16   Ht 5' 9\" (1.753 m)   Wt 199 lb 14.4 oz (90.7 kg)   SpO2 96%   BMI 29.52 kg/m²     Current Outpatient Medications   Medication Sig    cholecalciferol (Vitamin D3) (1000 Units /25 mcg) tablet Take  by mouth daily.  calcium-cholecalciferol, d3, 600-125 mg-unit tab Take  by mouth.  docusate sodium (STOOL SOFTENER PO) Take  by mouth.  atorvastatin (LIPITOR) 40 mg tablet TAKE 1 TABLET BY MOUTH ONCE DAILY AT NIGHT    rivaroxaban (XARELTO) 20 mg tab tablet Take 1 Tab by mouth daily.  lisinopriL (PRINIVIL, ZESTRIL) 2.5 mg tablet Take 1 Tab by mouth daily.  carvediloL (COREG) 12.5 mg tablet Take 1 Tab by mouth two (2) times daily (with meals).  furosemide (LASIX) 20 mg tablet Take 1 Tab by mouth daily.  aspirin 81 mg chewable tablet Take 1 Tab by mouth daily.  Nubeqa 300 mg tablet Take 600 mg by mouth two (2) times daily (with meals).  clotrimazole-betamethasone (LOTRISONE) topical cream Apply  to affected area two (2) times a day. No current facility-administered medications for this visit.           Objective:      Visit Vitals  BP (!) 80/40 (BP 1 Location: Right upper arm, BP Patient Position: Standing, BP Cuff Size: Large adult)   Pulse (!) 56   Resp 16   Ht 5' 9\" (1.753 m)   Wt 199 lb 14.4 oz (90.7 kg)   SpO2 96%   BMI 29.52 kg/m²       Past Medical History:   Diagnosis Date    Acute respiratory failure with hypoxia ([facility] 02/08/2014    also 11/28/15, 2/17/16, 5/14/17     Alcohol abuse     As of 11/29/18 pt stated he quit 20 years    Arthritis     Back and shoulder    Atrial flutter ([address] 08/2017    saw Dr. Marysol Boyd; underwent a-flutter ablation    BPH (benign prostatic hyperplasia)     CHF (congestive heart failure) ([address] 02/11/2014    TTE 11/15: EF 40-45%, 2/14: EF 20%  Admitted for acute exacerbations 2/8/14, 11/28/15, 2/17/16, and 5/4/17)    Chronic kidney disease     III    Chronic low back pain     LS spine X-ray 4/8/17: mild DDD    Combined forms of age-related cataract of left eye 12/12/2018    KPE/IOL OS    Combined forms of age-related cataract of right eye 11/28/2018    KPE/IOL OD    COPD (chronic obstructive pulmonary disease) ([address]     PA (dyspnea on exertion)     ED (erectile dysfunction)     Elevated troponin 02/11/2014    also 11/28/15; due to cardiac strain    Frequent hospital admissions     5/14-5/23/17: acute hypoxic resp failure due to CHF exac, ROBINSON on CKD 3, sepsis due to LE cellulitis, 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  2/17-2/22/16: acute hypoxic resp failure, acute diarrhea  11/28-11/30/15: acute hypoxic resp failure due to acute CHF exac, elevated troponin due to cardiac strain  2/8-2/12/14: acuter hypoxic resp failure due to CHF exac, pneumonia     Hand blister 10/8/2017    Bilateral    Hypertension     Kidney disease, chronic, stage III (GFR 30-59 ml/min) ([facility])     Leg fracture, right 1973    Nonischemic cardiomyopathy ([address]     with LVH    Pneumonia 02/08/2014 2/8/14 and 10/30/15    Poor historian     As of 11/29/18 pt reports 5th grade education, pt unable to give med list-obtained from wife per patient's permission    Prediabetes     Prostate cancer ([address] 2016    As of 11/29/18, pt states he gets two injections twice a year for this    Pulmonary edema 11/28/2015    S/P cardiac cath 2/12/2014 2/12/14 no significant coronary disease, severe LV dysfunction    Tinea cruris     also genital folliculitis    Vertigo       Past Surgical History:   Procedure Laterality Date    COLONOSCOPY N/A 3/2/2020    COLONOSCOPY performed by Dewayne Hobbs MD at [facility]. 2 tubular adenomas, diverticulosis, int hemorrhoids, repeat in 5 yrs    COLONOSCOPY,DIAGNOSTIC  2016    Dr. Eda Price; single sessile polyp at descending colon, sigmoid diverticulosis, int hemorrhoids    COLONOSCOPY,REMV LESN,SNARE  2016         COLONOSCOPY,REMV LESN,SNARE  3/2/2020         HX CATARACT REMOVAL Right 2018    Dr. Sy Grimes Left 2018    Dr. Daniel Murcia.  LEFT EYE SECOND REFRATIVE CATARACT REMOVAL WITH LENS IMPLANTATION    HX ORTHOPAEDIC Right 's    index finger    HX OTHER SURGICAL  2017    Dr. Pari Mayen; atrial flutter ablation    HX ROTATOR CUFF REPAIR Right     HX TONSILLECTOMY  194    DC CARDIAC SURG PROCEDURE UNLIST  2017    SVT ablation     Allergies   Allergen Reactions    Oxycodone Contact Dermatitis      Family History   Adopted: Yes   Family history unknown: Yes      Social History     Socioeconomic History    Marital status:      Spouse name: Not on file    Number of children: Not on file    Years of education: Not on file    Highest education level: Not on file   Occupational History    Not on file   Social Needs    Financial resource strain: Not on file    Food insecurity     Worry: Not on file     Inability: Not on file   ICEdot needs     Medical: Not on file     Non-medical: Not on file   Tobacco Use    Smoking status: Former Smoker     Packs/day: 2.00     Years: 20.00     Pack years: 40.00     Types: Cigarettes, Cigars     Quit date: 2019     Years since quittin.6    Smokeless tobacco: Former User     Types: Chew   Substance and Sexual Activity    Alcohol use: Not Currently     Alcohol/week: 0.0 standard drinks     Comment: As of 20, pt quit 20 years ago    Drug use: Yes     Types: Marijuana     Comment: not recently    Sexual activity: Not on file   Lifestyle    Physical activity     Days per week: Not on file     Minutes per session: Not on file    Stress: Not on file   Relationships    Social connections     Talks on phone: Not on file     Gets together: Not on file     Attends [employer] service: Not on file     Active member of club or organization: Not on file     Attends meetings of clubs or organizations: Not on file     Relationship status: Not on file    Intimate partner violence     Fear of current or ex partner: Not on file     Emotionally abused: Not on file     Physically abused: Not on file     Forced sexual activity: Not on file   Other Topics Concern    Not on file   Social History Narrative    Not on file         Review of Systems     General: Not Present- Anorexia, Chills, Dietary Changes, Fatigue, Fever, Medication Changes, Night Sweats, Weight Gain > 10lbs. and Weight Loss > 10lbs. .  Skin: Not Present- Bruising and Excessive Sweating. HEENT: Not Present- Headache, Visual Loss and Vertigo. Respiratory: Not Present- Cough, Decreased Exercise Tolerance, Difficulty Breathing, Snoring and Wheezing. Cardiovascular: Not Present- Abnormal Blood Pressure, Chest Pain, Claudications, Difficulty Breathing On Exertion, Edema, Fainting / Blacking Out, Irregular Heart Beat, Night Cramps, Orthopnea, Palpitations, Paroxysmal Nocturnal Dyspnea, Rapid Heart Rate, Shortness of Breath and Swelling of Extremities. Gastrointestinal: Not Present- Black, Tarry Stool, Bloody Stool, Diarrhea, Hematemesis, Rectal Bleeding and Vomiting. Musculoskeletal: Not Present- Muscle Pain and Muscle Weakness. Neurological: Present- Dizziness. Psychiatric: Not Present- Depression. Endocrine: Not Present- Cold Intolerance, Heat Intolerance and Thyroid Problems. Hematology: Not Present- Abnormal Bleeding, Anemia, Blood Clots and Easy Bruising.        Physical Exam   The physical exam findings are as follows:       General Mental Status - Alert. General Appearance - Not in acute distress. Chest and Lung Exam   Inspection: Accessory muscles - No use of accessory muscles in breathing. Auscultation:   Breath sounds: - Normal.      Cardiovascular   Inspection: Jugular vein - Bilateral - Inspection Normal.  Palpation/Percussion:   Apical Impulse: - Normal.  Auscultation: Rhythm - Regular. Heart Sounds - S1 WNL and S2 WNL. No S3 or S4. Murmurs & Other Heart Sounds: Auscultation of the heart reveals - No Murmurs. Carotid arteries - No Carotid bruit. Peripheral Vascular   Upper Extremity: Inspection - Bilateral - No Cyanotic nailbeds or Digital clubbing. Lower Extremity:   Palpation: Edema - Bilateral - No edema. Assessment:       ICD-10-CM ICD-9-CM    1. Essential hypertension  I10 401.9 AMB POC EKG ROUTINE W/ 12 LEADS, INTER & REP      ECHO ADULT COMPLETE      CBC W/O DIFF      METABOLIC PANEL, COMPREHENSIVE      TSH 3RD GENERATION   2. Mixed hyperlipidemia  E78.2 272.2 AMB POC EKG ROUTINE W/ 12 LEADS, INTER & REP      ECHO ADULT COMPLETE      CBC W/O DIFF      METABOLIC PANEL, COMPREHENSIVE      TSH 3RD GENERATION   3. PAF (paroxysmal atrial fibrillation) ([facility])  I48.0 427.31 AMB POC EKG ROUTINE W/ 12 LEADS, INTER & REP      ECHO ADULT COMPLETE      CBC W/O DIFF      METABOLIC PANEL, COMPREHENSIVE      TSH 3RD GENERATION   4. NICM (nonischemic cardiomyopathy) ([facility])  I42.8 425.4 ECHO ADULT COMPLETE      CBC W/O DIFF      METABOLIC PANEL, COMPREHENSIVE      TSH 3RD GENERATION   5. Orthostatic hypotension  I95.1 458.0 ECHO ADULT COMPLETE      CBC W/O DIFF      METABOLIC PANEL, COMPREHENSIVE      TSH 3RD GENERATION       Plan:     Orthostatic hypotension:  Dc norvasc. Check labs. Monitor BP.      Cardiomyopathy:   EF 25-30% per limited echo 12/2020  55-60% in 2017;  25-30% per TAZ in 11/2020  Never came for TTE.  Will order it again.     PAF:   history of atrial flutter s/p ablation in 2017; S/p DCCV  11/2020  Now in SR. Continue current meds.      Nonobstructive atherosclerotic heart disease:   Negative NST 12/2020   20% lesion proximal LAD on last cath 2/2014   On ASA, BB, statin     Hypertension:   Med changes as above.       Hyperlipidemia:    9/2020 LDL 28 continue statin

## 2021-04-14 ENCOUNTER — HOSPITAL ENCOUNTER (OUTPATIENT)
Dept: CT IMAGING | Age: 81
Discharge: HOME | End: 2021-04-14
Attending: UROLOGY
Payer: MEDICARE

## 2021-04-14 DIAGNOSIS — C61 MALIGNANT NEOPLASM OF PROSTATE (HCC): ICD-10-CM

## 2021-04-14 LAB
ALBUMIN SERPL-MCNC: 3.8 G/DL (ref 3.7–4.7)
ALBUMIN/GLOB SERPL: 1.3 {RATIO} (ref 1.2–2.2)
ALP SERPL-CCNC: 95 IU/L (ref 39–117)
ALT SERPL-CCNC: 9 IU/L (ref 0–44)
AST SERPL-CCNC: 16 IU/L (ref 0–40)
BILIRUB SERPL-MCNC: 0.3 MG/DL (ref 0–1.2)
BUN SERPL-MCNC: 16 MG/DL (ref 8–27)
BUN/CREAT SERPL: 14 (ref 10–24)
CALCIUM SERPL-MCNC: 9.2 MG/DL (ref 8.6–10.2)
CHLORIDE SERPL-SCNC: 105 MMOL/L (ref 96–106)
CO2 SERPL-SCNC: 23 MMOL/L (ref 20–29)
CREAT BLD-MCNC: 1.1 MG/DL (ref 0.6–1.3)
CREAT SERPL-MCNC: 1.16 MG/DL (ref 0.76–1.27)
ERYTHROCYTE [DISTWIDTH] IN BLOOD BY AUTOMATED COUNT: 15.5 % (ref 11.6–15.4)
GLOBULIN SER CALC-MCNC: 2.9 G/DL (ref 1.5–4.5)
GLUCOSE SERPL-MCNC: 125 MG/DL (ref 65–99)
HCT VFR BLD AUTO: 26.3 % (ref 37.5–51)
HGB BLD-MCNC: 8 G/DL (ref 13–17.7)
MCH RBC QN AUTO: 26.1 PG (ref 26.6–33)
MCHC RBC AUTO-ENTMCNC: 30.4 G/DL (ref 31.5–35.7)
MCV RBC AUTO: 86 FL (ref 79–97)
PLATELET # BLD AUTO: 154 X10E3/UL (ref 150–450)
POTASSIUM SERPL-SCNC: 4.1 MMOL/L (ref 3.5–5.2)
PROT SERPL-MCNC: 6.7 G/DL (ref 6–8.5)
RBC # BLD AUTO: 3.07 X10E6/UL (ref 4.14–5.8)
SODIUM SERPL-SCNC: 143 MMOL/L (ref 134–144)
TSH SERPL DL<=0.005 MIU/L-ACNC: 0.24 UIU/ML (ref 0.45–4.5)
WBC # BLD AUTO: 4.1 X10E3/UL (ref 3.4–10.8)

## 2021-04-14 PROCEDURE — 82565 ASSAY OF CREATININE: CPT

## 2021-04-14 PROCEDURE — 74011000636 HC RX REV CODE- 636: Performed by: UROLOGY

## 2021-04-14 PROCEDURE — 74177 CT ABD & PELVIS W/CONTRAST: CPT

## 2021-04-14 RX ADMIN — IOPAMIDOL 100 ML: 755 INJECTION, SOLUTION INTRAVENOUS at 14:30

## 2021-04-14 RX ADMIN — IOHEXOL 50 ML: 240 INJECTION, SOLUTION INTRATHECAL; INTRAVASCULAR; INTRAVENOUS; ORAL at 14:30

## 2021-04-14 NOTE — PROGRESS NOTES
Inform him labs are k except for TSH slightly low. Check with him if he takes any synthroid otherwise have him f/u with PCP. Make sure he is not taking norvasc anymore. Discontinued it yesterday.

## 2021-04-15 ENCOUNTER — TELEPHONE (OUTPATIENT)
Dept: CARDIOLOGY CLINIC | Age: 81
End: 2021-04-15

## 2021-04-15 NOTE — TELEPHONE ENCOUNTER
----- Message from Kaleb Flores MD sent at 4/14/2021  9:44 AM EDT -----  Inform him labs are k except for TSH slightly low. Check with him if he takes any synthroid otherwise have him f/u with PCP. Make sure he is not taking norvasc anymore. Discontinued it yesterday. Called pt but no answer/no answer voice message service. Will try later. Called pt,verified pt with two pt identifiers, advised pt his TSH levels are low, explained thyroid to pt. Pt advised not taking any medications for this, looked at med list and he is not on any meds. Advised to f/u with PCP office regarding this. Advised his PCP is [facility] and can see in the chart and will be able to advise on this. Asked pt if he is still taking Amlodipine, pt advised he is not , he thru them away. Pt verbalized understanding and would call the PCP.

## 2021-04-16 ENCOUNTER — TELEPHONE (OUTPATIENT)
Dept: INTERNAL MEDICINE CLINIC | Age: 81
End: 2021-04-16

## 2021-04-16 NOTE — TELEPHONE ENCOUNTER
I called the patient and verified them by name and date of birth. I informed the patient on the message from the provider and where the thyroid is located. They stated understanding and had no further questions.

## 2021-04-16 NOTE — TELEPHONE ENCOUNTER
Per chart note from 04/15/2021 from Dr. Roscoe Burkitt patient's TSH was slightly low and is not taking any medications for it. Recommend to follow up with Dr. Katty Mcclellan.

## 2021-04-16 NOTE — TELEPHONE ENCOUNTER
Tell patient that his thyroid blood test was a little abnormal.  He does not need medication for it right now. Dr. Dee Dee Burton will recheck it at his next clinic visit on 5/14/21.

## 2021-04-16 NOTE — TELEPHONE ENCOUNTER
pt called and stated that her heart doctor told him to call his pcp to talk about a medication.  Pt was not sure on what and said it was in the computer

## 2021-05-06 ENCOUNTER — ANCILLARY PROCEDURE (OUTPATIENT)
Dept: CARDIOLOGY CLINIC | Age: 81
End: 2021-05-06
Payer: MEDICARE

## 2021-05-06 ENCOUNTER — TELEPHONE (OUTPATIENT)
Dept: CARDIOLOGY CLINIC | Age: 81
End: 2021-05-06

## 2021-05-06 VITALS
SYSTOLIC BLOOD PRESSURE: 80 MMHG | BODY MASS INDEX: 29.47 KG/M2 | HEIGHT: 69 IN | DIASTOLIC BLOOD PRESSURE: 40 MMHG | WEIGHT: 199 LBS

## 2021-05-06 DIAGNOSIS — I48.0 PAF (PAROXYSMAL ATRIAL FIBRILLATION) (HCC): ICD-10-CM

## 2021-05-06 DIAGNOSIS — I42.8 NICM (NONISCHEMIC CARDIOMYOPATHY) (HCC): ICD-10-CM

## 2021-05-06 DIAGNOSIS — I10 ESSENTIAL HYPERTENSION: ICD-10-CM

## 2021-05-06 DIAGNOSIS — I95.1 ORTHOSTATIC HYPOTENSION: ICD-10-CM

## 2021-05-06 DIAGNOSIS — E78.2 MIXED HYPERLIPIDEMIA: ICD-10-CM

## 2021-05-06 LAB
ECHO AO ASC DIAM: 3.36 CM
ECHO AO ROOT DIAM: 3.4 CM
ECHO AV AREA PEAK VELOCITY: 2.95 CM2
ECHO AV AREA/BSA PEAK VELOCITY: 1.4 CM2/M2
ECHO AV PEAK GRADIENT: 5.99 MMHG
ECHO AV PEAK VELOCITY: 122.4 CM/S
ECHO LA AREA 4C: 26.49 CM2
ECHO LA MAJOR AXIS: 4.5 CM
ECHO LA MINOR AXIS: 2.18 CM
ECHO LA VOL 2C: 88.15 ML (ref 18–58)
ECHO LA VOL 4C: 84.36 ML (ref 18–58)
ECHO LA VOL BP: 93.25 ML (ref 18–58)
ECHO LA VOL/BSA BIPLANE: 45.24 ML/M2 (ref 16–28)
ECHO LA VOLUME INDEX A2C: 42.77 ML/M2 (ref 16–28)
ECHO LA VOLUME INDEX A4C: 40.93 ML/M2 (ref 16–28)
ECHO LV E' LATERAL VELOCITY: 8.93 CM/S
ECHO LV E' SEPTAL VELOCITY: 5.88 CM/S
ECHO LV EDV A2C: 108.85 ML
ECHO LV EDV A4C: 103.63 ML
ECHO LV EDV BP: 110.44 ML (ref 67–155)
ECHO LV EDV INDEX A4C: 50.3 ML/M2
ECHO LV EDV INDEX BP: 53.6 ML/M2
ECHO LV EDV NDEX A2C: 52.8 ML/M2
ECHO LV EJECTION FRACTION A2C: 39 PERCENT
ECHO LV EJECTION FRACTION A4C: 59 PERCENT
ECHO LV EJECTION FRACTION BIPLANE: 52.4 PERCENT (ref 55–100)
ECHO LV ESV A2C: 66.27 ML
ECHO LV ESV A4C: 42.53 ML
ECHO LV ESV BP: 52.58 ML (ref 22–58)
ECHO LV ESV INDEX A2C: 32.2 ML/M2
ECHO LV ESV INDEX A4C: 20.6 ML/M2
ECHO LV ESV INDEX BP: 25.5 ML/M2
ECHO LV INTERNAL DIMENSION DIASTOLIC: 4.98 CM (ref 4.2–5.9)
ECHO LV INTERNAL DIMENSION SYSTOLIC: 3.59 CM
ECHO LV IVSD: 1.43 CM (ref 0.6–1)
ECHO LV MASS 2D: 274.7 G (ref 88–224)
ECHO LV MASS INDEX 2D: 133.3 G/M2 (ref 49–115)
ECHO LV POSTERIOR WALL DIASTOLIC: 1.27 CM (ref 0.6–1)
ECHO LVOT DIAM: 2.02 CM
ECHO LVOT PEAK GRADIENT: 5.1 MMHG
ECHO LVOT PEAK VELOCITY: 112.95 CM/S
ECHO LVOT SV: 82 ML
ECHO LVOT VTI: 25.65 CM
ECHO MV A VELOCITY: 96.27 CM/S
ECHO MV AREA PHT: 3.03 CM2
ECHO MV E DECELERATION TIME (DT): 250.23 MS
ECHO MV E VELOCITY: 125.71 CM/S
ECHO MV E/A RATIO: 1.31
ECHO MV E/E' LATERAL: 14.08
ECHO MV E/E' RATIO (AVERAGED): 17.73
ECHO MV E/E' SEPTAL: 21.38
ECHO MV PRESSURE HALF TIME (PHT): 72.57 MS
ECHO RV TAPSE: 1.82 CM (ref 1.5–2)

## 2021-05-06 PROCEDURE — 93306 TTE W/DOPPLER COMPLETE: CPT | Performed by: INTERNAL MEDICINE

## 2021-05-06 NOTE — TELEPHONE ENCOUNTER
----- Message from Bong Cameron MD sent at 5/6/2021  1:20 PM EDT -----  Inform him Echo is k. Called pt,verified pt with two pt identifiers, advised pt his echo looks okay, his EF 50 -55%. Verified his appt 5/20/21 at 1:15 with  and to keep that appt. Pt verbalized understanding.

## 2021-05-14 ENCOUNTER — OFFICE VISIT (OUTPATIENT)
Dept: INTERNAL MEDICINE CLINIC | Age: 81
End: 2021-05-14
Payer: MEDICARE

## 2021-05-14 VITALS
TEMPERATURE: 98.1 F | DIASTOLIC BLOOD PRESSURE: 79 MMHG | SYSTOLIC BLOOD PRESSURE: 116 MMHG | HEIGHT: 69 IN | BODY MASS INDEX: 30.13 KG/M2 | OXYGEN SATURATION: 98 % | HEART RATE: 60 BPM | WEIGHT: 203.4 LBS | RESPIRATION RATE: 16 BRPM

## 2021-05-14 DIAGNOSIS — L29.9 ITCHING: ICD-10-CM

## 2021-05-14 DIAGNOSIS — E78.2 MIXED HYPERLIPIDEMIA: ICD-10-CM

## 2021-05-14 DIAGNOSIS — E11.22 TYPE 2 DIABETES MELLITUS WITH STAGE 3B CHRONIC KIDNEY DISEASE, WITHOUT LONG-TERM CURRENT USE OF INSULIN (HCC): ICD-10-CM

## 2021-05-14 DIAGNOSIS — N18.32 TYPE 2 DIABETES MELLITUS WITH STAGE 3B CHRONIC KIDNEY DISEASE, WITHOUT LONG-TERM CURRENT USE OF INSULIN (HCC): ICD-10-CM

## 2021-05-14 DIAGNOSIS — I50.42 CHRONIC COMBINED SYSTOLIC AND DIASTOLIC CONGESTIVE HEART FAILURE (HCC): ICD-10-CM

## 2021-05-14 DIAGNOSIS — I10 ESSENTIAL HYPERTENSION: Primary | ICD-10-CM

## 2021-05-14 LAB — HBA1C MFR BLD HPLC: 5.5 %

## 2021-05-14 PROCEDURE — 99214 OFFICE O/P EST MOD 30 MIN: CPT | Performed by: INTERNAL MEDICINE

## 2021-05-14 PROCEDURE — G8417 CALC BMI ABV UP PARAM F/U: HCPCS | Performed by: INTERNAL MEDICINE

## 2021-05-14 PROCEDURE — 83036 HEMOGLOBIN GLYCOSYLATED A1C: CPT | Performed by: INTERNAL MEDICINE

## 2021-05-14 PROCEDURE — 1101F PT FALLS ASSESS-DOCD LE1/YR: CPT | Performed by: INTERNAL MEDICINE

## 2021-05-14 PROCEDURE — G8427 DOCREV CUR MEDS BY ELIG CLIN: HCPCS | Performed by: INTERNAL MEDICINE

## 2021-05-14 PROCEDURE — G8536 NO DOC ELDER MAL SCRN: HCPCS | Performed by: INTERNAL MEDICINE

## 2021-05-14 PROCEDURE — G8752 SYS BP LESS 140: HCPCS | Performed by: INTERNAL MEDICINE

## 2021-05-14 PROCEDURE — G8754 DIAS BP LESS 90: HCPCS | Performed by: INTERNAL MEDICINE

## 2021-05-14 PROCEDURE — G8432 DEP SCR NOT DOC, RNG: HCPCS | Performed by: INTERNAL MEDICINE

## 2021-05-14 RX ORDER — CETIRIZINE HCL 10 MG
10 TABLET ORAL
Qty: 60 TAB | Refills: 0 | Status: SHIPPED | OUTPATIENT
Start: 2021-05-14

## 2021-05-14 RX ORDER — TRIAMCINOLONE ACETONIDE 0.25 MG/G
OINTMENT TOPICAL
Qty: 80 G | Refills: 1 | Status: SHIPPED | OUTPATIENT
Start: 2021-05-14 | End: 2021-12-27

## 2021-05-14 NOTE — PROGRESS NOTES
Twan Wayne  Identified pt with two pt identifiers(name and ). Chief Complaint   Patient presents with    Hypertension    Cholesterol Problem    Diabetes    Rash     Pt states that he has a rash, itchy spots all over his body        Reviewed record In preparation for visit and have obtained necessary documentation. 1. Have you been to the ER, urgent care clinic or hospitalized since your last visit? No     2. Have you seen or consulted any other health care providers outside of the [address] [place] [address] since your last visit? Include any pap smears or colon screening. No    Patient does not have an advance directive. Vitals reviewed with provider.     Health Maintenance reviewed:     Health Maintenance Due   Topic    Hepatitis C Screening     Eye Exam Retinal or Dilated           Wt Readings from Last 3 Encounters:   21 203 lb 6.4 oz (92.3 kg)   21 199 lb (90.3 kg)   21 199 lb 14.4 oz (90.7 kg)        Temp Readings from Last 3 Encounters:   21 98.1 °F (36.7 °C) (Oral)   21 98.3 °F (36.8 °C) (Oral)   21 98 °F (36.7 °C) (Temporal)        BP Readings from Last 3 Encounters:   21 116/79   21 (!) 80/40   21 (!) 80/40        Pulse Readings from Last 3 Encounters:   21 60   21 (!) 56   21 82        Vitals:    21 0945   BP: 116/79   Pulse: 60   Resp: 16   Temp: 98.1 °F (36.7 °C)   TempSrc: Oral   SpO2: 98%   Weight: 203 lb 6.4 oz (92.3 kg)   Height: 5' 9\" (1.753 m)   PainSc:   0 - No pain          Learning Assessment:   :       Learning Assessment 2020 10/5/2017 2014   PRIMARY LEARNER Patient Patient Patient   HIGHEST LEVEL OF EDUCATION - PRIMARY LEARNER  - - DID NOT GRADUATE HIGH SCHOOL   BARRIERS PRIMARY LEARNER - - NONE   CO-LEARNER CAREGIVER - - No   PRIMARY LANGUAGE ENGLISH ENGLISH ENGLISH    NEED - - No   LEARNER PREFERENCE PRIMARY DEMONSTRATION LISTENING LISTENING     - DEMONSTRATION -     - READING -   ANSWERED BY self patient Patient   RELATIONSHIP SELF SELF SELF        Depression Screening:   :       3 most recent PHQ Screens 4/13/2021   Little interest or pleasure in doing things Not at all   Feeling down, depressed, irritable, or hopeless Not at all   Total Score PHQ 2 0        Fall Risk Assessment:   :       Fall Risk Assessment, last 12 mths 4/13/2021   Able to walk? Yes   Fall in past 12 months? 0   Do you feel unsteady? 0   Are you worried about falling 0   Fall with injury? -        Abuse Screening:   :       Abuse Screening Questionnaire 1/7/2021 2/13/2020 10/9/2019 7/9/2019 9/4/2018 7/3/2018 10/5/2017   Do you ever feel afraid of your partner? N N N N N N N   Are you in a relationship with someone who physically or mentally threatens you? N N N N N N N   Is it safe for you to go home?  Y Y Y Y Y Y Y        ADL Screening:   :       ADL Assessment 7/3/2018   Feeding yourself No Help Needed   Getting from bed to chair No Help Needed   Getting dressed No Help Needed   Bathing or showering No Help Needed   Walk across the room (includes cane/walker) No Help Needed   Using the telphone No Help Needed   Taking your medications No Help Needed   Preparing meals No Help Needed   Managing money (expenses/bills) No Help Needed   Moderately strenuous housework (laundry) No Help Needed   Shopping for personal items (toiletries/medicines) No Help Needed   Shopping for groceries No Help Needed   Driving No Help Needed   Climbing a flight of stairs No Help Needed   Getting to places beyond walking distances No Help Needed

## 2021-05-14 NOTE — PATIENT INSTRUCTIONS
Dry Skin: Care Instructions Your Care Instructions Dry skin is a common problem, especially in areas where the air is very dry. Dry skin can also become a problem as you get older and lose natural oils that keep your skin moist. 
A tendency toward dry, itchy skin may run in families. Some problems with the body's defenses (immune system), allergies, or an infection with a fungus may also cause patches of dry skin. An over-the-counter cream may help your dry skin. If your skin problem does not get better with home treatment, your doctor may prescribe ointment. You may need antibiotics if you have a skin infection. Follow-up care is a key part of your treatment and safety. Be sure to make and go to all appointments, and call your doctor if you are having problems. It's also a good idea to know your test results and keep a list of the medicines you take. How can you care for yourself at home? Showers and baths · Keep showers and baths short, and use warm or lukewarm water. Don't use hot water. It takes off more of your skin's natural oils. · Choose a mild skin cleanser like Aquanil or Cetaphil. · If you are taking a bath, use a skin cleanser at the very end. Then rinse off with fresh water. Gently pat your skin dry with a towel. Skin creams and moisturizers · Apply moisturizer or skin cream right away (within 3 minutes) after a bath or shower. Use a moisturizer at other times too, as often as you need it. · Moisturizing creams are better than lotions. Try brands like CeraVe cream, Cetaphil cream, or Eucerin cream. 
Other tips · When washing clothes, use a small amount of detergent. Don't use fabric softeners or dryer sheets. · For small areas of itchy skin, try an over-the-counter 1% hydrocortisone cream. 
· If you have very dry hands, spread petroleum jelly (such as Vaseline) on your hands before bed. Wear thin cotton gloves while you sleep.  If your feet are dry, spread Vaseline on them and wear socks while you sleep. When should you call for help? Call your doctor now or seek immediate medical care if: 
  · You have signs of infection, such as: 
? Pain, warmth, or swelling in the skin. ? Red streaks near a wound in the skin. ? Pus coming from a wound in your skin. ? A fever. Watch closely for changes in your health, and be sure to contact your doctor if: 
  · You do not get better as expected. Where can you learn more? Go to http://www.gray.com/ Enter J584 in the search box to learn more about \"Dry Skin: Care Instructions. \" Current as of: July 2, 2020               Content Version: 12.8 © 2006-2021 [employer], [employer]. Care instructions adapted under license by [employer] (which disclaims liability or warranty for this information). If you have questions about a medical condition or this instruction, always ask your healthcare professional. [address] any warranty or liability for your use of this information.

## 2021-05-14 NOTE — PROGRESS NOTES
CC:   Chief Complaint   Patient presents with    Hypertension    Cholesterol Problem    Diabetes    Rash     Pt states that he has a rash, itchy spots all over his body        HISTORY OF PRESENT ILLNESS  Sangita Rush is a 80 y.o. male. Presents for 4 month follow up evaluation. He has HTN, diet-controlled type 2 DM, CKD stage 3, hyperlipidemia, CHF (combined diastolic and systolic) with echo EF 12-50% in 7/17, non-ischemic cardiomyopathy, atrial flutter s/p AFL ablation on 8/22/17, non-obstructive atherosclerotic heart disease, COPD, chronic low back pain, and prostate cancer. Complains of diffuse itching for the past few weeks. No rash. No relief with Benadryl. Left ear better; not bothering him today. Denies HA's, CP, SOB, dizziness, heart palpitations, or leg swelling. Amlodipine stopped by Dr. Erasto Franklin on 4/13/21 due to low BP. Home BP monitoring: not done. He reports taking medications as instructed, no medication side effects noted. Diet and Lifestyle: generally follows a low fat low cholesterol diet, generally follows a low sodium diet, no formal exercise but active during the day. Lab review: labs reviewed and discussed with patient. Echo 5/6/21: LVEF 50-55%, LA mod dilated    He has diet-controlled type 2 DM.  Denies polyuria, polydipsia, or hypoglycemia.  Home glucose monitoring: is not performed.  Diabetic diet compliance: compliant most of the time.  Lab review: A1c is 5.5% today, was 6.0% today on 1/5/21 and 6.4% on 8/26/20.  Eye exam: overdue.      COVID vaccine: had both vaccines, 2nd vaccine made him feel poorly and tired    ROS  A complete review of systems was performed and is negative except for those mentioned in the HPI.     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    S/P cardiac cath Z98.890    NICM (nonischemic cardiomyopathy) ([facility] I42.8    Hypertension I10    Mixed hyperlipidemia E78.2  Smokeless tobacco use Z72.0    Atrial flutter ([facility]) I48.92    Type 2 diabetes mellitus with stage 3 chronic kidney disease, without long-term current use of insulin ([facility]) E11.22, N18.30    Prostate cancer ([facility] C61    COPD (chronic obstructive pulmonary disease) ([facility]) J44.9    Chronic low back pain M54.5, G89.29    Obesity (BMI 30-39. 9) E66.9    Candidal intertrigo B37.2    Anemia of chronic disease D63.8    Pericardial effusion I31.3    PAF (paroxysmal atrial fibrillation) ([facility]) I48.0    Osteopenia of multiple sites M85.89    Orthostatic hypotension I95.1     Past Medical History:   Diagnosis Date    Acute respiratory failure with hypoxia ([facility] 02/08/2014    also 11/28/15, 2/17/16, 5/14/17     Alcohol abuse     As of 11/29/18 pt stated he quit 20 years    Arthritis     Back and shoulder    Atrial flutter ([facility] 08/2017    saw Dr. Manuela Brunson; underwent a-flutter ablation    BPH (benign prostatic hyperplasia)     CHF (congestive heart failure) ([facility] 02/11/2014    TTE 11/15: EF 40-45%, 2/14: EF 20%  Admitted for acute exacerbations 2/8/14, 11/28/15, 2/17/16, and 5/4/17)    Chronic kidney disease     III    Chronic low back pain     LS spine X-ray 4/8/17: mild DDD    Combined forms of age-related cataract of left eye 12/12/2018    KPE/IOL OS    Combined forms of age-related cataract of right eye 11/28/2018    KPE/IOL OD    COPD (chronic obstructive pulmonary disease) ([facility]     PA (dyspnea on exertion)     ED (erectile dysfunction)     Elevated troponin 02/11/2014    also 11/28/15; due to cardiac strain    Frequent hospital admissions     5/14-5/23/17: acute hypoxic resp failure due to CHF exac, ROBINSON on CKD 3, sepsis due to LE cellulitis, 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  2/17-2/22/16: acute hypoxic resp failure, acute diarrhea  11/28-11/30/15: acute hypoxic resp failure due to acute CHF exac, elevated troponin due to cardiac strain  2/8-2/12/14: acuter hypoxic resp failure due to CHF exac, pneumonia     Hand blister 10/8/2017    Bilateral    Hypertension     Kidney disease, chronic, stage III (GFR 30-59 ml/min) ([facility])     Leg fracture, right 1973    Nonischemic cardiomyopathy ([facility]     with LVH    Pneumonia 02/08/2014 2/8/14 and 10/30/15    Poor historian     As of 11/29/18 pt reports 5th grade education, pt unable to give med list-obtained from wife per patient's permission    Prediabetes     Prostate cancer ([facility] 2016    As of 11/29/18, pt states he gets two injections twice a year for this    Pulmonary edema 11/28/2015    S/P cardiac cath 2/12/2014 2/12/14 no significant coronary disease, severe LV dysfunction    Tinea cruris     also genital folliculitis    Vertigo      Allergies   Allergen Reactions    Oxycodone Contact Dermatitis       Current Outpatient Medications   Medication Sig Dispense Refill    Nubeqa 300 mg tablet Take 600 mg by mouth two (2) times daily (with meals).  cholecalciferol (Vitamin D3) (1000 Units /25 mcg) tablet Take  by mouth daily.  calcium-cholecalciferol, d3, 600-125 mg-unit tab Take  by mouth.  docusate sodium (STOOL SOFTENER PO) Take  by mouth.  atorvastatin (LIPITOR) 40 mg tablet TAKE 1 TABLET BY MOUTH ONCE DAILY AT NIGHT 90 Tab 0    rivaroxaban (XARELTO) 20 mg tab tablet Take 1 Tab by mouth daily. 30 Tab 5    lisinopriL (PRINIVIL, ZESTRIL) 2.5 mg tablet Take 1 Tab by mouth daily. 90 Tab 1    carvediloL (COREG) 12.5 mg tablet Take 1 Tab by mouth two (2) times daily (with meals). 60 Tab 5    furosemide (LASIX) 20 mg tablet Take 1 Tab by mouth daily. 30 Tab 5    aspirin 81 mg chewable tablet Take 1 Tab by mouth daily.  10 Tab 0         PHYSICAL EXAM  Visit Vitals  /79 (BP 1 Location: Left arm, BP Patient Position: Sitting, BP Cuff Size: Large adult)   Pulse 60   Temp 98.1 °F (36.7 °C) (Oral)   Resp 16   Ht 5' 9\" (1.753 m)   Wt 203 lb 6.4 oz (92.3 kg)   SpO2 98%   BMI 30.04 kg/m²   5 lb weight increase since last clinic visit 2 months ago    General: Obese, no distress. HEENT:  Head normocephalic/atraumatic, no scleral icterus  Lungs:  Clear to ausculation bilaterally. Good air movement. Heart:  Regular rate and rhythm, normal S1 and S2, no murmur, gallop, or rub  Extremities: No clubbing, cyanosis, or edema. Skin: Dryness at arms. No rash or lesions. Neurological: Alert and oriented. Psychiatric: Normal mood and affect. Behavior is normal.     Results for orders placed or performed in visit on 05/14/21   AMB POC HEMOGLOBIN A1C   Result Value Ref Range    Hemoglobin A1c (POC) 5.5 %         ASSESSMENT AND PLAN    ICD-10-CM ICD-9-CM    1. Essential hypertension  I10 401.9    2. Type 2 diabetes mellitus with stage 3b chronic kidney disease, without long-term current use of insulin (HCC)  E11.22 250.40 AMB POC HEMOGLOBIN A1C    N18.32 585.3    3. Mixed hyperlipidemia  E78.2 272.2    4. Chronic combined systolic and diastolic congestive heart failure (HCC)  I50.42 428.42      428.0    5. Itching  L29.9 698.9 triamcinolone acetonide (KENALOG) 0.025 % ointment      cetirizine (ZYRTEC) 10 mg tablet     Diagnoses and all orders for this visit:    1. Essential hypertension  Controlled. Continue carvedilol and lisinopril 2.5 mg daily. 2. Type 2 diabetes mellitus with stage 3b chronic kidney disease, without long-term current use of insulin (HCC)  A1c 5.5% today. Well-controlled by diet alone. -     AMB POC HEMOGLOBIN A1C    3. Mixed hyperlipidemia  LDL 28 on 9/10/20. Continue atorvastatin 40 mg nightly. Recheck lipid panel at next clinic visit. 4. Chronic combined systolic and diastolic congestive heart failure (HCC)  Controlled. Continue present management. Follow up with Dr. Carolyn Rojo on 5/20/21 as scheduled. 5. Itching  Likely due to dry skin. Also consider due to one of his newer medications (Nubeqa?). -     Start triamcinolone acetonide (KENALOG) 0.025 % ointment;  Apply  to affected area two (2) times daily as needed for Itching. Use thin layer  -     Start cetirizine (ZYRTEC) 10 mg tablet; Take 1 Tab by mouth two (2) times daily as needed for Itching. For 30 days. Follow-up and Dispositions    · Return in about 4 months (around 9/14/2021), or if symptoms worsen or fail to improve, for Medic[facility] AWV; fasting labs on same day. I have discussed the diagnosis with the patient and the intended plan as seen in the above orders. Patient is in agreement. The patient has received an after-visit summary and questions were answered concerning future plans. I have discussed medication side effects and warnings with the patient as well.

## 2021-05-20 ENCOUNTER — OFFICE VISIT (OUTPATIENT)
Dept: CARDIOLOGY CLINIC | Age: 81
End: 2021-05-20
Payer: MEDICARE

## 2021-05-20 VITALS
OXYGEN SATURATION: 97 % | DIASTOLIC BLOOD PRESSURE: 74 MMHG | HEIGHT: 69 IN | HEART RATE: 67 BPM | WEIGHT: 202.2 LBS | BODY MASS INDEX: 29.95 KG/M2 | RESPIRATION RATE: 16 BRPM | SYSTOLIC BLOOD PRESSURE: 130 MMHG

## 2021-05-20 DIAGNOSIS — I25.10 CORONARY ARTERY DISEASE DUE TO LIPID RICH PLAQUE: ICD-10-CM

## 2021-05-20 DIAGNOSIS — I95.1 ORTHOSTATIC HYPOTENSION: ICD-10-CM

## 2021-05-20 DIAGNOSIS — I10 ESSENTIAL HYPERTENSION: ICD-10-CM

## 2021-05-20 DIAGNOSIS — E78.2 MIXED HYPERLIPIDEMIA: ICD-10-CM

## 2021-05-20 DIAGNOSIS — I25.83 CORONARY ARTERY DISEASE DUE TO LIPID RICH PLAQUE: ICD-10-CM

## 2021-05-20 DIAGNOSIS — I42.8 NICM (NONISCHEMIC CARDIOMYOPATHY) (HCC): ICD-10-CM

## 2021-05-20 DIAGNOSIS — I48.0 PAF (PAROXYSMAL ATRIAL FIBRILLATION) (HCC): Primary | ICD-10-CM

## 2021-05-20 PROCEDURE — G8432 DEP SCR NOT DOC, RNG: HCPCS | Performed by: INTERNAL MEDICINE

## 2021-05-20 PROCEDURE — G8536 NO DOC ELDER MAL SCRN: HCPCS | Performed by: INTERNAL MEDICINE

## 2021-05-20 PROCEDURE — 1101F PT FALLS ASSESS-DOCD LE1/YR: CPT | Performed by: INTERNAL MEDICINE

## 2021-05-20 PROCEDURE — G8752 SYS BP LESS 140: HCPCS | Performed by: INTERNAL MEDICINE

## 2021-05-20 PROCEDURE — 99214 OFFICE O/P EST MOD 30 MIN: CPT | Performed by: INTERNAL MEDICINE

## 2021-05-20 PROCEDURE — G0463 HOSPITAL OUTPT CLINIC VISIT: HCPCS | Performed by: INTERNAL MEDICINE

## 2021-05-20 PROCEDURE — G8417 CALC BMI ABV UP PARAM F/U: HCPCS | Performed by: INTERNAL MEDICINE

## 2021-05-20 PROCEDURE — G8427 DOCREV CUR MEDS BY ELIG CLIN: HCPCS | Performed by: INTERNAL MEDICINE

## 2021-05-20 PROCEDURE — G8754 DIAS BP LESS 90: HCPCS | Performed by: INTERNAL MEDICINE

## 2021-05-20 NOTE — PROGRESS NOTES
1. Have you been to the ER, urgent care clinic since your last visit? Hospitalized since your last visit? No.    2. Have you seen or consulted any other health care providers outside of the [address] [place] [address] since your last visit? Include any pap smears or colon screening.    No.      Chief Complaint   Patient presents with    Hypertension     1 month- pt denies any cardiac symptoms

## 2021-05-20 NOTE — PROGRESS NOTES
[address] [place] [address], [address]  [phone]     Subjective:      Crow Dos Santos is a 80 y.o. male is here for one mos follow up. He has pmhx CAD NICM HTN AFL s/p AFL ablation in 2017 and HLD. On last OV, he was orthostatic. We dc amlodipine, labs checked. Echo reordered which showed improved EF. Today, Bp controlled 130/70. Feels great. He started walking again for exercise. He received both of COVID vaccines. The patient denies chest pain/ shortness of breath, orthopnea, PND, LE edema, palpitations, syncope, or presyncope. Echo 5/2021  · LV: Estimated LVEF is 50 - 55%. Normal cavity size, wall thickness and diastolic function. Low normal systolic function. Wall motion: normal.  · LA: Moderately dilated left atrium.     Patient Active Problem List    Diagnosis Date Noted    Orthostatic hypotension 04/13/2021    Osteopenia of multiple sites 04/04/2021    PAF (paroxysmal atrial fibrillation) (HCC) 01/07/2021    Pericardial effusion 11/10/2020    Anemia of chronic disease 09/17/2020    Candidal intertrigo 11/05/2018    Type 2 diabetes mellitus with stage 3 chronic kidney disease, without long-term current use of insulin ([address] 10/08/2017    Prostate cancer ([address] 10/08/2017    COPD (chronic obstructive pulmonary disease) ([address] 10/08/2017    Chronic low back pain 10/08/2017    Obesity (BMI 30-39.9) 10/08/2017    Atrial flutter ([address] 08/22/2017    Smokeless tobacco use 07/25/2017    Mixed hyperlipidemia 03/31/2016    Hypertension 05/20/2014    NICM (nonischemic cardiomyopathy) ([address] 02/20/2014    S/P cardiac cath 02/12/2014    Combined systolic and diastolic congestive heart failure ([address] 02/08/2014      Loy Card MD  Past Medical History:   Diagnosis Date    Acute respiratory failure with hypoxia ([address] 02/08/2014    also 11/28/15, 2/17/16, 5/14/17     Alcohol abuse     As of 11/29/18 pt stated he quit 20 years    Arthritis     Back and shoulder    Atrial flutter ([address] 08/2017    saw Dr. Yadav Son; underwent a-flutter ablation    BPH (benign prostatic hyperplasia)     CHF (congestive heart failure) ([address] 02/11/2014    TTE 11/15: EF 40-45%, 2/14: EF 20%  Admitted for acute exacerbations 2/8/14, 11/28/15, 2/17/16, and 5/4/17)    Chronic kidney disease     III    Chronic low back pain     LS spine X-ray 4/8/17: mild DDD    Combined forms of age-related cataract of left eye 12/12/2018    KPE/IOL OS    Combined forms of age-related cataract of right eye 11/28/2018    KPE/IOL OD    COPD (chronic obstructive pulmonary disease) ([address]     PA (dyspnea on exertion)     ED (erectile dysfunction)     Elevated troponin 02/11/2014    also 11/28/15; due to cardiac strain    Frequent hospital admissions     5/14-5/23/17: acute hypoxic resp failure due to CHF exac, ROBINSON on CKD 3, sepsis due to LE cellulitis, 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  2/17-2/22/16: acute hypoxic resp failure, acute diarrhea  11/28-11/30/15: acute hypoxic resp failure due to acute CHF exac, elevated troponin due to cardiac strain  2/8-2/12/14: acuter hypoxic resp failure due to CHF exac, pneumonia     Hand blister 10/8/2017    Bilateral    Hypertension     Kidney disease, chronic, stage III (GFR 30-59 ml/min) ([facility])     Leg fracture, right 1973    Nonischemic cardiomyopathy ([address]     with LVH    Pneumonia 02/08/2014 2/8/14 and 10/30/15    Poor historian     As of 11/29/18 pt reports 5th grade education, pt unable to give med list-obtained from wife per patient's permission    Prediabetes     Prostate cancer ([address] 2016    As of 11/29/18, pt states he gets two injections twice a year for this    Pulmonary edema 11/28/2015    S/P cardiac cath 2/12/2014 2/12/14 no significant coronary disease, severe LV dysfunction    Tinea cruris     also genital folliculitis    Vertigo       Past Surgical History:   Procedure Laterality Date    COLONOSCOPY N/A 3/2/2020 COLONOSCOPY performed by Adelina Pierre [facility] at [facility] ENDOSCOPY. 2 tubular adenomas, diverticulosis, int hemorrhoids, repeat in 5 yrs    COLONOSCOPY,DIAGNOSTIC  2016    Dr. Milagros Melendez; single sessile polyp at descending colon, sigmoid diverticulosis, int hemorrhoids    COLONOSCOPY,REMV LESN,SNARE  2016         COLONOSCOPY,REMV LESN,SNARE  3/2/2020         HX CATARACT REMOVAL Right 2018    Dr. Angélica Noonan Left 2018    Dr. Renata Mark.  LEFT EYE SECOND REFRATIVE CATARACT REMOVAL WITH LENS IMPLANTATION    HX ORTHOPAEDIC Right 's    index finger    HX OTHER SURGICAL  2017    Dr. Melquiades Mayorga; atrial flutter ablation    HX ROTATOR CUFF REPAIR Right     HX TONSILLECTOMY  194    LA CARDIAC SURG PROCEDURE UNLIST  2017    SVT ablation     Allergies   Allergen Reactions    Oxycodone Contact Dermatitis      Family History   Adopted: Yes   Family history unknown: Yes      Social History     Socioeconomic History    Marital status:      Spouse name: Not on file    Number of children: Not on file    Years of education: Not on file    Highest education level: Not on file   Occupational History    Not on file   Tobacco Use    Smoking status: Former Smoker     Packs/day: 2.00     Years: 20.00     Pack years: 40.00     Types: Cigarettes, Cigars     Quit date: 2019     Years since quittin.7    Smokeless tobacco: Former User     Types: Chew   Vaping Use    Vaping Use: Never used   Substance and Sexual Activity    Alcohol use: Not Currently     Alcohol/week: 0.0 standard drinks     Comment: As of 20, pt quit 20 years ago    Drug use: Yes     Types: Marijuana     Comment: not recently    Sexual activity: Not on file   Other Topics Concern    Not on file   Social History Narrative    Not on file     Social Determinants of Health     Financial Resource Strain:     Difficulty of Paying Living Expenses:    Food Insecurity:     Worried About Running Out of Food in the Last Year:    [address] in the Last Year:    Transportation Needs:     Lack of Transportation (Medical):  Lack of Transportation (Non-Medical):    Physical Activity:     Days of Exercise per Week:     Minutes of Exercise per Session:    Stress:     Feeling of Stress :    Social Connections:     Frequency of Communication with Friends and Family:     Frequency of Social Gatherings with Friends and Family:     Attends Judaism Services:     Active Member of Clubs or Organizations:     Attends Club or Organization Meetings:     Marital Status:    Intimate Partner Violence:     Fear of Current or Ex-Partner:     Emotionally Abused:     Physically Abused:     Sexually Abused:       Current Outpatient Medications   Medication Sig    triamcinolone acetonide (KENALOG) 0.025 % ointment Apply  to affected area two (2) times daily as needed for Itching. Use thin layer    cetirizine (ZYRTEC) 10 mg tablet Take 1 Tab by mouth two (2) times daily as needed for Itching.  Nubeqa 300 mg tablet Take 600 mg by mouth two (2) times daily (with meals).  cholecalciferol (Vitamin D3) (1000 Units /25 mcg) tablet Take  by mouth daily.  calcium-cholecalciferol, d3, 600-125 mg-unit tab Take  by mouth.  docusate sodium (STOOL SOFTENER PO) Take  by mouth.  atorvastatin (LIPITOR) 40 mg tablet TAKE 1 TABLET BY MOUTH ONCE DAILY AT NIGHT    rivaroxaban (XARELTO) 20 mg tab tablet Take 1 Tab by mouth daily.  lisinopriL (PRINIVIL, ZESTRIL) 2.5 mg tablet Take 1 Tab by mouth daily.  carvediloL (COREG) 12.5 mg tablet Take 1 Tab by mouth two (2) times daily (with meals).  furosemide (LASIX) 20 mg tablet Take 1 Tab by mouth daily.  aspirin 81 mg chewable tablet Take 1 Tab by mouth daily. No current facility-administered medications for this visit.          Review of Symptoms:  11 systems reviewed, negative other than as stated in the HPI    Physical ExamPhysical Exam:    Vitals: 05/20/21 1307   Height: 5' 9\" (1.753 m)     Body mass index is 30.04 kg/m². General PE  Gen:  NAD  Mental Status - Alert. General Appearance - Not in acute distress. HEENT:  PERRL, no carotid bruits or JVD  Chest and Lung Exam   Inspection: Accessory muscles - No use of accessory muscles in breathing. Auscultation:   Breath sounds: - Normal.   Cardiovascular   Inspection: Jugular vein - Bilateral - Inspection Normal.   Palpation/Percussion:   Apical Impulse: - Normal.   Auscultation: Rhythm - Regular. Heart Sounds - S1 WNL and S2 WNL. No S3 or S4. Murmurs & Other Heart Sounds: Auscultation of the heart reveals - No Murmurs. Peripheral Vascular   Upper Extremity: Inspection - Bilateral - No Cyanotic nailbeds or Digital clubbing. Lower Extremity:   Palpation: Edema - Bilateral - No edema. Abdomen:   Soft, non-tender, bowel sounds are active.   Neuro: A&O times 3, CN and motor grossly WNL    Labs:   Lab Results   Component Value Date/Time    Cholesterol, total 81 (L) 09/10/2020 08:04 AM    Cholesterol, total 77 (L) 04/02/2019 08:17 AM    Cholesterol, total 117 04/19/2018 08:49 AM    Cholesterol, total 87 (L) 10/05/2017 09:35 AM    HDL Cholesterol 39 (L) 09/10/2020 08:04 AM    HDL Cholesterol 34 (L) 04/02/2019 08:17 AM    HDL Cholesterol 45 04/19/2018 08:49 AM    HDL Cholesterol 14 (L) 10/05/2017 09:35 AM    LDL, calculated 28 09/10/2020 08:04 AM    LDL, calculated 29 04/02/2019 08:17 AM    LDL, calculated 43 04/19/2018 08:49 AM    LDL, calculated 51 10/05/2017 09:35 AM    Triglyceride 56 09/10/2020 08:04 AM    Triglyceride 68 04/02/2019 08:17 AM    Triglyceride 143 04/19/2018 08:49 AM    Triglyceride 110 10/05/2017 09:35 AM     Lab Results   Component Value Date/Time     11/12/2020 10:55 AM     Lab Results   Component Value Date/Time    Sodium 143 04/13/2021 11:35 AM    Potassium 4.1 04/13/2021 11:35 AM    Chloride 105 04/13/2021 11:35 AM    CO2 23 04/13/2021 11:35 AM    Anion gap 5 11/18/2020 03:32 AM Glucose 125 (H) 04/13/2021 11:35 AM    BUN 16 04/13/2021 11:35 AM    Creatinine 1.16 04/13/2021 11:35 AM    BUN/Creatinine ratio 14 04/13/2021 11:35 AM    GFR est AA 69 04/13/2021 11:35 AM    GFR est non-AA 60 04/13/2021 11:35 AM    Calcium 9.2 04/13/2021 11:35 AM    Bilirubin, total 0.3 04/13/2021 11:35 AM    Alk. phosphatase 95 04/13/2021 11:35 AM    Protein, total 6.7 04/13/2021 11:35 AM    Albumin 3.8 04/13/2021 11:35 AM    Globulin 5.3 (H) 11/12/2020 10:55 AM    A-G Ratio 1.3 04/13/2021 11:35 AM    ALT (SGPT) 9 04/13/2021 11:35 AM        Assessment:     Assessment:      1. PAF (paroxysmal atrial fibrillation) ([facility]    2. NICM (nonischemic cardiomyopathy) ([facility]    3. Essential hypertension    4. Mixed hyperlipidemia    5. Orthostatic hypotension    6. Coronary artery disease due to lipid rich plaque        No orders of the defined types were placed in this encounter. Plan:      NICM  Improved systolic fxn 45-12% per echo 5/2021  EF 25-30% per limited echo 12/2020  55-60% in 2017;  25-30% per TAZ in 11/2020  Stable. Continue coreg, lisinopril    PAF  History of atrial flutter s/p ablation in 2017; S/p DCCV  11/2020  Continue BB  CHADVASc=5. On Xarelto     Nonobstructive atherosclerotic heart disease:   Negative NST 12/2020   20% lesion proximal LAD on last cath 2/2014   On ASA, BB, statin    HTN  Controlled with current therapy     Orthostatic hypotension, resolved upon dc amlodipine      Hyperlipidemia:    9/2020 LDL 28 On atorva 40 mg daily will check labs at follow up     Continue current care and f/u in 6 months    Dipak Dixon NP       Patient seen and examined by me with nurse practitioner. I personally performed all components of the history, physical, and medical decision making and agree with the assessment and plan as noted. BP controlled now since discontinuing norvasc. Continue other meds. On statin. Check labs with next visit.      Michael Alcocer MD

## 2021-06-03 RX ORDER — ATORVASTATIN CALCIUM 40 MG/1
TABLET, FILM COATED ORAL
Qty: 90 TABLET | Refills: 0 | Status: SHIPPED | OUTPATIENT
Start: 2021-06-03 | End: 2021-09-07

## 2021-06-16 RX ORDER — FUROSEMIDE 20 MG/1
TABLET ORAL
Qty: 30 TABLET | Refills: 0 | Status: SHIPPED | OUTPATIENT
Start: 2021-06-16 | End: 2021-07-19

## 2021-06-21 RX ORDER — CARVEDILOL 12.5 MG/1
TABLET ORAL
Qty: 60 TABLET | Refills: 0 | Status: SHIPPED | OUTPATIENT
Start: 2021-06-21 | End: 2021-07-21

## 2021-07-19 RX ORDER — FUROSEMIDE 20 MG/1
TABLET ORAL
Qty: 30 TABLET | Refills: 0 | Status: SHIPPED | OUTPATIENT
Start: 2021-07-19 | End: 2021-08-16

## 2021-07-21 RX ORDER — CARVEDILOL 12.5 MG/1
TABLET ORAL
Qty: 60 TABLET | Refills: 0 | Status: SHIPPED | OUTPATIENT
Start: 2021-07-21 | End: 2021-11-18

## 2021-07-22 ENCOUNTER — TELEPHONE (OUTPATIENT)
Dept: CARDIOLOGY CLINIC | Age: 81
End: 2021-07-22

## 2021-07-22 NOTE — TELEPHONE ENCOUNTER
Call in verbal order for Coreg 12.5 mg tab 1 tab by mouth twice daily with meals disp 60 tab refill 3 the pharmacist verbalized understanding.

## 2021-07-26 RX ORDER — RIVAROXABAN 20 MG/1
TABLET, FILM COATED ORAL
Qty: 30 TABLET | Refills: 0 | Status: SHIPPED | OUTPATIENT
Start: 2021-07-26 | End: 2021-08-24

## 2021-08-10 ENCOUNTER — OFFICE VISIT (OUTPATIENT)
Dept: URGENT CARE | Age: 81
End: 2021-08-10
Payer: MEDICARE

## 2021-08-10 VITALS — TEMPERATURE: 98.4 F | RESPIRATION RATE: 18 BRPM | HEART RATE: 62 BPM | OXYGEN SATURATION: 95 %

## 2021-08-10 DIAGNOSIS — H65.02 ACUTE SEROUS OTITIS MEDIA OF LEFT EAR, RECURRENCE NOT SPECIFIED: Primary | ICD-10-CM

## 2021-08-10 PROCEDURE — G8536 NO DOC ELDER MAL SCRN: HCPCS | Performed by: FAMILY MEDICINE

## 2021-08-10 PROCEDURE — G8427 DOCREV CUR MEDS BY ELIG CLIN: HCPCS | Performed by: FAMILY MEDICINE

## 2021-08-10 PROCEDURE — G8756 NO BP MEASURE DOC: HCPCS | Performed by: FAMILY MEDICINE

## 2021-08-10 PROCEDURE — 1101F PT FALLS ASSESS-DOCD LE1/YR: CPT | Performed by: FAMILY MEDICINE

## 2021-08-10 PROCEDURE — 99203 OFFICE O/P NEW LOW 30 MIN: CPT | Performed by: FAMILY MEDICINE

## 2021-08-10 PROCEDURE — G8417 CALC BMI ABV UP PARAM F/U: HCPCS | Performed by: FAMILY MEDICINE

## 2021-08-10 PROCEDURE — G8432 DEP SCR NOT DOC, RNG: HCPCS | Performed by: FAMILY MEDICINE

## 2021-08-10 RX ORDER — CEFDINIR 300 MG/1
300 CAPSULE ORAL 2 TIMES DAILY
Qty: 20 CAPSULE | Refills: 0 | Status: SHIPPED | OUTPATIENT
Start: 2021-08-10 | End: 2021-09-14 | Stop reason: ALTCHOICE

## 2021-08-10 RX ORDER — FLUTICASONE PROPIONATE 50 MCG
2 SPRAY, SUSPENSION (ML) NASAL DAILY
Qty: 1 BOTTLE | Refills: 0 | Status: SHIPPED | OUTPATIENT
Start: 2021-08-10 | End: 2021-12-27

## 2021-08-10 RX ORDER — PREDNISONE 5 MG/1
TABLET ORAL
Qty: 21 TABLET | Refills: 0 | Status: SHIPPED | OUTPATIENT
Start: 2021-08-10 | End: 2021-09-14 | Stop reason: ALTCHOICE

## 2021-08-10 NOTE — PROGRESS NOTES
This patient was seen at [address] [place] [address] Urgent Care while in their vehicle due to COVID-19 pandemic with PPE and focused examination in order to decrease community viral transmission. The patient/guardian gave verbal consent to treat. The history is provided by the patient. Ear Pain  This is a new problem. The current episode started more than 1 week ago. The problem occurs daily. The problem has not changed since onset. Associated symptoms comments: Left ear fullness and pressure  C/o ringing  More pain when expsoed to air blowing / cold - better in heat   No injury - no drainage. Exacerbated by: cold air. Relieved by: heat- out in sun. Treatments tried: antibiotics- wice a day x 10 days - from [facility]- don't know name  The treatment provided mild (sxs not gone completekly ) relief.         Past Medical History:   Diagnosis Date    Acute respiratory failure with hypoxia ([address] 02/08/2014    also 11/28/15, 2/17/16, 5/14/17     Alcohol abuse     As of 11/29/18 pt stated he quit 20 years    Arthritis     Back and shoulder    Atrial flutter ([address] 08/2017    saw Dr. Maryanne Klein; underwent a-flutter ablation    BPH (benign prostatic hyperplasia)     CHF (congestive heart failure) ([address] 02/11/2014    TTE 11/15: EF 40-45%, 2/14: EF 20%  Admitted for acute exacerbations 2/8/14, 11/28/15, 2/17/16, and 5/4/17)    Chronic kidney disease     III    Chronic low back pain     LS spine X-ray 4/8/17: mild DDD    Combined forms of age-related cataract of left eye 12/12/2018    KPE/IOL OS    Combined forms of age-related cataract of right eye 11/28/2018    KPE/IOL OD    COPD (chronic obstructive pulmonary disease) ([address]     PA (dyspnea on exertion)     ED (erectile dysfunction)     Elevated troponin 02/11/2014    also 11/28/15; due to cardiac strain    Frequent hospital admissions     5/14-5/23/17: acute hypoxic resp failure due to CHF exac, ROBINSON on CKD 3, sepsis due to LE cellulitis, 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  2/17-2/22/16: acute hypoxic resp failure, acute diarrhea  11/28-11/30/15: acute hypoxic resp failure due to acute CHF exac, elevated troponin due to cardiac strain  2/8-2/12/14: acuter hypoxic resp failure due to CHF exac, pneumonia     Hand blister 10/8/2017    Bilateral    Hypertension     Kidney disease, chronic, stage III (GFR 30-59 ml/min) ([facility])     Leg fracture, right 1973    Nonischemic cardiomyopathy ([facility]     with LVH    Pneumonia 02/08/2014 2/8/14 and 10/30/15    Poor historian     As of 11/29/18 pt reports 5th grade education, pt unable to give med list-obtained from wife per patient's permission    Prediabetes     Prostate cancer ([facility] 2016    As of 11/29/18, pt states he gets two injections twice a year for this    Pulmonary edema 11/28/2015    S/P cardiac cath 2/12/2014 2/12/14 no significant coronary disease, severe LV dysfunction    Tinea cruris     also genital folliculitis    Vertigo         Past Surgical History:   Procedure Laterality Date    COLONOSCOPY N/A 3/2/2020    COLONOSCOPY performed by Crow Chirinos MD at [facility] ENDOSCOPY. 2 tubular adenomas, diverticulosis, int hemorrhoids, repeat in 5 yrs    COLONOSCOPY,DIAGNOSTIC  02/22/2016    Dr. Linda Goldstein; single sessile polyp at descending colon, sigmoid diverticulosis, int hemorrhoids    COLONOSCOPY,REMV LESN,SNARE  2/22/2016         COLONOSCOPY,REMV LESN,SNARE  3/2/2020         HX CATARACT REMOVAL Right 12/05/2018    Dr. Enedina Oliva Left 12/12/2018    Dr. Kellie Cormier.  LEFT EYE SECOND REFRATIVE CATARACT REMOVAL WITH LENS IMPLANTATION    HX ORTHOPAEDIC Right 1980's    index finger    HX OTHER SURGICAL  08/22/2017    Dr. Maryanne Klein; atrial flutter ablation    HX ROTATOR CUFF REPAIR Right 2000    HX TONSILLECTOMY  1948    [place] CARDIAC SURG PROCEDURE UNLIST  08/22/2017    SVT ablation         Family History   Adopted: Yes   Family history unknown: Yes        Social History     Socioeconomic History    Marital status:      Spouse name: Not on file    Number of children: Not on file    Years of education: Not on file    Highest education level: Not on file   Occupational History    Not on file   Tobacco Use    Smoking status: Former Smoker     Packs/day: 2.00     Years: 20.00     Pack years: 40.00     Types: Cigarettes, Cigars     Quit date: 2019     Years since quittin.0    Smokeless tobacco: Former User     Types: Chew   Vaping Use    Vaping Use: Never used   Substance and Sexual Activity    Alcohol use: Not Currently     Alcohol/week: 0.0 standard drinks     Comment: As of 20, pt quit 20 years ago    Drug use: Yes     Types: Marijuana     Comment: not recently    Sexual activity: Not on file   Other Topics Concern    Not on file   Social History Narrative    Not on file     Social Determinants of Health     Financial Resource Strain:     Difficulty of Paying Living Expenses:    Food Insecurity:     Worried About [address] in the Last Year:     [address] [employer] in the Last Year:    Transportation Needs:     Lack of Transportation (Medical):  Lack of Transportation (Non-Medical):    Physical Activity:     Days of Exercise per Week:     Minutes of Exercise per Session:    Stress:     Feeling of Stress :    Social Connections:     Frequency of Communication with Friends and Family:     Frequency of Social Gatherings with Friends and Family:     Attends Sabianist Services:     Active Member of Clubs or Organizations:     Attends Club or Organization Meetings:     Marital Status:    Intimate Partner Violence:     Fear of Current or Ex-Partner:     Emotionally Abused:     Physically Abused:     Sexually Abused: ALLERGIES: Oxycodone    Review of Systems   All other systems reviewed and are negative. Vitals:    08/10/21 1131   Pulse: 62   Resp: 18   Temp: 98.4 °F (36.9 °C)   SpO2: 95%       Physical Exam  Vitals and nursing note reviewed. Constitutional:       General: He is not in acute distress. Appearance: He is not ill-appearing. HENT:      Left Ear: A middle ear effusion is present. Left ear injected TM: dull TM  Left ear bulging TM: ?? Pulmonary:      Effort: Pulmonary effort is normal. No respiratory distress. Breath sounds: Normal breath sounds. MDM    Procedures        ICD-10-CM ICD-9-CM    1. Acute serous otitis media of left ear, recurrence not specified  H65.02 381.01        Follow with ENT if sxs not resolved. Medications Ordered Today   Medications    cefdinir (OMNICEF) 300 mg capsule     Sig: Take 1 Capsule by mouth two (2) times a day. Dispense:  20 Capsule     Refill:  0    fluticasone propionate (FLONASE) 50 mcg/actuation nasal spray     Si Sprays by Both Nostrils route daily. Dispense:  1 Bottle     Refill:  0    predniSONE (STERAPRED) 5 mg dose pack     Sig: See administration instruction per 5mg dose pack     Dispense:  21 Tablet     Refill:  0     No results found for any visits on 08/10/21. The patients condition was discussed with the patient and they understand. The patient is to follow up with primary care doctor. If signs and symptoms become worse the pt is to go to the ER. The patient is to take medications as prescribed.

## 2021-08-16 RX ORDER — FUROSEMIDE 20 MG/1
TABLET ORAL
Qty: 30 TABLET | Refills: 0 | Status: SHIPPED | OUTPATIENT
Start: 2021-08-16 | End: 2021-09-13

## 2021-08-20 ENCOUNTER — HOSPITAL ENCOUNTER (EMERGENCY)
Age: 81
Discharge: HOME | End: 2021-08-20
Attending: EMERGENCY MEDICINE
Payer: MEDICARE

## 2021-08-20 ENCOUNTER — APPOINTMENT (OUTPATIENT)
Dept: GENERAL RADIOLOGY | Age: 81
End: 2021-08-20
Attending: EMERGENCY MEDICINE
Payer: MEDICARE

## 2021-08-20 VITALS
RESPIRATION RATE: 18 BRPM | HEART RATE: 57 BPM | TEMPERATURE: 98.6 F | BODY MASS INDEX: 30.69 KG/M2 | DIASTOLIC BLOOD PRESSURE: 58 MMHG | SYSTOLIC BLOOD PRESSURE: 131 MMHG | HEIGHT: 69 IN | OXYGEN SATURATION: 100 % | WEIGHT: 207.23 LBS

## 2021-08-20 DIAGNOSIS — M25.552 LEFT HIP PAIN: Primary | ICD-10-CM

## 2021-08-20 DIAGNOSIS — M54.32 SCIATICA OF LEFT SIDE: ICD-10-CM

## 2021-08-20 PROBLEM — M16.12 PRIMARY OSTEOARTHRITIS OF LEFT HIP: Status: ACTIVE | Noted: 2021-08-20

## 2021-08-20 PROCEDURE — 73502 X-RAY EXAM HIP UNI 2-3 VIEWS: CPT

## 2021-08-20 PROCEDURE — 99282 EMERGENCY DEPT VISIT SF MDM: CPT

## 2021-08-20 RX ORDER — PREDNISONE 20 MG/1
20 TABLET ORAL DAILY
Qty: 4 TABLET | Refills: 0 | Status: SHIPPED | OUTPATIENT
Start: 2021-08-20 | End: 2021-08-24

## 2021-08-20 RX ORDER — METHOCARBAMOL 750 MG/1
750 TABLET, FILM COATED ORAL 3 TIMES DAILY
Qty: 20 TABLET | Refills: 0 | Status: SHIPPED | OUTPATIENT
Start: 2021-08-20 | End: 2021-09-14 | Stop reason: SDUPTHER

## 2021-08-20 NOTE — ED PROVIDER NOTES
EMERGENCY DEPARTMENT HISTORY AND PHYSICAL EXAM      Date: 8/20/2021  Patient Name: Jody Rascon    History of Presenting Illness     Chief Complaint   Patient presents with    Hip Pain     pt ambulatory to triage complaining of left sided hip pain since Monday. pt denies any recent trauma or injury. History Provided By: Patient    HPI: Jody Rascon, 80 y.o. male with PMHx significant for medical problems listed below, presents to the ED with cc of left hip pain onset 4 days ago. The pain is located to his posterior hip and radiates down his left leg with associated tingling. Pain is worse with movement and ambulating. He did not try any over-the-counter analgesics. He denies injury or trauma. He denies leg numbness or weakness, bowel or bladder dysfunction, saddle anesthesia. There are no other complaints, changes, or physical findings at this time. PCP: Nesha Bahena MD    No current facility-administered medications on file prior to encounter. Current Outpatient Medications on File Prior to Encounter   Medication Sig Dispense Refill    furosemide (LASIX) 20 mg tablet Take 1 tablet by mouth once daily 30 Tablet 0    cefdinir (OMNICEF) 300 mg capsule Take 1 Capsule by mouth two (2) times a day. 20 Capsule 0    fluticasone propionate (FLONASE) 50 mcg/actuation nasal spray 2 Sprays by Both Nostrils route daily. 1 Bottle 0    predniSONE (STERAPRED) 5 mg dose pack See administration instruction per 5mg dose pack 21 Tablet 0    Xarelto 20 mg tab tablet Take 1 tablet by mouth once daily 30 Tablet 0    carvediloL (COREG) 12.5 mg tablet TAKE 1 TABLET BY MOUTH TWICE DAILY WITH MEALS 60 Tablet 0    lisinopriL (PRINIVIL, ZESTRIL) 2.5 mg tablet Take 1 tablet by mouth daily 90 Tablet 1    atorvastatin (LIPITOR) 40 mg tablet TAKE 1 TABLET BY MOUTH ONCE NIGHTLY 90 Tablet 0    triamcinolone acetonide (KENALOG) 0.025 % ointment Apply  to affected area two (2) times daily as needed for Itching. Use thin layer 80 g 1    cetirizine (ZYRTEC) 10 mg tablet Take 1 Tab by mouth two (2) times daily as needed for Itching. 60 Tab 0    Nubeqa 300 mg tablet Take 600 mg by mouth two (2) times daily (with meals).  cholecalciferol (Vitamin D3) (1000 Units /25 mcg) tablet Take  by mouth daily.  calcium-cholecalciferol, d3, 600-125 mg-unit tab Take  by mouth.  docusate sodium (STOOL SOFTENER PO) Take  by mouth.  aspirin 81 mg chewable tablet Take 1 Tab by mouth daily.  10 Tab 0       Past History     Past Medical History:  Past Medical History:   Diagnosis Date    Acute respiratory failure with hypoxia ([address] 02/08/2014    also 11/28/15, 2/17/16, 5/14/17     Alcohol abuse     As of 11/29/18 pt stated he quit 20 years    Arthritis     Back and shoulder    Atrial flutter ([address] 08/2017    saw Dr. Wilfrido Gregory; underwent a-flutter ablation    BPH (benign prostatic hyperplasia)     CHF (congestive heart failure) ([address] 02/11/2014    TTE 11/15: EF 40-45%, 2/14: EF 20%  Admitted for acute exacerbations 2/8/14, 11/28/15, 2/17/16, and 5/4/17)    Chronic kidney disease     III    Chronic low back pain     LS spine X-ray 4/8/17: mild DDD    Combined forms of age-related cataract of left eye 12/12/2018    KPE/IOL OS    Combined forms of age-related cataract of right eye 11/28/2018    KPE/IOL OD    COPD (chronic obstructive pulmonary disease) ([address]     PA (dyspnea on exertion)     ED (erectile dysfunction)     Elevated troponin 02/11/2014    also 11/28/15; due to cardiac strain    Frequent hospital admissions     5/14-5/23/17: acute hypoxic resp failure due to CHF exac, ROBINSON on CKD 3, sepsis due to LE cellulitis, 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  2/17-2/22/16: acute hypoxic resp failure, acute diarrhea  11/28-11/30/15: acute hypoxic resp failure due to acute CHF exac, elevated troponin due to cardiac strain  2/8-2/12/14: acuter hypoxic resp failure due to CHF exac, pneumonia     Hand blister 10/8/2017    Bilateral    Hypertension     Kidney disease, chronic, stage III (GFR 30-59 ml/min) ([facility])     Leg fracture, right 1973    Nonischemic cardiomyopathy ([facility]     with LVH    Pneumonia 2014 and 10/30/15    Poor historian     As of 20 pt reports 5th grade education, pt unable to give med list-obtained from wife per patient's permission    Prediabetes     Prostate cancer ([facility] 2016    As of 20, pt states he gets two injections twice a year for this    Pulmonary edema 2015    S/P cardiac cath 2014 no significant coronary disease, severe LV dysfunction    Tinea cruris     also genital folliculitis    Vertigo        Past Surgical History:  Past Surgical History:   Procedure Laterality Date    COLONOSCOPY N/A 3/2/2020    COLONOSCOPY performed by Alexx Soriano MD at [facility] ENDOSCOPY. 2 tubular adenomas, diverticulosis, int hemorrhoids, repeat in 5 yrs    COLONOSCOPY,DIAGNOSTIC  2016    Dr. Monse Gonzalez; single sessile polyp at descending colon, sigmoid diverticulosis, int hemorrhoids    COLONOSCOPY,REMV LESN,SNARE  2016         COLONOSCOPY,REMV LESN,SNARE  3/2/2020         HX CATARACT REMOVAL Right 2018    Dr. Rose Rivers Left 2018    Dr. Cosmo Gordillo.  LEFT EYE SECOND REFRATIVE CATARACT REMOVAL WITH LENS IMPLANTATION    HX ORTHOPAEDIC Right 's    index finger    HX OTHER SURGICAL  2017    Dr. Van Arias; atrial flutter ablation    HX ROTATOR CUFF REPAIR Right     HX TONSILLECTOMY  1948    [place] CARDIAC SUR[place] PROCEDURE UNLIST  2017    SVT ablation       Family History:  Family History   Adopted: Yes   Family history unknown: Yes       Social History:  Social History     Tobacco Use    Smoking status: Former Smoker     Packs/day: 2.00     Years: 20.00     Pack years: 40.00     Types: Cigarettes, Cigars     Quit date: 2019     Years since quittin.0    Smokeless tobacco: Former User     Types: Chew   Vaping Use    Vaping Use: Never used   Substance Use Topics    Alcohol use: Not Currently     Alcohol/week: 0.0 standard drinks     Comment: As of 11/29/18, pt quit 20 years ago    Drug use: Yes     Types: Marijuana     Comment: not recently       Allergies: Allergies   Allergen Reactions    Oxycodone Contact Dermatitis         Review of Systems   Review of Systems   Constitutional: Negative for chills and fever. HENT: Negative for ear pain and sore throat. Eyes: Negative for redness and visual disturbance. Respiratory: Negative for cough and shortness of breath. Cardiovascular: Negative for chest pain and palpitations. Gastrointestinal: Negative for abdominal pain, nausea and vomiting. Genitourinary: Negative for dysuria and hematuria. Musculoskeletal: Negative for back pain and gait problem. +left hip pain   Skin: Negative for rash and wound. Neurological: Negative for dizziness, weakness, numbness and headaches. Psychiatric/Behavioral: Negative for behavioral problems and confusion. All other systems reviewed and are negative. Physical Exam   Physical Exam  Constitutional:       Appearance: He is not toxic-appearing. HENT:      Head: Normocephalic and atraumatic. Mouth/Throat:      Mouth: Mucous membranes are moist.   Eyes:      Extraocular Movements: Extraocular movements intact. Pupils: Pupils are equal, round, and reactive to light. Cardiovascular:      Rate and Rhythm: Normal rate and regular rhythm. Pulmonary:      Effort: Pulmonary effort is normal. No respiratory distress. Musculoskeletal:         General: No deformity. Normal range of motion. Cervical back: Normal range of motion and neck supple. Comments: Tenderness to palpation over the left sciatic notch. No midline spinal tenderness. Full range of motion in hip joint. Skin:     General: Skin is warm and dry. Neurological:      General: No focal deficit present.       Mental Status: He is alert and oriented to person, place, and time. Sensory: No sensory deficit. Motor: No weakness. Comments: Patient ambulates without difficulty. Psychiatric:         Behavior: Behavior normal.           Diagnostic Study Results     Labs -   No results found for this or any previous visit (from the past 12 hour(s)). Radiologic Studies -   XR HIP LT W OR WO PELV 2-3 VWS   Final Result   No acute abnormality. CT Results  (Last 48 hours)    None        CXR Results  (Last 48 hours)    None            Medical Decision Making   I am the first provider for this patient. I reviewed the vital signs, available nursing notes, past medical history, past surgical history, family history and social history. Vital Signs-Reviewed the patient's vital signs. Patient Vitals for the past 12 hrs:   Temp Pulse Resp BP SpO2   08/20/21 1045 98.6 °F (37 °C) (!) 57 18 (!) 131/58 100 %         Records Reviewed: Nursing Notes and Old Medical Records      Provider Notes (Medical Decision Making):   DDx: Osteoarthritis, muscle strain, sciatica    Patient presents with atraumatic left hip pain that radiates down his left leg. He has no red flag signs and neurological exam is intact. X-ray shows osteoarthritis without acute process. Plan to treat with steroids and muscle relaxer. Discussed follow-up and return precautions. ED Course:   Initial assessment performed. The patients presenting problems have been discussed, and they are in agreement with the care plan formulated and outlined with them. I have encouraged them to ask questions as they arise throughout their visit. Disposition:  1:09 PM  The patient has been re-evaluated and is ready for discharge. Reviewed available results with patient. Counseled patient on diagnosis and care plan. Patient has expressed understanding, and all questions have been answered.  Patient agrees with plan and agrees to follow up as recommended, or to return to the ED if their symptoms worsen. Discharge instructions have been provided and explained to the patient, along with reasons to return to the ED. PLAN:  1. Discharge Medication List as of 8/20/2021  1:09 PM      START taking these medications    Details   predniSONE (DELTASONE) 20 mg tablet Take 20 mg by mouth daily for 4 days. With Breakfast, Normal, Disp-4 Tablet, R-0      methocarbamoL (ROBAXIN) 750 mg tablet Take 1 Tablet by mouth three (3) times daily. , Normal, Disp-20 Tablet, R-0         CONTINUE these medications which have NOT CHANGED    Details   furosemide (LASIX) 20 mg tablet Take 1 tablet by mouth once daily, Normal, Disp-30 Tablet, R-0      cefdinir (OMNICEF) 300 mg capsule Take 1 Capsule by mouth two (2) times a day., Normal, Disp-20 Capsule, R-0      fluticasone propionate (FLONASE) 50 mcg/actuation nasal spray 2 Sprays by Both Nostrils route daily. , Normal, Disp-1 Bottle, R-0      predniSONE (STERAPRED) 5 mg dose pack See administration instruction per 5mg dose pack, Normal, Disp-21 Tablet, R-0      Xarelto 20 mg tab tablet Take 1 tablet by mouth once daily, Normal, Disp-30 Tablet, R-0      carvediloL (COREG) 12.5 mg tablet TAKE 1 TABLET BY MOUTH TWICE DAILY WITH MEALS, Normal, Disp-60 Tablet, R-0      lisinopriL (PRINIVIL, ZESTRIL) 2.5 mg tablet Take 1 tablet by mouth daily, Normal, Disp-90 Tablet, R-1      atorvastatin (LIPITOR) 40 mg tablet TAKE 1 TABLET BY MOUTH ONCE NIGHTLY, Normal, Disp-90 Tablet, R-0      triamcinolone acetonide (KENALOG) 0.025 % ointment Apply  to affected area two (2) times daily as needed for Itching. Use thin layer, Normal, Disp-80 g, R-1      cetirizine (ZYRTEC) 10 mg tablet Take 1 Tab by mouth two (2) times daily as needed for Itching., Normal, Disp-60 Tab, R-0      Nubeqa 300 mg tablet Take 600 mg by mouth two (2) times daily (with meals). , Historical Med, ASIA      cholecalciferol (Vitamin D3) (1000 Units /25 mcg) tablet Take  by mouth daily. , Historical Med      calcium-cholecalciferol, d3, 600-125 mg-unit tab Take  by mouth., Historical Med      docusate sodium (STOOL SOFTENER PO) Take  by mouth., Historical Med      aspirin 81 mg chewable tablet Take 1 Tab by mouth daily. , Normal, Disp-10 Tab,R-0           2. Follow-up Information     Follow up With Specialties Details Why Contact Info    [facility]    [phone] [facility] [address] [address] [phone]    [facility] EMERGENCY DEPT Emergency Medicine Go to  If symptoms worsen [address] [place] [address]  [phone]        Return to ED if worse     Diagnosis     Clinical Impression:   1. Left hip pain    2. Sciatica of left side            Wilber Rodriguez.  ANDREW Lopez

## 2021-08-26 LAB — CREATININE, EXTERNAL: 1.38

## 2021-09-07 RX ORDER — ATORVASTATIN CALCIUM 40 MG/1
TABLET, FILM COATED ORAL
Qty: 90 TABLET | Refills: 0 | Status: SHIPPED | OUTPATIENT
Start: 2021-09-07 | End: 2021-12-03

## 2021-09-13 RX ORDER — FUROSEMIDE 20 MG/1
TABLET ORAL
Qty: 30 TABLET | Refills: 0 | Status: SHIPPED | OUTPATIENT
Start: 2021-09-13 | End: 2021-10-13

## 2021-09-14 ENCOUNTER — OFFICE VISIT (OUTPATIENT)
Dept: INTERNAL MEDICINE CLINIC | Age: 81
End: 2021-09-14
Payer: MEDICARE

## 2021-09-14 VITALS
TEMPERATURE: 98.6 F | RESPIRATION RATE: 16 BRPM | WEIGHT: 208 LBS | DIASTOLIC BLOOD PRESSURE: 65 MMHG | HEIGHT: 69 IN | BODY MASS INDEX: 30.81 KG/M2 | OXYGEN SATURATION: 97 % | HEART RATE: 56 BPM | SYSTOLIC BLOOD PRESSURE: 133 MMHG

## 2021-09-14 DIAGNOSIS — Z11.59 NEED FOR HEPATITIS C SCREENING TEST: ICD-10-CM

## 2021-09-14 DIAGNOSIS — Z13.31 SCREENING FOR DEPRESSION: ICD-10-CM

## 2021-09-14 DIAGNOSIS — M25.552 LEFT HIP PAIN: ICD-10-CM

## 2021-09-14 DIAGNOSIS — M54.32 SCIATICA OF LEFT SIDE: ICD-10-CM

## 2021-09-14 DIAGNOSIS — I10 ESSENTIAL HYPERTENSION: ICD-10-CM

## 2021-09-14 DIAGNOSIS — I48.3 TYPICAL ATRIAL FLUTTER (HCC): ICD-10-CM

## 2021-09-14 DIAGNOSIS — Z98.890 S/P CARDIAC CATH: ICD-10-CM

## 2021-09-14 DIAGNOSIS — E11.22 TYPE 2 DIABETES MELLITUS WITH STAGE 3B CHRONIC KIDNEY DISEASE, WITHOUT LONG-TERM CURRENT USE OF INSULIN (HCC): ICD-10-CM

## 2021-09-14 DIAGNOSIS — N18.32 TYPE 2 DIABETES MELLITUS WITH STAGE 3B CHRONIC KIDNEY DISEASE, WITHOUT LONG-TERM CURRENT USE OF INSULIN (HCC): ICD-10-CM

## 2021-09-14 DIAGNOSIS — E78.2 MIXED HYPERLIPIDEMIA: ICD-10-CM

## 2021-09-14 DIAGNOSIS — I50.42 CHRONIC COMBINED SYSTOLIC AND DIASTOLIC CONGESTIVE HEART FAILURE (HCC): ICD-10-CM

## 2021-09-14 DIAGNOSIS — Z00.00 MEDICARE ANNUAL WELLNESS VISIT, SUBSEQUENT: Primary | ICD-10-CM

## 2021-09-14 DIAGNOSIS — Z71.89 ADVANCE CARE PLANNING: ICD-10-CM

## 2021-09-14 PROBLEM — I95.1 ORTHOSTATIC HYPOTENSION: Status: RESOLVED | Noted: 2021-04-13 | Resolved: 2021-09-14

## 2021-09-14 PROBLEM — I31.39 PERICARDIAL EFFUSION: Status: RESOLVED | Noted: 2020-11-10 | Resolved: 2021-09-14

## 2021-09-14 LAB — HBA1C MFR BLD HPLC: 5.5 %

## 2021-09-14 PROCEDURE — G8417 CALC BMI ABV UP PARAM F/U: HCPCS | Performed by: INTERNAL MEDICINE

## 2021-09-14 PROCEDURE — G8427 DOCREV CUR MEDS BY ELIG CLIN: HCPCS | Performed by: INTERNAL MEDICINE

## 2021-09-14 PROCEDURE — 1101F PT FALLS ASSESS-DOCD LE1/YR: CPT | Performed by: INTERNAL MEDICINE

## 2021-09-14 PROCEDURE — 99215 OFFICE O/P EST HI 40 MIN: CPT | Performed by: INTERNAL MEDICINE

## 2021-09-14 PROCEDURE — G8536 NO DOC ELDER MAL SCRN: HCPCS | Performed by: INTERNAL MEDICINE

## 2021-09-14 PROCEDURE — G8752 SYS BP LESS 140: HCPCS | Performed by: INTERNAL MEDICINE

## 2021-09-14 PROCEDURE — G8510 SCR DEP NEG, NO PLAN REQD: HCPCS | Performed by: INTERNAL MEDICINE

## 2021-09-14 PROCEDURE — G0439 PPPS, SUBSEQ VISIT: HCPCS | Performed by: INTERNAL MEDICINE

## 2021-09-14 PROCEDURE — 83036 HEMOGLOBIN GLYCOSYLATED A1C: CPT | Performed by: INTERNAL MEDICINE

## 2021-09-14 PROCEDURE — G0444 DEPRESSION SCREEN ANNUAL: HCPCS | Performed by: INTERNAL MEDICINE

## 2021-09-14 PROCEDURE — G8754 DIAS BP LESS 90: HCPCS | Performed by: INTERNAL MEDICINE

## 2021-09-14 RX ORDER — METHOCARBAMOL 750 MG/1
750 TABLET, FILM COATED ORAL 3 TIMES DAILY
Qty: 30 TABLET | Refills: 0 | Status: SHIPPED | OUTPATIENT
Start: 2021-09-14 | End: 2021-09-24

## 2021-09-14 NOTE — PATIENT INSTRUCTIONS
Medicare Wellness Visit, Male    The best way to live healthy is to have a lifestyle where you eat a well-balanced diet, exercise regularly, limit alcohol use, and quit all forms of tobacco/nicotine, if applicable. Regular preventive services are another way to keep healthy. Preventive services (vaccines, screening tests, monitoring & exams) can help personalize your care plan, which helps you manage your own care. Screening tests can find health problems at the earliest stages, when they are easiest to treat. [facility] follows the current, evidence-based guidelines published by the [facility] ([facility]) when recommending preventive services for our patients. Because we follow these guidelines, sometimes recommendations change over time as research supports it. (For example, a prostate screening blood test is no longer routinely recommended for men with no symptoms). Of course, you and your doctor may decide to screen more often for some diseases, based on your risk and co-morbidities (chronic disease you are already diagnosed with). Preventive services for you include:  - Medicare offers their members a free annual wellness visit, which is time for you and your primary care provider to discuss and plan for your preventive service needs. Take advantage of this benefit every year!  -All adults over age 72 should receive the recommended pneumonia vaccines. Current USPSTF guidelines recommend a series of two vaccines for the best pneumonia protection.   -All adults should have a flu vaccine yearly and tetanus vaccine every 10 years.  -All adults age 48 and older should receive the shingles vaccines (series of two vaccines).        -All adults age 38-68 who are overweight should have a diabetes screening test once every three years.   -Other screening tests & preventive services for persons with diabetes include: an eye exam to screen for diabetic retinopathy, a kidney function test, a foot exam, and stricter control over your cholesterol.   -Cardiovascular screening for adults with routine risk involves an electrocardiogram (ECG) at intervals determined by the provider.   -Colorectal cancer screening should be done for adults age 54-65 with no increased risk factors for colorectal cancer. There are a number of acceptable methods of screening for this type of cancer. Each test has its own benefits and drawbacks. Discuss with your provider what is most appropriate for you during your annual wellness visit. The different tests include: colonoscopy (considered the best screening method), a fecal occult blood test, a fecal DNA test, and sigmoidoscopy.  -All adults born between [facility] should be screened once for Hepatitis C.  -An Abdominal Aortic Aneurysm (AAA) Screening is recommended for men age 73-68 who has ever smoked in their lifetime.      Here is a list of your current Health Maintenance items (your personalized list of preventive services) with a due date:  Health Maintenance Due   Topic Date Due    Hepatitis C Test  Never done    Eye Exam  Never done    Smoker or Former Smoker - Annual Lung Cancer Screen  Never done    Albumin Urine Test  09/10/2021    Cholesterol Test   09/10/2021

## 2021-09-14 NOTE — PROGRESS NOTES
Identified pt with two pt identifiers. Reviewed record in preparation for visit and have obtained necessary documentation. All patient medications has been reviewed. Chief Complaint   Patient presents with   [employer] Annual Wellness Visit     Additional information about chief complaint:    Visit Vitals  /65 (BP 1 Location: Left upper arm, BP Patient Position: Sitting, BP Cuff Size: Large adult)   Pulse (!) 56   Temp 98.6 °F (37 °C) (Oral)   Resp 16   Ht 5' 9\" (1.753 m)   Wt 208 lb (94.3 kg)   SpO2 97%   BMI 30.72 kg/m²       Health Maintenance Due   Topic    Hepatitis C Screening     Eye Exam Retinal or Dilated     Low dose CT lung screening     Foot Exam Q1     MICROALBUMIN Q1     Lipid Screen        1. Have you been to the ER, urgent care clinic since your last visit? Hospitalized since your last visit? Yes 8/20/21 hip pain    Another ER for ear infection ? Around 7/30/21      2. Have you seen or consulted any other health care providers outside of the [address] [place] [address] since your last visit? Include any pap smears or colon screening.   No   bdg

## 2021-09-14 NOTE — PROGRESS NOTES
This is the Subsequent Medicare Annual Wellness Exam, performed 12 months or more after the Initial AWV or the last Subsequent AWV    I have reviewed the patient's medical history in detail and updated the computerized patient record. Assessment/Plan   Education and counseling provided:  Are appropriate based on today's review and evaluation  End-of-Life planning (with patient's consent)  Influenza Vaccine    1. Medicare annual wellness visit, subsequent  2. Type 2 diabetes mellitus with stage 3b chronic kidney disease, without long-term current use of insulin ([facility])  -     AMB POC HEMOGLOBIN A1C  -     MICROALBUMIN, UR, RAND W/ MICROALB/CREAT RATIO; Future  -      DIABETES FOOT EXAM  3. Screening for depression  -     DEPRESSION SCREEN ANNUAL       Depression Risk Factor Screening     3 most recent PHQ Screens 9/14/2021   Little interest or pleasure in doing things Not at all   Feeling down, depressed, irritable, or hopeless Not at all   Total Score PHQ 2 0       Alcohol Risk Screen    Do you average more than 1 drink per night or more than 7 drinks a week: No    In the past three months have you have had more than 4 drinks containing alcohol on one occasion: No        Functional Ability and Level of Safety    Hearing: Hearing is good. Activities of Daily Living: The home contains: no safety equipment. Patient does total self care      Ambulation: with no difficulty     Fall Risk:  Fall Risk Assessment, last 12 mths 9/14/2021   Able to walk? Yes   Fall in past 12 months? 0   Do you feel unsteady? 0   Are you worried about falling 0   Fall with injury?  -      Abuse Screen:  Patient is not abused       Cognitive Screening    Has your family/caregiver stated any concerns about your memory: no     Cognitive Screening: normal on exam    Health Maintenance Due     Health Maintenance Due   Topic Date Due    Hepatitis C Screening  Never done    Eye Exam Retinal or Dilated  Never done    Low dose CT lung screening  Never done    MICROALBUMIN Q1  09/10/2021    Lipid Screen  09/10/2021       Patient Care Team   Patient Care Team:  Malgorzata Díaz MD as PCP - General (Internal Medicine)  Malgorzata Díaz MD as PCP - [facility]  Abhay Corley MD as Physician (Cardiology)  Asaf Durant MD (Cardiology)  Meagan Acevedo MD (Urology)  Alina Mcintyre MD (Nephrology)  Gus Barbour DO (Ophthalmology)  Stephen Pacheco MD (Otolaryngology)    History     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    S/P cardiac cath Z98.890    NICM (nonischemic cardiomyopathy) ([address] I42.8    Hypertension I10    Mixed hyperlipidemia E78.2    Smokeless tobacco use Z72.0    Atrial flutter ([address] I48.92    Type 2 diabetes mellitus with stage 3 chronic kidney disease, without long-term current use of insulin ([address] E11.22, N18.30    Prostate cancer ([address] C61    COPD (chronic obstructive pulmonary disease) ([address] J44.9    Chronic low back pain M54.5, G89.29    Obesity (BMI 30-39. 9) E66.9    Candidal intertrigo B37.2    Anemia of chronic disease D63.8    Pericardial effusion I31.3    PAF (paroxysmal atrial fibrillation) ([facility]) I48.0    Osteopenia of multiple sites M85.89    Orthostatic hypotension I95.1    Primary osteoarthritis of left hip M16.12     Past Medical History:   Diagnosis Date    Acute respiratory failure with hypoxia ([address] 02/08/2014    also 11/28/15, 2/17/16, 5/14/17     Alcohol abuse     As of 11/29/18 pt stated he quit 20 years    Arthritis     Back and shoulder    Atrial flutter ([address] 08/2017    saw Dr. Tana Jones; underwent a-flutter ablation    BPH (benign prostatic hyperplasia)     CHF (congestive heart failure) ([address] 02/11/2014    TTE 11/15: EF 40-45%, 2/14: EF 20%  Admitted for acute exacerbations 2/8/14, 11/28/15, 2/17/16, and 5/4/17)    Chronic kidney disease     III    Chronic low back pain LS spine X-ray 4/8/17: mild DDD    Combined forms of age-related cataract of left eye 12/12/2018    KPE/IOL OS    Combined forms of age-related cataract of right eye 11/28/2018    KPE/IOL OD    COPD (chronic obstructive pulmonary disease) ([facility]     PA (dyspnea on exertion)     ED (erectile dysfunction)     Elevated troponin 02/11/2014    also 11/28/15; due to cardiac strain    Frequent hospital admissions     5/14-5/23/17: acute hypoxic resp failure due to CHF exac, ROBINSON on CKD 3, sepsis due to LE cellulitis, 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  2/17-2/22/16: acute hypoxic resp failure, acute diarrhea  11/28-11/30/15: acute hypoxic resp failure due to acute CHF exac, elevated troponin due to cardiac strain  2/8-2/12/14: acuter hypoxic resp failure due to CHF exac, pneumonia     Hand blister 10/8/2017    Bilateral    Hypertension     Kidney disease, chronic, stage III (GFR 30-59 ml/min) ([facility])     Leg fracture, right 1973    Nonischemic cardiomyopathy ([facility]     with LVH    Pneumonia 02/08/2014 2/8/14 and 10/30/15    Poor historian     As of 11/29/18 pt reports 5th grade education, pt unable to give med list-obtained from wife per patient's permission    Prediabetes     Prostate cancer ([facility] 2016    As of 11/29/18, pt states he gets two injections twice a year for this    Pulmonary edema 11/28/2015    S/P cardiac cath 2/12/2014 2/12/14 no significant coronary disease, severe LV dysfunction    Tinea cruris     also genital folliculitis    Vertigo       Past Surgical History:   Procedure Laterality Date    COLONOSCOPY N/A 3/2/2020    COLONOSCOPY performed by Crow Chirinos MD at hospitals ENDOSCOPY. 2 tubular adenomas, diverticulosis, int hemorrhoids, repeat in 5 yrs    COLONOSCOPY,DIAGNOSTIC  02/22/2016    Dr. Linda Goldstein; single sessile polyp at descending colon, sigmoid diverticulosis, int hemorrhoids    COLONOSCOPY,REMV LESN,SNARE  2/22/2016         COLONOSCOPY,REMV Danny Alley  3/2/2020  HX CATARACT REMOVAL Right 12/05/2018    Dr. Baron Hawk Left 12/12/2018    Dr. Maynor Aguero. LEFT EYE SECOND REFRATIVE CATARACT REMOVAL WITH LENS IMPLANTATION    HX ORTHOPAEDIC Right 1980's    index finger    HX OTHER SURGICAL  08/22/2017    Dr. Christian Wilkes; atrial flutter ablation    HX ROTATOR CUFF REPAIR Right 2000    HX TONSILLECTOMY  1948    DE CARDIAC SURG PROCEDURE UNLIST  08/22/2017    SVT ablation     Current Outpatient Medications   Medication Sig Dispense Refill    furosemide (LASIX) 20 mg tablet Take 1 tablet by mouth once daily 30 Tablet 0    atorvastatin (LIPITOR) 40 mg tablet Take 1 tablet by mouth nightly 90 Tablet 0    Xarelto 20 mg tab tablet Take 1 tablet by mouth once daily 90 Tablet 3    methocarbamoL (ROBAXIN) 750 mg tablet Take 1 Tablet by mouth three (3) times daily. 20 Tablet 0    fluticasone propionate (FLONASE) 50 mcg/actuation nasal spray 2 Sprays by Both Nostrils route daily. 1 Bottle 0    carvediloL (COREG) 12.5 mg tablet TAKE 1 TABLET BY MOUTH TWICE DAILY WITH MEALS 60 Tablet 0    lisinopriL (PRINIVIL, ZESTRIL) 2.5 mg tablet Take 1 tablet by mouth daily 90 Tablet 1    triamcinolone acetonide (KENALOG) 0.025 % ointment Apply  to affected area two (2) times daily as needed for Itching. Use thin layer 80 g 1    cetirizine (ZYRTEC) 10 mg tablet Take 1 Tab by mouth two (2) times daily as needed for Itching. 60 Tab 0    Nubeqa 300 mg tablet Take 600 mg by mouth two (2) times daily (with meals).  cholecalciferol (Vitamin D3) (1000 Units /25 mcg) tablet Take  by mouth daily.  calcium-cholecalciferol, d3, 600-125 mg-unit tab Take  by mouth.  docusate sodium (STOOL SOFTENER PO) Take  by mouth.  aspirin 81 mg chewable tablet Take 1 Tab by mouth daily.  10 Tab 0     Allergies   Allergen Reactions    Oxycodone Contact Dermatitis       Family History   Adopted: Yes   Family history unknown: Yes     Social History     Tobacco Use    Smoking status: Former Smoker     Packs/day: 2.00     Years: 20.00     Pack years: 40.00     Types: Cigarettes, Cigars     Quit date: 2019     Years since quittin.1    Smokeless tobacco: Former User     Types: Chew   Substance Use Topics    Alcohol use: Not Currently     Alcohol/week: 0.0 standard drinks     Comment: As of 20, pt quit 20 years ago         Constanza Lucio MD

## 2021-09-14 NOTE — PROGRESS NOTES
CC:   Chief Complaint   Patient presents with   [employer] Annual Wellness Visit       HISTORY OF PRESENT ILLNESS  Jose Enrique Ladd is a 80 y.o. male. Presents for Medicare AWV and 4 month follow up evaluation. He has HTN, diet-controlled type 2 DM, CKD stage 3, hyperlipidemia, CHF (combined diastolic and systolic), non-ischemic cardiomyopathy, atrial flutter s/p AFL ablation on 8/22/17 and DCCV in 11/2020 , non-obstructive atherosclerotic heart disease, COPD, chronic low back pain, and prostate cancer on Nubeqa. Complains of left hip pain that radiates down left leg to foot. Was seen at [facility] ED on 8/20/21 for the same. L hip XR: mild left hip OA. Diagnosed with sciatica and discharged on prednisone and methocarbamol. He reports these helped while he was taking them then pain returned. Was told to follow up with a bone doctor but when he called was told he owed $63 before he could be seen. He has diet-controlled type 2 DM.  Denies polyuria, polydipsia, hypoglycemia, or numbness, tingling, burning at feet.  Home glucose monitoring: is not performed.  Diabetic diet compliance: compliant most of the time.  Lab review: A1c is 5.5% today (9/14/21) and on 5/4/21, 6.0% on 1/5/21, and 6.4% on 8/26/20.  Eye exam: overdue.      Has occasional dizziness when he turns his head. Denies HA's, CP, SOB, heart palpitations, or leg swelling. ROS  A complete review of systems was performed and is negative except for those mentioned in the HPI.     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    S/P cardiac cath Z98.890    NICM (nonischemic cardiomyopathy) ([facility] I42.8    Hypertension I10    Mixed hyperlipidemia E78.2    Smokeless tobacco use Z72.0    Atrial flutter (HCC) I48.92    Type 2 diabetes mellitus with stage 3 chronic kidney disease, without long-term current use of insulin (HCC) E11.22, N18.30    Prostate cancer ([facility] C61    COPD (chronic obstructive pulmonary disease) ([facility]) J44.9    Chronic low back pain M54.5, G89.29    Obesity (BMI 30-39. 9) E66.9    Candidal intertrigo B37.2    Anemia of chronic disease D63.8    Pericardial effusion I31.3    PAF (paroxysmal atrial fibrillation) ([facility]) I48.0    Osteopenia of multiple sites M85.89    Orthostatic hypotension I95.1    Primary osteoarthritis of left hip M16.12     Past Medical History:   Diagnosis Date    Acute respiratory failure with hypoxia ([facility] 02/08/2014    also 11/28/15, 2/17/16, 5/14/17     Alcohol abuse     As of 11/29/18 pt stated he quit 20 years    Arthritis     Back and shoulder    Atrial flutter ([address] 08/2017    saw Dr. Crow Callahan; underwent a-flutter ablation    BPH (benign prostatic hyperplasia)     CHF (congestive heart failure) ([facility] 02/11/2014    TTE 11/15: EF 40-45%, 2/14: EF 20%  Admitted for acute exacerbations 2/8/14, 11/28/15, 2/17/16, and 5/4/17)    Chronic kidney disease     III    Chronic low back pain     LS spine X-ray 4/8/17: mild DDD    Combined forms of age-related cataract of left eye 12/12/2018    KPE/IOL OS    Combined forms of age-related cataract of right eye 11/28/2018    KPE/IOL OD    COPD (chronic obstructive pulmonary disease) ([facility]     PA (dyspnea on exertion)     ED (erectile dysfunction)     Elevated troponin 02/11/2014    also 11/28/15; due to cardiac strain    Frequent hospital admissions     5/14-5/23/17: acute hypoxic resp failure due to CHF exac, ROBINSON on CKD 3, sepsis due to LE cellulitis, 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  2/17-2/22/16: acute hypoxic resp failure, acute diarrhea  11/28-11/30/15: acute hypoxic resp failure due to acute CHF exac, elevated troponin due to cardiac strain  2/8-2/12/14: acuter hypoxic resp failure due to CHF exac, pneumonia     Hand blister 10/8/2017    Bilateral    Hypertension     Kidney disease, chronic, stage III (GFR 30-59 ml/min) ([facility])     Leg fracture, right 1973    Nonischemic cardiomyopathy ([facility]     with LVH    Pneumonia 02/08/2014 2/8/14 and 10/30/15    Poor historian     As of 11/29/18 pt reports 5th grade education, pt unable to give med list-obtained from wife per patient's permission    Prediabetes     Prostate cancer ([facility] 2016    As of 11/29/18, pt states he gets two injections twice a year for this    Pulmonary edema 11/28/2015    S/P cardiac cath 2/12/2014 2/12/14 no significant coronary disease, severe LV dysfunction    Tinea cruris     also genital folliculitis    Vertigo      Allergies   Allergen Reactions    Oxycodone Contact Dermatitis       Current Outpatient Medications   Medication Sig Dispense Refill    furosemide (LASIX) 20 mg tablet Take 1 tablet by mouth once daily 30 Tablet 0    atorvastatin (LIPITOR) 40 mg tablet Take 1 tablet by mouth nightly 90 Tablet 0    Xarelto 20 mg tab tablet Take 1 tablet by mouth once daily 90 Tablet 3    fluticasone propionate (FLONASE) 50 mcg/actuation nasal spray 2 Sprays by Both Nostrils route daily. 1 Bottle 0    carvediloL (COREG) 12.5 mg tablet TAKE 1 TABLET BY MOUTH TWICE DAILY WITH MEALS 60 Tablet 0    lisinopriL (PRINIVIL, ZESTRIL) 2.5 mg tablet Take 1 tablet by mouth daily 90 Tablet 1    triamcinolone acetonide (KENALOG) 0.025 % ointment Apply  to affected area two (2) times daily as needed for Itching. Use thin layer 80 g 1    cetirizine (ZYRTEC) 10 mg tablet Take 1 Tab by mouth two (2) times daily as needed for Itching. 60 Tab 0    Nubeqa 300 mg tablet Take 600 mg by mouth two (2) times daily (with meals).  cholecalciferol (Vitamin D3) (1000 Units /25 mcg) tablet Take  by mouth daily.  calcium-cholecalciferol, d3, 600-125 mg-unit tab Take  by mouth.  docusate sodium (STOOL SOFTENER PO) Take  by mouth.  aspirin 81 mg chewable tablet Take 1 Tab by mouth daily.  10 Tab 0         PHYSICAL EXAM  Visit Vitals  /65 (BP 1 Location: Left upper arm, BP Patient Position: Sitting, BP Cuff Size: Large adult)   Pulse (!) 56   Temp 98.6 °F (37 °C) (Oral)   Resp 16   Ht 5' 9\" (1.753 m)   Wt 208 lb (94.3 kg)   SpO2 97%   BMI 30.72 kg/m²       General: Obese, no distress. HEENT:  Head normocephalic/atraumatic, no scleral icterus  Lungs:  Clear to ausculation bilaterally. Good air movement. Heart:  Bradycardic, regular rhythm, normal S1 and S2, no murmur, gallop, or rub  Extremities: No clubbing, cyanosis, or edema. Neurological: Alert and oriented. Psychiatric: Normal mood and affect. Behavior is normal.   Diabetic foot exam:     Left Foot:   Visual Exam: normal    Pulse DP: 2+ (normal)   Filament test: normal sensation    Vibratory sensation: absent      Right Foot:   Visual Exam: normal    Pulse DP: 2+ (normal)   Filament test: normal sensation    Vibratory sensation: absent      Results for orders placed or performed in visit on 09/14/21   [facility] POC HEMOGLOBIN A1C   Result Value Ref Range    Hemoglobin A1c (POC) 5.5 %         ASSESSMENT AND PLAN    ICD-10-CM ICD-9-CM    1. Medicare annual wellness visit, subsequent  Z00.00 V70.0    2. Type 2 diabetes mellitus with stage 3b chronic kidney disease, without long-term current use of insulin ([facility])  E11.22 250.40 AMB POC HEMOGLOBIN A1C    N18.32 585.3 MICROALBUMIN, UR, RAND W/ MICROALB/CREAT RATIO       DIABETES FOOT EXAM      MICROALBUMIN, UR, RAND W/ MICROALB/CREAT RATIO      REFERRAL TO OPHTHALMOLOGY   3. Typical atrial flutter (HCC)  I48.3 427.32    4. Essential hypertension  I10 401.9    5. Mixed hyperlipidemia  E78.2 272.2 LIPID PANEL      LIPID PANEL      CANCELED: LIPID PANEL   6. Chronic combined systolic and diastolic congestive heart failure (HCC)  I50.42 428.42      428.0    7. Left hip pain  M25.552 719.45 methocarbamoL (ROBAXIN) 750 mg tablet      REFERRAL TO ORTHOPEDICS   8. Sciatica of left side  M54.32 724.3 REFERRAL TO ORTHOPEDICS   9. Screening for depression  Z13.31 V79.0 DEPRESSION SCREEN ANNUAL   10.  Advance care planning  Z71.89 V65.49    11. Need for hepatitis C screening test  Z11.59 V73.89 HEPATITIS C AB      HEPATITIS C AB      CANCELED: HEPATITIS C AB     Diagnoses and all orders for this visit:    1. Medicare annual wellness visit, subsequent    2. Type 2 diabetes mellitus with stage 3b chronic kidney disease, without long-term current use of insulin (HCC)  A1c 5.5%. Well-controlled by diet alone. -     AMB POC HEMOGLOBIN A1C  -     MICROALBUMIN, UR, RAND W/ MICROALB/CREAT RATIO; Future  -      DIABETES FOOT EXAM  -     REFERRAL TO OPHTHALMOLOGY (Dr. Silvio Hayward)    3. Typical atrial flutter (HCC)  In NSR. Continue carvedilol and Xarelto. 4. Essential hypertension  Controlled. Continue present management. 5. Mixed hyperlipidemia  LDL 28 on 9/10/20. Will recheck.  -     LIPID PANEL; Future    6. Chronic combined systolic and diastolic congestive heart failure (HCC)  Improved. Last echo 5/6/21: EF 90-01% with nl diastolic function. 7. Left hip pain  Due to lumbar spondylosis and mild OA. Tylenol as needed for pain. Cannot take NSAID's due to being on Xarelto and having CKD stage 3.  -     Refill methocarbamoL (ROBAXIN) 750 mg tablet; Take 1 Tablet by mouth three (3) times daily for 10 days.  -     REFERRAL TO ORTHOPEDICS    8. Sciatica of left side  -     REFERRAL TO ORTHOPEDICS (Dr. Ronni Matamoros)    9. Screening for depression  -     Yves Agarwal    10. Advance care planning    11. Need for hepatitis C screening test  -     HEPATITIS C AB; Future    Time Spent: 45 mins reviewing records, history, exam, discussing problems and plan, and documentation    Follow-up and Dispositions    · Return in about 4 months (around 1/14/2022), or if symptoms worsen or fail to improve, for HTN, chol, diet-controlled DM; have fasting labs at [facility] in next 1-2 weeks. I have discussed the diagnosis with the patient and the intended plan as seen in the above orders. Patient is in agreement.   The patient has received an after-visit summary and questions were answered concerning future plans. I have discussed medication side effects and warnings with the patient as well.

## 2021-09-14 NOTE — ACP (ADVANCE CARE PLANNING)
Advance Care Planning     Advance Care Planning (ACP) Physician/NP/PA Conversation      Date of Conversation: 9/14/2021  Conducted with: Patient with Decision Making Capacity    Healthcare Decision Maker:     Primary Decision Maker: Harriet Syed Spouse - [phone]    Secondary Decision Maker: Pa Brown - Child - [phone]  Click here to complete [address] including selection of the Healthcare Decision Maker Relationship (ie \"Primary\")    Care Preferences:    Hospitalization: \"If your health worsens and it becomes clear that your chance of recovery is unlikely, what would be your preference regarding hospitalization? \"  The patient would prefer hospitalization. Ventilation: \"If you were unable to breathe on your own and your chance of recovery was unlikely, what would be your preference about the use of a ventilator (breathing machine) if it was available to you? \"   The patient would desire the use of a ventilator. Resuscitation: \"In the event your heart stopped as a result of an underlying serious health condition, would you want attempts to be made to restart your heart, or would you prefer a natural death? \"   Yes, attempt to resuscitate.     Additional topics discussed: treatment goals    Conversation Outcomes / Follow-Up Plan:   ACP in process - information provided, considering goals and options  Reviewed DNR/DNI and patient elects Full Code (Attempt Resuscitation)     Length of Voluntary ACP Conversation in minutes:  <16 minutes (Non-Billable)    Alexander Romberg, MD

## 2021-09-15 LAB
ALBUMIN/CREAT UR: <3 MG/G CREAT (ref 0–29)
CREAT UR-MCNC: 106.5 MG/DL
MICROALBUMIN UR-MCNC: <3 UG/ML
SPECIMEN STATUS REPORT, ROLRST: NORMAL

## 2021-10-13 RX ORDER — FUROSEMIDE 20 MG/1
TABLET ORAL
Qty: 30 TABLET | Refills: 0 | Status: SHIPPED | OUTPATIENT
Start: 2021-10-13 | End: 2021-11-10

## 2021-10-27 ENCOUNTER — HOSPITAL ENCOUNTER (EMERGENCY)
Age: 81
Discharge: HOME | End: 2021-10-27
Attending: EMERGENCY MEDICINE
Payer: MEDICARE

## 2021-10-27 ENCOUNTER — APPOINTMENT (OUTPATIENT)
Dept: GENERAL RADIOLOGY | Age: 81
End: 2021-10-27
Attending: PHYSICIAN ASSISTANT
Payer: MEDICARE

## 2021-10-27 VITALS
TEMPERATURE: 98.3 F | HEART RATE: 62 BPM | SYSTOLIC BLOOD PRESSURE: 137 MMHG | DIASTOLIC BLOOD PRESSURE: 83 MMHG | WEIGHT: 205.69 LBS | BODY MASS INDEX: 30.47 KG/M2 | RESPIRATION RATE: 14 BRPM | HEIGHT: 69 IN | OXYGEN SATURATION: 100 %

## 2021-10-27 DIAGNOSIS — T14.8XXA MUSCULOSKELETAL STRAIN: Primary | ICD-10-CM

## 2021-10-27 PROCEDURE — 72100 X-RAY EXAM L-S SPINE 2/3 VWS: CPT

## 2021-10-27 PROCEDURE — 99281 EMR DPT VST MAYX REQ PHY/QHP: CPT

## 2021-10-27 PROCEDURE — 73502 X-RAY EXAM HIP UNI 2-3 VIEWS: CPT

## 2021-10-27 RX ORDER — HYDROCODONE BITARTRATE AND ACETAMINOPHEN 5; 325 MG/1; MG/1
1 TABLET ORAL
Qty: 10 TABLET | Refills: 0 | Status: SHIPPED | OUTPATIENT
Start: 2021-10-27 | End: 2021-10-30

## 2021-10-27 NOTE — ED PROVIDER NOTES
EMERGENCY DEPARTMENT HISTORY AND PHYSICAL EXAM      Date: 10/27/2021  Patient Name: Debbie Quiros  Patient Age and Sex: 80 y.o. male     History of Presenting Illness     Chief Complaint   Patient presents with    Leg Pain     Pt reports pain from her L hip radiating down his leg. Reports he was going down the steps and took an odd step. Reports it did not cause him to fall but he has had pain there since       History Provided By: Patient    HPI: Debbie Quiros is a 53-year-old history of chronic low back pain presenting with left hip pain. Yesterday a.m. he lost his footing stepped off of a curb on his left side. Did not fall to the ground reports he likely stretched his left leg. He has been having pain radiating from his buttock down to his knee since that time. Not relieved with Tylenol and IcyHot. He reports it is only present when he ambulates, moderate in severity. No numbness tingling in groin or leg. There are no other complaints, changes, or physical findings at this time. PCP: Monico Goldmann, MD    No current facility-administered medications on file prior to encounter. Current Outpatient Medications on File Prior to Encounter   Medication Sig Dispense Refill    furosemide (LASIX) 20 mg tablet Take 1 tablet by mouth once daily 30 Tablet 0    atorvastatin (LIPITOR) 40 mg tablet Take 1 tablet by mouth nightly 90 Tablet 0    Xarelto 20 mg tab tablet Take 1 tablet by mouth once daily 90 Tablet 3    fluticasone propionate (FLONASE) 50 mcg/actuation nasal spray 2 Sprays by Both Nostrils route daily. 1 Bottle 0    carvediloL (COREG) 12.5 mg tablet TAKE 1 TABLET BY MOUTH TWICE DAILY WITH MEALS 60 Tablet 0    lisinopriL (PRINIVIL, ZESTRIL) 2.5 mg tablet Take 1 tablet by mouth daily 90 Tablet 1    triamcinolone acetonide (KENALOG) 0.025 % ointment Apply  to affected area two (2) times daily as needed for Itching.  Use thin layer 80 g 1    cetirizine (ZYRTEC) 10 mg tablet Take 1 Tab by mouth two (2) times daily as needed for Itching. 60 Tab 0    Nubeqa 300 mg tablet Take 600 mg by mouth two (2) times daily (with meals).  cholecalciferol (Vitamin D3) (1000 Units /25 mcg) tablet Take  by mouth daily.  calcium-cholecalciferol, d3, 600-125 mg-unit tab Take  by mouth.  docusate sodium (STOOL SOFTENER PO) Take  by mouth.  aspirin 81 mg chewable tablet Take 1 Tab by mouth daily.  10 Tab 0       Past History     Past Medical History:  Past Medical History:   Diagnosis Date    Acute respiratory failure with hypoxia ([facility] [address] 02/08/2014    also 11/28/15, 2/17/16, 5/14/17     Alcohol abuse     As of 11/29/18 pt stated he quit 20 years    Arthritis     Back and shoulder    Atrial flutter ([address] 08/2017    saw Dr. Elicia Rubio; underwent a-flutter ablation    BPH (benign prostatic hyperplasia)     CHF (congestive heart failure) ([facility] [address] 02/11/2014    TTE 11/15: EF 40-45%, 2/14: EF 20%  Admitted for acute exacerbations 2/8/14, 11/28/15, 2/17/16, and 5/4/17)    Chronic kidney disease     III    Chronic low back pain     LS spine X-ray 4/8/17: mild DDD    Combined forms of age-related cataract of left eye 12/12/2018    KPE/IOL OS    Combined forms of age-related cataract of right eye 11/28/2018    KPE/IOL OD    COPD (chronic obstructive pulmonary disease) ([facility] [address]     PA (dyspnea on exertion)     ED (erectile dysfunction)     Elevated troponin 02/11/2014    also 11/28/15; due to cardiac strain    Frequent hospital admissions     5/14-5/23/17: acute hypoxic resp failure due to CHF exac, ROBINSON on CKD 3, sepsis due to LE cellulitis, 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  2/17-2/22/16: acute hypoxic resp failure, acute diarrhea  11/28-11/30/15: acute hypoxic resp failure due to acute CHF exac, elevated troponin due to cardiac strain  2/8-2/12/14: acuter hypoxic resp failure due to CHF exac, pneumonia     Hand blister 10/8/2017    Bilateral    Hypertension     Kidney disease, chronic, stage III (GFR 30-59 ml/min) (HCC)     Leg fracture, right     Nonischemic cardiomyopathy ([facility] [address]     with LVH    Orthostatic hypotension 2021    Pericardial effusion 11/10/2020    Pneumonia 2014 and 10/30/15    Poor historian     As of 18 pt reports 5th grade education, pt unable to give med list-obtained from wife per patient's permission    Prediabetes     Prostate cancer ([facility] [address] 2016    As of 18, pt states he gets two injections twice a year for this    Pulmonary edema 2015    S/P cardiac cath 2014 no significant coronary disease, severe LV dysfunction    Tinea cruris     also genital folliculitis    Vertigo        Past Surgical History:  Past Surgical History:   Procedure Laterality Date    COLONOSCOPY N/A 3/2/2020    COLONOSCOPY performed by Leslie Porter MD at [facility]. 2 tubular adenomas, diverticulosis, int hemorrhoids, repeat in 5 yrs    COLONOSCOPY,DIAGNOSTIC  2016    Dr. Sean Simon; single sessile polyp at descending colon, sigmoid diverticulosis, int hemorrhoids    COLONOSCOPY,REMV LESN,SNARE  2016         COLONOSCOPY,REMV LESN,SNARE  3/2/2020         HX CATARACT REMOVAL Right 2018    Dr. Melba Brewster Left 2018    Dr. Froylan Deshpande.  LEFT EYE SECOND REFRATIVE CATARACT REMOVAL WITH LENS IMPLANTATION    HX ORTHOPAEDIC Right 's    index finger    HX OTHER SURGICAL  2017    Dr. Rupali Mansfield; atrial flutter ablation    HX ROTATOR CUFF REPAIR Right     HX TONSILLECTOMY  194    SD CARDIAC SURG PROCEDURE UNLIST  2017    SVT ablation       Family History:  Family History   Adopted: Yes   Family history unknown: Yes       Social History:  Social History     Tobacco Use    Smoking status: Former Smoker     Packs/day: 2.00     Years: 20.00     Pack years: 40.00     Types: Cigarettes, Cigars     Quit date: 2019     Years since quittin.2    Smokeless tobacco: Former User     Types: [address] [place] [address] Vaping Use    Vaping Use: Never used   Substance Use Topics    Alcohol use: Not Currently     Alcohol/week: 0.0 standard drinks     Comment: As of 11/29/18, pt quit 20 years ago    Drug use: Yes     Types: Marijuana     Comment: not recently       Allergies: Allergies   Allergen Reactions    Oxycodone Contact Dermatitis         Review of Systems   Review of Systems   Musculoskeletal: Positive for arthralgias. All other systems reviewed and are negative. Physical Exam   Physical Exam   General: Alert, no acute distress  Skin: Warm, dry  Head: Normocephalic, atraumatic  Eye: EOMI, normal conjunctiva  Ears, Nose, Mouth and Throat: Oral mucosa moist, no pharyngeal erythema or exudate  Cardiovascular: No murmur, normal peripheral perfusion, regular rhythm  Pulmonary: Normal respiratory effort, normal breath sounds  Gastrointestinal: Soft, nontender, nondistended  Musculoskeletal: No swelling, no deformity. No pain with passive flexion extension internal or external rotation of left hip, no bony tenderness palpation. Neurological: Alert and oriented to person, place, situation. Normal speech observed. Moving all extremities symmetrically. Antalgic gait. Psychiatric: Cooperative, appropriate affect     Diagnostic Study Results     Labs   No results found for this or any previous visit (from the past 12 hour(s)). Radiologic Studies -   XR SPINE LUMB 2 OR 3 V   Final Result   No acute process. Multilevel lumbosacral degenerative disc disease. XR HIP LT W OR WO PELV 2-3 VWS   Final Result   Bilateral hip osteoarthritis and postoperative changes without acute   abnormality. CT Results  (Last 48 hours)    None        CXR Results  (Last 48 hours)    None            Medical Decision Making   I am the first provider for this patient. I reviewed the vital signs, available nursing notes, past medical history, past surgical history, family history and social history.     Vital Signs-Reviewed the patient's vital signs. Patient Vitals for the past 12 hrs:   Temp Pulse Resp BP SpO2   10/27/21 0927 98.3 °F (36.8 °C) 62 14 137/83 100 %       Records Reviewed: Nursing Notes and Old Medical Records    Provider Notes (Medical Decision Making):   Differential includes hip fracture,  lumbosacral fracture, musculoskeletal strain    Obtain lumbar x-rays, hip x-rays to evaluate for the above pathologies. Suspect patient will be able to discharged if negative imaging. ED Course:   Initial assessment performed. The patients presenting problems have been discussed, and they are in agreement with the care plan formulated and outlined with them. I have encouraged them to ask questions as they arise throughout their visit. ED Course as of Oct 27 1028   Wed Oct 27, 2021   1011 X-rays of L-spine and hip demonstrate osteoarthritis, postoperative changes with no acute abnormality    [WB]      ED Course User Index  [WB] Neha Gillis MD       Disposition:  Discharge Note:  The patient has been re-evaluated and is ready for discharge. Reviewed available results with patient. Counseled patient on diagnosis and care plan. Patient has expressed understanding, and all questions have been answered. Patient agrees with plan and agrees to follow up as recommended, or to return to the ED if their symptoms worsen. Discharge instructions have been provided and explained to the patient, along with reasons to return to the ED. PLAN:  Current Discharge Medication List      START taking these medications    Details   HYDROcodone-acetaminophen (Norco) 5-325 mg per tablet Take 1 Tablet by mouth every six (6) hours as needed for Pain for up to 3 days. Max Daily Amount: 4 Tablets.   Qty: 10 Tablet, Refills: 0  Start date: 10/27/2021, End date: 10/30/2021    Associated Diagnoses: Musculoskeletal strain         1.   2.   Follow-up Information     Follow up With Specialties Details Why Terrilee Aase, MD Internal Medicine Call  As needed [address] [place] [address]  [phone]          3. Return to ED if worse     Diagnosis     Clinical Impression:   1. Musculoskeletal strain        Attestations:    Ana Paula Kramer M.D. Please note that this dictation was completed with [employer], the computer voice recognition software. Quite often unanticipated grammatical, syntax, homophones, and other interpretive errors are inadvertently transcribed by the computer software. Please disregard these errors. Please excuse any errors that have escaped final proofreading. Thank you.

## 2021-10-28 ENCOUNTER — PATIENT OUTREACH (OUTPATIENT)
Dept: CASE MANAGEMENT | Age: 81
End: 2021-10-28

## 2021-10-28 NOTE — PROGRESS NOTES
Ambulatory Care Management Note    Date/Time:  10/28/2021 10:26 AM    This patient was received as a referral from [employer]. Ambulatory Care Manager outreached to patient today to offer care management services. Introduction to self and role of care manager provided. Pt reports his lt hip is still bothering him and he did  his Rx from the ED visit yesterday. However, he would like to see the ortho doc he was referred to by his PCP, but they won't make an appt b/c he has a $63 balance. Pt sts he would gladly pay the balance when he comes in for his appt. AC called [facility] to assist pt w/scheduling an appt but was told the same thing. [facility]  informed pt and ACM that we need to speak w/billing. Call was transferred to billing dept and a VM was left for pt on his behalf. The phone recording said to give them 48hrs to c/b. ACM informed pt that if he doesn't hear from them by Monday, to give this ACM a c/b Tues morning to expedite the process. Pt agreed.   In the meantime, patient has Ambulatory Care Manager's contact number for any questions or concerns.  Hardin Mohs

## 2021-11-01 ENCOUNTER — PATIENT OUTREACH (OUTPATIENT)
Dept: CASE MANAGEMENT | Age: 81
End: 2021-11-01

## 2021-11-01 NOTE — PROGRESS NOTES
Pt called to notify Ambulatory Care Manager [facility]) that he has made an appt elsewhere for his back pain (he saw his eye dr today and she referred him). AC outreached to patient today to offer care management services. Introduction to self and care management role provided. Patient did not accept care management services at this time. Therefore, no follow-up call was scheduled.  Patient has [facility]'s contact number for any questions or concerns in the future.  /dla

## 2021-11-04 ENCOUNTER — HOSPITAL ENCOUNTER (OUTPATIENT)
Dept: GENERAL RADIOLOGY | Age: 81
Discharge: HOME | End: 2021-11-04
Attending: PHYSICIAN ASSISTANT
Payer: MEDICARE

## 2021-11-04 ENCOUNTER — TRANSCRIBE ORDER (OUTPATIENT)
Dept: GENERAL RADIOLOGY | Age: 81
End: 2021-11-04

## 2021-11-04 ENCOUNTER — TRANSCRIBE ORDER (OUTPATIENT)
Dept: SCHEDULING | Age: 81
End: 2021-11-04

## 2021-11-04 DIAGNOSIS — M54.16 RADICULOPATHY, LUMBAR REGION: ICD-10-CM

## 2021-11-04 DIAGNOSIS — M54.16 RADICULOPATHY, LUMBAR REGION: Primary | ICD-10-CM

## 2021-11-04 DIAGNOSIS — M54.16 LUMBAR RADICULOPATHY: Primary | ICD-10-CM

## 2021-11-04 PROCEDURE — 72100 X-RAY EXAM L-S SPINE 2/3 VWS: CPT

## 2021-11-10 RX ORDER — FUROSEMIDE 20 MG/1
TABLET ORAL
Qty: 30 TABLET | Refills: 0 | Status: SHIPPED | OUTPATIENT
Start: 2021-11-10 | End: 2021-12-13

## 2021-11-11 ENCOUNTER — HOSPITAL ENCOUNTER (OUTPATIENT)
Dept: CT IMAGING | Age: 81
Discharge: HOME | End: 2021-11-11
Attending: PHYSICIAN ASSISTANT
Payer: MEDICARE

## 2021-11-11 DIAGNOSIS — M54.16 LUMBAR RADICULOPATHY: ICD-10-CM

## 2021-11-11 PROCEDURE — 72131 CT LUMBAR SPINE W/O DYE: CPT

## 2021-11-18 RX ORDER — CARVEDILOL 12.5 MG/1
TABLET ORAL
Qty: 60 TABLET | Refills: 0 | Status: SHIPPED | OUTPATIENT
Start: 2021-11-18 | End: 2021-12-20

## 2021-11-19 ENCOUNTER — TELEPHONE (OUTPATIENT)
Dept: CARDIOLOGY CLINIC | Age: 81
End: 2021-11-19

## 2021-11-19 NOTE — LETTER
11/19/2021 2:36 PM    Mr. Kelly Emeigh  [address] [facility]    To Whom It May Concern:    Mr. Philippe Baeza may discontinue/hold Rivaroxaban (Xarelto) 4 days prior to procedure and resume after procedure. If you have any questions, please call the office at [phone].           Sincerely,      Marily Costello MD

## 2021-11-19 NOTE — TELEPHONE ENCOUNTER
Fax received from [facility] to discontinue Xarelto for 4 days prior to procedure,. Patient is receiving an injection for pain control. Please advise if Patient can or may discontinue blood thinner piror procedure.

## 2021-11-19 NOTE — TELEPHONE ENCOUNTER
Letter composed providing anticoagulation Management for Xarelto prior to injection procedure for pain control per Dr. Zachary Guido. See Letter attached to encounter. Letter Faxed to [facility] at [phone] with confirmation of receipt.

## 2021-12-03 RX ORDER — ATORVASTATIN CALCIUM 40 MG/1
TABLET, FILM COATED ORAL
Qty: 90 TABLET | Refills: 0 | Status: SHIPPED | OUTPATIENT
Start: 2021-12-03 | End: 2022-03-07

## 2021-12-13 RX ORDER — FUROSEMIDE 20 MG/1
TABLET ORAL
Qty: 30 TABLET | Refills: 0 | Status: SHIPPED | OUTPATIENT
Start: 2021-12-13 | End: 2022-01-10

## 2021-12-14 LAB
CHOLEST SERPL-MCNC: 119 MG/DL (ref 100–199)
HCV AB S/CO SERPL IA: <0.1 S/CO RATIO (ref 0–0.9)
HDLC SERPL-MCNC: 75 MG/DL
LDLC SERPL CALC-MCNC: 31 MG/DL (ref 0–99)
TRIGL SERPL-MCNC: 57 MG/DL (ref 0–149)
VLDLC SERPL CALC-MCNC: 13 MG/DL (ref 5–40)

## 2021-12-19 NOTE — PROGRESS NOTES
Letter sent: Your labs showed normal cholesterol and hepatitis C screening test. Stay on atorvastatin, and keep up the good work!

## 2021-12-27 ENCOUNTER — OFFICE VISIT (OUTPATIENT)
Dept: CARDIOLOGY CLINIC | Age: 81
End: 2021-12-27
Payer: MEDICARE

## 2021-12-27 VITALS
BODY MASS INDEX: 31.06 KG/M2 | DIASTOLIC BLOOD PRESSURE: 56 MMHG | SYSTOLIC BLOOD PRESSURE: 108 MMHG | RESPIRATION RATE: 18 BRPM | WEIGHT: 209.7 LBS | OXYGEN SATURATION: 99 % | HEART RATE: 39 BPM | HEIGHT: 69 IN

## 2021-12-27 DIAGNOSIS — I10 ESSENTIAL HYPERTENSION: ICD-10-CM

## 2021-12-27 DIAGNOSIS — I25.10 CORONARY ARTERY DISEASE INVOLVING NATIVE CORONARY ARTERY OF NATIVE HEART WITHOUT ANGINA PECTORIS: Primary | ICD-10-CM

## 2021-12-27 DIAGNOSIS — E78.2 MIXED HYPERLIPIDEMIA: ICD-10-CM

## 2021-12-27 DIAGNOSIS — I48.0 PAF (PAROXYSMAL ATRIAL FIBRILLATION) (HCC): ICD-10-CM

## 2021-12-27 DIAGNOSIS — I42.8 NICM (NONISCHEMIC CARDIOMYOPATHY) (HCC): ICD-10-CM

## 2021-12-27 PROCEDURE — 99214 OFFICE O/P EST MOD 30 MIN: CPT | Performed by: NURSE PRACTITIONER

## 2021-12-27 PROCEDURE — G8752 SYS BP LESS 140: HCPCS | Performed by: NURSE PRACTITIONER

## 2021-12-27 PROCEDURE — 93010 ELECTROCARDIOGRAM REPORT: CPT | Performed by: NURSE PRACTITIONER

## 2021-12-27 PROCEDURE — 1101F PT FALLS ASSESS-DOCD LE1/YR: CPT | Performed by: NURSE PRACTITIONER

## 2021-12-27 PROCEDURE — G8432 DEP SCR NOT DOC, RNG: HCPCS | Performed by: NURSE PRACTITIONER

## 2021-12-27 PROCEDURE — G8417 CALC BMI ABV UP PARAM F/U: HCPCS | Performed by: NURSE PRACTITIONER

## 2021-12-27 PROCEDURE — G8427 DOCREV CUR MEDS BY ELIG CLIN: HCPCS | Performed by: NURSE PRACTITIONER

## 2021-12-27 PROCEDURE — G0463 HOSPITAL OUTPT CLINIC VISIT: HCPCS | Performed by: NURSE PRACTITIONER

## 2021-12-27 PROCEDURE — G8536 NO DOC ELDER MAL SCRN: HCPCS | Performed by: NURSE PRACTITIONER

## 2021-12-27 PROCEDURE — G8754 DIAS BP LESS 90: HCPCS | Performed by: NURSE PRACTITIONER

## 2021-12-27 PROCEDURE — 93005 ELECTROCARDIOGRAM TRACING: CPT | Performed by: NURSE PRACTITIONER

## 2021-12-27 NOTE — PROGRESS NOTES
Olga Edwards, [facility]    Subjective/HPI:     Jaime Swan is a 80 y.o. male is here for routine f/u. He has a PMHx of CAD, NICM, PAF s/p AFL ablation, HTN, and HLD. He has been having some dizziness recently. Has been having frequent ear infections. Denies complaints of chest pains, orthopnea, PND or edema. Denies palpitation symptoms or shortness of breath. Wants to know how long he will need to be on Xarelto. Current Outpatient Medications on File Prior to Visit   Medication Sig Dispense Refill    lisinopriL (PRINIVIL, ZESTRIL) 2.5 mg tablet Take 1 tablet by mouth once daily 90 Tablet 0    carvediloL (COREG) 12.5 mg tablet TAKE 1 TABLET BY MOUTH TWICE DAILY WITH MEALS 180 Tablet 3    furosemide (LASIX) 20 mg tablet Take 1 tablet by mouth once daily 30 Tablet 0    atorvastatin (LIPITOR) 40 mg tablet Take 1 tablet by mouth nightly 90 Tablet 0    Xarelto 20 mg tab tablet Take 1 tablet by mouth once daily 90 Tablet 3    cetirizine (ZYRTEC) 10 mg tablet Take 1 Tab by mouth two (2) times daily as needed for Itching. 60 Tab 0    Nubeqa 300 mg tablet Take 600 mg by mouth two (2) times daily (with meals).  calcium-cholecalciferol, d3, 600-125 mg-unit tab Take  by mouth.  docusate sodium (STOOL SOFTENER PO) Take  by mouth.  aspirin 81 mg chewable tablet Take 1 Tab by mouth daily. 10 Tab 0    [DISCONTINUED] fluticasone propionate (FLONASE) 50 mcg/actuation nasal spray 2 Sprays by Both Nostrils route daily. (Patient not taking: Reported on 12/27/2021) 1 Bottle 0    [DISCONTINUED] triamcinolone acetonide (KENALOG) 0.025 % ointment Apply  to affected area two (2) times daily as needed for Itching. Use thin layer (Patient not taking: Reported on 12/27/2021) 80 g 1    [DISCONTINUED] cholecalciferol (Vitamin D3) (1000 Units /25 mcg) tablet Take  by mouth daily.  (Patient not taking: Reported on 12/27/2021)       No current facility-administered medications on file prior to visit. Review of Symptoms:    Review of Systems   Constitutional: Negative for chills, fever and weight loss. HENT: Negative for nosebleeds. Eyes: Negative for blurred vision and double vision. Respiratory: Negative for cough, shortness of breath and wheezing. Cardiovascular: Negative for chest pain, palpitations, orthopnea, leg swelling and PND. Skin: Negative for rash. Neurological: Negative for dizziness and loss of consciousness. Physical Exam:      General: Well developed, in no acute distress, cooperative and alert  Heart:  reg rate and rhythm; normal S1/S2; no murmurs, no gallops or rubs. Respiratory: Clear bilaterally x 4, no wheezing or rales  Extremities:  Normal cap refill, no cyanosis, atraumatic. No edema. Vascular: 2+ pulses symmetric in all extremities    Vitals:    12/27/21 1507   BP: 110/60   BP 1 Location: Left upper arm   BP Patient Position: Sitting   BP Cuff Size: Large adult   Pulse: (!) 39   Resp: 18   Height: 5' 9\" (1.753 m)   Weight: 209 lb 11.2 oz (95.1 kg)   SpO2: 99%       ECG done today shows sinus bradycardia     Assessment:       ICD-10-CM ICD-9-CM    1. Coronary artery disease involving native coronary artery of native heart without angina pectoris  I25.10 414.01    2. NICM (nonischemic cardiomyopathy) ([facility])  I42.8 425.4    3. PAF (paroxysmal atrial fibrillation) ([facility])  I48.0 427.31 AMB POC EKG ROUTINE W/ 12 LEADS, INTER & REP   4. Essential hypertension  I10 401.9    5. Mixed hyperlipidemia  E78.2 272.2         Plan:     1. Coronary artery disease involving native coronary artery of native heart without angina pectoris  Non-obstructive CAD per cath in 2/2014 (20% prox LAD)  Lexiscan stress test done 12/2020 without evidence of ischemia  Continue ASA, BB, statin therapy    2. NICM (nonischemic cardiomyopathy) ([facility] [address]  Hx of non-ischemic cardiomyopathy  Echo done 5/2021 with preserved LVEF 50-55%  Continue carvedilol, lisinopril    3.  PAF (paroxysmal atrial fibrillation) ([facility] [address]  Hx of AFL s/p ablation in 2017   In sinus rhythm today  Continue BB. On Xarelto for stroke prevention    4. Essential hypertension  BP controlled. Continue anti-hypertensive therapy and low sodium diet    5.  Mixed hyperlipidemia  LDL 31 in 12/2021  Continue statin therapy and low fat, low cholesterol diet  Lipids managed by PCP    F/u with Dr. Mansi Romero in 6 months    Shawn Velazquez NP

## 2021-12-27 NOTE — PROGRESS NOTES
Chief Complaint   Patient presents with    Follow-up     6 months FU. SOB on excertion.  Cholesterol Problem    Hypertension    Irregular Heart Beat     1. Have you been to the ER, urgent care clinic since your last visit? Hospitalized since your last visit? No    2. Have you seen or consulted any other health care providers outside of the [address] [place] [address] since your last visit? Include any pap smears or colon screening.  No

## 2021-12-31 ENCOUNTER — HOSPITAL ENCOUNTER (EMERGENCY)
Age: 81
Discharge: HOME | End: 2022-01-01
Attending: EMERGENCY MEDICINE
Payer: MEDICARE

## 2021-12-31 ENCOUNTER — APPOINTMENT (OUTPATIENT)
Dept: CT IMAGING | Age: 81
End: 2021-12-31
Attending: EMERGENCY MEDICINE
Payer: MEDICARE

## 2021-12-31 VITALS
HEART RATE: 56 BPM | WEIGHT: 211.42 LBS | TEMPERATURE: 98.8 F | HEIGHT: 69 IN | DIASTOLIC BLOOD PRESSURE: 49 MMHG | SYSTOLIC BLOOD PRESSURE: 152 MMHG | RESPIRATION RATE: 16 BRPM | OXYGEN SATURATION: 99 % | BODY MASS INDEX: 31.31 KG/M2

## 2021-12-31 DIAGNOSIS — R10.30 LOWER ABDOMINAL PAIN: Primary | ICD-10-CM

## 2021-12-31 LAB
ALBUMIN SERPL-MCNC: 3.2 G/DL (ref 3.5–5)
ALBUMIN/GLOB SERPL: 0.7 {RATIO} (ref 1.1–2.2)
ALP SERPL-CCNC: 60 U/L (ref 45–117)
ALT SERPL-CCNC: 19 U/L (ref 12–78)
ANION GAP SERPL CALC-SCNC: 3 MMOL/L (ref 5–15)
APPEARANCE UR: CLEAR
AST SERPL-CCNC: 18 U/L (ref 15–37)
BACTERIA URNS QL MICRO: NEGATIVE /HPF
BASOPHILS # BLD: 0 K/UL (ref 0–0.1)
BASOPHILS NFR BLD: 1 % (ref 0–1)
BILIRUB SERPL-MCNC: 0.3 MG/DL (ref 0.2–1)
BILIRUB UR QL: NEGATIVE
BUN SERPL-MCNC: 46 MG/DL (ref 6–20)
BUN/CREAT SERPL: 24 (ref 12–20)
CALCIUM SERPL-MCNC: 9.2 MG/DL (ref 8.5–10.1)
CHLORIDE SERPL-SCNC: 110 MMOL/L (ref 97–108)
CO2 SERPL-SCNC: 27 MMOL/L (ref 21–32)
COLOR UR: NORMAL
CREAT SERPL-MCNC: 1.91 MG/DL (ref 0.7–1.3)
DIFFERENTIAL METHOD BLD: ABNORMAL
EOSINOPHIL # BLD: 0.2 K/UL (ref 0–0.4)
EOSINOPHIL NFR BLD: 4 % (ref 0–7)
EPITH CASTS URNS QL MICRO: NORMAL /LPF
ERYTHROCYTE [DISTWIDTH] IN BLOOD BY AUTOMATED COUNT: 16.5 % (ref 11.5–14.5)
GLOBULIN SER CALC-MCNC: 4.3 G/DL (ref 2–4)
GLUCOSE SERPL-MCNC: 102 MG/DL (ref 65–100)
GLUCOSE UR STRIP.AUTO-MCNC: NEGATIVE MG/DL
HCT VFR BLD AUTO: 29 % (ref 36.6–50.3)
HGB BLD-MCNC: 9 G/DL (ref 12.1–17)
HGB UR QL STRIP: NEGATIVE
HYALINE CASTS URNS QL MICRO: NORMAL /LPF (ref 0–5)
IMM GRANULOCYTES # BLD AUTO: 0 K/UL (ref 0–0.04)
IMM GRANULOCYTES NFR BLD AUTO: 0 % (ref 0–0.5)
KETONES UR QL STRIP.AUTO: NEGATIVE MG/DL
LEUKOCYTE ESTERASE UR QL STRIP.AUTO: NEGATIVE
LIPASE SERPL-CCNC: 68 U/L (ref 73–393)
LYMPHOCYTES # BLD: 1.1 K/UL (ref 0.8–3.5)
LYMPHOCYTES NFR BLD: 25 % (ref 12–49)
MCH RBC QN AUTO: 25.6 PG (ref 26–34)
MCHC RBC AUTO-ENTMCNC: 31 G/DL (ref 30–36.5)
MCV RBC AUTO: 82.4 FL (ref 80–99)
MONOCYTES # BLD: 0.4 K/UL (ref 0–1)
MONOCYTES NFR BLD: 9 % (ref 5–13)
NEUTS SEG # BLD: 2.8 K/UL (ref 1.8–8)
NEUTS SEG NFR BLD: 61 % (ref 32–75)
NITRITE UR QL STRIP.AUTO: NEGATIVE
NRBC # BLD: 0 K/UL (ref 0–0.01)
NRBC BLD-RTO: 0 PER 100 WBC
PH UR STRIP: 5 [PH] (ref 5–8)
PLATELET # BLD AUTO: 97 K/UL (ref 150–400)
POTASSIUM SERPL-SCNC: 4.2 MMOL/L (ref 3.5–5.1)
PROT SERPL-MCNC: 7.5 G/DL (ref 6.4–8.2)
PROT UR STRIP-MCNC: NEGATIVE MG/DL
RBC # BLD AUTO: 3.52 M/UL (ref 4.1–5.7)
RBC #/AREA URNS HPF: NORMAL /HPF (ref 0–5)
SODIUM SERPL-SCNC: 140 MMOL/L (ref 136–145)
SP GR UR REFRACTOMETRY: 1.02 (ref 1–1.03)
UA: UC IF INDICATED,UAUC: NORMAL
UROBILINOGEN UR QL STRIP.AUTO: 0.2 EU/DL (ref 0.2–1)
WBC # BLD AUTO: 4.6 K/UL (ref 4.1–11.1)
WBC URNS QL MICRO: NORMAL /HPF (ref 0–4)

## 2021-12-31 PROCEDURE — 36415 COLL VENOUS BLD VENIPUNCTURE: CPT

## 2021-12-31 PROCEDURE — 74011000636 HC RX REV CODE- 636: Performed by: EMERGENCY MEDICINE

## 2021-12-31 PROCEDURE — 85025 COMPLETE CBC W/AUTO DIFF WBC: CPT

## 2021-12-31 PROCEDURE — 74177 CT ABD & PELVIS W/CONTRAST: CPT

## 2021-12-31 PROCEDURE — 83690 ASSAY OF LIPASE: CPT

## 2021-12-31 PROCEDURE — 99283 EMERGENCY DEPT VISIT LOW MDM: CPT

## 2021-12-31 PROCEDURE — 81001 URINALYSIS AUTO W/SCOPE: CPT

## 2021-12-31 PROCEDURE — 80053 COMPREHEN METABOLIC PANEL: CPT

## 2021-12-31 RX ADMIN — IOPAMIDOL 100 ML: 755 INJECTION, SOLUTION INTRAVENOUS at 22:25

## 2022-01-01 NOTE — ED PROVIDER NOTES
EMERGENCY DEPARTMENT HISTORY AND PHYSICAL EXAM      Date: 12/31/2021  Patient Name: Ari Quezada    History of Presenting Illness     Chief Complaint   Patient presents with    Pelvic Pain     lower middle abdominal pelvic pain for 2 days; no N/V/D. denies dysuria       History Provided By: Patient    HPI: Ari Quezada, 80 y.o. male with PMHx significant for A. fib, CHF, CKD, diabetes, prior prostate cancer, who presents with chief complaint of lower abdominal pain for the last 2 to 3 days. Pain is described as aching, localized to the suprapubic region but patient reports it radiates to his entire abdomen. No nausea, vomiting, or diarrhea. Last bowel movement was this morning. He did not take anything at home for his symptoms. Denies any pain with urination or difficulty urinating. Denies any fever. No history of abdominal surgeries. PCP: Armando Russell MD    There are no other complaints, changes, or physical findings at this time. Current Outpatient Medications   Medication Sig Dispense Refill    lisinopriL (PRINIVIL, ZESTRIL) 2.5 mg tablet Take 1 tablet by mouth once daily 90 Tablet 0    carvediloL (COREG) 12.5 mg tablet TAKE 1 TABLET BY MOUTH TWICE DAILY WITH MEALS 180 Tablet 3    furosemide (LASIX) 20 mg tablet Take 1 tablet by mouth once daily 30 Tablet 0    atorvastatin (LIPITOR) 40 mg tablet Take 1 tablet by mouth nightly 90 Tablet 0    Xarelto 20 mg tab tablet Take 1 tablet by mouth once daily 90 Tablet 3    cetirizine (ZYRTEC) 10 mg tablet Take 1 Tab by mouth two (2) times daily as needed for Itching. 60 Tab 0    Nubeqa 300 mg tablet Take 600 mg by mouth two (2) times daily (with meals).  calcium-cholecalciferol, d3, 600-125 mg-unit tab Take  by mouth.  docusate sodium (STOOL SOFTENER PO) Take  by mouth.  aspirin 81 mg chewable tablet Take 1 Tab by mouth daily.  10 Tab 0     Past History     Past Medical History:  Past Medical History:   Diagnosis Date    Acute respiratory failure with hypoxia ([address] 02/08/2014    also 11/28/15, 2/17/16, 5/14/17     Alcohol abuse     As of 11/29/18 pt stated he quit 20 years    Arthritis     Back and shoulder    Atrial fibrillation ([address]     Atrial flutter ([address] 08/2017    saw Dr. Vamshi Cortés; underwent a-flutter ablation    BPH (benign prostatic hyperplasia)     CHF (congestive heart failure) ([address] 02/11/2014    TTE 11/15: EF 40-45%, 2/14: EF 20%  Admitted for acute exacerbations 2/8/14, 11/28/15, 2/17/16, and 5/4/17)    Chronic kidney disease     III    Chronic low back pain     LS spine X-ray 4/8/17: mild DDD    Combined forms of age-related cataract of left eye 12/12/2018    KPE/IOL OS    Combined forms of age-related cataract of right eye 11/28/2018    KPE/IOL OD    COPD (chronic obstructive pulmonary disease) ([address]     Diabetes ([address]     PA (dyspnea on exertion)     ED (erectile dysfunction)     Elevated troponin 02/11/2014    also 11/28/15; due to cardiac strain    Frequent hospital admissions     5/14-5/23/17: acute hypoxic resp failure due to CHF exac, ROBINSON on CKD 3, sepsis due to LE cellulitis, 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  2/17-2/22/16: acute hypoxic resp failure, acute diarrhea  11/28-11/30/15: acute hypoxic resp failure due to acute CHF exac, elevated troponin due to cardiac strain  2/8-2/12/14: acuter hypoxic resp failure due to CHF exac, pneumonia     Hand blister 10/8/2017    Bilateral    Hyperlipidemia     Hypertension     Kidney disease, chronic, stage III (GFR 30-59 ml/min) ([facility])     Leg fracture, right 1973    Nonischemic cardiomyopathy ([address]     with LVH    Orthostatic hypotension 4/13/2021    Pericardial effusion 11/10/2020    Pneumonia 02/08/2014 2/8/14 and 10/30/15    Poor historian     As of 11/29/18 pt reports 5th grade education, pt unable to give med list-obtained from wife per patient's permission    Prediabetes     Prostate cancer ([facility] 2016    As of 11/29/18, pt states he gets two injections twice a year for this    Pulmonary edema 2015    S/P cardiac cath 2014 no significant coronary disease, severe LV dysfunction    Tinea cruris     also genital folliculitis    Vertigo      Past Surgical History:  Past Surgical History:   Procedure Laterality Date    COLONOSCOPY N/A 3/2/2020    COLONOSCOPY performed by Sheila Flowers MD at [facility] ENDOSCOPY. 2 tubular adenomas, diverticulosis, int hemorrhoids, repeat in 5 yrs    COLONOSCOPY,DIAGNOSTIC  2016    Dr. Jessi Brown; single sessile polyp at descending colon, sigmoid diverticulosis, int hemorrhoids    COLONOSCOPY,REMV LESN,SNARE  2016         COLONOSCOPY,REMV LESN,SNARE  3/2/2020         HX CATARACT REMOVAL Right 2018    Dr. Gill Castaneda Left 2018    Dr. Roseanne Belle. LEFT EYE SECOND REFRATIVE CATARACT REMOVAL WITH LENS IMPLANTATION    HX ORTHOPAEDIC Right 's    index finger    HX OTHER SURGICAL  2017    Dr. Stalin Davidson; atrial flutter ablation    HX ROTATOR CUFF REPAIR Right     HX TONSILLECTOMY  1948    GA CARDIAC SURG PROCEDURE UNLIST  2017    SVT ablation     Family History:  Family History   Adopted: Yes   Family history unknown: Yes     Social History:  Social History     Tobacco Use    Smoking status: Former Smoker     Packs/day: 2.00     Years: 20.00     Pack years: 40.00     Types: Cigarettes, Cigars     Quit date: 2019     Years since quittin.4    Smokeless tobacco: Current User     Types: Chew   Vaping Use    Vaping Use: Never used   Substance Use Topics    Alcohol use: Not Currently     Alcohol/week: 0.0 standard drinks     Comment: As of 20, pt quit 20 years ago    Drug use: Yes     Types: Marijuana     Comment: not recently     Allergies: Allergies   Allergen Reactions    Oxycodone Contact Dermatitis     Review of Systems   Review of Systems   Constitutional: Negative for chills and fever.    HENT: Negative for congestion, rhinorrhea and sore throat. Respiratory: Negative for cough and shortness of breath. Cardiovascular: Negative for chest pain. Gastrointestinal: Positive for abdominal pain. Negative for nausea and vomiting. Genitourinary: Negative for dysuria and urgency. Skin: Negative for rash. Neurological: Negative for dizziness, light-headedness and headaches. All other systems reviewed and are negative. Physical Exam   Physical Exam  Vitals and nursing note reviewed. Constitutional:       General: He is not in acute distress. Appearance: He is well-developed. HENT:      Head: Normocephalic and atraumatic. Eyes:      Conjunctiva/sclera: Conjunctivae normal.      Pupils: Pupils are equal, round, and reactive to light. Cardiovascular:      Rate and Rhythm: Normal rate and regular rhythm. Pulmonary:      Effort: Pulmonary effort is normal. No respiratory distress. Breath sounds: Normal breath sounds. No stridor. Abdominal:      General: There is no distension. Palpations: Abdomen is soft. Tenderness: There is abdominal tenderness (mild) in the suprapubic area. There is no guarding or rebound. Musculoskeletal:         General: Normal range of motion. Cervical back: Normal range of motion. Skin:     General: Skin is warm and dry. Neurological:      Mental Status: He is alert and oriented to person, place, and time.        Diagnostic Study Results   Labs -     Recent Results (from the past 12 hour(s))   URINALYSIS W/ REFLEX CULTURE    Collection Time: 12/31/21  7:21 PM    Specimen: Urine   Result Value Ref Range    Color YELLOW/STRAW      Appearance CLEAR CLEAR      Specific gravity 1.020 1.003 - 1.030      pH (UA) 5.0 5.0 - 8.0      Protein Negative NEG mg/dL    Glucose Negative NEG mg/dL    Ketone Negative NEG mg/dL    Bilirubin Negative NEG      Blood Negative NEG      Urobilinogen 0.2 0.2 - 1.0 EU/dL    Nitrites Negative NEG      Leukocyte Esterase Negative NEG      WBC 0-4 0 - 4 /hpf    RBC 0-5 0 - 5 /hpf    Epithelial cells FEW FEW /lpf    Bacteria Negative NEG /hpf    UA:UC IF INDICATED CULTURE NOT INDICATED BY UA RESULT CNI      Hyaline cast 0-2 0 - 5 /lpf   METABOLIC PANEL, COMPREHENSIVE    Collection Time: 12/31/21  7:26 PM   Result Value Ref Range    Sodium 140 136 - 145 mmol/L    Potassium 4.2 3.5 - 5.1 mmol/L    Chloride 110 (H) 97 - 108 mmol/L    CO2 27 21 - 32 mmol/L    Anion gap 3 (L) 5 - 15 mmol/L    Glucose 102 (H) 65 - 100 mg/dL    BUN 46 (H) 6 - 20 MG/DL    Creatinine 1.91 (H) 0.70 - 1.30 MG/DL    BUN/Creatinine ratio 24 (H) 12 - 20      GFR est AA 41 (L) >60 ml/min/1.73m2    GFR est non-AA 34 (L) >60 ml/min/1.73m2    Calcium 9.2 8.5 - 10.1 MG/DL    Bilirubin, total 0.3 0.2 - 1.0 MG/DL    ALT (SGPT) 19 12 - 78 U/L    AST (SGOT) 18 15 - 37 U/L    Alk. phosphatase 60 45 - 117 U/L    Protein, total 7.5 6.4 - 8.2 g/dL    Albumin 3.2 (L) 3.5 - 5.0 g/dL    Globulin 4.3 (H) 2.0 - 4.0 g/dL    A-G Ratio 0.7 (L) 1.1 - 2.2     LIPASE    Collection Time: 12/31/21  7:26 PM   Result Value Ref Range    Lipase 68 (L) 73 - 393 U/L   CBC WITH AUTOMATED DIFF    Collection Time: 12/31/21  7:26 PM   Result Value Ref Range    WBC 4.6 4.1 - 11.1 K/uL    RBC 3.52 (L) 4.10 - 5.70 M/uL    HGB 9.0 (L) 12.1 - 17.0 g/dL    HCT 29.0 (L) 36.6 - 50.3 %    MCV 82.4 80.0 - 99.0 FL    MCH 25.6 (L) 26.0 - 34.0 PG    MCHC 31.0 30.0 - 36.5 g/dL    RDW 16.5 (H) 11.5 - 14.5 %    PLATELET 97 (L) 559 - 400 K/uL    NRBC 0.0 0  WBC    ABSOLUTE NRBC 0.00 0.00 - 0.01 K/uL    NEUTROPHILS 61 32 - 75 %    LYMPHOCYTES 25 12 - 49 %    MONOCYTES 9 5 - 13 %    EOSINOPHILS 4 0 - 7 %    BASOPHILS 1 0 - 1 %    IMMATURE GRANULOCYTES 0 0.0 - 0.5 %    ABS. NEUTROPHILS 2.8 1.8 - 8.0 K/UL    ABS. LYMPHOCYTES 1.1 0.8 - 3.5 K/UL    ABS. MONOCYTES 0.4 0.0 - 1.0 K/UL    ABS. EOSINOPHILS 0.2 0.0 - 0.4 K/UL    ABS. BASOPHILS 0.0 0.0 - 0.1 K/UL    ABS. IMM.  GRANS. 0.0 0.00 - 0.04 K/UL    DF AUTOMATED         Radiologic Studies -   CT ABD PELV W CONT   Final Result      1. No evidence of acute process in the abdomen or pelvis. 2. Stable borderline enlarged retroperitoneal lymph nodes. 3. Additional findings as above. CT ABD PELV W CONT    Result Date: 12/31/2021  1. No evidence of acute process in the abdomen or pelvis. 2. Stable borderline enlarged retroperitoneal lymph nodes. 3. Additional findings as above. Medical Decision Making   I am the first provider for this patient. I reviewed the vital signs, available nursing notes, past medical history, past surgical history, family history and social history. Vital Signs-Reviewed the patient's vital signs. Patient Vitals for the past 12 hrs:   Temp Pulse Resp BP SpO2   12/31/21 1841 98.8 °F (37.1 °C) (!) 56 16 (!) 152/49 99 %       Pulse Oximetry Analysis - 99% on ra      Records Reviewed: Nursing Notes and Old Medical Records    Provider Notes (Medical Decision Making):   Patient presents the chief complaint of lower abdominal pain for the last several days. On exam he is overall quite well-appearing with stable vital signs. Minimal abdominal tenderness on exam.  Differential includes UTI, musculoskeletal pain, diverticulitis, colitis. Will check basic lab work, urinalysis, CT abdomen. ED Course:   Initial assessment performed. The patients presenting problems have been discussed, and they are in agreement with the care plan formulated and outlined with them. I have encouraged them to ask questions as they arise throughout their visit. Lab work and imaging without acute findings. Patient stable for discharge home with outpatient follow-up. Procedures:  Procedures    Critical Care:  none    Disposition:  Discharge Note:  The patient has been re-evaluated and is ready for discharge. Reviewed available results with patient. Counseled patient on diagnosis and care plan.  Patient has expressed understanding, and all questions have been answered. Patient agrees with plan and agrees to follow up as recommended, or to return to the ED if their symptoms worsen. Discharge instructions have been provided and explained to the patient, along with reasons to return to the ED. PLAN:  1. Discharge Medication List as of 12/31/2021 11:15 PM        2. Follow-up Information     Follow up With Specialties Details Why Contact Info    Andrey Menjivar MD Internal Medicine Schedule an appointment as soon as possible for a visit   [address] [place] [address]  [phone]      [facility] EMERGENCY DEPT Emergency Medicine  As needed, If symptoms worsen [address] [place] [address]  [address] [facility]  [phone]        Return to ED if worse     Diagnosis     Clinical Impression:   1. Lower abdominal pain            Please note that this dictation was completed with [employer], the computer voice recognition software. Quite often unanticipated grammatical, syntax, homophones, and other interpretive errors are inadvertently transcribed by the computer software. Please disregard these errors.   Please excuse any errors that have escaped final proofreading

## 2022-01-06 ENCOUNTER — TELEPHONE (OUTPATIENT)
Dept: CARDIOLOGY CLINIC | Age: 82
End: 2022-01-06

## 2022-01-06 NOTE — TELEPHONE ENCOUNTER
Received fax from [facility] asking for clearance to hold his Xarelto 4 days prior to and resume 24 hours after for injection procedure for pain control. Does not specify what type of injection. Last visit on 12/27/21. Please advise if okay to hold, thanks.

## 2022-01-06 NOTE — TELEPHONE ENCOUNTER
Message  Received: Today  Selma Monae MD sent to Matti Massey LPN  Caller: Unspecified (Today,  8:15 AM)  Yes he can.  signed clearance form for pain injection. Faxed to [facility] at [phone] and received fax confirmation.

## 2022-01-10 RX ORDER — FUROSEMIDE 20 MG/1
TABLET ORAL
Qty: 30 TABLET | Refills: 0 | Status: SHIPPED | OUTPATIENT
Start: 2022-01-10 | End: 2022-01-14 | Stop reason: ALTCHOICE

## 2022-01-13 DIAGNOSIS — B37.2 INTERTRIGINOUS CANDIDIASIS: ICD-10-CM

## 2022-01-13 RX ORDER — CLOTRIMAZOLE AND BETAMETHASONE DIPROPIONATE 10; .64 MG/G; MG/G
CREAM TOPICAL
Qty: 15 G | Refills: 0 | Status: SHIPPED | OUTPATIENT
Start: 2022-01-13 | End: 2022-05-12

## 2022-01-14 ENCOUNTER — VIRTUAL VISIT (OUTPATIENT)
Dept: INTERNAL MEDICINE CLINIC | Age: 82
End: 2022-01-14
Payer: MEDICARE

## 2022-01-14 DIAGNOSIS — I10 PRIMARY HYPERTENSION: Primary | ICD-10-CM

## 2022-01-14 DIAGNOSIS — N18.32 TYPE 2 DIABETES MELLITUS WITH STAGE 3B CHRONIC KIDNEY DISEASE, WITHOUT LONG-TERM CURRENT USE OF INSULIN (HCC): ICD-10-CM

## 2022-01-14 DIAGNOSIS — C61 MALIGNANT NEOPLASM OF PROSTATE (HCC): ICD-10-CM

## 2022-01-14 DIAGNOSIS — I48.0 PAF (PAROXYSMAL ATRIAL FIBRILLATION) (HCC): ICD-10-CM

## 2022-01-14 DIAGNOSIS — I42.8 NICM (NONISCHEMIC CARDIOMYOPATHY) (HCC): ICD-10-CM

## 2022-01-14 DIAGNOSIS — J44.9 CHRONIC OBSTRUCTIVE PULMONARY DISEASE, UNSPECIFIED COPD TYPE (HCC): ICD-10-CM

## 2022-01-14 DIAGNOSIS — E11.22 TYPE 2 DIABETES MELLITUS WITH STAGE 3B CHRONIC KIDNEY DISEASE, WITHOUT LONG-TERM CURRENT USE OF INSULIN (HCC): ICD-10-CM

## 2022-01-14 PROCEDURE — 99443 PR PHYS/QHP TELEPHONE EVALUATION 21-30 MIN: CPT | Performed by: INTERNAL MEDICINE

## 2022-01-14 NOTE — PROGRESS NOTES
Twan Wayne  Identified pt with two pt identifiers(name and ). Chief Complaint   Patient presents with    Hypertension    Cholesterol Problem    Diabetes       Reviewed record In preparation for visit and have obtained necessary documentation. 1. Have you been to the ER, urgent care clinic or hospitalized since your last visit? Yes. [id number]     2. Have you seen or consulted any other health care providers outside of the [address] [place] [address] since your last visit? Include any pap smears or colon screening. Yes. Eye Exam - Ced    Patient does not have an advance directive. Vitals reviewed with provider. Health Maintenance reviewed:     Health Maintenance Due   Topic    Eye Exam Retinal or Dilated           Wt Readings from Last 3 Encounters:   21 211 lb 6.7 oz (95.9 kg)   21 209 lb 11.2 oz (95.1 kg)   10/27/21 205 lb 11 oz (93.3 kg)        Temp Readings from Last 3 Encounters:   21 98.8 °F (37.1 °C)   10/27/21 98.3 °F (36.8 °C)   21 98.6 °F (37 °C) (Oral)        BP Readings from Last 3 Encounters:   21 (!) 152/49   21 (!) 108/56   10/27/21 137/83        Pulse Readings from Last 3 Encounters:   21 (!) 56   21 (!) 39   10/27/21 62      There were no vitals filed for this visit.        Learning Assessment:   :       Learning Assessment 2020 10/5/2017 2014   PRIMARY LEARNER Patient Patient Patient   HIGHEST LEVEL OF EDUCATION - PRIMARY LEARNER  - - DID NOT GRADUATE HIGH SCHOOL   BARRIERS PRIMARY LEARNER - - NONE   CO-LEARNER CAREGIVER - - No   PRIMARY LANGUAGE ENGLISH ENGLISH ENGLISH    NEED - - No   LEARNER PREFERENCE PRIMARY DEMONSTRATION LISTENING LISTENING     - DEMONSTRATION -     - READING -   ANSWERED BY self patient Patient   RELATIONSHIP SELF SELF SELF        Depression Screening:   :       3 most recent [facility] Screens 2021   PHQ Not Done Patient refuses   Little interest or pleasure in doing things Not at all Feeling down, depressed, irritable, or hopeless Not at all   Total Score PHQ 2 0        Fall Risk Assessment:   :       Fall Risk Assessment, last 12 mths 12/27/2021   Able to walk? Yes   Fall in past 12 months? 0   Do you feel unsteady? 0   Are you worried about falling 0   Fall with injury? -        Abuse Screening:   :       Abuse Screening Questionnaire 9/14/2021 1/7/2021 2/13/2020 10/9/2019 7/9/2019 9/4/2018 7/3/2018   Do you ever feel afraid of your partner? N N N N N N N   Are you in a relationship with someone who physically or mentally threatens you? N N N N N N N   Is it safe for you to go home?  Y Y Y Y Y Y Y        ADL Screening:   :       ADL Assessment 9/14/2021   Feeding yourself No Help Needed   Getting from bed to chair No Help Needed   Getting dressed No Help Needed   Bathing or showering No Help Needed   Walk across the room (includes cane/walker) No Help Needed   Using the telphone No Help Needed   Taking your medications No Help Needed   Preparing meals No Help Needed   Managing money (expenses/bills) No Help Needed   Moderately strenuous housework (laundry) No Help Needed   Shopping for personal items (toiletries/medicines) No Help Needed   Shopping for groceries No Help Needed   Driving No Help Needed   Climbing a flight of stairs No Help Needed   Getting to places beyond walking distances No Help Needed

## 2022-01-14 NOTE — PROGRESS NOTES
Zack Martinez is a 80 y.o. male, evaluated via audio-only technology on 1/14/2022 for Hypertension, Cholesterol Problem, and Diabetes  . Assessment & Plan:     Diagnoses and all orders for this visit:    1. Primary hypertension  Controlled on carvedilol and lisinopril. 2. Type 2 diabetes mellitus with stage 3b chronic kidney disease, without long-term current use of insulin ([facility]  Controlled by diet. Last A1c 5.5% on 9/14/21. 3. NICM (nonischemic cardiomyopathy) ([address]  Controlled. Continue carvedilol and lisinopril. 4. PAF (paroxysmal atrial fibrillation) (HCC)  Stable, usually in NSR. Continue carvedilol and Xarelto. 5. Chronic obstructive pulmonary disease, unspecified COPD type ([facility]  Controlled. Continue present management. 6. Malignant neoplasm of prostate ([facility]  On Nubeqa. Continue following with Urology. Follow-up and Dispositions    · Return in about 4 months (around 5/14/2022), or if symptoms worsen or fail to improve, for DM, HTN, chol, POC A1c.         12  Subjective:     Presents for 4 month follow up evaluation. He has HTN, diet-controlled type 2 DM, CKD stage 3, hyperlipidemia, CHF (combined diastolic and systolic, EF 96-55% in 0/69), non-ischemic cardiomyopathy, atrial flutter s/p AFL ablation on 8/22/17 and DCCV in 11/2020 , non-obstructive atherosclerotic heart disease, COPD, chronic low back pain, and prostate cancer on Nubeqa. No new complaints. Has mild chronic PA. Denies HA's, CP, leg swelling, polydipsia, or polyuria. Tries to stay on a low-salt, low-sweets diet. Last saw Dr. Alondra Izquierdo on 12/27/21; no changes made. Following at [facility] for lumbar radiculopathy and lumbar spondylosis. Had first epidural steroid injection; says it helped only a little. Prior to Admission medications    Medication Sig Start Date End Date Taking?  Authorizing Provider   clotrimazole-betamethasone (LOTRISONE) topical cream APPLY  CREAM TOPICALLY TO AFFECTED AREA TWICE DAILY 1/13/22  Yes Harjeet Eason MD   lisinopriL (PRINIVIL, ZESTRIL) 2.5 mg tablet Take 1 tablet by mouth once daily 12/26/21  Yes Jenny Jennings, LALA   carvediloL (COREG) 12.5 mg tablet TAKE 1 TABLET BY MOUTH TWICE DAILY WITH MEALS 12/20/21  Yes Cynthia Rivas MD   atorvastatin (LIPITOR) 40 mg tablet Take 1 tablet by mouth nightly 12/3/21  Yes Cynthia Rivas MD   Xarelto 20 mg tab tablet Take 1 tablet by mouth once daily 8/24/21  Yes Cynthia Rivas MD   cetirizine (ZYRTEC) 10 mg tablet Take 1 Tab by mouth two (2) times daily as needed for Itching. 5/14/21  Yes Marielena Vizcarra MD   Nubeqa 300 mg tablet Take 600 mg by mouth two (2) times daily (with meals). 3/22/21  Yes Provider, Historical   docusate sodium (STOOL SOFTENER PO) Take  by mouth. Yes Provider, Historical   aspirin 81 mg chewable tablet Take 1 Tab by mouth daily. 3/3/16  Yes Thomas James MD     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    NICM (nonischemic cardiomyopathy) ([facility] I42.8    Hypertension I10    Mixed hyperlipidemia E78.2    Smokeless tobacco use Z72.0    Atrial flutter (HCC) I48.92    Type 2 diabetes mellitus with stage 3 chronic kidney disease, without long-term current use of insulin (HCC) E11.22, N18.30    Prostate cancer ([facility] C61    COPD (chronic obstructive pulmonary disease) ([facility]) J44.9    Chronic low back pain M54.50, G89.29    Obesity (BMI 30-39. 9) E66.9    Candidal intertrigo B37.2    Anemia of chronic disease D63.8    PAF (paroxysmal atrial fibrillation) ([facility]) I48.0    Osteopenia of multiple sites M85.89    Primary osteoarthritis of left hip M16.12       ROS    Patient-Reported Vitals 1/14/2022   Patient-Reported Weight 209lb       Ernesto Phillips, who was evaluated through a patient-initiated, synchronous (real-time) audio only encounter, and/or her healthcare decision maker, is aware that it is a billable service, with coverage as determined by his insurance carrier. He provided verbal consent to proceed: Yes. He has not had a related appointment within my department in the past 7 days or scheduled within the next 24 hours. On this date 01/14/2022 I have spent 21 minutes reviewing previous notes, test results and face to face (virtual) with the patient discussing the diagnosis and importance of compliance with the treatment plan as well as documenting on the day of the visit.     Paul Villegas MD

## 2022-02-08 RX ORDER — FUROSEMIDE 20 MG/1
TABLET ORAL
Qty: 30 TABLET | Refills: 0 | OUTPATIENT
Start: 2022-02-08

## 2022-02-09 RX ORDER — FUROSEMIDE 20 MG/1
TABLET ORAL
Qty: 30 TABLET | Refills: 0 | Status: SHIPPED | OUTPATIENT
Start: 2022-02-09 | End: 2022-03-14

## 2022-02-09 NOTE — TELEPHONE ENCOUNTER
Called pt,verified pt with two pt identifiers, advised pt his Lasix was sent in today by  to [address] in [place]. Advised to call the pharmacy and make sure they can fill it before he makes another trip there. Advised to call me back if any issues.

## 2022-02-09 NOTE — TELEPHONE ENCOUNTER
Pt walked into the office to get his prescription refilled on his furosemide. He wants to know why it was denied. Please call him at [phone].     Thanks Shanda

## 2022-03-07 RX ORDER — ATORVASTATIN CALCIUM 40 MG/1
TABLET, FILM COATED ORAL
Qty: 90 TABLET | Refills: 0 | Status: SHIPPED | OUTPATIENT
Start: 2022-03-07

## 2022-03-10 ENCOUNTER — OFFICE VISIT (OUTPATIENT)
Dept: INTERNAL MEDICINE CLINIC | Age: 82
End: 2022-03-10
Payer: MEDICARE

## 2022-03-10 ENCOUNTER — NURSE TRIAGE (OUTPATIENT)
Dept: OTHER | Facility: CLINIC | Age: 82
End: 2022-03-10

## 2022-03-10 VITALS
WEIGHT: 217 LBS | OXYGEN SATURATION: 96 % | DIASTOLIC BLOOD PRESSURE: 75 MMHG | RESPIRATION RATE: 12 BRPM | TEMPERATURE: 98.4 F | BODY MASS INDEX: 32.14 KG/M2 | SYSTOLIC BLOOD PRESSURE: 164 MMHG | HEIGHT: 69 IN | HEART RATE: 66 BPM

## 2022-03-10 DIAGNOSIS — H65.112 NON-RECURRENT ACUTE ALLERGIC OTITIS MEDIA OF LEFT EAR: Primary | ICD-10-CM

## 2022-03-10 DIAGNOSIS — I10 PRIMARY HYPERTENSION: ICD-10-CM

## 2022-03-10 PROCEDURE — 99213 OFFICE O/P EST LOW 20 MIN: CPT | Performed by: INTERNAL MEDICINE

## 2022-03-10 PROCEDURE — G8432 DEP SCR NOT DOC, RNG: HCPCS | Performed by: INTERNAL MEDICINE

## 2022-03-10 PROCEDURE — G8427 DOCREV CUR MEDS BY ELIG CLIN: HCPCS | Performed by: INTERNAL MEDICINE

## 2022-03-10 PROCEDURE — G8754 DIAS BP LESS 90: HCPCS | Performed by: INTERNAL MEDICINE

## 2022-03-10 PROCEDURE — G8417 CALC BMI ABV UP PARAM F/U: HCPCS | Performed by: INTERNAL MEDICINE

## 2022-03-10 PROCEDURE — G8753 SYS BP > OR = 140: HCPCS | Performed by: INTERNAL MEDICINE

## 2022-03-10 PROCEDURE — 1101F PT FALLS ASSESS-DOCD LE1/YR: CPT | Performed by: INTERNAL MEDICINE

## 2022-03-10 PROCEDURE — G8536 NO DOC ELDER MAL SCRN: HCPCS | Performed by: INTERNAL MEDICINE

## 2022-03-10 RX ORDER — AMOXICILLIN AND CLAVULANATE POTASSIUM 875; 125 MG/1; MG/1
1 TABLET, FILM COATED ORAL 2 TIMES DAILY
Qty: 14 TABLET | Refills: 0 | Status: SHIPPED | OUTPATIENT
Start: 2022-03-10 | End: 2022-03-17

## 2022-03-10 NOTE — PROGRESS NOTES
CC:   Chief Complaint   Patient presents with    Ear Pain     left    Dizziness       HISTORY OF PRESENT ILLNESS  Kirk Chappell is a 80 y.o. male. Complains of left ear pain and dizziness that both started yesterday. Reports having ear infections every March. Left ear also feels sensitive to cold air. Using sweet oil in left ear. Patient Active Problem List   Diagnosis Code    Combined systolic and diastolic congestive heart failure ([facility]) I50.40    NICM (nonischemic cardiomyopathy) ([facility]) I42.8    Hypertension I10    Mixed hyperlipidemia E78.2    Smokeless tobacco use Z72.0    Atrial flutter ([facility]) I48.92    Type 2 diabetes mellitus with stage 3 chronic kidney disease, without long-term current use of insulin ([facility]) E11.22, N18.30    Prostate cancer ([facility] C61    COPD (chronic obstructive pulmonary disease) ([facility]) J44.9    Chronic low back pain M54.50, G89.29    Obesity (BMI 30-39. 9) E66.9    Candidal intertrigo B37.2    Anemia of chronic disease D63.8    PAF (paroxysmal atrial fibrillation) ([facility]) I48.0    Osteopenia of multiple sites M85.89    Primary osteoarthritis of left hip M16.12     Past Medical History:   Diagnosis Date    Acute respiratory failure with hypoxia ([address] 02/08/2014    also 11/28/15, 2/17/16, 5/14/17     Alcohol abuse     As of 11/29/18 pt stated he quit 20 years    Arthritis     Back and shoulder    Atrial fibrillation ([address]     Atrial flutter ([address] 08/2017    saw Dr. Beth Post; underwent a-flutter ablation    BPH (benign prostatic hyperplasia)     CHF (congestive heart failure) ([address] 02/11/2014    TTE 11/15: EF 40-45%, 2/14: EF 20%  Admitted for acute exacerbations 2/8/14, 11/28/15, 2/17/16, and 5/4/17)    Chronic kidney disease     III    Chronic low back pain     LS spine X-ray 4/8/17: mild DDD    Combined forms of age-related cataract of left eye 12/12/2018    KPE/IOL OS    Combined forms of age-related cataract of right eye 11/28/2018    KPE/IOL OD    COPD (chronic obstructive pulmonary disease) ([facility]     Diabetes ([facility]     PA (dyspnea on exertion)     ED (erectile dysfunction)     Elevated troponin 02/11/2014    also 11/28/15; due to cardiac strain    Frequent hospital admissions     5/14-5/23/17: acute hypoxic resp failure due to CHF exac, ROBINSON on CKD 3, sepsis due to LE cellulitis, 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  2/17-2/22/16: acute hypoxic resp failure, acute diarrhea  11/28-11/30/15: acute hypoxic resp failure due to acute CHF exac, elevated troponin due to cardiac strain  2/8-2/12/14: acuter hypoxic resp failure due to CHF exac, pneumonia     Hand blister 10/8/2017    Bilateral    Hyperlipidemia     Hypertension     Kidney disease, chronic, stage III (GFR 30-59 ml/min) ([facility])     Leg fracture, right 1973    Nonischemic cardiomyopathy ([facility]     with LVH    Orthostatic hypotension 4/13/2021    Pericardial effusion 11/10/2020    Pneumonia 02/08/2014 2/8/14 and 10/30/15    Poor historian     As of 11/29/18 pt reports 5th grade education, pt unable to give med list-obtained from wife per patient's permission    Prediabetes     Prostate cancer ([facility] 2016    As of 11/29/18, pt states he gets two injections twice a year for this    Pulmonary edema 11/28/2015    S/P cardiac cath 2/12/2014 2/12/14 no significant coronary disease, severe LV dysfunction    Tinea cruris     also genital folliculitis    Vertigo      Allergies   Allergen Reactions    Oxycodone Contact Dermatitis       Current Outpatient Medications   Medication Sig Dispense Refill    atorvastatin (LIPITOR) 40 mg tablet Take 1 tablet by mouth nightly 90 Tablet 0    furosemide (LASIX) 20 mg tablet Take 1 tablet by mouth once daily 30 Tablet 0    clotrimazole-betamethasone (LOTRISONE) topical cream APPLY  CREAM TOPICALLY TO AFFECTED AREA TWICE DAILY 15 g 0    lisinopriL (PRINIVIL, ZESTRIL) 2.5 mg tablet Take 1 tablet by mouth once daily 90 Tablet 0    carvediloL (COREG) 12.5 mg tablet TAKE 1 TABLET BY MOUTH TWICE DAILY WITH MEALS 180 Tablet 3    Xarelto 20 mg tab tablet Take 1 tablet by mouth once daily 90 Tablet 3    cetirizine (ZYRTEC) 10 mg tablet Take 1 Tab by mouth two (2) times daily as needed for Itching. 60 Tab 0    Nubeqa 300 mg tablet Take 600 mg by mouth two (2) times daily (with meals).  docusate sodium (STOOL SOFTENER PO) Take  by mouth.  aspirin 81 mg chewable tablet Take 1 Tab by mouth daily. 10 Tab 0         PHYSICAL EXAM  Visit Vitals  BP (!) 164/75 (BP 1 Location: Left upper arm, BP Patient Position: Sitting)   Pulse 66   Temp 98.4 °F (36.9 °C) (Oral)   Resp 12   Ht 5' 9\" (1.753 m)   Wt 217 lb (98.4 kg)   SpO2 96%   BMI 32.05 kg/m²       General: Obese, no distress. HEENT:  Head normocephalic/atraumatic, no scleral icterus. Left TM with hyperemia. Right TM and both ear canals normal.  Neurological: Alert and oriented. Psychiatric: Normal mood and affect. Behavior is normal.         ASSESSMENT AND PLAN    ICD-10-CM ICD-9-CM    1. Non-recurrent acute allergic otitis media of left ear  H65.112 381.04 amoxicillin-clavulanate (AUGMENTIN) 875-125 mg per tablet   2. Primary hypertension  I10 401.9      Diagnoses and all orders for this visit:    1. Non-recurrent acute allergic otitis media of left ear  -     Start amoxicillin-clavulanate (AUGMENTIN) 875-125 mg per tablet; Take 1 Tablet by mouth two (2) times a day for 7 days. 2. Primary hypertension  Elevated today. Previously well-controlled. He does not monitor BP at home. Will recheck at next clinic visit. Follow-up and Dispositions    · Return if symptoms worsen or fail to improve, for Scheduled appointment on 5/13/22. I have discussed the diagnosis with the patient and the intended plan as seen in the above orders. Patient is in agreement. The patient has received an after-visit summary and questions were answered concerning future plans.   I have discussed medication side effects and warnings with the patient as well.

## 2022-03-10 NOTE — TELEPHONE ENCOUNTER
Received call from [address] at [facility] with Red Flag Complaint. Subjective: Caller states \"I think I have a ear infection and dizziness. \"     Current Symptoms: dizzy when turning my head or when I get up, I have to sit on the side of bed. I get this every March. My left ear has pain, I have cotton in it now. I need an antibiotic. Patient just wants an appt- didn't really want to answer questions as he has had this before and knows what he needs. Onset: 2 days ago; worsening    Associated Symptoms: NA    Pain Severity: 9/10; pain; constant    Temperature: none   What has been tried: cotton ball, Put some OTC drops for ear_hasn't helped. LMP: NA Pregnant: NA    Recommended disposition: See in Office Today     [address] if no appts. Care advice provided, patient verbalizes understanding; denies any other questions or concerns; instructed to call back for any new or worsening symptoms. Patient/Caller agrees with recommended disposition; writer provided warm transfer to [facility] at [facility] for appointment scheduling    Attention Provider: Thank you for allowing me to participate in the care of your patient. The patient was connected to triage in response to information provided to the [facility]. Please do not respond through this encounter as the response is not directed to a shared pool.         Reason for Disposition   Severe earache pain    Protocols used: EARACHE-ADULT-[place]

## 2022-03-10 NOTE — PROGRESS NOTES
Twan Wayne  Identified pt with two pt identifiers(name and ). Chief Complaint   Patient presents with    Ear Pain     left    Dizziness       Reviewed record In preparation for visit and have obtained necessary documentation. 1. Have you been to the ER, urgent care clinic or hospitalized since your last visit? No     2. Have you seen or consulted any other health care providers outside of the [address] [place] [address] since your last visit? Include any pap smears or colon screening. No    Vitals reviewed with provider.     Health Maintenance reviewed:     Health Maintenance Due   Topic    Eye Exam Retinal or Dilated           Wt Readings from Last 3 Encounters:   03/10/22 217 lb (98.4 kg)   21 211 lb 6.7 oz (95.9 kg)   21 209 lb 11.2 oz (95.1 kg)        Temp Readings from Last 3 Encounters:   03/10/22 98.4 °F (36.9 °C) (Oral)   21 98.8 °F (37.1 °C)   10/27/21 98.3 °F (36.8 °C)        BP Readings from Last 3 Encounters:   03/10/22 (!) 164/75   21 (!) 152/49   21 (!) 108/56        Pulse Readings from Last 3 Encounters:   03/10/22 66   21 (!) 56   21 (!) 39        Vitals:    03/10/22 1016 03/10/22 1022   BP: (!) 166/65 (!) 164/75   Pulse: 66    Resp: 12    Temp: 98.4 °F (36.9 °C)    TempSrc: Oral    SpO2: 96%    Weight: 217 lb (98.4 kg)    Height: 5' 9\" (1.753 m)    PainSc:   6    PainLoc: Ear           Learning Assessment:   :       Learning Assessment 2020 10/5/2017 2014   PRIMARY LEARNER Patient Patient Patient   HIGHEST LEVEL OF EDUCATION - PRIMARY LEARNER  - - DID NOT GRADUATE HIGH SCHOOL   BARRIERS PRIMARY LEARNER - - NONE   CO-LEARNER CAREGIVER - - No   PRIMARY LANGUAGE ENGLISH ENGLISH ENGLISH    NEED - - No   LEARNER PREFERENCE PRIMARY DEMONSTRATION LISTENING LISTENING     - DEMONSTRATION -     - READING -   ANSWERED BY self patient Patient   RELATIONSHIP SELF SELF SELF        Depression Screening:   :       3 most recent PHQ Screens 1/14/2022   PHQ Not Done -   Little interest or pleasure in doing things Not at all   Feeling down, depressed, irritable, or hopeless Not at all   Total Score PHQ 2 0        Fall Risk Assessment:   :       Fall Risk Assessment, last 12 mths 12/27/2021   Able to walk? Yes   Fall in past 12 months? 0   Do you feel unsteady? 0   Are you worried about falling 0   Fall with injury? -        Abuse Screening:   :       Abuse Screening Questionnaire 9/14/2021 1/7/2021 2/13/2020 10/9/2019 7/9/2019 9/4/2018 7/3/2018   Do you ever feel afraid of your partner? N N N N N N N   Are you in a relationship with someone who physically or mentally threatens you? N N N N N N N   Is it safe for you to go home?  Y Y Y Y Y Y Y        ADL Screening:   :       ADL Assessment 9/14/2021   Feeding yourself No Help Needed   Getting from bed to chair No Help Needed   Getting dressed No Help Needed   Bathing or showering No Help Needed   Walk across the room (includes cane/walker) No Help Needed   Using the telphone No Help Needed   Taking your medications No Help Needed   Preparing meals No Help Needed   Managing money (expenses/bills) No Help Needed   Moderately strenuous housework (laundry) No Help Needed   Shopping for personal items (toiletries/medicines) No Help Needed   Shopping for groceries No Help Needed   Driving No Help Needed   Climbing a flight of stairs No Help Needed   Getting to places beyond walking distances No Help Needed

## 2022-03-14 RX ORDER — FUROSEMIDE 20 MG/1
TABLET ORAL
Qty: 30 TABLET | Refills: 0 | Status: SHIPPED | OUTPATIENT
Start: 2022-03-14 | End: 2022-03-21

## 2022-03-18 PROBLEM — N18.30 TYPE 2 DIABETES MELLITUS WITH STAGE 3 CHRONIC KIDNEY DISEASE, WITHOUT LONG-TERM CURRENT USE OF INSULIN (HCC): Status: ACTIVE | Noted: 2017-10-08

## 2022-03-18 PROBLEM — E11.22 TYPE 2 DIABETES MELLITUS WITH STAGE 3 CHRONIC KIDNEY DISEASE, WITHOUT LONG-TERM CURRENT USE OF INSULIN (HCC): Status: ACTIVE | Noted: 2017-10-08

## 2022-03-18 PROBLEM — I48.0 PAF (PAROXYSMAL ATRIAL FIBRILLATION) (HCC): Status: ACTIVE | Noted: 2021-01-07

## 2022-03-19 PROBLEM — D63.8 ANEMIA OF CHRONIC DISEASE: Status: ACTIVE | Noted: 2020-09-17

## 2022-03-19 PROBLEM — C61 PROSTATE CANCER (HCC): Status: ACTIVE | Noted: 2017-10-08

## 2022-03-19 PROBLEM — M16.12 PRIMARY OSTEOARTHRITIS OF LEFT HIP: Status: ACTIVE | Noted: 2021-08-20

## 2022-03-19 PROBLEM — J44.9 COPD (CHRONIC OBSTRUCTIVE PULMONARY DISEASE) (HCC): Status: ACTIVE | Noted: 2017-10-08

## 2022-03-19 PROBLEM — Z72.0 SMOKELESS TOBACCO USE: Status: ACTIVE | Noted: 2017-07-25

## 2022-03-19 PROBLEM — M85.89 OSTEOPENIA OF MULTIPLE SITES: Status: ACTIVE | Noted: 2021-04-04

## 2022-03-19 PROBLEM — E66.9 OBESITY (BMI 30-39.9): Status: ACTIVE | Noted: 2017-10-08

## 2022-03-20 PROBLEM — M54.50 CHRONIC LOW BACK PAIN: Status: ACTIVE | Noted: 2017-10-08

## 2022-03-20 PROBLEM — B37.2 CANDIDAL INTERTRIGO: Status: ACTIVE | Noted: 2018-11-05

## 2022-03-20 PROBLEM — I48.92 ATRIAL FLUTTER (HCC): Status: ACTIVE | Noted: 2017-08-22

## 2022-03-20 PROBLEM — G89.29 CHRONIC LOW BACK PAIN: Status: ACTIVE | Noted: 2017-10-08

## 2022-03-21 ENCOUNTER — TELEPHONE (OUTPATIENT)
Dept: CARDIOLOGY CLINIC | Age: 82
End: 2022-03-21

## 2022-03-21 DIAGNOSIS — I10 ESSENTIAL HYPERTENSION: ICD-10-CM

## 2022-03-21 DIAGNOSIS — I48.0 PAF (PAROXYSMAL ATRIAL FIBRILLATION) (HCC): ICD-10-CM

## 2022-03-21 DIAGNOSIS — I50.42 CHRONIC COMBINED SYSTOLIC AND DIASTOLIC CONGESTIVE HEART FAILURE (HCC): Primary | ICD-10-CM

## 2022-03-21 DIAGNOSIS — I25.10 CORONARY ARTERY DISEASE INVOLVING NATIVE CORONARY ARTERY OF NATIVE HEART WITHOUT ANGINA PECTORIS: ICD-10-CM

## 2022-03-21 DIAGNOSIS — I42.8 NICM (NONISCHEMIC CARDIOMYOPATHY) (HCC): ICD-10-CM

## 2022-03-21 DIAGNOSIS — E78.2 MIXED HYPERLIPIDEMIA: ICD-10-CM

## 2022-03-21 RX ORDER — FUROSEMIDE 20 MG/1
TABLET ORAL
Qty: 30 TABLET | Refills: 0 | Status: SHIPPED | OUTPATIENT
Start: 2022-03-21

## 2022-03-21 NOTE — TELEPHONE ENCOUNTER
----- Message from Mahin Lazo NP sent at 3/21/2022  9:27 AM EDT -----  Can you call patient and ask if any repeat BMP since he went to ED in December. His kidney fxn was up at 1.9 then, went there c/o abdominal pain. Just want to make sure it has improved/back to baseline as he is on lasix and lisinopril        Called pt but no answer, could not leave a message. Called pt,verified pt with two pt identifiers, asked pt if any labs done since his ER visit in December, pt advised no. I looked in cc and no recent labs within [facility] since then. Advised we would like to get a BMP on him to check on his kidney fxn and make sure he is back to normal/baseline for him since he is on lasix and lisinopril. Verified lab vilma and pt can go any time he wants. He does not need to fast or need a lab slip. Pt verbalized understanding. Put lab order in cc.

## 2022-03-23 DIAGNOSIS — I48.0 PAF (PAROXYSMAL ATRIAL FIBRILLATION) (HCC): Primary | ICD-10-CM

## 2022-03-23 DIAGNOSIS — N28.9 RENAL INSUFFICIENCY: ICD-10-CM

## 2022-03-25 ENCOUNTER — TELEPHONE (OUTPATIENT)
Dept: CARDIOLOGY CLINIC | Age: 82
End: 2022-03-25

## 2022-03-25 LAB
BUN SERPL-MCNC: 19 MG/DL (ref 8–27)
BUN/CREAT SERPL: 14 (ref 10–24)
CALCIUM SERPL-MCNC: 9.3 MG/DL (ref 8.6–10.2)
CHLORIDE SERPL-SCNC: 106 MMOL/L (ref 96–106)
CO2 SERPL-SCNC: 21 MMOL/L (ref 20–29)
CREAT SERPL-MCNC: 1.35 MG/DL (ref 0.76–1.27)
EGFR: 53 ML/MIN/1.73
GLUCOSE SERPL-MCNC: 99 MG/DL (ref 65–99)
INTERPRETATION: NORMAL
POTASSIUM SERPL-SCNC: 4.3 MMOL/L (ref 3.5–5.2)
SODIUM SERPL-SCNC: 144 MMOL/L (ref 134–144)

## 2022-03-25 NOTE — TELEPHONE ENCOUNTER
----- Message from Channing Bolaños MD sent at 3/25/2022  1:21 PM EDT -----  BUN and Cr has improved compared to last blood work.

## 2022-03-28 NOTE — TELEPHONE ENCOUNTER
Patient instructed to take Xarelto 20 mg daily per Dr Jeff Dominguez instructions when he is down to 5 tablets call for coumadin to help with cost of medication.

## 2022-04-08 ENCOUNTER — TELEPHONE (OUTPATIENT)
Dept: CARDIOLOGY CLINIC | Age: 82
End: 2022-04-08

## 2022-04-08 NOTE — TELEPHONE ENCOUNTER
Spoke with patient. Verified with two patient identifiers. Spoke with pt he states that he was told to call when he was down to 5 pils of Xarelto and they would change him to Coumadin. Verified with encounter on 3/25/22, Dr. Ynes Lindsay did tell pt he could switch to coumadin as Xarelto is to expessive. Advised pt that we have him down on the schedule for Monday 4/11/22 at 10 with [facility] in the Coumadin clinic. Advised at that appt she will teach him how he is to take the medication and how often he will have to get his INR checked. Advise to continue to take Xarelto until after consult on Monday. Pt also wanted to know since he lives in [place] is there anywhere that he could go closer to get his INR checked when it is needed advised will discuss with him at consult on Monday. Patient verbalized understanding.

## 2022-04-08 NOTE — TELEPHONE ENCOUNTER
Patient called and said he is on blood thinner and was told to call because they are going to switch his medication.  Please call him at [address]

## 2022-04-08 NOTE — TELEPHONE ENCOUNTER
Jack White     Can you put pt on schedule for Monday 4/11/22 at 10am in Coumadin clinic with Aly. Aly already aware and pt was already informed of appt date and time. Thank You!

## 2022-04-11 ENCOUNTER — ANTI-COAG VISIT (OUTPATIENT)
Dept: CARDIOLOGY CLINIC | Age: 82
End: 2022-04-11
Payer: MEDICARE

## 2022-04-11 DIAGNOSIS — I48.0 PAF (PAROXYSMAL ATRIAL FIBRILLATION) (HCC): Primary | ICD-10-CM

## 2022-04-11 DIAGNOSIS — Z79.01 ENCOUNTER FOR CURRENT LONG-TERM USE OF ANTICOAGULANTS: ICD-10-CM

## 2022-04-11 LAB
INR BLD: 2 (ref 1–1.5)
PT POC: 24.4 SECONDS (ref 9.1–12)
VALID INTERNAL CONTROL?: YES

## 2022-04-11 PROCEDURE — 85610 PROTHROMBIN TIME: CPT | Performed by: INTERNAL MEDICINE

## 2022-04-11 RX ORDER — WARFARIN 3 MG/1
TABLET ORAL
Qty: 60 TABLET | Refills: 0 | Status: SHIPPED | OUTPATIENT
Start: 2022-04-11 | End: 2022-05-25 | Stop reason: SDUPTHER

## 2022-04-19 ENCOUNTER — TELEPHONE (OUTPATIENT)
Dept: CARDIOLOGY CLINIC | Age: 82
End: 2022-04-19

## 2022-04-19 ENCOUNTER — ANTI-COAG VISIT (OUTPATIENT)
Dept: CARDIOLOGY CLINIC | Age: 82
End: 2022-04-19
Payer: MEDICARE

## 2022-04-19 DIAGNOSIS — Z79.01 LONG TERM (CURRENT) USE OF ANTICOAGULANTS: Primary | ICD-10-CM

## 2022-04-19 DIAGNOSIS — I48.0 PAF (PAROXYSMAL ATRIAL FIBRILLATION) (HCC): ICD-10-CM

## 2022-04-19 LAB
INR BLD: 1.1 (ref 1–1.5)
PT POC: 13.5 SECONDS (ref 9.1–12)
VALID INTERNAL CONTROL?: YES

## 2022-04-19 PROCEDURE — 85610 PROTHROMBIN TIME: CPT | Performed by: INTERNAL MEDICINE

## 2022-04-19 NOTE — TELEPHONE ENCOUNTER
Received fax from [facility] asking for clearance on stopping his Coumadin 7-10 days before injection with INR <= 1.2 and will resume in 24 hours after. Pt is not scheduled for injection yet-they are asking for clearance first and then he will be scheduled. Message  Received: Today  Rahel Comer MD sent to Benton Mendoza LPN  Caller: Unspecified Siena Red,  3:16 PM)  Yes. Noted. Yolanda when would he be able to hold his Coumadin since he has just started back on it. I will wait to fax until I hear from you, thanks! Spoke to Levi and she advised no issues with stopping Coumadin since he just started. Faxed this encounter, stating clearance to [facility] at [phone] and received fax confirmation.

## 2022-05-04 ENCOUNTER — OFFICE VISIT (OUTPATIENT)
Dept: INTERNAL MEDICINE CLINIC | Age: 82
End: 2022-05-04
Payer: MEDICARE

## 2022-05-04 VITALS
SYSTOLIC BLOOD PRESSURE: 125 MMHG | HEIGHT: 69 IN | RESPIRATION RATE: 20 BRPM | DIASTOLIC BLOOD PRESSURE: 60 MMHG | OXYGEN SATURATION: 96 % | TEMPERATURE: 99 F | HEART RATE: 60 BPM | WEIGHT: 220.6 LBS | BODY MASS INDEX: 32.67 KG/M2

## 2022-05-04 DIAGNOSIS — L60.8 DEFORMITY OF TOENAIL: ICD-10-CM

## 2022-05-04 DIAGNOSIS — L03.039 INFECTION OF TOENAIL: ICD-10-CM

## 2022-05-04 DIAGNOSIS — B35.1 ONYCHOMYCOSIS: Primary | ICD-10-CM

## 2022-05-04 PROCEDURE — G8752 SYS BP LESS 140: HCPCS | Performed by: PHYSICIAN ASSISTANT

## 2022-05-04 PROCEDURE — 99213 OFFICE O/P EST LOW 20 MIN: CPT | Performed by: PHYSICIAN ASSISTANT

## 2022-05-04 PROCEDURE — G8427 DOCREV CUR MEDS BY ELIG CLIN: HCPCS | Performed by: PHYSICIAN ASSISTANT

## 2022-05-04 PROCEDURE — 1101F PT FALLS ASSESS-DOCD LE1/YR: CPT | Performed by: PHYSICIAN ASSISTANT

## 2022-05-04 PROCEDURE — G8754 DIAS BP LESS 90: HCPCS | Performed by: PHYSICIAN ASSISTANT

## 2022-05-04 PROCEDURE — G8536 NO DOC ELDER MAL SCRN: HCPCS | Performed by: PHYSICIAN ASSISTANT

## 2022-05-04 PROCEDURE — G8417 CALC BMI ABV UP PARAM F/U: HCPCS | Performed by: PHYSICIAN ASSISTANT

## 2022-05-04 PROCEDURE — G8432 DEP SCR NOT DOC, RNG: HCPCS | Performed by: PHYSICIAN ASSISTANT

## 2022-05-04 RX ORDER — L.ACIDOPH/L.BULG/B.BIF/S.THERM 1B-250 MG
1 TABLET ORAL 2 TIMES DAILY
Qty: 14 TABLET | Refills: 0 | Status: SHIPPED | OUTPATIENT
Start: 2022-05-04 | End: 2022-05-16 | Stop reason: ALTCHOICE

## 2022-05-04 RX ORDER — CEPHALEXIN 500 MG/1
500 CAPSULE ORAL 3 TIMES DAILY
Qty: 21 CAPSULE | Refills: 0 | Status: SHIPPED | OUTPATIENT
Start: 2022-05-04 | End: 2022-05-16 | Stop reason: ALTCHOICE

## 2022-05-04 NOTE — PROGRESS NOTES
Chief Complaint   Patient presents with    Other     Right great toenail coming off, pain 8/10     3 most recent PHQ Screens 5/4/2022   PHQ Not Done -   Little interest or pleasure in doing things Not at all   Feeling down, depressed, irritable, or hopeless Not at all   Total Score PHQ 2 0     Abuse Screening Questionnaire 5/4/2022   Do you ever feel afraid of your partner? N   Are you in a relationship with someone who physically or mentally threatens you? N   Is it safe for you to go home?  Y     Visit Vitals  /60 (BP 1 Location: Right upper arm, BP Patient Position: Sitting, BP Cuff Size: Large adult)   Pulse 60   Temp 99 °F (37.2 °C) (Oral)   Resp 20   Ht 5' 9\" (1.753 m)   Wt 220 lb 9.6 oz (100.1 kg)   SpO2 96%   BMI 32.58 kg/m²

## 2022-05-04 NOTE — PATIENT INSTRUCTIONS
Take probiotic with along with antibiotic to prevent diarrhea      Toenail Fungus: Care Instructions  Overview  A nail that is infected by a fungus usually turns white or yellow. As the fungus spreads, the nail turns a darker color and gets thicker. And the nail edges start to turn ragged and crumble. A bad infection can cause pain, and the nail may pull away from the toe or finger. Nails that are exposed to moisture and warmth a lot are more likely to get infected by a fungus. This can happen from wearing sweaty shoes often and from walking barefoot on shower floors. Or it can happen if you share personal things, such as towels and nail clippers. It's hard to treat nail fungus. And the infection can return after it has cleared up. But medicines can sometimes get rid of nail fungus for good. If the infection is very bad, or if it causes a lot of pain, you may need to have the nail removed. Follow-up care is a key part of your treatment and safety. Be sure to make and go to all appointments, and call your doctor if you are having problems. It's also a good idea to know your test results and keep a list of the medicines you take. How can you care for yourself at home? · Take your medicines exactly as prescribed. Call your doctor if you have any problems with your medicine. · If your doctor gave you a cream or liquid to put on your nail, use it exactly as directed. · Wash your hands and feet often, and wash your hands after touching your feet. · Keep your nails clean and dry. Dry your feet completely after you bathe and before you put on shoes and socks. · Keep your nails trimmed. · Change socks often. Wear dry socks that absorb moisture. · Don't go barefoot in public places. · Use a spray or powder that fights fungus on your feet and in your shoes. · Don't pick at the skin around your nails. · Don't use nail polish or fake nails on your nails.   · Don't share personal things, such as towels and nail clippers. When should you call for help? Call your doctor now or seek immediate medical care if:    · You have signs of infection, such as:  ? Increased pain, swelling, warmth, or redness. ? Red streaks leading from the site. ? Pus draining from the site. ? A fever.     · You have new or increased toe pain. Watch closely for changes in your health, and be sure to contact your doctor if:    · You do not get better as expected. Where can you learn more? Go to http://www.gray.com/  Enter D202 in the search box to learn more about \"Toenail Fungus: Care Instructions. \"  Current as of: November 15, 2021               Content Version: 13.2  © 2006-2022 [employer]. Care instructions adapted under license by [employer] (which disclaims liability or warranty for this information). If you have questions about a medical condition or this instruction, always ask your healthcare professional. [place] [address] any warranty or liability for your use of this information. Paronychia: Care Instructions  Overview  Paronychia (say \"ynrz-iw-MK-yogesh-uh\") is an inflammation of the skin around a fingernail or toenail. It happens when germs enter through a break in the skin. If you had an abscess, your doctor may have made a small cut in the infected area to drain the pus. Most cases of paronychia improve in a few days. But watch your symptoms and follow your doctor's advice. Though rare, a mild case can turn into something more serious and infect your entire finger or toe. Also, it is possible for an infection to return. Follow-up care is a key part of your treatment and safety. Be sure to make and go to all appointments, and call your doctor if you are having problems. It's also a good idea to know your test results and keep a list of the medicines you take. How can you care for yourself at home?   · If your doctor told you how to care for your infected nail, follow the doctor's instructions. If you did not get instructions, follow this general advice:  ? Wash the area with clean water 2 times a day. Don't use hydrogen peroxide or alcohol, which can slow healing. ? You may cover the area with a thin layer of petroleum jelly, such as Vaseline, and a nonstick bandage. ? Apply more petroleum jelly and replace the bandage as needed. · If your doctor prescribed antibiotics, take them as directed. Do not stop taking them just because you feel better. You need to take the full course of antibiotics. · Take an over-the-counter pain medicine, such as acetaminophen (Tylenol), ibuprofen (Advil, Motrin), or naproxen (Aleve). Read and follow all instructions on the label. · Do not take two or more pain medicines at the same time unless the doctor told you to. Many pain medicines have acetaminophen, which is Tylenol. Too much acetaminophen (Tylenol) can be harmful. · Prop up the toe or finger so that it is higher than the level of your heart. This will help with pain and swelling. · Apply heat. Put a warm water bottle, heating pad set on low, or warm cloth on your finger or toe. Do not go to sleep with a heating pad on your skin. · Soak the area in warm water twice a day for 15 minutes each time. After soaking, dry the area well and apply a thin layer of petroleum jelly, such as Vaseline. Put on a new bandage. When should you call for help? Call your doctor now or seek immediate medical care if:    · You have signs of new or worsening infection, such as:  ? Increased pain, swelling, warmth, or redness. ? Red streaks leading from the infected skin. ? Pus draining from the area. ? A fever. Watch closely for changes in your health, and be sure to contact your doctor if:    · You do not get better as expected. Where can you learn more?   Go to http://www.gray.com/  Enter C435 in the search box to learn more about \"Paronychia: Care Instructions. \"  Current as of: November 15, 2021               Content Version: 13.2  © 9365-6662 [employer], [facility]. Care instructions adapted under license by [employer] (which disclaims liability or warranty for this information). If you have questions about a medical condition or this instruction, always ask your healthcare professional. [place] [address] any warranty or liability for your use of this information.

## 2022-05-04 NOTE — PROGRESS NOTES
July Mcbride is a [de-identified]y.o. year old male seen in clinic today for   Chief Complaint   Patient presents with    Other     Right great toenail coming off, pain 8/10       he presents with 3 days of increasing R great toe pain and swelling. Notes he has toe nail fungus on R great toe nail and the nail broke 3 days ago. Has never seen podiatrist.     No fevers or chills. Current Outpatient Medications on File Prior to Visit   Medication Sig Dispense Refill    furosemide (LASIX) 20 mg tablet Take 1 tablet by mouth once daily 30 Tablet 0    lisinopriL (PRINIVIL, ZESTRIL) 2.5 mg tablet Take 1 tablet by mouth once daily 90 Tablet 0    atorvastatin (LIPITOR) 40 mg tablet Take 1 tablet by mouth nightly 90 Tablet 0    clotrimazole-betamethasone (LOTRISONE) topical cream APPLY  CREAM TOPICALLY TO AFFECTED AREA TWICE DAILY 15 g 0    carvediloL (COREG) 12.5 mg tablet TAKE 1 TABLET BY MOUTH TWICE DAILY WITH MEALS 180 Tablet 3    cetirizine (ZYRTEC) 10 mg tablet Take 1 Tab by mouth two (2) times daily as needed for Itching. 60 Tab 0    Nubeqa 300 mg tablet Take 600 mg by mouth two (2) times daily (with meals).  aspirin 81 mg chewable tablet Take 1 Tab by mouth daily. 10 Tab 0    warfarin (COUMADIN) 3 mg tablet Take one tablet daily. Start taking Coumadin this Wednesday 4/13  Indications: treatment to prevent blood clots in chronic atrial fibrillation (Patient taking differently: Monday through Friday 1/2 tablet; Saturday and Sunday 1 tablet  Indications: treatment to prevent blood clots in chronic atrial fibrillation) 60 Tablet 0    docusate sodium (STOOL SOFTENER PO) Take  by mouth. (Patient not taking: Reported on 5/4/2022)       No current facility-administered medications on file prior to visit.          Allergies   Allergen Reactions    Oxycodone Contact Dermatitis     Past Medical History:   Diagnosis Date    Acute respiratory failure with hypoxia ([facility] 02/08/2014    also 11/28/15, 2/17/16, 5/14/17     Alcohol abuse     As of 11/29/18 pt stated he quit 20 years    Arthritis     Back and shoulder    Atrial fibrillation ([address]     Atrial flutter ([address] 08/2017    saw Dr. Mai Lucas; underwent a-flutter ablation    BPH (benign prostatic hyperplasia)     CHF (congestive heart failure) ([address] 02/11/2014    TTE 11/15: EF 40-45%, 2/14: EF 20%  Admitted for acute exacerbations 2/8/14, 11/28/15, 2/17/16, and 5/4/17)    Chronic kidney disease     III    Chronic low back pain     LS spine X-ray 4/8/17: mild DDD    Combined forms of age-related cataract of left eye 12/12/2018    KPE/IOL OS    Combined forms of age-related cataract of right eye 11/28/2018    KPE/IOL OD    COPD (chronic obstructive pulmonary disease) ([address]     Diabetes ([address]     PA (dyspnea on exertion)     ED (erectile dysfunction)     Elevated troponin 02/11/2014    also 11/28/15; due to cardiac strain    Frequent hospital admissions     5/14-5/23/17: acute hypoxic resp failure due to CHF exac, ROBINSON on CKD 3, sepsis due to LE cellulitis, 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)     Leg fracture, right 1973    Nonischemic cardiomyopathy ([address]     with LVH    Orthostatic hypotension 4/13/2021    Pericardial effusion 11/10/2020    Pneumonia 02/08/2014 2/8/14 and 10/30/15    Poor historian     As of 11/29/18 pt reports 5th grade education, pt unable to give med list-obtained from wife per patient's permission    Prediabetes     Prostate cancer ([address] 2016    As of 11/29/18, pt states he gets two injections twice a year for this    Pulmonary edema 11/28/2015    S/P cardiac cath 2014 no significant coronary disease, severe LV dysfunction    Tinea cruris     also genital folliculitis    Vertigo       Past Surgical History:   Procedure Laterality Date    COLONOSCOPY N/A 3/2/2020    COLONOSCOPY performed by Narciso Balbuena MD at [facility] ENDOSCOPY. 2 tubular adenomas, diverticulosis, int hemorrhoids, repeat in 5 yrs    COLONOSCOPY,DIAGNOSTIC  2016    Dr. Missy Reese; single sessile polyp at descending colon, sigmoid diverticulosis, int hemorrhoids    COLONOSCOPY,REMV LESN,SNARE  2016         COLONOSCOPY,REMV LESN,SNARE  3/2/2020         HX CATARACT REMOVAL Right 2018    Dr. Chuck Alarcon Left 2018    Dr. Mellissa Rodriguez.  LEFT EYE SECOND REFRATIVE CATARACT REMOVAL WITH LENS IMPLANTATION    HX ORTHOPAEDIC Right 's    index finger    HX OTHER SURGICAL  2017    Dr. Beto Kimball; atrial flutter ablation    HX ROTATOR CUFF REPAIR Right     HX TONSILLECTOMY  1946    [place] CARDIAC SURG PROCEDURE UNLIST  2017    SVT ablation        Family History   Adopted: Yes   Family history unknown: Yes        Social History     Socioeconomic History    Marital status:      Spouse name: Not on file    Number of children: Not on file    Years of education: Not on file    Highest education level: Not on file   Occupational History    Not on file   Tobacco Use    Smoking status: Former Smoker     Packs/day: 2.00     Years: 20.00     Pack years: 40.00     Types: Cigarettes, Cigars     Quit date: 2019     Years since quittin.7    Smokeless tobacco: Current User     Types: Chew   Vaping Use    Vaping Use: Never used   Substance and Sexual Activity    Alcohol use: Not Currently     Alcohol/week: 0.0 standard drinks     Comment: As of 20, pt quit 20 years ago    Drug use: Yes     Types: Marijuana     Comment: not recently    Sexual activity: Not on file   Other Topics Concern    Not on file   Social History Narrative    Not on file     Social Determinants of Health     Financial Resource Strain:     Difficulty of Paying Living Expenses: Not on file   Food Insecurity:     Worried About Running Out of Food in the Last Year: Not on file    Natalia of Food in the Last Year: Not on file   Transportation Needs:     Lack of Transportation (Medical): Not on file    Lack of Transportation (Non-Medical): Not on file   Physical Activity:     Days of Exercise per Week: Not on file    Minutes of Exercise per Session: Not on file   Stress:     Feeling of Stress : Not on file   Social Connections:     Frequency of Communication with Friends and Family: Not on file    Frequency of Social Gatherings with Friends and Family: Not on file    Attends Synagogue Services: Not on file    Active Member of [address] [place] [address] Visual Threat or Organizations: Not on file    Attends Club or Organization Meetings: Not on file    Marital Status: Not on file   Intimate Partner Violence:     Fear of Current or Ex-Partner: Not on file    Emotionally Abused: Not on file    Physically Abused: Not on file    Sexually Abused: Not on file   Housing Stability:     Unable to Pay for Housing in the Last Year: Not on file    Number of [place] in the Last Year: Not on file    Unstable Housing in the Last Year: Not on file           Visit Vitals  /60 (BP 1 Location: Right upper arm, BP Patient Position: Sitting, BP Cuff Size: Large adult)   Pulse 60   Temp 99 °F (37.2 °C) (Oral)   Resp 20   Ht 5' 9\" (1.753 m)   Wt 220 lb 9.6 oz (100.1 kg)   SpO2 96%   BMI 32.58 kg/m²       Review of Systems   Constitutional: Negative for chills and fever. Musculoskeletal: Positive for myalgias. Skin: Negative for rash. Physical Exam  Vitals and nursing note reviewed. Constitutional:       Appearance: Normal appearance. He is obese. Comments: Walking with a cane   HENT:      Head: Normocephalic and atraumatic.       Right Ear: External ear normal.      Left Ear: External ear normal.      Nose: Nose normal.      Mouth/Throat:      Mouth: Mucous membranes are moist.   Eyes:      Conjunctiva/sclera: Conjunctivae normal.   Pulmonary:      Effort: Pulmonary effort is normal.   Musculoskeletal:      Cervical back: Normal range of motion and neck supple. Right lower leg: No edema. Left lower leg: No edema. Skin:     Capillary Refill: Capillary refill takes 2 to 3 seconds. Comments: R great toe with chronic thickening. Split transversely across mid nail from lateral to medial aspect of nail. No bleeding under nail. Paronychia present across cuticle of R great toe. Mild tenderness over paronychia. Neurological:      General: No focal deficit present. Mental Status: He is alert. ASSESSMENT AND PLAN:  Diagnoses and all orders for this visit:    1. Onychomycosis  -     REFERRAL TO PODIATRY    2. Deformity of toenail  -     REFERRAL TO PODIATRY    3. Infection of toenail  -     REFERRAL TO PODIATRY  -     cephALEXin (KEFLEX) 500 mg capsule; Take 1 Capsule by mouth three (3) times daily. -     acidophilus-Lb-Bb-S.thermophl (Bacid) 1 billion cell- 250 mg tab tablet; Take 1 Tablet by mouth two (2) times a day. Suspect onychomycosis caused nail damage. Will send to podiatry for nail care. Cover with antibiotic for suspected paronychia. Keflex TID due to hx of Diabetes. I have discussed the diagnosis with the patient and the intended plan as seen in the above orders. Patient is in agreement. The patient has received an after-visit summary and questions were answered concerning future plans. I have discussed medication side effects and warnings with the patient as well.     Carl Mcdonough PA-C

## 2022-05-13 DIAGNOSIS — Z79.01 LONG TERM (CURRENT) USE OF ANTICOAGULANTS: ICD-10-CM

## 2022-05-13 DIAGNOSIS — I48.0 PAF (PAROXYSMAL ATRIAL FIBRILLATION) (HCC): Primary | ICD-10-CM

## 2022-05-16 ENCOUNTER — OFFICE VISIT (OUTPATIENT)
Dept: INTERNAL MEDICINE CLINIC | Age: 82
End: 2022-05-16
Payer: MEDICARE

## 2022-05-16 VITALS
DIASTOLIC BLOOD PRESSURE: 68 MMHG | OXYGEN SATURATION: 96 % | RESPIRATION RATE: 12 BRPM | TEMPERATURE: 99.8 F | HEART RATE: 64 BPM | WEIGHT: 220 LBS | SYSTOLIC BLOOD PRESSURE: 136 MMHG | HEIGHT: 69 IN | BODY MASS INDEX: 32.58 KG/M2

## 2022-05-16 DIAGNOSIS — I10 PRIMARY HYPERTENSION: ICD-10-CM

## 2022-05-16 DIAGNOSIS — I50.42 CHRONIC COMBINED SYSTOLIC AND DIASTOLIC CONGESTIVE HEART FAILURE (HCC): ICD-10-CM

## 2022-05-16 DIAGNOSIS — I48.0 PAF (PAROXYSMAL ATRIAL FIBRILLATION) (HCC): ICD-10-CM

## 2022-05-16 DIAGNOSIS — R05.9 COUGH: ICD-10-CM

## 2022-05-16 DIAGNOSIS — E11.22 TYPE 2 DIABETES MELLITUS WITH STAGE 3B CHRONIC KIDNEY DISEASE, WITHOUT LONG-TERM CURRENT USE OF INSULIN (HCC): Primary | ICD-10-CM

## 2022-05-16 DIAGNOSIS — E78.2 MIXED HYPERLIPIDEMIA: ICD-10-CM

## 2022-05-16 DIAGNOSIS — N18.32 TYPE 2 DIABETES MELLITUS WITH STAGE 3B CHRONIC KIDNEY DISEASE, WITHOUT LONG-TERM CURRENT USE OF INSULIN (HCC): Primary | ICD-10-CM

## 2022-05-16 LAB — HBA1C MFR BLD HPLC: 5.6 %

## 2022-05-16 PROCEDURE — G8432 DEP SCR NOT DOC, RNG: HCPCS | Performed by: INTERNAL MEDICINE

## 2022-05-16 PROCEDURE — 83036 HEMOGLOBIN GLYCOSYLATED A1C: CPT | Performed by: INTERNAL MEDICINE

## 2022-05-16 PROCEDURE — 1101F PT FALLS ASSESS-DOCD LE1/YR: CPT | Performed by: INTERNAL MEDICINE

## 2022-05-16 PROCEDURE — G8427 DOCREV CUR MEDS BY ELIG CLIN: HCPCS | Performed by: INTERNAL MEDICINE

## 2022-05-16 PROCEDURE — 3044F HG A1C LEVEL LT 7.0%: CPT | Performed by: INTERNAL MEDICINE

## 2022-05-16 PROCEDURE — G8417 CALC BMI ABV UP PARAM F/U: HCPCS | Performed by: INTERNAL MEDICINE

## 2022-05-16 PROCEDURE — G8536 NO DOC ELDER MAL SCRN: HCPCS | Performed by: INTERNAL MEDICINE

## 2022-05-16 PROCEDURE — G8752 SYS BP LESS 140: HCPCS | Performed by: INTERNAL MEDICINE

## 2022-05-16 PROCEDURE — G8754 DIAS BP LESS 90: HCPCS | Performed by: INTERNAL MEDICINE

## 2022-05-16 PROCEDURE — 99214 OFFICE O/P EST MOD 30 MIN: CPT | Performed by: INTERNAL MEDICINE

## 2022-05-16 RX ORDER — GUAIFENESIN DEXTROMETHORPHAN HYDROBROMIDE ORAL SOLUTION 10; 100 MG/5ML; MG/5ML
10 SOLUTION ORAL
Qty: 118 ML | Refills: 0 | Status: SHIPPED | OUTPATIENT
Start: 2022-05-16 | End: 2022-09-19 | Stop reason: ALTCHOICE

## 2022-05-16 NOTE — PATIENT INSTRUCTIONS
Patient Instructions  You have swelling due to fluid at your right lower leg. 1. Take furosemide (Lasix) 20 mg 2 tablets a day on Tuesday (5/17/22), Wednesday (5/18/22), and Thursday (5/19/22). Go back to taking 1 tablet a day on Friday, 5/20/22 and after Friday. 2. Keep your legs raised when you are sitting down.

## 2022-05-16 NOTE — PROGRESS NOTES
CC:   Chief Complaint   Patient presents with    Diabetes    Hypertension    Cholesterol Problem    Cough       HISTORY OF PRESENT ILLNESS  Daryl Laureano is a [de-identified] y.o. male. Presents for 4 month follow up evaluation. He has HTN, diet-controlled type 2 DM, CKD stage 3, hyperlipidemia, CHF (combined diastolic and systolic, EF 50-72% in 4/88), non-ischemic cardiomyopathy, atrial flutter s/p AFL ablation on 8/22/17 and DCCV in 11/2020 , non-obstructive atherosclerotic heart disease, COPD, chronic low back pain, and prostate cancer on Nubeqa.     Complains of non-productive cough that occurs at nighttime. Has mild chronic PA. Denies fevers or chills. Requests cough syrup. Has diet-controlled diabetes. Denies polydipsia, polyuria, or hypoglycemia. Also denies numbness, tingling, or burning at feet. Home glucose monitoring: not done. A1c 5.6% today (5/16/22). Eye exam: UTD (Dr. Louann Romero)    Denies HA's, CP, dizziness, or heart palpitations. Follows with . Could no longer afford Xarelto so was recently changed to Coumadin. Saw and podiatrist. Was diagnosed with infection at his great toe along with onychomycosis. Treated with an antibiotic. Following at [facility] for lumbar radiculopathy and lumbar spondylosis. Receiving epidural steroid injections. ROS  A complete review of systems was performed and is negative except for those mentioned in the HPI.        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    NICM (nonischemic cardiomyopathy) ([facility] I42.8    Hypertension I10    Mixed hyperlipidemia E78.2    Smokeless tobacco use Z72.0    Atrial flutter ([facility]) I48.92    Type 2 diabetes mellitus with stage 3 chronic kidney disease, without long-term current use of insulin ([facility]) E11.22, N18.30    Pro[facility] C61    COPD (chronic obstructive pulmonary disease) ([facility]) J44.9    Chronic low back pain M54.50, G89.29    Obesity (BMI 30-39. 9) E66.9    Candidal intertrigo B37.2    Anemia of chronic disease D63.8    PAF (paroxysmal atrial fibrillation) ([facility]) I48.0    Osteopenia of multiple sites M85.89    Primary osteoarthritis of left hip M16.12     Past Medical History:   Diagnosis Date    Acute respiratory failure with hypoxia ([address] 02/08/2014    also 11/28/15, 2/17/16, 5/14/17     Alcohol abuse     As of 11/29/18 pt stated he quit 20 years    Arthritis     Back and shoulder    Atrial fibrillation ([address]     Atrial flutter ([facility] 08/2017    saw Dr. Jerrell Rees; underwent a-flutter ablation    BPH (benign prostatic hyperplasia)     CHF (congestive heart failure) ([address] 02/11/2014    TTE 11/15: EF 40-45%, 2/14: EF 20%  Admitted for acute exacerbations 2/8/14, 11/28/15, 2/17/16, and 5/4/17)    Chronic kidney disease     III    Chronic low back pain     LS spine X-ray 4/8/17: mild DDD    Combined forms of age-related cataract of left eye 12/12/2018    KPE/IOL OS    Combined forms of age-related cataract of right eye 11/28/2018    KPE/IOL OD    COPD (chronic obstructive pulmonary disease) ([address]     Diabetes ([address]     PA (dyspnea on exertion)     ED (erectile dysfunction)     Elevated troponin 02/11/2014    also 11/28/15; due to cardiac strain    Frequent hospital admissions     5/14-5/23/17: acute hypoxic resp failure due to CHF exac, ROBINSON on CKD 3, sepsis due to LE cellulitis, 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  2/17-2/22/16: acute hypoxic resp failure, acute diarrhea  11/28-11/30/15: acute hypoxic resp failure due to acute CHF exac, elevated troponin due to cardiac strain  2/8-2/12/14: acuter hypoxic resp failure due to CHF exac, pneumonia     Hand blister 10/8/2017    Bilateral    Hyperlipidemia     Hypertension     Kidney disease, chronic, stage III (GFR 30-59 ml/min) ([facility])     Leg fracture, right 1973    Nonischemic cardiomyopathy ([facility]     with LVH    Orthostatic hypotension 4/13/2021    Pericardial effusion 11/10/2020    Pneumonia 02/08/2014 2/8/14 and 10/30/15    Poor historian     As of 11/29/18 pt reports 5th grade education, pt unable to give med list-obtained from wife per patient's permission    Prediabetes     Prostate cancer ([facility] 2016    As of 11/29/18, pt states he gets two injections twice a year for this    Pulmonary edema 11/28/2015    S/P cardiac cath 2/12/2014 2/12/14 no significant coronary disease, severe LV dysfunction    Tinea cruris     also genital folliculitis    Vertigo      Allergies   Allergen Reactions    Oxycodone Contact Dermatitis       Current Outpatient Medications   Medication Sig Dispense Refill    clotrimazole-betamethasone (LOTRISONE) topical cream APPLY  CREAM TOPICALLY TO AFFECTED AREA TWICE DAILY 15 g 3    warfarin (COUMADIN) 3 mg tablet Take one tablet daily. Start taking Coumadin this Wednesday 4/13  Indications: treatment to prevent blood clots in chronic atrial fibrillation (Patient taking differently: Take 3 mg by mouth daily. Monday through Friday 1/2 tablet; Saturday and Sunday 1 tablet  Indications: treatment to prevent blood clots in chronic atrial fibrillation) 60 Tablet 0    furosemide (LASIX) 20 mg tablet Take 1 tablet by mouth once daily 30 Tablet 0    lisinopriL (PRINIVIL, ZESTRIL) 2.5 mg tablet Take 1 tablet by mouth once daily 90 Tablet 0    atorvastatin (LIPITOR) 40 mg tablet Take 1 tablet by mouth nightly 90 Tablet 0    carvediloL (COREG) 12.5 mg tablet TAKE 1 TABLET BY MOUTH TWICE DAILY WITH MEALS 180 Tablet 3    cetirizine (ZYRTEC) 10 mg tablet Take 1 Tab by mouth two (2) times daily as needed for Itching. 60 Tab 0    Nubeqa 300 mg tablet Take 600 mg by mouth two (2) times daily (with meals).  aspirin 81 mg chewable tablet Take 1 Tab by mouth daily.  10 Tab 0         PHYSICAL EXAM  Visit Vitals  /68   Pulse 64   Temp 99.8 °F (37.7 °C) (Oral)   Resp 12   Ht 5' 9\" (1.753 m)   Wt 220 lb (99.8 kg) SpO2 96%   BMI 32.49 kg/m²       General: Obese, no distress. HEENT:  Head normocephalic/atraumatic, no scleral icterus  Lungs:  Clear to ausculation bilaterally. Good air movement. Heart:  Irregularly irregular, normal S1 and S2, no murmur, gallop, or rub  Extremities: No clubbing or cyanosis. 2+ pitting edema at RLE and foot. Trace edema at LLE. Neurological: Alert and oriented. Psychiatric: Normal mood and affect. Behavior is normal.     Results for orders placed or performed in visit on 05/16/22   AMB POC HEMOGLOBIN A1C   Result Value Ref Range    Hemoglobin A1c (POC) 5.6 %         ASSESSMENT AND PLAN    ICD-10-CM ICD-9-CM    1. Type 2 diabetes mellitus with stage 3b chronic kidney disease, without long-term current use of insulin ([facility])  E11.22 250.40 AMB POC HEMOGLOBIN A1C    N18.32 585.3    2. Primary hypertension  I10 401.9    3. Mixed hyperlipidemia  E78.2 272.2    4. Cough  R05.9 786.2 guaiFENesin-dextromethorphan (Diabetic Tussin DM)  mg/5 mL liqd   5. PAF (paroxysmal atrial fibrillation) ([facility])  I48.0 427.31    6. Chronic combined systolic and diastolic congestive heart failure (HCC)  I50.42 428.42      428.0      Diagnoses and all orders for this visit:    1. Type 2 diabetes mellitus with stage 3b chronic kidney disease, without long-term current use of insulin ([facility])  A1c 5.6% today. Controlled by diet. -     AMB POC HEMOGLOBIN A1C    2. Primary hypertension  Controlled. Continue carvedilol and lisinopril. 3. Mixed hyperlipidemia  Controlled. 12/13/21: tot chol 119, LDL 31. Continue atorvastatin 40 mg nightly. 4. Cough  Likely due to COPD. -    Start  guaiFENesin-dextromethorphan (Diabetic Tussin DM)  mg/5 mL liqd; Take 10 mL by mouth nightly as needed for Cough. 5. PAF (paroxysmal atrial fibrillation) ([facility]    6. Chronic combined systolic and diastolic congestive heart failure (HCC)  Increased leg swelling but lungs clear.    Patient Instructions  You have swelling due to fluid at your right lower leg. 1. Take furosemide (Lasix) 20 mg 2 tablets a day on Tuesday (5/17/22), Wednesday (5/18/22), and Thursday (5/19/22). Go back to taking 1 tablet a day on Friday, 5/20/22 and after Friday. 2. Keep your legs raised when you are sitting down. Follow-up and Dispositions    · Return in about 4 months (around 9/16/2022), or if symptoms worsen or fail to improve, for Medicare AWV. I have discussed the diagnosis with the patient and the intended plan as seen in the above orders. Patient is in agreement. The patient has received an after-visit summary and questions were answered concerning future plans. I have discussed medication side effects and warnings with the patient as well.

## 2022-05-16 NOTE — PROGRESS NOTES
Twan Wayne  Identified pt with two pt identifiers(name and ). Chief Complaint   Patient presents with    Diabetes    Hypertension    Cholesterol Problem    Cough       Reviewed record In preparation for visit and have obtained necessary documentation. 1. Have you been to the ER, urgent care clinic or hospitalized since your last visit? No     2. Have you seen or consulted any other health care providers outside of the [address] [place] [address] since your last visit? Include any pap smears or colon screening. No    Vitals reviewed with provider.     Health Maintenance reviewed:     Health Maintenance Due   Topic    Eye Exam Retinal or Dilated           Wt Readings from Last 3 Encounters:   22 220 lb (99.8 kg)   22 220 lb 9.6 oz (100.1 kg)   03/10/22 217 lb (98.4 kg)        Temp Readings from Last 3 Encounters:   22 99.8 °F (37.7 °C) (Oral)   22 99 °F (37.2 °C) (Oral)   03/10/22 98.4 °F (36.9 °C) (Oral)        BP Readings from Last 3 Encounters:   22 (!) 157/74   22 125/60   03/10/22 (!) 164/75        Pulse Readings from Last 3 Encounters:   22 64   22 60   03/10/22 66        Vitals:    22 1311   BP: (!) 157/74   Pulse: 64   Resp: 12   Temp: 99.8 °F (37.7 °C)   TempSrc: Oral   SpO2: 96%   Weight: 220 lb (99.8 kg)   Height: 5' 9\" (1.753 m)   PainSc:   0 - No pain          Learning Assessment:   :       Learning Assessment 2020 10/5/2017 2014   PRIMARY LEARNER Patient Patient Patient   HIGHEST LEVEL OF EDUCATION - PRIMARY LEARNER  - - DID NOT GRADUATE HIGH SCHOOL   BARRIERS PRIMARY LEARNER - - NONE   CO-LEARNER CAREGIVER - - No   PRIMARY LANGUAGE ENGLISH ENGLISH ENGLISH    NEED - - No   LEARNER PREFERENCE PRIMARY DEMONSTRATION LISTENING LISTENING     - DEMONSTRATION -     - READING -   ANSWERED BY self patient Patient   RELATIONSHIP SELF SELF SELF        Depression Screening:   :       3 most recent [facility] 36 Screens 2022   PHQ Not Done -   Little interest or pleasure in doing things Not at all   Feeling down, depressed, irritable, or hopeless Not at all   Total Score PHQ 2 0        Fall Risk Assessment:   :       Fall Risk Assessment, last 12 mths 5/4/2022   Able to walk? Yes   Fall in past 12 months? 0   Do you feel unsteady? -   Are you worried about falling -   Fall with injury? -        Abuse Screening:   :       Abuse Screening Questionnaire 5/4/2022 9/14/2021 1/7/2021 2/13/2020 10/9/2019 7/9/2019 9/4/2018   Do you ever feel afraid of your partner? N N N N N N N   Are you in a relationship with someone who physically or mentally threatens you? N N N N N N N   Is it safe for you to go home?  Y Y Y Y Y Y Y        ADL Screening:   :       ADL Assessment 9/14/2021   Feeding yourself No Help Needed   Getting from bed to chair No Help Needed   Getting dressed No Help Needed   Bathing or showering No Help Needed   Walk across the room (includes cane/walker) No Help Needed   Using the telphone No Help Needed   Taking your medications No Help Needed   Preparing meals No Help Needed   Managing money (expenses/bills) No Help Needed   Moderately strenuous housework (laundry) No Help Needed   Shopping for personal items (toiletries/medicines) No Help Needed   Shopping for groceries No Help Needed   Driving No Help Needed   Climbing a flight of stairs No Help Needed   Getting to places beyond walking distances No Help Needed

## 2022-05-19 ENCOUNTER — ANTI-COAG VISIT (OUTPATIENT)
Dept: PHARMACY | Age: 82
End: 2022-05-19
Payer: MEDICARE

## 2022-05-19 ENCOUNTER — TELEPHONE (OUTPATIENT)
Dept: INTERNAL MEDICINE CLINIC | Age: 82
End: 2022-05-19

## 2022-05-19 VITALS
WEIGHT: 221 LBS | BODY MASS INDEX: 32.73 KG/M2 | HEART RATE: 60 BPM | DIASTOLIC BLOOD PRESSURE: 50 MMHG | HEIGHT: 69 IN | SYSTOLIC BLOOD PRESSURE: 118 MMHG

## 2022-05-19 DIAGNOSIS — I48.0 PAF (PAROXYSMAL ATRIAL FIBRILLATION) (HCC): Primary | ICD-10-CM

## 2022-05-19 LAB
INR BLD: 1.4
INR BLD: 1.4
PT POC: 17.2 SECONDS
PT POC: 17.2 SECONDS
VALID INTERNAL CONTROL?: YES
VALID INTERNAL CONTROL?: YES

## 2022-05-19 PROCEDURE — 99212 OFFICE O/P EST SF 10 MIN: CPT

## 2022-05-19 PROCEDURE — 85610 PROTHROMBIN TIME: CPT

## 2022-05-19 NOTE — PROGRESS NOTES
Pharmacy Progress Note - Anticoagulation Management    S/O:  Mr. Kitty Latham  is a [de-identified] y.o. male seen today for anticoagulation management for the diagnosis of Atrial Fibrillation. HPI: At last visit (4/19) INR was 1.1 and subtherapeutic per chart review. Interim History:    · Warfarin start date: Prior to 4/11/22 per chart review  · INR Goal:  2.0-3.0    · Current warfarin regimen: 3 mg daily   · Warfarin tablet strength:   3 mg   · Duration of therapy: Indefinite    Today's pertinent positives includes:  Medication change     Patient reported recently completing an antibiotic regimen. Results for orders placed or performed in visit on 05/19/22   AMB POC PT/INR   Result Value Ref Range    VALID INTERNAL CONTROL POC Yes     Prothrombin time (POC) 17.2 seconds    INR POC 1.4        · Adherence:   · Able to recall regimen? N/A  · Miss/extra dose? NO  · Need refill? NO    Patient stated that his warfarin was held for 3 days for a procedure, however, the procedure was cancelled since he was on an antibiotic. Patient reported resuming his warfarin at 3 mg daily on 5/11/22. Upcoming procedure(s):  YES    Patient reports an upcoming procedure with his provider. Procedure to be discussed further at upcoming clinic visit.     Past Medical History:   Diagnosis Date    Acute respiratory failure with hypoxia ([address] 02/08/2014    also 11/28/15, 2/17/16, 5/14/17     Alcohol abuse     As of 11/29/18 pt stated he quit 20 years    Arthritis     Back and shoulder    Atrial fibrillation ([address]     Atrial flutter ([address] 08/2017    saw Dr. Miriam Grant; underwent a-flutter ablation    BPH (benign prostatic hyperplasia)     CHF (congestive heart failure) ([address] 02/11/2014    TTE 11/15: EF 40-45%, 2/14: EF 20%  Admitted for acute exacerbations 2/8/14, 11/28/15, 2/17/16, and 5/4/17)    Chronic kidney disease     III    Chronic low back pain     LS spine X-ray 4/8/17: mild DDD    Combined forms of age-related cataract of left eye 12/12/2018    KPE/IOL OS    Combined forms of age-related cataract of right eye 11/28/2018    KPE/IOL OD    COPD (chronic obstructive pulmonary disease) (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)     Leg fracture, right 1973    Nonischemic cardiomyopathy ([facility]     with LVH    Orthostatic hypotension 4/13/2021    Pericardial effusion 11/10/2020    Pneumonia 02/08/2014 2/8/14 and 10/30/15    Poor historian     As of 11/29/18 pt reports 5th grade education, pt unable to give med list-obtained from wife per patient's permission    Prediabetes     Prostate cancer ([facility] 2016    As of 11/29/18, pt states he gets two injections twice a year for this    Pulmonary edema 11/28/2015    S/P cardiac cath 2/12/2014 2/12/14 no significant coronary disease, severe LV dysfunction    Tinea cruris     also genital folliculitis    Vertigo      Allergies   Allergen Reactions    Oxycodone Contact Dermatitis     Current Outpatient Medications   Medication Sig    guaiFENesin-dextromethorphan (Diabetic Tussin DM)  mg/5 mL liqd Take 10 mL by mouth nightly as needed for Cough.  clotrimazole-betamethasone (LOTRISONE) topical cream APPLY  CREAM TOPICALLY TO AFFECTED AREA TWICE DAILY    warfarin (COUMADIN) 3 mg tablet Take one tablet daily.  Start taking Coumadin this Wednesday 4/13  Indications: treatment to prevent blood clots in chronic atrial fibrillation (Patient taking differently: Take 3 mg by mouth daily. Monday through Friday 1/2 tablet; Saturday and Sunday 1 tablet  Indications: treatment to prevent blood clots in chronic atrial fibrillation)    furosemide (LASIX) 20 mg tablet Take 1 tablet by mouth once daily    lisinopriL (PRINIVIL, ZESTRIL) 2.5 mg tablet Take 1 tablet by mouth once daily    atorvastatin (LIPITOR) 40 mg tablet Take 1 tablet by mouth nightly    carvediloL (COREG) 12.5 mg tablet TAKE 1 TABLET BY MOUTH TWICE DAILY WITH MEALS    cetirizine (ZYRTEC) 10 mg tablet Take 1 Tab by mouth two (2) times daily as needed for Itching.  Nubeqa 300 mg tablet Take 600 mg by mouth two (2) times daily (with meals).  aspirin 81 mg chewable tablet Take 1 Tab by mouth daily. No current facility-administered medications for this visit.      Wt Readings from Last 3 Encounters:   05/19/22 221 lb (100.2 kg)   05/16/22 220 lb (99.8 kg)   05/04/22 220 lb 9.6 oz (100.1 kg)     BP Readings from Last 3 Encounters:   05/19/22 (!) 118/50   05/16/22 136/68   05/04/22 125/60     Pulse Readings from Last 3 Encounters:   05/19/22 60   05/16/22 64   05/04/22 60     Lab Results   Component Value Date/Time    WBC 4.6 12/31/2021 07:26 PM    HGB 9.0 (L) 12/31/2021 07:26 PM    HCT 29.0 (L) 12/31/2021 07:26 PM    PLATELET 97 (L) 47/56/9340 07:26 PM    MCV 82.4 12/31/2021 07:26 PM     Lab Results   Component Value Date/Time    Sodium 144 03/24/2022 09:41 AM    Potassium 4.3 03/24/2022 09:41 AM    Chloride 106 03/24/2022 09:41 AM    CO2 21 03/24/2022 09:41 AM    Anion gap 3 (L) 12/31/2021 07:26 PM    Glucose 99 03/24/2022 09:41 AM    BUN 19 03/24/2022 09:41 AM    Creatinine 1.35 (H) 03/24/2022 09:41 AM    BUN/Creatinine ratio 14 03/24/2022 09:41 AM    GFR est AA 41 (L) 12/31/2021 07:26 PM    GFR est non-AA 34 (L) 12/31/2021 07:26 PM    Calcium 9.3 03/24/2022 09:41 AM    Bilirubin, total 0.3 12/31/2021 07:26 PM    Alk. phosphatase 60 12/31/2021 07:26 PM    Protein, total 7.5 12/31/2021 07:26 PM    Albumin 3.2 (L) 12/31/2021 07:26 PM    Globulin 4.3 (H) 12/31/2021 07:26 PM    A-G Ratio 0.7 (L) 12/31/2021 07:26 PM    ALT (SGPT) 19 12/31/2021 07:26 PM     Estimated Creatinine Clearance: 50.8 mL/min (A) (by C-G formula based on SCr of 1.35 mg/dL (H)). INR History:   (normal INR range 0.8-1.2)     Date   INR   PT   Dose/Comments  05/19/22 1.4  17.2 3 mg daily      · Medications that can increase  INR: N/A  · Medications that can decrease INR: N/A    A/P:       Anticoagulation:  Considering Mr. Chris Sahu past history, todays findings, and per Anticoagulation Collaborative Practice Agreement/Protocol:    1. Fingerstick POC INR (1.4) is Subtherapeutic for INR goal today. 2.  Change warfarin to 6 mg today (5/19) and then 4.5 mg Fri, Sat; 3 mg daily ROW  3. INR is 1.4 and subtherapeutic.  Patient denies missed doses of warfarin. Patient reported recently completing an antibiotic regimen. Patient reports consistent intake of vitamin K foods. Over the past 7 days, patient has taken 21 mg of warfarin. Per protocol, patient will take extra dose today, 5/19 (6 mg total).  Will also increase weekly dose from 21 mg to 24 mg (~14%) and starting 5/20 patient will take 4.5 mg Fri, Sat; 3 mg daily ROW. Will recheck INR in 1 week. Patient was instructed to schedule an appointment in 1 week(s) prior to leaving the clinic. Medication reconciliation was completed during the visit. There are no discontinued medications. A full discussion of the nature of anticoagulants has been carried out. A full discussion of the need for frequent and regular monitoring, precise dosage adjustment and compliance was stressed. Side effects of potential bleeding were discussed and Mr. Ramo Chatman was instructed to call [phone] if there are any signs of abnormal bleeding.   Mr. Ramo Chatman was instructed to avoid any OTC items containing aspirin or ibuprofen and prior to starting any new OTC products to consult with his physician or pharmacist to ensure no drug interactions are present. Mr. Ramo Chatman was instructed to avoid any major changes in his general diet and to avoid alcohol consumption. Mr. Ramo Chatman was provided information in the AVS that includes topics on understanding coumadin therapy, drug interaction considerations, vitamin K and coumadin use, interactions with foods and supplements containing vitamin K, and the use of herbal products. Mr. Ramo Chatman verbalized his understanding of all instructions and will call the office with any questions, concerns, or signs of abnormal bleeding or blood clot. Notifications of recommendations will be sent to [facility] for review.     Thank you,  VENKAT [address] Tracking Only     Intervention Detail: Adherence Monitorin, Dose Adjustment: 1, reason: Therapy Optimization and Lab(s) Ordered   Total # of Interventions Recommended: 3   Total # of Interventions Accepted: 3   Time Spent (min): 20

## 2022-05-19 NOTE — PATIENT INSTRUCTIONS
Today your INR was 1.4 . Your goal INR is 2.0-3.0 . You have a 3 mg tablet of Coumadin (warfarin). Take Coumadin (warfarin) as follows: Take 6 mg today (5/19/22) and then take 4.5 mg (1.5 tablets) every Friday, Saturday; 3 mg daily rest of week. Come back in 1 week(s) for your next finger stick/INR blood test.        Avoid any over the counter items containing aspirin or ibuprofen, and avoid great swings in general diet. Avoid alcohol consumption. Please notify the INR nurse if you are started on any new medication including over the counter or herbal supplements. Also, please notify your INR nurse if any of your other prescription or over the counter medications have been discontinued. Call [facility] at [phone] if you have any signs of abnormal bleeding/blood clot.  ------------------------------------------------------------------------------------------------------------------  Taking Warfarin Safely: Care Instructions    Your Care Instructions  Warfarin is a medicine that you take to prevent blood clots. It is often called a blood thinner. Doctors give warfarin (such as Coumadin) to reduce the risk of blood clots. You may be at risk for blood clots if you have atrial fibrillation or deep vein thrombosis. Some other health problems may also put you at risk. Warfarin slows the amount of time it takes for your blood to clot. It can cause bleeding problems. Even if you've been taking warfarin for a while, it's important to know how to take it safely. Foods and other medicines can affect the way warfarin works. Some can make warfarin work too well. This can cause bleeding problems. And some can make it work poorly, so that it does not prevent blood clots very well. You will need regular blood tests to check how long it takes for your blood to form a clot. This test is called a PT or prothrombin time test. The result of the test is called an INR level.  Depending on the test results, your doctor or anticoagulation clinic may adjust your dose of warfarin. Follow-up care is a key part of your treatment and safety. Be sure to make and go to all appointments, and call your doctor if you are having problems. It's also a good idea to know your test results and keep a list of the medicines you take. How can you care for yourself at home? Take warfarin safely  · Take your warfarin at the same time each day. · If you miss a dose of warfarin, don't take an extra dose to make up for it. Your doctor can tell you exactly what to do so you don't take too much or too little. · Wear medical alert jewelry that lets others know that you take warfarin. You can buy this at most drugstores. · Don't take warfarin if you are pregnant or planning to get pregnant. Talk to your doctor about how you can prevent getting pregnant while you are taking it. · Don't change your dose or stop taking warfarin unless your doctor tells you to. Effects of medicines and food on warfarin  · Don't start or stop taking any medicines, vitamins, or natural remedies unless you first talk to your doctor. Many medicines can affect how warfarin works. These include aspirin and other pain relievers, over-the-counter medicines, multivitamins, dietary supplements, and herbal products. · Tell all of your doctors and pharmacists that you take warfarin. Some prescription medicines can affect how warfarin works. · Keep the amount of vitamin K in your diet about the same from day to day. Do not suddenly eat a lot more or a lot less food that is rich in vitamin K than you usually do. Vitamin K affects how warfarin works and how your blood clots. Talk with your doctor before making big changes in your diet. Vitamin K is in many foods, such as:  ¨ Leafy greens, such as kale, cabbage, spinach, Swiss chard, and lettuce. ¨ Canola and soybean oils. ¨ Green vegetables, such as asparagus, broccoli, and [place] sprouts.   ¨ Vegetable drinks, green tea leaves, and some dietary supplement drinks. · Avoid cranberry juice and other cranberry products. They can increase the effects of warfarin. · Limit your use of alcohol. Avoid bleeding by preventing falls and injuries  · Wear slippers or shoes with nonskid soles. · Remove throw rugs and clutter. · Rearrange furniture and electrical cords to keep them out of walking paths. · Keep stairways, porches, and outside walkways well lit. Use night-lights in hallways and bathrooms. · Be extra careful when you work with sharp tools or knives. When should you call for help? Call 911 anytime you think you may need emergency care. For example, call if:  · You have a sudden, severe headache that is different from past headaches. Call your doctor now or seek immediate medical care if:  · You have any abnormal bleeding, such as:  ¨ Nosebleeds. ¨ Vaginal bleeding that is different (heavier, more frequent, at a different time of the month) than what you are used to. ¨ Bloody or black stools, or rectal bleeding. ¨ Bloody or pink urine. Watch closely for changes in your health, and be sure to contact your doctor if you have any problems. Where can you learn more? Go to http://www.gray.com/. Enter N644 in the search box to learn more about \"Taking Warfarin Safely: Care Instructions. \"  Current as of: January 27, 2016  Content Version: 11.1  © 1975-6919 [employer]. Care instructions adapted under license by [employer] (which disclaims liability or warranty for this information). If you have questions about a medical condition or this instruction, always ask your healthcare professional. [place] [address] any warranty or liability for your use of this information.

## 2022-05-19 NOTE — TELEPHONE ENCOUNTER
Please call  Ms [address] [employer] [phone] or her cell [phone]  She wants to see if this patients INR can be Manage here

## 2022-05-20 NOTE — TELEPHONE ENCOUNTER
Pt asked if Dr Gisell Montana would start managing his coumadin, he has to travel 19 miles to [facility] and with gas prices she is closer.

## 2022-05-20 NOTE — TELEPHONE ENCOUNTER
Yes, that is okay. Have home health nurse fax INR results. If she did not check it this week, he should schedule a nurse visit next week for INR check.

## 2022-05-25 ENCOUNTER — CLINICAL SUPPORT (OUTPATIENT)
Dept: INTERNAL MEDICINE CLINIC | Age: 82
End: 2022-05-25
Payer: MEDICARE

## 2022-05-25 DIAGNOSIS — Z79.01 ENCOUNTER FOR CURRENT LONG-TERM USE OF ANTICOAGULANTS: ICD-10-CM

## 2022-05-25 DIAGNOSIS — I48.0 PAF (PAROXYSMAL ATRIAL FIBRILLATION) (HCC): ICD-10-CM

## 2022-05-25 DIAGNOSIS — I48.0 PAF (PAROXYSMAL ATRIAL FIBRILLATION) (HCC): Primary | ICD-10-CM

## 2022-05-25 LAB
INR BLD: 1.9
PT POC: 23.2 SECONDS
VALID INTERNAL CONTROL?: YES

## 2022-05-25 PROCEDURE — 85610 PROTHROMBIN TIME: CPT | Performed by: INTERNAL MEDICINE

## 2022-05-25 NOTE — PROGRESS NOTES
Larry Burk is a [de-identified] y.o. male who presents today for Anticoagulation monitoring. Indication: Atrial Fibrillation  INR Goal: 2.0-3.0. Current dose:  coumadin 3mg 1.5 tabs Fri, Sat 1 tab other 5 days  Missed Coumadin Doses:  None  Medication Changes:  no  Dietary Changes:  no    Symptoms: taking coumadin appropriately without any bleeding.     Latest INRs:  Lab Results   Component Value Date/Time    INR 1.1 12/23/2016 10:42 PM    INR 1.0 05/08/2015 02:03 AM    INR POC 1.9 05/25/2022 11:47 AM    INR POC 1.4 05/19/2022 10:47 AM    INR POC 1.1 04/19/2022 12:51 PM    Prothrombin time 10.6 12/23/2016 10:42 PM    Prothrombin time 10.2 05/08/2015 02:03 AM

## 2022-05-25 NOTE — PROGRESS NOTES
Your INR is too low at 1.9. Currently taking warfarin 3 mg 1.5 tabs Fri, Sat 1 tab other 5 days. Change to 1.5 tabs on Fri., Sat, and Sun and 1 tab other days. Repeat INR in 10 days.

## 2022-05-25 NOTE — TELEPHONE ENCOUNTER
PCP: Artemio Patricia MD     Last appt: 2022   Future Appointments   Date Time Provider Diana Avila   2022 11:40 AM NURSE ANNY [facility]   2022  3:00 PM Patricia Dacosta MD [facility]   2022 11:10 AM Artemio Patricia MD [facility]        Requested Prescriptions     Pending Prescriptions Disp Refills    warfarin (COUMADIN) 3 mg tablet 60 Tablet 0     Si.5 tabs on Fri., Sat, and Sun and 1 tab other days  Indications: treatment to prevent blood clots in chronic atrial fibrillation

## 2022-05-26 RX ORDER — WARFARIN 3 MG/1
TABLET ORAL
Qty: 60 TABLET | Refills: 0 | Status: SHIPPED | OUTPATIENT
Start: 2022-05-26 | End: 2022-07-20 | Stop reason: DRUGHIGH

## 2022-06-02 ENCOUNTER — CLINICAL SUPPORT (OUTPATIENT)
Dept: INTERNAL MEDICINE CLINIC | Age: 82
End: 2022-06-02
Payer: MEDICARE

## 2022-06-02 DIAGNOSIS — I48.0 PAF (PAROXYSMAL ATRIAL FIBRILLATION) (HCC): Primary | ICD-10-CM

## 2022-06-02 LAB
INR BLD: 1.7
PT POC: 20.7 SECONDS
VALID INTERNAL CONTROL?: YES

## 2022-06-02 PROCEDURE — 85610 PROTHROMBIN TIME: CPT | Performed by: INTERNAL MEDICINE

## 2022-06-02 NOTE — PROGRESS NOTES
INR still low. Instruct him to increase to Coumadin 3 mg 1.5 tabs daily except for 1 tab on Mondays and Fridays.

## 2022-06-02 NOTE — PROGRESS NOTES
Indira Waldrop is a [de-identified] y.o. male who presents today for Anticoagulation monitoring. Indication: Atrial Fibrillation  INR Goal: 2.0-3.0. Current dose:  Coumadin 3mg 1.5 tabs on Fri., Sat, and Sun and 1 tab other days. Missed Coumadin Doses:  None  Medication Changes:  no  Dietary Changes:  no    Symptoms: taking coumadin appropriately without any bleeding. Latest INRs:  Lab Results   Component Value Date/Time    INR 1.1 12/23/2016 10:42 PM    INR 1.0 05/08/2015 02:03 AM    INR POC 1.7 06/02/2022 12:04 PM    INR POC 1.9 05/25/2022 11:47 AM    INR POC 1.4 05/19/2022 10:47 AM    Prothrombin time 10.6 12/23/2016 10:42 PM    Prothrombin time 10.2 05/08/2015 02:03 AM      pt is scheduled for epidural injection on 6/21 and 6/22, has to stop coumadin 6-17 and restart on 6/22.

## 2022-06-09 ENCOUNTER — CLINICAL SUPPORT (OUTPATIENT)
Dept: INTERNAL MEDICINE CLINIC | Age: 82
End: 2022-06-09
Payer: MEDICARE

## 2022-06-09 DIAGNOSIS — I48.0 PAF (PAROXYSMAL ATRIAL FIBRILLATION) (HCC): Primary | ICD-10-CM

## 2022-06-09 LAB
INR BLD: 2.4
PT POC: 28.2 SECONDS
VALID INTERNAL CONTROL?: YES

## 2022-06-09 PROCEDURE — 85610 PROTHROMBIN TIME: CPT | Performed by: INTERNAL MEDICINE

## 2022-06-09 NOTE — PROGRESS NOTES
Enrique Lacy is a [de-identified] y.o. male who presents today for Anticoagulation monitoring. Indication: Atrial Fibrillation  INR Goal: 2.0-3.0. Current dose:  coumadin 3mg 1 tab on Mondays, Fridays and 1.5 tabs other 5 days. Missed Coumadin Doses:  None  Medication Changes:  no  Dietary Changes:  no    Symptoms: taking coumadin appropriately without any bleeding. Latest INRs:  Lab Results   Component Value Date/Time    INR 1.1 12/23/2016 10:42 PM    INR 1.0 05/08/2015 02:03 AM    INR POC 2.4 06/09/2022 11:36 AM    INR POC 1.7 06/02/2022 12:04 PM    INR POC 1.9 05/25/2022 11:47 AM    Prothrombin time 10.6 12/23/2016 10:42 PM    Prothrombin time 10.2 05/08/2015 02:03 AM        New Coumadin dose:.current treatment plan is effective, no change in therapy. Next check to be scheduled for  3 weeks. Pt is having a procedure and will be off coumadin form 6/17 - 6/21. Will check level one week after restarting (6/30). Saw his oncologist, was told he is anemic and needed to start taking iron. He will be scheduled for colonoscopy.

## 2022-06-30 ENCOUNTER — CLINICAL SUPPORT (OUTPATIENT)
Dept: INTERNAL MEDICINE CLINIC | Age: 82
End: 2022-06-30
Payer: MEDICARE

## 2022-06-30 DIAGNOSIS — I48.0 PAF (PAROXYSMAL ATRIAL FIBRILLATION) (HCC): Primary | ICD-10-CM

## 2022-06-30 LAB
INR BLD: 1.4
PT POC: 17 SECONDS
VALID INTERNAL CONTROL?: YES

## 2022-06-30 PROCEDURE — 85610 PROTHROMBIN TIME: CPT | Performed by: INTERNAL MEDICINE

## 2022-06-30 NOTE — PROGRESS NOTES
INR 1.4 today. He restarted warfarin a week ago on 6/23/22. Instruct him to continue on the same dose (coumadin 3 mg 1 tab on Mondays, Fridays and 1.5 tabs other 5 days) and return in 2 weeks for repeat INR.

## 2022-06-30 NOTE — PROGRESS NOTES
Julia Briggs is a [de-identified] y.o. male who presents today for Anticoagulation monitoring. Indication: Atrial Fibrillation  INR Goal: 2.0-3.0. Current dose:  coumadin 3mg 1 tab on Mondays, Fridays and 1.5 tabs other 5 days. Missed Coumadin Doses:  had a procedure and was off coumadin form 6/17 - 6/23  Medication Changes:  no  Dietary Changes:  no    Symptoms: taking coumadin appropriately without any bleeding.     Latest INRs:  Lab Results   Component Value Date/Time    INR 1.1 12/23/2016 10:42 PM    INR 1.0 05/08/2015 02:03 AM    INR POC 1.4 06/30/2022 11:10 AM    INR POC 2.4 06/09/2022 11:36 AM    INR POC 1.7 06/02/2022 12:04 PM    Prothrombin time 10.6 12/23/2016 10:42 PM    Prothrombin time 10.2 05/08/2015 02:03 AM

## 2022-07-14 ENCOUNTER — CLINICAL SUPPORT (OUTPATIENT)
Dept: INTERNAL MEDICINE CLINIC | Age: 82
End: 2022-07-14
Payer: MEDICARE

## 2022-07-14 DIAGNOSIS — I48.0 PAF (PAROXYSMAL ATRIAL FIBRILLATION) (HCC): Primary | ICD-10-CM

## 2022-07-14 LAB
INR BLD: 2
PT POC: 24.5 SECONDS
VALID INTERNAL CONTROL?: YES

## 2022-07-14 PROCEDURE — 85610 PROTHROMBIN TIME: CPT | Performed by: INTERNAL MEDICINE

## 2022-07-14 RX ORDER — WARFARIN 3 MG/1
TABLET ORAL
COMMUNITY
End: 2022-07-20 | Stop reason: SDUPTHER

## 2022-07-14 NOTE — PROGRESS NOTES
Steffen Livingston is a [de-identified] y.o. male who presents today for Anticoagulation monitoring. Indication: Atrial Fibrillation  INR Goal: 2.0-3.0. Current dose:  Coumadin 3mg 1 tab on Mondays, Fridays and 1.5 tabs other 5 days. Missed Coumadin Doses:  None  Medication Changes:  no  Dietary Changes:  no    Symptoms: taking coumadin appropriately without any bleeding. Latest INRs:  Lab Results   Component Value Date/Time    INR 1.1 12/23/2016 10:42 PM    INR 1.0 05/08/2015 02:03 AM    INR POC 2.0 07/14/2022 11:14 AM    INR POC 1.4 06/30/2022 11:10 AM    INR POC 2.4 06/09/2022 11:36 AM    Prothrombin time 10.6 12/23/2016 10:42 PM    Prothrombin time 10.2 05/08/2015 02:03 AM        New Coumadin dose:.current treatment plan is effective, no change in therapy. Next check to be scheduled for  2 weeks.

## 2022-07-20 DIAGNOSIS — I48.0 PAF (PAROXYSMAL ATRIAL FIBRILLATION) (HCC): ICD-10-CM

## 2022-07-20 DIAGNOSIS — Z79.01 ENCOUNTER FOR CURRENT LONG-TERM USE OF ANTICOAGULANTS: ICD-10-CM

## 2022-07-20 RX ORDER — WARFARIN 3 MG/1
TABLET ORAL
Qty: 60 TABLET | Refills: 0 | OUTPATIENT
Start: 2022-07-20

## 2022-07-20 RX ORDER — WARFARIN 3 MG/1
TABLET ORAL
Qty: 90 TABLET | Refills: 1 | Status: SHIPPED | OUTPATIENT
Start: 2022-07-20

## 2022-08-02 ENCOUNTER — TRANSCRIBE ORDER (OUTPATIENT)
Dept: GENERAL RADIOLOGY | Age: 82
End: 2022-08-02

## 2022-08-02 ENCOUNTER — HOSPITAL ENCOUNTER (OUTPATIENT)
Dept: GENERAL RADIOLOGY | Age: 82
Discharge: HOME | End: 2022-08-02
Attending: PHYSICIAN ASSISTANT
Payer: MEDICARE

## 2022-08-02 DIAGNOSIS — M25.551 RIGHT HIP PAIN: Primary | ICD-10-CM

## 2022-08-02 DIAGNOSIS — M25.551 RIGHT HIP PAIN: ICD-10-CM

## 2022-08-02 PROCEDURE — 73502 X-RAY EXAM HIP UNI 2-3 VIEWS: CPT

## 2022-09-14 LAB — CREATININE, EXTERNAL: 1.49

## 2022-09-19 ENCOUNTER — OFFICE VISIT (OUTPATIENT)
Dept: INTERNAL MEDICINE CLINIC | Age: 82
End: 2022-09-19
Payer: MEDICARE

## 2022-09-19 VITALS
DIASTOLIC BLOOD PRESSURE: 51 MMHG | RESPIRATION RATE: 12 BRPM | TEMPERATURE: 98.4 F | OXYGEN SATURATION: 97 % | WEIGHT: 217 LBS | BODY MASS INDEX: 32.14 KG/M2 | SYSTOLIC BLOOD PRESSURE: 111 MMHG | HEART RATE: 56 BPM | HEIGHT: 69 IN

## 2022-09-19 DIAGNOSIS — N18.32 TYPE 2 DIABETES MELLITUS WITH STAGE 3B CHRONIC KIDNEY DISEASE, WITHOUT LONG-TERM CURRENT USE OF INSULIN (HCC): ICD-10-CM

## 2022-09-19 DIAGNOSIS — Z13.31 SCREENING FOR DEPRESSION: ICD-10-CM

## 2022-09-19 DIAGNOSIS — I10 PRIMARY HYPERTENSION: ICD-10-CM

## 2022-09-19 DIAGNOSIS — E11.22 TYPE 2 DIABETES MELLITUS WITH STAGE 3B CHRONIC KIDNEY DISEASE, WITHOUT LONG-TERM CURRENT USE OF INSULIN (HCC): ICD-10-CM

## 2022-09-19 DIAGNOSIS — I48.0 PAF (PAROXYSMAL ATRIAL FIBRILLATION) (HCC): ICD-10-CM

## 2022-09-19 DIAGNOSIS — Z23 NEEDS FLU SHOT: ICD-10-CM

## 2022-09-19 DIAGNOSIS — Z00.00 MEDICARE ANNUAL WELLNESS VISIT, SUBSEQUENT: Primary | ICD-10-CM

## 2022-09-19 LAB
INR BLD: 1.6
PT POC: 19.2 SECONDS
VALID INTERNAL CONTROL?: YES

## 2022-09-19 PROCEDURE — 85610 PROTHROMBIN TIME: CPT | Performed by: INTERNAL MEDICINE

## 2022-09-19 PROCEDURE — G8427 DOCREV CUR MEDS BY ELIG CLIN: HCPCS | Performed by: INTERNAL MEDICINE

## 2022-09-19 PROCEDURE — G8510 SCR DEP NEG, NO PLAN REQD: HCPCS | Performed by: INTERNAL MEDICINE

## 2022-09-19 PROCEDURE — 1101F PT FALLS ASSESS-DOCD LE1/YR: CPT | Performed by: INTERNAL MEDICINE

## 2022-09-19 PROCEDURE — 90000 DEPRESSION SCREEN ANNUAL: CPT | Performed by: INTERNAL MEDICINE

## 2022-09-19 PROCEDURE — G8417 CALC BMI ABV UP PARAM F/U: HCPCS | Performed by: INTERNAL MEDICINE

## 2022-09-19 PROCEDURE — G0008 ADMIN INFLUENZA VIRUS VAC: HCPCS | Performed by: INTERNAL MEDICINE

## 2022-09-19 PROCEDURE — G8536 NO DOC ELDER MAL SCRN: HCPCS | Performed by: INTERNAL MEDICINE

## 2022-09-19 PROCEDURE — G8754 DIAS BP LESS 90: HCPCS | Performed by: INTERNAL MEDICINE

## 2022-09-19 PROCEDURE — 1123F ACP DISCUSS/DSCN MKR DOCD: CPT | Performed by: INTERNAL MEDICINE

## 2022-09-19 PROCEDURE — 90694 VACC AIIV4 NO PRSRV 0.5ML IM: CPT | Performed by: INTERNAL MEDICINE

## 2022-09-19 PROCEDURE — 3044F HG A1C LEVEL LT 7.0%: CPT | Performed by: INTERNAL MEDICINE

## 2022-09-19 PROCEDURE — G0439 PPPS, SUBSEQ VISIT: HCPCS | Performed by: INTERNAL MEDICINE

## 2022-09-19 PROCEDURE — 99214 OFFICE O/P EST MOD 30 MIN: CPT | Performed by: INTERNAL MEDICINE

## 2022-09-19 PROCEDURE — G8752 SYS BP LESS 140: HCPCS | Performed by: INTERNAL MEDICINE

## 2022-09-19 NOTE — PATIENT INSTRUCTIONS
Vaccine Information Statement    Influenza (Flu) Vaccine (Inactivated or Recombinant): What You Need to Know    Many vaccine information statements are available in [place] and other languages. See www.immunize.org/vis. Hojas de información sobre vacunas están disponibles en español y en muchos otros idiomas. Visite www.immunize.org/vis. 1. Why get vaccinated? Influenza vaccine can prevent influenza (flu). Flu is a contagious disease that spreads around the [facility] every year, usually between October and May. Anyone can get the flu, but it is more dangerous for some people. Infants and young children, people 72 years and older, pregnant people, and people with certain health conditions or a weakened immune system are at greatest risk of flu complications. Pneumonia, bronchitis, sinus infections, and ear infections are examples of flu-related complications. If you have a medical condition, such as heart disease, cancer, or diabetes, flu can make it worse. Flu can cause fever and chills, sore throat, muscle aches, fatigue, cough, headache, and runny or stuffy nose. Some people may have vomiting and diarrhea, though this is more common in children than adults. In an average year, thousands of people in the [facility] die from flu, and many more are hospitalized. Flu vaccine prevents millions of illnesses and flu-related visits to the doctor each year. 2. Influenza vaccines     CDC recommends everyone 6 months and older get vaccinated every flu season. Children 6 months through 6years of age may need 2 doses during a single flu season. Everyone else needs only 1 dose each flu season. It takes about 2 weeks for protection to develop after vaccination. There are many flu viruses, and they are always changing. Each year a new flu vaccine is made to protect against the influenza viruses believed to be likely to cause disease in the upcoming flu season.  Even when the vaccine doesnt exactly match these viruses, it may still provide some protection. Influenza vaccine does not cause flu. Influenza vaccine may be given at the same time as other vaccines. 3. Talk with your health care provider    Tell your vaccination provider if the person getting the vaccine:  Has had an allergic reaction after a previous dose of influenza vaccine, or has any severe, life-threatening allergies   Has ever had Guillain-Barré Syndrome (also called GBS)    In some cases, your health care provider may decide to postpone influenza vaccination until a future visit. Influenza vaccine can be administered at any time during pregnancy. People who are or will be pregnant during influenza season should receive inactivated influenza vaccine. People with minor illnesses, such as a cold, may be vaccinated. People who are moderately or severely ill should usually wait until they recover before getting influenza vaccine. Your health care provider can give you more information. 4. Risks of a vaccine reaction    Soreness, redness, and swelling where the shot is given, fever, muscle aches, and headache can happen after influenza vaccination. There may be a very small increased risk of Guillain-Barré Syndrome (GBS) after inactivated influenza vaccine (the flu shot). [place] children who get the flu shot along with pneumococcal vaccine (PCV13) and/or DTaP vaccine at the same time might be slightly more likely to have a seizure caused by fever. Tell your health care provider if a child who is getting flu vaccine has ever had a seizure. People sometimes faint after medical procedures, including vaccination. Tell your provider if you feel dizzy or have vision changes or ringing in the ears. As with any medicine, there is a very remote chance of a vaccine causing a severe allergic reaction, other serious injury, or death. 5. What if there is a serious problem?     An allergic reaction could occur after the vaccinated person leaves the clinic. If you see signs of a severe allergic reaction (hives, swelling of the face and throat, difficulty breathing, a fast heartbeat, dizziness, or weakness), call 9-1-1 and get the person to the nearest hospital.    For other signs that concern you, call your health care provider. Adverse reactions should be reported to the Vaccine Adverse Event Reporting System (VAERS). Your health care provider will usually file this report, or you can do it yourself. Visit the VAERS website at www.vaers. [facility].gov or call 1-661.635.2087. VAERS is only for reporting reactions, and VAERS staff members do not give medical advice. 6. The National Vaccine Injury Compensation Program    The [facility] Vaccine Injury Compensation Program (VICP) is a federal program that was created to compensate people who may have been injured by certain vaccines. Claims regarding alleged injury or death due to vaccination have a time limit for filing, which may be as short as two years. Visit the VICP website at www.[facility].gov/vaccinecompensation or call 3-422.812.1559 to learn about the program and about filing a claim. 7. How can I learn more? Ask your health care provider. Call your local or state health department. Visit the website of the Food and Drug Administration (FDA) for vaccine package inserts and additional information at www.fda.gov/vaccines-blood-biologics/vaccines. Contact the Centers for Disease Control and Prevention (CDC): Call 0-376.580.5700 (1-406-JZR-INFO) or  Visit CDCs influenza website at www.cdc.gov/flu. Vaccine Information Statement   Inactivated Influenza Vaccine   8/6/2021  [address] [address]   Department of Health and Human Services  Centers for Disease Control and Prevention    Office Use Only      Medicare Wellness Visit, Male    The best way to live healthy is to have a lifestyle where you eat a well-balanced diet, exercise regularly, limit alcohol use, and quit all forms of tobacco/nicotine, if applicable. Regular preventive services are another way to keep healthy. Preventive services (vaccines, screening tests, monitoring & exams) can help personalize your care plan, which helps you manage your own care. Screening tests can find health problems at the earliest stages, when they are easiest to treat. [facility] follows the current, evidence-based guidelines published by the [facility] [place] ([facility]) when recommending preventive services for our patients. Because we follow these guidelines, sometimes recommendations change over time as research supports it. (For example, a prostate screening blood test is no longer routinely recommended for men with no symptoms). Of course, you and your doctor may decide to screen more often for some diseases, based on your risk and co-morbidities (chronic disease you are already diagnosed with). Preventive services for you include:  - Medicare offers their members a free annual wellness visit, which is time for you and your primary care provider to discuss and plan for your preventive service needs. Take advantage of this benefit every year!  -All adults over age 72 should receive the recommended pneumonia vaccines. Current USPSTF guidelines recommend a series of two vaccines for the best pneumonia protection.   -All adults should have a flu vaccine yearly and tetanus vaccine every 10 years.  -All adults age 48 and older should receive the shingles vaccines (series of two vaccines).        -All adults age 38-68 who are overweight should have a diabetes screening test once every three years.   -Other screening tests & preventive services for persons with diabetes include: an eye exam to screen for diabetic retinopathy, a kidney function test, a foot exam, and stricter control over your cholesterol.   -Cardiovascular screening for adults with routine risk involves an electrocardiogram (ECG) at intervals determined by the provider.   -Colorectal cancer screening should be done for adults age 54-65 with no increased risk factors for colorectal cancer. There are a number of acceptable methods of screening for this type of cancer. Each test has its own benefits and drawbacks. Discuss with your provider what is most appropriate for you during your annual wellness visit. The different tests include: colonoscopy (considered the best screening method), a fecal occult blood test, a fecal DNA test, and sigmoidoscopy.  -All adults born between [facility] should be screened once for Hepatitis C.  -An Abdominal Aortic Aneurysm (AAA) Screening is recommended for men age 73-68 who has ever smoked in their lifetime.      Here is a list of your current Health Maintenance items (your personalized list of preventive services) with a due date:  Health Maintenance Due   Topic Date Due    Eye Exam  Never done    Yearly Flu Vaccine (1) 09/01/2022    Diabetic Foot Care  09/14/2022    Albumin Urine Test  09/14/2022

## 2022-09-19 NOTE — PROGRESS NOTES
CC:   Chief Complaint   Patient presents with    Annual Wellness Visit    Diabetes    Hypertension       HISTORY OF PRESENT ILLNESS  Claudia Ibarra is a [de-identified] y.o. male. Presents for Medicare AWV and 4 month follow up evaluation. He has HTN, diet-controlled type 2 DM, CKD stage 3, hyperlipidemia, CHF (combined diastolic and systolic, EF 33-25% in 5/63), non-ischemic cardiomyopathy, atrial flutter s/p AFL ablation on 8/22/17 and DCCV in 11/2020, non-obstructive atherosclerotic heart disease, COPD, chronic low back pain, and prostate cancer on Nubeqa. Mild PA with walking. Denies CP, dizziness, heart palpitations, orthopnea, or leg swelling. Has diet-controlled diabetes. Eats candy regularly. Last A1c 5.6% (5/16/22). Eye exam: due (Dr. Maryann Chapman)     Following at [facility] for lumbar radiculopathy and lumbar spondylosis. Received 8 epidural steroid injections without pain improvement. Patient Active Problem List   Diagnosis Code    Combined systolic and diastolic congestive heart failure (HCC) I50.40    NICM (nonischemic cardiomyopathy) ([facility] I42.8    Hypertension I10    Mixed hyperlipidemia E78.2    Smokeless tobacco use Z72.0    Atrial flutter (HCC) I48.92    Type 2 diabetes mellitus with stage 3 chronic kidney disease, without long-term current use of insulin ([facility]) E11.22, N18.30    Prostate cancer ([facility] C61    COPD (chronic obstructive pulmonary disease) ([facility]) J44.9    Chronic low back pain M54.50, G89.29    Obesity (BMI 30-39. 9) E66.9    Candidal intertrigo B37.2    Anemia of chronic disease D63.8    PAF (paroxysmal atrial fibrillation) ([facility]) I48.0    Osteopenia of multiple sites M85.89    Primary osteoarthritis of left hip M16.12     Past Medical History:   Diagnosis Date    Acute respiratory failure with hypoxia ([facility] 02/08/2014    also 11/28/15, 2/17/16, 5/14/17     Alcohol abuse     As of 11/29/18 pt stated he quit 20 years    Arthritis     Back and shoulder    Atrial fibrillation ([address]     Atrial flutter ([address] 08/2017    saw Dr. Linda Fernández; underwent a-flutter ablation    BPH (benign prostatic hyperplasia)     CHF (congestive heart failure) ([address] 02/11/2014    TTE 11/15: EF 40-45%, 2/14: EF 20%  Admitted for acute exacerbations 2/8/14, 11/28/15, 2/17/16, and 5/4/17)    Chronic kidney disease     III    Chronic low back pain     LS spine X-ray 4/8/17: mild DDD    Combined forms of age-related cataract of left eye 12/12/2018    KPE/IOL OS    Combined forms of age-related cataract of right eye 11/28/2018    KPE/IOL OD    COPD (chronic obstructive pulmonary disease) ([address]     Diabetes ([address]     PA (dyspnea on exertion)     ED (erectile dysfunction)     Elevated troponin 02/11/2014    also 11/28/15; due to cardiac strain    Frequent hospital admissions     5/14-5/23/17: acute hypoxic resp failure due to CHF exac, ROBINSON on CKD 3, sepsis due to LE cellulitis, 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  2/17-2/22/16: acute hypoxic resp failure, acute diarrhea  11/28-11/30/15: acute hypoxic resp failure due to acute CHF exac, elevated troponin due to cardiac strain  2/8-2/12/14: acuter hypoxic resp failure due to CHF exac, pneumonia     Hand blister 10/8/2017    Bilateral    Hyperlipidemia     Hypertension     Kidney disease, chronic, stage III (GFR 30-59 ml/min) ([facility])     Leg fracture, right 1973    Nonischemic cardiomyopathy ([address]     with LVH    Orthostatic hypotension 4/13/2021    Pericardial effusion 11/10/2020    Pneumonia 02/08/2014 2/8/14 and 10/30/15    Poor historian     As of 11/29/18 pt reports 5th grade education, pt unable to give med list-obtained from wife per patient's permission    Prediabetes     Prostate cancer ([address] 2016    As of 11/29/18, pt states he gets two injections twice a year for this    Pulmonary edema 11/28/2015    S/P cardiac cath 2/12/2014 2/12/14 no significant coronary disease, severe LV dysfunction    Tinea cruris     also genital folliculitis Vertigo      Allergies   Allergen Reactions    Oxycodone Contact Dermatitis       Current Outpatient Medications   Medication Sig Dispense Refill    warfarin (COUMADIN) 3 mg tablet Take 1 tab on Mondays and Fridays and 1.5 tabs on other 5 days of the week (Patient taking differently: Take 3 mg by mouth daily.) 90 Tablet 1    lisinopriL (PRINIVIL, ZESTRIL) 2.5 mg tablet Take 1 tablet by mouth once daily 90 Tablet 0    clotrimazole-betamethasone (LOTRISONE) topical cream APPLY  CREAM TOPICALLY TO AFFECTED AREA TWICE DAILY 15 g 3    furosemide (LASIX) 20 mg tablet Take 1 tablet by mouth once daily 30 Tablet 0    atorvastatin (LIPITOR) 40 mg tablet Take 1 tablet by mouth nightly 90 Tablet 0    carvediloL (COREG) 12.5 mg tablet TAKE 1 TABLET BY MOUTH TWICE DAILY WITH MEALS 180 Tablet 3    cetirizine (ZYRTEC) 10 mg tablet Take 1 Tab by mouth two (2) times daily as needed for Itching. 60 Tab 0    Nubeqa 300 mg tablet Take 600 mg by mouth two (2) times daily (with meals). aspirin 81 mg chewable tablet Take 1 Tab by mouth daily. 10 Tab 0         PHYSICAL EXAM  Visit Vitals  BP (!) 111/51 (BP 1 Location: Left upper arm, BP Patient Position: Sitting)   Pulse (!) 56   Temp 98.4 °F (36.9 °C) (Oral)   Resp 12   Ht 5' 9\" (1.753 m)   Wt 217 lb (98.4 kg)   SpO2 97%   BMI 32.05 kg/m²     General: Obese, no distress. HEENT:  Head normocephalic/atraumatic, no scleral icterus  Lungs:  Clear to ausculation bilaterally. Good air movement. Heart:  Irregularly irregular, normal S1 and S2, no murmur, gallop, or rub  Extremities: No clubbing, cyanosis, or edema. Neurological: Alert and oriented. Psychiatric: Normal mood and affect. Behavior is normal.       Results for orders placed or performed in visit on 09/19/22   AMB POC PT/INR   Result Value Ref Range    VALID INTERNAL CONTROL POC Yes     Prothrombin time (POC) 19.2 seconds    INR POC 1.6          ASSESSMENT AND PLAN    ICD-10-CM ICD-9-CM    1.  Medicare annual wellness visit, subsequent  Z00.00 V70.0       2. Type 2 diabetes mellitus with stage 3b chronic kidney disease, without long-term current use of insulin (HCC)  E11.22 250.40 MICROALBUMIN, UR, RAND W/ MICROALB/CREAT RATIO    N18.32 585.3 MICROALBUMIN, UR, RAND W/ MICROALB/CREAT RATIO      REFERRAL TO OPHTHALMOLOGY      3. Primary hypertension  I10 401.9       4. PAF (paroxysmal atrial fibrillation) (HCC)  I48.0 427.31 AMB POC PT/INR      5. Screening for depression  Z13.31 V79.0 DEPRESSION SCREEN ANNUAL      6. Needs flu shot  Z23 V04.81 INFLUENZA, FLUAD, (AGE 65 Y+), IM, PF, 0.5 ML        Diagnoses and all orders for this visit:    1. Medicare annual wellness visit, subsequent    2. Type 2 diabetes mellitus with stage 3b chronic kidney disease, without long-term current use of insulin (HCC)  -     MICROALBUMIN, UR, RAND W/ MICROALB/CREAT RATIO; Future  -     REFERRAL TO OPHTHALMOLOGY    3. Primary hypertension    4. PAF (paroxysmal atrial fibrillation) ([facility])  -     AMB POC PT/INR    5. Screening for depression  -     DEPRESSION SCREEN ANNUAL    6. Needs flu shot  -     INFLUENZA, FLUAD, (AGE 72 Y+), IM, PF, 0.5 ML    On chronic anticoagulation with warfarin for stroke prevention due to paroxysmal a-fib. He has not been taking warfarin at prescribed dose. Instructed him to take warfarin 3 mg 1 tab on Mon and Fridays and 1.5 tabs on the other days of the week. Return to clinic in 2-3 weeks for INR. Diet-controlled DM is at goal. Counseled on reducing intake of sweets. Urine microalbumin and dilated eye exam needed. HTN well-controlled; continue present medications. Plan foot exam at next appointment. Time Spent: 30 mins on medical record review, history, exam, discussing problems and plan, counseling, and placing orders. Follow-up and Dispositions    Return in about 4 months (around 1/19/2023), or if symptoms worsen or fail to improve, for HTN, chol, DM; have fasting labs 1 week before appointment.  Nursing visit in 2 wks for INR. .           I have discussed the diagnosis with the patient and the intended plan as seen in the above orders. Patient is in agreement. The patient has received an after-visit summary and questions were answered concerning future plans. I have discussed medication side effects and warnings with the patient as well.

## 2022-09-19 NOTE — PROGRESS NOTES
This is the Subsequent Medicare Annual Wellness Exam, performed 12 months or more after the Initial AWV or the last Subsequent AWV    I have reviewed the patient's medical history in detail and updated the computerized patient record. Assessment/Plan   Education and counseling provided:  Are appropriate based on today's review and evaluation  Influenza Vaccine    1. Medicare annual wellness visit, subsequent  2. Needs flu shot  -     INFLUENZA, FLUAD, (AGE 65 Y+), IM, PF, 0.5 ML  3. PAF (paroxysmal atrial fibrillation) (HCC)  -     AMB POC PT/INR  4. Screening for depression  -     DEPRESSION SCREEN ANNUAL       Depression Risk Factor Screening     3 most recent PHQ Screens 9/19/2022   PHQ Not Done -   Little interest or pleasure in doing things Not at all   Feeling down, depressed, irritable, or hopeless Not at all   Total Score PHQ 2 0       Alcohol & Drug Abuse Risk Screen    Do you average more than 1 drink per night or more than 7 drinks a week: No    In the past three months have you have had more than 4 drinks containing alcohol on one occasion: No          Functional Ability and Level of Safety    Hearing: Hearing is good. Activities of Daily Living: The home contains: grab bars  Patient does total self care      Ambulation: with difficulty, uses a cane     Fall Risk:  Fall Risk Assessment, last 12 mths 9/19/2022   Able to walk? Yes   Fall in past 12 months? 1   Do you feel unsteady? 0   Are you worried about falling 0   Fall with injury?  0      Abuse Screen:  Patient is not abused       Cognitive Screening    Has your family/caregiver stated any concerns about your memory: no     Cognitive Screening: Abnormal recall of only 1/3 words after 5 mins    Health Maintenance Due     Health Maintenance Due   Topic Date Due    Eye Exam Retinal or Dilated  Never done    Flu Vaccine (1) 09/01/2022    Foot Exam Q1  09/14/2022    MICROALBUMIN Q1  09/14/2022       Patient Care Team   Patient Care Team:  Florentino Morrow MD as PCP - General (Internal Medicine Physician)  Florentino Morrow MD as PCP - [address] [place] [address] Provider  Darlin Nguyen MD as Physician (Cardiovascular Disease Physician)  Bart Walters MD (Cardiovascular Disease Physician)  Bernard Patel MD (Urology)  Avi Hartman MD (Nephrology)  Claudean Schmid, DO (Ophthalmology)  MaySteve MD (Otolaryngology)  Tayler Bernal MD (Orthopedic Surgery)    History     Patient Active Problem List   Diagnosis Code    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 9) E66.9    Candidal intertrigo B37.2    Anemia of chronic disease D63.8    PAF (paroxysmal atrial fibrillation) ([facility]) I48.0    Osteopenia of multiple sites M85.89    Primary osteoarthritis of left hip M16.12     Past Medical History:   Diagnosis Date    Acute respiratory failure with hypoxia ([address] 02/08/2014    also 11/28/15, 2/17/16, 5/14/17     Alcohol abuse     As of 11/29/18 pt stated he quit 20 years    Arthritis     Back and shoulder    Atrial fibrillation ([address]     Atrial flutter ([address] 08/2017    saw Dr. Aditya Mendez; underwent a-flutter ablation    BPH (benign prostatic hyperplasia)     CHF (congestive heart failure) ([address] 02/11/2014    TTE 11/15: EF 40-45%, 2/14: EF 20%  Admitted for acute exacerbations 2/8/14, 11/28/15, 2/17/16, and 5/4/17)    Chronic kidney disease     III    Chronic low back pain     LS spine X-ray 4/8/17: mild DDD    Combined forms of age-related cataract of left eye 12/12/2018    KPE/IOL OS Combined forms of age-related cataract of right eye 11/28/2018    KPE/IOL OD    COPD (chronic obstructive pulmonary disease) ([facility])     Diabetes ([facility]     PA (dyspnea on exertion)     ED (erectile dysfunction)     Elevated troponin 02/11/2014    also 11/28/15; due to cardiac strain    Frequent hospital admissions     5/14-5/23/17: acute hypoxic resp failure due to CHF exac, ROBINSON on CKD 3, sepsis due to LE cellulitis, 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  2/17-2/22/16: acute hypoxic resp failure, acute diarrhea  11/28-11/30/15: acute hypoxic resp failure due to acute CHF exac, elevated troponin due to cardiac strain  2/8-2/12/14: acuter hypoxic resp failure due to CHF exac, pneumonia     Hand blister 10/8/2017    Bilateral    Hyperlipidemia     Hypertension     Kidney disease, chronic, stage III (GFR 30-59 ml/min) ([facility])     Leg fracture, right 1973    Nonischemic cardiomyopathy ([facility]     with LVH    Orthostatic hypotension 4/13/2021    Pericardial effusion 11/10/2020    Pneumonia 02/08/2014 2/8/14 and 10/30/15    Poor historian     As of 11/29/18 pt reports 5th grade education, pt unable to give med list-obtained from wife per patient's permission    Prediabetes     Prostate cancer ([facility] 2016    As of 11/29/18, pt states he gets two injections twice a year for this    Pulmonary edema 11/28/2015    S/P cardiac cath 2/12/2014 2/12/14 no significant coronary disease, severe LV dysfunction    Tinea cruris     also genital folliculitis    Vertigo       Past Surgical History:   Procedure Laterality Date    COLONOSCOPY N/A 3/2/2020    COLONOSCOPY performed by Mo Murcia MD at [facility] ENDOSCOPY. 2 tubular adenomas, diverticulosis, int hemorrhoids, repeat in 5 yrs    COLONOSCOPY,DIAGNOSTIC  02/22/2016    Dr. Rafael Daigle; single sessile polyp at descending colon, sigmoid diverticulosis, int hemorrhoids    COLONOSCOPY,REMV LESN,SNARE  2/22/2016         COLONOSCOPY,REMV LESN,SNARE  3/2/2020         HX CATARACT REMOVAL Right 12/05/2018    Dr. Kim Ng Left 12/12/2018    Dr. Gladys Farmer. LEFT EYE SECOND REFRATIVE CATARACT REMOVAL WITH LENS IMPLANTATION    HX ORTHOPAEDIC Right 1980's    index finger    HX OTHER SURGICAL  08/22/2017    Dr. Zhanna Childress; atrial flutter ablation    HX ROTATOR CUFF REPAIR Right 2000    HX TONSILLECTOMY  1948    SD CARDIAC SURG PROCEDURE UNLIST  08/22/2017    SVT ablation     Current Outpatient Medications   Medication Sig Dispense Refill    warfarin (COUMADIN) 3 mg tablet Take 1 tab on Mondays and Fridays and 1.5 tabs on other 5 days of the week (Patient taking differently: Take 3 mg by mouth daily.) 90 Tablet 1    lisinopriL (PRINIVIL, ZESTRIL) 2.5 mg tablet Take 1 tablet by mouth once daily 90 Tablet 0    clotrimazole-betamethasone (LOTRISONE) topical cream APPLY  CREAM TOPICALLY TO AFFECTED AREA TWICE DAILY 15 g 3    furosemide (LASIX) 20 mg tablet Take 1 tablet by mouth once daily 30 Tablet 0    atorvastatin (LIPITOR) 40 mg tablet Take 1 tablet by mouth nightly 90 Tablet 0    carvediloL (COREG) 12.5 mg tablet TAKE 1 TABLET BY MOUTH TWICE DAILY WITH MEALS 180 Tablet 3    cetirizine (ZYRTEC) 10 mg tablet Take 1 Tab by mouth two (2) times daily as needed for Itching. 60 Tab 0    Nubeqa 300 mg tablet Take 600 mg by mouth two (2) times daily (with meals). aspirin 81 mg chewable tablet Take 1 Tab by mouth daily.  10 Tab 0     Allergies   Allergen Reactions    Oxycodone Contact Dermatitis       Family History   Adopted: Yes   Family history unknown: Yes     Social History     Tobacco Use    Smoking status: Former     Packs/day: 2.00     Years: 20.00     Pack years: 40.00     Types: Cigarettes, Cigars     Quit date: 8/7/2019     Years since quitting: 3.1    Smokeless tobacco: Current     Types: Chew   Substance Use Topics    Alcohol use: Not Currently     Alcohol/week: 0.0 standard drinks     Comment: As of 11/29/18, pt quit 20 years ago         Vernon Antonio MD

## 2022-09-19 NOTE — ACP (ADVANCE CARE PLANNING)
Advance Care Planning     Advance Care Planning (ACP) Physician/NP/PA Conversation      Date of Conversation: 9/19/2022  Conducted with: Patient with Decision Making Capacity    Healthcare Decision Maker:     Primary Decision Maker: Kamila Rodriguez Spouse - [phone]    Secondary Decision Maker: Gasper Angela - Child - [phone]  Click here to complete [address] including selection of the Healthcare Decision Maker Relationship (ie \"Primary\")    Care Preferences:    Hospitalization: \"If your health worsens and it becomes clear that your chance of recovery is unlikely, what would be your preference regarding hospitalization? \"  The patient would prefer hospitalization. Ventilation: \"If you were unable to breathe on your own and your chance of recovery was unlikely, what would be your preference about the use of a ventilator (breathing machine) if it was available to you? \"   The patient would desire the use of a ventilator. Resuscitation: \"In the event your heart stopped as a result of an underlying serious health condition, would you want attempts to be made to restart your heart, or would you prefer a natural death? \"   Yes, attempt to resuscitate.     Additional topics discussed: treatment goals    Conversation Outcomes / Follow-Up Plan:   ACP in process - information provided, considering goals and options  Reviewed DNR/DNI and patient elects Full Code (Attempt Resuscitation)     Length of Voluntary ACP Conversation in minutes:  <16 minutes (Non-Billable)    Juel Jeans, MD

## 2022-09-19 NOTE — PROGRESS NOTES
Twan Wayne  Identified pt with two pt identifiers(name and ). Chief Complaint   Patient presents with    Annual Wellness Visit    Diabetes    Hypertension       Reviewed record In preparation for visit and have obtained necessary documentation. 1. Have you been to the ER, urgent care clinic or hospitalized since your last visit? No     2. Have you seen or consulted any other health care providers outside of the [address] [place] [address] since your last visit? Include any pap smears or colon screening. No    Vitals reviewed with provider. Health Maintenance reviewed: After verbal order read back of Dr Basilio Viramontes, patient received High Dose Flu Shot (Adjuvanted Fluad) in left deltoid. Amaury Fink 47: 96164-210-31 Lot: [id number] Exp: 2023. Patient tolerated procedure without complaints and received VIS. Health Maintenance Due   Topic    Eye Exam Retinal or Dilated     Flu Vaccine (1)    Foot Exam Q1     MICROALBUMIN Q1     Medicare Yearly Exam           Wt Readings from Last 3 Encounters:   22 221 lb (100.2 kg)   22 220 lb (99.8 kg)   22 220 lb 9.6 oz (100.1 kg)        Temp Readings from Last 3 Encounters:   22 99.8 °F (37.7 °C) (Oral)   22 99 °F (37.2 °C) (Oral)   03/10/22 98.4 °F (36.9 °C) (Oral)        BP Readings from Last 3 Encounters:   22 (!) 118/50   22 136/68   22 125/60        Pulse Readings from Last 3 Encounters:   22 60   22 64   22 60      There were no vitals filed for this visit.        Learning Assessment:   :       Learning Assessment 2020 10/5/2017 2014   PRIMARY LEARNER Patient Patient Patient   HIGHEST LEVEL OF EDUCATION - PRIMARY LEARNER  - - DID NOT GRADUATE HIGH SCHOOL   BARRIERS PRIMARY LEARNER - - NONE   CO-LEARNER CAREGIVER - - No   PRIMARY LANGUAGE ENGLISH ENGLISH ENGLISH    NEED - - No   LEARNER PREFERENCE PRIMARY DEMONSTRATION LISTENING LISTENING     - DEMONSTRATION -     - READING - ANSWERED BY self patient Patient   RELATIONSHIP SELF SELF SELF        Depression Screening:   :       3 most recent PHQ Screens 5/4/2022   PHQ Not Done -   Little interest or pleasure in doing things Not at all   Feeling down, depressed, irritable, or hopeless Not at all   Total Score PHQ 2 0        Fall Risk Assessment:   :       Fall Risk Assessment, last 12 mths 5/4/2022   Able to walk? Yes   Fall in past 12 months? 0   Do you feel unsteady? -   Are you worried about falling -   Fall with injury? -        Abuse Screening:   :       Abuse Screening Questionnaire 5/4/2022 9/14/2021 1/7/2021 2/13/2020 10/9/2019 7/9/2019 9/4/2018   Do you ever feel afraid of your partner? N N N N N N N   Are you in a relationship with someone who physically or mentally threatens you? N N N N N N N   Is it safe for you to go home?  Y Y Y Y Y Y Y        ADL Screening:   :       ADL Assessment 9/19/2022   Feeding yourself No Help Needed   Getting from bed to chair No Help Needed   Getting dressed No Help Needed   Bathing or showering No Help Needed   Walk across the room (includes cane/walker) No Help Needed   Using the telphone No Help Needed   Taking your medications No Help Needed   Preparing meals No Help Needed   Managing money (expenses/bills) No Help Needed   Moderately strenuous housework (laundry) No Help Needed   Shopping for personal items (toiletries/medicines) No Help Needed   Shopping for groceries No Help Needed   Driving No Help Needed   Climbing a flight of stairs No Help Needed   Getting to places beyond walking distances No Help Needed

## 2022-09-20 LAB
ALBUMIN/CREAT UR: <6 MG/G CREAT (ref 0–29)
CREAT UR-MCNC: 48.3 MG/DL
MICROALBUMIN UR-MCNC: <3 UG/ML

## 2022-10-11 ENCOUNTER — CLINICAL SUPPORT (OUTPATIENT)
Dept: INTERNAL MEDICINE CLINIC | Age: 82
End: 2022-10-11
Payer: MEDICARE

## 2022-10-11 DIAGNOSIS — I50.42 CHRONIC COMBINED SYSTOLIC AND DIASTOLIC CONGESTIVE HEART FAILURE (HCC): Primary | ICD-10-CM

## 2022-10-11 LAB
INR BLD: 2
PT POC: 29 SECONDS
VALID INTERNAL CONTROL?: YES

## 2022-10-11 PROCEDURE — 85610 PROTHROMBIN TIME: CPT | Performed by: INTERNAL MEDICINE

## 2022-10-11 RX ORDER — DIAZEPAM 5 MG/1
TABLET ORAL
COMMUNITY

## 2022-10-11 RX ORDER — TIZANIDINE 4 MG/1
TABLET ORAL
COMMUNITY

## 2022-10-11 RX ORDER — BENZONATATE 100 MG/1
CAPSULE ORAL
COMMUNITY
Start: 2022-10-10

## 2022-10-11 RX ORDER — PROMETHAZINE HYDROCHLORIDE AND DEXTROMETHORPHAN HYDROBROMIDE 6.25; 15 MG/5ML; MG/5ML
SYRUP ORAL
COMMUNITY
Start: 2022-10-10

## 2022-10-11 NOTE — PROGRESS NOTES
Per Dr. Vida Langford, pt notified verbally and written to continue same dose of warfarin 3mg pills one on Monday's and Fridays. One and 1.5 tabs the other days of the week. Pt stated understanding to directions. Pt Made an appointment to come back in one month for recheck.

## 2022-10-11 NOTE — PROGRESS NOTES
Genesis Durbin is a [de-identified] y.o. male who presents today for Anticoagulation monitoring. Indication: Atrial Fibrillation  INR Goal: 2.0-3.0. Current dose:  Coumadin 3 mg pills 1 pill Monday and Friday. 1.5 pills rest of the week. Missed Coumadin Doses:  None  Medication Changes:  yes - Benzonate 100mg, diazepam 5mg, promethazine 15mg/5ml  Dietary Changes:  no    Symptoms: taking coumadin appropriately without any bleeding. Latest INRs:  Lab Results   Component Value Date/Time    INR 1.1 12/23/2016 10:42 PM    INR 1.0 05/08/2015 02:03 AM    INR POC 1.6 09/19/2022 11:23 AM    INR POC 2.0 07/14/2022 11:14 AM    INR POC 1.4 06/30/2022 11:10 AM    Prothrombin time 10.6 12/23/2016 10:42 PM    Prothrombin time 10.2 05/08/2015 02:03 AM        New Coumadin dose:. .    Next check to be scheduled for  weeks.

## 2022-10-11 NOTE — PROGRESS NOTES
Goal Outcome Evaluation:  Plan of Care Reviewed With: patient  Progress: improving  Outcome Summary: Pt has had no complaints this shift, v/s stable, O2 weaned from 12L to 9L, will continue to monitor   INR at goal of 2.0-3.0. Continue same dose of warfarin 3 mg (1 tab on Mondays and Fridays and 1.5 tabs on the other 5 days of the week).

## 2022-10-20 PROBLEM — N18.31 TYPE 2 DIABETES MELLITUS WITH STAGE 3A CHRONIC KIDNEY DISEASE, WITHOUT LONG-TERM CURRENT USE OF INSULIN (HCC): Status: ACTIVE | Noted: 2017-10-08

## 2022-10-20 PROBLEM — E11.22 TYPE 2 DIABETES MELLITUS WITH STAGE 3A CHRONIC KIDNEY DISEASE, WITHOUT LONG-TERM CURRENT USE OF INSULIN (HCC): Status: ACTIVE | Noted: 2017-10-08

## 2022-11-08 NOTE — PROGRESS NOTES
Raulito Albright is a [de-identified] y.o. male here for new patient appt for abnormal lab work. Referred by Dr Ramana Boyer. Pt ambulates with cane. States he feels fine. No concerns brought up. 1. Have you been to the ER, urgent care clinic since your last visit? Hospitalized since your last visit? New Pt    2. Have you seen or consulted any other health care providers outside of the [address] [place] [address] since your last visit? Include any pap smears or colon screening.  New Pt

## 2022-11-10 ENCOUNTER — OFFICE VISIT (OUTPATIENT)
Dept: ONCOLOGY | Age: 82
End: 2022-11-10
Payer: MEDICARE

## 2022-11-10 VITALS
WEIGHT: 213.8 LBS | SYSTOLIC BLOOD PRESSURE: 148 MMHG | HEIGHT: 69 IN | BODY MASS INDEX: 31.67 KG/M2 | TEMPERATURE: 98.2 F | HEART RATE: 74 BPM | OXYGEN SATURATION: 96 % | DIASTOLIC BLOOD PRESSURE: 70 MMHG

## 2022-11-10 DIAGNOSIS — N18.30 ANEMIA, CHRONIC RENAL FAILURE, STAGE 3 (MODERATE) (HCC): Primary | ICD-10-CM

## 2022-11-10 DIAGNOSIS — C61 PROSTATE CANCER (HCC): ICD-10-CM

## 2022-11-10 DIAGNOSIS — D63.1 ANEMIA, CHRONIC RENAL FAILURE, STAGE 3 (MODERATE) (HCC): Primary | ICD-10-CM

## 2022-11-10 PROCEDURE — 1101F PT FALLS ASSESS-DOCD LE1/YR: CPT | Performed by: INTERNAL MEDICINE

## 2022-11-10 PROCEDURE — G8427 DOCREV CUR MEDS BY ELIG CLIN: HCPCS | Performed by: INTERNAL MEDICINE

## 2022-11-10 PROCEDURE — 3074F SYST BP LT 130 MM HG: CPT | Performed by: INTERNAL MEDICINE

## 2022-11-10 PROCEDURE — G8432 DEP SCR NOT DOC, RNG: HCPCS | Performed by: INTERNAL MEDICINE

## 2022-11-10 PROCEDURE — 3078F DIAST BP <80 MM HG: CPT | Performed by: INTERNAL MEDICINE

## 2022-11-10 PROCEDURE — G8753 SYS BP > OR = 140: HCPCS | Performed by: INTERNAL MEDICINE

## 2022-11-10 PROCEDURE — 99203 OFFICE O/P NEW LOW 30 MIN: CPT | Performed by: INTERNAL MEDICINE

## 2022-11-10 PROCEDURE — G0463 HOSPITAL OUTPT CLINIC VISIT: HCPCS | Performed by: INTERNAL MEDICINE

## 2022-11-10 PROCEDURE — G8536 NO DOC ELDER MAL SCRN: HCPCS | Performed by: INTERNAL MEDICINE

## 2022-11-10 PROCEDURE — G8754 DIAS BP LESS 90: HCPCS | Performed by: INTERNAL MEDICINE

## 2022-11-10 PROCEDURE — G8417 CALC BMI ABV UP PARAM F/U: HCPCS | Performed by: INTERNAL MEDICINE

## 2022-11-10 PROCEDURE — 1123F ACP DISCUSS/DSCN MKR DOCD: CPT | Performed by: INTERNAL MEDICINE

## 2022-11-10 NOTE — LETTER
11/20/2022    Patient: Maria Luz Sultana   YOB: 1940   Date of Visit: 11/10/2022     Ty David MD  [facility] [address]  [place]    Dear Ty David MD,      Thank you for referring Mr. Zeny Jorgensen to [address] [place] [address] for evaluation. My notes for this consultation are attached. If you have questions, please do not hesitate to call me. I look forward to following your patient along with you.       Sincerely,    Carol Greenfield MD

## 2022-11-11 ENCOUNTER — CLINICAL SUPPORT (OUTPATIENT)
Dept: INTERNAL MEDICINE CLINIC | Age: 82
End: 2022-11-11
Payer: MEDICARE

## 2022-11-11 DIAGNOSIS — I48.3 TYPICAL ATRIAL FLUTTER (HCC): Primary | ICD-10-CM

## 2022-11-11 LAB
INR BLD: 2.2
PT POC: 26 SECONDS
VALID INTERNAL CONTROL?: YES

## 2022-11-11 PROCEDURE — 85610 PROTHROMBIN TIME: CPT | Performed by: INTERNAL MEDICINE

## 2022-11-11 NOTE — PROGRESS NOTES
INR at goal. Continue on same dose of warfarin (3 mg, take 1 tab on Mondays and Fridays and 1.5 tabs on other 5 days of the week). Return in 1 month for repeat INR.

## 2022-11-11 NOTE — PROGRESS NOTES
Vernice Hatchet is a [de-identified] y.o. male who presents today for Anticoagulation monitoring. Indication: Atrial Fibrillation  INR Goal: 2.0-3.0. Current dose:  Coumadin 3 mg tablets. One Monday and Friday. One and half rest of the week  Missed Coumadin Doses:  None  Medication Changes:  no  Dietary Changes:  no    Symptoms: taking coumadin appropriately without any bleeding. Latest INRs:  Lab Results   Component Value Date/Time    INR 1.1 12/23/2016 10:42 PM    INR 1.0 05/08/2015 02:03 AM    INR POC 2.2 11/11/2022 11:40 AM    INR POC 2.0 10/11/2022 11:55 AM    INR POC 1.6 09/19/2022 11:23 AM    Prothrombin time 10.6 12/23/2016 10:42 PM    Prothrombin time 10.2 05/08/2015 02:03 AM        New Coumadin dose:. .    Next check to be scheduled for  4 weeks.

## 2022-11-21 NOTE — PROGRESS NOTES
[address] [facility] [address], [address] [facility], [address] [place] [place] [address]  [phone]    Hematology Note        Patient: Liz South MRN: [id number]  SSN: [id number]    YOB: 1940  Age: [de-identified] y.o. Sex: male      Subjective:      Liz South is a [de-identified] y.o. male who I am seeing on request of Dr. Yeison Reyes for anemia. 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 Routine labs show anemia and CKD. He denies any new symptoms.         Review of Systems:    Constitutional: negative  Eyes: negative  Ears, Nose, Mouth, Throat, and Face: negative  Respiratory: negative  Cardiovascular: negative  Gastrointestinal: negative  Genitourinary:negative  Integument/Breast: negative  Hematologic/Lymphatic: negative  Musculoskeletal:negative  Neurological: negative      Past Medical History:   Diagnosis Date    Acute respiratory failure with hypoxia ([address] 02/08/2014    also 11/28/15, 2/17/16, 5/14/17     Alcohol abuse     As of 11/29/18 pt stated he quit 20 years    Arthritis     Back and shoulder    Atrial fibrillation ([address]     Atrial flutter ([address] 08/2017    saw Dr. Junior Santos; underwent a-flutter ablation    BPH (benign prostatic hyperplasia)     CHF (congestive heart failure) ([address] 02/11/2014    TTE 11/15: EF 40-45%, 2/14: EF 20%  Admitted for acute exacerbations 2/8/14, 11/28/15, 2/17/16, and 5/4/17)    Chronic kidney disease     III    Chronic low back pain     LS spine X-ray 4/8/17: mild DDD    Combined forms of age-related cataract of left eye 12/12/2018    KPE/IOL OS    Combined forms of age-related cataract of right eye 11/28/2018    KPE/IOL OD    COPD (chronic obstructive pulmonary disease) ([address]     Diabetes ([address]     PA (dyspnea on exertion)     ED (erectile dysfunction)     Elevated troponin 02/11/2014    also 11/28/15; due to cardiac strain    Frequent hospital admissions     5/14-5/23/17: acute hypoxic resp failure due to CHF exac, ROBINSON on CKD 3, sepsis due to LE cellulitis, 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  2/17-2/22/16: acute hypoxic resp failure, acute diarrhea  11/28-11/30/15: acute hypoxic resp failure due to acute CHF exac, elevated troponin due to cardiac strain  2/8-2/12/14: acuter hypoxic resp failure due to CHF exac, pneumonia     Hand blister 10/8/2017    Bilateral    Hyperlipidemia     Hypertension     Kidney disease, chronic, stage III (GFR 30-59 ml/min) ([facility])     Leg fracture, right 1973    Nonischemic cardiomyopathy ([facility]     with LVH    Orthostatic hypotension 4/13/2021    Pericardial effusion 11/10/2020    Pneumonia 02/08/2014 2/8/14 and 10/30/15    Poor historian     As of 11/29/18 pt reports 5th grade education, pt unable to give med list-obtained from wife per patient's permission    Prediabetes     Prostate cancer ([facility] 2016    As of 11/29/18, pt states he gets two injections twice a year for this    Pulmonary edema 11/28/2015    S/P cardiac cath 2/12/2014 2/12/14 no significant coronary disease, severe LV dysfunction    Tinea cruris     also genital folliculitis    Vertigo      Past Surgical History:   Procedure Laterality Date    COLONOSCOPY N/A 3/2/2020    COLONOSCOPY performed by Erica Washington MD at [facility] ENDOSCOPY. 2 tubular adenomas, diverticulosis, int hemorrhoids, repeat in 5 yrs    COLONOSCOPY,DIAGNOSTIC  02/22/2016    Dr. Mable Damon; single sessile polyp at descending colon, sigmoid diverticulosis, int hemorrhoids    COLONOSCOPY,REMV LESN,SNARE  2/22/2016         COLONOSCOPY,REMV LESN,SNARE  3/2/2020         HX CATARACT REMOVAL Right 12/05/2018    Dr. Srini Rawls Left 12/12/2018    Dr. Veto Samayoa.  LEFT EYE SECOND REFRATIVE CATARACT REMOVAL WITH LENS IMPLANTATION    HX ORTHOPAEDIC Right 1980's    index finger    HX OTHER SURGICAL  08/22/2017    Dr. Tatianna Marte; atrial flutter ablation    HX ROTATOR CUFF REPAIR Right 2000    [address]    ID CARDIAC SURG PROCEDURE UNLIST  08/22/2017    SVT ablation      Family History   Adopted: Yes   Family history unknown: Yes     Social History     Tobacco Use    Smoking status: Former     Packs/day: 2.00     Years: 20.00     Pack years: 40.00     Types: Cigarettes, Cigars     Quit date: 8/7/2019     Years since quitting: 3.2    Smokeless tobacco: Current     Types: Chew   Substance Use Topics    Alcohol use: Not Currently     Alcohol/week: 0.0 standard drinks     Comment: As of 11/29/18, pt quit 20 years ago      Prior to Admission medications    Medication Sig Start Date End Date Taking? Authorizing Provider   warfarin (COUMADIN) 3 mg tablet Take 1 tab on Mondays and Fridays and 1.5 tabs on other 5 days of the week  Patient taking differently: Take 3 mg by mouth daily. 7/20/22  Yes Nissa Eason MD   lisinopriL (PRINIVIL, ZESTRIL) 2.5 mg tablet Take 1 tablet by mouth once daily 6/20/22  Yes Alvarado Mitchell NP   clotrimazole-betamethasone (LOTRISONE) topical cream APPLY  CREAM TOPICALLY TO AFFECTED AREA TWICE DAILY 5/12/22  Yes Nissa Eason MD   furosemide (LASIX) 20 mg tablet Take 1 tablet by mouth once daily 3/21/22  Yes Greg Gutierrez MD   atorvastatin (LIPITOR) 40 mg tablet Take 1 tablet by mouth nightly 3/7/22  Yes Greg Gutierrez MD   carvediloL (COREG) 12.5 mg tablet TAKE 1 TABLET BY MOUTH TWICE DAILY WITH MEALS 12/20/21  Yes Greg Gutierrez MD   cetirizine (ZYRTEC) 10 mg tablet Take 1 Tab by mouth two (2) times daily as needed for Itching. 5/14/21  Yes Alok Corado MD   Nubeqa 300 mg tablet Take 600 mg by mouth two (2) times daily (with meals). 3/22/21  Yes Provider, Historical   aspirin 81 mg chewable tablet Take 1 Tab by mouth daily.  3/3/16  Yes Trent Whipple MD   triamcinolone acetonide (KENALOG) 0.025 % ointment APPLY A THIN LAYER TO AFFECTED AREA TWICE DAILY AS NEEDED FOR ITCHING 11/18/22   Alok Corado MD   benzonatate (TESSALON) 100 mg capsule TAKE 1 CAPSULE BY MOUTH THREE TIMES DAILY AS NEEDED FOR COUGH  Patient not taking: Reported on 11/10/2022 10/10/22   Provider, Historical   diazePAM (VALIUM) 5 mg tablet diazepam 5 mg tablet  Patient not taking: Reported on 11/10/2022    Provider, Historical   promethazine-dextromethorphan (PROMETHAZINE-DM) 6.25-15 mg/5 mL syrup TAKE 5 ML BY MOUTH EVERY 6 HOURS AS NEEDED FOR COUGH  Patient not taking: Reported on 11/10/2022 10/10/22   Provider, Historical   tiZANidine (ZANAFLEX) 4 mg tablet tizanidine 4 mg tablet  Patient not taking: Reported on 11/10/2022    Provider, Historical            Allergies   Allergen Reactions    Oxycodone Contact Dermatitis           Objective:     Vitals:    11/10/22 1509   BP: (!) 148/70   Pulse: 74   Temp: 98.2 °F (36.8 °C)   TempSrc: Temporal   SpO2: 96%   Weight: 213 lb 12.8 oz (97 kg)   Height: 5' 9\" (1.753 m)            Physical Exam:    GENERAL: alert, cooperative, no distress, appears stated age  EYE: conjunctivae/corneas clear. PERRL, EOM's intact  LYMPHATIC: Cervical, supraclavicular, and axillary nodes normal.   THROAT & NECK: normal and no erythema or exudates noted. LUNG: clear to auscultation bilaterally  HEART: regular rate and rhythm, S1, S2 normal, no murmur, click, rub or gallop  ABDOMEN: soft, non-tender. Bowel sounds normal. No masses,  no organomegaly  EXTREMITIES:  extremities normal, atraumatic, no cyanosis or edema  SKIN: no rash or abnormalities  NEUROLOGIC: AOx3. Gait normal. Reflexes and motor strength normal and symmetric. Cranial nerves 2-12 and sensation grossly intact.         Lab Results   Component Value Date/Time    WBC 4.6 12/31/2021 07:26 PM    HGB 9.0 (L) 12/31/2021 07:26 PM    HCT 29.0 (L) 12/31/2021 07:26 PM    PLATELET 97 (L) 32/62/1582 07:26 PM    MCV 82.4 12/31/2021 07:26 PM           Lab Results   Component Value Date/Time    Ferritin 1,482 (H) 05/17/2017 06:18 PM       Lab Results   Component Value Date/Time    Sodium 144 03/24/2022 09:41 AM    Potassium 4.3 03/24/2022 09:41 AM    Chloride 106 03/24/2022 09:41 AM    CO2 21 03/24/2022 09:41 AM    Anion gap 3 (L) 12/31/2021 07:26 PM    Glucose 99 03/24/2022 09:41 AM    BUN 19 03/24/2022 09:41 AM    Creatinine 1.35 (H) 03/24/2022 09:41 AM    BUN/Creatinine ratio 14 03/24/2022 09:41 AM    GFR est AA 41 (L) 12/31/2021 07:26 PM    GFR est non-AA 34 (L) 12/31/2021 07:26 PM    Calcium 9.3 03/24/2022 09:41 AM    Bilirubin, total 0.3 12/31/2021 07:26 PM    Alk. phosphatase 60 12/31/2021 07:26 PM    Protein, total 7.5 12/31/2021 07:26 PM    Albumin 3.2 (L) 12/31/2021 07:26 PM    Globulin 4.3 (H) 12/31/2021 07:26 PM    A-G Ratio 0.7 (L) 12/31/2021 07:26 PM    ALT (SGPT) 19 12/31/2021 07:26 PM    AST (SGOT) 18 12/31/2021 07:26 PM           Assessment:     1. Anemia, chronic kidney disease related    No additional investigation or intervention is needed at this time. Plan:       Return as needed      Signed by: Laura García MD                     November 20, 2022        CC.  Valentino Keys MD

## 2022-12-08 ENCOUNTER — CLINICAL SUPPORT (OUTPATIENT)
Dept: INTERNAL MEDICINE CLINIC | Age: 82
End: 2022-12-08
Payer: MEDICARE

## 2022-12-08 DIAGNOSIS — I48.3 TYPICAL ATRIAL FLUTTER (HCC): Primary | ICD-10-CM

## 2022-12-08 LAB
INR BLD: 1.8
PT POC: 34 SECONDS
VALID INTERNAL CONTROL?: YES

## 2022-12-08 PROCEDURE — 85610 PROTHROMBIN TIME: CPT | Performed by: INTERNAL MEDICINE

## 2022-12-08 NOTE — PROGRESS NOTES
Oliver Stanton is a [de-identified] y.o. male who presents today for Anticoagulation monitoring. Indication: Atrial Fibrillation  INR Goal: 2.0-3.0. Current dose:  Coumadin 3 mg pills. One and one half pill 5 days a week, and one pill 2 days a week  Missed Coumadin Doses:  None  Medication Changes:  no  Dietary Changes:  no    Symptoms: taking coumadin appropriately without any bleeding.     Latest INRs:  Lab Results   Component Value Date/Time    INR 1.1 12/23/2016 10:42 PM    INR 1.0 05/08/2015 02:03 AM    INR POC 1.8 12/08/2022 11:45 AM    INR POC 2.2 11/11/2022 11:40 AM    INR POC 2.0 10/11/2022 11:55 AM    Prothrombin time 10.6 12/23/2016 10:42 PM    Prothrombin time 10.2 05/08/2015 02:03 AM

## 2022-12-08 NOTE — PROGRESS NOTES
Patient should stay on the same warfarin dose. Previous INR was at goal at 2.2. He ate greens for Thanksgiving about 2 weeks ago. Increased consumption of green vegetables can lower INR. Avoid green vegetables (since he normally does not eat them), stay on same warfarin dose, and recheck INR in 1 month.

## 2022-12-20 RX ORDER — WARFARIN 3 MG/1
TABLET ORAL
Qty: 90 TABLET | Refills: 0 | Status: SHIPPED | OUTPATIENT
Start: 2022-12-20

## 2023-01-06 ENCOUNTER — CLINICAL SUPPORT (OUTPATIENT)
Dept: INTERNAL MEDICINE CLINIC | Age: 83
End: 2023-01-06
Payer: MEDICARE

## 2023-01-06 DIAGNOSIS — I48.0 PAF (PAROXYSMAL ATRIAL FIBRILLATION) (HCC): Primary | ICD-10-CM

## 2023-01-06 LAB
INR BLD: 2.1
PT POC: 25 SECONDS
VALID INTERNAL CONTROL?: YES

## 2023-01-06 PROCEDURE — 85610 PROTHROMBIN TIME: CPT | Performed by: INTERNAL MEDICINE

## 2023-01-06 NOTE — PROGRESS NOTES
Steffen Livingston is a [de-identified] y.o. male who presents today for Anticoagulation monitoring. Indication: Atrial Fibrillation  INR Goal: 2.0-3.0. Current dose:  Coumadin 3mg tablets. One and one half pill 5 days a week, and one pill 2 days a week  Missed Coumadin Doses:  None  Medication Changes:  no  Dietary Changes:  no    Symptoms: taking coumadin appropriately without any bleeding. Latest INRs:  Lab Results   Component Value Date/Time    INR 1.1 12/23/2016 10:42 PM    INR 1.0 05/08/2015 02:03 AM    INR POC 2.1 01/06/2023 12:08 PM    INR POC 1.8 12/08/2022 11:45 AM    INR POC 2.2 11/11/2022 11:40 AM    Prothrombin time 10.6 12/23/2016 10:42 PM    Prothrombin time 10.2 05/08/2015 02:03 AM        New Coumadin dose:. TBD. Next check to be scheduled for  TBD weeks.

## 2023-01-06 NOTE — PROGRESS NOTES
Let patient know that his INR was good today. Stay on the same dose of warfarin. Return in 1 month for repeat INR.

## 2023-01-20 PROBLEM — R59.9 ENLARGED LYMPH NODES: Status: ACTIVE | Noted: 2020-10-29

## 2023-01-20 PROBLEM — R80.9 PROTEINURIA: Status: ACTIVE | Noted: 2022-09-25

## 2023-01-20 PROBLEM — H61.20 IMPACTED CERUMEN: Status: ACTIVE | Noted: 2023-01-20

## 2023-01-20 PROBLEM — E87.5 HYPERKALEMIA: Status: ACTIVE | Noted: 2021-02-24

## 2023-01-20 PROBLEM — E55.9 VITAMIN D DEFICIENCY: Status: ACTIVE | Noted: 2021-02-24

## 2023-01-20 PROBLEM — R35.1 NOCTURIA: Status: ACTIVE | Noted: 2018-08-29

## 2023-01-20 PROBLEM — H83.09 LABYRINTHITIS: Status: ACTIVE | Noted: 2023-01-20

## 2023-01-20 PROBLEM — R42 LIGHTHEADEDNESS: Status: ACTIVE | Noted: 2023-01-20

## 2023-01-20 PROBLEM — R42 VERTIGO: Status: ACTIVE | Noted: 2023-01-20

## 2023-01-20 PROBLEM — C61 MALIGNANT NEOPLASM OF PROSTATE (HCC): Status: ACTIVE | Noted: 2017-10-08

## 2023-01-20 PROBLEM — S90.112A CONTUSION OF GREAT TOE OF LEFT FOOT: Status: ACTIVE | Noted: 2023-01-20

## 2023-01-20 PROBLEM — I12.9 HYPERTENSIVE CHRONIC KIDNEY DISEASE WITH STAGE 1 THROUGH STAGE 4 CHRONIC KIDNEY DISEASE, OR UNSPECIFIED CHRONIC KIDNEY DISEASE: Status: ACTIVE | Noted: 2021-09-08

## 2023-01-20 PROBLEM — I25.10 CORONARY ARTERIOSCLEROSIS: Status: ACTIVE | Noted: 2022-07-01

## 2023-01-20 PROBLEM — E87.0 HYPERNATREMIA: Status: ACTIVE | Noted: 2022-09-25

## 2023-01-20 PROBLEM — H66.90 OTITIS MEDIA: Status: ACTIVE | Noted: 2023-01-20

## 2023-01-20 PROBLEM — H92.02 EARACHE ON LEFT: Status: ACTIVE | Noted: 2023-01-20

## 2023-01-20 PROBLEM — R39.198 SLOWING, URINARY STREAM: Status: ACTIVE | Noted: 2018-08-29

## 2023-01-20 PROBLEM — N39.0 URINARY TRACT INFECTION: Status: ACTIVE | Noted: 2023-01-20

## 2023-01-20 PROBLEM — R73.03 PREDIABETES: Status: ACTIVE | Noted: 2021-02-18

## 2023-01-20 PROBLEM — M79.671 RIGHT FOOT PAIN: Status: ACTIVE | Noted: 2023-01-20

## 2023-01-20 PROBLEM — R97.21 RISING PSA FOLLOWING TREATMENT FOR MALIGNANT NEOPLASM OF PROSTATE: Status: ACTIVE | Noted: 2019-09-11

## 2023-01-23 ENCOUNTER — OFFICE VISIT (OUTPATIENT)
Dept: INTERNAL MEDICINE CLINIC | Age: 83
End: 2023-01-23
Payer: MEDICARE

## 2023-01-23 VITALS
HEART RATE: 78 BPM | DIASTOLIC BLOOD PRESSURE: 69 MMHG | BODY MASS INDEX: 32.29 KG/M2 | OXYGEN SATURATION: 98 % | HEIGHT: 69 IN | SYSTOLIC BLOOD PRESSURE: 137 MMHG | WEIGHT: 218 LBS | RESPIRATION RATE: 12 BRPM | TEMPERATURE: 98.7 F

## 2023-01-23 DIAGNOSIS — E11.22 TYPE 2 DIABETES MELLITUS WITH STAGE 3B CHRONIC KIDNEY DISEASE, WITHOUT LONG-TERM CURRENT USE OF INSULIN (HCC): Primary | ICD-10-CM

## 2023-01-23 DIAGNOSIS — I50.42 CHRONIC COMBINED SYSTOLIC AND DIASTOLIC CONGESTIVE HEART FAILURE (HCC): ICD-10-CM

## 2023-01-23 DIAGNOSIS — C61 PROSTATE CANCER (HCC): ICD-10-CM

## 2023-01-23 DIAGNOSIS — E78.2 MIXED HYPERLIPIDEMIA: ICD-10-CM

## 2023-01-23 DIAGNOSIS — N18.32 TYPE 2 DIABETES MELLITUS WITH STAGE 3B CHRONIC KIDNEY DISEASE, WITHOUT LONG-TERM CURRENT USE OF INSULIN (HCC): Primary | ICD-10-CM

## 2023-01-23 DIAGNOSIS — D63.1 ANEMIA, CHRONIC RENAL FAILURE, STAGE 3 (MODERATE) (HCC): ICD-10-CM

## 2023-01-23 DIAGNOSIS — I10 PRIMARY HYPERTENSION: ICD-10-CM

## 2023-01-23 DIAGNOSIS — J44.9 CHRONIC OBSTRUCTIVE PULMONARY DISEASE, UNSPECIFIED COPD TYPE (HCC): ICD-10-CM

## 2023-01-23 DIAGNOSIS — N18.30 ANEMIA, CHRONIC RENAL FAILURE, STAGE 3 (MODERATE) (HCC): ICD-10-CM

## 2023-01-23 DIAGNOSIS — I48.3 TYPICAL ATRIAL FLUTTER (HCC): ICD-10-CM

## 2023-01-23 LAB
INR BLD: 2.6
PT POC: 20 SECONDS
VALID INTERNAL CONTROL?: YES

## 2023-01-23 PROCEDURE — G8427 DOCREV CUR MEDS BY ELIG CLIN: HCPCS | Performed by: INTERNAL MEDICINE

## 2023-01-23 PROCEDURE — G8432 DEP SCR NOT DOC, RNG: HCPCS | Performed by: INTERNAL MEDICINE

## 2023-01-23 PROCEDURE — 3078F DIAST BP <80 MM HG: CPT | Performed by: INTERNAL MEDICINE

## 2023-01-23 PROCEDURE — 1101F PT FALLS ASSESS-DOCD LE1/YR: CPT | Performed by: INTERNAL MEDICINE

## 2023-01-23 PROCEDURE — G8417 CALC BMI ABV UP PARAM F/U: HCPCS | Performed by: INTERNAL MEDICINE

## 2023-01-23 PROCEDURE — G8536 NO DOC ELDER MAL SCRN: HCPCS | Performed by: INTERNAL MEDICINE

## 2023-01-23 PROCEDURE — 99214 OFFICE O/P EST MOD 30 MIN: CPT | Performed by: INTERNAL MEDICINE

## 2023-01-23 PROCEDURE — 85610 PROTHROMBIN TIME: CPT | Performed by: INTERNAL MEDICINE

## 2023-01-23 PROCEDURE — 1123F ACP DISCUSS/DSCN MKR DOCD: CPT | Performed by: INTERNAL MEDICINE

## 2023-01-23 PROCEDURE — 3075F SYST BP GE 130 - 139MM HG: CPT | Performed by: INTERNAL MEDICINE

## 2023-01-23 NOTE — PROGRESS NOTES
Twan Wayne  Identified pt with two pt identifiers(name and ). Chief Complaint   Patient presents with    Diabetes    Hypertension    Cholesterol Problem       Reviewed record In preparation for visit and have obtained necessary documentation. 1. Have you been to the ER, urgent care clinic or hospitalized since your last visit? No     2. Have you seen or consulted any other health care providers outside of the [address] [place] [address] since your last visit? Include any pap smears or colon screening. No    Vitals reviewed with provider.     Health Maintenance reviewed:     Health Maintenance Due   Topic    Eye Exam Retinal or Dilated     COVID-19 Vaccine (5 - Booster)    Foot Exam Q1     Lipid Screen           Wt Readings from Last 3 Encounters:   23 218 lb (98.9 kg)   11/10/22 213 lb 12.8 oz (97 kg)   22 217 lb (98.4 kg)        Temp Readings from Last 3 Encounters:   23 98.7 °F (37.1 °C) (Oral)   11/10/22 98.2 °F (36.8 °C) (Temporal)   22 98.4 °F (36.9 °C) (Oral)        BP Readings from Last 3 Encounters:   23 137/69   11/10/22 (!) 148/70   22 (!) 111/51        Pulse Readings from Last 3 Encounters:   23 78   11/10/22 74   22 (!) 56        Vitals:    23 1107 23 1111   BP: (!) 143/77 137/69   Pulse: 78    Resp: 12    Temp: 98.7 °F (37.1 °C)    TempSrc: Oral    SpO2: 98%    Weight: 218 lb (98.9 kg)    Height: 5' 9\" (1.753 m)    PainSc:   0 - No pain           Learning Assessment:   :       Learning Assessment 2020 10/5/2017 2014   PRIMARY LEARNER Patient Patient Patient   HIGHEST LEVEL OF EDUCATION - PRIMARY LEARNER  - - DID NOT GRADUATE HIGH SCHOOL   BARRIERS PRIMARY LEARNER - - NONE   CO-LEARNER CAREGIVER - - No   PRIMARY LANGUAGE ENGLISH ENGLISH ENGLISH    NEED - - No   LEARNER PREFERENCE PRIMARY DEMONSTRATION LISTENING LISTENING     - DEMONSTRATION -     - READING -   ANSWERED BY self patient Patient   RELATIONSHIP SELF SELF SELF        Depression Screening:   :       3 most recent PHQ Screens 9/19/2022   PHQ Not Done -   Little interest or pleasure in doing things Not at all   Feeling down, depressed, irritable, or hopeless Not at all   Total Score PHQ 2 0        Fall Risk Assessment:   :       Fall Risk Assessment, last 12 mths 9/19/2022   Able to walk? Yes   Fall in past 12 months? 1   Do you feel unsteady? 0   Are you worried about falling 0   Fall with injury? 0        Abuse Screening:   :       Abuse Screening Questionnaire 9/19/2022 5/4/2022 9/14/2021 1/7/2021 2/13/2020 10/9/2019 7/9/2019   Do you ever feel afraid of your partner? N N N N N N N   Are you in a relationship with someone who physically or mentally threatens you? N N N N N N N   Is it safe for you to go home?  Y Y Y Y Y Y Y        ADL Screening:   :       ADL Assessment 9/19/2022   Feeding yourself No Help Needed   Getting from bed to chair No Help Needed   Getting dressed No Help Needed   Bathing or showering No Help Needed   Walk across the room (includes cane/walker) No Help Needed   Using the telphone No Help Needed   Taking your medications No Help Needed   Preparing meals No Help Needed   Managing money (expenses/bills) No Help Needed   Moderately strenuous housework (laundry) No Help Needed   Shopping for personal items (toiletries/medicines) No Help Needed   Shopping for groceries No Help Needed   Driving No Help Needed   Climbing a flight of stairs No Help Needed   Getting to places beyond walking distances No Help Needed

## 2023-01-23 NOTE — PROGRESS NOTES
CC:   Chief Complaint   Patient presents with    Diabetes    Hypertension    Cholesterol Problem       HISTORY OF PRESENT ILLNESS  Emmett Ayala is a [de-identified] y.o. male. Presents for 4 month follow up evaluation. He has HTN, diet-controlled type 2 DM, CKD stage 3, hyperlipidemia, CHF (combined diastolic and systolic, EF 83-93% in 5/95), non-ischemic cardiomyopathy, atrial flutter s/p AFL ablation on 8/22/17 and DCCV in 11/2020, non-obstructive atherosclerotic heart disease, COPD, chronic low back pain, and prostate cancer on Nubeqa. No new complaints. Has diet-controlled diabetes. Eats candy regularly. Last A1c 5.6% on 5/16/22. Eye exam: says he saw Dr. Shelbi Ames in 2022    Continues to have mild PA with walking. Denies CP, dizziness, heart palpitations, orthopnea, or leg swelling. Still with chronic back pain. Had nerve ablation at [facility] for lumbar radiculopathy and lumbar spondylosis; helped a little. Prior to that received 8 epidural steroid injections without pain improvement. Still walks with cane. Recently saw Daniel Monique NP with Cardiology. No changes made. Has appointment with Nephrology in March. Patient Active Problem List   Diagnosis Code    Combined systolic and diastolic congestive heart failure (HCC) I50.40    NICM (nonischemic cardiomyopathy) ([facility] I42.8    Hypertension I10    Mixed hyperlipidemia E78.2    Smokeless tobacco use Z72.0    Atrial flutter (HCC) I48.92    Type 2 diabetes mellitus with stage 3a chronic kidney disease, without long-term current use of insulin (HCC) E11.22, N18.31    Prostate cancer ([facility] C61    COPD (chronic obstructive pulmonary disease) ([facility]) J44.9    Chronic low back pain M54.50, G89.29    Obesity (BMI 30-39. 9) E66.9    Candidal intertrigo B37.2    Anemia of chronic disease D63.8    PAF (paroxysmal atrial fibrillation) ([facility]) I48.0    Osteopenia of multiple sites M85.89    Primary osteoarthritis of left hip M16.12    Benign prostatic hyperplasia with lower urinary tract symptoms N40.1    Contusion of great toe of left foot S90.112A    Coronary arteriosclerosis I25.10    Degeneration of intervertebral disc [id number]    Earache on left H92.02    Enlarged lymph nodes R59.9    Hyperkalemia E87.5    Hypernatremia E87.0    Impacted cerumen H61.20    Labyrinthitis H83.09    Lightheadedness R42    Male erectile dysfunction, unspecified N52.9    Nocturia R35.1    Otitis media H66.90    Prediabetes R73.03    Proteinuria R80.9    Right foot pain M79.671    Slowing, urinary stream R39.198    Urinary tract infection N39.0    Vertigo R42    Vitamin D deficiency E55.9    Rising PSA following treatment for malignant neoplasm of prostate R97.21    Cardiomyopathy (HCC) I42.9    Malignant neoplasm of prostate ([address] C61    Hypertensive chronic kidney disease with stage 1 through stage 4 chronic kidney disease, or unspecified chronic kidney disease I12.9     Past Medical History:   Diagnosis Date    Acute respiratory failure with hypoxia ([address] 02/08/2014    also 11/28/15, 2/17/16, 5/14/17     Alcohol abuse     As of 11/29/18 pt stated he quit 20 years    Arthritis     Back and shoulder    Atrial fibrillation ([address]     Atrial flutter ([address] 08/2017    saw Dr. Fawn Powell; underwent a-flutter ablation    BPH (benign prostatic hyperplasia)     CHF (congestive heart failure) ([address] 02/11/2014    TTE 11/15: EF 40-45%, 2/14: EF 20%  Admitted for acute exacerbations 2/8/14, 11/28/15, 2/17/16, and 5/4/17)    Chronic kidney disease     III    Chronic low back pain     LS spine X-ray 4/8/17: mild DDD    Combined forms of age-related cataract of left eye 12/12/2018    KPE/IOL OS    Combined forms of age-related cataract of right eye 11/28/2018    KPE/IOL OD    COPD (chronic obstructive pulmonary disease) ([address]     Coronary arteriosclerosis 7/1/2022    Diabetes ([address]     PA (dyspnea on exertion)     ED (erectile dysfunction)     Elevated troponin 02/11/2014    also 11/28/15; due to cardiac strain    Frequent hospital admissions     5/14-5/23/17: acute hypoxic resp failure due to CHF exac, ROBINSON on CKD 3, sepsis due to LE cellulitis, 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  2/17-2/22/16: acute hypoxic resp failure, acute diarrhea  11/28-11/30/15: acute hypoxic resp failure due to acute CHF exac, elevated troponin due to cardiac strain  2/8-2/12/14: acuter hypoxic resp failure due to CHF exac, pneumonia     Hand blister 10/8/2017    Bilateral    Hyperlipidemia     Hypertension     Kidney disease, chronic, stage III (GFR 30-59 ml/min) ([facility])     Leg fracture, right 1973    Nonischemic cardiomyopathy ([facility]     with LVH    Orthostatic hypotension 4/13/2021    Pericardial effusion 11/10/2020    Pneumonia 02/08/2014 2/8/14 and 10/30/15    Poor historian     As of 11/29/18 pt reports 5th grade education, pt unable to give med list-obtained from wife per patient's permission    Prediabetes     Prostate cancer ([facility] 2016    As of 11/29/18, pt states he gets two injections twice a year for this    Pulmonary edema 11/28/2015    S/P cardiac cath 2/12/2014 2/12/14 no significant coronary disease, severe LV dysfunction    Tinea cruris     also genital folliculitis    Vertigo      Allergies   Allergen Reactions    Oxycodone Contact Dermatitis       Current Outpatient Medications   Medication Sig Dispense Refill    warfarin (COUMADIN) 3 mg tablet TAKE 1 TABLET BY MOUTH MONDAY AND FRIDAY AND 1 & 1/2 (ONE & ONE-HALF) TABLETS ON THE OTHER 5 DAYS OF THE WEEK 90 Tablet 0    triamcinolone acetonide (KENALOG) 0.025 % ointment APPLY A THIN LAYER TO AFFECTED AREA TWICE DAILY AS NEEDED FOR ITCHING 80 g 1    benzonatate (TESSALON) 100 mg capsule       diazePAM (VALIUM) 5 mg tablet       lisinopriL (PRINIVIL, ZESTRIL) 2.5 mg tablet Take 1 tablet by mouth once daily 90 Tablet 0    clotrimazole-betamethasone (LOTRISONE) topical cream APPLY  CREAM TOPICALLY TO AFFECTED AREA TWICE DAILY 15 g 3    furosemide (LASIX) 20 mg tablet Take 1 tablet by mouth once daily 30 Tablet 0    atorvastatin (LIPITOR) 40 mg tablet Take 1 tablet by mouth nightly 90 Tablet 0    carvediloL (COREG) 12.5 mg tablet TAKE 1 TABLET BY MOUTH TWICE DAILY WITH MEALS 180 Tablet 3    cetirizine (ZYRTEC) 10 mg tablet Take 1 Tab by mouth two (2) times daily as needed for Itching. 60 Tab 0    Nubeqa 300 mg tablet Take 600 mg by mouth two (2) times daily (with meals). aspirin 81 mg chewable tablet Take 1 Tab by mouth daily. 10 Tab 0    promethazine-dextromethorphan (PROMETHAZINE-DM) 6.25-15 mg/5 mL syrup TAKE 5 ML BY MOUTH EVERY 6 HOURS AS NEEDED FOR COUGH (Patient not taking: No sig reported)      tiZANidine (ZANAFLEX) 4 mg tablet tizanidine 4 mg tablet (Patient not taking: No sig reported)           PHYSICAL EXAM  Visit Vitals  /69 (BP 1 Location: Left upper arm, BP Patient Position: Sitting)   Pulse 78   Temp 98.7 °F (37.1 °C) (Oral)   Resp 12   Ht 5' 9\" (1.753 m)   Wt 218 lb (98.9 kg)   SpO2 98%   BMI 32.19 kg/m²       General: Well-developed and well-nourished, no distress. HEENT:  Head normocephalic/atraumatic, no scleral icterus  Lungs:  Clear to ausculation bilaterally. Good air movement. Heart:  Regular rate and rhythm, normal S1 and S2, no murmur, gallop, or rub  Extremities: No clubbing, cyanosis, or edema. Neurological: Alert and oriented. Psychiatric: Normal mood and affect.  Behavior is normal.   Diabetic foot exam:     Left Foot:   Visual Exam: normal    Pulse DP: 2+ (normal)   Filament test: normal sensation at dorsum of foot but decreased at plantar surface   Vibratory sensation: normal      Right Foot:   Visual Exam: normal    Pulse DP: 2+ (normal)   Filament test: normal sensation  at dorsum of foot but decreased at plantar surface   Vibratory sensation: normal      Results for orders placed or performed in visit on 01/23/23   AMB POC PT/INR   Result Value Ref Range    VALID INTERNAL CONTROL POC Yes     Prothrombin time (POC) 20.0 seconds    INR POC 2.6          ASSESSMENT AND PLAN    ICD-10-CM ICD-9-CM    1. Type 2 diabetes mellitus with stage 3b chronic kidney disease, without long-term current use of insulin ([facility])  E11.22 250.40 HEMOGLOBIN A1C WITH EAG    N18.32 585.3 HEMOGLOBIN A1C WITH EAG      2. Primary hypertension  Q71 641.8 METABOLIC PANEL, COMPREHENSIVE      CBC WITH AUTOMATED DIFF      METABOLIC PANEL, COMPREHENSIVE      CBC WITH AUTOMATED DIFF      3. Mixed hyperlipidemia  E78.2 272.2 LIPID PANEL      LIPID PANEL      4. Chronic obstructive pulmonary disease, unspecified COPD type ([facility]  J44.9 496       5. Chronic combined systolic and diastolic congestive heart failure ([facility])  I50.42 428.42      428.0       6. Prostate cancer ([place] [address]  C61 185       7. Anemia, chronic renal failure, stage 3 (moderate) (HCC)  N18.30 285.21     D63.1 585.3       8. Typical atrial flutter ([facility])  I48.3 427.32 AMB POC PT/INR        Diagnoses and all orders for this visit:    1. Type 2 diabetes mellitus with stage 3b chronic kidney disease, without long-term current use of insulin ([facility]  Diet-controlled DM. Counseled on reducing candy consumption. Has early neuropathy at foot. Counseled on foot care. -     HEMOGLOBIN A1C WITH EAG; Future    2. Primary hypertension  Controlled. Continue present management.  -     METABOLIC PANEL, COMPREHENSIVE; Future  -     CBC WITH AUTOMATED DIFF; Future    3. Mixed hyperlipidemia  -     LIPID PANEL; Future    4. Chronic obstructive pulmonary disease, unspecified COPD type ([facility]  Controlled. 5. Chronic combined systolic and diastolic congestive heart failure ([facility]    6. Prostate cancer [facility])  Continue following with Dr. Nathan Watson. On Nubeqa and every 6 month Lupron injections. In the process of being scheduled for DEXA. 7. Anemia, chronic renal failure, stage 3 (moderate) (HCC)  Saw Dr. Alberto Stone recently. No intervention needed at this time.     8. Typical atrial flutter (HCC)  -     AMB POC PT/INR      Follow-up and Dispositions    Return in about 4 months (around 5/23/2023), or if symptoms worsen or fail to improve, for HTN, chol, DM; have fasitng labs done within next 2 weeks. I have discussed the diagnosis with the patient and the intended plan as seen in the above orders. Patient is in agreement. The patient has received an after-visit summary and questions were answered concerning future plans. I have discussed medication side effects and warnings with the patient as well.

## 2023-01-28 LAB
ALBUMIN SERPL-MCNC: 4.1 G/DL (ref 3.7–4.7)
ALBUMIN/GLOB SERPL: 1.3 {RATIO} (ref 1.2–2.2)
ALP SERPL-CCNC: 69 IU/L (ref 44–121)
ALT SERPL-CCNC: 11 IU/L (ref 0–44)
AST SERPL-CCNC: 18 IU/L (ref 0–40)
BASOPHILS # BLD AUTO: 0 X10E3/UL (ref 0–0.2)
BASOPHILS NFR BLD AUTO: 1 %
BILIRUB SERPL-MCNC: 0.4 MG/DL (ref 0–1.2)
BUN SERPL-MCNC: 19 MG/DL (ref 8–27)
BUN/CREAT SERPL: 14 (ref 10–24)
CALCIUM SERPL-MCNC: 9.1 MG/DL (ref 8.6–10.2)
CHLORIDE SERPL-SCNC: 103 MMOL/L (ref 96–106)
CHOLEST SERPL-MCNC: 96 MG/DL (ref 100–199)
CO2 SERPL-SCNC: 19 MMOL/L (ref 20–29)
CREAT SERPL-MCNC: 1.37 MG/DL (ref 0.76–1.27)
EGFR: 52 ML/MIN/1.73
EOSINOPHIL # BLD AUTO: 0.2 X10E3/UL (ref 0–0.4)
EOSINOPHIL NFR BLD AUTO: 4 %
ERYTHROCYTE [DISTWIDTH] IN BLOOD BY AUTOMATED COUNT: 14.4 % (ref 11.6–15.4)
EST. AVERAGE GLUCOSE BLD GHB EST-MCNC: 146 MG/DL
GLOBULIN SER CALC-MCNC: 3.1 G/DL (ref 1.5–4.5)
GLUCOSE SERPL-MCNC: 118 MG/DL (ref 70–99)
HBA1C MFR BLD: 6.7 % (ref 4.8–5.6)
HCT VFR BLD AUTO: 31.9 % (ref 37.5–51)
HDLC SERPL-MCNC: 52 MG/DL
HGB BLD-MCNC: 9.8 G/DL (ref 13–17.7)
IMM GRANULOCYTES # BLD AUTO: 0 X10E3/UL (ref 0–0.1)
IMM GRANULOCYTES NFR BLD AUTO: 0 %
LDLC SERPL CALC-MCNC: 30 MG/DL (ref 0–99)
LYMPHOCYTES # BLD AUTO: 0.7 X10E3/UL (ref 0.7–3.1)
LYMPHOCYTES NFR BLD AUTO: 13 %
MCH RBC QN AUTO: 25.7 PG (ref 26.6–33)
MCHC RBC AUTO-ENTMCNC: 30.7 G/DL (ref 31.5–35.7)
MCV RBC AUTO: 84 FL (ref 79–97)
MONOCYTES # BLD AUTO: 0.4 X10E3/UL (ref 0.1–0.9)
MONOCYTES NFR BLD AUTO: 7 %
MORPHOLOGY BLD-IMP: ABNORMAL
NEUTROPHILS # BLD AUTO: 4.1 X10E3/UL (ref 1.4–7)
NEUTROPHILS NFR BLD AUTO: 75 %
PLATELET # BLD AUTO: 117 X10E3/UL (ref 150–450)
POTASSIUM SERPL-SCNC: 4.4 MMOL/L (ref 3.5–5.2)
PROT SERPL-MCNC: 7.2 G/DL (ref 6–8.5)
RBC # BLD AUTO: 3.82 X10E6/UL (ref 4.14–5.8)
SODIUM SERPL-SCNC: 139 MMOL/L (ref 134–144)
TRIGL SERPL-MCNC: 62 MG/DL (ref 0–149)
VLDLC SERPL CALC-MCNC: 14 MG/DL (ref 5–40)
WBC # BLD AUTO: 5.5 X10E3/UL (ref 3.4–10.8)

## 2023-01-29 NOTE — PROGRESS NOTES
Letter sent: Your labs showed A1c 6.7%, meaning well-controlled diabetes. You have unchanged chronic kidney disease and chronic anemia due to kidney disease and prostate cancer. One type of blood cell called platelets was a little low due to taking aspirin. Platelets help with blood clotting.  Your liver tests and cholesterol were normal.

## 2023-02-02 PROBLEM — R73.03 PREDIABETES: Status: RESOLVED | Noted: 2021-02-18 | Resolved: 2023-02-02

## 2023-02-02 PROBLEM — S90.112A CONTUSION OF GREAT TOE OF LEFT FOOT: Status: RESOLVED | Noted: 2023-01-20 | Resolved: 2023-02-02

## 2023-02-02 PROBLEM — H61.20 IMPACTED CERUMEN: Status: RESOLVED | Noted: 2023-01-20 | Resolved: 2023-02-02

## 2023-02-02 PROBLEM — R80.9 PROTEINURIA: Status: RESOLVED | Noted: 2022-09-25 | Resolved: 2023-02-02

## 2023-02-02 PROBLEM — R35.1 NOCTURIA: Status: RESOLVED | Noted: 2018-08-29 | Resolved: 2023-02-02

## 2023-02-02 PROBLEM — R42 LIGHTHEADEDNESS: Status: RESOLVED | Noted: 2023-01-20 | Resolved: 2023-02-02

## 2023-02-02 PROBLEM — E87.0 HYPERNATREMIA: Status: RESOLVED | Noted: 2022-09-25 | Resolved: 2023-02-02

## 2023-02-02 PROBLEM — C61 MALIGNANT NEOPLASM OF PROSTATE (HCC): Status: RESOLVED | Noted: 2017-10-08 | Resolved: 2023-02-02

## 2023-02-02 PROBLEM — R39.198 SLOWING, URINARY STREAM: Status: RESOLVED | Noted: 2018-08-29 | Resolved: 2023-02-02

## 2023-02-02 PROBLEM — I48.92 ATRIAL FLUTTER (HCC): Status: RESOLVED | Noted: 2017-08-22 | Resolved: 2023-02-02

## 2023-02-02 PROBLEM — M79.671 RIGHT FOOT PAIN: Status: RESOLVED | Noted: 2023-01-20 | Resolved: 2023-02-02

## 2023-02-02 PROBLEM — R97.21 RISING PSA FOLLOWING TREATMENT FOR MALIGNANT NEOPLASM OF PROSTATE: Status: RESOLVED | Noted: 2019-09-11 | Resolved: 2023-02-02

## 2023-02-02 PROBLEM — N39.0 URINARY TRACT INFECTION: Status: RESOLVED | Noted: 2023-01-20 | Resolved: 2023-02-02

## 2023-02-02 PROBLEM — I25.10 CORONARY ARTERY DISEASE INVOLVING NATIVE CORONARY ARTERY OF NATIVE HEART WITHOUT ANGINA PECTORIS: Status: ACTIVE | Noted: 2022-07-01

## 2023-02-02 PROBLEM — R42 VERTIGO: Status: RESOLVED | Noted: 2023-01-20 | Resolved: 2023-02-02

## 2023-02-02 PROBLEM — R59.9 ENLARGED LYMPH NODES: Status: RESOLVED | Noted: 2020-10-29 | Resolved: 2023-02-02

## 2023-02-02 PROBLEM — E55.9 VITAMIN D DEFICIENCY: Status: RESOLVED | Noted: 2021-02-24 | Resolved: 2023-02-02

## 2023-02-02 PROBLEM — H83.09 LABYRINTHITIS: Status: RESOLVED | Noted: 2023-01-20 | Resolved: 2023-02-02

## 2023-02-02 PROBLEM — H66.90 OTITIS MEDIA: Status: RESOLVED | Noted: 2023-01-20 | Resolved: 2023-02-02

## 2023-02-02 PROBLEM — E87.5 HYPERKALEMIA: Status: RESOLVED | Noted: 2021-02-24 | Resolved: 2023-02-02

## 2023-02-23 ENCOUNTER — CLINICAL SUPPORT (OUTPATIENT)
Dept: INTERNAL MEDICINE CLINIC | Age: 83
End: 2023-02-23
Payer: MEDICARE

## 2023-02-23 DIAGNOSIS — I48.0 PAF (PAROXYSMAL ATRIAL FIBRILLATION) (HCC): Primary | ICD-10-CM

## 2023-02-23 LAB
INR BLD: 1.7
PT POC: 20.5 SECONDS
VALID INTERNAL CONTROL?: YES

## 2023-02-23 PROCEDURE — 85610 PROTHROMBIN TIME: CPT | Performed by: INTERNAL MEDICINE

## 2023-02-23 NOTE — PROGRESS NOTES
Azael Ceron is a [de-identified] y.o. male who presents today for Anticoagulation monitoring. Indication: Atrial Fibrillation  INR Goal: 2.0-3.0. Current dose:  Coumadin 3mg tablets. 1 tablet on Monday & Friday. 1 1/2 tablets all other days. Missed Coumadin Doses:  None  Medication Changes:  no  Dietary Changes:  no    Symptoms: taking coumadin appropriately without any bleeding. Latest INRs:  Lab Results   Component Value Date/Time    INR 1.1 12/23/2016 10:42 PM    INR 1.0 05/08/2015 02:03 AM    INR POC 1.7 02/23/2023 11:41 AM    INR POC 2.6 01/23/2023 11:31 AM    INR POC 2.1 01/06/2023 12:08 PM    Prothrombin time 10.6 12/23/2016 10:42 PM    Prothrombin time 10.2 05/08/2015 02:03 AM        New Coumadin dose:.the following changes are made - TBD. Next check to be scheduled for  TBD weeks.

## 2023-02-23 NOTE — PROGRESS NOTES
INR low at 1.7 today but previously at goal. Continue on same dose and repeat INR  in 1 month. Warfarin refill sent to pharmacy.

## 2023-03-09 PROBLEM — N18.32 TYPE 2 DIABETES MELLITUS WITH STAGE 3B CHRONIC KIDNEY DISEASE, WITHOUT LONG-TERM CURRENT USE OF INSULIN (HCC): Status: ACTIVE | Noted: 2017-10-08

## 2023-03-09 PROBLEM — E11.22 TYPE 2 DIABETES MELLITUS WITH STAGE 3B CHRONIC KIDNEY DISEASE, WITHOUT LONG-TERM CURRENT USE OF INSULIN (HCC): Status: ACTIVE | Noted: 2017-10-08

## 2023-03-23 ENCOUNTER — CLINICAL SUPPORT (OUTPATIENT)
Dept: INTERNAL MEDICINE CLINIC | Age: 83
End: 2023-03-23
Payer: MEDICARE

## 2023-03-23 DIAGNOSIS — I48.0 PAF (PAROXYSMAL ATRIAL FIBRILLATION) (HCC): Primary | ICD-10-CM

## 2023-03-23 LAB
INR BLD: 1.9
PT POC: 22.4 SECONDS
VALID INTERNAL CONTROL?: YES

## 2023-03-23 PROCEDURE — 85610 PROTHROMBIN TIME: CPT | Performed by: INTERNAL MEDICINE

## 2023-03-23 NOTE — PROGRESS NOTES
Lawson Franco is a [de-identified] y.o. male who presents today for Anticoagulation monitoring. Indication: Atrial Fibrillation  INR Goal: 2.0-3.0. Current dose Coumadin 3mg tablets. 1 tablet on Monday & Friday. 1 1/2 tablets all other days. Missed Coumadin Doses:  None  Medication Changes:  no  Dietary Changes:  no    Symptoms: taking coumadin appropriately without any bleeding. Latest INRs:  Lab Results   Component Value Date/Time    INR 1.1 12/23/2016 10:42 PM    INR 1.0 05/08/2015 02:03 AM    INR POC 1.9 03/23/2023 12:10 PM    INR POC 1.7 02/23/2023 11:41 AM    INR POC 2.6 01/23/2023 11:31 AM    Prothrombin time 10.6 12/23/2016 10:42 PM    Prothrombin time 10.2 05/08/2015 02:03 AM        New Coumadin dose:. TBD.     Next check to be scheduled for  TBD

## 2023-04-03 ENCOUNTER — TRANSCRIBE ORDER (OUTPATIENT)
Dept: SCHEDULING | Age: 83
End: 2023-04-03

## 2023-04-03 DIAGNOSIS — Z79.818 ANDROGEN DEPRIVATION THERAPY: Primary | ICD-10-CM

## 2023-04-14 ENCOUNTER — HOSPITAL ENCOUNTER (OUTPATIENT)
Dept: BONE DENSITY | Age: 83
Discharge: HOME | End: 2023-04-14
Attending: UROLOGY
Payer: MEDICARE

## 2023-04-14 DIAGNOSIS — Z79.818 ANDROGEN DEPRIVATION THERAPY: ICD-10-CM

## 2023-04-14 PROCEDURE — 77080 DXA BONE DENSITY AXIAL: CPT

## 2023-04-23 DIAGNOSIS — Z79.818 ANDROGEN DEPRIVATION THERAPY: Primary | ICD-10-CM

## 2023-04-27 ENCOUNTER — TELEPHONE (OUTPATIENT)
Dept: INTERNAL MEDICINE CLINIC | Age: 83
End: 2023-04-27

## 2023-04-27 NOTE — TELEPHONE ENCOUNTER
Verified pt's name and date of birth. Per RIMA Posada notified pt to continue the same coumadin dose, and return to the clinic in 4 weeks. Pt repeated dose to  me and indicated understanding.

## 2023-05-20 PROBLEM — C61 PROSTATE CANCER (HCC): Status: ACTIVE | Noted: 2017-10-08

## 2023-05-24 ENCOUNTER — OFFICE VISIT (OUTPATIENT)
Facility: CLINIC | Age: 83
End: 2023-05-24
Payer: MEDICARE

## 2023-05-24 VITALS
DIASTOLIC BLOOD PRESSURE: 67 MMHG | HEART RATE: 67 BPM | HEIGHT: 69 IN | WEIGHT: 220.8 LBS | OXYGEN SATURATION: 97 % | BODY MASS INDEX: 32.7 KG/M2 | TEMPERATURE: 98.4 F | RESPIRATION RATE: 20 BRPM | SYSTOLIC BLOOD PRESSURE: 167 MMHG

## 2023-05-24 DIAGNOSIS — C61 PROSTATE CANCER (HCC): ICD-10-CM

## 2023-05-24 DIAGNOSIS — I10 PRIMARY HYPERTENSION: ICD-10-CM

## 2023-05-24 DIAGNOSIS — I48.0 PAF (PAROXYSMAL ATRIAL FIBRILLATION) (HCC): ICD-10-CM

## 2023-05-24 DIAGNOSIS — J44.9 CHRONIC OBSTRUCTIVE PULMONARY DISEASE, UNSPECIFIED COPD TYPE (HCC): ICD-10-CM

## 2023-05-24 DIAGNOSIS — N18.32 TYPE 2 DIABETES MELLITUS WITH STAGE 3B CHRONIC KIDNEY DISEASE, WITHOUT LONG-TERM CURRENT USE OF INSULIN (HCC): Primary | ICD-10-CM

## 2023-05-24 DIAGNOSIS — E11.22 TYPE 2 DIABETES MELLITUS WITH STAGE 3B CHRONIC KIDNEY DISEASE, WITHOUT LONG-TERM CURRENT USE OF INSULIN (HCC): Primary | ICD-10-CM

## 2023-05-24 LAB
HBA1C MFR BLD: 6.3 %
POC INR: 2.8
PROTHROMBIN TIME, POC: 33.3

## 2023-05-24 PROCEDURE — 3077F SYST BP >= 140 MM HG: CPT | Performed by: INTERNAL MEDICINE

## 2023-05-24 PROCEDURE — 99214 OFFICE O/P EST MOD 30 MIN: CPT | Performed by: INTERNAL MEDICINE

## 2023-05-24 PROCEDURE — 3044F HG A1C LEVEL LT 7.0%: CPT | Performed by: INTERNAL MEDICINE

## 2023-05-24 PROCEDURE — 3023F SPIROM DOC REV: CPT | Performed by: INTERNAL MEDICINE

## 2023-05-24 PROCEDURE — 85610 PROTHROMBIN TIME: CPT | Performed by: INTERNAL MEDICINE

## 2023-05-24 PROCEDURE — G8417 CALC BMI ABV UP PARAM F/U: HCPCS | Performed by: INTERNAL MEDICINE

## 2023-05-24 PROCEDURE — 4004F PT TOBACCO SCREEN RCVD TLK: CPT | Performed by: INTERNAL MEDICINE

## 2023-05-24 PROCEDURE — 1123F ACP DISCUSS/DSCN MKR DOCD: CPT | Performed by: INTERNAL MEDICINE

## 2023-05-24 PROCEDURE — 83036 HEMOGLOBIN GLYCOSYLATED A1C: CPT | Performed by: INTERNAL MEDICINE

## 2023-05-24 PROCEDURE — 3078F DIAST BP <80 MM HG: CPT | Performed by: INTERNAL MEDICINE

## 2023-05-24 PROCEDURE — G8427 DOCREV CUR MEDS BY ELIG CLIN: HCPCS | Performed by: INTERNAL MEDICINE

## 2023-05-24 RX ORDER — LOSARTAN POTASSIUM 25 MG/1
25 TABLET ORAL DAILY
Qty: 30 TABLET | Refills: 11 | COMMUNITY
Start: 2023-03-03 | End: 2024-03-02

## 2023-05-24 RX ORDER — DAROLUTAMIDE 300 MG/1
600 TABLET, FILM COATED ORAL 2 TIMES DAILY WITH MEALS
Qty: 60 TABLET | Refills: 0
Start: 2023-05-24

## 2023-05-24 SDOH — ECONOMIC STABILITY: INCOME INSECURITY: HOW HARD IS IT FOR YOU TO PAY FOR THE VERY BASICS LIKE FOOD, HOUSING, MEDICAL CARE, AND HEATING?: NOT VERY HARD

## 2023-05-24 SDOH — ECONOMIC STABILITY: HOUSING INSECURITY
IN THE LAST 12 MONTHS, WAS THERE A TIME WHEN YOU DID NOT HAVE A STEADY PLACE TO SLEEP OR SLEPT IN A SHELTER (INCLUDING NOW)?: NO

## 2023-05-24 SDOH — ECONOMIC STABILITY: FOOD INSECURITY: WITHIN THE PAST 12 MONTHS, YOU WORRIED THAT YOUR FOOD WOULD RUN OUT BEFORE YOU GOT MONEY TO BUY MORE.: NEVER TRUE

## 2023-05-24 SDOH — ECONOMIC STABILITY: FOOD INSECURITY: WITHIN THE PAST 12 MONTHS, THE FOOD YOU BOUGHT JUST DIDN'T LAST AND YOU DIDN'T HAVE MONEY TO GET MORE.: NEVER TRUE

## 2023-05-24 ASSESSMENT — PATIENT HEALTH QUESTIONNAIRE - PHQ9
1. LITTLE INTEREST OR PLEASURE IN DOING THINGS: 0
SUM OF ALL RESPONSES TO PHQ9 QUESTIONS 1 & 2: 0
SUM OF ALL RESPONSES TO PHQ QUESTIONS 1-9: 0
2. FEELING DOWN, DEPRESSED OR HOPELESS: 0
SUM OF ALL RESPONSES TO PHQ QUESTIONS 1-9: 0

## 2023-05-24 NOTE — PROGRESS NOTES
bleeding. Latest INRs:  Lab Results   Component Value Date/Time    INR 2.8 05/24/2023 12:00 PM    INR 2.8 04/27/2023 11:20 AM    INR 1.9 03/23/2023 12:10 PM    INR 1.7 02/23/2023 11:41 AM        New Coumadin dose: Amanda Freeman
fibrillation) (HCC)  I48.0 AMB POC PT/INR      4. Chronic obstructive pulmonary disease, unspecified COPD type ([facility]  J44.9       5. Prostate cancer ([facility]  C61 Darolutamide (NUBEQA) 300 MG TABS         A1c 6.3% today. Controlled. Continue diet control for DM. BP elevated today. Better controlled at last appointment. Continue losartan and carvedilol at current doses. On chronic anticoagulation with warfarin for stroke prophylaxis. INR 2.8 today. At goal of 2-3. Continue current warfarin dose and recheck INR in 1 month. Stable on Nubeqa and every 6 month Lupron injections for prostate cancer. Return in about 4 months (around 9/24/2023), or if symptoms worsen or fail to improve, for Medicare AW. I have discussed the diagnosis with the patient and the intended plan as seen in the above orders. Patient is in agreement. The patient has received an after-visit summary and questions were answered concerning future plans. I have discussed medication side effects and warnings with the patient as well.

## 2023-06-27 ENCOUNTER — NURSE ONLY (OUTPATIENT)
Facility: CLINIC | Age: 83
End: 2023-06-27
Payer: MEDICARE

## 2023-06-27 DIAGNOSIS — I48.0 PAF (PAROXYSMAL ATRIAL FIBRILLATION) (HCC): Primary | ICD-10-CM

## 2023-06-27 LAB
POC INR: 2.5
PROTHROMBIN TIME, POC: NORMAL

## 2023-06-27 PROCEDURE — 85610 PROTHROMBIN TIME: CPT | Performed by: INTERNAL MEDICINE

## 2023-06-30 RX ORDER — CLOTRIMAZOLE AND BETAMETHASONE DIPROPIONATE 10; .64 MG/G; MG/G
CREAM TOPICAL
Qty: 15 G | Refills: 1 | Status: SHIPPED | OUTPATIENT
Start: 2023-06-30

## 2023-07-03 RX ORDER — WARFARIN SODIUM 3 MG/1
TABLET ORAL
Qty: 90 TABLET | Refills: 1 | Status: SHIPPED | OUTPATIENT
Start: 2023-07-03

## 2023-07-03 NOTE — TELEPHONE ENCOUNTER
PCP: Kelsy Peterson MD     Last appt: 5/24/2023    Future Appointments   Date Time Provider [address]   7/25/2023 11:40 AM NURSE RODY RUIZ   9/29/2023 11:10 AM MD RODY Henley          Requested Prescriptions     Pending Prescriptions Disp Refills    warfarin (COUMADIN) 3 MG tablet [Pharmacy Med Name: Warfarin Sodium 3 MG Oral Tablet] 90 tablet 0     Sig: TAKE 1 TABLET BY MOUTH ON MONDAY AND FRIDAY AND 1 & 1/2 TABLETS ON THE OTHER 5 DAYS OF THE WEEK

## 2023-07-26 ENCOUNTER — NURSE ONLY (OUTPATIENT)
Facility: CLINIC | Age: 83
End: 2023-07-26
Payer: MEDICARE

## 2023-07-26 DIAGNOSIS — I48.0 PAF (PAROXYSMAL ATRIAL FIBRILLATION) (HCC): Primary | ICD-10-CM

## 2023-07-26 LAB
POC INR: 1.7
PROTHROMBIN TIME, POC: NORMAL

## 2023-07-26 PROCEDURE — 85610 PROTHROMBIN TIME: CPT | Performed by: INTERNAL MEDICINE

## 2023-07-26 NOTE — PROGRESS NOTES
Ryan Hoff is a 82 y.o. male who presents today for Anticoagulation monitoring. Indication: atrial fibrillation  INR Goal: 2.0-3.0. Current dose:  Coumadin 3mg tablets. 1 tablet on Monday & Friday. 1 1/2 all other days. Missed Coumadin Doses:  None  Medication Changes:  no  Dietary Changes:  no    Symptoms: taking coumadin appropriately without any bleeding. Latest INRs:  Lab Results   Component Value Date/Time    INR 1.7 07/26/2023 11:07 AM    INR 2.5 06/27/2023 10:58 AM    INR 2.8 05/24/2023 12:00 PM    INR 2.8 04/27/2023 11:20 AM    INR 1.9 03/23/2023 12:10 PM    INR 1.7 02/23/2023 11:41 AM        New Coumadin dose:.the following changes are made - TBD. Next check to be scheduled for  TBD weeks.

## 2023-08-09 ENCOUNTER — NURSE ONLY (OUTPATIENT)
Facility: CLINIC | Age: 83
End: 2023-08-09
Payer: MEDICARE

## 2023-08-09 DIAGNOSIS — I48.0 PAF (PAROXYSMAL ATRIAL FIBRILLATION) (HCC): Primary | ICD-10-CM

## 2023-08-09 LAB
POC INR: NORMAL
PROTHROMBIN TIME, POC: 2

## 2023-08-09 PROCEDURE — 85610 PROTHROMBIN TIME: CPT | Performed by: INTERNAL MEDICINE

## 2023-08-09 NOTE — PROGRESS NOTES
Marlene Klein is a 82 y.o. male who presents today for Anticoagulation monitoring. Indication: atrial fibrillation  INR Goal: 2.0-3.0. Current dose:  Coumadin 3mg daily. 1 tablet on Monday & Friday. 1 1/2 tablets all other days. Missed Coumadin Doses:  None  Medication Changes:  no  Dietary Changes:  no    Symptoms: taking coumadin appropriately without any bleeding. Latest INRs:  Lab Results   Component Value Date/Time    INR 1.7 07/26/2023 11:07 AM    INR 2.5 06/27/2023 10:58 AM    INR 2.8 05/24/2023 12:00 PM    INR 2.8 04/27/2023 11:20 AM    INR 1.9 03/23/2023 12:10 PM    INR 1.7 02/23/2023 11:41 AM        New Coumadin dose:.the following changes are made - TBD. Next check to be scheduled for  TBD weeks.

## 2023-08-20 PROBLEM — E11.22 TYPE 2 DIABETES MELLITUS WITH STAGE 3B CHRONIC KIDNEY DISEASE, WITHOUT LONG-TERM CURRENT USE OF INSULIN (HCC): Status: ACTIVE | Noted: 2017-10-08

## 2023-08-20 PROBLEM — N18.32 TYPE 2 DIABETES MELLITUS WITH STAGE 3B CHRONIC KIDNEY DISEASE, WITHOUT LONG-TERM CURRENT USE OF INSULIN (HCC): Status: ACTIVE | Noted: 2017-10-08

## 2023-09-06 ENCOUNTER — NURSE ONLY (OUTPATIENT)
Facility: CLINIC | Age: 83
End: 2023-09-06
Payer: MEDICARE

## 2023-09-06 DIAGNOSIS — I48.0 PAF (PAROXYSMAL ATRIAL FIBRILLATION) (HCC): Primary | ICD-10-CM

## 2023-09-06 LAB
POC INR: 1.3
PROTHROMBIN TIME, POC: NORMAL

## 2023-09-06 PROCEDURE — 85610 PROTHROMBIN TIME: CPT | Performed by: PHYSICIAN ASSISTANT

## 2023-09-06 RX ORDER — WARFARIN SODIUM 3 MG/1
TABLET ORAL
Qty: 90 TABLET | Refills: 1 | Status: SHIPPED | OUTPATIENT
Start: 2023-09-06

## 2023-09-06 NOTE — TELEPHONE ENCOUNTER
PCP: Christian Jones MD     Last appt:  5/24/2023      Future Appointments   Date Time Provider [address]   9/29/2023 11:10 AM Christian Jones MD [facility] BS AMB          Requested Prescriptions     Pending Prescriptions Disp Refills    warfarin (COUMADIN) 3 MG tablet 90 tablet 1     Sig: TAKE 1 TABLET BY MOUTH ON FRIDAY AND 1 & 1/2 TABLETS ON THE OTHER 6 DAYS OF THE WEEK newly diagnosed c prediabetes, questions on dit following VPS removal

## 2023-09-06 NOTE — PROGRESS NOTES
Aline Mcintyre is a 82 y.o. male who presents today for Anticoagulation monitoring. Indication: atrial fibrillation  INR Goal: 2.0-3.0. Current dose:  Coumadin 3mg Tablets. 1 tablet on Monday & Friday. 1 1/2 tablets all other days. Missed Coumadin Doses:  None  Medication Changes:  no  Dietary Changes:  no    Symptoms: taking coumadin appropriately without any bleeding. Latest INRs:  Lab Results   Component Value Date/Time    INR 1.3 09/06/2023 10:43 AM    INR 1.7 07/26/2023 11:07 AM    INR 2.5 06/27/2023 10:58 AM    INR 2.8 04/27/2023 11:20 AM    INR 1.9 03/23/2023 12:10 PM    INR 1.7 02/23/2023 11:41 AM        New Coumadin dose:.the following changes are made - TBD. Next check to be scheduled for  TBD weeks.

## 2023-09-12 ENCOUNTER — APPOINTMENT (OUTPATIENT)
Facility: HOSPITAL | Age: 83
End: 2023-09-12
Payer: MEDICARE

## 2023-09-12 ENCOUNTER — HOSPITAL ENCOUNTER (EMERGENCY)
Facility: HOSPITAL | Age: 83
Discharge: HOME | End: 2023-09-12
Attending: EMERGENCY MEDICINE
Payer: MEDICARE

## 2023-09-12 VITALS
BODY MASS INDEX: 31.45 KG/M2 | SYSTOLIC BLOOD PRESSURE: 134 MMHG | RESPIRATION RATE: 16 BRPM | DIASTOLIC BLOOD PRESSURE: 78 MMHG | HEART RATE: 85 BPM | WEIGHT: 212.96 LBS | OXYGEN SATURATION: 99 % | TEMPERATURE: 99 F

## 2023-09-12 DIAGNOSIS — G89.29 ACUTE EXACERBATION OF CHRONIC LOW BACK PAIN: Primary | ICD-10-CM

## 2023-09-12 DIAGNOSIS — M54.50 ACUTE EXACERBATION OF CHRONIC LOW BACK PAIN: Primary | ICD-10-CM

## 2023-09-12 DIAGNOSIS — M51.37 DDD (DEGENERATIVE DISC DISEASE), LUMBOSACRAL: ICD-10-CM

## 2023-09-12 LAB
APPEARANCE UR: CLEAR
BACTERIA URNS QL MICRO: NEGATIVE /HPF
BILIRUB UR QL: NEGATIVE
COLOR UR: ABNORMAL
EPITH CASTS URNS QL MICRO: ABNORMAL /LPF
GLUCOSE UR STRIP.AUTO-MCNC: NEGATIVE MG/DL
HGB UR QL STRIP: ABNORMAL
HYALINE CASTS URNS QL MICRO: ABNORMAL /LPF (ref 0–2)
KETONES UR QL STRIP.AUTO: NEGATIVE MG/DL
LEUKOCYTE ESTERASE UR QL STRIP.AUTO: NEGATIVE
NITRITE UR QL STRIP.AUTO: NEGATIVE
PH UR STRIP: 5.5 (ref 5–8)
PROT UR STRIP-MCNC: NEGATIVE MG/DL
RBC #/AREA URNS HPF: ABNORMAL /HPF (ref 0–5)
SP GR UR REFRACTOMETRY: 1.02
URINE CULTURE IF INDICATED: ABNORMAL
UROBILINOGEN UR QL STRIP.AUTO: 0.2 EU/DL (ref 0.2–1)
WBC URNS QL MICRO: ABNORMAL /HPF (ref 0–4)

## 2023-09-12 PROCEDURE — 72110 X-RAY EXAM L-2 SPINE 4/>VWS: CPT

## 2023-09-12 PROCEDURE — 6360000002 HC RX W HCPCS

## 2023-09-12 PROCEDURE — 6370000000 HC RX 637 (ALT 250 FOR IP)

## 2023-09-12 PROCEDURE — 81001 URINALYSIS AUTO W/SCOPE: CPT

## 2023-09-12 PROCEDURE — 96372 THER/PROPH/DIAG INJ SC/IM: CPT

## 2023-09-12 PROCEDURE — 99284 EMERGENCY DEPT VISIT MOD MDM: CPT

## 2023-09-12 PROCEDURE — 72100 X-RAY EXAM L-S SPINE 2/3 VWS: CPT

## 2023-09-12 RX ORDER — KETOROLAC TROMETHAMINE 30 MG/ML
30 INJECTION, SOLUTION INTRAMUSCULAR; INTRAVENOUS ONCE
Status: COMPLETED | OUTPATIENT
Start: 2023-09-12 | End: 2023-09-12

## 2023-09-12 RX ORDER — METHOCARBAMOL 750 MG/1
750 TABLET, FILM COATED ORAL
Status: COMPLETED | OUTPATIENT
Start: 2023-09-12 | End: 2023-09-12

## 2023-09-12 RX ORDER — LIDOCAINE 50 MG/G
1 PATCH TOPICAL DAILY
Qty: 10 PATCH | Refills: 0 | Status: SHIPPED | OUTPATIENT
Start: 2023-09-12 | End: 2023-09-22

## 2023-09-12 RX ORDER — METHOCARBAMOL 500 MG/1
500 TABLET, FILM COATED ORAL 4 TIMES DAILY PRN
Qty: 40 TABLET | Refills: 0 | Status: SHIPPED | OUTPATIENT
Start: 2023-09-12 | End: 2023-09-22

## 2023-09-12 RX ADMIN — METHOCARBAMOL TABLETS 750 MG: 750 TABLET, COATED ORAL at 20:47

## 2023-09-12 RX ADMIN — KETOROLAC TROMETHAMINE 30 MG: 30 INJECTION, SOLUTION INTRAMUSCULAR; INTRAVENOUS at 20:47

## 2023-09-12 ASSESSMENT — ENCOUNTER SYMPTOMS
BACK PAIN: 1
NAUSEA: 0
VOMITING: 0
GASTROINTESTINAL NEGATIVE: 1
CHEST TIGHTNESS: 0

## 2023-09-12 ASSESSMENT — PAIN SCALES - GENERAL: PAINLEVEL_OUTOF10: 9

## 2023-09-12 NOTE — ED PROVIDER NOTES
should their symptoms worsen. PATIENT REFERRED TO:  Johanny Ramirez MD  [address] [facility]  [phone]    Schedule an appointment as soon as possible for a visit       [address] [facility] [address]  [phone]  Schedule an appointment as soon as possible for a visit          DISCHARGE MEDICATIONS:     Medication List        START taking these medications      lidocaine 5 %  Commonly known as: [address] [place] 1 patch onto the skin daily for 10 days 12 hours on, 12 hours off.     methocarbamol 500 MG tablet  Commonly known as: ROBAXIN  Take 1 tablet by mouth 4 times daily as needed (Muscle spasms)            ASK your doctor about these medications      aspirin 81 MG chewable tablet     atorvastatin 40 MG tablet  Commonly known as: LIPITOR     carvedilol 12.5 MG tablet  Commonly known as: COREG     cetirizine 10 MG tablet  Commonly known as: ZYRTEC     clotrimazole-betamethasone 1-0.05 % cream  Commonly known as: LOTRISONE  APPLY  CREAM TOPICALLY TO AFFECTED AREA TWICE DAILY     furosemide 20 MG tablet  Commonly known as: LASIX     losartan 25 MG tablet  Commonly known as: COZAAR     Nubeqa 300 MG Tabs  Generic drug: Darolutamide  Take 600 mg by mouth 2 times daily (with meals)     warfarin 3 MG tablet  Commonly known as: COUMADIN  TAKE 1 TABLET BY MOUTH ON FRIDAY AND 1 & 1/2 TABLETS ON THE OTHER 6 DAYS OF THE WEEK               Where to Get Your Medications        These medications were sent to [address] [place] [address] [phone] [facility] [phone]  [address] [facility], [address] [facility]      Phone: [phone]   lidocaine 5 %  methocarbamol 500 MG tablet           DISCONTINUED MEDICATIONS:  Discharge Medication List as of 9/12/2023  8:31 PM          I have seen and evaluated the patient autonomously. My supervision physician was on site and available for consultation if needed.      I am the Primary Clinician of

## 2023-09-13 ENCOUNTER — NURSE ONLY (OUTPATIENT)
Facility: CLINIC | Age: 83
End: 2023-09-13
Payer: MEDICARE

## 2023-09-13 DIAGNOSIS — I48.0 PAF (PAROXYSMAL ATRIAL FIBRILLATION) (HCC): Primary | ICD-10-CM

## 2023-09-13 LAB
POC INR: 1.8
PROTHROMBIN TIME, POC: NORMAL

## 2023-09-13 PROCEDURE — 85610 PROTHROMBIN TIME: CPT | Performed by: INTERNAL MEDICINE

## 2023-09-13 NOTE — ED NOTES
DC info reviewed with patient, all questions answered. Patient well-appearing at time of discharge and vital signs stable. Ambulatory out of ED at this time.        Angelica Ramos RN  09/12/23 2086

## 2023-09-13 NOTE — DISCHARGE INSTRUCTIONS
INDICATION:  low back pain      EXAM: 3 views lumbar spine. Comparison 11/11/2021, 11/4/2021. FINDINGS: There is slight anterolisthesis of L4 on L5. Multilevel disc height  loss and spurring most significant at L4-5 and L5-S1. Associated facet  arthropathy. Bones are osteopenic and aorta is incompletely calcified. No bony  destructive lesions or fractures. IMPRESSION:  1.  Stented degenerative change most significant at L4-5 and L5-S1

## 2023-09-17 DIAGNOSIS — I48.0 PAF (PAROXYSMAL ATRIAL FIBRILLATION) (HCC): Primary | ICD-10-CM

## 2023-09-17 RX ORDER — CARVEDILOL 12.5 MG/1
25 TABLET ORAL 2 TIMES DAILY WITH MEALS
Qty: 120 TABLET | Refills: 0
Start: 2023-09-17

## 2023-09-29 ENCOUNTER — OFFICE VISIT (OUTPATIENT)
Facility: CLINIC | Age: 83
End: 2023-09-29

## 2023-09-29 VITALS
RESPIRATION RATE: 16 BRPM | OXYGEN SATURATION: 98 % | DIASTOLIC BLOOD PRESSURE: 74 MMHG | HEART RATE: 71 BPM | HEIGHT: 69 IN | WEIGHT: 220 LBS | TEMPERATURE: 98.4 F | BODY MASS INDEX: 32.58 KG/M2 | SYSTOLIC BLOOD PRESSURE: 112 MMHG

## 2023-09-29 DIAGNOSIS — I10 PRIMARY HYPERTENSION: ICD-10-CM

## 2023-09-29 DIAGNOSIS — I48.0 PAF (PAROXYSMAL ATRIAL FIBRILLATION) (HCC): ICD-10-CM

## 2023-09-29 DIAGNOSIS — Z00.00 MEDICARE ANNUAL WELLNESS VISIT, SUBSEQUENT: Primary | ICD-10-CM

## 2023-09-29 DIAGNOSIS — M54.16 LUMBAR RADICULOPATHY: ICD-10-CM

## 2023-09-29 ASSESSMENT — PATIENT HEALTH QUESTIONNAIRE - PHQ9
SUM OF ALL RESPONSES TO PHQ QUESTIONS 1-9: 0
SUM OF ALL RESPONSES TO PHQ QUESTIONS 1-9: 0
2. FEELING DOWN, DEPRESSED OR HOPELESS: 0
SUM OF ALL RESPONSES TO PHQ QUESTIONS 1-9: 0
SUM OF ALL RESPONSES TO PHQ QUESTIONS 1-9: 0
SUM OF ALL RESPONSES TO PHQ9 QUESTIONS 1 & 2: 0
1. LITTLE INTEREST OR PLEASURE IN DOING THINGS: 0

## 2023-09-29 ASSESSMENT — LIFESTYLE VARIABLES
HOW MANY STANDARD DRINKS CONTAINING ALCOHOL DO YOU HAVE ON A TYPICAL DAY: PATIENT DOES NOT DRINK
HOW OFTEN DO YOU HAVE A DRINK CONTAINING ALCOHOL: NEVER

## 2023-09-29 NOTE — PROGRESS NOTES
Medicare Annual Wellness Visit    Briseyda Tidwell is here for Annual Exam (3B)    Assessment & Plan     Medicare annual wellness visit, subsequent  Lumbar radiculopathy  I instructed patient to stop Coumadin and ASA 5 days before epidural steroid injection (GERALDINE). After procedure, okay to restart Coumadin and ASA the same day. A form documenting this information will be faxed to Dr. Mikey Smart office. Primary hypertension  Controlled. Continue present management. PAF (paroxysmal atrial fibrillation) ([address])    Recommendations for Preventive Services Due: see orders and patient instructions/AVS.  Recommended screening schedule for the next 5-10 years is provided to the patient in written form: see Patient Instructions/AVS.       Return in about 4 months (around 1/29/2024), or if symptoms worsen or fail to improve, for HTN, chol. Subjective     Presents for Medicare AWV and 4 month follow up. Complains of severe left hip pain that is coming from his back. Plan is for [facility]. Needs to know when to stop Coumadin and have me fax a form to Dr. Tanya Mcdonough (Orthopedics). Chronic mild MARIA. Occasional leg swelling. Denies CP, SOB, or dizziness. Patient's complete Health Risk Assessment and screening values have been reviewed and are found in Flowsheets. The following problems were reviewed today and where indicated follow up appointments were made and/or referrals ordered. Positive Risk Factor Screenings with Interventions:    Fall Risk:  Do you feel unsteady or are you worried about falling? : (!) yes  2 or more falls in past year?: no  Fall with injury in past year?: no     Interventions:    See AVS for additional education material    Cognitive:    Words recalled: 2 Words Recalled           Total Score Interpretation: Abnormal Mini-Cog      Interventions:  Patient advised to follow-up in this office for further evaluation and treatment      Drug Use:          Interpretation:  1-2: Low level - Monitor,

## 2023-10-18 ENCOUNTER — NURSE ONLY (OUTPATIENT)
Facility: CLINIC | Age: 83
End: 2023-10-18
Payer: MEDICARE

## 2023-10-18 ENCOUNTER — TELEPHONE (OUTPATIENT)
Facility: CLINIC | Age: 83
End: 2023-10-18

## 2023-10-18 DIAGNOSIS — I48.0 PAF (PAROXYSMAL ATRIAL FIBRILLATION) (HCC): Primary | ICD-10-CM

## 2023-10-18 LAB
POC INR: 1.2
PROTHROMBIN TIME, POC: NORMAL

## 2023-10-18 PROCEDURE — 85610 PROTHROMBIN TIME: CPT | Performed by: INTERNAL MEDICINE

## 2023-10-18 NOTE — PROGRESS NOTES
Aga Alvarado is a 82 y.o. male who presents today for Anticoagulation monitoring. Indication: atrial fibrillation  INR Goal: 2.0-3.0. Current dose:  Coumadin 3mg . Pt has been off of medication since 10/16/2023 due to pt having pain shots in his back for pain on 10/19/2023  Missed Coumadin Doses: currently not taking  Medication Changes:  no  Dietary Changes:  no    Symptoms: taking coumadin appropriately without any bleeding. Latest INRs:  Lab Results   Component Value Date/Time    INR 1.2 10/18/2023 11:08 AM    INR 1.8 09/13/2023 11:30 AM    INR 1.3 09/06/2023 10:43 AM    INR 2.8 04/27/2023 11:20 AM    INR 1.9 03/23/2023 12:10 PM    INR 1.7 02/23/2023 11:41 AM        New Coumadin dose:. TBD. Next check to be scheduled for  TBD weeks.

## 2023-10-18 NOTE — TELEPHONE ENCOUNTER
Pt came in for INR check 1.2 . He has been off of Coumadin 3mg since Monday due to having shots in his back for pain starting 10/19/2023. Please advise on new dosage moving forward and when to have next INR checked.

## 2023-10-18 NOTE — TELEPHONE ENCOUNTER
Let patient know:  After receiving the steroid injection to your back, you can restart Coumadin the same day. Return to clinic in 2 weeks for your next INR check.

## 2023-10-22 DIAGNOSIS — I48.0 PAF (PAROXYSMAL ATRIAL FIBRILLATION) (HCC): ICD-10-CM

## 2023-10-22 PROBLEM — I25.10 CORONARY ARTERY DISEASE INVOLVING NATIVE CORONARY ARTERY OF NATIVE HEART WITHOUT ANGINA PECTORIS: Status: ACTIVE | Noted: 2022-07-01

## 2023-11-01 ENCOUNTER — NURSE ONLY (OUTPATIENT)
Facility: CLINIC | Age: 83
End: 2023-11-01
Payer: MEDICARE

## 2023-11-01 DIAGNOSIS — I48.0 PAF (PAROXYSMAL ATRIAL FIBRILLATION) (HCC): Primary | ICD-10-CM

## 2023-11-01 LAB
POC INR: 1.3
PROTHROMBIN TIME, POC: NORMAL

## 2023-11-01 PROCEDURE — 85610 PROTHROMBIN TIME: CPT | Performed by: INTERNAL MEDICINE

## 2023-11-01 NOTE — PROGRESS NOTES
Rosie Ames is a 82 y.o. male who presents today for Anticoagulation monitoring. Indication: atrial fibrillation  INR Goal: 2.0-3.0. Current dose:  Coumadin 3mg daily. Missed Coumadin Doses:  None  Medication Changes:  no  Dietary Changes:  no    Symptoms: taking coumadin appropriately without any bleeding. Latest INRs:  Lab Results   Component Value Date/Time    INR 1.3 11/01/2023 10:57 AM    INR 1.2 10/18/2023 11:08 AM    INR 1.8 09/13/2023 11:30 AM    INR 2.8 04/27/2023 11:20 AM    INR 1.9 03/23/2023 12:10 PM    INR 1.7 02/23/2023 11:41 AM        New Coumadin dose: TBD    Next check to be scheduled for  1 weeks.

## 2023-11-29 ENCOUNTER — HOSPITAL ENCOUNTER (INPATIENT)
Facility: HOSPITAL | Age: 83
LOS: 3 days | Discharge: HOME | End: 2023-12-02
Attending: EMERGENCY MEDICINE | Admitting: INTERNAL MEDICINE
Payer: MEDICARE

## 2023-11-29 ENCOUNTER — APPOINTMENT (OUTPATIENT)
Facility: HOSPITAL | Age: 83
End: 2023-11-29
Payer: MEDICARE

## 2023-11-29 DIAGNOSIS — R65.20 SEPSIS WITH ACUTE ORGAN DYSFUNCTION WITHOUT SEPTIC SHOCK, DUE TO UNSPECIFIED ORGANISM, UNSPECIFIED ORGAN DYSFUNCTION TYPE (HCC): Primary | ICD-10-CM

## 2023-11-29 DIAGNOSIS — J18.9 PNEUMONIA OF RIGHT MIDDLE LOBE DUE TO INFECTIOUS ORGANISM: ICD-10-CM

## 2023-11-29 DIAGNOSIS — A41.9 SEPSIS WITH ACUTE ORGAN DYSFUNCTION WITHOUT SEPTIC SHOCK, DUE TO UNSPECIFIED ORGANISM, UNSPECIFIED ORGAN DYSFUNCTION TYPE (HCC): Primary | ICD-10-CM

## 2023-11-29 DIAGNOSIS — G93.40 ENCEPHALOPATHY: ICD-10-CM

## 2023-11-29 LAB
ALBUMIN SERPL-MCNC: 3.3 G/DL (ref 3.5–5)
ALBUMIN/GLOB SERPL: 0.7 (ref 1.1–2.2)
ALP SERPL-CCNC: 67 U/L (ref 45–117)
ALT SERPL-CCNC: 17 U/L (ref 12–78)
AMMONIA PLAS-SCNC: 40 UMOL/L
ANION GAP SERPL CALC-SCNC: 7 MMOL/L (ref 5–15)
APPEARANCE UR: CLEAR
AST SERPL-CCNC: 20 U/L (ref 15–37)
BACTERIA URNS QL MICRO: NEGATIVE /HPF
BASE DEFICIT BLDV-SCNC: 3.4 MMOL/L
BASOPHILS # BLD: 0 K/UL (ref 0–0.1)
BASOPHILS NFR BLD: 0 % (ref 0–1)
BILIRUB SERPL-MCNC: 0.5 MG/DL (ref 0.2–1)
BILIRUB UR QL: NEGATIVE
BUN SERPL-MCNC: 42 MG/DL (ref 6–20)
BUN/CREAT SERPL: 21 (ref 12–20)
CALCIUM SERPL-MCNC: 8.9 MG/DL (ref 8.5–10.1)
CHLORIDE SERPL-SCNC: 110 MMOL/L (ref 97–108)
CO2 SERPL-SCNC: 24 MMOL/L (ref 21–32)
COLOR UR: ABNORMAL
CREAT SERPL-MCNC: 2.03 MG/DL (ref 0.7–1.3)
DIFFERENTIAL METHOD BLD: ABNORMAL
EKG ATRIAL RATE: 174 BPM
EKG DIAGNOSIS: NORMAL
EKG Q-T INTERVAL: 424 MS
EKG QRS DURATION: 124 MS
EKG QTC CALCULATION (BAZETT): 486 MS
EKG R AXIS: 140 DEGREES
EKG T AXIS: 159 DEGREES
EKG VENTRICULAR RATE: 79 BPM
EOSINOPHIL # BLD: 0 K/UL (ref 0–0.4)
EOSINOPHIL NFR BLD: 0 % (ref 0–7)
EPITH CASTS URNS QL MICRO: ABNORMAL /LPF
ERYTHROCYTE [DISTWIDTH] IN BLOOD BY AUTOMATED COUNT: 15.6 % (ref 11.5–14.5)
FLUAV AG NPH QL IA: NEGATIVE
FLUBV AG NOSE QL IA: NEGATIVE
GLOBULIN SER CALC-MCNC: 4.6 G/DL (ref 2–4)
GLUCOSE BLD STRIP.AUTO-MCNC: 153 MG/DL (ref 65–117)
GLUCOSE BLD STRIP.AUTO-MCNC: 179 MG/DL (ref 65–117)
GLUCOSE SERPL-MCNC: 137 MG/DL (ref 65–100)
GLUCOSE UR STRIP.AUTO-MCNC: NEGATIVE MG/DL
HCO3 BLDV-SCNC: 22.3 MMOL/L (ref 23–28)
HCT VFR BLD AUTO: 33.7 % (ref 36.6–50.3)
HGB BLD-MCNC: 10.4 G/DL (ref 12.1–17)
HGB UR QL STRIP: ABNORMAL
HYALINE CASTS URNS QL MICRO: ABNORMAL /LPF (ref 0–2)
IMM GRANULOCYTES # BLD AUTO: 0.1 K/UL (ref 0–0.04)
IMM GRANULOCYTES NFR BLD AUTO: 1 % (ref 0–0.5)
INR PPP: 1.6 (ref 0.9–1.1)
KETONES UR QL STRIP.AUTO: NEGATIVE MG/DL
LACTATE BLD-SCNC: 1.66 MMOL/L (ref 0.4–2)
LACTATE BLD-SCNC: 2.55 MMOL/L (ref 0.4–2)
LEUKOCYTE ESTERASE UR QL STRIP.AUTO: NEGATIVE
LYMPHOCYTES # BLD: 0.6 K/UL (ref 0.8–3.5)
LYMPHOCYTES NFR BLD: 4 % (ref 12–49)
MCH RBC QN AUTO: 26.5 PG (ref 26–34)
MCHC RBC AUTO-ENTMCNC: 30.9 G/DL (ref 30–36.5)
MCV RBC AUTO: 86 FL (ref 80–99)
MONOCYTES # BLD: 0.6 K/UL (ref 0–1)
MONOCYTES NFR BLD: 4 % (ref 5–13)
NEUTS SEG # BLD: 12.9 K/UL (ref 1.8–8)
NEUTS SEG NFR BLD: 91 % (ref 32–75)
NITRITE UR QL STRIP.AUTO: NEGATIVE
NRBC # BLD: 0 K/UL (ref 0–0.01)
NRBC BLD-RTO: 0 PER 100 WBC
PCO2 BLDV: 42.1 MMHG (ref 41–51)
PH BLDV: 7.33 (ref 7.32–7.42)
PH UR STRIP: 5 (ref 5–8)
PLATELET # BLD AUTO: 86 K/UL (ref 150–400)
PLATELET COMMENT: ABNORMAL
PMV BLD AUTO: 12.1 FL (ref 8.9–12.9)
PO2 BLDV: <27 MMHG (ref 25–40)
POTASSIUM SERPL-SCNC: 3.9 MMOL/L (ref 3.5–5.1)
PROCALCITONIN SERPL-MCNC: 9.82 NG/ML
PROT SERPL-MCNC: 7.9 G/DL (ref 6.4–8.2)
PROT UR STRIP-MCNC: NEGATIVE MG/DL
PROTHROMBIN TIME: 16.4 SEC (ref 9–11.1)
RBC # BLD AUTO: 3.92 M/UL (ref 4.1–5.7)
RBC #/AREA URNS HPF: ABNORMAL /HPF (ref 0–5)
RBC MORPH BLD: ABNORMAL
SARS-COV-2 RDRP RESP QL NAA+PROBE: NOT DETECTED
SERVICE CMNT-IMP: ABNORMAL
SODIUM SERPL-SCNC: 141 MMOL/L (ref 136–145)
SOURCE: NORMAL
SP GR UR REFRACTOMETRY: 1.02
SPECIMEN TYPE: ABNORMAL
TROPONIN I SERPL HS-MCNC: 33 NG/L (ref 0–76)
TSH SERPL DL<=0.05 MIU/L-ACNC: 0.57 UIU/ML (ref 0.36–3.74)
URINE CULTURE IF INDICATED: ABNORMAL
UROBILINOGEN UR QL STRIP.AUTO: 0.2 EU/DL (ref 0.2–1)
WBC # BLD AUTO: 14.2 K/UL (ref 4.1–11.1)
WBC MORPH BLD: ABNORMAL
WBC URNS QL MICRO: ABNORMAL /HPF (ref 0–4)

## 2023-11-29 PROCEDURE — 6360000002 HC RX W HCPCS: Performed by: EMERGENCY MEDICINE

## 2023-11-29 PROCEDURE — 96365 THER/PROPH/DIAG IV INF INIT: CPT

## 2023-11-29 PROCEDURE — 71045 X-RAY EXAM CHEST 1 VIEW: CPT

## 2023-11-29 PROCEDURE — 93005 ELECTROCARDIOGRAM TRACING: CPT | Performed by: EMERGENCY MEDICINE

## 2023-11-29 PROCEDURE — 83605 ASSAY OF LACTIC ACID: CPT

## 2023-11-29 PROCEDURE — 82803 BLOOD GASES ANY COMBINATION: CPT

## 2023-11-29 PROCEDURE — 2580000003 HC RX 258: Performed by: INTERNAL MEDICINE

## 2023-11-29 PROCEDURE — 84145 PROCALCITONIN (PCT): CPT

## 2023-11-29 PROCEDURE — 6370000000 HC RX 637 (ALT 250 FOR IP): Performed by: INTERNAL MEDICINE

## 2023-11-29 PROCEDURE — 84443 ASSAY THYROID STIM HORMONE: CPT

## 2023-11-29 PROCEDURE — 2580000003 HC RX 258: Performed by: EMERGENCY MEDICINE

## 2023-11-29 PROCEDURE — 82962 GLUCOSE BLOOD TEST: CPT

## 2023-11-29 PROCEDURE — 85610 PROTHROMBIN TIME: CPT

## 2023-11-29 PROCEDURE — 87040 BLOOD CULTURE FOR BACTERIA: CPT

## 2023-11-29 PROCEDURE — 80053 COMPREHEN METABOLIC PANEL: CPT

## 2023-11-29 PROCEDURE — 81001 URINALYSIS AUTO W/SCOPE: CPT

## 2023-11-29 PROCEDURE — 84484 ASSAY OF TROPONIN QUANT: CPT

## 2023-11-29 PROCEDURE — 82140 ASSAY OF AMMONIA: CPT

## 2023-11-29 PROCEDURE — 99285 EMERGENCY DEPT VISIT HI MDM: CPT

## 2023-11-29 PROCEDURE — 1100000003 HC PRIVATE W/ TELEMETRY

## 2023-11-29 PROCEDURE — 87635 SARS-COV-2 COVID-19 AMP PRB: CPT

## 2023-11-29 PROCEDURE — 87804 INFLUENZA ASSAY W/OPTIC: CPT

## 2023-11-29 PROCEDURE — 70450 CT HEAD/BRAIN W/O DYE: CPT

## 2023-11-29 PROCEDURE — 85025 COMPLETE CBC W/AUTO DIFF WBC: CPT

## 2023-11-29 PROCEDURE — 36415 COLL VENOUS BLD VENIPUNCTURE: CPT

## 2023-11-29 RX ORDER — SODIUM CHLORIDE 0.9 % (FLUSH) 0.9 %
5-40 SYRINGE (ML) INJECTION EVERY 12 HOURS SCHEDULED
Status: DISCONTINUED | OUTPATIENT
Start: 2023-11-29 | End: 2023-12-02 | Stop reason: HOSPADM

## 2023-11-29 RX ORDER — 0.9 % SODIUM CHLORIDE 0.9 %
30 INTRAVENOUS SOLUTION INTRAVENOUS ONCE
Status: COMPLETED | OUTPATIENT
Start: 2023-11-29 | End: 2023-11-29

## 2023-11-29 RX ORDER — ENOXAPARIN SODIUM 100 MG/ML
40 INJECTION SUBCUTANEOUS DAILY
Status: DISCONTINUED | OUTPATIENT
Start: 2023-11-29 | End: 2023-11-29 | Stop reason: SDUPTHER

## 2023-11-29 RX ORDER — ACETAMINOPHEN 325 MG/1
650 TABLET ORAL EVERY 6 HOURS PRN
Status: DISCONTINUED | OUTPATIENT
Start: 2023-11-29 | End: 2023-12-02 | Stop reason: HOSPADM

## 2023-11-29 RX ORDER — ASPIRIN 81 MG/1
81 TABLET, CHEWABLE ORAL DAILY
Status: DISCONTINUED | OUTPATIENT
Start: 2023-11-29 | End: 2023-12-02 | Stop reason: HOSPADM

## 2023-11-29 RX ORDER — WARFARIN SODIUM 1 MG/1
3 TABLET ORAL
Status: COMPLETED | OUTPATIENT
Start: 2023-11-29 | End: 2023-11-29

## 2023-11-29 RX ORDER — ONDANSETRON 4 MG/1
4 TABLET, ORALLY DISINTEGRATING ORAL EVERY 8 HOURS PRN
Status: DISCONTINUED | OUTPATIENT
Start: 2023-11-29 | End: 2023-12-02 | Stop reason: HOSPADM

## 2023-11-29 RX ORDER — INSULIN LISPRO 100 [IU]/ML
0-4 INJECTION, SOLUTION INTRAVENOUS; SUBCUTANEOUS NIGHTLY
Status: DISCONTINUED | OUTPATIENT
Start: 2023-11-29 | End: 2023-12-02 | Stop reason: HOSPADM

## 2023-11-29 RX ORDER — POLYETHYLENE GLYCOL 3350 17 G/17G
17 POWDER, FOR SOLUTION ORAL DAILY PRN
Status: DISCONTINUED | OUTPATIENT
Start: 2023-11-29 | End: 2023-12-02 | Stop reason: HOSPADM

## 2023-11-29 RX ORDER — SODIUM CHLORIDE 9 MG/ML
INJECTION, SOLUTION INTRAVENOUS CONTINUOUS
Status: DISCONTINUED | OUTPATIENT
Start: 2023-11-29 | End: 2023-11-29

## 2023-11-29 RX ORDER — ACETAMINOPHEN 650 MG/1
650 SUPPOSITORY RECTAL EVERY 6 HOURS PRN
Status: DISCONTINUED | OUTPATIENT
Start: 2023-11-29 | End: 2023-12-02 | Stop reason: HOSPADM

## 2023-11-29 RX ORDER — 0.9 % SODIUM CHLORIDE 0.9 %
1000 INTRAVENOUS SOLUTION INTRAVENOUS ONCE
Status: DISCONTINUED | OUTPATIENT
Start: 2023-11-29 | End: 2023-11-29

## 2023-11-29 RX ORDER — CARVEDILOL 12.5 MG/1
25 TABLET ORAL 2 TIMES DAILY WITH MEALS
Status: DISCONTINUED | OUTPATIENT
Start: 2023-11-29 | End: 2023-12-02 | Stop reason: HOSPADM

## 2023-11-29 RX ORDER — ONDANSETRON 2 MG/ML
4 INJECTION INTRAMUSCULAR; INTRAVENOUS EVERY 6 HOURS PRN
Status: DISCONTINUED | OUTPATIENT
Start: 2023-11-29 | End: 2023-12-02 | Stop reason: HOSPADM

## 2023-11-29 RX ORDER — SODIUM CHLORIDE 9 MG/ML
INJECTION, SOLUTION INTRAVENOUS PRN
Status: DISCONTINUED | OUTPATIENT
Start: 2023-11-29 | End: 2023-12-02 | Stop reason: HOSPADM

## 2023-11-29 RX ORDER — IPRATROPIUM BROMIDE AND ALBUTEROL SULFATE 2.5; .5 MG/3ML; MG/3ML
1 SOLUTION RESPIRATORY (INHALATION) EVERY 4 HOURS PRN
Status: DISCONTINUED | OUTPATIENT
Start: 2023-11-29 | End: 2023-12-02 | Stop reason: HOSPADM

## 2023-11-29 RX ORDER — SODIUM CHLORIDE 0.9 % (FLUSH) 0.9 %
5-40 SYRINGE (ML) INJECTION PRN
Status: DISCONTINUED | OUTPATIENT
Start: 2023-11-29 | End: 2023-12-02 | Stop reason: HOSPADM

## 2023-11-29 RX ORDER — INSULIN LISPRO 100 [IU]/ML
0-8 INJECTION, SOLUTION INTRAVENOUS; SUBCUTANEOUS
Status: DISCONTINUED | OUTPATIENT
Start: 2023-11-29 | End: 2023-12-02 | Stop reason: HOSPADM

## 2023-11-29 RX ORDER — ATORVASTATIN CALCIUM 40 MG/1
40 TABLET, FILM COATED ORAL NIGHTLY
Status: DISCONTINUED | OUTPATIENT
Start: 2023-11-29 | End: 2023-12-02 | Stop reason: HOSPADM

## 2023-11-29 RX ADMIN — SODIUM CHLORIDE, PRESERVATIVE FREE 10 ML: 5 INJECTION INTRAVENOUS at 20:51

## 2023-11-29 RX ADMIN — CARVEDILOL 25 MG: 12.5 TABLET, FILM COATED ORAL at 18:08

## 2023-11-29 RX ADMIN — SODIUM CHLORIDE 1000 MG: 900 INJECTION INTRAVENOUS at 11:35

## 2023-11-29 RX ADMIN — ATORVASTATIN CALCIUM 40 MG: 40 TABLET, FILM COATED ORAL at 20:50

## 2023-11-29 RX ADMIN — SODIUM CHLORIDE 2121 ML: 9 INJECTION, SOLUTION INTRAVENOUS at 11:00

## 2023-11-29 RX ADMIN — WARFARIN SODIUM 3 MG: 2 TABLET ORAL at 17:56

## 2023-11-29 ASSESSMENT — PAIN SCALES - GENERAL
PAINLEVEL_OUTOF10: 0
PAINLEVEL_OUTOF10: 0

## 2023-11-29 ASSESSMENT — PAIN - FUNCTIONAL ASSESSMENT: PAIN_FUNCTIONAL_ASSESSMENT: 0-10

## 2023-11-29 NOTE — ED NOTES
Pt back from CT at this time.  Pt on monitor x3 with call bell in reach      Karyle Marseille, [place]  11/29/23 3197

## 2023-11-29 NOTE — H&P
Hospitalist Admission Note    NAME:   Joycelyn Burgos   : 1940   MRN: [id number]     Date/Time: 2023 3:15 PM    Patient PCP: Meri Villegas MD    ______________________________________________________________________  Given the patient's current clinical presentation, I have a high level of concern for decompensation if discharged from the emergency department. Complex decision making was performed, which includes reviewing the patient's available past medical records, laboratory results, and x-ray films. My assessment of this patient's clinical condition and my plan of care is as follows. Assessment / Plan:  Community-acquired pneumonia  Weakness in the setting of history of lumbar radiculopathy  Lactic acidosis  Chest x-ray show pneumonia  COVID and flu negative  S/p IVF, hold further fluids due to known EF 25-30%. Will start empiric IV Rocephin/azithromycin  Nebs as needed  O2 supplement as needed  PT/OT    History of CHF  History of PAF  NACHO on CKD 3  He has an EF of 25-30% in 2020. Hold fluids  hold Entresto, Lasix-resume in the AM once cr is improved  Encourage p.o. intake and repeat BMP in the a.m. Resume home ASA, Coreg  Pharmacy consulted for Coumadin dosing    T2DM  SSI        Medical Decision Making:  I personally reviewed labs  I personally reviewed imaging  Toxic drug monitoring  I personally reviewed EKG  Discussed case with: ED provider. After discussion I am in agreement that acuity of patient's medical condition necessitates hospital stay. Code Status: full   DVT Prophylaxis: warfarin  Baseline: from home, walks with walker. Subjective:   CHIEF COMPLAINT: fall. HISTORY OF PRESENT ILLNESS:     Ronny Baird is a 82 y.o.  male with PMHx significant for PAF, CHF, hypertension, lumbar radiculopathy, BPH, type 2 diabetes, presents to the ER for evaluation of fall associated with altered mental status.   Per patient's wife and son at bedside, she did not 2/17-2/22/16: acute hypoxic resp failure, acute diarrhea  11/28-11/30/15: acute hypoxic resp failure due to acute CHF exac, elevated troponin due to cardiac strain  2/8-2/12/14: acuter hypoxic resp failure due to CHF exac, pneumonia     Hand blister 10/8/2017    Bilateral    Hyperlipidemia     Hypertension     Kidney disease, chronic, stage III (GFR 30-59 ml/min) ([facility])     Leg fracture, right 1973    Nonischemic cardiomyopathy ([address])     with LVH    Orthostatic hypotension 4/13/2021    Pericardial effusion 11/10/2020    Pneumonia 02/08/2014 2/8/14 and 10/30/15    Poor historian     As of 11/29/18 pt reports 5th grade education, pt unable to give med list-obtained from wife per patient's permission    Prediabetes     Prostate cancer ([address]) 2016    As of 11/29/18, pt states he gets two injections twice a year for this    Pulmonary edema 11/28/2015    S/P cardiac cath 2/12/2014 2/12/14 no significant coronary disease, severe LV dysfunction    Tinea cruris     also genital folliculitis    Vertigo         Past Surgical History:   Procedure Laterality Date    CATARACT REMOVAL Left 12/12/2018    Dr. Librado Cowan.  LEFT EYE SECOND REFRATIVE CATARACT REMOVAL WITH LENS IMPLANTATION    CATARACT REMOVAL Right 12/05/2018    Dr. Librado Cowan    COLONOSCOPY N/A 3/2/2020    COLONOSCOPY performed by Jon Quintero MD at [facility] END[facility]. 2 tubular adenomas, diverticulosis, int hemorrhoids, repeat in 5 yrs    COLONOSCOPY,BIOPSY  02/22/2016    Dr. Jud Castañeda; single sessile polyp at descending colon, sigmoid diverticulosis, int hemorrhoids    COLONOSCOPY,REMV LESN,SNARE  3/2/2020         COLONOSCOPY,REMV LESN,SNARE  2/22/2016         ORTHOPEDIC SURGERY Right 1980's    index finger    OTHER SURGICAL HISTORY  08/22/2017    Dr. Manuel Adams; atrial flutter ablation    [place] UNLISTED PROCEDURE CARDIAC SURGERY  08/22/2017    SVT ablation    ROTATOR CUFF REPAIR Right 2000    TONSILLECTOMY  1948       Social History     Tobacco Use    Smoking status: Former

## 2023-11-29 NOTE — PROGRESS NOTES
Pharmacist Daily Dosing of Warfarin    Indication & Goal INR: AFib, INR Goal 2-3    PTA Warfarin Dose: 3 mg daily (per INR check 11/1/23; but RX directions state take 3 mg Friday and 4.5 mg all other days)    Notable concurrent conditions and medications: Macrolide    Labs:  Recent Labs     Units 11/29/23  0946   INR   1.6*   HGB g/dL 10.4*   PLT K/uL 86*         Impression/Plan:   Unable to reach RN to clarify warfarin dose w/ patient; per med rec consult, wife also does not have med list  Will order warfarin 3 mg PO x 1 dose. Daily INR has been ordered  CBC w/o differential every other day has been ordered     Pharmacy will follow daily and adjust the dose as appropriate.     Thank you,  Eufemia Douglass, [address] [place] [address]      Warfarin Protocol    Located on pharmacy Teams site: Clinical Practice -> Anticoagulation & Cardiology -> Anticoagulation Policies, Protocols, Guidance

## 2023-11-29 NOTE — ED NOTES
Assumed care of pt at this time. Pt arrives from home by EMS with reports of a GLF around 0700, EMS came to check pt out and he was responding normally- Aox4. EMS called back d/t pt not responding normally and had an episode of incontinence. On arrival pt is slow to respond. Pt has pmhx CHF, HTN. Pt is scheduled to get pacemaker soon but cannot report why. Pt on monitor x3 with call bell in reach. 1030: Pt resting in ER stretcher with no complaints. Pt remains on monitor x3 with call bell in reach. Pt family at bedside. 1130: Assisted pt to use urinal at this time. Pt cleaned up and new brief applied. Pt remains on monitor x3 with call bell in reach. Pt family at bedside. 1245: Pt remains on monitor x3 with call bell in reach. Pt has no complaints and resting comfortably. Pt family at bedside. 1345: Pt resting comfortably and in no acute distress. Pt on monitor x3 with call bell in reach. Pt family at bedside. 1445: Pt eating lunch at this time. Pt resting and on monitor x3 with call bell in reach. 1545: Pt resting with no complaints. Pt remains on monitor x3 with call bell in reach. 1645: Pt resting and in no distress. Pt remains on monitor x3 with call bell in reach.    Karyle Marseille, RN  11/29/23 1442       Karyle Marseille, RN  11/29/23 1712

## 2023-11-30 LAB
ANION GAP SERPL CALC-SCNC: 7 MMOL/L (ref 5–15)
BUN SERPL-MCNC: 39 MG/DL (ref 6–20)
BUN/CREAT SERPL: 22 (ref 12–20)
CALCIUM SERPL-MCNC: 8.1 MG/DL (ref 8.5–10.1)
CHLORIDE SERPL-SCNC: 113 MMOL/L (ref 97–108)
CO2 SERPL-SCNC: 22 MMOL/L (ref 21–32)
CREAT SERPL-MCNC: 1.75 MG/DL (ref 0.7–1.3)
GLUCOSE BLD STRIP.AUTO-MCNC: 110 MG/DL (ref 65–117)
GLUCOSE BLD STRIP.AUTO-MCNC: 115 MG/DL (ref 65–117)
GLUCOSE BLD STRIP.AUTO-MCNC: 127 MG/DL (ref 65–117)
GLUCOSE BLD STRIP.AUTO-MCNC: 152 MG/DL (ref 65–117)
GLUCOSE SERPL-MCNC: 122 MG/DL (ref 65–100)
INR PPP: 1.8 (ref 0.9–1.1)
POTASSIUM SERPL-SCNC: 3.6 MMOL/L (ref 3.5–5.1)
PROTHROMBIN TIME: 18.3 SEC (ref 9–11.1)
SERVICE CMNT-IMP: ABNORMAL
SERVICE CMNT-IMP: ABNORMAL
SERVICE CMNT-IMP: NORMAL
SERVICE CMNT-IMP: NORMAL
SODIUM SERPL-SCNC: 142 MMOL/L (ref 136–145)

## 2023-11-30 PROCEDURE — 36415 COLL VENOUS BLD VENIPUNCTURE: CPT

## 2023-11-30 PROCEDURE — 6370000000 HC RX 637 (ALT 250 FOR IP): Performed by: INTERNAL MEDICINE

## 2023-11-30 PROCEDURE — 97530 THERAPEUTIC ACTIVITIES: CPT

## 2023-11-30 PROCEDURE — 2580000003 HC RX 258: Performed by: INTERNAL MEDICINE

## 2023-11-30 PROCEDURE — 97165 OT EVAL LOW COMPLEX 30 MIN: CPT

## 2023-11-30 PROCEDURE — 97116 GAIT TRAINING THERAPY: CPT

## 2023-11-30 PROCEDURE — 1100000003 HC PRIVATE W/ TELEMETRY

## 2023-11-30 PROCEDURE — 97162 PT EVAL MOD COMPLEX 30 MIN: CPT

## 2023-11-30 PROCEDURE — 85610 PROTHROMBIN TIME: CPT

## 2023-11-30 PROCEDURE — 94760 N-INVAS EAR/PLS OXIMETRY 1: CPT

## 2023-11-30 PROCEDURE — 97535 SELF CARE MNGMENT TRAINING: CPT

## 2023-11-30 PROCEDURE — 6360000002 HC RX W HCPCS: Performed by: INTERNAL MEDICINE

## 2023-11-30 PROCEDURE — 82962 GLUCOSE BLOOD TEST: CPT

## 2023-11-30 PROCEDURE — 2700000000 HC OXYGEN THERAPY PER DAY

## 2023-11-30 PROCEDURE — 80048 BASIC METABOLIC PNL TOTAL CA: CPT

## 2023-11-30 RX ORDER — WARFARIN SODIUM 3 MG/1
3 TABLET ORAL DAILY
COMMUNITY
End: 2023-12-12 | Stop reason: SDUPTHER

## 2023-11-30 RX ORDER — WARFARIN SODIUM 1 MG/1
4 TABLET ORAL
Status: COMPLETED | OUTPATIENT
Start: 2023-11-30 | End: 2023-11-30

## 2023-11-30 RX ORDER — CARVEDILOL 25 MG/1
25 TABLET ORAL 2 TIMES DAILY WITH MEALS
COMMUNITY

## 2023-11-30 RX ORDER — LISINOPRIL 2.5 MG/1
2.5 TABLET ORAL DAILY
Status: ON HOLD | COMMUNITY
End: 2023-11-30 | Stop reason: ALTCHOICE

## 2023-11-30 RX ORDER — METHOCARBAMOL 500 MG/1
500 TABLET, FILM COATED ORAL 2 TIMES DAILY PRN
Status: DISCONTINUED | OUTPATIENT
Start: 2023-11-30 | End: 2023-12-02 | Stop reason: HOSPADM

## 2023-11-30 RX ORDER — PREGABALIN 50 MG/1
50 CAPSULE ORAL 2 TIMES DAILY
COMMUNITY
End: 2023-12-12 | Stop reason: ALTCHOICE

## 2023-11-30 RX ADMIN — AZITHROMYCIN MONOHYDRATE 500 MG: 500 INJECTION, POWDER, LYOPHILIZED, FOR SOLUTION INTRAVENOUS at 09:53

## 2023-11-30 RX ADMIN — SODIUM CHLORIDE, PRESERVATIVE FREE 10 ML: 5 INJECTION INTRAVENOUS at 21:23

## 2023-11-30 RX ADMIN — CARVEDILOL 25 MG: 12.5 TABLET, FILM COATED ORAL at 08:32

## 2023-11-30 RX ADMIN — ATORVASTATIN CALCIUM 40 MG: 40 TABLET, FILM COATED ORAL at 21:21

## 2023-11-30 RX ADMIN — WARFARIN SODIUM 4 MG: 1 TABLET ORAL at 17:41

## 2023-11-30 RX ADMIN — SODIUM CHLORIDE, PRESERVATIVE FREE 10 ML: 5 INJECTION INTRAVENOUS at 08:32

## 2023-11-30 RX ADMIN — CARVEDILOL 25 MG: 12.5 TABLET, FILM COATED ORAL at 17:42

## 2023-11-30 RX ADMIN — ASPIRIN 81 MG: 81 TABLET, CHEWABLE ORAL at 08:32

## 2023-11-30 RX ADMIN — SODIUM CHLORIDE 1000 MG: 900 INJECTION INTRAVENOUS at 11:24

## 2023-11-30 NOTE — PROGRESS NOTES
Pharmacist Daily Dosing of Warfarin    Indication & Goal INR: AFib, INR Goal 2-3    PTA Warfarin Dose: 3 mg daily (confirmed with patient)    Notable concurrent conditions and medications: Macrolide    Labs:  Recent Labs     Units 11/30/23  0150 11/29/23  0946   INR   1.8* 1.6*   HGB g/dL  --  10.4*   PLT K/uL  --  86*         Impression/Plan:   Will order warfarin 4 mg PO x 1 dose. Daily INR has been ordered  CBC w/o differential every other day has been ordered     Pharmacy will follow daily and adjust the dose as appropriate.     Thank you,  Madonna Wilde, [facility]      Warfarin Protocol    Located on pharmacy Teams site: Clinical Practice -> Anticoagulation & Cardiology -> Anticoagulation Policies, Protocols, Guidance

## 2023-11-30 NOTE — PLAN OF CARE
Problem: Occupational Therapy - Adult  Goal: By Discharge: Performs self-care activities at highest level of function for planned discharge setting. See evaluation for individualized goals. Description: FUNCTIONAL STATUS PRIOR TO ADMISSION:  Independent at baseline for ADLs. Uses a SPC occasionally but supposed to be used always. Retired, doesn't do house work. Receives Help From: Family, Friend(s), ADL Assistance: Independent,  ,  ,  ,  ,  , Homemaking Assistance: Needs assistance, Ambulation Assistance: Independent, Transfer Assistance: Independent, Active : Yes     HOME SUPPORT: Patient lived with wife but didn't require assistance. Occupational Therapy Goals:  Initiated 11/30/2023  1. Patient will perform grooming with [place] within 7 day(s). 2.  Patient will perform upper body dressing with [place] within 7 day(s). 3.  Patient will perform lower body dressing with [place] within 7 day(s). 4.  Patient will perform toilet transfers with Modified [place]  within 7 day(s). 5.  Patient will perform all aspects of toileting with [place] within 7 day(s). 6.  Patient will utilize energy conservation techniques during functional activities with verbal cues within 7 day(s).     Outcome: Progressing   OCCUPATIONAL THERAPY EVALUATION    Patient: Johan Omer (89 y.o. male)  Date: 11/30/2023  Primary Diagnosis: Encephalopathy [G93.40]  HCAP (healthcare-associated pneumonia) [J18.9]  Pneumonia of right middle lobe due to infectious organism [J18.9]  Sepsis with acute organ dysfunction without septic shock, due to unspecified organism, unspecified organ dysfunction type ([address]) [A41.9, R65.20]         Precautions: Fall Risk, Bed Alarm, Up as Tolerated                  ASSESSMENT :  The patient is limited by decreased functional mobility, independence in ADLs, high-level IADLs, ROM, strength, body mechanics, activity tolerance, endurance, safety awareness, balance, increased pain yards  Stairs: Needs help or supervision  Total Barthel Index Score: 65       The Barthel ADL Index: Guidelines  1. The index should be used as a record of what a patient does, not as a record of what a patient could do. 2. The main aim is to establish degree of independence from any help, physical or verbal, however minor and for whatever reason. 3. The need for supervision renders the patient not independent. 4. A patient's performance should be established using the best available evidence. Asking the patient, friends/relatives and nurses are the usual sources, but direct observation and common sense are also important. However direct testing is not needed. 5. Usually the patient's performance over the preceding 24-48 hours is important, but occasionally longer periods will be relevant. 6. Middle categories imply that the patient supplies over 50 per cent of the effort. 7. Use of aids to be independent is allowed. Score Interpretation (from [address] [place] [address])    Independent   60-79 Minimally independent   40-59 Partially dependent   20-39 Very dependent   <20 Totally dependent     -Listete Schmitt., Barthel, D.W. (1965). Functional evaluation: the Barthel Index. [address] ([address] (1997). The Barthel activities of daily living index: self-reporting versus actual performance in the old (> or = 75 years). Journal of [facility] 45(7), [address], J.J.M.F, Caroline Muñiz., Leary Pallas. (1999). Measuring the change in disability after inpatient rehabilitation; comparison of the responsiveness of the Barthel Index and Functional [place] Measure. Journal of Neurology, Neurosurgery, and Psychiatry, 66(4), 193-960. Candi Huston, N.J.A, KAYLA Cha, & [employer] MHECTOR. (2004) Assessment of post-stroke quality of life in cost-effectiveness studies: The usefulness of the Barthel Index and the EuroQoL-5D.  Quality of Life Research,

## 2023-11-30 NOTE — PROGRESS NOTES
Pharmacy - Medication Reconciliation     The following PTA medication list was compiled utilizing a variety of sources including the patient's wife, patient's outpatient pharmacy, patient's specialty pharmacy, and patient's primary care office. Allergy Update: Oxycodone    Recommendations/Findings: The following amendments were made to the patient's active medication list on file at [facility]:   1) Additions: N/A    2) Deletions: Clotrimazole/Betamethasone; Lisinopril; Methocarbamol    3) Changes:  Carvedilol - takes 25 mg by mouth twice daily  Nubeqa 300 mg - takes 2 tablets by mouth twice daily; last filled 8/2023  Wafarin 3 mg - patient stated his current dose is one tablet by mouth daily (as of 11/01)    Pertinent Findings:     Reviewed prescription bottles which the patient's wife brought in from home. Noticed bottles of lisinopril and Entresto. These medications were prescribed by different providers. The patient stated he was taking both. Educated patient and his family that these medications should not be taken together due to risk of angioedema.      Clarified PTA med list with:  Prescription bottles brought in by the patient's wife  RxQuery  Patient's wife  Patient's outpatient pharmacy [id number]; [phone])  Patient's specialty pharmacy ([address] [place] [address]; [phone])  Patient's PCP ([facility]; [phone])    PTA medication list was corrected to the following:     Prior to Admission Medications   Prescriptions Last Dose Informant   Darolutamide (NUBEQA) 300 MG TABS  Spouse/Significant Other, Outside Pharmacy/PCP   Sig: Take 600 mg by mouth 2 times daily (with meals)   ENTRESTO 24-26 MG per tablet  Spouse/Significant Other, Outside Pharmacy/PCP   Sig: Take 1 tablet by mouth 2 times daily   aspirin 81 MG chewable tablet  Spouse/Significant Other   Sig: Take 1 tablet by mouth daily   atorvastatin (LIPITOR) 40 MG tablet  Outside Pharmacy/PCP   Sig: Take 1

## 2023-11-30 NOTE — CARE COORDINATION
Care Management Initial Assessment       RUR: 18% Moderate Risk  Readmission? No  1st IM letter given? Yes   1st  letter given: N/A    Initial Assessment: Chart reviewed. CM met with pt at the bedside to introduce self and role. Verified contact information and demographics. Pt resides with spouse Naomi Olmstead [phone]) in a two level home with 2 RHONDA. Pt PCP is Dr. Anthony Gaines with last visit being in October of 2023. [facility] [place] [place]. Pt has no hx needing IPR/SNF services. Pt has had HH in the past but does not remember the name of agency. Pt independent with ADL's. Pt is an active  and spouse will pick him for discharge. ? He?states there is no concerns for him to return home. CM will continue to follow.   Full assessment below:          11/30/23 1603   Service Assessment   Patient Orientation Alert and Oriented;Person;Place;Situation;Self   Cognition Alert   History Provided By Patient   Primary Caregiver Self   Support Systems Spouse/Significant Other   Patient's Healthcare Decision Maker is: Legal Next of [address] [facility]  ([address] (Spouse) [phone])   PCP Verified by CM Yes   Last Visit to PCP Within last 3 months   Prior Functional Level Independent in ADLs/IADLs   Current Functional Level Independent in ADLs/IADLs   Can patient return to prior living arrangement Yes   Social/Functional History   Lives With Spouse   Type of [address] [place] [address]  Two level   Entrance Stairs - Number of Steps 2   [place] Yes   Discharge Planning   Type of Residence House   Current Services Prior To Admission None   Patient expects to be discharged to: [address] (ACP) Conversation    Date of Conversation: 11/29/2023  Conducted with: Patient with [address]:    Primary Decision Maker: Claudia Schaefer - Child - [phone]    Secondary Decision Maker: Junior Kuhn - Child  Click here to complete Healthcare Decision Makers including selection of the Healthcare Decision Maker Relationship (ie \"Primary\"). Today we documented Decision Maker(s) consistent with Legal Next of Kin hierarchy.     Content/Action Overview:  Has NO ACP documents/care preferences - information provided, considering goals and options  Reviewed DNR/DNI and patient elects Full Code (Attempt Resuscitation)        Length of Voluntary ACP Conversation in minutes:  <16 minutes (Non-Billable)    KAREL Anthony,  [phone]

## 2023-11-30 NOTE — PLAN OF CARE
Problem: Physical Therapy - Adult  Goal: By Discharge: Performs mobility at highest level of function for planned discharge setting. See evaluation for individualized goals. Description: FUNCTIONAL STATUS PRIOR TO ADMISSION: Patient was modified independent using a single point cane for functional mobility. and The patient  was modified independent for basic and instrumental ADLs. Still driving a truck. HOME SUPPORT PRIOR TO ADMISSION: The patient lived with spouse but did not require assistance. 2 story home with 2 steps to enter and 14 steps to upstairs bedroom. Physical Therapy Goals  Initiated 11/30/2023  1. Patient will move from supine to sit and sit to supine to supine and roll side to side in bed using log roll technique with modified independence within 7 day(s). 2.  Patient will perform sit to stand with modified independence within 7 day(s). 3.  Patient will transfer from bed to chair and chair to bed with modified independence using the least restrictive device within 7 day(s). 4.  Patient will ambulate with modified independence for 150 feet with the least restrictive device within 7 day(s). 5.  Patient will ascend/descend 13 stairs with 1 handrail(s) with standby assistance within 7 day(s). 6.  Patient will verbalize and demonstrate understanding of spinal precautions log-roll technique as instructed) within 7 days. 7.  Patient will demonstrate correct technique and performance with lumbar flexion and hip flexor stretching within 4 days.   Outcome: Not Progressing   PHYSICAL THERAPY EVALUATION    Patient: Joycelyn Burgos (33 y.o. male)  Date: 11/30/2023  Primary Diagnosis: Encephalopathy [G93.40]  HCAP (healthcare-associated pneumonia) [J18.9]  Pneumonia of right middle lobe due to infectious organism [J18.9]  Sepsis with acute organ dysfunction without septic shock, due to unspecified organism, unspecified organ dysfunction type ([address]) [A41.9, R65.20]       Precautions: Fall Risk, Bed Discharge Destination After Acute Care Hospitalization in Select Patient Groups: A Retrospective, Observational Study. Arch Rehabil Res Clin Transl. 2022;4(3):635402. doi: 10.1016/j.arrct. 6382.906090. PMID: 73540766; PMCID: NWV9240004. 4. Raudel Freitas Ni P. AM-PAC Short Forms Manual 4.0. Revised 2020. Pain Ratin/10 R posterior hip/glut sitting on edge of bed; 8/10 with walking 45 feet with RW; pain decreases with sitting. Pain Intervention(s):   nursing notified and addressing and repositioning    Activity Tolerance:   Fair , requires rest breaks, and SpO2 stable on room air    After treatment:   Patient left in no apparent distress in bed, Call bell within reach, and Bed/ chair alarm activated    COMMUNICATION/EDUCATION:   The patient's plan of care was discussed with: occupational therapist and registered nurse    Patient Education  Education Given To: Patient  Education Provided: Role of Therapy;Plan of Care;Precautions;Transfer Training;Equipment;Orientation; Fall Prevention Strategies  Education Provided Comments: instructed on log roll technique; need for RW; needs further education in lower back and hip flexor stretching. Education Method: Demonstration;Verbal  Education Outcome: Verbalized understanding;Continued education needed; Unable to demonstrate understanding    Thank you for this referral.  Edwige Cali, PT  Minutes: 39      Physical Therapy Evaluation Charge Determination   History Examination Presentation Decision-Making   HIGH Complexity :3+ comorbidities / personal factors will impact the outcome/ POC  HIGH Complexity : 4+ Standardized tests and measures addressing body structure, function, activity limitation and / or participation in recreation

## 2023-12-01 LAB
ANION GAP SERPL CALC-SCNC: 7 MMOL/L (ref 5–15)
BUN SERPL-MCNC: 30 MG/DL (ref 6–20)
BUN/CREAT SERPL: 19 (ref 12–20)
CALCIUM SERPL-MCNC: 8.5 MG/DL (ref 8.5–10.1)
CHLORIDE SERPL-SCNC: 112 MMOL/L (ref 97–108)
CO2 SERPL-SCNC: 22 MMOL/L (ref 21–32)
CREAT SERPL-MCNC: 1.57 MG/DL (ref 0.7–1.3)
GLUCOSE BLD STRIP.AUTO-MCNC: 102 MG/DL (ref 65–117)
GLUCOSE BLD STRIP.AUTO-MCNC: 108 MG/DL (ref 65–117)
GLUCOSE BLD STRIP.AUTO-MCNC: 126 MG/DL (ref 65–117)
GLUCOSE BLD STRIP.AUTO-MCNC: 132 MG/DL (ref 65–117)
GLUCOSE SERPL-MCNC: 120 MG/DL (ref 65–100)
INR PPP: 1.9 (ref 0.9–1.1)
POTASSIUM SERPL-SCNC: 3.8 MMOL/L (ref 3.5–5.1)
PROTHROMBIN TIME: 18.8 SEC (ref 9–11.1)
SERVICE CMNT-IMP: ABNORMAL
SERVICE CMNT-IMP: ABNORMAL
SERVICE CMNT-IMP: NORMAL
SERVICE CMNT-IMP: NORMAL
SODIUM SERPL-SCNC: 141 MMOL/L (ref 136–145)

## 2023-12-01 PROCEDURE — 6360000002 HC RX W HCPCS: Performed by: INTERNAL MEDICINE

## 2023-12-01 PROCEDURE — 97535 SELF CARE MNGMENT TRAINING: CPT

## 2023-12-01 PROCEDURE — 6370000000 HC RX 637 (ALT 250 FOR IP): Performed by: INTERNAL MEDICINE

## 2023-12-01 PROCEDURE — 1100000003 HC PRIVATE W/ TELEMETRY

## 2023-12-01 PROCEDURE — 80048 BASIC METABOLIC PNL TOTAL CA: CPT

## 2023-12-01 PROCEDURE — 2580000003 HC RX 258: Performed by: INTERNAL MEDICINE

## 2023-12-01 PROCEDURE — 82962 GLUCOSE BLOOD TEST: CPT

## 2023-12-01 PROCEDURE — 85610 PROTHROMBIN TIME: CPT

## 2023-12-01 PROCEDURE — 94760 N-INVAS EAR/PLS OXIMETRY 1: CPT

## 2023-12-01 PROCEDURE — 36415 COLL VENOUS BLD VENIPUNCTURE: CPT

## 2023-12-01 RX ORDER — WARFARIN SODIUM 1 MG/1
4 TABLET ORAL
Status: COMPLETED | OUTPATIENT
Start: 2023-12-01 | End: 2023-12-01

## 2023-12-01 RX ADMIN — ATORVASTATIN CALCIUM 40 MG: 40 TABLET, FILM COATED ORAL at 20:13

## 2023-12-01 RX ADMIN — SODIUM CHLORIDE, PRESERVATIVE FREE 10 ML: 5 INJECTION INTRAVENOUS at 20:14

## 2023-12-01 RX ADMIN — CARVEDILOL 25 MG: 12.5 TABLET, FILM COATED ORAL at 10:23

## 2023-12-01 RX ADMIN — CARVEDILOL 25 MG: 12.5 TABLET, FILM COATED ORAL at 18:59

## 2023-12-01 RX ADMIN — ACETAMINOPHEN 650 MG: 325 TABLET ORAL at 10:23

## 2023-12-01 RX ADMIN — SODIUM CHLORIDE 1000 MG: 900 INJECTION INTRAVENOUS at 11:53

## 2023-12-01 RX ADMIN — ASPIRIN 81 MG: 81 TABLET, CHEWABLE ORAL at 10:23

## 2023-12-01 RX ADMIN — SODIUM CHLORIDE, PRESERVATIVE FREE 10 ML: 5 INJECTION INTRAVENOUS at 10:23

## 2023-12-01 RX ADMIN — WARFARIN SODIUM 4 MG: 1 TABLET ORAL at 18:59

## 2023-12-01 RX ADMIN — AZITHROMYCIN MONOHYDRATE 500 MG: 500 INJECTION, POWDER, LYOPHILIZED, FOR SOLUTION INTRAVENOUS at 10:32

## 2023-12-01 NOTE — PLAN OF CARE
Problem: Occupational Therapy - Adult  Goal: By Discharge: Performs self-care activities at highest level of function for planned discharge setting. See evaluation for individualized goals. Description: FUNCTIONAL STATUS PRIOR TO ADMISSION:  Independent at baseline for ADLs. Uses a SPC occasionally but supposed to be used always. Retired, doesn't do house work. Receives Help From: Family, Friend(s), ADL Assistance: Independent,  ,  ,  ,  ,  , Homemaking Assistance: Needs assistance, Ambulation Assistance: Independent, Transfer Assistance: Independent, Active : Yes     HOME SUPPORT: Patient lived with wife but didn't require assistance. Occupational Therapy Goals:  Initiated 11/30/2023  1. Patient will perform grooming with [place] within 7 day(s). 2.  Patient will perform upper body dressing with [place] within 7 day(s). 3.  Patient will perform lower body dressing with [place] within 7 day(s). 4.  Patient will perform toilet transfers with Modified [place]  within 7 day(s). 5.  Patient will perform all aspects of toileting with [place] within 7 day(s). 6.  Patient will utilize energy conservation techniques during functional activities with verbal cues within 7 day(s). Outcome: Progressing     OCCUPATIONAL THERAPY TREATMENT  Patient: Reese Robledo (89 y.o. male)  Date: 12/1/2023  Primary Diagnosis: Encephalopathy [G93.40]  HCAP (healthcare-associated pneumonia) [J18.9]  Pneumonia of right middle lobe due to infectious organism [J18.9]  Sepsis with acute organ dysfunction without septic shock, due to unspecified organism, unspecified organ dysfunction type ([address]) [A41.9, R65.20]       Precautions: Fall Risk, Bed Alarm, Up as Tolerated                Chart, occupational therapy assessment, plan of care, and goals were reviewed. ASSESSMENT  Patient continues to benefit from skilled OT services and is progressing towards goals.  Demonstrated improved upright position up in chair and Call bell within reach    COMMUNICATION/EDUCATION:   The patient's plan of care was discussed with: registered nurse    Patient Education  Education Given To: Patient  Education Provided: ADL Adaptive Strategies;Transfer Training; Fall Prevention Strategies  Education Method: Verbal  Barriers to Learning: None  Education Outcome: Verbalized understanding    Thank you for this referral.  Radha Hyatt OT  Minutes: 14

## 2023-12-01 NOTE — PROGRESS NOTES
Pharmacist Daily Dosing of Warfarin    Indication & Goal INR: AFib, INR Goal 2-3    PTA Warfarin Dose: 3 mg daily (confirmed with patient)    Notable concurrent conditions and medications: Macrolide    Labs:  Recent Labs     Units 12/01/23  0301 11/30/23  0150 11/29/23  0946   INR   1.9* 1.8* 1.6*   HGB g/dL  --   --  10.4*   PLT K/uL  --   --  86*         Impression/Plan:   Will order warfarin 4 mg PO x 1 dose. Daily INR has been ordered  CBC w/o differential every other day has been ordered     Pharmacy will follow daily and adjust the dose as appropriate.     Thank you,  Yulissa Grubbs, [facility]      Warfarin Protocol    Located on pharmacy Teams site: Clinical Practice -> Anticoagulation & Cardiology -> Anticoagulation Policies, Protocols, Guidance

## 2023-12-02 ENCOUNTER — HOME HEALTH ADMISSION (OUTPATIENT)
Dept: HOME HEALTH SERVICES | Facility: HOME HEALTH | Age: 83
End: 2023-12-02

## 2023-12-02 VITALS
HEIGHT: 69 IN | SYSTOLIC BLOOD PRESSURE: 135 MMHG | WEIGHT: 218.92 LBS | DIASTOLIC BLOOD PRESSURE: 69 MMHG | HEART RATE: 74 BPM | RESPIRATION RATE: 13 BRPM | TEMPERATURE: 97.7 F | OXYGEN SATURATION: 97 % | BODY MASS INDEX: 32.42 KG/M2

## 2023-12-02 LAB
ANION GAP SERPL CALC-SCNC: 6 MMOL/L (ref 5–15)
BASOPHILS # BLD: 0 K/UL (ref 0–0.1)
BASOPHILS NFR BLD: 1 % (ref 0–1)
BUN SERPL-MCNC: 26 MG/DL (ref 6–20)
BUN/CREAT SERPL: 20 (ref 12–20)
CALCIUM SERPL-MCNC: 8.6 MG/DL (ref 8.5–10.1)
CHLORIDE SERPL-SCNC: 112 MMOL/L (ref 97–108)
CO2 SERPL-SCNC: 23 MMOL/L (ref 21–32)
CREAT SERPL-MCNC: 1.31 MG/DL (ref 0.7–1.3)
DIFFERENTIAL METHOD BLD: ABNORMAL
EOSINOPHIL # BLD: 0.2 K/UL (ref 0–0.4)
EOSINOPHIL NFR BLD: 4 % (ref 0–7)
ERYTHROCYTE [DISTWIDTH] IN BLOOD BY AUTOMATED COUNT: 15.2 % (ref 11.5–14.5)
GLUCOSE BLD STRIP.AUTO-MCNC: 108 MG/DL (ref 65–117)
GLUCOSE BLD STRIP.AUTO-MCNC: 144 MG/DL (ref 65–117)
GLUCOSE SERPL-MCNC: 107 MG/DL (ref 65–100)
HCT VFR BLD AUTO: 25 % (ref 36.6–50.3)
HCT VFR BLD AUTO: 27.6 % (ref 36.6–50.3)
HGB BLD-MCNC: 7.8 G/DL (ref 12.1–17)
HGB BLD-MCNC: 8.7 G/DL (ref 12.1–17)
IMM GRANULOCYTES # BLD AUTO: 0 K/UL (ref 0–0.04)
IMM GRANULOCYTES NFR BLD AUTO: 0 % (ref 0–0.5)
INR PPP: 1.7 (ref 0.9–1.1)
LYMPHOCYTES # BLD: 0.7 K/UL (ref 0.8–3.5)
LYMPHOCYTES NFR BLD: 15 % (ref 12–49)
MCH RBC QN AUTO: 26.4 PG (ref 26–34)
MCHC RBC AUTO-ENTMCNC: 31.2 G/DL (ref 30–36.5)
MCV RBC AUTO: 84.5 FL (ref 80–99)
MONOCYTES # BLD: 0.3 K/UL (ref 0–1)
MONOCYTES NFR BLD: 7 % (ref 5–13)
NEUTS SEG # BLD: 3.6 K/UL (ref 1.8–8)
NEUTS SEG NFR BLD: 73 % (ref 32–75)
NRBC # BLD: 0 K/UL (ref 0–0.01)
NRBC BLD-RTO: 0 PER 100 WBC
PLATELET # BLD AUTO: 87 K/UL (ref 150–400)
PMV BLD AUTO: 13 FL (ref 8.9–12.9)
POTASSIUM SERPL-SCNC: 3.9 MMOL/L (ref 3.5–5.1)
PROTHROMBIN TIME: 17.1 SEC (ref 9–11.1)
RBC # BLD AUTO: 2.96 M/UL (ref 4.1–5.7)
RBC MORPH BLD: ABNORMAL
SERVICE CMNT-IMP: ABNORMAL
SERVICE CMNT-IMP: NORMAL
SODIUM SERPL-SCNC: 141 MMOL/L (ref 136–145)
WBC # BLD AUTO: 4.8 K/UL (ref 4.1–11.1)

## 2023-12-02 PROCEDURE — 80048 BASIC METABOLIC PNL TOTAL CA: CPT

## 2023-12-02 PROCEDURE — 2580000003 HC RX 258: Performed by: INTERNAL MEDICINE

## 2023-12-02 PROCEDURE — 85018 HEMOGLOBIN: CPT

## 2023-12-02 PROCEDURE — 6360000002 HC RX W HCPCS: Performed by: INTERNAL MEDICINE

## 2023-12-02 PROCEDURE — 85025 COMPLETE CBC W/AUTO DIFF WBC: CPT

## 2023-12-02 PROCEDURE — 2700000000 HC OXYGEN THERAPY PER DAY

## 2023-12-02 PROCEDURE — 6370000000 HC RX 637 (ALT 250 FOR IP): Performed by: INTERNAL MEDICINE

## 2023-12-02 PROCEDURE — 85610 PROTHROMBIN TIME: CPT

## 2023-12-02 PROCEDURE — 82962 GLUCOSE BLOOD TEST: CPT

## 2023-12-02 PROCEDURE — 85014 HEMATOCRIT: CPT

## 2023-12-02 PROCEDURE — 94760 N-INVAS EAR/PLS OXIMETRY 1: CPT

## 2023-12-02 PROCEDURE — 36415 COLL VENOUS BLD VENIPUNCTURE: CPT

## 2023-12-02 RX ORDER — AZITHROMYCIN 500 MG/1
500 TABLET, FILM COATED ORAL DAILY
Qty: 3 TABLET | Refills: 0 | Status: SHIPPED | OUTPATIENT
Start: 2023-12-02 | End: 2023-12-05

## 2023-12-02 RX ORDER — WARFARIN SODIUM 5 MG/1
5 TABLET ORAL
Status: DISCONTINUED | OUTPATIENT
Start: 2023-12-02 | End: 2023-12-02 | Stop reason: HOSPADM

## 2023-12-02 RX ORDER — AMOXICILLIN AND CLAVULANATE POTASSIUM 875; 125 MG/1; MG/1
1 TABLET, FILM COATED ORAL 2 TIMES DAILY
Qty: 10 TABLET | Refills: 0 | Status: SHIPPED | OUTPATIENT
Start: 2023-12-02 | End: 2023-12-07

## 2023-12-02 RX ADMIN — SODIUM CHLORIDE: 9 INJECTION, SOLUTION INTRAVENOUS at 08:55

## 2023-12-02 RX ADMIN — SODIUM CHLORIDE 1000 MG: 900 INJECTION INTRAVENOUS at 11:26

## 2023-12-02 RX ADMIN — SODIUM CHLORIDE: 9 INJECTION, SOLUTION INTRAVENOUS at 11:26

## 2023-12-02 RX ADMIN — SODIUM CHLORIDE, PRESERVATIVE FREE 10 ML: 5 INJECTION INTRAVENOUS at 08:49

## 2023-12-02 RX ADMIN — AZITHROMYCIN MONOHYDRATE 500 MG: 500 INJECTION, POWDER, LYOPHILIZED, FOR SOLUTION INTRAVENOUS at 08:56

## 2023-12-02 RX ADMIN — ASPIRIN 81 MG: 81 TABLET, CHEWABLE ORAL at 08:48

## 2023-12-02 RX ADMIN — CARVEDILOL 25 MG: 12.5 TABLET, FILM COATED ORAL at 08:48

## 2023-12-02 NOTE — PLAN OF CARE
Problem: Pain  Goal: Verbalizes/displays adequate comfort level or baseline comfort level  12/2/2023 1521 by Chary Jiang RN  Outcome: Adequate for Discharge  12/2/2023 1101 by Chary Jiang RN  Outcome: Progressing     Problem: Discharge Planning  Goal: Discharge to home or other facility with appropriate resources  12/2/2023 1521 by Chary Jiang RN  Outcome: Adequate for Discharge  12/2/2023 1101 by Chary Jiang RN  Outcome: Progressing  Flowsheets (Taken 12/2/2023 0000 by Sondra Wetzel RN)  Discharge to home or other facility with appropriate resources:   Identify barriers to discharge with patient and caregiver   Arrange for needed discharge resources and transportation as appropriate     Problem: Safety - Adult  Goal: Free from fall injury  12/2/2023 1521 by Chary Jiang RN  Outcome: Adequate for Discharge  12/2/2023 1101 by Chary Jiang RN  Outcome: Progressing     Problem: Skin/Tissue Integrity  Goal: Absence of new skin breakdown  Description: 1. Monitor for areas of redness and/or skin breakdown  2. Assess vascular access sites hourly  3. Every 4-6 hours minimum:  Change oxygen saturation probe site  4. Every 4-6 hours:  If on nasal continuous positive airway pressure, respiratory therapy assess nares and determine need for appliance change or resting period.   12/2/2023 1521 by Chary Jiang RN  Outcome: Adequate for Discharge  12/2/2023 1101 by Chary Jiang RN  Outcome: Progressing     Problem: ABCDS Injury Assessment  Goal: Absence of physical injury  12/2/2023 1521 by Chary Jiang RN  Outcome: Adequate for Discharge  12/2/2023 1101 by Chary Jiang RN  Outcome: Progressing     Problem: Chronic Conditions and Co-morbidities  Goal: Patient's chronic conditions and co-morbidity symptoms are monitored and maintained or improved  12/2/2023 1521 by Chary Jiang RN  Outcome: Adequate for Discharge  12/2/2023 1101 by Chary Jiang RN  Outcome: Progressing  Flowsheets (Taken 12/2/2023 0000 by Tori Bruner RN)  Care Plan - Patient's Chronic Conditions and Co-Morbidity Symptoms are Monitored and Maintained or Improved: Monitor and assess patient's chronic conditions and comorbid symptoms for stability, deterioration, or improvement

## 2023-12-02 NOTE — DISCHARGE INSTRUCTIONS
Need to follow-up with Coumadin clinic to adjust warfarin dose for therapeutic INR 2-3  If any worsening chest pain shortness of breath return to the hospital  Repeat BMP in 3 to 5 days  Monitor blood pressure daily, if blood pressure less than 90/60 hold Entresto and Lasix

## 2023-12-02 NOTE — PLAN OF CARE
Problem: Pain  Goal: Verbalizes/displays adequate comfort level or baseline comfort level  Outcome: Progressing     Problem: Discharge Planning  Goal: Discharge to home or other facility with appropriate resources  Outcome: Progressing  Flowsheets (Taken 12/2/2023 0000 by Efe Sarabia RN)  Discharge to home or other facility with appropriate resources:   Identify barriers to discharge with patient and caregiver   Arrange for needed discharge resources and transportation as appropriate     Problem: Safety - Adult  Goal: Free from fall injury  Outcome: Progressing     Problem: Skin/Tissue Integrity  Goal: Absence of new skin breakdown  Description: 1. Monitor for areas of redness and/or skin breakdown  2. Assess vascular access sites hourly  3. Every 4-6 hours minimum:  Change oxygen saturation probe site  4. Every 4-6 hours:  If on nasal continuous positive airway pressure, respiratory therapy assess nares and determine need for appliance change or resting period.   Outcome: Progressing     Problem: ABCDS Injury Assessment  Goal: Absence of physical injury  Outcome: Progressing     Problem: Chronic Conditions and Co-morbidities  Goal: Patient's chronic conditions and co-morbidity symptoms are monitored and maintained or improved  Outcome: Progressing  Flowsheets (Taken 12/2/2023 0000 by Efe Sarabia RN)  Care Plan - Patient's Chronic Conditions and Co-Morbidity Symptoms are Monitored and Maintained or Improved: Monitor and assess patient's chronic conditions and comorbid symptoms for stability, deterioration, or improvement

## 2023-12-02 NOTE — CARE COORDINATION
Transition of Care Plan:    RUR: 17  Prior Level of Functioning: Independent   Disposition: Home with home health  If SNF or IPR: Date FOC offered:   Date FOC received:   Accepting facility:   Date authorization started with reference number:   Date authorization received and expires: Follow up appointments:   DME needed:   Transportation at discharge:   IM/IMM Medicare/ letter given: Given on 12/2/23  Is patient a [place] and connected with [facility]? If yes, was [employer] transfer form completed and VA notified? Caregiver Contact:   Discharge Caregiver contacted prior to discharge? Care Conference needed? Barriers to discharge:      12/02/23 [address] Discharge   Transition of Care Consult (CM Consult) [address] Resource Information Provided? No   Mode of Transport at Discharge Self   Confirm Follow Up Transport Self   Condition of Participation: Discharge Planning   The Plan for Transition of Care is related to the following treatment goals: Home with home health   The Patient and/or Patient Representative was provided with a Choice of Provider? Patient   The Patient and/Or Patient Representative agree with the Discharge Plan? Yes   Freedom of Choice list was provided with basic dialogue that supports the patient's individualized plan of care/goals, treatment preferences, and shares the quality data associated with the providers?   Yes     Nelli Russell

## 2023-12-02 NOTE — PROGRESS NOTES
Pharmacist Daily Dosing of Warfarin    Indication & Goal INR: AFib, INR Goal 2-3    PTA Warfarin Dose: 3 mg daily (confirmed with patient)    Notable concurrent conditions and medications: Macrolide    Labs:  Recent Labs     Units 12/02/23  0419 12/01/23  0301 11/30/23  0150   INR   1.7* 1.9* 1.8*   HGB g/dL 7.8*  --   --    PLT K/uL 87*  --   --          Impression/Plan:   Will order warfarin 5 mg PO x 1 dose. Daily INR has been ordered  CBC w/o differential every other day has been ordered     Pharmacy will follow daily and adjust the dose as appropriate.     Thank you,  Matthew Wakefield, [facility]      Warfarin Protocol    Located on pharmacy Teams site: Clinical Practice -> Anticoagulation & Cardiology -> Anticoagulation Policies, Protocols, Guidance

## 2023-12-02 NOTE — PROGRESS NOTES
Patient has been reviewed and discharged by Case Management. Discharge instructions and review has been explained to the patient and son.

## 2023-12-03 LAB
BACTERIA SPEC CULT: NORMAL
BACTERIA SPEC CULT: NORMAL
SERVICE CMNT-IMP: NORMAL
SERVICE CMNT-IMP: NORMAL

## 2023-12-07 ENCOUNTER — HOME CARE VISIT (OUTPATIENT)
Dept: HOME HEALTH SERVICES | Facility: HOME HEALTH | Age: 83
End: 2023-12-07

## 2023-12-11 NOTE — PROGRESS NOTES
Physician Progress Note      Fartun Waddell  [facility] #:                  [id number]  :                       1940  ADMIT DATE:       2023 9:28 AM  DISCH DATE:        2023 5:51 PM  RESPONDING  PROVIDER #: Chelo Alvarez MD          QUERY TEXT:    Good morning. Pt admitted from 23-23 with pneumonia. Pt noted to have WBC 14.2,   lactic acid 2.55 and procalcitonin 9.82 on admission. If possible, please clarify if you were evaluating and /or treating any of the   following: The medical record reflects the following:    Risk Factors: 82 yr old male, CHF, PAF, CKD 3, DM II    Clinical Indicators: 23 labs: WBC 14.2, lactic acid 2.55, procalcitonin   9.82; CXR: Enlarged cardiac silhouette, patchy right perihilar airspace   disease concerning for infection    Treatment: labs, CXR, IV Zithromax, IV Ceftriaxone      Thank you,  Luz Marina Rutherford RN, CDI  Options provided:  -- Sepsis, present on admission  -- Pneumonia without Sepsis  -- Other - I will add my own diagnosis  -- Disagree - Not applicable / Not valid  -- Disagree - Clinically unable to determine / Unknown  -- Refer to Clinical Documentation Reviewer    PROVIDER RESPONSE TEXT:    This patient had sepsis which was present on admission.     Query created by: [employer] on 2023 7:16 AM      Electronically signed by:  Zach Gaitan MD 2023 3:29 PM

## 2023-12-12 ENCOUNTER — OFFICE VISIT (OUTPATIENT)
Facility: CLINIC | Age: 83
End: 2023-12-12

## 2023-12-12 VITALS
TEMPERATURE: 98 F | DIASTOLIC BLOOD PRESSURE: 65 MMHG | SYSTOLIC BLOOD PRESSURE: 138 MMHG | WEIGHT: 220 LBS | HEART RATE: 66 BPM | BODY MASS INDEX: 32.58 KG/M2 | OXYGEN SATURATION: 98 % | RESPIRATION RATE: 16 BRPM | HEIGHT: 69 IN

## 2023-12-12 DIAGNOSIS — I48.0 PAF (PAROXYSMAL ATRIAL FIBRILLATION) (HCC): ICD-10-CM

## 2023-12-12 DIAGNOSIS — Z09 HOSPITAL DISCHARGE FOLLOW-UP: Primary | ICD-10-CM

## 2023-12-12 DIAGNOSIS — I10 PRIMARY HYPERTENSION: ICD-10-CM

## 2023-12-12 DIAGNOSIS — J18.9 COMMUNITY ACQUIRED PNEUMONIA OF RIGHT MIDDLE LOBE OF LUNG: ICD-10-CM

## 2023-12-12 RX ORDER — WARFARIN SODIUM 3 MG/1
4.5 TABLET ORAL DAILY
Qty: 45 TABLET | Refills: 1 | Status: SHIPPED | OUTPATIENT
Start: 2023-12-12

## 2023-12-12 NOTE — PROGRESS NOTES
Post-Discharge Transitional Care  Follow Up      Kim Hector   YOB: 1940    Date of Office Visit:  12/12/2023  Date of Hospital Admission: 11/29/23  Date of Hospital Discharge: 12/2/23  Risk of hospital readmission (high >=14%. Medium >=10%) :Readmission Risk Score: 17.2      Care management risk score Rising risk (score 2-5) and Complex Care (Scores >=6): No Risk Score On File     Non face to face  following discharge, date last encounter closed (first attempt may have been earlier): 12/04/2023    Call initiated 2 business days of discharge: Yes    ASSESSMENT/PLAN:   Hospital discharge follow-up  -     [facility] DISCHARGE MEDS RECONCILED W/ CURRENT OUTPATIENT MED LIST  Primary hypertension  Controlled. Continue current management. Community acquired pneumonia of right middle lobe of lung  Plan repeat CXR in 4-5 wks to confirm resolution of pneumonia. I instructed him to have CXR any time during the week of 1/8/24.  -     XR CHEST (2 VIEWS); Future  PAF (paroxysmal atrial fibrillation) (HCC)  INR's have been low. Will not be getting any further GERALDINE's so restart warfarin.  -     Refill warfarin (COUMADIN) 3 MG tablet; Take 1.5 tablets by mouth daily Current as of 11/01/2023, Disp-45 tablet, R-1Normal    Medical Decision Making: moderate complexity  Return in about 3 months (around 3/12/2024), or if symptoms worsen or fail to improve, for HTN, DM; schedule nursing visit for INR in 4 weeks. Subjective:   HPI:  Follow up of Hospital problems/diagnosis(es): Pneumonia    Inpatient course: Discharge summary reviewed- see chart. Interval history/Current status:   Finished antibiotics. Strength improving but still with mild weakness. He declined home health and home PT. Has mild non-productive cough and chronic SOB. Denies fevers, chills, wheezing, or CP. No falls since being home. Has appointment with Dr. Ashley Gaines in 2 days to discuss PAF and CHF management. Needs refill on warfarin.  Has been on

## 2024-01-08 ENCOUNTER — HOSPITAL ENCOUNTER (OUTPATIENT)
Facility: HOSPITAL | Age: 84
Discharge: HOME | End: 2024-01-11
Payer: MEDICARE

## 2024-01-08 DIAGNOSIS — J18.9 COMMUNITY ACQUIRED PNEUMONIA OF RIGHT MIDDLE LOBE OF LUNG: ICD-10-CM

## 2024-01-08 PROCEDURE — 71046 X-RAY EXAM CHEST 2 VIEWS: CPT

## 2024-01-12 ENCOUNTER — ANESTHESIA (OUTPATIENT)
Facility: HOSPITAL | Age: 84
End: 2024-01-12
Payer: MEDICARE

## 2024-01-12 ENCOUNTER — ANESTHESIA EVENT (OUTPATIENT)
Facility: HOSPITAL | Age: 84
End: 2024-01-12
Payer: MEDICARE

## 2024-01-12 ENCOUNTER — HOSPITAL ENCOUNTER (OUTPATIENT)
Facility: HOSPITAL | Age: 84
End: 2024-01-12
Attending: INTERNAL MEDICINE
Payer: MEDICARE

## 2024-01-12 VITALS
HEART RATE: 81 BPM | BODY MASS INDEX: 30.51 KG/M2 | DIASTOLIC BLOOD PRESSURE: 67 MMHG | RESPIRATION RATE: 20 BRPM | WEIGHT: 206 LBS | SYSTOLIC BLOOD PRESSURE: 138 MMHG | HEIGHT: 69 IN | OXYGEN SATURATION: 99 %

## 2024-01-12 DIAGNOSIS — I42.9 CARDIOMYOPATHY (HCC): ICD-10-CM

## 2024-01-12 LAB
ECHO BSA: 2.13 M2
GLUCOSE BLD STRIP.AUTO-MCNC: 110 MG/DL (ref 65–117)
INR BLD: 1.4
SERVICE CMNT-IMP: NORMAL

## 2024-01-12 PROCEDURE — 82962 GLUCOSE BLOOD TEST: CPT

## 2024-01-12 PROCEDURE — 6360000002 HC RX W HCPCS: Performed by: NURSE ANESTHETIST, CERTIFIED REGISTERED

## 2024-01-12 PROCEDURE — 85610 PROTHROMBIN TIME: CPT

## 2024-01-12 RX ORDER — LIDOCAINE 50 MG/G
1 PATCH TOPICAL DAILY
COMMUNITY

## 2024-01-12 RX ORDER — TRIAMCINOLONE ACETONIDE 0.25 MG/G
OINTMENT TOPICAL 2 TIMES DAILY
COMMUNITY

## 2024-01-12 RX ORDER — METOPROLOL SUCCINATE 50 MG/1
50 TABLET, EXTENDED RELEASE ORAL DAILY
COMMUNITY

## 2024-01-12 RX ORDER — GABAPENTIN 300 MG/1
300 CAPSULE ORAL 3 TIMES DAILY
COMMUNITY

## 2024-01-12 RX ORDER — FENTANYL CITRATE 50 UG/ML
INJECTION, SOLUTION INTRAMUSCULAR; INTRAVENOUS PRN
Status: DISCONTINUED | OUTPATIENT
Start: 2024-01-12 | End: 2024-01-12 | Stop reason: SDUPTHER

## 2024-01-12 RX ORDER — MIDAZOLAM HYDROCHLORIDE 1 MG/ML
INJECTION INTRAMUSCULAR; INTRAVENOUS PRN
Status: DISCONTINUED | OUTPATIENT
Start: 2024-01-12 | End: 2024-01-12 | Stop reason: SDUPTHER

## 2024-01-12 RX ORDER — LORAZEPAM 0.5 MG/1
0.5 TABLET ORAL EVERY 6 HOURS PRN
COMMUNITY

## 2024-01-12 RX ORDER — CLOTRIMAZOLE AND BETAMETHASONE DIPROPIONATE 10; .64 MG/G; MG/G
CREAM TOPICAL 2 TIMES DAILY
COMMUNITY

## 2024-01-12 RX ORDER — PREGABALIN 50 MG/1
50 CAPSULE ORAL 3 TIMES DAILY
COMMUNITY

## 2024-01-12 RX ADMIN — MIDAZOLAM HYDROCHLORIDE 0.5 MG: 1 INJECTION, SOLUTION INTRAMUSCULAR; INTRAVENOUS at 09:10

## 2024-01-12 RX ADMIN — FENTANYL CITRATE 10 MCG: 50 INJECTION, SOLUTION INTRAMUSCULAR; INTRAVENOUS at 09:10

## 2024-01-12 ASSESSMENT — ENCOUNTER SYMPTOMS: SHORTNESS OF BREATH: 1

## 2024-01-12 NOTE — ANESTHESIA PRE PROCEDURE
Department of Anesthesiology  Preprocedure Note       Name:  Wilberto Kuhn   Age:  83 y.o.  :  1940                                          MRN:  [id number]         Date:  2024      Surgeon: Surgeon(s):  Elvis Espinoza MD    Procedure: Procedure(s):  Insert PPM biv multi  Ablation AV node    Medications prior to admission:   Prior to Admission medications    Medication Sig Start Date End Date Taking? Authorizing Provider   warfarin (COUMADIN) 3 MG tablet Take 1.5 tablets by mouth daily Current as of 2023   Isabel Simpson MD   carvedilol (COREG) 25 MG tablet Take 1 tablet by mouth 2 times daily (with meals)    ProviderKatie MD   ENTRESTO 24-26 MG per tablet Take 1 tablet by mouth 2 times daily 9/15/23   Provider, MD Katie   Darolutamide (NUBEQA) 300 MG TABS Take 600 mg by mouth 2 times daily (with meals) 23   Isabel Simpson MD   aspirin 81 MG chewable tablet Take 1 tablet by mouth daily 3/3/16   Automatic Reconciliation, Ar   atorvastatin (LIPITOR) 40 MG tablet Take 1 tablet by mouth nightly 3/7/22   Automatic Reconciliation, Ar   furosemide (LASIX) 20 MG tablet Take 1 tablet by mouth once daily 3/21/22   Automatic Reconciliation, Ar       Current medications:    No current facility-administered medications for this encounter.       Allergies:    Allergies   Allergen Reactions    Oxycodone Dermatitis       Problem List:    Patient Active Problem List   Diagnosis Code    PAF (paroxysmal atrial fibrillation) ([facility]) I48.0    Prostate cancer ([facility]) C61    Primary osteoarthritis of left hip M16.12    NICM (nonischemic cardiomyopathy) ([facility]) I42.8    Mixed hyperlipidemia E78.2    Osteopenia of multiple sites M85.89    Smokeless tobacco use Z72.0    Anemia of chronic disease D63.8    COPD (chronic obstructive pulmonary disease) ([facility]) J44.9    Hypertension I10    Obesity (BMI 30-39.9) E66.9    Chronic low back pain M54.50, G89.29    Candidal intertrigo B37.2    Earache on

## 2024-01-12 NOTE — PROGRESS NOTES
Cardiac Cath Lab Recovery Arrival Note:      Wilberto Kuhn arrived to Cardiac Cath Lab, Recovery Area. Staff introduced to patient. Patient identifiers verified with NAME and DATE OF BIRTH. Procedure verified with patient. Consent forms reviewed and signed by patient or authorized representative and verified. Allergies verified.     Patient and family oriented to department. Patient and family informed of procedure and plan of care.     Questions answered with review. Patient prepped for procedure, per orders from physician, prior to arrival.    Patient on cardiac monitor, non-invasive blood pressure, SPO2 monitor. On RA. Patient is A&Ox 4. Patient reports no pain.     Patient in stretcher, in low position, with side rails up, call bell within reach, patient instructed to call if assistance as needed.    Patient prep in: [facility] Recovery Area, [place] [address].     Family in: Summer, wife.   Prep by: Lakshmi

## 2024-01-12 NOTE — ANESTHESIA POSTPROCEDURE EVALUATION
Post-Anesthesia Evaluation and Assessment    Patient: Wilberto Kuhn MRN: [id number]  SSN: [id number]    YOB: 1940  Age: 83 y.o.  Sex: male      I have evaluated the patient and they are stable and ready for discharge from the PACU.     Cardiovascular Function/Vital Signs  Visit Vitals  /67   Pulse 81   Resp 20   Ht 1.753 m (5' 9\")   Wt 93.4 kg (206 lb)   SpO2 99%   BMI 30.42 kg/m²       Patient is status post Monitor Anesthesia Care anesthesia for Procedure(s):  Procedure cancelled.    Nausea/Vomiting: None    Postoperative hydration reviewed and adequate.    Pain:  Managed    Neurological Status:   At baseline    Mental Status, Level of Consciousness: Alert and  oriented to person, place, and time    Pulmonary Status:   Adequate oxygenation and airway patent    Complications related to anesthesia: None    Post-anesthesia assessment completed. No concerns    Signed By: Jaron Osborn MD     January 12, 2024

## 2024-01-12 NOTE — PROGRESS NOTES
Procedure cancelled    Anesthesia was unable to get IV access    I discussed getting femoral access with the patient but he is unable to lie flat and has to continuously move his left hip secondary to OA.     Will reattempt once he has had his hip replaced    Thank you for allowing me to participate in this patients care.    Elvis Espinoza MD, FACC, [facility]

## 2024-02-13 ENCOUNTER — NURSE ONLY (OUTPATIENT)
Facility: CLINIC | Age: 84
End: 2024-02-13
Payer: MEDICARE

## 2024-02-13 DIAGNOSIS — I48.0 PAROXYSMAL ATRIAL FIBRILLATION (HCC): Primary | ICD-10-CM

## 2024-02-13 DIAGNOSIS — I48.0 PAF (PAROXYSMAL ATRIAL FIBRILLATION) (HCC): ICD-10-CM

## 2024-02-13 LAB
POC INR: 1.4
PROTHROMBIN TIME, POC: NORMAL

## 2024-02-13 PROCEDURE — 85610 PROTHROMBIN TIME: CPT | Performed by: PHYSICIAN ASSISTANT

## 2024-02-13 RX ORDER — WARFARIN SODIUM 5 MG/1
5 TABLET ORAL DAILY
Qty: 30 TABLET | Refills: 0 | Status: SHIPPED | OUTPATIENT
Start: 2024-02-13

## 2024-02-13 NOTE — TELEPHONE ENCOUNTER
PCP: Isabel Simpson MD     Last appt:  12/12/2023      Future Appointments   Date Time Provider Department Center   3/12/2024 11:30 AM Isabel Simpson MD [facility] BS AMB          Requested Prescriptions     Pending Prescriptions Disp Refills    warfarin (COUMADIN) 3 MG tablet 45 tablet 1     Sig: Take 1.5 tablets by mouth daily Current as of 11/01/2023

## 2024-02-20 ENCOUNTER — NURSE ONLY (OUTPATIENT)
Facility: CLINIC | Age: 84
End: 2024-02-20

## 2024-02-20 ENCOUNTER — TELEPHONE (OUTPATIENT)
Facility: CLINIC | Age: 84
End: 2024-02-20

## 2024-02-20 DIAGNOSIS — I48.0 PAF (PAROXYSMAL ATRIAL FIBRILLATION) (HCC): ICD-10-CM

## 2024-02-20 DIAGNOSIS — I48.0 PAF (PAROXYSMAL ATRIAL FIBRILLATION) (HCC): Primary | ICD-10-CM

## 2024-02-20 LAB
INR PPP: 1.8 (ref 0.9–1.1)
PROTHROMBIN TIME: 18.1 SEC (ref 9–11.1)

## 2024-02-25 DIAGNOSIS — I48.0 PAF (PAROXYSMAL ATRIAL FIBRILLATION) (HCC): ICD-10-CM

## 2024-02-25 RX ORDER — WARFARIN SODIUM 5 MG/1
TABLET ORAL
Qty: 30 TABLET | Refills: 0 | Status: SHIPPED
Start: 2024-02-25

## 2024-03-11 DIAGNOSIS — I48.0 PAF (PAROXYSMAL ATRIAL FIBRILLATION) (HCC): ICD-10-CM

## 2024-03-11 NOTE — TELEPHONE ENCOUNTER
PCP: Isabel Simpson MD     Last appt:  12/12/2023      Future Appointments   Date Time Provider Department Center   3/12/2024 11:30 AM Isabel Simpson MD [facility] BS AMB          Requested Prescriptions     Pending Prescriptions Disp Refills    warfarin (COUMADIN) 5 MG tablet 30 tablet 0     Sig: Take 1 tablet by mouth daily except for 1.5 tablets daily on Mondays and Fridays.

## 2024-03-12 ENCOUNTER — OFFICE VISIT (OUTPATIENT)
Facility: CLINIC | Age: 84
End: 2024-03-12
Payer: MEDICARE

## 2024-03-12 VITALS
RESPIRATION RATE: 18 BRPM | WEIGHT: 202.2 LBS | TEMPERATURE: 97 F | DIASTOLIC BLOOD PRESSURE: 80 MMHG | HEIGHT: 69 IN | HEART RATE: 60 BPM | OXYGEN SATURATION: 97 % | BODY MASS INDEX: 29.95 KG/M2 | SYSTOLIC BLOOD PRESSURE: 121 MMHG

## 2024-03-12 DIAGNOSIS — D69.6 THROMBOCYTOPENIA, UNSPECIFIED (HCC): ICD-10-CM

## 2024-03-12 DIAGNOSIS — I48.0 PAF (PAROXYSMAL ATRIAL FIBRILLATION) (HCC): ICD-10-CM

## 2024-03-12 DIAGNOSIS — J44.9 CHRONIC OBSTRUCTIVE PULMONARY DISEASE, UNSPECIFIED COPD TYPE (HCC): ICD-10-CM

## 2024-03-12 DIAGNOSIS — I10 PRIMARY HYPERTENSION: ICD-10-CM

## 2024-03-12 DIAGNOSIS — N18.32 TYPE 2 DIABETES MELLITUS WITH STAGE 3B CHRONIC KIDNEY DISEASE, WITHOUT LONG-TERM CURRENT USE OF INSULIN (HCC): Primary | ICD-10-CM

## 2024-03-12 DIAGNOSIS — C61 PROSTATE CANCER (HCC): ICD-10-CM

## 2024-03-12 DIAGNOSIS — E11.22 TYPE 2 DIABETES MELLITUS WITH STAGE 3B CHRONIC KIDNEY DISEASE, WITHOUT LONG-TERM CURRENT USE OF INSULIN (HCC): Primary | ICD-10-CM

## 2024-03-12 LAB — HBA1C MFR BLD: 6.2 %

## 2024-03-12 PROCEDURE — G8417 CALC BMI ABV UP PARAM F/U: HCPCS | Performed by: INTERNAL MEDICINE

## 2024-03-12 PROCEDURE — 4004F PT TOBACCO SCREEN RCVD TLK: CPT | Performed by: INTERNAL MEDICINE

## 2024-03-12 PROCEDURE — G8484 FLU IMMUNIZE NO ADMIN: HCPCS | Performed by: INTERNAL MEDICINE

## 2024-03-12 PROCEDURE — G8427 DOCREV CUR MEDS BY ELIG CLIN: HCPCS | Performed by: INTERNAL MEDICINE

## 2024-03-12 PROCEDURE — 3079F DIAST BP 80-89 MM HG: CPT | Performed by: INTERNAL MEDICINE

## 2024-03-12 PROCEDURE — 3074F SYST BP LT 130 MM HG: CPT | Performed by: INTERNAL MEDICINE

## 2024-03-12 PROCEDURE — 3023F SPIROM DOC REV: CPT | Performed by: INTERNAL MEDICINE

## 2024-03-12 PROCEDURE — 1123F ACP DISCUSS/DSCN MKR DOCD: CPT | Performed by: INTERNAL MEDICINE

## 2024-03-12 PROCEDURE — 83036 HEMOGLOBIN GLYCOSYLATED A1C: CPT | Performed by: INTERNAL MEDICINE

## 2024-03-12 PROCEDURE — 99214 OFFICE O/P EST MOD 30 MIN: CPT | Performed by: INTERNAL MEDICINE

## 2024-03-12 RX ORDER — WARFARIN SODIUM 5 MG/1
TABLET ORAL
Qty: 30 TABLET | Refills: 3 | Status: SHIPPED | OUTPATIENT
Start: 2024-03-12

## 2024-03-12 SDOH — ECONOMIC STABILITY: FOOD INSECURITY: WITHIN THE PAST 12 MONTHS, THE FOOD YOU BOUGHT JUST DIDN'T LAST AND YOU DIDN'T HAVE MONEY TO GET MORE.: NEVER TRUE

## 2024-03-12 SDOH — ECONOMIC STABILITY: FOOD INSECURITY: WITHIN THE PAST 12 MONTHS, YOU WORRIED THAT YOUR FOOD WOULD RUN OUT BEFORE YOU GOT MONEY TO BUY MORE.: NEVER TRUE

## 2024-03-12 SDOH — ECONOMIC STABILITY: INCOME INSECURITY: HOW HARD IS IT FOR YOU TO PAY FOR THE VERY BASICS LIKE FOOD, HOUSING, MEDICAL CARE, AND HEATING?: NOT HARD AT ALL

## 2024-03-12 ASSESSMENT — PATIENT HEALTH QUESTIONNAIRE - PHQ9
SUM OF ALL RESPONSES TO PHQ QUESTIONS 1-9: 0
SUM OF ALL RESPONSES TO PHQ QUESTIONS 1-9: 0
1. LITTLE INTEREST OR PLEASURE IN DOING THINGS: 0
SUM OF ALL RESPONSES TO PHQ QUESTIONS 1-9: 0
SUM OF ALL RESPONSES TO PHQ9 QUESTIONS 1 & 2: 0
2. FEELING DOWN, DEPRESSED OR HOPELESS: 0
SUM OF ALL RESPONSES TO PHQ QUESTIONS 1-9: 0

## 2024-03-12 NOTE — PROGRESS NOTES
Patient identified with two identification factors, Name and Date of Birth.    Chief Complaint   Patient presents with    Follow-up     3 month follow-up        /80 (Site: Left Upper Arm, Position: Sitting, Cuff Size: Large Adult)   Pulse 60   Temp 97 °F (36.1 °C) (Temporal)   Resp 18   Ht 1.753 m (5' 9\")   Wt 91.7 kg (202 lb 3.2 oz)   SpO2 97%   BMI 29.86 kg/m²       1. \"Have you been to the ER, urgent care clinic since your last visit?  Hospitalized since your last visit?\" No     2. \"Have you seen or consulted any other health care providers outside of the [facility] since your last visit?\" No

## 2024-03-12 NOTE — PROGRESS NOTES
Chief Complaint   Patient presents with    Follow-up     3 month follow-up        HISTORY OF PRESENT ILLNESS  Wilberto Kuhn is a 83 y.o. male    Presents for 3 month follow up evaluation of HTN and DM.  He has HTN, type 2 DM, CKD stage 3, hyperlipidemia, CHF (combined diastolic and systolic, EF 50-55% in 5/21), non-ischemic cardiomyopathy, atrial flutter s/p AFL ablation on 8/22/17 and DCCV in 11/2020, non-obstructive atherosclerotic heart disease, COPD, chronic low back pain, and prostate cancer on [place].     No complaints. Unchanged mild MARIA. Denies polydipsia, polyuria, CP, dizziness, heart palpitations, orthopnea, or leg swelling.      Other Providers: Dr. Gregory/Nikhil (Cardiology), Dr. Espinoza (Cardiology-EP), Dr. Hu Chen (Nephrology), Dr. Cira Adams (Urology). Dr. Vick (Ophthalmology)    Patient Active Problem List   Diagnosis    PAF (paroxysmal atrial fibrillation) (HCC)    Prostate cancer (HCC)    Primary osteoarthritis of left hip    NICM (nonischemic cardiomyopathy) (HCC)    Mixed hyperlipidemia    Osteopenia of multiple sites    Smokeless tobacco use    Anemia of chronic disease    COPD (chronic obstructive pulmonary disease) (HCC)    Hypertension    Obesity (BMI 30-39.9)    Chronic low back pain    Candidal intertrigo    Earache on left    Coronary artery disease involving native coronary artery of native heart without angina pectoris    Type 2 diabetes mellitus with stage 3b chronic kidney disease, without long-term current use of insulin (HCC)    HCAP (healthcare-associated pneumonia)    Thrombocytopenia, unspecified (HCC)     Past Medical History:   Diagnosis Date    Acute respiratory failure with hypoxia (HCC) 02/08/2014    also 11/28/15, 2/17/16, 5/14/17     Alcohol abuse     As of 11/29/18 pt stated he quit 20 years    Arthritis     Back and shoulder    Atrial fibrillation (HCC)     Atrial flutter (HCC) 08/2017    saw Dr. Espinoza; underwent a-flutter ablation    BPH (benign

## 2024-03-25 ENCOUNTER — NURSE ONLY (OUTPATIENT)
Facility: CLINIC | Age: 84
End: 2024-03-25
Payer: MEDICARE

## 2024-03-25 DIAGNOSIS — I48.0 PAF (PAROXYSMAL ATRIAL FIBRILLATION) (HCC): Primary | ICD-10-CM

## 2024-03-25 LAB
POC INR: 3.7
PROTHROMBIN TIME, POC: NORMAL

## 2024-03-25 PROCEDURE — 85610 PROTHROMBIN TIME: CPT | Performed by: INTERNAL MEDICINE

## 2024-03-25 NOTE — PROGRESS NOTES
Wilberto Kuhn is a 83 y.o. male who presents today for Anticoagulation monitoring.    Indication: atrial fibrillation  INR Goal: 2.0-3.0.  Current dose:  Coumadin 5mg tab daily, 1.5 tab on Monday's and Friday's.   Missed Coumadin Doses:  None  Medication Changes:  no  Dietary Changes:  no    Symptoms: taking coumadin appropriately without any bleeding.    Latest INRs:  Lab Results   Component Value Date/Time    INR 1.8 02/20/2024 12:24 PM    INR 1.4 02/13/2024 11:22 AM    INR 1.4 01/12/2024 08:08 AM    INR 1.7 12/02/2023 04:19 AM    INR 1.9 12/01/2023 03:01 AM    INR 2.8 04/27/2023 11:20 AM    INR 1.9 03/23/2023 12:10 PM    INR 1.7 02/23/2023 11:41 AM        New Coumadin dose:.TBD.    Next check to be scheduled for  TBD weeks.

## 2024-04-03 ENCOUNTER — NURSE ONLY (OUTPATIENT)
Facility: CLINIC | Age: 84
End: 2024-04-03
Payer: MEDICARE

## 2024-04-03 DIAGNOSIS — I48.0 PAF (PAROXYSMAL ATRIAL FIBRILLATION) (HCC): Primary | ICD-10-CM

## 2024-04-03 LAB
POC INR: 4
PROTHROMBIN TIME, POC: NORMAL

## 2024-04-03 PROCEDURE — 85610 PROTHROMBIN TIME: CPT | Performed by: INTERNAL MEDICINE

## 2024-04-03 NOTE — PROGRESS NOTES
Wilberto Kuhn is a 83 y.o. male who presents today for Anticoagulation monitoring.    Indication: atrial fibrillation  INR Goal: 2.0-3.0.  Current dose:  Coumadin 5mg daily except 7.5mg on Mon and Fri  Missed Coumadin Doses:  None  Medication Changes:  no  Dietary Changes:  no    Symptoms: taking coumadin appropriately without any bleeding.    Latest INRs:  Lab Results   Component Value Date/Time    INR 4.0 04/03/2024 11:24 AM    INR 3.7 03/25/2024 10:47 AM    INR 1.8 02/20/2024 12:24 PM    INR 1.4 02/13/2024 11:22 AM    INR 1.7 12/02/2023 04:19 AM    INR 1.9 12/01/2023 03:01 AM    INR 2.8 04/27/2023 11:20 AM    INR 1.9 03/23/2023 12:10 PM    INR 1.7 02/23/2023 11:41 AM        New Coumadin dose:per Dr Simpson 7.5 mg on Friday and 5 mg other 6 days, hold tonight's dose  Next check to be scheduled for  1 weeks.

## 2024-04-06 ENCOUNTER — CLINICAL DOCUMENTATION (OUTPATIENT)
Facility: CLINIC | Age: 84
End: 2024-04-06

## 2024-04-06 DIAGNOSIS — I48.0 PAROXYSMAL ATRIAL FIBRILLATION (HCC): Primary | ICD-10-CM

## 2024-04-06 NOTE — PROGRESS NOTES
Agree with 4/3/24 note by Makenzie Calvo LPN.  4/3/24 INR 4.0 on current dose of Coumadin 5 mg daily except 7.5 mg on Mon and Fri   A) Supra-therapeutic INR. Goal 2.0-3.0.  P) Hold 1 night's dose, then decrease to Coumadin 5 mg daily except 7.5 mg on Fri. Repeat INR in 1 wk.

## 2024-04-10 ENCOUNTER — NURSE ONLY (OUTPATIENT)
Facility: CLINIC | Age: 84
End: 2024-04-10
Payer: MEDICARE

## 2024-04-10 DIAGNOSIS — I48.0 PAF (PAROXYSMAL ATRIAL FIBRILLATION) (HCC): Primary | ICD-10-CM

## 2024-04-10 LAB
POC INR: 2
PROTHROMBIN TIME, POC: NORMAL

## 2024-04-10 PROCEDURE — 85610 PROTHROMBIN TIME: CPT | Performed by: INTERNAL MEDICINE

## 2024-04-10 NOTE — PROGRESS NOTES
Wilberto Kuhn is a 84 y.o. male who presents today for Anticoagulation monitoring.    Indication: atrial fibrillation  INR Goal: 2.0-3.0.  Current dose:  Coumadin 7.5mg on Friday and 5mg all other days.   Missed Coumadin Doses:  None  Medication Changes:  no  Dietary Changes:  no    Symptoms: taking coumadin appropriately without any bleeding.    Latest INRs:  Lab Results   Component Value Date/Time    INR 2.0 04/10/2024 11:22 AM    INR 4.0 04/03/2024 11:24 AM    INR 3.7 03/25/2024 10:47 AM    INR 1.8 02/20/2024 12:24 PM    INR 1.7 12/02/2023 04:19 AM    INR 1.9 12/01/2023 03:01 AM    INR 2.8 04/27/2023 11:20 AM    INR 1.9 03/23/2023 12:10 PM    INR 1.7 02/23/2023 11:41 AM        New Coumadin dose:.TBD.    Next check to be scheduled for  TBD weeks.

## 2024-04-13 ENCOUNTER — CLINICAL DOCUMENTATION (OUTPATIENT)
Facility: CLINIC | Age: 84
End: 2024-04-13

## 2024-04-13 DIAGNOSIS — Z79.01 CHRONIC ANTICOAGULATION: ICD-10-CM

## 2024-04-13 DIAGNOSIS — I48.0 PAF (PAROXYSMAL ATRIAL FIBRILLATION) (HCC): Primary | ICD-10-CM

## 2024-04-13 NOTE — PROGRESS NOTES
Agree with nurse note dated 4/10/24.  INR (4/10/24): 2.0. At goal of 2.0-3.0. Continue same Coumadin dose: Coumadin 7.5 mg on Friday and 5 mg all other days.   Return for repeat INR in 2 weeks.

## 2024-04-23 ENCOUNTER — HOSPITAL ENCOUNTER (OUTPATIENT)
Facility: HOSPITAL | Age: 84
Discharge: HOME | End: 2024-04-26
Payer: MEDICARE

## 2024-04-23 ENCOUNTER — TRANSCRIBE ORDERS (OUTPATIENT)
Facility: HOSPITAL | Age: 84
End: 2024-04-23

## 2024-04-23 DIAGNOSIS — I42.9 PRIMARY CARDIOMYOPATHY (HCC): Primary | ICD-10-CM

## 2024-04-23 DIAGNOSIS — I42.9 PRIMARY CARDIOMYOPATHY (HCC): ICD-10-CM

## 2024-04-23 PROCEDURE — 71046 X-RAY EXAM CHEST 2 VIEWS: CPT

## 2024-05-08 ENCOUNTER — HOSPITAL ENCOUNTER (OUTPATIENT)
Facility: HOSPITAL | Age: 84
Setting detail: OBSERVATION
Discharge: HOME | End: 2024-05-09
Attending: INTERNAL MEDICINE | Admitting: INTERNAL MEDICINE
Payer: MEDICARE

## 2024-05-08 ENCOUNTER — APPOINTMENT (OUTPATIENT)
Facility: HOSPITAL | Age: 84
End: 2024-05-08
Attending: INTERNAL MEDICINE
Payer: MEDICARE

## 2024-05-08 ENCOUNTER — ANESTHESIA (OUTPATIENT)
Facility: HOSPITAL | Age: 84
End: 2024-05-08
Payer: MEDICARE

## 2024-05-08 ENCOUNTER — ANESTHESIA EVENT (OUTPATIENT)
Facility: HOSPITAL | Age: 84
End: 2024-05-08
Payer: MEDICARE

## 2024-05-08 DIAGNOSIS — I48.21 PERMANENT ATRIAL FIBRILLATION (HCC): Primary | ICD-10-CM

## 2024-05-08 DIAGNOSIS — I49.9 ABNORMAL HEART RHYTHM: ICD-10-CM

## 2024-05-08 DIAGNOSIS — I50.22 CHRONIC SYSTOLIC CONGESTIVE HEART FAILURE (HCC): ICD-10-CM

## 2024-05-08 DIAGNOSIS — I42.0 DILATED CARDIOMYOPATHY (HCC): ICD-10-CM

## 2024-05-08 LAB — INR BLD: 2.4

## 2024-05-08 PROCEDURE — C1894 INTRO/SHEATH, NON-LASER: HCPCS | Performed by: INTERNAL MEDICINE

## 2024-05-08 PROCEDURE — 3700000001 HC ADD 15 MINUTES (ANESTHESIA): Performed by: INTERNAL MEDICINE

## 2024-05-08 PROCEDURE — C1892 INTRO/SHEATH,FIXED,PEEL-AWAY: HCPCS | Performed by: INTERNAL MEDICINE

## 2024-05-08 PROCEDURE — G0378 HOSPITAL OBSERVATION PER HR: HCPCS

## 2024-05-08 PROCEDURE — 6360000002 HC RX W HCPCS

## 2024-05-08 PROCEDURE — 2580000003 HC RX 258: Performed by: INTERNAL MEDICINE

## 2024-05-08 PROCEDURE — 93650 ICAR CATH ABLTJ AV NODE FUNC: CPT | Performed by: INTERNAL MEDICINE

## 2024-05-08 PROCEDURE — C1769 GUIDE WIRE: HCPCS | Performed by: INTERNAL MEDICINE

## 2024-05-08 PROCEDURE — 85610 PROTHROMBIN TIME: CPT

## 2024-05-08 PROCEDURE — 6370000000 HC RX 637 (ALT 250 FOR IP): Performed by: INTERNAL MEDICINE

## 2024-05-08 PROCEDURE — 2500000003 HC RX 250 WO HCPCS: Performed by: INTERNAL MEDICINE

## 2024-05-08 PROCEDURE — C1887 CATHETER, GUIDING: HCPCS | Performed by: INTERNAL MEDICINE

## 2024-05-08 PROCEDURE — 2709999900 HC NON-CHARGEABLE SUPPLY: Performed by: INTERNAL MEDICINE

## 2024-05-08 PROCEDURE — 3700000000 HC ANESTHESIA ATTENDED CARE: Performed by: INTERNAL MEDICINE

## 2024-05-08 PROCEDURE — 2720000010 HC SURG SUPPLY STERILE: Performed by: INTERNAL MEDICINE

## 2024-05-08 PROCEDURE — C2621 PMKR, OTHER THAN SING/DUAL: HCPCS | Performed by: INTERNAL MEDICINE

## 2024-05-08 PROCEDURE — 71045 X-RAY EXAM CHEST 1 VIEW: CPT

## 2024-05-08 PROCEDURE — C1898 LEAD, PMKR, OTHER THAN TRANS: HCPCS | Performed by: INTERNAL MEDICINE

## 2024-05-08 PROCEDURE — 33208 INSRT HEART PM ATRIAL & VENT: CPT | Performed by: INTERNAL MEDICINE

## 2024-05-08 PROCEDURE — C1900 LEAD, CORONARY VENOUS: HCPCS | Performed by: INTERNAL MEDICINE

## 2024-05-08 PROCEDURE — C1766 INTRO/SHEATH,STRBLE,NON-PEEL: HCPCS | Performed by: INTERNAL MEDICINE

## 2024-05-08 PROCEDURE — 2580000003 HC RX 258

## 2024-05-08 PROCEDURE — C1732 CATH, EP, DIAG/ABL, 3D/VECT: HCPCS | Performed by: INTERNAL MEDICINE

## 2024-05-08 DEVICE — PACE/SENSE LEAD
Type: IMPLANTABLE DEVICE | Status: FUNCTIONAL
Brand: ACUITY™ X4 STRAIGHT

## 2024-05-08 DEVICE — PACE/SENSE LEAD
Type: IMPLANTABLE DEVICE | Status: FUNCTIONAL
Brand: INGEVITY™+

## 2024-05-08 DEVICE — CARDIAC RESYNCHRONIZATION THERAPY PACEMAKER
Type: IMPLANTABLE DEVICE | Status: FUNCTIONAL
Brand: VISIONIST™ X4 CRT-P

## 2024-05-08 RX ORDER — SODIUM CHLORIDE 0.9 % (FLUSH) 0.9 %
5-40 SYRINGE (ML) INJECTION EVERY 12 HOURS SCHEDULED
Status: DISCONTINUED | OUTPATIENT
Start: 2024-05-08 | End: 2024-05-09 | Stop reason: HOSPADM

## 2024-05-08 RX ORDER — DAPAGLIFLOZIN 10 MG/1
10 TABLET, FILM COATED ORAL EVERY MORNING
Status: DISCONTINUED | OUTPATIENT
Start: 2024-05-09 | End: 2024-05-09 | Stop reason: HOSPADM

## 2024-05-08 RX ORDER — SODIUM CHLORIDE 0.9 % (FLUSH) 0.9 %
5-40 SYRINGE (ML) INJECTION PRN
Status: DISCONTINUED | OUTPATIENT
Start: 2024-05-08 | End: 2024-05-09 | Stop reason: HOSPADM

## 2024-05-08 RX ORDER — FENTANYL CITRATE 50 UG/ML
INJECTION, SOLUTION INTRAMUSCULAR; INTRAVENOUS PRN
Status: DISCONTINUED | OUTPATIENT
Start: 2024-05-08 | End: 2024-05-08 | Stop reason: SDUPTHER

## 2024-05-08 RX ORDER — ONDANSETRON 2 MG/ML
INJECTION INTRAMUSCULAR; INTRAVENOUS PRN
Status: DISCONTINUED | OUTPATIENT
Start: 2024-05-08 | End: 2024-05-08 | Stop reason: SDUPTHER

## 2024-05-08 RX ORDER — SODIUM CHLORIDE 9 MG/ML
INJECTION, SOLUTION INTRAVENOUS CONTINUOUS PRN
Status: DISCONTINUED | OUTPATIENT
Start: 2024-05-08 | End: 2024-05-08 | Stop reason: SDUPTHER

## 2024-05-08 RX ORDER — PREGABALIN 25 MG/1
50 CAPSULE ORAL 3 TIMES DAILY
Status: DISCONTINUED | OUTPATIENT
Start: 2024-05-08 | End: 2024-05-08 | Stop reason: SDUPTHER

## 2024-05-08 RX ORDER — FUROSEMIDE 20 MG/1
20 TABLET ORAL DAILY
Status: DISCONTINUED | OUTPATIENT
Start: 2024-05-08 | End: 2024-05-09 | Stop reason: HOSPADM

## 2024-05-08 RX ORDER — CARVEDILOL 12.5 MG/1
25 TABLET ORAL 2 TIMES DAILY WITH MEALS
Status: DISCONTINUED | OUTPATIENT
Start: 2024-05-08 | End: 2024-05-09 | Stop reason: HOSPADM

## 2024-05-08 RX ORDER — SODIUM CHLORIDE 9 MG/ML
INJECTION, SOLUTION INTRAVENOUS PRN
Status: DISCONTINUED | OUTPATIENT
Start: 2024-05-08 | End: 2024-05-09 | Stop reason: HOSPADM

## 2024-05-08 RX ORDER — WARFARIN SODIUM 5 MG/1
5 TABLET ORAL
Status: DISCONTINUED | OUTPATIENT
Start: 2024-05-09 | End: 2024-05-09 | Stop reason: HOSPADM

## 2024-05-08 RX ORDER — CEFAZOLIN SODIUM/WATER 2 G/20 ML
SYRINGE (ML) INTRAVENOUS PRN
Status: DISCONTINUED | OUTPATIENT
Start: 2024-05-08 | End: 2024-05-08 | Stop reason: SDUPTHER

## 2024-05-08 RX ORDER — HEPARIN SODIUM 10000 [USP'U]/ML
INJECTION, SOLUTION INTRAVENOUS; SUBCUTANEOUS PRN
Status: DISCONTINUED | OUTPATIENT
Start: 2024-05-08 | End: 2024-05-08 | Stop reason: HOSPADM

## 2024-05-08 RX ORDER — LIDOCAINE HYDROCHLORIDE 10 MG/ML
INJECTION, SOLUTION EPIDURAL; INFILTRATION; INTRACAUDAL; PERINEURAL PRN
Status: DISCONTINUED | OUTPATIENT
Start: 2024-05-08 | End: 2024-05-08 | Stop reason: HOSPADM

## 2024-05-08 RX ORDER — PREGABALIN 50 MG/1
50 CAPSULE ORAL 3 TIMES DAILY
Status: DISCONTINUED | OUTPATIENT
Start: 2024-05-09 | End: 2024-05-09 | Stop reason: HOSPADM

## 2024-05-08 RX ORDER — ACETAMINOPHEN 325 MG/1
650 TABLET ORAL EVERY 4 HOURS PRN
Status: DISCONTINUED | OUTPATIENT
Start: 2024-05-08 | End: 2024-05-09 | Stop reason: HOSPADM

## 2024-05-08 RX ORDER — LORAZEPAM 0.5 MG/1
0.5 TABLET ORAL EVERY 6 HOURS PRN
Status: DISCONTINUED | OUTPATIENT
Start: 2024-05-08 | End: 2024-05-09 | Stop reason: HOSPADM

## 2024-05-08 RX ORDER — GABAPENTIN 300 MG/1
300 CAPSULE ORAL 3 TIMES DAILY
Status: DISCONTINUED | OUTPATIENT
Start: 2024-05-08 | End: 2024-05-09 | Stop reason: HOSPADM

## 2024-05-08 RX ADMIN — SODIUM CHLORIDE, PRESERVATIVE FREE 10 ML: 5 INJECTION INTRAVENOUS at 20:16

## 2024-05-08 RX ADMIN — FENTANYL CITRATE 25 MCG: 50 INJECTION, SOLUTION INTRAMUSCULAR; INTRAVENOUS at 09:38

## 2024-05-08 RX ADMIN — SODIUM CHLORIDE: 9 INJECTION, SOLUTION INTRAVENOUS at 09:18

## 2024-05-08 RX ADMIN — FENTANYL CITRATE 25 MCG: 50 INJECTION, SOLUTION INTRAMUSCULAR; INTRAVENOUS at 09:30

## 2024-05-08 RX ADMIN — PROPOFOL 50 MCG/KG/MIN: 10 INJECTION, EMULSION INTRAVENOUS at 09:26

## 2024-05-08 RX ADMIN — Medication 2000 MG: at 09:28

## 2024-05-08 RX ADMIN — CARVEDILOL 25 MG: 12.5 TABLET, FILM COATED ORAL at 18:07

## 2024-05-08 RX ADMIN — GABAPENTIN 300 MG: 300 CAPSULE ORAL at 20:17

## 2024-05-08 RX ADMIN — SACUBITRIL AND VALSARTAN 1 TABLET: 24; 26 TABLET, FILM COATED ORAL at 20:17

## 2024-05-08 RX ADMIN — FENTANYL CITRATE 50 MCG: 50 INJECTION, SOLUTION INTRAMUSCULAR; INTRAVENOUS at 10:25

## 2024-05-08 RX ADMIN — GABAPENTIN 300 MG: 300 CAPSULE ORAL at 18:07

## 2024-05-08 RX ADMIN — ONDANSETRON HYDROCHLORIDE 4 MG: 2 INJECTION, SOLUTION INTRAMUSCULAR; INTRAVENOUS at 09:32

## 2024-05-08 RX ADMIN — FUROSEMIDE 20 MG: 20 TABLET ORAL at 18:07

## 2024-05-08 ASSESSMENT — LIFESTYLE VARIABLES: SMOKING_STATUS: 1

## 2024-05-08 ASSESSMENT — ENCOUNTER SYMPTOMS: SHORTNESS OF BREATH: 1

## 2024-05-08 NOTE — PROGRESS NOTES
Bleeding has resolved.    Per patient request, will admit overnight for obs    Dc in the am    Thank you for allowing me to participate in this patients care.    Elvis Espinoza MD, FACC, RS

## 2024-05-08 NOTE — ANESTHESIA PRE PROCEDURE
Thrombocytopenia, unspecified ([facility]) D69.6       Past Medical History:        Diagnosis Date   • Acute respiratory failure with hypoxia ([facility]) 02/08/2014    also 11/28/15, 2/17/16, 5/14/17    • Alcohol abuse     As of 11/29/18 pt stated he quit 20 years   • Arthritis     Back and shoulder   • Atrial fibrillation ([facility])    • Atrial flutter ([facility]) 08/2017    saw Dr. Espinoza; underwent a-flutter ablation   • BPH (benign prostatic hyperplasia)    • CHF (congestive heart failure) ([facility]) 02/11/2014    TTE 11/15: EF 40-45%, 2/14: EF 20%  Admitted for acute exacerbations 2/8/14, 11/28/15, 2/17/16, and 5/4/17)   • Chronic kidney disease     III   • Chronic low back pain     LS spine X-ray 4/8/17: mild DDD   • Combined forms of age-related cataract of left eye 12/12/2018    KPE/IOL OS   • Combined forms of age-related cataract of right eye 11/28/2018    KPE/IOL OD   • COPD (chronic obstructive pulmonary disease) ([facility])    • Coronary arteriosclerosis 7/1/2022   • Diabetes ([facility])    • MARIA (dyspnea on exertion)    • ED (erectile dysfunction)    • Elevated troponin 02/11/2014    also 11/28/15; due to cardiac strain   • Frequent hospital admissions     5/14-5/23/17: acute hypoxic resp failure due to CHF exac, NACHO on CKD 3, sepsis due to LE cellulitis, 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  2/17-2/22/16: acute hypoxic resp failure, acute diarrhea  11/28-11/30/15: acute hypoxic resp failure due to acute CHF exac, elevated troponin due to cardiac strain  2/8-2/12/14: acuter hypoxic resp failure due to CHF exac, pneumonia    • Hand blister 10/8/2017    Bilateral   • Hyperlipidemia    • Hypertension    • Kidney disease, chronic, stage III (GFR 30-59 ml/min) ([facility])    • Leg fracture, right 1973   • Nonischemic cardiomyopathy ([facility])     with LVH   • Orthostatic hypotension 4/13/2021   • Pericardial effusion 11/10/2020   • Pneumonia 02/08/2014 2/8/14 and 10/30/15   • Poor historian     As of 11/29/18 pt reports 5th grade education, pt unable

## 2024-05-08 NOTE — PROGRESS NOTES
Cardiac Cath Lab Recovery Arrival Note:      Wilberto Kuhn arrived to Cardiac Cath Lab, Recovery Area. Staff introduced to patient. Patient identifiers verified with NAME and DATE OF BIRTH. Procedure verified with patient. Consent forms reviewed and signed by patient or authorized representative and verified. Allergies verified.     Patient and family oriented to department. Patient and family informed of procedure and plan of care.     Questions answered with review. Patient prepped for procedure, per orders from physician, prior to arrival.    Patient on cardiac monitor, non-invasive blood pressure, SPO2 monitor. On RA. Patient is A&Ox 4. Patient reports no pain.     Patient in stretcher, in low position, with side rails up, call bell within reach, patient instructed to call if assistance as needed.    Patient prep in: [facility] Recovery Area, [place] [address].     Family in: Karla, daughter.   Prep by: David

## 2024-05-08 NOTE — DISCHARGE INSTRUCTIONS
[facility]  [address] [place] [address]  [phone]  [email or web address]     NEW PACEMAKER IMPLANT DISCHARGE INSTRUCTIONS    Patient ID:  Wilberto Kuhn  [id number]  84 y.o.  1940    Admit Date: 5/8/2024    Discharge Date: 5/8/2024     Admitting Physician: [unfilled]     Discharge Physician: [unfilled]    Admission Diagnoses:   Abnormal heart rhythm [I49.9]  Dilated cardiomyopathy (HCC) [I42.0]    Discharge Diagnoses:   [unfilled]    Discharge Condition: Good    Cardiology Procedures this Admission:  Pacemaker insertion.     Disposition: home    Reference discharge instructions provided by nursing for diet and activity.    Follow-up with device clinic in three weeks. Call [phone] to make an appointment.    Signed:  Elvis Espinoza MD  5/8/2024  10:27 AM      DISCHARGE INSTRUCTIONS FOR PATIENTS WITH PACEMAKERS    1. Remember to call for an appointment for 3 weeks [phone] to check healing and implant programming.  2. Medic Alert Bracelets are available from your pharmacist to wear at all times if you choose to wear one.  3. Carry your ID card for pacemaker with you at all times.  This card will be given to you in the hospital or mailed to you.  4. The pacemaker will bulge slightly under your skin.  The bulge will decrease in size over the next few weeks.  Please notify the doctor's office if you notice any of the following around your site:   A.  A bruise that does not go away.   B.  Soreness or yellow, green, or brown drainage from the site.   C.  Any swelling from the site.   D.  If you have a fever of 100 degrees or higher that lasts for a few days.    INCISION CARE       1.  Leave the dressing over your site until your follow up visit.  2.  You may not shower until after follow up visit.   3.  For comfort, wear loose fitting clothing.  4.  Ice pack to affected shoulder for first 24 hours, wear your sling for 2 days.  5.  Report any signs of

## 2024-05-08 NOTE — PROGRESS NOTES
1450: Pt stood up with RN and began bleeding from R groin. RN assisted pt supine in bed and hand held manual pressure x15 minutes. R groin CDI. Pt requesting to stay overnight. RN called MD Espinoza who is in agreement.

## 2024-05-08 NOTE — ANESTHESIA POSTPROCEDURE EVALUATION
Department of Anesthesiology  Postprocedure Note    Patient: Wilberto Kuhn  MRN: [id number]  YOB: 1940  Date of evaluation: 5/8/2024    Procedure Summary       Date: 05/08/24 Room / Location: [facility] EP LAB / [facility] CARDIAC CATH LAB    Anesthesia Start: 0918 Anesthesia Stop: 1059    Procedures:       Insert PPM biv multi      Ablation AV node Diagnosis: Permanent atrial fibrillation (HCC)    Providers: Elvis Espinoza MD Responsible Provider: Alexx Clifford DO    Anesthesia Type: MAC ASA Status: 4            Anesthesia Type: No value filed.    Jose Armando Phase I: Jose Armando Score: 9    Jose Armando Phase II:      Anesthesia Post Evaluation    Patient location during evaluation: PACU  Patient participation: complete - patient participated  Level of consciousness: awake and alert  Pain score: 0  Airway patency: patent  Nausea & Vomiting: no nausea  Cardiovascular status: hemodynamically stable  Respiratory status: acceptable  Hydration status: euvolemic  Comments: Seen, no complaints   Pain management: adequate    There were no known notable events for this encounter.

## 2024-05-09 VITALS
DIASTOLIC BLOOD PRESSURE: 74 MMHG | HEART RATE: 91 BPM | TEMPERATURE: 98.4 F | SYSTOLIC BLOOD PRESSURE: 118 MMHG | BODY MASS INDEX: 29.92 KG/M2 | HEIGHT: 69 IN | WEIGHT: 202 LBS | RESPIRATION RATE: 14 BRPM | OXYGEN SATURATION: 98 %

## 2024-05-09 PROCEDURE — G0378 HOSPITAL OBSERVATION PER HR: HCPCS

## 2024-05-09 NOTE — PROGRESS NOTES
- Patients bedrest up at 1845.  Offered patient to assist him to get up and ambulate.  Patient stated, \"Nah, I better wait.  This thing done finally stopped hurting now.\"  Patient educated on the importance of ambulation.  Patient refused to ambulate at this time.    - This RN gave patient his PM medications and he stated, \"I only take my pills twice a day, once in the morning and once at night.  Y'all aint gonna keep coming in here to give me pills every hour.\"  Medications administered and their reasoning explained to patient.    - When attempting to flush patients IV patient stated, \"No, leave me alone.  You don't need to be doing all that.  This is what they did, nothing on my arm,\" pointing to his R groin site.  RN then again explained to patient what was needed and patient defiant but ultimately willing to let RN flush IV.

## 2024-05-09 NOTE — PROGRESS NOTES
0920: Patient given all discharge instructions, including site care. Site to left chest and right groin shows no s/s of bruising or bleeding. Left chest site has skin glue in place. Daughter at beside and voiced no concerns. Patient transferred by daughter to home for discharge.

## 2024-05-09 NOTE — PROGRESS NOTES
Cardiology Progress Note       [facility]  [address] [place] [address]  [phone]  [email or web address]     5/9/2024 8:29 AM    Admit Date: 5/8/2024    Admit Diagnosis: Abnormal heart rhythm [I49.9]  Dilated cardiomyopathy (HCC) [I42.0]    Subjective:     Wilberto Kuhn   denies chest pain.    /74   Pulse 91   Temp 98.4 °F (36.9 °C) (Oral)   Resp 14   Ht 1.753 m (5' 9\")   Wt 91.6 kg (202 lb)   SpO2 98%   BMI 29.83 kg/m²   Current Facility-Administered Medications   Medication Dose Route Frequency    sodium chloride flush 0.9 % injection 5-40 mL  5-40 mL IntraVENous 2 times per day    sodium chloride flush 0.9 % injection 5-40 mL  5-40 mL IntraVENous PRN    0.9 % sodium chloride infusion   IntraVENous PRN    acetaminophen (TYLENOL) tablet 650 mg  650 mg Oral Q4H PRN    carvedilol (COREG) tablet 25 mg  25 mg Oral BID WC    dapagliflozin (FARXIGA) tablet 10 mg  10 mg Oral QAM    Darolutamide TABS 600 mg  600 mg Oral BID WC    sacubitril-valsartan (ENTRESTO) 24-26 MG per tablet 1 tablet  1 tablet Oral BID    furosemide (LASIX) tablet 20 mg  20 mg Oral Daily    gabapentin (NEURONTIN) capsule 300 mg  300 mg Oral TID    LORazepam (ATIVAN) tablet 0.5 mg  0.5 mg Oral Q6H PRN    warfarin (COUMADIN) tablet 5 mg  5 mg Oral Once    sodium chloride flush 0.9 % injection 5-40 mL  5-40 mL IntraVENous 2 times per day    sodium chloride flush 0.9 % injection 5-40 mL  5-40 mL IntraVENous PRN    0.9 % sodium chloride infusion   IntraVENous PRN    pregabalin (LYRICA) capsule 50 mg  50 mg Oral TID         Objective:      Vitals:    05/09/24 0715   BP: 118/74   Pulse: 91   Resp: 14   Temp: 98.4 °F (36.9 °C)   SpO2: 98%       Physical Exam:  General:  Alert, cooperative, well noursished, well developed, appears stated age   Eyes:  Sclera anicteric. Pupils equally round and reactive to light.   Mouth/Throat: Mucous membranes normal, oral pharynx clear   Neck: Supple   Lungs:

## 2024-05-09 NOTE — PROGRESS NOTES
Report was given to STEPHANIE Siu by this RN including patients vitals, orientation, respiratory and cardiac status, cardiac rhythm, IV access and medications.

## 2024-05-10 LAB — ECHO BSA: 2.11 M2

## 2024-07-15 DIAGNOSIS — I48.0 PAF (PAROXYSMAL ATRIAL FIBRILLATION) (HCC): ICD-10-CM

## 2024-07-15 RX ORDER — WARFARIN SODIUM 5 MG/1
TABLET ORAL
Qty: 32 TABLET | Refills: 1 | Status: SHIPPED | OUTPATIENT
Start: 2024-07-15

## 2024-07-31 ENCOUNTER — OFFICE VISIT (OUTPATIENT)
Facility: CLINIC | Age: 84
End: 2024-07-31
Payer: MEDICARE

## 2024-07-31 VITALS
BODY MASS INDEX: 31.81 KG/M2 | OXYGEN SATURATION: 97 % | HEART RATE: 74 BPM | HEIGHT: 69 IN | TEMPERATURE: 98.5 F | RESPIRATION RATE: 16 BRPM | SYSTOLIC BLOOD PRESSURE: 122 MMHG | WEIGHT: 214.8 LBS | DIASTOLIC BLOOD PRESSURE: 67 MMHG

## 2024-07-31 DIAGNOSIS — H65.02 ACUTE SEROUS OTITIS MEDIA OF LEFT EAR, RECURRENCE NOT SPECIFIED: Primary | ICD-10-CM

## 2024-07-31 PROCEDURE — 99213 OFFICE O/P EST LOW 20 MIN: CPT | Performed by: INTERNAL MEDICINE

## 2024-07-31 PROCEDURE — G8427 DOCREV CUR MEDS BY ELIG CLIN: HCPCS | Performed by: INTERNAL MEDICINE

## 2024-07-31 PROCEDURE — 1123F ACP DISCUSS/DSCN MKR DOCD: CPT | Performed by: INTERNAL MEDICINE

## 2024-07-31 PROCEDURE — G8417 CALC BMI ABV UP PARAM F/U: HCPCS | Performed by: INTERNAL MEDICINE

## 2024-07-31 PROCEDURE — 4004F PT TOBACCO SCREEN RCVD TLK: CPT | Performed by: INTERNAL MEDICINE

## 2024-07-31 RX ORDER — CEFDINIR 300 MG/1
300 CAPSULE ORAL 2 TIMES DAILY
Qty: 20 CAPSULE | Refills: 0 | Status: SHIPPED | OUTPATIENT
Start: 2024-07-31 | End: 2024-08-10

## 2024-07-31 RX ORDER — FLUTICASONE PROPIONATE 50 MCG
1 SPRAY, SUSPENSION (ML) NASAL DAILY
Qty: 16 G | Refills: 0 | Status: SHIPPED | OUTPATIENT
Start: 2024-07-31

## 2024-07-31 NOTE — PROGRESS NOTES
\"Have you been to the ER, urgent care clinic since your last visit?  Hospitalized since your last visit?\"    May 2024 - Pace maker     “Have you seen or consulted any other health care providers outside of [facility] since your last visit?”    NO            Click Here for Release of Records Request

## 2024-07-31 NOTE — PROGRESS NOTES
Chief Complaint   Patient presents with    Otalgia     3A//   X 1 day.        HISTORY OF PRESENT ILLNESS  Wilberto Kuhn is a 84 y.o. male    Complains of left ear pain. Started yesterday. Ear fullness, mild left ear hearing loss. Fan in house with AC on has bothered ear. Has hx of left ear infections and sensitivity to cold.    Patient Active Problem List   Diagnosis    PAF (paroxysmal atrial fibrillation) (HCC)    Prostate cancer (HCC)    Primary osteoarthritis of left hip    NICM (nonischemic cardiomyopathy) (HCC)    Mixed hyperlipidemia    Osteopenia of multiple sites    Smokeless tobacco use    Anemia of chronic disease    COPD (chronic obstructive pulmonary disease) (HCC)    Benign hypertension with CKD (chronic kidney disease) stage III ([facility])    Obesity (BMI 30-39.9)    Chronic low back pain    Candidal intertrigo    Earache on left    Coronary artery disease involving native coronary artery of native heart without angina pectoris    Type 2 diabetes mellitus with stage 3b chronic kidney disease, without long-term current use of insulin (HCC)    HCAP (healthcare-associated pneumonia)    Thrombocytopenia, unspecified (HCC)    Dilated cardiomyopathy (HCC)     Past Medical History:   Diagnosis Date    Acute respiratory failure with hypoxia ([facility]) 02/08/2014    also 11/28/15, 2/17/16, 5/14/17     Alcohol abuse     As of 11/29/18 pt stated he quit 20 years    Arthritis     Back and shoulder    Atrial fibrillation ([facility])     Atrial flutter ([facility]) 08/2017    saw Dr. Espinoza; underwent a-flutter ablation    BPH (benign prostatic hyperplasia)     CHF (congestive heart failure) ([facility]) 02/11/2014    TTE 11/15: EF 40-45%, 2/14: EF 20%  Admitted for acute exacerbations 2/8/14, 11/28/15, 2/17/16, and 5/4/17)    Chronic kidney disease     III    Chronic low back pain     LS spine X-ray 4/8/17: mild DDD    Combined forms of age-related cataract of left eye 12/12/2018    KPE/IOL OS    Combined forms of age-related cataract of

## 2024-08-07 NOTE — PATIENT INSTRUCTIONS
For Dry Skin  Use Aquaphor, Eucerin Cream, or Vaseline (over the counter) daily after bathing and as needed for rash/dry skin/itching. Use a gentle soap: Dove, Tone, Caress or Aveeno. Avoid Ivory and deodorant soaps. Bathe in the coolest water that is comfortable. Pat skin dry, do not rub. Dry Skin: Care Instructions  Your Care Instructions  Dry skin is a common problem, especially in areas where the air is very dry. Dry skin can also become a problem as you get older and lose natural oils that keep your skin moist.  A tendency toward dry, itchy skin may run in families. Some problems with the body's defenses (immune system), allergies, or an infection with a fungus may also cause patches of dry skin. An over-the-counter cream may help your dry skin. If your skin problem does not get better with home treatment, your doctor may prescribe ointment. You may need antibiotics if you have a skin infection. Follow-up care is a key part of your treatment and safety. Be sure to make and go to all appointments, and call your doctor if you are having problems. It's also a good idea to know your test results and keep a list of the medicines you take. How can you care for yourself at home? Showers and baths  · Keep showers and baths short, and use warm or lukewarm water. Don't use hot water. It takes off more of your skin's natural oils. · Use as little soap as you can. Choose a mild soap, such as Dove, Cetaphil, or Neutrogena. Or use a skin cleanser like Aquanil or Cetaphil. · If you are taking a bath, use soap only at the very end. Then rinse off all traces of soap with fresh water. Gently pat your skin dry with a towel. Skin creams and moisturizers  · Apply moisturizer or skin cream right away (within 3 minutes) after a bath or shower. Use a moisturizer at other times too, as often as you need it. · Moisturizing creams are better than lotions.  Try brands like CeraVe cream, Cetaphil cream, or Eucerin Notified Dr. Maloney that pt HR runs from 42-21 bpm   cream.  Other tips  · When washing clothes, use a small amount of detergent. Don't use fabric softeners or dryer sheets. · For small areas of itchy skin, try an over-the-counter 1% hydrocortisone cream.  · If you have very dry hands, spread petroleum jelly (such as Vaseline) on your hands before bed. Wear thin cotton gloves while you sleep. If your feet are dry, spread Vaseline on them and wear socks while you sleep. When should you call for help? Call your doctor now or seek immediate medical care if:  ? · You have signs of infection, such as:  ¨ Pain, warmth, or swelling in the skin. ¨ Red streaks near a wound in the skin. ¨ Pus coming from a wound in your skin. ¨ A fever. ? Watch closely for changes in your health, and be sure to contact your doctor if:  ? · You do not get better as expected. Where can you learn more? Go to http://bg-gurpreet.info/. Enter V682 in the search box to learn more about \"Dry Skin: Care Instructions. \"  Current as of: October 13, 2016  Content Version: 11.4  © 2334-7360 [employer]. Care instructions adapted under license by [employer] (which disclaims liability or warranty for this information). If you have questions about a medical condition or this instruction, always ask your healthcare professional. [address] any warranty or liability for your use of this information.

## 2024-08-26 ENCOUNTER — OFFICE VISIT (OUTPATIENT)
Facility: CLINIC | Age: 84
End: 2024-08-26

## 2024-08-26 VITALS
WEIGHT: 216 LBS | HEART RATE: 78 BPM | DIASTOLIC BLOOD PRESSURE: 72 MMHG | OXYGEN SATURATION: 97 % | TEMPERATURE: 98.9 F | HEIGHT: 69 IN | SYSTOLIC BLOOD PRESSURE: 128 MMHG | RESPIRATION RATE: 12 BRPM | BODY MASS INDEX: 31.99 KG/M2

## 2024-08-26 DIAGNOSIS — U07.1 COVID-19: Primary | ICD-10-CM

## 2024-08-26 DIAGNOSIS — N18.32 STAGE 3B CHRONIC KIDNEY DISEASE (HCC): ICD-10-CM

## 2024-08-26 DIAGNOSIS — R09.89 RUNNY NOSE: ICD-10-CM

## 2024-08-26 DIAGNOSIS — R05.1 ACUTE COUGH: ICD-10-CM

## 2024-08-26 DIAGNOSIS — R09.81 HEAD CONGESTION: ICD-10-CM

## 2024-08-26 LAB
EXP DATE SOLUTION: NORMAL
EXP DATE SWAB: NORMAL
EXPIRATION DATE: NORMAL
LOT NUMBER POC: NORMAL
LOT NUMBER SOLUTION: NORMAL
LOT NUMBER SWAB: NORMAL
SARS-COV-2 RNA, POC: POSITIVE

## 2024-08-26 NOTE — PROGRESS NOTES
HPI  Mr. Wilberto Kuhn is a 84 y.o. year old male, he is seen today for URI - symptoms started 3 days ago with chills, runny nose, cough, sneezing. No nasal congestion. No HA. No n/v/diarrhea. Has been tired and a little sob.       CrCl 44  GFR 38    Chief Complaint   Patient presents with    Head Congestion    Cough        Prior to Admission medications    Medication Sig Start Date End Date Taking? Authorizing Provider   warfarin (COUMADIN) 5 MG tablet TAKE 1 TABLET BY MOUTH ONCE DAILY EXCEPT  FOR  1.5  TABS  DAILY  ON FRIDAYS. 7/15/24  Yes Isabel Simpson MD   dapagliflozin (FARXIGA) 10 MG tablet Take 1 tablet by mouth every morning   Yes Katie Borges MD   gabapentin (NEURONTIN) 300 MG capsule Take 1 capsule by mouth 3 times daily.   Yes Katie Borges MD   carvedilol (COREG) 25 MG tablet Take 1 tablet by mouth 2 times daily (with meals)   Yes Katie Borges MD   ENTRESTO 24-26 MG per tablet Take 1 tablet by mouth 2 times daily 9/15/23  Yes Katie Borges MD   Darolutamide (NUBEQA) 300 MG TABS Take 600 mg by mouth 2 times daily (with meals) 5/24/23  Yes Isabel Simpson MD   aspirin 81 MG chewable tablet Take 1 tablet by mouth daily 3/3/16  Yes Automatic Reconciliation, Ar   atorvastatin (LIPITOR) 40 MG tablet Take 1 tablet by mouth nightly 3/7/22  Yes Automatic Reconciliation, Ar   furosemide (LASIX) 20 MG tablet Take 1 tablet by mouth once daily 3/21/22  Yes Automatic Reconciliation, Ar   fluticasone (FLONASE) 50 MCG/ACT nasal spray 1 spray by Each Nostril route daily 7/31/24   Isabel Simpson MD   sodium bicarbonate 650 MG tablet Take 1 tablet by mouth 2 times daily 2/5/24 2/4/25  Katie Borges MD   clotrimazole-betamethasone (LOTRISONE) 1-0.05 % cream Apply topically 2 times daily Apply topically 2 times daily.    Katie Borges MD   diclofenac sodium (VOLTAREN) 1 % GEL Apply topically 2 times daily    Katie Borges MD   lidocaine (LIDODERM) 5 % Place 1 patch

## 2024-08-26 NOTE — PROGRESS NOTES
Wilberto Kuhn  Identified pt with two pt identifiers(name and ).     Chief Complaint   Patient presents with    Head Congestion    Cough       Reviewed record In preparation for visit and have obtained necessary documentation.     1. Have you been to the ER, urgent care clinic or hospitalized since your last visit? No     2. Have you seen or consulted any other health care providers outside of the [facility] since your last visit? Include any pap smears or colon screening. No    Vitals reviewed with provider.    Health Maintenance reviewed:     Health Maintenance Due   Topic    Lipids     Flu vaccine (1)          Wt Readings from Last 3 Encounters:   24 98 kg (216 lb)   24 97.4 kg (214 lb 12.8 oz)   24 91.6 kg (202 lb)        Temp Readings from Last 3 Encounters:   24 98.9 °F (37.2 °C) (Oral)   24 98.5 °F (36.9 °C) (Oral)   24 98.4 °F (36.9 °C) (Oral)        BP Readings from Last 3 Encounters:   24 128/72   24 122/67   24 118/74        Pulse Readings from Last 3 Encounters:   24 78   24 74   24 91      [unfilled]       Learning Assessment:   :         2023    10:30 AM   [facility] AMB LEARNING ASSESSMENT   Primary Learner Patient   level of education DID NOT GRADUATE HIGH SCHOOL   Primary Language ENGLISH   Learning Preference DEMONSTRATION   Answered By Patient   Relationship to Learner SELF         Fall Risk Assessment:   :         3/12/2024    11:27 AM 2023    11:10 AM 2023    10:58 AM 2022    11:14 AM 2022     9:00 AM 2021     3:06 PM 2021     2:56 PM   Amb Fall Risk Assessment and TUG Test   Do you feel unsteady or are you worried about falling?  no yes no       2 or more falls in past year? no no no       Fall with injury in past year? no no no       Fall in past 12 months?    1 0 0 0   Able to walk?    Yes Yes Yes Yes          Abuse Screening:   :         2023    10:00 AM   AMB Abuse

## 2024-08-26 NOTE — PATIENT INSTRUCTIONS
You may be around others again once you feel better for 24 hours.   Take extra precautions for the next 5 days after feeling better - like staying away from others and wearing a mask.

## 2024-09-04 RX ORDER — CLOTRIMAZOLE AND BETAMETHASONE DIPROPIONATE 10; .64 MG/G; MG/G
CREAM TOPICAL
Qty: 15 G | Refills: 0 | Status: SHIPPED | OUTPATIENT
Start: 2024-09-04

## 2024-09-04 NOTE — TELEPHONE ENCOUNTER
PCP: Isabel Simpson MD     Last appt:  8/26/2024      Future Appointments   Date Time Provider Department Center   9/30/2024 11:10 AM Isabel Simpson MD [facility] DEP          Requested Prescriptions     Pending Prescriptions Disp Refills    clotrimazole-betamethasone (LOTRISONE) 1-0.05 % cream [Pharmacy Med Name: Clotrimazole-Betamethasone 1-0.05 % External Cream] 15 g 0     Sig: APPLY  CREAM TOPICALLY TO AFFECTED AREA TWICE DAILY

## 2024-09-06 NOTE — PROGRESS NOTES
[FreeTextEntry1] : .  Plan: - We had a long discussion regarding her options. For her loss of facial volume, we discussed option for filler placement for tow goals 1) to the malar area to augment the midface and lift the soft tissue descent/jowling and 2) to the nasolabial folds, marionette lines, and smile lines to fill in these areas. We also talked about option for Botox which would be an effective treatment for dynamic wrinkles to the upper face and would help with perioral rhytids and mentalis dimpling as well - she will think about this but is interested in mentalis Botox. - Regarding fine lines and skin complexion, we had a discussion regarding options which include resurfacing techniques (peels, lasers, microneedling w/ PRP). Referral provided to dermatology for further consultation. - Pt will think about her options and let us know how she would like to proceed.  -- Deborah Medina MD Facial Plastic & Reconstructive Surgery physical Therapy EVALUATION/DISCHARGE  Patient: Delfino Cummings (59 y.o. male)  Date: 5/20/2017  Primary Diagnosis: Acute respiratory failure ([facility]        Precautions:      ASSESSMENT :  Based on the objective data described below, the patient presents with antalgic gait pattern as a result of R LE lesion discomfort however baseline modified independent functional mobility. Pt performed all aspects of functional mobility at mod I level this date including sit<>stand transfers and ambulation of 100ft Gait speed decreased as well as antalgic as a result of LE lesions however steady overall with no LOB/balance deficits noted. At this time, pt is functioning at his baseline independent level and has no further skilled therapy needs. Pt is safe to discharge home w/ assist of family when medically stable. DC PT. Skilled physical therapy is not indicated at this time. PLAN :  Discharge Recommendations: None  Further Equipment Recommendations for Discharge: None     SUBJECTIVE:   Patient stated I'm always short of breath. I smoke 2 packs a day.     OBJECTIVE DATA SUMMARY:   HISTORY:    Past Medical History:   Diagnosis Date    Acute on chronic systolic HF (heart failure) ([facility] [address] 2/11/2014    11/15 Echo EF 40-45%    Cancer (HCC)     prostate    COPD (chronic obstructive pulmonary disease) ([facility] [address] [facility]     Elevated troponin 2/11/2014    Hypertension     S/P cardiac cath 2/12/2014 2/12/14 no significant coronary disease, severe LV dysfunction     Past Surgical History:   Procedure Laterality Date    COLONOSCOPY,DIAGNOSTIC  2/22/2016        [facility]  2/22/2016          Prior Level of Function/Home Situation: Lives at home w/ wife. Independent w/ ambulation, ADLs, and denies history of falls. Denies home O2 use. Still driving. Works part-time 3days/wk.   Personal factors and/or comorbidities impacting plan of care: hx of prostate CA, COPD    Home Situation  Home Environment: Private residence  # Steps to Enter: 2  Rails to Enter: No  One/Two Story Residence: Two story  # of Interior Steps: 15  Interior Rails: Both  Lift Chair Available: No  Living Alone: No  Support Systems: Spouse/Significant Other/Partner  Patient Expects to be Discharged to[de-identified] Private residence  Current DME Used/Available at Home: None    EXAMINATION/PRESENTATION/DECISION MAKING:   Critical Behavior:              Hearing: Auditory  Auditory Impairment: None  Skin:  Dressings clean, dry, intact  Edema: none noted   Range Of Motion:  AROM: Within functional limits                       Strength:    Strength: Within functional limits                    Tone & Sensation:   Tone: Normal                              Coordination:  Coordination: Within functional limits  Vision:      Functional Mobility:  Bed Mobility:              Transfers:  Sit to Stand: Modified independent  Stand to Sit: Modified independent                       Balance:   Sitting: Intact  Standing: Intact  Ambulation/Gait Training:  Distance (ft): 100 Feet (ft)  Assistive Device: Gait belt  Ambulation - Level of Assistance: Modified independent        Gait Abnormalities: Antalgic        Base of Support: Widened     Speed/Ama: Slow                         Pt ambulated 100ft at mod I level, exhibiting antalgic gait pattern as a result of R LE leg pain however steady gait overall. Gait speed decreased. Functional Measure:  Barthel Index:    Bathin  Bladder: 10  Bowels: 10  Groomin  Dressing: 10  Feeding: 10  Mobility: 15  Stairs: 0  Toilet Use: 10  Transfer (Bed to Chair and Back): 15  Total: 90       Barthel and G-code impairment scale:  Percentage of impairment CH  0% CI  1-19% CJ  20-39% CK  40-59% CL  60-79% CM  80-99% CN  100%   Barthel Score 0-100 100 99-80 79-60 59-40 20-39 1-19   0   Barthel Score 0-20 20 17-19 13-16 9-12 5-8 1-4 0      The Barthel ADL Index: Guidelines  1.  The index should be used as a record of what a patient does, not as a record of what a patient could do. 2. The main aim is to establish degree of independence from any help, physical or verbal, however minor and for whatever reason. 3. The need for supervision renders the patient not independent. 4. A patient's performance should be established using the best available evidence. Asking the patient, friends/relatives and nurses are the usual sources, but direct observation and common sense are also important. However direct testing is not needed. 5. Usually the patient's performance over the preceding 24-48 hours is important, but occasionally longer periods will be relevant. 6. Middle categories imply that the patient supplies over 50 per cent of the effort. 7. Use of aids to be independent is allowed. Halie Spring., Barthel, D.W. (5553). Functional evaluation: the Barthel Index. [address] [facility] (14)2. Neil Grade skylar [place], J.J.M.F, Micaela Dickson., Benigno Samuels., [place], [address] [place] [address] (1999). Measuring the change indisability after inpatient rehabilitation; comparison of the responsiveness of the Barthel Index and Functional [place] Measure. Journal of Neurology, Neurosurgery, and Psychiatry, 66(4), 956-117. Crystal Pimentel, N.J.A, BISHOP Santos, & Vivian Andrews, M.A. (2004.) Assessment of post-stroke quality of life in cost-effectiveness studies: The usefulness of the Barthel Index and the EuroQoL-5D. Quality of Life Research, 13, 223-14       G codes: In compliance with CMSs Claims Based Outcome Reporting, the following G-code set was chosen for this patient based on their primary functional limitation being treated: The outcome measure chosen to determine the severity of the functional limitation was the Barthel Index with a score of 90/100 which was correlated with the impairment scale.     ? Mobility - Walking and Moving Around:     - CURRENT STATUS: CI - 1%-19% impaired, limited or restricted    - GOAL STATUS: CI - 1%-19% impaired, limited or restricted    - D/C STATUS:  CI - 1%-19% impaired, limited or restricted        Physical Therapy Evaluation Charge Determination   History Examination Presentation Decision-Making   MEDIUM  Complexity : 1-2 comorbidities / personal factors will impact the outcome/ POC  MEDIUM Complexity : 3 Standardized tests and measures addressing body structure, function, activity limitation and / or participation in recreation  MEDIUM Complexity : Evolving with changing characteristics  MEDIUM Complexity : FOTO score of 26-74      Based on the above components, the patient evaluation is determined to be of the following complexity level: MEDIUM    Pain:  Pain Scale 1: Numeric (0 - 10)  Pain Intensity 1: 0     Activity Tolerance:   VSS throughout on RA  Please refer to the flowsheet for vital signs taken during this treatment. After treatment:   [x]   Patient left in no apparent distress sitting up in chair  []   Patient left in no apparent distress in bed  [x]   Call bell left within reach  [x]   Nursing notified  []   Caregiver present  []   Bed alarm activated    COMMUNICATION/EDUCATION:   Communication/Collaboration:  [x]   Fall prevention education was provided and the patient/caregiver indicated understanding. [x]   Patient/family have participated as able and agree with findings and recommendations. []   Patient is unable to participate in plan of care at this time.   Findings and recommendations were discussed with: Registered Nurse    Thank you for this referral.  Cristian Butcher, PT, DPT   Time Calculation: 14 mins

## 2024-09-16 DIAGNOSIS — I48.0 PAF (PAROXYSMAL ATRIAL FIBRILLATION) (HCC): ICD-10-CM

## 2024-09-17 RX ORDER — WARFARIN SODIUM 5 MG/1
TABLET ORAL
Qty: 32 TABLET | Refills: 3 | Status: SHIPPED | OUTPATIENT
Start: 2024-09-17

## 2024-09-17 NOTE — PROGRESS NOTES
CC:   Chief Complaint   Patient presents with    Hypertension    Cholesterol Problem       HISTORY OF PRESENT ILLNESS  Pete Luna is a 70 y.o. male. Presents for 4 month follow up evaluation. He has HTN, hyperlipidemia, CKD stage 3-4, CHF (combined diastolic and systolic) with echo EF 88-16% in 7/17, non-ischemic cardiomyopathy, atrial flutter s/p AFL ablation on 8/22/17 now in NSR and off anticoagulation, prediabetes, COPD, chronic low back pain, and prostate cancer.       He has no new complaints. Was seen for left ear infection on 2/11/19 and dizziness on 2/22/19. Feels like his ear he is getting better. Dizziness also better with meclizine. Has been following with Dr. Maritza Maxwell for chronic low back pain and receiving epidural steroid injections that are helping. Has appointment on 11/9/18 with Dr. Shelley Melara. Was told he might need cataract surgery.        Cardiovascular Review  The patient has hypertension, CHF, non-ischemic cardiomyopathy, and atrial flutter s/p AFL ablation on 8/22/17.  Denies CP, SOB at rest, heart palpitations, leg swelling, orthopnea, or PND.  He reports taking medications as instructed, no medication side effects noted.  Diet and Lifestyle: generally follows a low sodium diet, no formal exercise but active during the day.  Lab review: labs reviewed and discussed with patient.       Oncology Review  Diagnosed with prostate cancer on 2/6/09.  Stage [id number].  Pre-treatment PSA was 24.8 ng/ml.  Had external beam radiation therapy in 2009.  Has been on Lupron since 12/22/16; receiving every 6 months, last received in 8/18.       Review of Studies  Cardio  EKG 10/30/18: Sinus gaudencio with nonspecific T wave changes. Echo (TTE) 7/31/17: EF 55-60%. No regional wall motion abnormalities. LA moderately dilated. Ortho  CT neck 10/4/18: no evidence of significant soft tissue abnormality. A 10 mm left thyroid nodule is noted. No evidence of pathologically enlarged cervical lymph nodes. Writer will be sending a result letter to patient regarding non-emergent test results from their recent labs due to patient having an active [employer] account.       Flowing ventral osteophytes in the cervical spine consistent with DISH. XR lumbar spine 5/3/18: mild to moderate multilevel degenerative disc disease. Pulmonary  CXR 10/2/17: mild interstitial edema in the mid lower lung zones right greater than left     Other Providers: Dr. Shira Dang (Cardiology), Dr. Esequiel Whittington (Cardiology EPS), Dr. Erin Celeste (Urology), Dr. Starr Blood (Nephrology), Dr. David Coughlin (Ophthalmology), Dr. Papo Payton (ENT)      Soc Hx  . Has 2 children and 3 grandchildren. Retired . He chews tobacco.  Quit smoking 8/7/17; previously smoked 1.5-2 ppd for 50 yrs. Stopped drinking alcohol in 2008. Denies recreational drug use. Does not get much exercise.     ROS  A complete review of systems was performed and is negative except for those mentioned in the HPI. 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    S/P cardiac cath Z98.890    NICM (nonischemic cardiomyopathy) ([facility] [address] I42.8    Hypertension I10    Mixed hyperlipidemia E78.2    Smokeless tobacco use Z72.0    Atrial flutter (HCC) I48.92    Stage 3 chronic kidney disease ([facility] [address] N18.3    Frequent hospital admissions Z78.9    Prediabetes R73.03    Prostate cancer ([facility] [address] C61    COPD (chronic obstructive pulmonary disease) ([facility] J44.9    Chronic low back pain M54.5, G89.29    Obesity (BMI 30-39. 9) E66.9    SOB (shortness of breath) R06.02    Candidal intertrigo B37.2    Combined forms of age-related cataract of right eye H25.811    Combined forms of age-related cataract of left eye H25.812     Past Medical History:   Diagnosis Date    Acute respiratory failure with hypoxia ([address] 02/08/2014    also 11/28/15, 2/17/16, 5/14/17     Alcohol abuse     As of 11/29/18 pt stated he quit 20 years    Arthritis     Back and shoulder    Atrial flutter ([address] 08/2017    saw Dr. Esequiel Whittington; underwent a-flutter ablation    BPH (benign prostatic hyperplasia)     CHF (congestive heart failure) ([facility] 02/11/2014    TTE 11/15: EF 40-45%, 2/14: EF 20%  Admitted for acute exacerbations 2/8/14, 11/28/15, 2/17/16, and 5/4/17)    Chronic low back pain     LS spine X-ray 4/8/17: mild DDD    COPD (chronic obstructive pulmonary disease) ([facility]     PA (dyspnea on exertion)     ED (erectile dysfunction)     Elevated troponin 02/11/2014    also 11/28/15; due to cardiac strain    Frequent hospital admissions     5/14-5/23/17: acute hypoxic resp failure due to CHF exac, ROBINSON on CKD 3, sepsis due to LE cellulitis, 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  2/17-2/22/16: acute hypoxic resp failure, acute diarrhea  11/28-11/30/15: acute hypoxic resp failure due to acute CHF exac, elevated troponin due to cardiac strain  2/8-2/12/14: acuter hypoxic resp failure due to CHF exac, pneumonia     Hand blister 10/8/2017    Bilateral    Hypertension     Kidney disease, chronic, stage III (GFR 30-59 ml/min) ([facility]     Leg fracture, right 1973    Nonischemic cardiomyopathy ([facility]     with LVH    Pneumonia 02/08/2014 2/8/14 and 10/30/15    Poor historian     As of 11/29/18 pt reports 5th grade education, pt unable to give med list-obtained from wife per patient's permission    Prediabetes     Prostate cancer ([facility] 2016    As of 11/29/18, pt states he gets two injections twice a year for this    Pulmonary edema 11/28/2015    S/P cardiac cath 2/12/2014 2/12/14 no significant coronary disease, severe LV dysfunction    Tinea cruris     also genital folliculitis    Vertigo      Allergies   Allergen Reactions    Oxycodone Contact Dermatitis       Current Outpatient Medications   Medication Sig Dispense Refill    amoxicillin (AMOXIL) 500 mg capsule       OTHER Unknown ear drops      atorvastatin (LIPITOR) 40 mg tablet TAKE 1 TABLET BY MOUTH NIGHTLY 90 Tab 1    furosemide (LASIX) 40 mg tablet TAKE ONE TABLET BY MOUTH DAILY - DOSE REDUSED 12/17/15 30 Tab 6    clotrimazole-betamethasone (LOTRISONE) topical cream Apply  to affected area two (2) times a day. 45 g 3    amLODIPine (NORVASC) 2.5 mg tablet TAKE 1 TABLET BY MOUTH ONCE DAILY 30 Tab 0    carvedilol (COREG) 25 mg tablet TAKE ONE TABLET BY MOUTH TWICE DAILY WITH MEALS 60 Tab 6    aspirin 81 mg chewable tablet Take 1 Tab by mouth daily. 10 Tab 0         PHYSICAL EXAM  Visit Vitals  /67 (BP 1 Location: Right arm, BP Patient Position: Sitting)   Pulse 72   Temp 98 °F (36.7 °C) (Oral)   Resp 18   Ht 5' 9\" (1.753 m)   Wt 230 lb (104.3 kg)   SpO2 96%   BMI 33.97 kg/m²       General: Obese, no distress. HEENT:  Head normocephalic/atraumatic, no scleral icterus  Neck: Supple. No carotid bruits, JVD, lymphadenopathy, or thyromegaly. TM's and ear canals normal bilaterally. Lungs:  Clear to ausculation bilaterally. Good air movement. Heart:  Regular rate and rhythm, normal S1 and S2, no murmur, gallop, or rub  Extremities: No clubbing, cyanosis, or edema. Neurological: Alert and oriented. Psychiatric: Normal mood and affect. Behavior is normal.         ASSESSMENT AND PLAN    ICD-10-CM ICD-9-CM    1. Essential hypertension P01 886.2 METABOLIC PANEL, COMPREHENSIVE      CBC WITH AUTOMATED DIFF   2. Mixed hyperlipidemia E78.2 272.2 TSH RFX ON ABNORMAL TO FREE T4      LIPID PANEL   3. Left otitis media, unspecified otitis media type H66.92 382.9    4. Chronic obstructive pulmonary disease, unspecified COPD type ([facility] J44.9 496    5. Atrial flutter, unspecified type (HCC) I48.92 427.32    6. Combined systolic and diastolic congestive heart failure, unspecified HF chronicity (HCC) I50.40 428.40    7. NICM (nonischemic cardiomyopathy) ([facility]) I42.8 425.4    8. Prostate cancer ([facility] C61 185    9. Chronic bilateral low back pain without sciatica M54.5 724.2     G89.29 338.29    10. IGT (impaired glucose tolerance) R73.02 790.22 HEMOGLOBIN A1C WITH EAG   11.  Screening for prostate cancer Z12.5 V76.44 PSA SCREENING (SCREENING)     Diagnoses and all orders for this visit:    1. Essential hypertension  Controlled. /67. Continue carvedilol and amlodipine.  -     METABOLIC PANEL, COMPREHENSIVE; Future  -     CBC WITH AUTOMATED DIFF; Future    2. Mixed hyperlipidemia  Controlled on atorvastatin 40 mg daily.  -     TSH RFX ON ABNORMAL TO FREE T4; Future  -     LIPID PANEL; Future    3. Left otitis media, unspecified otitis media type  Resolved. Finish antibiotic and meclizine. 4. Chronic obstructive pulmonary disease, unspecified COPD type ([facility] [address]    5. Atrial flutter, unspecified type ([facility] [facility]  Now in NSR and rate-controlled. Continue carvedilol and ASA 81 mg daily. 6. Combined systolic and diastolic congestive heart failure, unspecified HF chronicity (HCC)  Controlled on furosemide and carvedilol. 7. NICM (nonischemic cardiomyopathy) ([facility] [facility]    8. Prostate cancer ([facility]    9. Chronic bilateral low back pain without sciatica  Continue following with Dr. Raheel Curran. 10. IGT (impaired glucose tolerance)  -     HEMOGLOBIN A1C WITH EAG; Future    11. Screening for prostate cancer  -     PSA SCREENING (SCREENING); Future      Follow-up Disposition:  Return in about 5 weeks (around 4/9/2019), or if symptoms worsen or fail to improve, for HTN, discuss labs; schedule for fasting labs 1 wee before next clinic visit. I have discussed the diagnosis with the patient and the intended plan as seen in the above orders. Patient is in agreement. The patient has received an after-visit summary and questions were answered concerning future plans. I have discussed medication side effects and warnings with the patient as well.

## 2024-09-20 ENCOUNTER — OFFICE VISIT (OUTPATIENT)
Age: 84
End: 2024-09-20

## 2024-09-20 VITALS
OXYGEN SATURATION: 99 % | DIASTOLIC BLOOD PRESSURE: 76 MMHG | TEMPERATURE: 98.1 F | HEART RATE: 41 BPM | BODY MASS INDEX: 31.6 KG/M2 | SYSTOLIC BLOOD PRESSURE: 159 MMHG | WEIGHT: 214 LBS | RESPIRATION RATE: 18 BRPM

## 2024-09-20 DIAGNOSIS — J00 NASOPHARYNGITIS ACUTE: Primary | ICD-10-CM

## 2024-09-20 LAB
Lab: NORMAL
PERFORMING INSTRUMENT: NORMAL
QC PASS/FAIL: NORMAL
SARS-COV-2, POC: NORMAL

## 2024-09-23 ENCOUNTER — HOSPITAL ENCOUNTER (EMERGENCY)
Facility: HOSPITAL | Age: 84
Discharge: HOME | DRG: 291 | End: 2024-09-23
Attending: STUDENT IN AN ORGANIZED HEALTH CARE EDUCATION/TRAINING PROGRAM
Payer: MEDICARE

## 2024-09-23 ENCOUNTER — APPOINTMENT (OUTPATIENT)
Facility: HOSPITAL | Age: 84
DRG: 291 | End: 2024-09-23
Payer: MEDICARE

## 2024-09-23 VITALS
HEART RATE: 79 BPM | WEIGHT: 206.8 LBS | RESPIRATION RATE: 20 BRPM | BODY MASS INDEX: 30.63 KG/M2 | TEMPERATURE: 98.6 F | OXYGEN SATURATION: 97 % | DIASTOLIC BLOOD PRESSURE: 54 MMHG | HEIGHT: 69 IN | SYSTOLIC BLOOD PRESSURE: 154 MMHG

## 2024-09-23 DIAGNOSIS — J06.9 VIRAL URI WITH COUGH: ICD-10-CM

## 2024-09-23 DIAGNOSIS — R51.9 ACUTE NONINTRACTABLE HEADACHE, UNSPECIFIED HEADACHE TYPE: Primary | ICD-10-CM

## 2024-09-23 LAB
ALBUMIN SERPL-MCNC: 3.6 G/DL (ref 3.5–5)
ALBUMIN/GLOB SERPL: 0.8 (ref 1.1–2.2)
ALP SERPL-CCNC: 72 U/L (ref 45–117)
ALT SERPL-CCNC: 24 U/L (ref 12–78)
ANION GAP SERPL CALC-SCNC: 8 MMOL/L (ref 2–12)
AST SERPL-CCNC: 27 U/L (ref 15–37)
BASOPHILS # BLD: 0 K/UL (ref 0–0.1)
BASOPHILS NFR BLD: 0 % (ref 0–1)
BILIRUB SERPL-MCNC: 0.8 MG/DL (ref 0.2–1)
BUN SERPL-MCNC: 41 MG/DL (ref 6–20)
BUN/CREAT SERPL: 19 (ref 12–20)
CALCIUM SERPL-MCNC: 9.2 MG/DL (ref 8.5–10.1)
CHLORIDE SERPL-SCNC: 107 MMOL/L (ref 97–108)
CO2 SERPL-SCNC: 22 MMOL/L (ref 21–32)
CREAT SERPL-MCNC: 2.19 MG/DL (ref 0.7–1.3)
DIFFERENTIAL METHOD BLD: ABNORMAL
EOSINOPHIL # BLD: 0.1 K/UL (ref 0–0.4)
EOSINOPHIL NFR BLD: 1 % (ref 0–7)
ERYTHROCYTE [DISTWIDTH] IN BLOOD BY AUTOMATED COUNT: 14.4 % (ref 11.5–14.5)
GLOBULIN SER CALC-MCNC: 4.3 G/DL (ref 2–4)
GLUCOSE SERPL-MCNC: 137 MG/DL (ref 65–100)
HCT VFR BLD AUTO: 35.2 % (ref 36.6–50.3)
HGB BLD-MCNC: 11.2 G/DL (ref 12.1–17)
IMM GRANULOCYTES # BLD AUTO: 0.1 K/UL (ref 0–0.04)
IMM GRANULOCYTES NFR BLD AUTO: 1 % (ref 0–0.5)
LYMPHOCYTES # BLD: 0.2 K/UL (ref 0.8–3.5)
LYMPHOCYTES NFR BLD: 2 % (ref 12–49)
MCH RBC QN AUTO: 27.4 PG (ref 26–34)
MCHC RBC AUTO-ENTMCNC: 31.8 G/DL (ref 30–36.5)
MCV RBC AUTO: 86.1 FL (ref 80–99)
MONOCYTES # BLD: 0.4 K/UL (ref 0–1)
MONOCYTES NFR BLD: 4 % (ref 5–13)
NEUTS SEG # BLD: 10 K/UL (ref 1.8–8)
NEUTS SEG NFR BLD: 92 % (ref 32–75)
NRBC # BLD: 0 K/UL (ref 0–0.01)
NRBC BLD-RTO: 0 PER 100 WBC
PLATELET # BLD AUTO: 100 K/UL (ref 150–400)
PMV BLD AUTO: 12.8 FL (ref 8.9–12.9)
POTASSIUM SERPL-SCNC: 4.6 MMOL/L (ref 3.5–5.1)
PROT SERPL-MCNC: 7.9 G/DL (ref 6.4–8.2)
RBC # BLD AUTO: 4.09 M/UL (ref 4.1–5.7)
RBC MORPH BLD: ABNORMAL
SODIUM SERPL-SCNC: 137 MMOL/L (ref 136–145)
WBC # BLD AUTO: 10.8 K/UL (ref 4.1–11.1)

## 2024-09-23 PROCEDURE — 70450 CT HEAD/BRAIN W/O DYE: CPT

## 2024-09-23 PROCEDURE — 6370000000 HC RX 637 (ALT 250 FOR IP): Performed by: STUDENT IN AN ORGANIZED HEALTH CARE EDUCATION/TRAINING PROGRAM

## 2024-09-23 PROCEDURE — 99284 EMERGENCY DEPT VISIT MOD MDM: CPT

## 2024-09-23 PROCEDURE — 6360000002 HC RX W HCPCS: Performed by: STUDENT IN AN ORGANIZED HEALTH CARE EDUCATION/TRAINING PROGRAM

## 2024-09-23 PROCEDURE — 96375 TX/PRO/DX INJ NEW DRUG ADDON: CPT

## 2024-09-23 PROCEDURE — 71045 X-RAY EXAM CHEST 1 VIEW: CPT

## 2024-09-23 PROCEDURE — 85025 COMPLETE CBC W/AUTO DIFF WBC: CPT

## 2024-09-23 PROCEDURE — 36415 COLL VENOUS BLD VENIPUNCTURE: CPT

## 2024-09-23 PROCEDURE — 96374 THER/PROPH/DIAG INJ IV PUSH: CPT

## 2024-09-23 PROCEDURE — 80053 COMPREHEN METABOLIC PANEL: CPT

## 2024-09-23 RX ORDER — PROCHLORPERAZINE EDISYLATE 5 MG/ML
5 INJECTION INTRAMUSCULAR; INTRAVENOUS ONCE
Status: COMPLETED | OUTPATIENT
Start: 2024-09-23 | End: 2024-09-23

## 2024-09-23 RX ORDER — ACETAMINOPHEN 500 MG
1000 TABLET ORAL
Status: COMPLETED | OUTPATIENT
Start: 2024-09-23 | End: 2024-09-23

## 2024-09-23 RX ORDER — KETOROLAC TROMETHAMINE 30 MG/ML
15 INJECTION, SOLUTION INTRAMUSCULAR; INTRAVENOUS ONCE
Status: COMPLETED | OUTPATIENT
Start: 2024-09-23 | End: 2024-09-23

## 2024-09-23 RX ADMIN — ACETAMINOPHEN 1000 MG: 500 TABLET ORAL at 08:14

## 2024-09-23 RX ADMIN — KETOROLAC TROMETHAMINE 15 MG: 30 INJECTION, SOLUTION INTRAMUSCULAR at 08:14

## 2024-09-23 RX ADMIN — PROCHLORPERAZINE EDISYLATE 5 MG: 5 INJECTION INTRAMUSCULAR; INTRAVENOUS at 08:14

## 2024-09-23 ASSESSMENT — PAIN DESCRIPTION - DESCRIPTORS: DESCRIPTORS: ACHING;SHARP

## 2024-09-23 ASSESSMENT — PAIN DESCRIPTION - ONSET: ONSET: GRADUAL

## 2024-09-23 ASSESSMENT — PAIN - FUNCTIONAL ASSESSMENT: PAIN_FUNCTIONAL_ASSESSMENT: 0-10

## 2024-09-23 ASSESSMENT — PAIN DESCRIPTION - FREQUENCY: FREQUENCY: CONTINUOUS

## 2024-09-23 ASSESSMENT — PAIN DESCRIPTION - LOCATION: LOCATION: LEG;HEAD

## 2024-09-23 ASSESSMENT — PAIN SCALES - GENERAL: PAINLEVEL_OUTOF10: 8

## 2024-09-23 ASSESSMENT — PAIN DESCRIPTION - PAIN TYPE: TYPE: ACUTE PAIN

## 2024-09-23 ASSESSMENT — PAIN DESCRIPTION - ORIENTATION: ORIENTATION: RIGHT

## 2024-09-23 NOTE — ED PROVIDER NOTES
[facility] EMERGENCY DEPT  EMERGENCY DEPARTMENT ENCOUNTER       Pt Name: Wilberto Kuhn  MRN: [id number]  Birthdate 1940  Date of evaluation: 9/23/2024  Provider: Bladimir Yanez MD   PCP: Isabel Simpson MD  Note Started: 6:45 AM EDT 9/23/24     CHIEF COMPLAINT       Chief Complaint   Patient presents with    Headache     Patient complaining of a severe headache for the last three days and this morning his right leg started hurting. He is also complaining of intermittent chills. He mentions being worried about his blood pressure, however he denies history of HTN or taking meds for HTN. He has been drinking vinegar and water to help, however it has been ineffective.        HISTORY OF PRESENT ILLNESS: 1 or more elements      History From: patient, History limited by: none     Wilberto Kuhn is a 84 y.o. male presenting with HA.  It has been going on for 2-3 days, located on top of his head.  Notes some chills but no real fever.  He is drinking vinegar and water - an at home remedy for headaches - it is not working.  Notes some photophobia.  Denies NV.  He denies trauma to the head.  Notes a mild cough since this AM.  Notes some right lateral leg pain as well - only hurts at night when he stretches out - not currently.         Please See MDM for Additional Details of the HPI/PMH  Nursing Notes were all reviewed and agreed with or any disagreements were addressed in the HPI.     REVIEW OF SYSTEMS        Positives and Pertinent negatives as per HPI.    PAST HISTORY     Past Medical History:  Past Medical History:   Diagnosis Date    Acute respiratory failure with hypoxia (HCC) 02/08/2014    also 11/28/15, 2/17/16, 5/14/17     Alcohol abuse     As of 11/29/18 pt stated he quit 20 years    Arthritis     Back and shoulder    Atrial fibrillation (HCC)     Atrial flutter (HCC) 08/2017    saw Dr. Espinoza; underwent a-flutter ablation    BPH (benign prostatic hyperplasia)     CHF (congestive heart failure) (HCC) 02/11/2014

## 2024-09-25 ENCOUNTER — HOSPITAL ENCOUNTER (INPATIENT)
Facility: HOSPITAL | Age: 84
LOS: 7 days | Discharge: HOME | DRG: 291 | End: 2024-10-02
Attending: HOSPITALIST | Admitting: HOSPITALIST
Payer: MEDICARE

## 2024-09-25 ENCOUNTER — OFFICE VISIT (OUTPATIENT)
Age: 84
End: 2024-09-25

## 2024-09-25 ENCOUNTER — APPOINTMENT (OUTPATIENT)
Facility: HOSPITAL | Age: 84
DRG: 291 | End: 2024-09-25
Payer: MEDICARE

## 2024-09-25 VITALS
DIASTOLIC BLOOD PRESSURE: 54 MMHG | WEIGHT: 215 LBS | HEART RATE: 42 BPM | BODY MASS INDEX: 31.75 KG/M2 | OXYGEN SATURATION: 98 % | TEMPERATURE: 98.6 F | SYSTOLIC BLOOD PRESSURE: 108 MMHG

## 2024-09-25 DIAGNOSIS — I20.0 UNSTABLE ANGINA PECTORIS (HCC): ICD-10-CM

## 2024-09-25 DIAGNOSIS — R06.09 DYSPNEA ON EXERTION: ICD-10-CM

## 2024-09-25 DIAGNOSIS — N17.9 ACUTE KIDNEY INJURY (HCC): ICD-10-CM

## 2024-09-25 DIAGNOSIS — J44.1 COPD EXACERBATION (HCC): Primary | ICD-10-CM

## 2024-09-25 DIAGNOSIS — R06.02 SHORTNESS OF BREATH: Primary | ICD-10-CM

## 2024-09-25 DIAGNOSIS — R07.9 CHEST PAIN, UNSPECIFIED TYPE: ICD-10-CM

## 2024-09-25 DIAGNOSIS — R06.02 SHORTNESS OF BREATH: ICD-10-CM

## 2024-09-25 LAB
ALBUMIN SERPL-MCNC: 3.1 G/DL (ref 3.5–5)
ALBUMIN/GLOB SERPL: 0.7 (ref 1.1–2.2)
ALP SERPL-CCNC: 86 U/L (ref 45–117)
ALT SERPL-CCNC: 24 U/L (ref 12–78)
ANION GAP SERPL CALC-SCNC: 6 MMOL/L (ref 2–12)
APPEARANCE UR: CLEAR
AST SERPL-CCNC: 36 U/L (ref 15–37)
BACTERIA URNS QL MICRO: NEGATIVE /HPF
BASOPHILS # BLD: 0 K/UL (ref 0–0.1)
BASOPHILS NFR BLD: 1 % (ref 0–1)
BILIRUB SERPL-MCNC: 0.8 MG/DL (ref 0.2–1)
BILIRUB UR QL: NEGATIVE
BUN SERPL-MCNC: 45 MG/DL (ref 6–20)
BUN/CREAT SERPL: 17 (ref 12–20)
CALCIUM SERPL-MCNC: 9.4 MG/DL (ref 8.5–10.1)
CHLORIDE SERPL-SCNC: 107 MMOL/L (ref 97–108)
CO2 SERPL-SCNC: 24 MMOL/L (ref 21–32)
COLOR UR: ABNORMAL
CREAT SERPL-MCNC: 2.64 MG/DL (ref 0.7–1.3)
DIFFERENTIAL METHOD BLD: ABNORMAL
EKG ATRIAL RATE: 76 BPM
EKG DIAGNOSIS: NORMAL
EKG Q-T INTERVAL: 504 MS
EKG QRS DURATION: 216 MS
EKG QTC CALCULATION (BAZETT): 567 MS
EKG R AXIS: 254 DEGREES
EKG T AXIS: 49 DEGREES
EKG VENTRICULAR RATE: 76 BPM
EOSINOPHIL # BLD: 0.1 K/UL (ref 0–0.4)
EOSINOPHIL NFR BLD: 2 % (ref 0–7)
EPITH CASTS URNS QL MICRO: ABNORMAL /LPF
ERYTHROCYTE [DISTWIDTH] IN BLOOD BY AUTOMATED COUNT: 14.6 % (ref 11.5–14.5)
FLUAV RNA SPEC QL NAA+PROBE: NOT DETECTED
FLUBV RNA SPEC QL NAA+PROBE: NOT DETECTED
GLOBULIN SER CALC-MCNC: 4.7 G/DL (ref 2–4)
GLUCOSE SERPL-MCNC: 127 MG/DL (ref 65–100)
GLUCOSE UR STRIP.AUTO-MCNC: 500 MG/DL
HCT VFR BLD AUTO: 34.2 % (ref 36.6–50.3)
HGB BLD-MCNC: 11 G/DL (ref 12.1–17)
HGB UR QL STRIP: ABNORMAL
HYALINE CASTS URNS QL MICRO: ABNORMAL /LPF (ref 0–2)
IMM GRANULOCYTES # BLD AUTO: 0 K/UL (ref 0–0.04)
IMM GRANULOCYTES NFR BLD AUTO: 0 % (ref 0–0.5)
INR PPP: 3.6 (ref 0.9–1.1)
KETONES UR QL STRIP.AUTO: NEGATIVE MG/DL
LEUKOCYTE ESTERASE UR QL STRIP.AUTO: NEGATIVE
LYMPHOCYTES # BLD: 0.3 K/UL (ref 0.8–3.5)
LYMPHOCYTES NFR BLD: 8 % (ref 12–49)
MAGNESIUM SERPL-MCNC: 2.9 MG/DL (ref 1.6–2.4)
MCH RBC QN AUTO: 27.2 PG (ref 26–34)
MCHC RBC AUTO-ENTMCNC: 32.2 G/DL (ref 30–36.5)
MCV RBC AUTO: 84.7 FL (ref 80–99)
MONOCYTES # BLD: 0.2 K/UL (ref 0–1)
MONOCYTES NFR BLD: 5 % (ref 5–13)
NEUTS SEG # BLD: 3.2 K/UL (ref 1.8–8)
NEUTS SEG NFR BLD: 84 % (ref 32–75)
NITRITE UR QL STRIP.AUTO: NEGATIVE
NRBC # BLD: 0 K/UL (ref 0–0.01)
NRBC BLD-RTO: 0 PER 100 WBC
NT PRO BNP: 7765 PG/ML
PH UR STRIP: 5 (ref 5–8)
PLATELET # BLD AUTO: 67 K/UL (ref 150–400)
PLATELET COMMENT: ABNORMAL
POTASSIUM SERPL-SCNC: 4.6 MMOL/L (ref 3.5–5.1)
PROCALCITONIN SERPL-MCNC: 6.24 NG/ML
PROT SERPL-MCNC: 7.8 G/DL (ref 6.4–8.2)
PROT UR STRIP-MCNC: NEGATIVE MG/DL
PROTHROMBIN TIME: 34.9 SEC (ref 9–11.1)
RBC # BLD AUTO: 4.04 M/UL (ref 4.1–5.7)
RBC #/AREA URNS HPF: ABNORMAL /HPF (ref 0–5)
RBC MORPH BLD: ABNORMAL
SARS-COV-2 RNA RESP QL NAA+PROBE: NOT DETECTED
SODIUM SERPL-SCNC: 137 MMOL/L (ref 136–145)
SOURCE: NORMAL
SP GR UR REFRACTOMETRY: 1.01
TROPONIN I SERPL HS-MCNC: 28 NG/L (ref 0–76)
TROPONIN I SERPL HS-MCNC: 31 NG/L (ref 0–76)
URINE CULTURE IF INDICATED: ABNORMAL
UROBILINOGEN UR QL STRIP.AUTO: 0.2 EU/DL (ref 0.2–1)
WBC # BLD AUTO: 3.8 K/UL (ref 4.1–11.1)
WBC URNS QL MICRO: ABNORMAL /HPF (ref 0–4)

## 2024-09-25 PROCEDURE — 71045 X-RAY EXAM CHEST 1 VIEW: CPT

## 2024-09-25 PROCEDURE — 36415 COLL VENOUS BLD VENIPUNCTURE: CPT

## 2024-09-25 PROCEDURE — 83735 ASSAY OF MAGNESIUM: CPT

## 2024-09-25 PROCEDURE — 6360000002 HC RX W HCPCS: Performed by: PHYSICIAN ASSISTANT

## 2024-09-25 PROCEDURE — 80053 COMPREHEN METABOLIC PANEL: CPT

## 2024-09-25 PROCEDURE — 96374 THER/PROPH/DIAG INJ IV PUSH: CPT

## 2024-09-25 PROCEDURE — 84145 PROCALCITONIN (PCT): CPT

## 2024-09-25 PROCEDURE — 81001 URINALYSIS AUTO W/SCOPE: CPT

## 2024-09-25 PROCEDURE — 76770 US EXAM ABDO BACK WALL COMP: CPT

## 2024-09-25 PROCEDURE — 99285 EMERGENCY DEPT VISIT HI MDM: CPT

## 2024-09-25 PROCEDURE — 87636 SARSCOV2 & INF A&B AMP PRB: CPT

## 2024-09-25 PROCEDURE — 6370000000 HC RX 637 (ALT 250 FOR IP): Performed by: PHYSICIAN ASSISTANT

## 2024-09-25 PROCEDURE — 83880 ASSAY OF NATRIURETIC PEPTIDE: CPT

## 2024-09-25 PROCEDURE — 93005 ELECTROCARDIOGRAM TRACING: CPT | Performed by: PHYSICIAN ASSISTANT

## 2024-09-25 PROCEDURE — 84484 ASSAY OF TROPONIN QUANT: CPT

## 2024-09-25 PROCEDURE — 6370000000 HC RX 637 (ALT 250 FOR IP): Performed by: HOSPITALIST

## 2024-09-25 PROCEDURE — 85025 COMPLETE CBC W/AUTO DIFF WBC: CPT

## 2024-09-25 PROCEDURE — 85610 PROTHROMBIN TIME: CPT

## 2024-09-25 PROCEDURE — 1100000003 HC PRIVATE W/ TELEMETRY

## 2024-09-25 PROCEDURE — 6360000002 HC RX W HCPCS: Performed by: HOSPITALIST

## 2024-09-25 RX ORDER — ASPIRIN 81 MG/1
81 TABLET, CHEWABLE ORAL DAILY
Status: DISCONTINUED | OUTPATIENT
Start: 2024-09-25 | End: 2024-10-02 | Stop reason: HOSPADM

## 2024-09-25 RX ORDER — ACETAMINOPHEN 325 MG/1
650 TABLET ORAL EVERY 4 HOURS PRN
Status: COMPLETED | OUTPATIENT
Start: 2024-09-25 | End: 2024-09-27

## 2024-09-25 RX ORDER — CARVEDILOL 12.5 MG/1
25 TABLET ORAL 2 TIMES DAILY WITH MEALS
Status: DISCONTINUED | OUTPATIENT
Start: 2024-09-25 | End: 2024-09-26

## 2024-09-25 RX ORDER — ONDANSETRON 2 MG/ML
4 INJECTION INTRAMUSCULAR; INTRAVENOUS EVERY 4 HOURS PRN
Status: DISCONTINUED | OUTPATIENT
Start: 2024-09-25 | End: 2024-10-02 | Stop reason: HOSPADM

## 2024-09-25 RX ORDER — FUROSEMIDE 10 MG/ML
40 INJECTION INTRAMUSCULAR; INTRAVENOUS ONCE
Status: COMPLETED | OUTPATIENT
Start: 2024-09-25 | End: 2024-09-25

## 2024-09-25 RX ORDER — FUROSEMIDE 10 MG/ML
40 INJECTION INTRAMUSCULAR; INTRAVENOUS DAILY
Status: DISCONTINUED | OUTPATIENT
Start: 2024-09-25 | End: 2024-09-30

## 2024-09-25 RX ORDER — SODIUM BICARBONATE 650 MG/1
650 TABLET ORAL 2 TIMES DAILY
Status: DISCONTINUED | OUTPATIENT
Start: 2024-09-25 | End: 2024-10-02 | Stop reason: HOSPADM

## 2024-09-25 RX ORDER — ATORVASTATIN CALCIUM 40 MG/1
40 TABLET, FILM COATED ORAL NIGHTLY
Status: DISCONTINUED | OUTPATIENT
Start: 2024-09-25 | End: 2024-10-02 | Stop reason: HOSPADM

## 2024-09-25 RX ADMIN — ACETAMINOPHEN 650 MG: 325 TABLET ORAL at 15:34

## 2024-09-25 RX ADMIN — ATORVASTATIN CALCIUM 40 MG: 40 TABLET, FILM COATED ORAL at 22:10

## 2024-09-25 RX ADMIN — SODIUM BICARBONATE 650 MG: 650 TABLET ORAL at 22:10

## 2024-09-25 RX ADMIN — FUROSEMIDE 40 MG: 10 INJECTION, SOLUTION INTRAMUSCULAR; INTRAVENOUS at 12:42

## 2024-09-25 RX ADMIN — FUROSEMIDE 40 MG: 10 INJECTION, SOLUTION INTRAMUSCULAR; INTRAVENOUS at 17:21

## 2024-09-25 RX ADMIN — CARVEDILOL 25 MG: 12.5 TABLET, FILM COATED ORAL at 17:20

## 2024-09-25 RX ADMIN — ASPIRIN 81 MG: 81 TABLET, CHEWABLE ORAL at 17:20

## 2024-09-25 ASSESSMENT — PAIN DESCRIPTION - DESCRIPTORS: DESCRIPTORS: THROBBING

## 2024-09-25 ASSESSMENT — PAIN SCALES - GENERAL
PAINLEVEL_OUTOF10: 8
PAINLEVEL_OUTOF10: 3
PAINLEVEL_OUTOF10: 0

## 2024-09-25 ASSESSMENT — ENCOUNTER SYMPTOMS: SHORTNESS OF BREATH: 1

## 2024-09-25 ASSESSMENT — PAIN DESCRIPTION - PAIN TYPE
TYPE: ACUTE PAIN
TYPE: ACUTE PAIN

## 2024-09-25 ASSESSMENT — PAIN DESCRIPTION - LOCATION
LOCATION: HEAD
LOCATION: HEAD

## 2024-09-25 ASSESSMENT — PAIN - FUNCTIONAL ASSESSMENT: PAIN_FUNCTIONAL_ASSESSMENT: 0-10

## 2024-09-25 NOTE — ED PROVIDER NOTES
[facility] EMERGENCY DEPT  EMERGENCY DEPARTMENT ENCOUNTER       Pt Name: Wilberto Kuhn  MRN: [id number]  Birthdate 1940  Date of evaluation: 9/25/2024  Provider: SHERLYN Watt   PCP: Isabel Simpson MD  Note Started: 12:15 PM EDT 9/25/24     CHIEF COMPLAINT       Chief Complaint   Patient presents with    Shortness of Breath     Patient came in due to shortness of breath and headache   Came form urgent care said that his pacemaker is not working well   Sent to Ed. Patient taking blood thinners      HISTORY OF PRESENT ILLNESS: 1 or more elements      History From: Patient  HPI Limitations: None     Wilberto Kuhn is a 84 y.o. male who presents by EMS with complaints of headache and SOB. He has been feeling ill on Monday and was seen in the ED. Today he followed up with his PCP and was noted to have a HR in the 40s on arrival. Thus, he was sent back to the ED for repeat evaluation.  He endorses that his SOB is worse with exertion and a mild cough with congestion. Patient denies chest pain, palpitations, nausea, vomiting, fever, chills, lightheadedness, dizziness. There has been no treatment PTA.      Nursing Notes were all reviewed and agreed with or any disagreements were addressed in the HPI.     REVIEW OF SYSTEMS      Review of Systems     Positives and Pertinent negatives as per HPI.    PAST HISTORY     Past Medical History:  Past Medical History:   Diagnosis Date    Acute respiratory failure with hypoxia (HCC) 02/08/2014    also 11/28/15, 2/17/16, 5/14/17     Alcohol abuse     As of 11/29/18 pt stated he quit 20 years    Arthritis     Back and shoulder    Atrial fibrillation (HCC)     Atrial flutter (HCC) 08/2017    saw Dr. Espinoza; underwent a-flutter ablation    BPH (benign prostatic hyperplasia)     CHF (congestive heart failure) (HCC) 02/11/2014    TTE 11/15: EF 40-45%, 2/14: EF 20%  Admitted for acute exacerbations 2/8/14, 11/28/15, 2/17/16, and 5/4/17)    Chronic kidney disease     III    Chronic low

## 2024-09-25 NOTE — H&P
PG    MCHC 32.2 30.0 - 36.5 g/dL    RDW 14.6 (H) 11.5 - 14.5 %    Platelets 67 (L) 150 - 400 K/uL    Nucleated RBCs 0.0 0  WBC    nRBC 0.00 0.00 - 0.01 K/uL    Neutrophils % 84 (H) 32 - 75 %    Lymphocytes % 8 (L) 12 - 49 %    Monocytes % 5 5 - 13 %    Eosinophils % 2 0 - 7 %    Basophils % 1 0 - 1 %    Immature Granulocytes % 0 0.0 - 0.5 %    Neutrophils Absolute 3.2 1.8 - 8.0 K/UL    Lymphocytes Absolute 0.3 (L) 0.8 - 3.5 K/UL    Monocytes Absolute 0.2 0.0 - 1.0 K/UL    Eosinophils Absolute 0.1 0.0 - 0.4 K/UL    Basophils Absolute 0.0 0.0 - 0.1 K/UL    Immature Granulocytes Absolute 0.0 0.00 - 0.04 K/UL    Differential Type SMEAR SCANNED      Platelet Comment DECREASED PLATELETS      RBC Comment NORMOCYTIC, NORMOCHROMIC     Comprehensive Metabolic Panel    Collection Time: 09/25/24 11:02 AM   Result Value Ref Range    Sodium 137 136 - 145 mmol/L    Potassium 4.6 3.5 - 5.1 mmol/L    Chloride 107 97 - 108 mmol/L    CO2 24 21 - 32 mmol/L    Anion Gap 6 2 - 12 mmol/L    Glucose 127 (H) 65 - 100 mg/dL    BUN 45 (H) 6 - 20 MG/DL    Creatinine 2.64 (H) 0.70 - 1.30 MG/DL    BUN/Creatinine Ratio 17 12 - 20      Est, Glom Filt Rate 23 (L) >60 ml/min/1.73m2    Calcium 9.4 8.5 - 10.1 MG/DL    Total Bilirubin 0.8 0.2 - 1.0 MG/DL    ALT 24 12 - 78 U/L    AST 36 15 - 37 U/L    Alk Phosphatase 86 45 - 117 U/L    Total Protein 7.8 6.4 - 8.2 g/dL    Albumin 3.1 (L) 3.5 - 5.0 g/dL    Globulin 4.7 (H) 2.0 - 4.0 g/dL    Albumin/Globulin Ratio 0.7 (L) 1.1 - 2.2     Magnesium    Collection Time: 09/25/24 11:02 AM   Result Value Ref Range    Magnesium 2.9 (H) 1.6 - 2.4 mg/dL   Troponin    Collection Time: 09/25/24 11:02 AM   Result Value Ref Range    Troponin, High Sensitivity 31 0 - 76 ng/L   COVID-19 & Influenza Combo    Collection Time: 09/25/24 11:02 AM    Specimen: Nasopharyngeal   Result Value Ref Range    Source Nasopharyngeal      SARS-CoV-2, PCR Not detected NOTD      Rapid Influenza A By PCR Not detected NOTD      Rapid

## 2024-09-26 LAB
ANION GAP SERPL CALC-SCNC: 8 MMOL/L (ref 2–12)
BUN SERPL-MCNC: 42 MG/DL (ref 6–20)
BUN/CREAT SERPL: 17 (ref 12–20)
CALCIUM SERPL-MCNC: 8.7 MG/DL (ref 8.5–10.1)
CHLORIDE SERPL-SCNC: 105 MMOL/L (ref 97–108)
CHLORIDE UR-SCNC: 31 MMOL/L
CO2 SERPL-SCNC: 22 MMOL/L (ref 21–32)
CREAT SERPL-MCNC: 2.45 MG/DL (ref 0.7–1.3)
CREAT UR-MCNC: 109 MG/DL
ERYTHROCYTE [DISTWIDTH] IN BLOOD BY AUTOMATED COUNT: 14.4 % (ref 11.5–14.5)
GLUCOSE SERPL-MCNC: 132 MG/DL (ref 65–100)
HCT VFR BLD AUTO: 31.8 % (ref 36.6–50.3)
HGB BLD-MCNC: 10.2 G/DL (ref 12.1–17)
INR PPP: 3.2 (ref 0.9–1.1)
MCH RBC QN AUTO: 26.9 PG (ref 26–34)
MCHC RBC AUTO-ENTMCNC: 32.1 G/DL (ref 30–36.5)
MCV RBC AUTO: 83.9 FL (ref 80–99)
NRBC # BLD: 0 K/UL (ref 0–0.01)
NRBC BLD-RTO: 0 PER 100 WBC
PLATELET # BLD AUTO: 73 K/UL (ref 150–400)
POTASSIUM SERPL-SCNC: 4.1 MMOL/L (ref 3.5–5.1)
PROTHROMBIN TIME: 30.8 SEC (ref 9–11.1)
RBC # BLD AUTO: 3.79 M/UL (ref 4.1–5.7)
SODIUM SERPL-SCNC: 135 MMOL/L (ref 136–145)
SODIUM UR-SCNC: 37 MMOL/L
WBC # BLD AUTO: 3.4 K/UL (ref 4.1–11.1)

## 2024-09-26 PROCEDURE — 6360000002 HC RX W HCPCS: Performed by: INTERNAL MEDICINE

## 2024-09-26 PROCEDURE — 84300 ASSAY OF URINE SODIUM: CPT

## 2024-09-26 PROCEDURE — 36415 COLL VENOUS BLD VENIPUNCTURE: CPT

## 2024-09-26 PROCEDURE — 80048 BASIC METABOLIC PNL TOTAL CA: CPT

## 2024-09-26 PROCEDURE — 6370000000 HC RX 637 (ALT 250 FOR IP): Performed by: INTERNAL MEDICINE

## 2024-09-26 PROCEDURE — 1100000003 HC PRIVATE W/ TELEMETRY

## 2024-09-26 PROCEDURE — P9047 ALBUMIN (HUMAN), 25%, 50ML: HCPCS | Performed by: INTERNAL MEDICINE

## 2024-09-26 PROCEDURE — 85610 PROTHROMBIN TIME: CPT

## 2024-09-26 PROCEDURE — 82436 ASSAY OF URINE CHLORIDE: CPT

## 2024-09-26 PROCEDURE — 85027 COMPLETE CBC AUTOMATED: CPT

## 2024-09-26 PROCEDURE — 6360000002 HC RX W HCPCS: Performed by: HOSPITALIST

## 2024-09-26 PROCEDURE — 6370000000 HC RX 637 (ALT 250 FOR IP): Performed by: HOSPITALIST

## 2024-09-26 PROCEDURE — 82570 ASSAY OF URINE CREATININE: CPT

## 2024-09-26 PROCEDURE — 51798 US URINE CAPACITY MEASURE: CPT

## 2024-09-26 RX ORDER — CARVEDILOL 6.25 MG/1
3.12 TABLET ORAL 2 TIMES DAILY WITH MEALS
Status: DISCONTINUED | OUTPATIENT
Start: 2024-09-27 | End: 2024-10-02 | Stop reason: HOSPADM

## 2024-09-26 RX ORDER — ALBUMIN (HUMAN) 12.5 G/50ML
25 SOLUTION INTRAVENOUS ONCE
Status: COMPLETED | OUTPATIENT
Start: 2024-09-26 | End: 2024-09-26

## 2024-09-26 RX ORDER — CARVEDILOL 6.25 MG/1
6.25 TABLET ORAL 2 TIMES DAILY WITH MEALS
Status: DISCONTINUED | OUTPATIENT
Start: 2024-09-26 | End: 2024-09-26

## 2024-09-26 RX ORDER — LANOLIN ALCOHOL/MO/W.PET/CERES
6 CREAM (GRAM) TOPICAL NIGHTLY PRN
Status: DISCONTINUED | OUTPATIENT
Start: 2024-09-26 | End: 2024-10-02 | Stop reason: HOSPADM

## 2024-09-26 RX ORDER — HYDRALAZINE HYDROCHLORIDE 20 MG/ML
20 INJECTION INTRAMUSCULAR; INTRAVENOUS EVERY 6 HOURS PRN
Status: DISCONTINUED | OUTPATIENT
Start: 2024-09-26 | End: 2024-10-02 | Stop reason: HOSPADM

## 2024-09-26 RX ADMIN — SODIUM BICARBONATE 650 MG: 650 TABLET ORAL at 08:13

## 2024-09-26 RX ADMIN — CARVEDILOL 6.25 MG: 6.25 TABLET, FILM COATED ORAL at 17:57

## 2024-09-26 RX ADMIN — ALBUMIN (HUMAN) 25 G: 0.25 INJECTION, SOLUTION INTRAVENOUS at 18:16

## 2024-09-26 RX ADMIN — ASPIRIN 81 MG: 81 TABLET, CHEWABLE ORAL at 08:13

## 2024-09-26 RX ADMIN — CARVEDILOL 25 MG: 12.5 TABLET, FILM COATED ORAL at 08:13

## 2024-09-26 RX ADMIN — MELATONIN 6 MG: at 21:31

## 2024-09-26 RX ADMIN — SODIUM BICARBONATE 650 MG: 650 TABLET ORAL at 21:31

## 2024-09-26 RX ADMIN — FUROSEMIDE 40 MG: 10 INJECTION, SOLUTION INTRAMUSCULAR; INTRAVENOUS at 09:52

## 2024-09-26 RX ADMIN — ATORVASTATIN CALCIUM 40 MG: 40 TABLET, FILM COATED ORAL at 21:31

## 2024-09-26 ASSESSMENT — PAIN SCALES - GENERAL: PAINLEVEL_OUTOF10: 0

## 2024-09-26 NOTE — CONSULTS
hyperlipidemia 03/31/2016    Benign hypertension with CKD (chronic kidney disease) stage III ([facility]) 05/20/2014    NICM (nonischemic cardiomyopathy) ([facility]) 02/20/2014      Isabel Simpson MD  Past Medical History:   Diagnosis Date    Acute respiratory failure with hypoxia ([facility]) 02/08/2014    also 11/28/15, 2/17/16, 5/14/17     Alcohol abuse     As of 11/29/18 pt stated he quit 20 years    Arthritis     Back and shoulder    Atrial fibrillation ([facility])     Atrial flutter ([facility]) 08/2017    saw Dr. Espinoza; underwent a-flutter ablation    BPH (benign prostatic hyperplasia)     CHF (congestive heart failure) ([facility]) 02/11/2014    TTE 11/15: EF 40-45%, 2/14: EF 20%  Admitted for acute exacerbations 2/8/14, 11/28/15, 2/17/16, and 5/4/17)    Chronic kidney disease     III    Chronic low back pain     LS spine X-ray 4/8/17: mild DDD    Combined forms of age-related cataract of left eye 12/12/2018    KPE/IOL OS    Combined forms of age-related cataract of right eye 11/28/2018    KPE/IOL OD    COPD (chronic obstructive pulmonary disease) ([facility])     Coronary arteriosclerosis 7/1/2022    Diabetes (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)     Leg fracture, right 1973    Nonischemic cardiomyopathy ([facility])     with LVH    Orthostatic hypotension 4/13/2021    Pericardial effusion 11/10/2020    Pneumonia 
tablet by mouth daily   atorvastatin (LIPITOR) 40 MG tablet 9/24/2024 at 2100 Outside Pharmacy/PCP, Self Yes Yes   Sig: Take 1 tablet by mouth nightly   carvedilol (COREG) 25 MG tablet 9/25/2024 at 0800 Outside Pharmacy/PCP, Self Yes Yes   Sig: Take 1 tablet by mouth 2 times daily (with meals)   clotrimazole-betamethasone (LOTRISONE) 1-0.05 % cream 9/25/2024 Self, Outside Pharmacy/PCP No Yes   Sig: APPLY  CREAM TOPICALLY TO AFFECTED AREA TWICE DAILY   dapagliflozin (FARXIGA) 10 MG tablet 9/25/2024 at 0900 Self, Outside Pharmacy/PCP Yes Yes   Sig: Take 1 tablet by mouth every morning   furosemide (LASIX) 20 MG tablet 9/25/2024 at 0900 Outside Pharmacy/PCP, Self Yes Yes   Sig: Take 1 tablet by mouth once daily   sodium bicarbonate 650 MG tablet 9/25/2024 at 0900 Self, Outside Pharmacy/PCP Yes Yes   Sig: Take 1 tablet by mouth 2 times daily   warfarin (COUMADIN) 5 MG tablet 9/24/2024 at 1800 Self, Outside Pharmacy/PCP No Yes   Sig: TAKE 1 TABLET BY MOUTH ONCE DAILY EXCEPT  1.5  TABLET  ON  FRIDAYS      Facility-Administered Medications: None         Imaging:    Medications list Personally Reviewed   [x]      Yes     []               No    Thank you for allowing us to participate in the care this patient.   We will follow patient with you.  Signed By: Hu Chen MD  [facility]  [facility] Office  [address], [address] [address]  Phone - [phone]         Fax - [phone] [address], [place] [address]  Phone - [phone]        Fax - [phone]

## 2024-09-27 LAB
ALBUMIN SERPL-MCNC: 2.9 G/DL (ref 3.5–5)
ANION GAP SERPL CALC-SCNC: 8 MMOL/L (ref 2–12)
BUN SERPL-MCNC: 52 MG/DL (ref 6–20)
BUN/CREAT SERPL: 22 (ref 12–20)
CALCIUM SERPL-MCNC: 8.7 MG/DL (ref 8.5–10.1)
CHLORIDE SERPL-SCNC: 102 MMOL/L (ref 97–108)
CK SERPL-CCNC: 90 U/L (ref 39–308)
CO2 SERPL-SCNC: 23 MMOL/L (ref 21–32)
CREAT SERPL-MCNC: 2.37 MG/DL (ref 0.7–1.3)
ERYTHROCYTE [DISTWIDTH] IN BLOOD BY AUTOMATED COUNT: 14.1 % (ref 11.5–14.5)
GLUCOSE SERPL-MCNC: 116 MG/DL (ref 65–100)
HCT VFR BLD AUTO: 27.8 % (ref 36.6–50.3)
HGB BLD-MCNC: 9.2 G/DL (ref 12.1–17)
INR PPP: 2.1 (ref 0.9–1.1)
MCH RBC QN AUTO: 27.2 PG (ref 26–34)
MCHC RBC AUTO-ENTMCNC: 33.1 G/DL (ref 30–36.5)
MCV RBC AUTO: 82.2 FL (ref 80–99)
NRBC # BLD: 0 K/UL (ref 0–0.01)
NRBC BLD-RTO: 0 PER 100 WBC
PHOSPHATE SERPL-MCNC: 4 MG/DL (ref 2.6–4.7)
PLATELET # BLD AUTO: 83 K/UL (ref 150–400)
POTASSIUM SERPL-SCNC: 3.8 MMOL/L (ref 3.5–5.1)
PROTHROMBIN TIME: 20.5 SEC (ref 9–11.1)
RBC # BLD AUTO: 3.38 M/UL (ref 4.1–5.7)
SODIUM SERPL-SCNC: 133 MMOL/L (ref 136–145)
WBC # BLD AUTO: 4.2 K/UL (ref 4.1–11.1)

## 2024-09-27 PROCEDURE — 85027 COMPLETE CBC AUTOMATED: CPT

## 2024-09-27 PROCEDURE — 80069 RENAL FUNCTION PANEL: CPT

## 2024-09-27 PROCEDURE — 82550 ASSAY OF CK (CPK): CPT

## 2024-09-27 PROCEDURE — 6370000000 HC RX 637 (ALT 250 FOR IP): Performed by: HOSPITALIST

## 2024-09-27 PROCEDURE — 6370000000 HC RX 637 (ALT 250 FOR IP): Performed by: INTERNAL MEDICINE

## 2024-09-27 PROCEDURE — 36415 COLL VENOUS BLD VENIPUNCTURE: CPT

## 2024-09-27 PROCEDURE — 1100000003 HC PRIVATE W/ TELEMETRY

## 2024-09-27 PROCEDURE — 6360000002 HC RX W HCPCS: Performed by: HOSPITALIST

## 2024-09-27 PROCEDURE — 85610 PROTHROMBIN TIME: CPT

## 2024-09-27 PROCEDURE — 6370000000 HC RX 637 (ALT 250 FOR IP): Performed by: PHYSICIAN ASSISTANT

## 2024-09-27 RX ORDER — BUTALBITAL, ACETAMINOPHEN AND CAFFEINE 50; 325; 40 MG/1; MG/1; MG/1
1 TABLET ORAL 4 TIMES DAILY
Status: DISPENSED | OUTPATIENT
Start: 2024-09-27 | End: 2024-09-28

## 2024-09-27 RX ORDER — TRAMADOL HYDROCHLORIDE 50 MG/1
25 TABLET ORAL EVERY 6 HOURS PRN
Status: DISCONTINUED | OUTPATIENT
Start: 2024-09-27 | End: 2024-10-02 | Stop reason: HOSPADM

## 2024-09-27 RX ADMIN — MELATONIN 6 MG: at 21:58

## 2024-09-27 RX ADMIN — SODIUM BICARBONATE 650 MG: 650 TABLET ORAL at 21:58

## 2024-09-27 RX ADMIN — BUTALBITAL, ACETAMINOPHEN AND CAFFEINE 1 TABLET: 325; 50; 40 TABLET ORAL at 15:29

## 2024-09-27 RX ADMIN — WARFARIN SODIUM 7.5 MG: 5 TABLET ORAL at 18:06

## 2024-09-27 RX ADMIN — CARVEDILOL 3.12 MG: 6.25 TABLET, FILM COATED ORAL at 15:40

## 2024-09-27 RX ADMIN — ASPIRIN 81 MG: 81 TABLET, CHEWABLE ORAL at 10:08

## 2024-09-27 RX ADMIN — SODIUM BICARBONATE 650 MG: 650 TABLET ORAL at 10:08

## 2024-09-27 RX ADMIN — ACETAMINOPHEN 650 MG: 325 TABLET ORAL at 10:08

## 2024-09-27 RX ADMIN — CARVEDILOL 3.12 MG: 6.25 TABLET, FILM COATED ORAL at 10:08

## 2024-09-27 RX ADMIN — FUROSEMIDE 40 MG: 10 INJECTION, SOLUTION INTRAMUSCULAR; INTRAVENOUS at 10:08

## 2024-09-27 RX ADMIN — ATORVASTATIN CALCIUM 40 MG: 40 TABLET, FILM COATED ORAL at 21:58

## 2024-09-27 RX ADMIN — BUTALBITAL, ACETAMINOPHEN AND CAFFEINE 1 TABLET: 325; 50; 40 TABLET ORAL at 21:58

## 2024-09-27 ASSESSMENT — PAIN DESCRIPTION - PAIN TYPE: TYPE: ACUTE PAIN

## 2024-09-27 ASSESSMENT — PAIN DESCRIPTION - LOCATION
LOCATION: HEAD

## 2024-09-27 ASSESSMENT — PAIN DESCRIPTION - FREQUENCY: FREQUENCY: INTERMITTENT

## 2024-09-27 ASSESSMENT — PAIN SCALES - GENERAL
PAINLEVEL_OUTOF10: 6

## 2024-09-27 ASSESSMENT — PAIN DESCRIPTION - DESCRIPTORS: DESCRIPTORS: THROBBING

## 2024-09-27 NOTE — CARE COORDINATION
Care Management Initial Assessment       RUR: 20%  Readmission? No  1st IM letter given? Yes   1st  letter given: No     Chart reviewed. CM met with pt at the bedside to introduce self and role. Verified contact information and demographics. Pt resides with spouse in a two level home with 1 RHONDA. Pt reports his bedroom is on the main level. He is independent and ambulatory with a cane. Has family support from son and daughter. No prior hx of SNF/rehab noted. Hx of HHC with [facility]. Preferred pharmacy is [facility]. Family to transport home upon d/c. Will continue to follow.        09/27/24 1530   Service Assessment   Patient Orientation Alert and Oriented   Cognition Alert   History Provided By Patient   Primary Caregiver Self   Support Systems Spouse/Significant Other;Children   Patient's Healthcare Decision Maker is: Legal Next of Kin   PCP Verified by CM Yes   Last Visit to PCP Within last 3 months   Prior Functional Level Independent in ADLs/IADLs   Current Functional Level Independent in ADLs/IADLs   Can patient return to prior living arrangement Yes   Ability to make needs known: Good   Would you like for me to discuss the discharge plan with any other family members/significant others, and if so, who? Yes  (spouse or daughter)   Financial Resources Medicare;Other (Comment)  (BCBS)   Social/Functional History   Lives With Spouse   Home Layout Two level;Able to Live on Main level with bedroom/bathroom   Home Access Stairs to enter with rails   Entrance Stairs - Number of Steps 1   Home Equipment Cane   Receives Help From Family   ADL Assistance Independent   Homemaking Assistance Independent   Ambulation Assistance Independent   Transfer Assistance Independent   Active  Yes   Occupation Retired   Discharge Planning   Type of Residence House   Living Arrangements Spouse/Significant Other   Current Services Prior To Admission None   Potential Assistance Needed N/A   DME

## 2024-09-28 LAB
ALBUMIN SERPL-MCNC: 2.7 G/DL (ref 3.5–5)
ANION GAP SERPL CALC-SCNC: 9 MMOL/L (ref 2–12)
BUN SERPL-MCNC: 49 MG/DL (ref 6–20)
BUN/CREAT SERPL: 24 (ref 12–20)
CALCIUM SERPL-MCNC: 9 MG/DL (ref 8.5–10.1)
CHLORIDE SERPL-SCNC: 103 MMOL/L (ref 97–108)
CO2 SERPL-SCNC: 23 MMOL/L (ref 21–32)
CREAT SERPL-MCNC: 2.02 MG/DL (ref 0.7–1.3)
ERYTHROCYTE [DISTWIDTH] IN BLOOD BY AUTOMATED COUNT: 14.1 % (ref 11.5–14.5)
GLUCOSE SERPL-MCNC: 104 MG/DL (ref 65–100)
HCT VFR BLD AUTO: 28.4 % (ref 36.6–50.3)
HGB BLD-MCNC: 9.2 G/DL (ref 12.1–17)
INR PPP: 1.6 (ref 0.9–1.1)
MCH RBC QN AUTO: 26.7 PG (ref 26–34)
MCHC RBC AUTO-ENTMCNC: 32.4 G/DL (ref 30–36.5)
MCV RBC AUTO: 82.3 FL (ref 80–99)
NRBC # BLD: 0 K/UL (ref 0–0.01)
NRBC BLD-RTO: 0 PER 100 WBC
PHOSPHATE SERPL-MCNC: 4 MG/DL (ref 2.6–4.7)
PLATELET # BLD AUTO: 103 K/UL (ref 150–400)
PMV BLD AUTO: 12.3 FL (ref 8.9–12.9)
POTASSIUM SERPL-SCNC: 3.7 MMOL/L (ref 3.5–5.1)
PROTHROMBIN TIME: 16.2 SEC (ref 9–11.1)
RBC # BLD AUTO: 3.45 M/UL (ref 4.1–5.7)
SODIUM SERPL-SCNC: 135 MMOL/L (ref 136–145)
WBC # BLD AUTO: 4.2 K/UL (ref 4.1–11.1)

## 2024-09-28 PROCEDURE — 6370000000 HC RX 637 (ALT 250 FOR IP): Performed by: INTERNAL MEDICINE

## 2024-09-28 PROCEDURE — 6370000000 HC RX 637 (ALT 250 FOR IP): Performed by: HOSPITALIST

## 2024-09-28 PROCEDURE — 80048 BASIC METABOLIC PNL TOTAL CA: CPT

## 2024-09-28 PROCEDURE — 85027 COMPLETE CBC AUTOMATED: CPT

## 2024-09-28 PROCEDURE — 36415 COLL VENOUS BLD VENIPUNCTURE: CPT

## 2024-09-28 PROCEDURE — 85610 PROTHROMBIN TIME: CPT

## 2024-09-28 PROCEDURE — 1100000003 HC PRIVATE W/ TELEMETRY

## 2024-09-28 PROCEDURE — 84100 ASSAY OF PHOSPHORUS: CPT

## 2024-09-28 PROCEDURE — 6360000002 HC RX W HCPCS: Performed by: HOSPITALIST

## 2024-09-28 PROCEDURE — 82040 ASSAY OF SERUM ALBUMIN: CPT

## 2024-09-28 RX ORDER — WARFARIN SODIUM 5 MG/1
5 TABLET ORAL
Status: COMPLETED | OUTPATIENT
Start: 2024-09-28 | End: 2024-09-28

## 2024-09-28 RX ADMIN — CARVEDILOL 3.12 MG: 6.25 TABLET, FILM COATED ORAL at 09:26

## 2024-09-28 RX ADMIN — MELATONIN 6 MG: at 22:15

## 2024-09-28 RX ADMIN — BUTALBITAL, ACETAMINOPHEN AND CAFFEINE 1 TABLET: 325; 50; 40 TABLET ORAL at 09:26

## 2024-09-28 RX ADMIN — TRAMADOL HYDROCHLORIDE 25 MG: 50 TABLET ORAL at 22:15

## 2024-09-28 RX ADMIN — ATORVASTATIN CALCIUM 40 MG: 40 TABLET, FILM COATED ORAL at 22:16

## 2024-09-28 RX ADMIN — FUROSEMIDE 40 MG: 10 INJECTION, SOLUTION INTRAMUSCULAR; INTRAVENOUS at 09:26

## 2024-09-28 RX ADMIN — SODIUM BICARBONATE 650 MG: 650 TABLET ORAL at 22:15

## 2024-09-28 RX ADMIN — ASPIRIN 81 MG: 81 TABLET, CHEWABLE ORAL at 09:34

## 2024-09-28 RX ADMIN — SODIUM BICARBONATE 650 MG: 650 TABLET ORAL at 09:26

## 2024-09-28 RX ADMIN — WARFARIN SODIUM 5 MG: 5 TABLET ORAL at 17:33

## 2024-09-28 RX ADMIN — CARVEDILOL 3.12 MG: 6.25 TABLET, FILM COATED ORAL at 17:33

## 2024-09-28 ASSESSMENT — PAIN DESCRIPTION - LOCATION: LOCATION: HEAD

## 2024-09-28 ASSESSMENT — PAIN DESCRIPTION - DESCRIPTORS: DESCRIPTORS: THROBBING

## 2024-09-28 ASSESSMENT — PAIN SCALES - GENERAL: PAINLEVEL_OUTOF10: 7

## 2024-09-28 ASSESSMENT — PAIN DESCRIPTION - ORIENTATION: ORIENTATION: ANTERIOR

## 2024-09-29 LAB
ALBUMIN SERPL-MCNC: 2.7 G/DL (ref 3.5–5)
ANION GAP SERPL CALC-SCNC: 7 MMOL/L (ref 2–12)
BUN SERPL-MCNC: 53 MG/DL (ref 6–20)
BUN/CREAT SERPL: 27 (ref 12–20)
CALCIUM SERPL-MCNC: 8.8 MG/DL (ref 8.5–10.1)
CHLORIDE SERPL-SCNC: 104 MMOL/L (ref 97–108)
CO2 SERPL-SCNC: 23 MMOL/L (ref 21–32)
CREAT SERPL-MCNC: 1.97 MG/DL (ref 0.7–1.3)
GLUCOSE SERPL-MCNC: 108 MG/DL (ref 65–100)
INR PPP: 1.8 (ref 0.9–1.1)
PHOSPHATE SERPL-MCNC: 4.4 MG/DL (ref 2.6–4.7)
POTASSIUM SERPL-SCNC: 4.1 MMOL/L (ref 3.5–5.1)
PROTHROMBIN TIME: 18.5 SEC (ref 9–11.1)
SODIUM SERPL-SCNC: 134 MMOL/L (ref 136–145)

## 2024-09-29 PROCEDURE — 6370000000 HC RX 637 (ALT 250 FOR IP): Performed by: INTERNAL MEDICINE

## 2024-09-29 PROCEDURE — 1100000003 HC PRIVATE W/ TELEMETRY

## 2024-09-29 PROCEDURE — 80069 RENAL FUNCTION PANEL: CPT

## 2024-09-29 PROCEDURE — 36415 COLL VENOUS BLD VENIPUNCTURE: CPT

## 2024-09-29 PROCEDURE — 6360000002 HC RX W HCPCS: Performed by: HOSPITALIST

## 2024-09-29 PROCEDURE — 85610 PROTHROMBIN TIME: CPT

## 2024-09-29 PROCEDURE — 6370000000 HC RX 637 (ALT 250 FOR IP): Performed by: HOSPITALIST

## 2024-09-29 RX ORDER — BUTALBITAL, ACETAMINOPHEN AND CAFFEINE 50; 325; 40 MG/1; MG/1; MG/1
1 TABLET ORAL EVERY 4 HOURS PRN
Status: DISCONTINUED | OUTPATIENT
Start: 2024-09-29 | End: 2024-10-02 | Stop reason: HOSPADM

## 2024-09-29 RX ORDER — WARFARIN SODIUM 5 MG/1
5 TABLET ORAL
Status: COMPLETED | OUTPATIENT
Start: 2024-09-29 | End: 2024-09-29

## 2024-09-29 RX ADMIN — CARVEDILOL 3.12 MG: 6.25 TABLET, FILM COATED ORAL at 08:33

## 2024-09-29 RX ADMIN — TRAMADOL HYDROCHLORIDE 25 MG: 50 TABLET ORAL at 03:46

## 2024-09-29 RX ADMIN — SODIUM BICARBONATE 650 MG: 650 TABLET ORAL at 08:33

## 2024-09-29 RX ADMIN — MELATONIN 6 MG: at 19:49

## 2024-09-29 RX ADMIN — ATORVASTATIN CALCIUM 40 MG: 40 TABLET, FILM COATED ORAL at 19:43

## 2024-09-29 RX ADMIN — CARVEDILOL 3.12 MG: 6.25 TABLET, FILM COATED ORAL at 17:11

## 2024-09-29 RX ADMIN — FUROSEMIDE 40 MG: 10 INJECTION, SOLUTION INTRAMUSCULAR; INTRAVENOUS at 08:34

## 2024-09-29 RX ADMIN — WARFARIN SODIUM 5 MG: 5 TABLET ORAL at 17:33

## 2024-09-29 RX ADMIN — ASPIRIN 81 MG: 81 TABLET, CHEWABLE ORAL at 08:33

## 2024-09-29 RX ADMIN — BUTALBITAL, ACETAMINOPHEN AND CAFFEINE 1 TABLET: 325; 50; 40 TABLET ORAL at 19:44

## 2024-09-29 RX ADMIN — SODIUM BICARBONATE 650 MG: 650 TABLET ORAL at 19:43

## 2024-09-29 ASSESSMENT — PAIN SCALES - GENERAL
PAINLEVEL_OUTOF10: 4
PAINLEVEL_OUTOF10: 7
PAINLEVEL_OUTOF10: 0

## 2024-09-29 ASSESSMENT — PAIN DESCRIPTION - LOCATION
LOCATION: HEAD
LOCATION: HEAD

## 2024-09-29 ASSESSMENT — PAIN DESCRIPTION - DESCRIPTORS
DESCRIPTORS: ACHING
DESCRIPTORS: THROBBING

## 2024-09-29 ASSESSMENT — PAIN - FUNCTIONAL ASSESSMENT: PAIN_FUNCTIONAL_ASSESSMENT: PREVENTS OR INTERFERES SOME ACTIVE ACTIVITIES AND ADLS

## 2024-09-29 ASSESSMENT — PAIN DESCRIPTION - ORIENTATION: ORIENTATION: OTHER (COMMENT)

## 2024-09-30 ENCOUNTER — APPOINTMENT (OUTPATIENT)
Facility: HOSPITAL | Age: 84
DRG: 291 | End: 2024-09-30
Payer: MEDICARE

## 2024-09-30 LAB
ALBUMIN SERPL-MCNC: 2.7 G/DL (ref 3.5–5)
ANION GAP SERPL CALC-SCNC: 6 MMOL/L (ref 2–12)
BASOPHILS # BLD: 0 K/UL (ref 0–0.1)
BASOPHILS NFR BLD: 1 % (ref 0–1)
BUN SERPL-MCNC: 47 MG/DL (ref 6–20)
BUN/CREAT SERPL: 25 (ref 12–20)
CALCIUM SERPL-MCNC: 9.1 MG/DL (ref 8.5–10.1)
CHLORIDE SERPL-SCNC: 106 MMOL/L (ref 97–108)
CO2 SERPL-SCNC: 24 MMOL/L (ref 21–32)
CREAT SERPL-MCNC: 1.86 MG/DL (ref 0.7–1.3)
DIFFERENTIAL METHOD BLD: ABNORMAL
ECHO BSA: 2.18 M2
EKG DIAGNOSIS: NORMAL
EOSINOPHIL # BLD: 0.3 K/UL (ref 0–0.4)
EOSINOPHIL NFR BLD: 6 % (ref 0–7)
ERYTHROCYTE [DISTWIDTH] IN BLOOD BY AUTOMATED COUNT: 14.3 % (ref 11.5–14.5)
GLUCOSE SERPL-MCNC: 102 MG/DL (ref 65–100)
HCT VFR BLD AUTO: 28.7 % (ref 36.6–50.3)
HGB BLD-MCNC: 9.1 G/DL (ref 12.1–17)
IMM GRANULOCYTES # BLD AUTO: 0.1 K/UL (ref 0–0.04)
IMM GRANULOCYTES NFR BLD AUTO: 2 % (ref 0–0.5)
INR PPP: 1.9 (ref 0.9–1.1)
LYMPHOCYTES # BLD: 0.8 K/UL (ref 0.8–3.5)
LYMPHOCYTES NFR BLD: 17 % (ref 12–49)
MCH RBC QN AUTO: 26.6 PG (ref 26–34)
MCHC RBC AUTO-ENTMCNC: 31.7 G/DL (ref 30–36.5)
MCV RBC AUTO: 83.9 FL (ref 80–99)
MONOCYTES # BLD: 0.4 K/UL (ref 0–1)
MONOCYTES NFR BLD: 8 % (ref 5–13)
NEUTS SEG # BLD: 3.1 K/UL (ref 1.8–8)
NEUTS SEG NFR BLD: 67 % (ref 32–75)
NRBC # BLD: 0 K/UL (ref 0–0.01)
NRBC BLD-RTO: 0 PER 100 WBC
PHOSPHATE SERPL-MCNC: 3.9 MG/DL (ref 2.6–4.7)
PLATELET # BLD AUTO: 157 K/UL (ref 150–400)
PMV BLD AUTO: 11.8 FL (ref 8.9–12.9)
POTASSIUM SERPL-SCNC: 4.1 MMOL/L (ref 3.5–5.1)
PROTHROMBIN TIME: 19.1 SEC (ref 9–11.1)
RBC # BLD AUTO: 3.42 M/UL (ref 4.1–5.7)
SODIUM SERPL-SCNC: 136 MMOL/L (ref 136–145)
STRESS BASELINE DIAS BP: 65 MMHG
STRESS BASELINE HR: 71 BPM
STRESS BASELINE SYS BP: 133 MMHG
STRESS ESTIMATED WORKLOAD: 1 METS
STRESS O2 SAT PEAK: 96 %
STRESS O2 SAT REST: 94 %
STRESS PEAK DIAS BP: 55 MMHG
STRESS PEAK SYS BP: 134 MMHG
STRESS PERCENT HR ACHIEVED: 54 %
STRESS POST PEAK HR: 74 BPM
STRESS RATE PRESSURE PRODUCT: 9916 BPM*MMHG
STRESS TARGET HR: 138 BPM
WBC # BLD AUTO: 4.7 K/UL (ref 4.1–11.1)

## 2024-09-30 PROCEDURE — 97116 GAIT TRAINING THERAPY: CPT

## 2024-09-30 PROCEDURE — 6370000000 HC RX 637 (ALT 250 FOR IP): Performed by: INTERNAL MEDICINE

## 2024-09-30 PROCEDURE — 78452 HT MUSCLE IMAGE SPECT MULT: CPT

## 2024-09-30 PROCEDURE — 6360000002 HC RX W HCPCS: Performed by: HOSPITALIST

## 2024-09-30 PROCEDURE — 36415 COLL VENOUS BLD VENIPUNCTURE: CPT

## 2024-09-30 PROCEDURE — 85025 COMPLETE CBC W/AUTO DIFF WBC: CPT

## 2024-09-30 PROCEDURE — 80069 RENAL FUNCTION PANEL: CPT

## 2024-09-30 PROCEDURE — 97530 THERAPEUTIC ACTIVITIES: CPT

## 2024-09-30 PROCEDURE — A9500 TC99M SESTAMIBI: HCPCS | Performed by: STUDENT IN AN ORGANIZED HEALTH CARE EDUCATION/TRAINING PROGRAM

## 2024-09-30 PROCEDURE — 6370000000 HC RX 637 (ALT 250 FOR IP): Performed by: HOSPITALIST

## 2024-09-30 PROCEDURE — 97162 PT EVAL MOD COMPLEX 30 MIN: CPT

## 2024-09-30 PROCEDURE — 93017 CV STRESS TEST TRACING ONLY: CPT

## 2024-09-30 PROCEDURE — 94760 N-INVAS EAR/PLS OXIMETRY 1: CPT

## 2024-09-30 PROCEDURE — 3430000000 HC RX DIAGNOSTIC RADIOPHARMACEUTICAL: Performed by: STUDENT IN AN ORGANIZED HEALTH CARE EDUCATION/TRAINING PROGRAM

## 2024-09-30 PROCEDURE — 2700000000 HC OXYGEN THERAPY PER DAY

## 2024-09-30 PROCEDURE — 1100000003 HC PRIVATE W/ TELEMETRY

## 2024-09-30 PROCEDURE — 85610 PROTHROMBIN TIME: CPT

## 2024-09-30 PROCEDURE — 6360000002 HC RX W HCPCS: Performed by: INTERNAL MEDICINE

## 2024-09-30 RX ORDER — REGADENOSON 0.08 MG/ML
0.4 INJECTION, SOLUTION INTRAVENOUS
Status: COMPLETED | OUTPATIENT
Start: 2024-09-30 | End: 2024-09-30

## 2024-09-30 RX ORDER — TETRAKIS(2-METHOXYISOBUTYLISOCYANIDE)COPPER(I) TETRAFLUOROBORATE 1 MG/ML
10.8 INJECTION, POWDER, LYOPHILIZED, FOR SOLUTION INTRAVENOUS
Status: COMPLETED | OUTPATIENT
Start: 2024-09-30 | End: 2024-09-30

## 2024-09-30 RX ORDER — TETRAKIS(2-METHOXYISOBUTYLISOCYANIDE)COPPER(I) TETRAFLUOROBORATE 1 MG/ML
32.9 INJECTION, POWDER, LYOPHILIZED, FOR SOLUTION INTRAVENOUS
Status: COMPLETED | OUTPATIENT
Start: 2024-09-30 | End: 2024-09-30

## 2024-09-30 RX ORDER — FUROSEMIDE 40 MG
40 TABLET ORAL 2 TIMES DAILY
Status: DISCONTINUED | OUTPATIENT
Start: 2024-10-01 | End: 2024-10-01

## 2024-09-30 RX ORDER — FUROSEMIDE 10 MG/ML
40 INJECTION INTRAMUSCULAR; INTRAVENOUS 2 TIMES DAILY
Status: DISCONTINUED | OUTPATIENT
Start: 2024-10-01 | End: 2024-09-30

## 2024-09-30 RX ORDER — WARFARIN SODIUM 5 MG/1
5 TABLET ORAL
Status: COMPLETED | OUTPATIENT
Start: 2024-09-30 | End: 2024-09-30

## 2024-09-30 RX ADMIN — FUROSEMIDE 40 MG: 10 INJECTION, SOLUTION INTRAMUSCULAR; INTRAVENOUS at 12:06

## 2024-09-30 RX ADMIN — TETRAKIS(2-METHOXYISOBUTYLISOCYANIDE)COPPER(I) TETRAFLUOROBORATE 10.8 MILLICURIE: 1 INJECTION, POWDER, LYOPHILIZED, FOR SOLUTION INTRAVENOUS at 08:00

## 2024-09-30 RX ADMIN — SODIUM BICARBONATE 650 MG: 650 TABLET ORAL at 20:49

## 2024-09-30 RX ADMIN — BUTALBITAL, ACETAMINOPHEN AND CAFFEINE 1 TABLET: 325; 50; 40 TABLET ORAL at 13:01

## 2024-09-30 RX ADMIN — SODIUM BICARBONATE 650 MG: 650 TABLET ORAL at 12:05

## 2024-09-30 RX ADMIN — ATORVASTATIN CALCIUM 40 MG: 40 TABLET, FILM COATED ORAL at 20:49

## 2024-09-30 RX ADMIN — REGADENOSON 0.4 MG: 0.08 INJECTION, SOLUTION INTRAVENOUS at 10:01

## 2024-09-30 RX ADMIN — CARVEDILOL 3.12 MG: 6.25 TABLET, FILM COATED ORAL at 17:46

## 2024-09-30 RX ADMIN — WARFARIN SODIUM 5 MG: 5 TABLET ORAL at 17:46

## 2024-09-30 RX ADMIN — TRAMADOL HYDROCHLORIDE 25 MG: 50 TABLET ORAL at 20:49

## 2024-09-30 RX ADMIN — TETRAKIS(2-METHOXYISOBUTYLISOCYANIDE)COPPER(I) TETRAFLUOROBORATE 32.9 MILLICURIE: 1 INJECTION, POWDER, LYOPHILIZED, FOR SOLUTION INTRAVENOUS at 10:10

## 2024-09-30 RX ADMIN — CARVEDILOL 3.12 MG: 6.25 TABLET, FILM COATED ORAL at 12:05

## 2024-09-30 RX ADMIN — ASPIRIN 81 MG: 81 TABLET, CHEWABLE ORAL at 12:05

## 2024-09-30 ASSESSMENT — PAIN SCALES - GENERAL
PAINLEVEL_OUTOF10: 5
PAINLEVEL_OUTOF10: 3
PAINLEVEL_OUTOF10: 7
PAINLEVEL_OUTOF10: 2

## 2024-09-30 ASSESSMENT — PAIN DESCRIPTION - LOCATION: LOCATION: HEAD

## 2024-09-30 ASSESSMENT — PAIN DESCRIPTION - DESCRIPTORS: DESCRIPTORS: ACHING

## 2024-10-01 ENCOUNTER — APPOINTMENT (OUTPATIENT)
Facility: HOSPITAL | Age: 84
DRG: 291 | End: 2024-10-01
Attending: HOSPITALIST
Payer: MEDICARE

## 2024-10-01 LAB
ALBUMIN SERPL-MCNC: 2.9 G/DL (ref 3.5–5)
ANION GAP SERPL CALC-SCNC: 7 MMOL/L (ref 2–12)
BASOPHILS # BLD: 0 K/UL (ref 0–0.1)
BASOPHILS NFR BLD: 1 % (ref 0–1)
BUN SERPL-MCNC: 46 MG/DL (ref 6–20)
BUN/CREAT SERPL: 24 (ref 12–20)
CALCIUM SERPL-MCNC: 9.3 MG/DL (ref 8.5–10.1)
CHLORIDE SERPL-SCNC: 105 MMOL/L (ref 97–108)
CO2 SERPL-SCNC: 25 MMOL/L (ref 21–32)
CREAT SERPL-MCNC: 1.89 MG/DL (ref 0.7–1.3)
DIFFERENTIAL METHOD BLD: ABNORMAL
ECHO AO ROOT DIAM: 3.5 CM
ECHO AO ROOT INDEX: 1.64 CM/M2
ECHO AV AREA PEAK VELOCITY: 1.4 CM2
ECHO AV AREA VTI: 1.5 CM2
ECHO AV AREA/BSA PEAK VELOCITY: 0.7 CM2/M2
ECHO AV AREA/BSA VTI: 0.7 CM2/M2
ECHO AV MEAN GRADIENT: 5 MMHG
ECHO AV MEAN VELOCITY: 1 M/S
ECHO AV PEAK GRADIENT: 9 MMHG
ECHO AV PEAK VELOCITY: 1.5 M/S
ECHO AV VELOCITY RATIO: 0.4
ECHO AV VTI: 26.2 CM
ECHO BSA: 2.18 M2
ECHO EST RA PRESSURE: 3 MMHG
ECHO LA DIAMETER INDEX: 2.11 CM/M2
ECHO LA DIAMETER: 4.5 CM
ECHO LA TO AORTIC ROOT RATIO: 1.29
ECHO LA VOL A-L A4C: 54 ML (ref 18–58)
ECHO LA VOL MOD A4C: 51 ML (ref 18–58)
ECHO LA VOLUME INDEX A-L A4C: 25 ML/M2 (ref 16–34)
ECHO LA VOLUME INDEX MOD A4C: 24 ML/M2 (ref 16–34)
ECHO LV E' SEPTAL VELOCITY: 560.3 CM/S
ECHO LV EF PHYSICIAN: 50 %
ECHO LV INTERNAL DIMENSION DIASTOLIC: 3.7 CM (ref 4.2–5.9)
ECHO LV INTERNAL DIMENSION DIASTOLIC: 4.7 CM (ref 4.2–5.9)
ECHO LV IVSD: 0.9 CM (ref 0.6–1)
ECHO LV POSTERIOR WALL DIASTOLIC: 1.1 CM (ref 0.6–1)
ECHO LVOT AREA: 3.5 CM2
ECHO LVOT AV VTI INDEX: 0.41
ECHO LVOT DIAM: 2.1 CM
ECHO LVOT MEAN GRADIENT: 1 MMHG
ECHO LVOT PEAK GRADIENT: 1 MMHG
ECHO LVOT PEAK VELOCITY: 0.6 M/S
ECHO LVOT STROKE VOLUME INDEX: 17.6 ML/M2
ECHO LVOT SV: 37.4 ML
ECHO LVOT VTI: 10.8 CM
ECHO MV A VELOCITY: 0.25 M/S
ECHO MV E DECELERATION TIME (DT): 314.7 MS
ECHO MV E VELOCITY: 1.35 M/S
ECHO MV E/A RATIO: 5.4
ECHO MV E/E' SEPTAL: 0.24
ECHO MV REGURGITANT PEAK GRADIENT: 58 MMHG
ECHO MV REGURGITANT PEAK VELOCITY: 3.8 M/S
ECHO PV MAX VELOCITY: 0.7 M/S
ECHO PV MEAN GRADIENT: 1 MMHG
ECHO PV MEAN VELOCITY: 0.5 M/S
ECHO PV PEAK GRADIENT: 2 MMHG
ECHO RIGHT VENTRICULAR SYSTOLIC PRESSURE (RVSP): 37 MMHG
ECHO RV FREE WALL PEAK S': 9.5 CM/S
ECHO RV TAPSE: 2 CM (ref 1.7–?)
ECHO TV REGURGITANT MAX VELOCITY: 2.91 M/S
ECHO TV REGURGITANT PEAK GRADIENT: 34 MMHG
EOSINOPHIL # BLD: 0.3 K/UL (ref 0–0.4)
EOSINOPHIL NFR BLD: 6 % (ref 0–7)
ERYTHROCYTE [DISTWIDTH] IN BLOOD BY AUTOMATED COUNT: 14.1 % (ref 11.5–14.5)
GLUCOSE SERPL-MCNC: 98 MG/DL (ref 65–100)
HCT VFR BLD AUTO: 31.8 % (ref 36.6–50.3)
HGB BLD-MCNC: 9.9 G/DL (ref 12.1–17)
IMM GRANULOCYTES # BLD AUTO: 0 K/UL (ref 0–0.04)
IMM GRANULOCYTES NFR BLD AUTO: 1 % (ref 0–0.5)
INR PPP: 2.2 (ref 0.9–1.1)
LYMPHOCYTES # BLD: 0.7 K/UL (ref 0.8–3.5)
LYMPHOCYTES NFR BLD: 17 % (ref 12–49)
MCH RBC QN AUTO: 26.1 PG (ref 26–34)
MCHC RBC AUTO-ENTMCNC: 31.1 G/DL (ref 30–36.5)
MCV RBC AUTO: 83.7 FL (ref 80–99)
MONOCYTES # BLD: 0.3 K/UL (ref 0–1)
MONOCYTES NFR BLD: 7 % (ref 5–13)
NEUTS SEG # BLD: 2.8 K/UL (ref 1.8–8)
NEUTS SEG NFR BLD: 68 % (ref 32–75)
NRBC # BLD: 0 K/UL (ref 0–0.01)
NRBC BLD-RTO: 0 PER 100 WBC
PHOSPHATE SERPL-MCNC: 3.5 MG/DL (ref 2.6–4.7)
PLATELET # BLD AUTO: 167 K/UL (ref 150–400)
PMV BLD AUTO: 10.7 FL (ref 8.9–12.9)
POTASSIUM SERPL-SCNC: 4 MMOL/L (ref 3.5–5.1)
PROTHROMBIN TIME: 21.4 SEC (ref 9–11.1)
RBC # BLD AUTO: 3.8 M/UL (ref 4.1–5.7)
SODIUM SERPL-SCNC: 137 MMOL/L (ref 136–145)
WBC # BLD AUTO: 4 K/UL (ref 4.1–11.1)

## 2024-10-01 PROCEDURE — 97116 GAIT TRAINING THERAPY: CPT

## 2024-10-01 PROCEDURE — 85610 PROTHROMBIN TIME: CPT

## 2024-10-01 PROCEDURE — 6370000000 HC RX 637 (ALT 250 FOR IP): Performed by: INTERNAL MEDICINE

## 2024-10-01 PROCEDURE — 36415 COLL VENOUS BLD VENIPUNCTURE: CPT

## 2024-10-01 PROCEDURE — 6370000000 HC RX 637 (ALT 250 FOR IP): Performed by: STUDENT IN AN ORGANIZED HEALTH CARE EDUCATION/TRAINING PROGRAM

## 2024-10-01 PROCEDURE — 6370000000 HC RX 637 (ALT 250 FOR IP): Performed by: HOSPITALIST

## 2024-10-01 PROCEDURE — 85025 COMPLETE CBC W/AUTO DIFF WBC: CPT

## 2024-10-01 PROCEDURE — 6360000004 HC RX CONTRAST MEDICATION: Performed by: INTERNAL MEDICINE

## 2024-10-01 PROCEDURE — 97166 OT EVAL MOD COMPLEX 45 MIN: CPT | Performed by: OCCUPATIONAL THERAPIST

## 2024-10-01 PROCEDURE — C8929 TTE W OR WO FOL WCON,DOPPLER: HCPCS

## 2024-10-01 PROCEDURE — 1100000003 HC PRIVATE W/ TELEMETRY

## 2024-10-01 PROCEDURE — 97535 SELF CARE MNGMENT TRAINING: CPT | Performed by: OCCUPATIONAL THERAPIST

## 2024-10-01 PROCEDURE — 80069 RENAL FUNCTION PANEL: CPT

## 2024-10-01 RX ORDER — WARFARIN SODIUM 5 MG/1
5 TABLET ORAL
Status: COMPLETED | OUTPATIENT
Start: 2024-10-01 | End: 2024-10-01

## 2024-10-01 RX ORDER — BUMETANIDE 1 MG/1
1 TABLET ORAL 2 TIMES DAILY
Status: DISCONTINUED | OUTPATIENT
Start: 2024-10-01 | End: 2024-10-02 | Stop reason: HOSPADM

## 2024-10-01 RX ORDER — FUROSEMIDE 10 MG/ML
60 INJECTION INTRAMUSCULAR; INTRAVENOUS 2 TIMES DAILY
Status: DISCONTINUED | OUTPATIENT
Start: 2024-10-01 | End: 2024-10-01

## 2024-10-01 RX ADMIN — BUMETANIDE 1 MG: 1 TABLET ORAL at 17:46

## 2024-10-01 RX ADMIN — CARVEDILOL 3.12 MG: 6.25 TABLET, FILM COATED ORAL at 16:29

## 2024-10-01 RX ADMIN — SODIUM BICARBONATE 650 MG: 650 TABLET ORAL at 20:57

## 2024-10-01 RX ADMIN — FUROSEMIDE 40 MG: 40 TABLET ORAL at 09:19

## 2024-10-01 RX ADMIN — ASPIRIN 81 MG: 81 TABLET, CHEWABLE ORAL at 09:14

## 2024-10-01 RX ADMIN — SODIUM BICARBONATE 650 MG: 650 TABLET ORAL at 09:20

## 2024-10-01 RX ADMIN — WARFARIN SODIUM 5 MG: 5 TABLET ORAL at 17:46

## 2024-10-01 RX ADMIN — ATORVASTATIN CALCIUM 40 MG: 40 TABLET, FILM COATED ORAL at 20:57

## 2024-10-01 RX ADMIN — CARVEDILOL 3.12 MG: 6.25 TABLET, FILM COATED ORAL at 09:16

## 2024-10-01 RX ADMIN — PERFLUTREN 1.5 ML: 6.52 INJECTION, SUSPENSION INTRAVENOUS at 14:27

## 2024-10-01 ASSESSMENT — PAIN SCALES - GENERAL
PAINLEVEL_OUTOF10: 0

## 2024-10-01 NOTE — CARE COORDINATION
Transition of Care Plan:    RUR: 17%  Prior Level of Functioning: independent, lives with spouse  Disposition: SNF placement- [facility]   If SNF or IPR: Date FOC offered: 10/1  Date FOC received: 10/1  Accepting facility: [facility]  Date authorization started with reference number: N/A  Date authorization received and expires: N/A  Follow up appointments: after rehab  DME needed: available at facility   Transportation at discharge: medical transport   IM/IMM Medicare/ letter given: to be given   Is patient a  and connected with VA? no   If yes, was  transfer form completed and VA notified?   Caregiver Contact: daughter (Karla)  Discharge Caregiver contacted prior to discharge? yes  Care Conference needed? no  Barriers to discharge:  medical clearance    Chart reviewed. CM continuing to follow for discharge planning. Noted PT/OT recommendations for SNF placement. Met with pt at the bedside to further discuss. Offered FOC and provided SNF list. Pt has chosen [facility]. Referral sent and accepted pending patient discharge. Updated pt's daughter by phone. She is in agreement with current d/c plan. Will continue to follow.    SRI Mas   Care Manager, [facility]  [phone]

## 2024-10-02 VITALS
BODY MASS INDEX: 31.44 KG/M2 | HEART RATE: 70 BPM | DIASTOLIC BLOOD PRESSURE: 61 MMHG | RESPIRATION RATE: 17 BRPM | OXYGEN SATURATION: 96 % | SYSTOLIC BLOOD PRESSURE: 139 MMHG | TEMPERATURE: 98.1 F | HEIGHT: 69 IN | WEIGHT: 212.3 LBS

## 2024-10-02 LAB
ANION GAP SERPL CALC-SCNC: 8 MMOL/L (ref 2–12)
BUN SERPL-MCNC: 43 MG/DL (ref 6–20)
BUN/CREAT SERPL: 25 (ref 12–20)
CALCIUM SERPL-MCNC: 9.1 MG/DL (ref 8.5–10.1)
CHLORIDE SERPL-SCNC: 104 MMOL/L (ref 97–108)
CO2 SERPL-SCNC: 24 MMOL/L (ref 21–32)
CREAT SERPL-MCNC: 1.72 MG/DL (ref 0.7–1.3)
GLUCOSE SERPL-MCNC: 100 MG/DL (ref 65–100)
INR PPP: 2.1 (ref 0.9–1.1)
MAGNESIUM SERPL-MCNC: 2.1 MG/DL (ref 1.6–2.4)
POTASSIUM SERPL-SCNC: 4.1 MMOL/L (ref 3.5–5.1)
PROTHROMBIN TIME: 21.4 SEC (ref 9–11.1)
SODIUM SERPL-SCNC: 136 MMOL/L (ref 136–145)

## 2024-10-02 PROCEDURE — 6370000000 HC RX 637 (ALT 250 FOR IP): Performed by: INTERNAL MEDICINE

## 2024-10-02 PROCEDURE — 83735 ASSAY OF MAGNESIUM: CPT

## 2024-10-02 PROCEDURE — 97530 THERAPEUTIC ACTIVITIES: CPT

## 2024-10-02 PROCEDURE — 80048 BASIC METABOLIC PNL TOTAL CA: CPT

## 2024-10-02 PROCEDURE — 85610 PROTHROMBIN TIME: CPT

## 2024-10-02 PROCEDURE — 97116 GAIT TRAINING THERAPY: CPT

## 2024-10-02 PROCEDURE — 6370000000 HC RX 637 (ALT 250 FOR IP): Performed by: HOSPITALIST

## 2024-10-02 PROCEDURE — 6370000000 HC RX 637 (ALT 250 FOR IP): Performed by: STUDENT IN AN ORGANIZED HEALTH CARE EDUCATION/TRAINING PROGRAM

## 2024-10-02 PROCEDURE — 36415 COLL VENOUS BLD VENIPUNCTURE: CPT

## 2024-10-02 RX ORDER — WARFARIN SODIUM 5 MG/1
5 TABLET ORAL
Status: DISCONTINUED | OUTPATIENT
Start: 2024-10-02 | End: 2024-10-02 | Stop reason: HOSPADM

## 2024-10-02 RX ORDER — BUMETANIDE 1 MG/1
1 TABLET ORAL
Qty: 30 TABLET | Refills: 0 | Status: SHIPPED | OUTPATIENT
Start: 2024-10-02

## 2024-10-02 RX ORDER — CARVEDILOL 3.12 MG/1
3.12 TABLET ORAL 2 TIMES DAILY WITH MEALS
Qty: 60 TABLET | Refills: 0 | Status: SHIPPED | OUTPATIENT
Start: 2024-10-02

## 2024-10-02 RX ORDER — BUMETANIDE 2 MG/1
2 TABLET ORAL DAILY
Qty: 30 TABLET | Refills: 0 | Status: SHIPPED | OUTPATIENT
Start: 2024-10-02

## 2024-10-02 RX ORDER — BUMETANIDE 1 MG/1
1 TABLET ORAL 2 TIMES DAILY
Qty: 60 TABLET | Refills: 0 | Status: CANCELLED | OUTPATIENT
Start: 2024-10-02

## 2024-10-02 RX ADMIN — BUTALBITAL, ACETAMINOPHEN AND CAFFEINE 1 TABLET: 325; 50; 40 TABLET ORAL at 12:45

## 2024-10-02 RX ADMIN — SODIUM BICARBONATE 650 MG: 650 TABLET ORAL at 10:27

## 2024-10-02 RX ADMIN — BUMETANIDE 1 MG: 1 TABLET ORAL at 10:28

## 2024-10-02 RX ADMIN — ASPIRIN 81 MG: 81 TABLET, CHEWABLE ORAL at 10:26

## 2024-10-02 RX ADMIN — CARVEDILOL 3.12 MG: 6.25 TABLET, FILM COATED ORAL at 10:28

## 2024-10-02 ASSESSMENT — PAIN DESCRIPTION - DESCRIPTORS: DESCRIPTORS: ACHING

## 2024-10-02 ASSESSMENT — PAIN SCALES - GENERAL
PAINLEVEL_OUTOF10: 0
PAINLEVEL_OUTOF10: 5
PAINLEVEL_OUTOF10: 0

## 2024-10-02 ASSESSMENT — PAIN DESCRIPTION - LOCATION: LOCATION: HEAD

## 2024-10-02 NOTE — CARE COORDINATION
Transition of Care Plan:    RUR: 18%  Disposition:  Home with HH - [facility]     If SNF or IPR: Date FOC offered:10/2/24  Date FOC received:  10/2/24  Date authorization started with reference number: pending  Date authorization received and expires: pending  Accepting facility: N/a  Follow up appointments: PCP, Specialist  DME needed:  [facility]- pending.  Transportation at Discharge: Daughter to transport  IM Medicare Letter: 2nd  Letter to be given   Is patient a  and connected with the VA?                If yes, was [place] transfer form completed and VA notified?   Caregiver Contact: Karla Rao [facility] - [phone]  Discharge Caregiver contacted prior to discharge?   CM to discussed d/c plan with pt  Care Conference needed? No   Barriers: Cardiac clearance pending.     Updated Note: 2:49pm  RW was approved - Walker delivered to pt room by CM.    Updated Note - 12:39n  [facility] has accepted pt for HH services and added to AVS  RW - pending    Initial Note-     Referrals for HH and walker pending. - referrals sent via [facility].    DANIEL Trejo,RN  Care   [facility]  Phone: [phone]

## 2024-10-02 NOTE — PLAN OF CARE
Problem: Discharge Planning  Goal: Discharge to home or other facility with appropriate resources  9/29/2024 2038 by Umm Mcgarry RN  Outcome: Progressing  9/29/2024 0935 by Lanette Corrales RN  Outcome: Progressing     Problem: Safety - Adult  Goal: Free from fall injury  9/29/2024 2038 by Umm Mcgarry RN  Outcome: Progressing  9/29/2024 0935 by Lanette Corrales RN  Outcome: Progressing     Problem: ABCDS Injury Assessment  Goal: Absence of physical injury  9/29/2024 2038 by Umm Mcgarry RN  Outcome: Progressing  9/29/2024 0935 by Lanette Corrales RN  Outcome: Progressing     Problem: Chronic Conditions and Co-morbidities  Goal: Patient's chronic conditions and co-morbidity symptoms are monitored and maintained or improved  9/29/2024 2038 by Umm Mcgarry RN  Outcome: Progressing  9/29/2024 0935 by Lanette Corrales RN  Outcome: Progressing     Problem: Skin/Tissue Integrity  Goal: Absence of new skin breakdown  Description: 1.  Monitor for areas of redness and/or skin breakdown  2.  Assess vascular access sites hourly  3.  Every 4-6 hours minimum:  Change oxygen saturation probe site  4.  Every 4-6 hours:  If on nasal continuous positive airway pressure, respiratory therapy assess nares and determine need for appliance change or resting period.  9/29/2024 2038 by Umm Mcgarry RN  Outcome: Progressing  9/29/2024 0935 by Lanette Corrales RN  Outcome: Progressing     Problem: Pain  Goal: Verbalizes/displays adequate comfort level or baseline comfort level  9/29/2024 2038 by Umm Mcgarry RN  Outcome: Progressing  9/29/2024 0935 by Lanette Corrales RN  Outcome: Progressing     
  Problem: Discharge Planning  Goal: Discharge to home or other facility with appropriate resources  Outcome: Progressing     Problem: Safety - Adult  Goal: Free from fall injury  Outcome: Progressing     Problem: ABCDS Injury Assessment  Goal: Absence of physical injury  Outcome: Progressing     Problem: Chronic Conditions and Co-morbidities  Goal: Patient's chronic conditions and co-morbidity symptoms are monitored and maintained or improved  Outcome: Progressing     Problem: Skin/Tissue Integrity  Goal: Absence of new skin breakdown  Description: 1.  Monitor for areas of redness and/or skin breakdown  2.  Assess vascular access sites hourly  3.  Every 4-6 hours minimum:  Change oxygen saturation probe site  4.  Every 4-6 hours:  If on nasal continuous positive airway pressure, respiratory therapy assess nares and determine need for appliance change or resting period.  Outcome: Progressing     Problem: Pain  Goal: Verbalizes/displays adequate comfort level or baseline comfort level  Outcome: Progressing     Problem: Neurosensory - Adult  Goal: Achieves stable or improved neurological status  Outcome: Progressing     Problem: Respiratory - Adult  Goal: Achieves optimal ventilation and oxygenation  Outcome: Progressing     Problem: Cardiovascular - Adult  Goal: Maintains optimal cardiac output and hemodynamic stability  Outcome: Progressing  Goal: Absence of cardiac dysrhythmias or at baseline  Outcome: Progressing     Problem: Skin/Tissue Integrity - Adult  Goal: Skin integrity remains intact  Outcome: Progressing     Problem: Musculoskeletal - Adult  Goal: Return mobility to safest level of function  Outcome: Progressing     Problem: Gastrointestinal - Adult  Goal: Maintains adequate nutritional intake  Outcome: Progressing     Problem: Genitourinary - Adult  Goal: Absence of urinary retention  Outcome: Progressing     Problem: Infection - Adult  Goal: Absence of infection during hospitalization  Outcome: 
  Problem: Discharge Planning  Goal: Discharge to home or other facility with appropriate resources  Outcome: Progressing     Problem: Safety - Adult  Goal: Free from fall injury  Outcome: Progressing     Problem: Pain  Goal: Verbalizes/displays adequate comfort level or baseline comfort level  Outcome: Progressing     Problem: Pain  Goal: Verbalizes/displays adequate comfort level or baseline comfort level  Outcome: Progressing     
  Problem: Discharge Planning  Goal: Discharge to home or other facility with appropriate resources  Outcome: Progressing     Problem: Safety - Adult  Goal: Free from fall injury  Outcome: Progressing     Problem: Skin/Tissue Integrity  Goal: Absence of new skin breakdown  Description: 1.  Monitor for areas of redness and/or skin breakdown  2.  Assess vascular access sites hourly  3.  Every 4-6 hours minimum:  Change oxygen saturation probe site  4.  Every 4-6 hours:  If on nasal continuous positive airway pressure, respiratory therapy assess nares and determine need for appliance change or resting period.  Outcome: Progressing     Problem: Pain  Goal: Verbalizes/displays adequate comfort level or baseline comfort level  Outcome: Progressing     
  Problem: Physical Therapy - Adult  Goal: By Discharge: Performs mobility at highest level of function for planned discharge setting.  See evaluation for individualized goals.  Description: FUNCTIONAL STATUS PRIOR TO ADMISSION: Patient was modified independent using a single point cane intermittently for functional mobility.    HOME SUPPORT PRIOR TO ADMISSION: The patient lived with wife but did not require assistance. Note patient reports wife is handicapped and does not require assistance.     Physical Therapy Goals  Initiated 9/30/2024  1.  Patient will move from supine to sit and sit to supine, scoot up and down, and roll side to side in bed with modified independence within 7 day(s).    2.  Patient will perform sit to stand with modified independence within 7 day(s).  3.  Patient will transfer from bed to chair and chair to bed with modified independence using the least restrictive device within 7 day(s).  4.  Patient will ambulate with supervision/set-up for 150 feet with the least restrictive device within 7 day(s).   5.  Patient will ascend/descend 2 stairs with L handrail(s) with contact guard assist within 7 day(s).    Outcome: Progressing   PHYSICAL THERAPY TREATMENT    Patient: Wilberto Kuhn (84 y.o. male)  Date: 10/1/2024  Diagnosis: Shortness of breath [R06.02]  Dyspnea on exertion [R06.09]  COPD exacerbation ([facility]) [J44.1]  NACHO (acute kidney injury) ([facility]) [N17.9]  Acute kidney injury ([facility]) [N17.9] NACHO (acute kidney injury) ([facility])      Precautions:                        ASSESSMENT:  Patient continues to benefit from skilled PT services and is progressing towards goals. Patient able to ambulate with single point cane this date. Patient adamantly requests to use cane for gait training. Able to ambulate ~60ft with overall CGA and use of cane, but demonstrates intermittent reaching out for support. Provided education of benefits of rolling walker for safety and stability. Patient nearing functional 
  Problem: Physical Therapy - Adult  Goal: By Discharge: Performs mobility at highest level of function for planned discharge setting.  See evaluation for individualized goals.  Description: FUNCTIONAL STATUS PRIOR TO ADMISSION: Patient was modified independent using a single point cane intermittently for functional mobility.    HOME SUPPORT PRIOR TO ADMISSION: The patient lived with wife but did not require assistance. Note patient reports wife is handicapped and does not require assistance.     Physical Therapy Goals  Initiated 9/30/2024  1.  Patient will move from supine to sit and sit to supine, scoot up and down, and roll side to side in bed with modified independence within 7 day(s).    2.  Patient will perform sit to stand with modified independence within 7 day(s).  3.  Patient will transfer from bed to chair and chair to bed with modified independence using the least restrictive device within 7 day(s).  4.  Patient will ambulate with supervision/set-up for 150 feet with the least restrictive device within 7 day(s).   5.  Patient will ascend/descend 2 stairs with L handrail(s) with contact guard assist within 7 day(s).    Outcome: Progressing   PHYSICAL THERAPY TREATMENT    Patient: Wilberto Kuhn (84 y.o. male)  Date: 10/2/2024  Diagnosis: Shortness of breath [R06.02]  Dyspnea on exertion [R06.09]  COPD exacerbation ([facility]) [J44.1]  NACHO (acute kidney injury) ([facility]) [N17.9]  Acute kidney injury ([facility]) [N17.9] NACHO (acute kidney injury) ([facility])      Precautions: Bed Alarm                      ASSESSMENT:  Patient continues to benefit from skilled PT services and is progressing towards goals. Patient improved activity tolerance and endurance this date. Able to progress ambulation distance to 80ft. X2 with overall SBA. Patient used RW for first gait trial and request to use single point cane for second trial gait. Would benefit from continued education for safety with assistive devices with sit<>stand transfers. 
Predictive Model Details          27 (Normal)  Factor Value    Calculated 9/26/2024 08:07 48% Age 84 years old    Deterioration Index Model 15% Cardiac rhythm Atrial paced     11% Potassium 4.6 mmol/L     9% Respiratory rate 18     7% Systolic 103     3% BUN abnormal (45 MG/DL)     3% Sodium 137 mmol/L     2% Platelet count abnormal (67 K/uL)     1% Hematocrit abnormal (34.2 %)     1% WBC count abnormal (3.8 K/uL)     1% Temperature 99.1 °F (37.3 °C)     1% Pulse oximetry 98 %     0% Pulse 73        Problem: Discharge Planning  Goal: Discharge to home or other facility with appropriate resources  Outcome: Progressing     Problem: Safety - Adult  Goal: Free from fall injury  Outcome: Progressing     Problem: ABCDS Injury Assessment  Goal: Absence of physical injury  Outcome: Progressing     
Progressing  9/30/2024 1241 by Klaudia Becker RN  Outcome: Progressing     Problem: Cardiovascular - Adult  Goal: Maintains optimal cardiac output and hemodynamic stability  9/30/2024 2054 by Madiha Candelario RN  Outcome: Progressing  9/30/2024 1241 by Klaudia Becker RN  Outcome: Progressing  Goal: Absence of cardiac dysrhythmias or at baseline  9/30/2024 2054 by Madiha Candelario RN  Outcome: Progressing  9/30/2024 1241 by Klaudia Becker RN  Outcome: Progressing     Problem: Skin/Tissue Integrity - Adult  Goal: Skin integrity remains intact  9/30/2024 2054 by Madiha Candelario RN  Outcome: Progressing  9/30/2024 1241 by Klaudia Becker RN  Outcome: Progressing     Problem: Musculoskeletal - Adult  Goal: Return mobility to safest level of function  9/30/2024 2054 by Madiha Candelario RN  Outcome: Progressing  9/30/2024 1241 by Klaudia Becker RN  Outcome: Progressing     Problem: Gastrointestinal - Adult  Goal: Maintains adequate nutritional intake  9/30/2024 2054 by Madiha Candelario RN  Outcome: Progressing  9/30/2024 1241 by Klaudia Becker RN  Outcome: Progressing     Problem: Genitourinary - Adult  Goal: Absence of urinary retention  9/30/2024 2054 by Madiha Candelario RN  Outcome: Progressing  9/30/2024 1241 by Klaudia Becker RN  Outcome: Progressing     Problem: Infection - Adult  Goal: Absence of infection during hospitalization  9/30/2024 2054 by Madiha Candelario RN  Outcome: Progressing  9/30/2024 1241 by Klaudia Becker RN  Outcome: Progressing     Problem: Metabolic/Fluid and Electrolytes - Adult  Goal: Electrolytes maintained within normal limits  9/30/2024 2054 by Madiha Candelario RN  Outcome: Progressing  9/30/2024 1241 by Klaudia Becker RN  Outcome: Progressing  Goal: Hemodynamic stability and optimal renal function maintained  9/30/2024 2054 by Madiha Candelario RN  Outcome: Progressing  9/30/2024 1241 by Klaudia Becker RN  Outcome: Progressing     Problem: Hematologic - Adult  Goal: Maintains hematologic 
he quit 20 years    Arthritis     Back and shoulder    Atrial fibrillation ([facility])     Atrial flutter ([facility]) 08/2017    saw Dr. Espinoza; underwent a-flutter ablation    BPH (benign prostatic hyperplasia)     CHF (congestive heart failure) ([facility]) 02/11/2014    TTE 11/15: EF 40-45%, 2/14: EF 20%  Admitted for acute exacerbations 2/8/14, 11/28/15, 2/17/16, and 5/4/17)    Chronic kidney disease     III    Chronic low back pain     LS spine X-ray 4/8/17: mild DDD    Combined forms of age-related cataract of left eye 12/12/2018    KPE/IOL OS    Combined forms of age-related cataract of right eye 11/28/2018    KPE/IOL OD    COPD (chronic obstructive pulmonary disease) ([facility])     Coronary arteriosclerosis 7/1/2022    Diabetes ([facility])     MARIA (dyspnea on exertion)     ED (erectile dysfunction)     Elevated troponin 02/11/2014    also 11/28/15; due to cardiac strain    Frequent hospital admissions     5/14-5/23/17: acute hypoxic resp failure due to CHF exac, NACHO on CKD 3, sepsis due to LE cellulitis, 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  2/17-2/22/16: acute hypoxic resp failure, acute diarrhea  11/28-11/30/15: acute hypoxic resp failure due to acute CHF exac, elevated troponin due to cardiac strain  2/8-2/12/14: acuter hypoxic resp failure due to CHF exac, pneumonia     Hand blister 10/8/2017    Bilateral    Hyperlipidemia     Hypertension     Kidney disease, chronic, stage III (GFR 30-59 ml/min) ([facility])     Leg fracture, right 1973    Nonischemic cardiomyopathy ([facility])     with LVH    Orthostatic hypotension 4/13/2021    Pericardial effusion 11/10/2020    Pneumonia 02/08/2014 2/8/14 and 10/30/15    Poor historian     As of 11/29/18 pt reports 5th grade education, pt unable to give med list-obtained from wife per patient's permission    Prediabetes     Prostate cancer ([facility]) 2016    As of 11/29/18, pt states he gets two injections twice a year for this    Pulmonary edema 11/28/2015    S/P cardiac cath 2/12/2014 2/12/14 no 
Predict Discharge Destination After Acute Care Hospitalization in Select Patient Groups: A Retrospective, Observational Study. Arch Rehabil Res Clin Transl. 2022 Jul 16;4(3):329187. doi: 10.1016/[email or web address]. PMID: 65380160; PMCID: PXV5710580.  4. Cielo Pagan S, Raudel W, Keli [employer]. AM-PAC Short Forms Manual 4.0. Revised 2/2020.                                                                                                                                                                                                                               Pain Rating:  Pain in low back during activity   Pain Intervention(s):   rest and repositioning  Activity Tolerance:   Fair  and requires rest breaks  After treatment:   Patient left in no apparent distress sitting up in chair, Call bell within reach, and Bed/ chair alarm activated    COMMUNICATION/EDUCATION:   The patient's plan of care was discussed with: registered nurse    Patient Education  Education Given To: Patient  Education Provided: Role of Therapy;Plan of Care;Equipment;Transfer Training;Energy Conservation;Fall Prevention Strategies  Education Method: Verbal  Barriers to Learning: None  Education Outcome: Verbalized understanding;Continued education needed    Thank you for this referral.  Elaine Moe [facility]  Minutes: 30    Regarding student involvement in patient care:  A student participated in this treatment session. Per CMS Medicare statements and APTA guidelines I certify that the following was true:  1. I was present and directly observed the entire session.  2. I made all skilled judgments and clinical decisions regarding care.  3. I am the practitioner responsible for assessment, treatment, and documentation.

## 2024-10-02 NOTE — PROGRESS NOTES
[facility]  [address] [place] [address]  [phone]  [email or web address]  CARDIOLOGY PROGRESS NOTE    9/30/2024 6:56 PM    Admit Date: 9/25/2024    Admit Diagnosis:   Shortness of breath [R06.02]  Dyspnea on exertion [R06.09]  COPD exacerbation (HCC) [J44.1]  NACHO (acute kidney injury) (HCC) [N17.9]  Acute kidney injury (HCC) [N17.9]    Subjective:     Wilberto Kuhn was seen and examined at the bedside.  Breathing is somewhat better.  No complaint of chest pain this time.    BP (!) 144/78   Pulse 71   Temp 97.9 °F (36.6 °C)   Resp 16   Ht 1.753 m (5' 9\")   Wt 95.5 kg (210 lb 8.6 oz)   SpO2 95%   BMI 31.09 kg/m²     Current Facility-Administered Medications   Medication Dose Route Frequency    butalbital-acetaminophen-caffeine (FIORICET, ESGIC) per tablet 1 tablet  1 tablet Oral Q4H PRN    Darolutamide TABS 600 mg (Patient Supplied)  600 mg Oral BID WC    traMADol (ULTRAM) tablet 25 mg  25 mg Oral Q6H PRN    hydrALAZINE (APRESOLINE) injection 20 mg  20 mg IntraVENous Q6H PRN    melatonin tablet 6 mg  6 mg Oral Nightly PRN    carvedilol (COREG) tablet 3.125 mg  3.125 mg Oral BID WC    ondansetron (ZOFRAN) injection 4 mg  4 mg IntraVENous Q4H PRN    aspirin chewable tablet 81 mg  81 mg Oral Daily    atorvastatin (LIPITOR) tablet 40 mg  40 mg Oral Nightly    sodium bicarbonate tablet 650 mg  650 mg Oral BID    furosemide (LASIX) injection 40 mg  40 mg IntraVENous Daily    warfarin placeholder: dosing by pharmacy   Other RX Placeholder         Objective:      Physical Exam  Physical Exam  Constitutional:       General: He is not in acute distress.  HENT:      Head: Normocephalic and atraumatic.   Neck:      Vascular: No JVD.   Cardiovascular:      Rate and Rhythm: Normal rate and regular rhythm.   Pulmonary:      Effort: Pulmonary effort is normal.      Breath sounds: No wheezing.   Skin:     General: Skin is warm.   Neurological:      Mental Status: He 
        [facility]  [address] [place] [address]  [phone]  [email or web address]  CARDIOLOGY PROGRESS NOTE    10/1/2024 11:44 AM    Admit Date: 9/25/2024    Admit Diagnosis:   Shortness of breath [R06.02]  Dyspnea on exertion [R06.09]  COPD exacerbation (HCC) [J44.1]  NACHO (acute kidney injury) (HCC) [N17.9]  Acute kidney injury (HCC) [N17.9]    Subjective:     Wilberto Kuhn was seen and examined at the bedside.  The patient reports shortness of breath with walking with physical therapy.  Discussed with the patient's daughter over the phone.  Updated her with the plan of care.    BP (!) 132/48   Pulse 70   Temp 99 °F (37.2 °C) (Oral)   Resp 16   Ht 1.753 m (5' 9\")   Wt 98 kg (216 lb 0.8 oz)   SpO2 100%   BMI 31.91 kg/m²     Current Facility-Administered Medications   Medication Dose Route Frequency    warfarin (COUMADIN) tablet 5 mg  5 mg Oral Once    furosemide (LASIX) injection 60 mg  60 mg IntraVENous BID    butalbital-acetaminophen-caffeine (FIORICET, ESGIC) per tablet 1 tablet  1 tablet Oral Q4H PRN    Darolutamide TABS 600 mg (Patient Supplied)  600 mg Oral BID WC    traMADol (ULTRAM) tablet 25 mg  25 mg Oral Q6H PRN    hydrALAZINE (APRESOLINE) injection 20 mg  20 mg IntraVENous Q6H PRN    melatonin tablet 6 mg  6 mg Oral Nightly PRN    carvedilol (COREG) tablet 3.125 mg  3.125 mg Oral BID WC    ondansetron (ZOFRAN) injection 4 mg  4 mg IntraVENous Q4H PRN    aspirin chewable tablet 81 mg  81 mg Oral Daily    atorvastatin (LIPITOR) tablet 40 mg  40 mg Oral Nightly    sodium bicarbonate tablet 650 mg  650 mg Oral BID    warfarin placeholder: dosing by pharmacy   Other RX Placeholder         Objective:      Physical Exam  Physical Exam  Constitutional:       General: He is not in acute distress.  HENT:      Head: Normocephalic and atraumatic.   Cardiovascular:      Rate and Rhythm: Normal rate and regular rhythm.   Pulmonary:      Effort: Pulmonary 
        [facility]  [address] [place], [place] [id number] [phone]  [email or web address]  CARDIOLOGY PROGRESS NOTE    10/2/2024 6:50 PM    Admit Date: 9/25/2024    Admit Diagnosis:   Shortness of breath [R06.02]  Dyspnea on exertion [R06.09]  COPD exacerbation (HCC) [J44.1]  NACHO (acute kidney injury) (HCC) [N17.9]  Acute kidney injury (HCC) [N17.9]    Subjective:     Wilberto Kuhn was seen and examined at the bedside.  No new complaints.  I reviewed patient's medication bottles with the patient.  I discussed patient's care with the patient's daughter over the phone.    /61   Pulse 70   Temp 98.1 °F (36.7 °C) (Oral)   Resp 17   Ht 1.753 m (5' 9\")   Wt 96.3 kg (212 lb 4.9 oz)   SpO2 96%   BMI 31.35 kg/m²     Current Facility-Administered Medications   Medication Dose Route Frequency    warfarin (COUMADIN) tablet 5 mg  5 mg Oral Once    bumetanide (BUMEX) tablet 1 mg  1 mg Oral BID    butalbital-acetaminophen-caffeine (FIORICET, ESGIC) per tablet 1 tablet  1 tablet Oral Q4H PRN    Darolutamide TABS 600 mg (Patient Supplied)  600 mg Oral BID WC    traMADol (ULTRAM) tablet 25 mg  25 mg Oral Q6H PRN    hydrALAZINE (APRESOLINE) injection 20 mg  20 mg IntraVENous Q6H PRN    melatonin tablet 6 mg  6 mg Oral Nightly PRN    carvedilol (COREG) tablet 3.125 mg  3.125 mg Oral BID WC    ondansetron (ZOFRAN) injection 4 mg  4 mg IntraVENous Q4H PRN    aspirin chewable tablet 81 mg  81 mg Oral Daily    atorvastatin (LIPITOR) tablet 40 mg  40 mg Oral Nightly    sodium bicarbonate tablet 650 mg  650 mg Oral BID    warfarin placeholder: dosing by pharmacy   Other RX Placeholder     Current Outpatient Medications   Medication Sig    carvedilol (COREG) 3.125 MG tablet Take 1 tablet by mouth 2 times daily (with meals)    bumetanide (BUMEX) 2 MG tablet Take 1 tablet by mouth daily    bumetanide (BUMEX) 1 MG tablet Take 1 tablet by mouth Daily with supper    warfarin (COUMADIN) 5 
        [facility]  [address] [place] [address]  [phone]  [email or web address]  CARDIOLOGY PROGRESS NOTE    9/27/2024 3:47 PM    Admit Date: 9/25/2024    Admit Diagnosis:   Shortness of breath [R06.02]  Dyspnea on exertion [R06.09]  COPD exacerbation (HCC) [J44.1]  NACHO (acute kidney injury) (HCC) [N17.9]  Acute kidney injury (HCC) [N17.9]    Subjective:     Wilberto Kuhn was seen and examined at the bedside.  The patient reports episodes of chest discomfort and shortness of breath.  Also reports headache.  I spoke to the patient's daughter over the phone.  Requesting further information about the patient's headache.  Informed Dr. Carlos with the primary medicine team    /63   Pulse 68   Temp 97.5 °F (36.4 °C) (Oral)   Resp 18   Ht 1.753 m (5' 9\")   Wt 98 kg (216 lb 0.8 oz)   SpO2 96%   BMI 31.91 kg/m²     Current Facility-Administered Medications   Medication Dose Route Frequency    warfarin (COUMADIN) tablet 7.5 mg  7.5 mg Oral Once    Darolutamide TABS 600 mg (Patient Supplied)  600 mg Oral BID WC    butalbital-acetaminophen-caffeine (FIORICET, ESGIC) per tablet 1 tablet  1 tablet Oral 4x daily    traMADol (ULTRAM) tablet 25 mg  25 mg Oral Q6H PRN    hydrALAZINE (APRESOLINE) injection 20 mg  20 mg IntraVENous Q6H PRN    melatonin tablet 6 mg  6 mg Oral Nightly PRN    carvedilol (COREG) tablet 3.125 mg  3.125 mg Oral BID WC    ondansetron (ZOFRAN) injection 4 mg  4 mg IntraVENous Q4H PRN    aspirin chewable tablet 81 mg  81 mg Oral Daily    atorvastatin (LIPITOR) tablet 40 mg  40 mg Oral Nightly    sodium bicarbonate tablet 650 mg  650 mg Oral BID    furosemide (LASIX) injection 40 mg  40 mg IntraVENous Daily    warfarin placeholder: dosing by pharmacy   Other RX Placeholder         Objective:      Physical Exam  Physical Exam  Constitutional:       General: He is not in acute distress.  HENT:      Head: Normocephalic and atraumatic. 
    Hospitalist Progress Note    NAME:   Wilberto Kuhn   : 1940   MRN: [id number]     Date: 2024    Patient PCP: Isabel Simpson MD    Hospital Problem list:     Acute on chronic systolic CHF POA LVEF 25-30% --> 50% in   Paroxysmal atrial fibrillation POA on chronic coumadin  S/p PPM   Recent COVID-19 infection 2024 = paxlovid  Seen in ED  with severe HA x 3 days   CXR with improved edema   Head CT negative   HA treated symptomatically  PCP day of admit with persistent SOB and HA   HR 40s   No PND or orthopnea  Sent to ED  CXR with cardiomegaly with slightly increased mild edema  Pro BNP 7765   Troponin negative   Hold Entresto/ farxiga for elevated Cr   IV lasix  Resume home ASA   BP borderline low, reduced coreg to 3.125 mg with lower BP  Cont coumadin, pharmacy to dose   INR 3.6 --> 3.2 --> 2.1 --> 1.6 --> 1.8  Primary cardiology ( Dr Espinoza) consult to help with management   ECHO pending  Cardiac testing pending for the morning, will discharge once cleared by cardiology     Acute kidney injury POA admit creat 2.64 --> 1.97  Stage 3 CKD POA Baseline Cr 1.6 to 1.8 since   2.1 on 2024 and 2.64 in ED   ? Cardiorenal, ? Hypovolemia,  Renal US   Large 9.0 cm simple cyst left kidney   No hydronephrosis bilaterally  Nephrology consult ( Dr Hu Chen)   Monitor I&O, daily weight   IV diuresis  Serial labs     Headache, severe at times  Coming and going, improved with Fioricet  Head ct negative  at first ED visit  Symptom control  Responded well to an 24-hour trial of scheduled Fioricet, will use as needed     Thrombocytopenia, chronic POA PLTs 67,000 at admit --> 83,000  Baseline platelets around 80 to 110  Monitor  improving     Hyperlipidemia  Neuropathy  Continue ASA, lipitor     Code Status: full code   DVT Prophylaxis: coumadin   Baseline: independent     Medical Decision Making:   I personally reviewed labs: yes   I personally reviewed imaging:  I personally reviewed EKG: 
    Hospitalist Progress Note    NAME:   Wilberto Kuhn   : 1940   MRN: [id number]     Date: 10/1/2024    Patient PCP: Isabel Simpson MD    Hospital Problem list:     Acute on chronic systolic CHF POA LVEF 25-30% --> 50% in   Paroxysmal atrial fibrillation POA on chronic coumadin, INR 2.2  S/p PPM   Recent COVID-19 infection 2024 = paxlovid  Seen in ED  with severe HA x 3 days   CXR with improved edema   Head CT negative   HA treated symptomatically  PCP day of admit with persistent SOB and HA   HR 40s   No PND or orthopnea  Sent to ED  CXR with cardiomegaly with slightly increased mild edema  Pro BNP 7765   Troponin negative   Hold Entresto/ farxiga for elevated Cr   Resume home ASA   BP borderline low, reduced coreg to 3.125 mg with lower BP  Cont coumadin, pharmacy to dose   INR 3.6 --> 3.2 --> 2.1 --> 1.6 --> 1.8  Primary cardiology ( Dr Espinoza) consult to help with management   ECHO done  Cardiac stress test with no ischemia or infarct, LVEF 51%  Still short of breath walking   Discussed management with Dr. Galvan, recommended continued diuresis   I initially ordered IV but he changed to Bumex 1 mg twice a day  Hopefully discharge in a.m.  PT and OT following     Acute kidney injury POA admit creat 2.64 --> 1.89  Stage 3 CKD POA Baseline Cr 1.6 to 1.8 since   2.1 on 2024 and 2.64 in ED   ? Cardiorenal, ? Hypovolemia,  Renal US   Large 9.0 cm simple cyst left kidney   No hydronephrosis bilaterally  Nephrology consult ( Dr Hu Chen)   Monitor I&O, daily weight   Increased diuresis, changed to p.o. Bumex by Dr. Galvan today  Serial labs, watch for worsening function     Headache, severe at times  Coming and going, improved with Fioricet, deafly doing better now  Head ct negative  at first ED visit  Symptom control  Improved     Thrombocytopenia, chronic POA PLTs 67,000 at admit --> 167,000  Baseline platelets around 80 to 110  Monitor  improving   
    Hospitalist Progress Note    NAME:   Wilberto Kuhn   : 1940   MRN: [id number]     Date: 2024    Patient PCP: Isabel Simpson MD    Hospital Problem list:     Acute on chronic systolic CHF POA LVEF 25-30% --> 50% in   Paroxysmal atrial fibrillation POA  S/p PPM   Recent COVID-19 infection 2024 = paxlovid  Seen in ED  with severe HA x 3 days   CXR with improved edema   Head CT negative   HA treated symptomatically  PCP day of admit with persistent SOB and HA   HR 40s   No PND or orthopnea  Sent to ED  CXR with cardiomegaly with slightly increased mild edema  Pro BNP 7765   Troponin negative   Hold Entresto/ farxiga for elevated Cr   IV lasix  Resume home ASA, Coreg  Cont coumadin, pharmacy to dose   INR 3.6 --> 3.2  Primary cardiology ( Dr Espinoza) consult to help with management   ECHO      Acute kidney injury POA admit creat 2.64  Stage 3 CKD POA Baseline Cr 1.6 to 1.8 since   2.1 on 2024 and 2.64 in ED   ? Cardiorenal, ? Hypovolemia, rule out obstruction  Renal US   Large 9.0 cm simple cyst left kidney   No hydronephrosis bilaterally  Nephrology consult ( Dr Hu Chen)   Monitor I&O, daily weight   IV diuresis  Serial labs     Headache   Head ct negative    Symptom control  Currently denies headache     Thrombocytopenia, chronic POA PLTs 67,000 at admit  Baseline platelets around 80 to 110  Monitor  Improved today     Hyperlipidemia  Neuropathy  Patient cannot recall exact medications he takes, so pharmacy was consulted for med rec.     Code Status: full code   DVT Prophylaxis: coumadin   Baseline: independent     Medical Decision Making:   I personally reviewed labs: yes   I personally reviewed imaging:  I personally reviewed EKG: yes   Toxic drug monitoring: coumadin, monitor INR daily and adjust dose   Discussed case with: JOANNA MIKE     History, assessment and plan for 2024:     Estimated discharge date: 2024    Needs to be done before discharge:  Cards and 
.echo  
End of Shift Note    Bedside shift change report given to Anand (oncoming nurse) by Umm Mcgarry RN (offgoing nurse).  Report included the following information SBAR REPORTS LIST: SBAR, Kardex, MAR, Recent Results, and Med Rec Status    Shift worked:  0392-2208     Shift summary and any significant changes:     Pt.had uneventful night.on room air, denies any pain,non compliant of pain.Kept NPO for cardiac stress test today.     Concerns for physician to address:       Zone phone for oncoming shift:          Activity:  Activity: Out of bed with assistance  Number times ambulated in hallways past shift: 0  Number of times OOB to chair past shift: 0    Cardiac:   Cardiac Monitoring: YES / NO: Yes        Access:  Current line(s): IV ACCESS: - PIV    Genitourinary:   Urinary status: Urinary status: Patient is voiding without difficulty.    Respiratory:   oxygen delivery: room air  Chronic home O2 use?: YES / NO: No  Incentive spirometer at bedside: YES / NO: No      GI:  Current diet:  DIET: regular  Passing flatus: YES / NO: Yes  Tolerating current diet: YES / NO: Yes      Pain Management:   Patient states pain is manageable on current regimen: YES / NO: Yes    Skin:    Interventions: FABRICE SCALE: 19    Patient Safety:  Fall Score: FALL RISK ASSESSMENT: At risk due to:  generalized weakness  Interventions: Fall Interventions Provided: Implemented/recommended use of non-skid footwear, Implemented/recommended assistive devices and encouraged their use, Implemented/recommended resources for alarm system (personal alarm, bed alarm, call bell, etc.) , and Implemented/recommended environmental changes (remove hazards, lower bed, improve lighting, etc.)      Length of Stay:  Expected LOS: 4  Actual LOS: 5      Umm Mcgarry RN                            
End of Shift Note    Bedside shift change report given to José Agarwal LPN (oncoming nurse) by CONY CALABRESE RN (offgoing nurse).  Report included the following information SBAR REPORTS LIST: SBAR, Intake/Output, MAR, and Recent Results    Shift worked:  0051-5086     Shift summary and any significant changes:     Patient had an uneventfull night. C/o headache and one dose of Tramadol ws given as prescribed with good effect. Patient had morning bas done this morning . He is not on tele. Awaits Cardiology , nephrology clearance.     Concerns for physician to address:  -     Zone phone for oncoming shift:   -       Activity:  Activity: Out of bed with assistance  Number times ambulated in hallways past shift: 0  Number of times OOB to chair past shift: 0    Cardiac:   Cardiac Monitoring: YES / NO: No        Access:  Current line(s): IV ACCESS: - PIV    Genitourinary:   Urinary status: Urinary status: Patient is voiding without difficulty.    Respiratory:   oxygen delivery: room air  Chronic home O2 use?: YES / NO: No  Incentive spirometer at bedside: YES / NO: No      GI:  Current diet:  DIET: regular  Passing flatus: YES / NO: Yes  Tolerating current diet: YES / NO: Yes      Pain Management:   Patient states pain is manageable on current regimen: YES / NO: Yes    Skin:    Interventions: FABRICE SCALE: 19    Patient Safety:  Fall Score: FALL RISK ASSESSMENT: At risk due to:  weakness  Interventions: Fall Interventions Provided: Implemented/recommended use of non-skid footwear, Implemented/recommended use of fall risk identification flag to all team members, Implemented/recommended assistive devices and encouraged their use, Implemented/recommended resources for alarm system (personal alarm, bed alarm, call bell, etc.) , Implemented/recommended environmental changes (remove hazards, lower bed, improve lighting, etc.), and Implemented/recommended increased supervision/assistance      Length of Stay:  Expected LOS: 
End of Shift Note    Bedside shift change report given to Keyana (oncoming nurse) by Justa Dunham RN (offgoing nurse).  Report included the following information SBAR, Kardex, and MAR    Shift worked:  7p-7a     Shift summary and any significant changes:     Scheduled medications were given, see MAR.  IV has been flushed and is patent.  Patient is up with the assistance of a cane.  Patient uses the urinal at bedside.  Morning lab was done.  Patient had two bloody bowel movements during my shift.  Patient teaching and  routine rounding has been done.     Concerns for physician to address:       Zone phone for oncoming shift:          Activity:     Number times ambulated in hallways past shift: 0  Number of times OOB to chair past shift: 0    Cardiac:   Cardiac Monitoring: No           Access:  Current line(s): PIV     Genitourinary:   Urinary status: voiding    Respiratory:      Chronic home O2 use?: NO  Incentive spirometer at bedside: NO       GI:     Current diet:  ADULT DIET; Regular; Low Sodium (2 gm)  Passing flatus: YES  Tolerating current diet: YES       Pain Management:   Patient states pain is manageable on current regimen: YES    Skin:     Interventions: float heels and increase time out of bed    Patient Safety:  Fall Score:    Interventions: bed/chair alarm, assistive device (walker, cane. etc), gripper socks, and pt to call before getting OOB       Length of Stay:  Expected LOS: 7  Actual LOS: 7      Justa Dunham RN                            
End of Shift Note    Bedside shift change report given to Madiha BROWN (oncoming nurse) by Klaudia Becker RN (offgoing nurse).  Report included the following information SBAR, Med Rec Status, Cardiac Rhythm  , and Quality Measures    Shift worked:  7a-7p     Shift summary and any significant changes:     Pt had no significant changes during shift. Pt stress test complete. Pt in chair most of shift. No complaints of care. Care is still progressing.      Concerns for physician to address:  [facility]      Zone phone for oncoming shift:          Activity:     Number times ambulated in hallways past shift: 1  Number of times OOB to chair past shift: 1    Cardiac:   Cardiac Monitoring: No           Access:  Current line(s): PIV     Genitourinary:   Urinary status: voiding    Respiratory:      Chronic home O2 use?: NO  Incentive spirometer at bedside: NO       GI:     Current diet:  ADULT DIET; Regular; Low Sodium (2 gm)  Passing flatus: YES  Tolerating current diet: YES       Pain Management:   Patient states pain is manageable on current regimen: YES    Skin:     Interventions: increase time out of bed and PT/OT consult    Patient Safety:  Fall Score:    Interventions: bed/chair alarm, assistive device (walker, cane. etc), gripper socks, pt to call before getting OOB, stay with me (per policy), and gait belt       Length of Stay:  Expected LOS: 6  Actual LOS: 5      Klaudia Becker RN                            
End of Shift Note    Bedside shift change report given to STEPHANIE Irving (oncoming nurse) by Any Monahan LPN (offgoing nurse).  Report included the following information SBAR, Intake/Output, MAR, Recent Results, and Med Rec Status    Shift worked:  7 A to 7 P     Shift summary and any significant changes:     Echo completed - EF of 50-55%. Patient waiting to be cleared with Nephrology, Cardiology, and for placement at a SNF before discharge. No complaints of pain. Dyspnea on exertion and SOB continue. No bowel movement.     Concerns for physician to address:  None     Zone phone for oncoming shift:   [phone]       Activity:     Number times ambulated in hallways past shift: 1  Number of times OOB to chair past shift: 3    Cardiac:   Cardiac Monitoring: No           Access:  Current line(s): PIV     Genitourinary:   Urinary status: voiding    Respiratory:      Chronic home O2 use?: NO  Incentive spirometer at bedside: NO       GI:     Current diet:  ADULT DIET; Regular; Low Sodium (2 gm)  Passing flatus: YES  Tolerating current diet: YES       Pain Management:   Patient states pain is manageable on current regimen: N/A    Skin:     Interventions: increase time out of bed    Patient Safety:  Fall Score:    Interventions: bed/chair alarm, assistive device (walker, cane. etc), gripper socks, and pt to call before getting OOB       Length of Stay:  Expected LOS: 7  Actual LOS: 6      Any Monahan LPN                           
Eval complete and note to follow.  SOB reported on exertion O2 sat on room air was 100%.  Cardiologist arrived at end of session and was informed of pts complaint and was providing education on cause for shortness of breath at end of session.   
Gave SB AR report to Roel. All questions answered. Patient going to room 2297.  
Improved renal fx, stable Na   Continue Lasix   We will check back Monday   
Nephrology Progress Note  [facility] [place] [facility]  [address], [address]  [place] [address]  Phone - [phone]  Fax - [phone]                 Patient: Wilberto Kuhn                     YOB: 1940        Date- 10/1/2024                                     Admit Date: 9/25/2024   CC: Follow up for NACHO on CKD          IMPRESSION & PLAN:   NACHO likely due to cardiorenal syndrome (renal ultrasound shows no hydro, showed renal cyst)  CHF  Sob  Hypertension  Ckd 3b-- bl cr 1.6- 1.9      PLAN-  Creatinine continues to improve  Continue twice daily diuretics  Check daily standing weights  Check daily labs     Subjective:  Interval History:   -Seen and examined today    Objective:   Vitals:    09/30/24 2023 09/30/24 2119 10/01/24 0536 10/01/24 0729   BP: 124/62   (!) 132/48   Pulse: 71   70   Resp: 17 16  16   Temp: 98.2 °F (36.8 °C)   99 °F (37.2 °C)   TempSrc: Oral   Oral   SpO2: 98%   100%   Weight:   98 kg (216 lb 0.8 oz)    Height:          I/O last 3 completed shifts:  In: -   Out: 1850 [Urine:1850]  I/O this shift:  In: -   Out: 200 [Urine:200]      Physical exam:    GEN: NAD  NECK- no mass  RESP: No wheezing, decreased BS b/l  CVS: S1,S2  RRR  NEURO: Normal speech, Non focal  EXT: No Edema   PSYCH: Normal Mood    Chart reviewed.         Pertinent Notes reviewed.     Data Review :  Lab Results   Component Value Date/Time     10/01/2024 04:18 AM    K 4.0 10/01/2024 04:18 AM     10/01/2024 04:18 AM    CO2 25 10/01/2024 04:18 AM    BUN 46 10/01/2024 04:18 AM    CREATININE 1.89 10/01/2024 04:18 AM    GLUCOSE 98 10/01/2024 04:18 AM    CALCIUM 9.3 10/01/2024 04:18 AM       Lab Results   Component Value Date    WBC 4.0 (L) 10/01/2024    HGB 9.9 (L) 10/01/2024    HCT 31.8 (L) 10/01/2024    MCV 83.7 10/01/2024     10/01/2024      Recent Labs     09/29/24  0332 09/30/24  0535 10/01/24  0418   * 136 137   K 4.1 
Nephrology Progress Note  [facility] [place] [facility]  [address], [address]  [place] [address]  Phone - [phone]  Fax - [phone]                 Patient: Wilberto Kuhn                     YOB: 1940        Date- 9/27/2024                                     Admit Date: 9/25/2024   CC: Follow up for NACHO on CKD          IMPRESSION & PLAN:   NACHO likely due to cardiorenal syndrome (renal ultrasound shows no hydro)  CHF  Sob  Hypertension  Ckd 3b-- bl cr 1.6- 1.9      PLAN-  Creatinine continues to improve  Continue to diurese with Lasix daily 40 mg  Check daily labs     Subjective:  Interval History:   -Seen and examined today    Objective:   Vitals:    09/27/24 0345 09/27/24 0400 09/27/24 0612 09/27/24 0800   BP: (!) 106/46 (!) 110/54  113/62   Pulse: 72 73  70   Resp: 16 16  18   Temp:  99.7 °F (37.6 °C)  98.8 °F (37.1 °C)   TempSrc: Oral Oral  Oral   SpO2: 98% 98%  94%   Weight:   98 kg (216 lb 0.8 oz)    Height:          I/O last 3 completed shifts:  In: 1130 [P.O.:1130]  Out: 1575 [Urine:1575]  I/O this shift:  In: -   Out: 275 [Urine:275]      Physical exam:    GEN: NAD  NECK- no mass  RESP: No wheezing, decreased BS b/l  CVS: S1,S2  RRR  NEURO: Normal speech, Non focal  EXT: No Edema   PSYCH: Normal Mood    Chart reviewed.         Pertinent Notes reviewed.     Data Review :  Lab Results   Component Value Date/Time     09/27/2024 04:01 AM    K 3.8 09/27/2024 04:01 AM     09/27/2024 04:01 AM    CO2 23 09/27/2024 04:01 AM    BUN 52 09/27/2024 04:01 AM    CREATININE 2.37 09/27/2024 04:01 AM    GLUCOSE 116 09/27/2024 04:01 AM    CALCIUM 8.7 09/27/2024 04:01 AM       Lab Results   Component Value Date    WBC 4.2 09/27/2024    HGB 9.2 (L) 09/27/2024    HCT 27.8 (L) 09/27/2024    MCV 82.2 09/27/2024    PLT 83 (L) 09/27/2024      Recent Labs     09/25/24  1102 09/26/24  0935 09/27/24  0401    135* 133*   K 4.6 
Nuclear medicine Lexiscan stress test (MPI) completed. Results pending.  
Patient discharged to home, picked up by daughter. IV removed. Belongings with patient. AVS reviewed with patient and daughter.   
Pharmacist Daily Dosing of Warfarin    Indication & Goal INR: AFib, INR Goal 2-3    PTA Warfarin Dose: 5mg every evening, except 7.5mg on Fridays    Notable concurrent conditions and medications: CHF    Labs:  Recent Labs     Units 09/26/24  0522 09/25/24  1815 09/25/24  1102 09/23/24  0820   INR   3.2* 3.6*  --   --    HGB g/dL  --   --  11.0* 11.2*   PLT K/uL  --   --  67* 100*       Impression/Plan:   Hold warfarin 9/26 - INR 3.2  Daily INR has been ordered  CBC w/o differential every other day has been ordered     Pharmacy will follow daily and adjust the dose as appropriate.    Thank you,  Renetta Baltazar [facility]      Warfarin Protocol    Located on pharmacy Teams site: Clinical Practice -> Anticoagulation & Cardiology -> Anticoagulation Policies, Protocols, Guidance         
Pharmacist Daily Dosing of Warfarin    Indication & Goal INR: AFib, INR Goal 2-3    PTA Warfarin Dose: 5mg every evening, except 7.5mg on Fridays    Notable concurrent conditions and medications: CHF    Labs:  Recent Labs     Units 09/27/24  0401 09/26/24  0935 09/26/24  0522 09/25/24  1815 09/25/24  1102   INR   2.1*  --  3.2* 3.6*  --    HGB g/dL 9.2* 10.2*  --   --  11.0*   PLT K/uL 83* 73*  --   --  67*       Impression/Plan:   Will order warfarin 7.5 mg x 1 dose   Daily INR has been ordered  CBC w/o differential every other day has been ordered     Pharmacy will follow daily and adjust the dose as appropriate.    Thank you,  Renetta Baltazar, [facility]      Warfarin Protocol    Located on pharmacy Teams site: Clinical Practice -> Anticoagulation & Cardiology -> Anticoagulation Policies, Protocols, Guidance       
Pharmacist Daily Dosing of Warfarin    Indication & Goal INR: AFib, INR Goal 2-3    PTA Warfarin Dose: 5mg every evening, except 7.5mg on Fridays    Notable concurrent conditions and medications: CHF    Labs:  Recent Labs     Units 09/28/24  0532 09/27/24  0401 09/26/24  0935 09/26/24  0522   INR   1.6* 2.1*  --  3.2*   HGB g/dL 9.2* 9.2* 10.2*  --    PLT K/uL 103* 83* 73*  --        Impression/Plan:   Will order warfarin 5 mg x 1 dose   Daily INR has been ordered  CBC w/o differential every other day has been ordered     Pharmacy will follow daily and adjust the dose as appropriate.    Thank you,  Hieu Fletcher [facility]      Warfarin Protocol    Located on pharmacy Teams site: Clinical Practice -> Anticoagulation & Cardiology -> Anticoagulation Policies, Protocols, Guidance             
Pharmacist Daily Dosing of Warfarin    Indication & Goal INR: AFib, INR Goal 2-3    PTA Warfarin Dose: 5mg every evening, except 7.5mg on Fridays    Notable concurrent conditions and medications: CHF    Labs:  Recent Labs     Units 09/29/24  0332 09/28/24  0532 09/27/24  0401   INR   1.8* 1.6* 2.1*   HGB g/dL  --  9.2* 9.2*   PLT K/uL  --  103* 83*       Impression/Plan:   Will order warfarin 5 mg x 1 dose   Daily INR has been ordered  CBC w/o differential every other day has been ordered     Pharmacy will follow daily and adjust the dose as appropriate.    Thank you,  Hieu Fletcher [facility]      Warfarin Protocol    Located on pharmacy Teams site: Clinical Practice -> Anticoagulation & Cardiology -> Anticoagulation Policies, Protocols, Guidance               
Pharmacist Daily Dosing of Warfarin    Indication & Goal INR: AFib, INR Goal 2-3    PTA Warfarin Dose: 5mg every evening, except 7.5mg on Fridays    Notable concurrent conditions and medications: CHF and fioricet    Labs:  Recent Labs     Units 09/30/24  0535 09/29/24  0332 09/28/24  0532   INR   1.9* 1.8* 1.6*   HGB g/dL 9.1*  --  9.2*   PLT K/uL 157  --  103*     Impression/Plan:   Will order warfarin 5 mg x 1 dose   Daily INR has been ordered  CBC w/o differential every other day has been ordered     Pharmacy will follow daily and adjust the dose as appropriate.    Thank you,  Renetta Baltazar [facility]      Warfarin Protocol    Located on pharmacy Teams site: Clinical Practice -> Anticoagulation & Cardiology -> Anticoagulation Policies, Protocols, Guidance     
Pharmacist Daily Dosing of Warfarin    Indication & Goal INR: AFib, INR Goal 2-3    PTA Warfarin Dose: 5mg every evening, except 7.5mg on Fridays    Notable concurrent conditions and medications: CHF and fioricet    Labs:  Recent Labs     Units 10/01/24  0418 09/30/24  0535 09/30/24  0535 09/29/24  0332   INR   2.2*  --  1.9* 1.8*   HGB g/dL 9.9*   < > 9.1*  --    PLT K/uL 167  --  157  --     < > = values in this interval not displayed.       Impression/Plan:   Will order warfarin 5 mg x 1 dose   Daily INR has been ordered  CBC w/o differential every other day has been ordered     Pharmacy will follow daily and adjust the dose as appropriate.    Thank you,  TEJ ALMEIDA [facility]      Warfarin Protocol    Located on pharmacy Teams site: Clinical Practice -> Anticoagulation & Cardiology -> Anticoagulation Policies, Protocols, Guidance                   
Pharmacist Daily Dosing of Warfarin    Indication & Goal INR: AFib, INR Goal 2-3    PTA Warfarin Dose: 5mg every evening, except 7.5mg on Fridays    Notable concurrent conditions and medications: CHF and fioricet    Labs:  Recent Labs     Units 10/02/24  0306 10/01/24  0418 09/30/24  0535   INR   2.1* 2.2* 1.9*   HGB g/dL  --  9.9* 9.1*   PLT K/uL  --  167 157       Impression/Plan:   Will order warfarin 5 mg x 1 dose   Daily INR has been ordered  CBC w/o differential every other day has been ordered     Pharmacy will follow daily and adjust the dose as appropriate.    Thank you,  Renetta Baltazar [facility]      Warfarin Protocol    Located on pharmacy Teams site: Clinical Practice -> Anticoagulation & Cardiology -> Anticoagulation Policies, Protocols, Guidance     
Pharmacist Daily Dosing of Warfarin    Indication & Goal INR: AFib, INR Goal 2-3    PTA Warfarin Dose: 5mg every evening, except 7.5mg on Fridays    Notable concurrent conditions and medications: None    Labs:  Recent Labs     Units 09/25/24  1102 09/23/24  0820   HGB g/dL 11.0* 11.2*   PLT K/uL 67* 100*       Impression/Plan:   Waiting on INR to result for 9/25/24 dose  Daily INR has been ordered  CBC w/o differential every other day has been ordered     Pharmacy will follow daily and adjust the dose as appropriate.    Thank you,  Conrado Bradley [facility]      Warfarin Protocol    Located on pharmacy Teams site: Clinical Practice -> Anticoagulation & Cardiology -> Anticoagulation Policies, Protocols, Guidance     
Pharmacist Daily Dosing of Warfarin    Indication & Goal INR: AFib, INR Goal 2-3    PTA Warfarin Dose: 5mg every evening, except 7.5mg on Fridays    Notable concurrent conditions and medications: None    Labs:  Recent Labs     Units 09/25/24  1815 09/25/24  1102 09/23/24  0820   INR   3.6*  --   --    HGB g/dL  --  11.0* 11.2*   PLT K/uL  --  67* 100*       Impression/Plan:   Waiting on INR to result for 9/25/24 dose - INR resulted as 3.6. HOLD dose tonight.   Daily INR has been ordered  CBC w/o differential every other day has been ordered     Pharmacy will follow daily and adjust the dose as appropriate.    Thank you,  Teena Matt [facility]      Warfarin Protocol    Located on pharmacy Teams site: Clinical Practice -> Anticoagulation & Cardiology -> Anticoagulation Policies, Protocols, Guidance       
Pharmacy Medication Reconciliation     The patient was interviewed regarding current PTA medication list, use and drug allergies;  patient present in room and obtained permission from patient to discuss drug regimen with visitor(s) present. The patient was questioned regarding use of any other inhalers, topical products, over the counter medications, herbal medications, vitamin products or ophthalmic/nasal/otic medication use.     Allergy Update: Oxycodone    Recommendations/Findings:   The following amendments were made to the patient's active medication list on file at [facility]:   1) Additions: none  2) Deletions:   -diclofenac gel  -fluticasone  -gabapentin  -lidocaine patch  -lorazepam  -mupirocin  -pregabalin  -triamcinolone cream  3) Changes: none  Pertinent Findings:   -patient confirms he receives some mail order medications    Clarified PTA med list with rx query, patient interview. PTA medication list was corrected to the following:     Prior to Admission Medications   Prescriptions Last Dose Informant   Darolutamide (NUBEQA) 300 MG TABS 9/25/2024 at 0900 Outside Pharmacy/PCP, Self   Sig: Take 600 mg by mouth 2 times daily (with meals)   ENTRESTO 24-26 MG per tablet 9/25/2024 at 0900 Outside Pharmacy/PCP, Self   Sig: Take 1 tablet by mouth 2 times daily   aspirin 81 MG chewable tablet 9/25/2024 at 0900 Self, Outside Pharmacy/PCP   Sig: Take 1 tablet by mouth daily   atorvastatin (LIPITOR) 40 MG tablet 9/24/2024 at 2100 Outside Pharmacy/PCP, Self   Sig: Take 1 tablet by mouth nightly   carvedilol (COREG) 25 MG tablet 9/25/2024 at 0800 Outside Pharmacy/PCP, Self   Sig: Take 1 tablet by mouth 2 times daily (with meals)   clotrimazole-betamethasone (LOTRISONE) 1-0.05 % cream 9/25/2024 Self, Outside Pharmacy/PCP   Sig: APPLY  CREAM TOPICALLY TO AFFECTED AREA TWICE DAILY   dapagliflozin (FARXIGA) 10 MG tablet 9/25/2024 at 0900 Self, Outside Pharmacy/PCP   Sig: Take 1 tablet by mouth every morning   furosemide 
Pt is off the floor and unable to examine   Chart reviewed  Renal fx back to baseline   We will check back tomorrow   
Pt standing weight 95.5 kg  
Report given to Clara BROWN. SBAR reviewed. Ongoing care provided.  
  I personally reviewed imaging:  I personally reviewed EKG: yes   Toxic drug monitoring: coumadin, monitor INR daily and adjust dose   Discussed case with: CM, NS, son by phone     History, assessment and plan for 9/30/2024:     Estimated discharge date: 9/10/1/2024    Needs to be done before discharge:  ? SNF bed    Reason for physician visit:    Missed earlier today at stress test, s;eeping hard when I came back later  Discussed with RN events overnight.   Cardiac stress test negative  Currently denies shortness of breath or chest pain  No  cough, CP, abdominal pain, N/V, diarrhea    Total NON critical care TIME:  25  Minutes    Total CRITICAL CARE TIME Spent:   Minutes non procedure based    Objective:     VITALS:   Last 24hrs VS reviewed since prior progress note. Most recent are:  Patient Vitals for the past 24 hrs:   BP Temp Temp src Pulse Resp SpO2 Weight   09/30/24 2023 124/62 98.2 °F (36.8 °C) Oral 71 17 -- --   09/30/24 1242 -- -- Oral -- -- -- --   09/30/24 1205 (!) 144/78 -- -- 71 -- -- --   09/30/24 0900 -- -- -- -- -- -- 95.5 kg (210 lb 8.6 oz)   09/30/24 0757 (!) 140/60 97.9 °F (36.6 °C) -- 72 16 95 % --   09/30/24 0552 -- -- -- -- -- -- 98.9 kg (218 lb 0.6 oz)   09/30/24 0426 (!) 119/56 98.1 °F (36.7 °C) Oral 70 15 98 % --         Intake/Output Summary (Last 24 hours) at 9/30/2024 2143  Last data filed at 9/30/2024 1601  Gross per 24 hour   Intake --   Output 1450 ml   Net -1450 ml        I had a face to face encounter and independently examined this patient on 9/30/2024, as outlined below:    PHYSICAL EXAM:  General: Alert, cooperative  EENT:  Anicteric sclerae.  Resp:  CTA bilaterally, no wheezing or rales.  No accessory muscle use  CV:  Regular  rhythm,  No edema  GI:  Soft, Non distended, Non tender.  +Bowel sounds  Neurologic:  Alert and oriented X 3, normal speech, no pronator drift, 5/5 motor strength  Psych:   Not anxious nor agitated  Skin:  No rashes.  No jaundice    Reviewed most current 
(L) 10/01/2024    HGB 9.9 (L) 10/01/2024    HCT 31.8 (L) 10/01/2024    MCV 83.7 10/01/2024     10/01/2024      Recent Labs     09/30/24  0535 10/01/24  0418 10/02/24  0306    137 136   K 4.1 4.0 4.1    105 104   CO2 24 25 24   GLUCOSE 102* 98 100   BUN 47* 46* 43*   CREATININE 1.86* 1.89* 1.72*   CALCIUM 9.1 9.3 9.1       Recent Labs     09/30/24  0535 10/01/24  0418   WBC 4.7 4.0*   RBC 3.42* 3.80*   HGB 9.1* 9.9*   HCT 28.7* 31.8*   MCV 83.9 83.7   MCH 26.6 26.1   MCHC 31.7 31.1   RDW 14.3 14.1    167   MPV 11.8 10.7     No results found for: \"IRON\", \"TIBC\"  No results found for: \"PTH\"  Lab Results   Component Value Date/Time    LABA1C 6.7 (H) 01/27/2023 09:02 AM     Lab Results   Component Value Date/Time    COLORU YELLOW/STRAW 09/25/2024 04:38 PM    CLARITYU CLEAR 12/31/2021 07:21 PM    GLUCOSEU 500 (A) 09/25/2024 04:38 PM    GLUCOSEU Negative 11/29/2023 11:50 AM    BILIRUBINUR Negative 09/25/2024 04:38 PM    KETUA Negative 09/25/2024 04:38 PM    BLOODU SMALL (A) 09/25/2024 04:38 PM    PHUR 5.0 09/25/2024 04:38 PM    PHUR 5.0 12/31/2021 07:21 PM    PROTEINU Negative 09/25/2024 04:38 PM    NITRU Negative 09/25/2024 04:38 PM    LEUKOCYTESUR Negative 09/25/2024 04:38 PM     US Results (most recent):  Medication list  reviewed  Current Facility-Administered Medications   Medication Dose Route Frequency    warfarin (COUMADIN) tablet 5 mg  5 mg Oral Once    bumetanide (BUMEX) tablet 1 mg  1 mg Oral BID    butalbital-acetaminophen-caffeine (FIORICET, ESGIC) per tablet 1 tablet  1 tablet Oral Q4H PRN    Darolutamide TABS 600 mg (Patient Supplied)  600 mg Oral BID WC    traMADol (ULTRAM) tablet 25 mg  25 mg Oral Q6H PRN    hydrALAZINE (APRESOLINE) injection 20 mg  20 mg IntraVENous Q6H PRN    melatonin tablet 6 mg  6 mg Oral Nightly PRN    carvedilol (COREG) tablet 3.125 mg  3.125 mg Oral BID WC    ondansetron (ZOFRAN) injection 4 mg  4 mg IntraVENous Q4H PRN    aspirin chewable tablet 81 mg  81 
discharge date: 9/30/2024    Needs to be done before discharge:  Cards and renal consult  Improved renal function  Improved headache  ambulation    Reason for physician visit:    \"The headache is done\".  Discussed with RN events overnight.   Headache recurrent, moderate, frontal resolved with fiorcet  Less SOB, currently on room air  Creatinine improving with diuresis  No  cough, CP, abdominal pain, N/V, diarrhea    Total NON critical care TIME:  33  Minutes    Total CRITICAL CARE TIME Spent:   Minutes non procedure based    Objective:     VITALS:   Last 24hrs VS reviewed since prior progress note. Most recent are:  Patient Vitals for the past 24 hrs:   BP Temp Temp src Pulse Resp SpO2   09/28/24 1440 (!) 107/50 98.1 °F (36.7 °C) Oral (!) 35 18 98 %         Intake/Output Summary (Last 24 hours) at 9/29/2024 1146  Last data filed at 9/29/2024 0329  Gross per 24 hour   Intake 700 ml   Output 1250 ml   Net -550 ml        I had a face to face encounter and independently examined this patient on 9/28/2024, as outlined below:    PHYSICAL EXAM:  General: Alert, cooperative  EENT:  Anicteric sclerae.  Resp:  CTA bilaterally, no wheezing or rales.  No accessory muscle use  CV:  Regular  rhythm,  No edema  GI:  Soft, Non distended, Non tender.  +Bowel sounds  Neurologic:  Alert and oriented X 3, normal speech, no pronator drift, 5/5 motor strength  Psych:   Not anxious nor agitated  Skin:  No rashes.  No jaundice    Reviewed most current lab test results and cultures  YES  Reviewed most current radiology test results   YES  Review and summation of old records today    NO  Reviewed patient's current orders and MAR    YES  PMH/SH reviewed - no change compared to H&P    ________________________________________________________________________        Comments   >50% of visit spent in counseling and coordination of care     ________________________________________________________________________  Carlitos Carlos Jr, MD 
strength  Psych:   Not anxious nor agitated  Skin:  No rashes.  No jaundice    Reviewed most current lab test results and cultures  YES  Reviewed most current radiology test results   YES  Review and summation of old records today    NO  Reviewed patient's current orders and MAR    YES  PMH/SH reviewed - no change compared to H&P    ________________________________________________________________________        Comments   >50% of visit spent in counseling and coordination of care     ________________________________________________________________________  Carlitos Carlos Jr, MD     Procedures: see electronic medical records for all procedures/Xrays and details which were not copied into this note but were reviewed prior to creation of Plan.      LABS:  I reviewed today's most current labs and imaging studies.  Pertinent labs include:  Recent Labs     09/25/24  1102 09/26/24  0935 09/27/24  0401   WBC 3.8* 3.4* 4.2   HGB 11.0* 10.2* 9.2*   HCT 34.2* 31.8* 27.8*   PLT 67* 73* 83*     Recent Labs     09/25/24  1102 09/26/24  0935 09/27/24  0401    135* 133*   K 4.6 4.1 3.8    105 102   CO2 24 22 23   GLUCOSE 127* 132* 116*   BUN 45* 42* 52*   CREATININE 2.64* 2.45* 2.37*   CALCIUM 9.4 8.7 8.7   MG 2.9*  --   --    PHOS  --   --  4.0   BILITOT 0.8  --   --    AST 36  --   --    ALT 24  --   --      Invalid input(s): \"CK\", \"TROPONIN\", \"PBNP\"    Signed: Carlitos Carlos Jr, MD

## 2024-10-02 NOTE — DISCHARGE SUMMARY
- F [phone]  [address], [facility] [id number]      Phone: [phone]   bumetanide 1 MG tablet  bumetanide 2 MG tablet  carvedilol 3.125 MG tablet             DISPOSITION:    Home with Family:       Home with HH/PT/OT/RN: Home with home health   SNF/LTC:    DIA:    OTHER:            Code status: Full code  Recommended diet: cardiac diet and diabetic diet  Recommended activity: activity as tolerated and as per PT/OT.  Wound care: None      Follow up with:   PCP : Isabel Simpson MD    [facility]  [address] [id number] [phone]  Follow up  Patient's Own Medication is located in the Patient's:  omni bin    Medications stored in the designated location: [place]    At the time of discharge or transfer to another unit, the nurse on duty will retrieve the home medications for the patient    [facility]  [address]  [facility] [id number] [phone]  Follow up  This is your home health agency. Contact them with any home health concerns.    Isabel Simpson MD  [address] [facility] [address]  [phone]    Follow up in 1 week(s)      Isabel Simpson MD  [address] [facility] [address]  [phone]          Elvis Espinoza MD  [address] [id number] [phone]    Follow up in 1 week(s)      Prashanth Rico MD  [address]  [address]  [facility] [address]  [phone]    Follow up in 1 month(s)            Total time in minutes spent coordinating this discharge (includes going over instructions, follow-up, prescriptions, and preparing report for sign off to her PCP) :  35 minutes

## 2024-10-09 ENCOUNTER — TELEPHONE (OUTPATIENT)
Facility: CLINIC | Age: 84
End: 2024-10-09

## 2024-10-09 NOTE — TELEPHONE ENCOUNTER
Call [facility]  and give verbal order from Dr. Simpson to test PT/INR this week then every 2 weeks. Phone: [phone].

## 2024-10-14 ENCOUNTER — OFFICE VISIT (OUTPATIENT)
Facility: CLINIC | Age: 84
End: 2024-10-14
Payer: MEDICARE

## 2024-10-14 VITALS
RESPIRATION RATE: 16 BRPM | HEART RATE: 79 BPM | OXYGEN SATURATION: 98 % | HEIGHT: 69 IN | WEIGHT: 212.4 LBS | SYSTOLIC BLOOD PRESSURE: 122 MMHG | TEMPERATURE: 99 F | DIASTOLIC BLOOD PRESSURE: 82 MMHG | BODY MASS INDEX: 31.46 KG/M2

## 2024-10-14 DIAGNOSIS — Z09 HOSPITAL DISCHARGE FOLLOW-UP: Primary | ICD-10-CM

## 2024-10-14 DIAGNOSIS — I50.33 ACUTE ON CHRONIC DIASTOLIC CONGESTIVE HEART FAILURE (HCC): ICD-10-CM

## 2024-10-14 DIAGNOSIS — I48.0 PAF (PAROXYSMAL ATRIAL FIBRILLATION) (HCC): ICD-10-CM

## 2024-10-14 DIAGNOSIS — N18.30 BENIGN HYPERTENSION WITH CKD (CHRONIC KIDNEY DISEASE) STAGE III (HCC): ICD-10-CM

## 2024-10-14 DIAGNOSIS — E78.2 MIXED HYPERLIPIDEMIA: ICD-10-CM

## 2024-10-14 DIAGNOSIS — Z79.01 CHRONIC ANTICOAGULATION: ICD-10-CM

## 2024-10-14 DIAGNOSIS — N17.9 AKI (ACUTE KIDNEY INJURY) (HCC): ICD-10-CM

## 2024-10-14 DIAGNOSIS — I12.9 BENIGN HYPERTENSION WITH CKD (CHRONIC KIDNEY DISEASE) STAGE III (HCC): ICD-10-CM

## 2024-10-14 LAB
POC INR: 1.8
PROTHROMBIN TIME, POC: NORMAL

## 2024-10-14 PROCEDURE — G8427 DOCREV CUR MEDS BY ELIG CLIN: HCPCS | Performed by: INTERNAL MEDICINE

## 2024-10-14 PROCEDURE — 1111F DSCHRG MED/CURRENT MED MERGE: CPT | Performed by: INTERNAL MEDICINE

## 2024-10-14 PROCEDURE — 1123F ACP DISCUSS/DSCN MKR DOCD: CPT | Performed by: INTERNAL MEDICINE

## 2024-10-14 PROCEDURE — 99215 OFFICE O/P EST HI 40 MIN: CPT | Performed by: INTERNAL MEDICINE

## 2024-10-14 PROCEDURE — G8484 FLU IMMUNIZE NO ADMIN: HCPCS | Performed by: INTERNAL MEDICINE

## 2024-10-14 PROCEDURE — G8417 CALC BMI ABV UP PARAM F/U: HCPCS | Performed by: INTERNAL MEDICINE

## 2024-10-14 PROCEDURE — 85610 PROTHROMBIN TIME: CPT | Performed by: INTERNAL MEDICINE

## 2024-10-14 PROCEDURE — 4004F PT TOBACCO SCREEN RCVD TLK: CPT | Performed by: INTERNAL MEDICINE

## 2024-10-14 NOTE — PROGRESS NOTES
Wilberto Kuhn  Identified pt with two pt identifiers(name and ).  Chief Complaint   Patient presents with    Follow-Up from Hospital       1. Have you been to the ER, urgent care clinic since your last visit?  Hospitalized since your last visit? Yes [facility]    2. Have you seen or consulted any other health care providers outside of the [facility] since your last visit?  Include any pap smears or colon screening. NO  Pt had a Flu and Covid vaccine.     Provider notified of reason for visit, vitals and flowsheets obtained on patients.     Patient received paperwork for advance directive during previous visit but has not completed at this time     Reviewed record In preparation for visit, huddled with provider and have obtained necessary documentation      Health Maintenance Due   Topic    Lipids     Flu vaccine (1)    Annual Wellness Visit (Medicare)        Wt Readings from Last 3 Encounters:   10/14/24 96.3 kg (212 lb 6.4 oz)   10/01/24 96.3 kg (212 lb 4.9 oz)   24 97.5 kg (215 lb)     Temp Readings from Last 3 Encounters:   10/14/24 99 °F (37.2 °C) (Oral)   10/02/24 98.1 °F (36.7 °C) (Oral)   24 98.6 °F (37 °C)     BP Readings from Last 3 Encounters:   10/14/24 122/82   10/02/24 139/61   24 (!) 108/54     Pulse Readings from Last 3 Encounters:   10/14/24 79   10/02/24 70   24 (!) 42          No data to display                  Learning Assessment:  :         2023    10:30 AM   [facility] AMB LEARNING ASSESSMENT   Primary Learner Patient   level of education DID NOT GRADUATE HIGH SCHOOL   Primary Language ENGLISH   Learning Preference DEMONSTRATION   Answered By Patient   Relationship to Learner SELF       Fall Risk Assessment:  :         3/12/2024    11:27 AM 2023    11:10 AM 2023    10:58 AM 2022    11:14 AM 2022     9:00 AM 2021     3:06 PM 2021     2:56 PM   Amb Fall Risk Assessment and TUG Test   Do you feel unsteady or are you worried about 
rate and rhythm, normal S1 and S2, no murmur, gallop, or rub  Chest: Well-healed vertical scar at left upper chest  Extremities: No clubbing, cyanosis, or edema.   Neurological: Alert and oriented. No focal deficits.  Psychiatric: Normal mood and affect. Behavior is normal.       Results for orders placed or performed in visit on 10/14/24   AMB POC PT/INR   Result Value Ref Range    Prothrombin time, POC      POC INR 1.8        An electronic signature was used to authenticate this note.  --Isabel Simpson MD

## 2024-10-17 LAB
CHOLEST SERPL-MCNC: 101 MG/DL (ref 100–199)
HDLC SERPL-MCNC: 47 MG/DL
LDLC SERPL CALC-MCNC: 39 MG/DL (ref 0–99)
TRIGL SERPL-MCNC: 74 MG/DL (ref 0–149)
VLDLC SERPL CALC-MCNC: 15 MG/DL (ref 5–40)

## 2024-10-24 ENCOUNTER — CLINICAL DOCUMENTATION (OUTPATIENT)
Facility: CLINIC | Age: 84
End: 2024-10-24

## 2024-10-24 NOTE — PROGRESS NOTES
Communication today with Jodi Dorman RN (Ambulatory Care Manager):     You  Jodi Dorman, RNJust now (1:28 PM)       Thank you!     Jodi Dorman RN40 minutes ago (12:47 PM)     Call placed to [facility] spoke with vineet Shannon per Dr. Simpson patient should stay on same Warfarin dose and repeat INR in 4 days.      Let them know he is in goal range, so stay on the same dose. Goal INR is 2.0-3.0. He should have INR recheck in 4 days.    Jodi Gordillo, RN  You1 hour ago (12:07 PM)     AP  Hello, Please see yellow box.    Thank you,    Jodi Dorman RN  Care Transition Nurse  [phone]     Jodi Dorman RN1 hour ago (12:07 PM)       Care Transitions Note     Follow Up Call      Patient Current Location:  [place]     Care Transition Nurse contacted the patient by telephone. Verified name and  as identifiers.          Additional needs identified to be addressed with provider    According to [facility] INR today 2.9 PT 34.8  Patient is taking Coumadin 5 mg daily expect 7.5 mg tabs on .           Method of communication with provider: chart routing.     Care Summary Note: Spoke with patient and nurse from [facility].    INR today 2.9 PT 34.8  Weight today 211 lbs  Patient denies SOB and swelling, does state if he gets to walking fast he experiences some SOB which resolves with rest.

## 2024-10-28 ENCOUNTER — TELEPHONE (OUTPATIENT)
Facility: CLINIC | Age: 84
End: 2024-10-28

## 2024-10-28 LAB
INR, EXTERNAL: 3.4
PT, EXTERNAL: 40.3

## 2024-10-28 NOTE — TELEPHONE ENCOUNTER
Spoke to Jodi with HH. She stated pt is taking Coumadin daily except for Friday taking 1.5 tabs. I let her know Dr. Simpson's message. No further questions.

## 2024-10-28 NOTE — TELEPHONE ENCOUNTER
Jodi from [facility] called to report INR was 3.4 and a PTT of 40.3. She would like a call back to be informed when to recheck [phone]

## 2024-10-28 NOTE — TELEPHONE ENCOUNTER
Verify that is he is taking Coumadin 5 mg 1 tablet once daily. Tell her to tell him to hold one dose of warfarin. Recheck INR in 1 week.

## 2024-10-30 RX ORDER — BUMETANIDE 2 MG/1
2 TABLET ORAL DAILY
Qty: 30 TABLET | Refills: 0 | OUTPATIENT
Start: 2024-10-30

## 2024-10-30 RX ORDER — CARVEDILOL 3.12 MG/1
3.12 TABLET ORAL 2 TIMES DAILY WITH MEALS
Qty: 60 TABLET | Refills: 0 | OUTPATIENT
Start: 2024-10-30

## 2024-10-30 NOTE — TELEPHONE ENCOUNTER
PCP: Isabel Simpson MD     Last appt:  10/14/2024      Future Appointments   Date Time Provider Department Center   1/29/2025 11:30 AM Isabel Simpson MD [facility] DEP          Requested Prescriptions     Pending Prescriptions Disp Refills    bumetanide (BUMEX) 2 MG tablet 30 tablet 0     Sig: Take 1 tablet by mouth daily    carvedilol (COREG) 3.125 MG tablet 60 tablet 0     Sig: Take 1 tablet by mouth 2 times daily (with meals)

## 2024-10-31 NOTE — TELEPHONE ENCOUNTER
Tell patient to call his cardiologist to get refills on these medications in case they have made any changes.

## 2024-10-31 NOTE — TELEPHONE ENCOUNTER
Spoke to pt verified name and . Pt stated his cardiologist has already filled his medication. No further questions.

## 2024-11-04 ENCOUNTER — TELEPHONE (OUTPATIENT)
Facility: CLINIC | Age: 84
End: 2024-11-04

## 2024-11-04 NOTE — TELEPHONE ENCOUNTER
[facility] called in stating patient advised home health nurse that he threw entrestro away and takes lasartan now.   [facility] states they have a nurse going out to visit tomorrow 11/5 and patient is refusing additional PT.   [facility] call back  # [phone]

## 2024-11-05 ENCOUNTER — CLINICAL DOCUMENTATION (OUTPATIENT)
Facility: CLINIC | Age: 84
End: 2024-11-05

## 2024-11-05 ENCOUNTER — TELEPHONE (OUTPATIENT)
Facility: CLINIC | Age: 84
End: 2024-11-05

## 2024-11-05 LAB
INR, EXTERNAL: 2.9
PT, EXTERNAL: 34.3

## 2024-11-14 ENCOUNTER — TELEPHONE (OUTPATIENT)
Facility: CLINIC | Age: 84
End: 2024-11-14

## 2024-11-14 NOTE — TELEPHONE ENCOUNTER
Jodi with [facility] want to give patient inr 2.9 and 35.1    Jodi number is [phone]

## 2024-11-15 NOTE — TELEPHONE ENCOUNTER
I tried to call the patient to verify their Coumadin dosage but was unable to reach them or leave a voicemail.

## 2024-11-19 NOTE — TELEPHONE ENCOUNTER
Please call patient to verify his Coumadin dose. He should have INR rechecked 2 weeks after his last check on 11/14/24.

## 2024-11-20 ENCOUNTER — TELEPHONE (OUTPATIENT)
Facility: CLINIC | Age: 84
End: 2024-11-20

## 2024-11-20 DIAGNOSIS — I48.0 PAF (PAROXYSMAL ATRIAL FIBRILLATION) (HCC): ICD-10-CM

## 2024-11-20 NOTE — TELEPHONE ENCOUNTER
Jodi Diallo  called to report inr 2.9 and pt of 34.2. Pt is taking taking 1 5mg warfarin every evening. Can call back at [phone] if needed

## 2024-11-20 NOTE — TELEPHONE ENCOUNTER
Continue same dose of medication. Call patient to verify that he is taking warfarin as we have listed on his medication list. If not, let me know. Recheck INR in 2 weeks.

## 2024-11-21 RX ORDER — WARFARIN SODIUM 5 MG/1
5 TABLET ORAL DAILY
Qty: 30 TABLET | Refills: 0 | Status: SHIPPED
Start: 2024-11-21

## 2024-11-21 NOTE — TELEPHONE ENCOUNTER
Spoke to pt verified name and . He stated he is taking Warfarin 5mg daily.     I called and spoke to Jodi with HH. I let her know Dr. Simpson wanted pt's INR checked in 2 weeks. She stated his insurance is ending the INR checks at home in a week. She will check his INR next week.

## 2024-12-05 DIAGNOSIS — I48.0 PAF (PAROXYSMAL ATRIAL FIBRILLATION) (HCC): ICD-10-CM

## 2024-12-05 RX ORDER — WARFARIN SODIUM 5 MG/1
5 TABLET ORAL DAILY
Qty: 30 TABLET | Refills: 2 | Status: SHIPPED | OUTPATIENT
Start: 2024-12-05

## 2024-12-05 NOTE — TELEPHONE ENCOUNTER
PCP: Isabel Simpson MD     Last appt:  10/14/2024      Future Appointments   Date Time Provider Department Center   1/29/2025 11:30 AM Isabel Simpson MD [facility] DEP          Requested Prescriptions     Pending Prescriptions Disp Refills    warfarin (COUMADIN) 5 MG tablet 30 tablet 0     Sig: Take 1 tablet by mouth daily TAKE 1 TABLET BY MOUTH ONCE DAILY

## 2024-12-05 NOTE — TELEPHONE ENCOUNTER
Spoke to pt verified name and . He wanted to know what was going on with his medication? I let him know Dr. Sipmson has to sign the rx before it can be sent to the pharmacy. He stated he was out of the medication. I let him  know I would send a message to Dr. Simpson. No further questions.

## 2024-12-05 NOTE — TELEPHONE ENCOUNTER
Caller requests Refill of:    warfarin (COUMADIN) 5 MG tablet     Please send to:    [facility]     Visit Appointment History:  Future Appointments:  Future Appointments   Date Time Provider Department Center   1/29/2025 11:30 AM Isabel Simpson MD [facility] DEP         Last Appointment With Me:  10/14/2024         Caller confirmed instructions and dosages as correct.    Caller was advised that Meds will be refilled as soon as possible, however there can be a 48-72 business hour turn around on refill requests.

## 2025-02-05 ENCOUNTER — OFFICE VISIT (OUTPATIENT)
Age: 85
End: 2025-02-05

## 2025-02-05 VITALS
HEART RATE: 75 BPM | TEMPERATURE: 97.7 F | BODY MASS INDEX: 34.41 KG/M2 | DIASTOLIC BLOOD PRESSURE: 60 MMHG | SYSTOLIC BLOOD PRESSURE: 136 MMHG | WEIGHT: 233 LBS | RESPIRATION RATE: 16 BRPM | OXYGEN SATURATION: 100 %

## 2025-02-05 DIAGNOSIS — B37.2 CANDIDAL INTERTRIGO: Primary | ICD-10-CM

## 2025-02-05 RX ORDER — CLOTRIMAZOLE 1 %
CREAM (GRAM) TOPICAL
Qty: 60 G | Refills: 0 | Status: SHIPPED | OUTPATIENT
Start: 2025-02-05 | End: 2025-02-12

## 2025-02-05 NOTE — PATIENT INSTRUCTIONS
Patient was seen today for a rash to the skin fold of his lower abdomen which is consistent with a candidal intertrigo/skin yeast infection  Clotrimazole cream, twice daily for 1 to 2 weeks  Please ensure that you are drying that area thoroughly after showering  Avoid scratching or itching at the rash  Consider drying the area thoroughly with a hair dryer set to a cool setting  You can also consider using a drying powder to the area  If having considerable itching, you can apply a mild steroid cream like hydrocortisone which is available over-the-counter  Please monitor symptoms carefully and follow-up with your PCP if symptoms are persisting

## 2025-02-05 NOTE — PROGRESS NOTES
Wilberto Kuhn (:  1940) is a 84 y.o. male,Established patient, here for evaluation of the following chief complaint(s):  Rash (C/o rash on left side in waist line)      Assessment & Plan :  Visit Diagnoses and Associated Orders       Candidal intertrigo    -  Primary    clotrimazole (LOTRIMIN AF) 1 % cream [1767]                        Patient was seen today for a rash to the skin fold of his lower abdomen which is consistent with a candidal intertrigo/skin yeast infection  Clotrimazole cream, twice daily for 1 to 2 weeks  Please ensure that you are drying that area thoroughly after showering  Avoid scratching or itching at the rash  Consider drying the area thoroughly with a hair dryer set to a cool setting  You can also consider using a drying powder to the area  If having considerable itching, you can apply a mild steroid cream like hydrocortisone which is available over-the-counter  Please monitor symptoms carefully and follow-up with your PCP if symptoms are persisting  Subjective :    Rash         84 y.o. male presents with symptoms of rash to lower abdomen which is persistent over the past 2 to 3 months.  He reports considerable itching.  He denies fevers, chills, body aches or fatigue.  He admits that he does frequently scratch at the area and has some small sores there from scratching.  He denies any rashes anywhere else on his body.  Denies any recent travel.  Denies any new soaps, shampoos or skin care products.  No new foods or medications.         Vitals:    25 1013   BP: 136/60   Site: Left Upper Arm   Position: Sitting   Cuff Size: Medium Adult   Pulse: 75   Resp: 16   Temp: 97.7 °F (36.5 °C)   SpO2: 100%   Weight: 105.7 kg (233 lb)       No results found for this visit on 25.      Objective   Physical Exam  Vitals and nursing note reviewed.   Constitutional:       General: He is not in acute distress.     Appearance: Normal appearance. He is not ill-appearing.   HENT:

## 2025-02-21 ENCOUNTER — OFFICE VISIT (OUTPATIENT)
Age: 85
End: 2025-02-21

## 2025-02-21 VITALS
SYSTOLIC BLOOD PRESSURE: 106 MMHG | RESPIRATION RATE: 16 BRPM | TEMPERATURE: 98.1 F | HEART RATE: 83 BPM | OXYGEN SATURATION: 100 % | DIASTOLIC BLOOD PRESSURE: 69 MMHG

## 2025-02-21 DIAGNOSIS — M51.379 DEGENERATION OF INTERVERTEBRAL DISC OF LUMBOSACRAL REGION, UNSPECIFIED WHETHER PAIN PRESENT: Primary | ICD-10-CM

## 2025-02-21 ASSESSMENT — ENCOUNTER SYMPTOMS: BACK PAIN: 1

## 2025-02-21 NOTE — PATIENT INSTRUCTIONS
Patient reporting chronic back pain that is unrelieved by Tylenol 1000 mg once a day.  Patient is on chronic anticoagulation and is unable to take NSAIDs.    Referral given to pain management for further evaluation and management of chronic pain and degenerative disc disease.  He also needs to follow-up with his primary care regarding this and chronic management as this is not something we are able to do here in urgent care.    Wrote down and recommended the patient take Tylenol 1000 mg 3 times a day every day to stay ahead of the pain.  Do not exceed a total of 4000 mg total Tylenol in 1 day.  Do not take any NSAIDs (ibuprofen, naproxen) with his blood thinners and his kidney disease.

## 2025-02-21 NOTE — PROGRESS NOTES
Wilberto Kuhn (:  1940) is a 84 y.o. male,Established patient, here for evaluation of the following chief complaint(s):  Back Pain (C/o lower back pain )      Assessment & Plan :  Visit Diagnoses and Associated Orders       Degeneration of intervertebral disc of lumbosacral region, unspecified whether pain present    -  Primary    [facility] - Dillon Duarte MD, Pain Medicine, [place] ([facility] [address]) [[facility]]                   Patient reporting chronic back pain that is unrelieved by Tylenol 1000 mg once a day.  Patient is on chronic anticoagulation and is unable to take NSAIDs.    Referral given to pain management for further evaluation and management of chronic pain and degenerative disc disease.  He also needs to follow-up with his primary care regarding this and chronic management as this is not something we are able to do here in urgent care.    Wrote down and recommended the patient take Tylenol 1000 mg 3 times a day every day to stay ahead of the pain.  Do not exceed a total of 4000 mg total Tylenol in 1 day.  Do not take any NSAIDs (ibuprofen, naproxen) with his blood thinners and his kidney disease.       Subjective :    Back Pain         84 y.o. male with history of diastolic heart failure, paroxysmal afib, htn, CKD stage 3, HLD, L3-S1 degenerative disc disease, severe facet arthropathy, scoliosis.    Patient with chronic back pain presents with complaints of back pain ongoing.  His son passed away the end of January last year, and he missed his primary care appointment on  due to the .  Patient reports chronic back pain unrelieved with taking Tylenol 1000 mg once.  No new bowel or bladder incontinence.  Patient reports he is here because he wants pain medication         Vitals:    25 0915   BP: 106/69   Pulse: 83   Resp: 16   Temp: 98.1 °F (36.7 °C)   SpO2: 100%       No results found for this visit on 25.      Objective   Physical Exam  Cardiovascular:      Rate and

## 2025-02-26 ENCOUNTER — OFFICE VISIT (OUTPATIENT)
Age: 85
End: 2025-02-26

## 2025-02-26 VITALS
BODY MASS INDEX: 31.75 KG/M2 | TEMPERATURE: 98.1 F | SYSTOLIC BLOOD PRESSURE: 142 MMHG | DIASTOLIC BLOOD PRESSURE: 67 MMHG | HEART RATE: 60 BPM | WEIGHT: 215 LBS | OXYGEN SATURATION: 99 % | RESPIRATION RATE: 16 BRPM

## 2025-02-26 DIAGNOSIS — J45.901 ASTHMATIC BRONCHITIS WITH ACUTE EXACERBATION, UNSPECIFIED ASTHMA SEVERITY, UNSPECIFIED WHETHER PERSISTENT: Primary | ICD-10-CM

## 2025-02-26 RX ORDER — INHALER, ASSIST DEVICES
1 SPACER (EA) MISCELLANEOUS PRN
Qty: 1 EACH | Refills: 0 | Status: SHIPPED | OUTPATIENT
Start: 2025-02-26

## 2025-02-26 RX ORDER — DEXAMETHASONE SODIUM PHOSPHATE 10 MG/ML
10 INJECTION, SOLUTION INTRA-ARTICULAR; INTRALESIONAL; INTRAMUSCULAR; INTRAVENOUS; SOFT TISSUE ONCE
Status: COMPLETED | OUTPATIENT
Start: 2025-02-26 | End: 2025-02-26

## 2025-02-26 RX ORDER — SACUBITRIL AND VALSARTAN 24; 26 MG/1; MG/1
1 TABLET, FILM COATED ORAL 2 TIMES DAILY
COMMUNITY
Start: 2024-09-20

## 2025-02-26 RX ORDER — PREDNISONE 20 MG/1
40 TABLET ORAL DAILY
Qty: 10 TABLET | Refills: 0 | Status: SHIPPED | OUTPATIENT
Start: 2025-02-26 | End: 2025-02-26

## 2025-02-26 RX ORDER — LEUPROLIDE ACETATE 45 MG
KIT INTRAMUSCULAR
COMMUNITY
Start: 2024-11-04

## 2025-02-26 RX ORDER — IPRATROPIUM BROMIDE AND ALBUTEROL SULFATE 2.5; .5 MG/3ML; MG/3ML
1 SOLUTION RESPIRATORY (INHALATION) ONCE
Status: COMPLETED | OUTPATIENT
Start: 2025-02-26 | End: 2025-02-26

## 2025-02-26 RX ORDER — PREDNISONE 20 MG/1
40 TABLET ORAL DAILY
Qty: 4 TABLET | Refills: 0 | Status: SHIPPED | OUTPATIENT
Start: 2025-02-27 | End: 2025-02-28 | Stop reason: SDUPTHER

## 2025-02-26 RX ORDER — ALBUTEROL SULFATE 90 UG/1
2 INHALANT RESPIRATORY (INHALATION) 4 TIMES DAILY PRN
Qty: 18 G | Refills: 0 | Status: SHIPPED | OUTPATIENT
Start: 2025-02-26

## 2025-02-26 RX ORDER — PREDNISONE 20 MG/1
40 TABLET ORAL DAILY
Qty: 6 TABLET | Refills: 0 | Status: SHIPPED | OUTPATIENT
Start: 2025-02-27 | End: 2025-02-26

## 2025-02-26 RX ADMIN — IPRATROPIUM BROMIDE AND ALBUTEROL SULFATE 1 DOSE: 2.5; .5 SOLUTION RESPIRATORY (INHALATION) at 11:15

## 2025-02-26 RX ADMIN — DEXAMETHASONE SODIUM PHOSPHATE 10 MG: 10 INJECTION, SOLUTION INTRA-ARTICULAR; INTRALESIONAL; INTRAMUSCULAR; INTRAVENOUS; SOFT TISSUE at 11:15

## 2025-02-26 NOTE — PROGRESS NOTES
N/A 5/8/2024    Insert PPM biv multi performed by Elvis Espinoza MD at [facility] CARDIAC CATH LAB   • EP DEVICE PROCEDURE N/A 5/8/2024    Ablation AV node performed by Elvis Espinoza MD at [facility] CARDIAC CATH LAB   • ORTHOPEDIC SURGERY Right 1980's    index finger   • OTHER SURGICAL HISTORY  08/22/2017    Dr. Espinoza; atrial flutter ablation   • AR UNLISTED PROCEDURE CARDIAC SURGERY  08/22/2017    SVT ablation   • ROTATOR CUFF REPAIR Right 2000   • TONSILLECTOMY  1948        Social History:   Social Connections: Moderately Integrated (12/9/2024)    Social Connection and Isolation Panel [NHANES]    • Frequency of Communication with Friends and Family: More than three times a week    • Frequency of Social Gatherings with Friends and Family: Twice a week    • Attends [employer] Services: More than 4 times per year    • Active Member of Clubs or Organizations: No    • Attends Club or Organization Meetings: Never    • Marital Status:         Patient Care Team:  Isabel Simpson MD as PCP - General  Isabel Simpson MD as PCP - Empaneled Provider  Monika Tejeda MD as Physician    Patient Active Problem List   Diagnosis   • PAF (paroxysmal atrial fibrillation) ([facility])   • Prostate cancer ([facility])   • Primary osteoarthritis of left hip   • NICM (nonischemic cardiomyopathy) ([facility])   • Mixed hyperlipidemia   • Osteopenia of multiple sites   • Smokeless tobacco use   • Anemia of chronic disease   • COPD (chronic obstructive pulmonary disease) ([facility])   • Benign hypertension with CKD (chronic kidney disease) stage III ([facility])   • Obesity (BMI 30-39.9)   • Chronic low back pain   • Candidal intertrigo   • Earache on left   • Coronary artery disease involving native coronary artery of native heart without angina pectoris   • Type 2 diabetes mellitus with stage 3b chronic kidney disease, without long-term current use of insulin ([facility])   • HCAP (healthcare-associated pneumonia)   • Thrombocytopenia, unspecified   • Dilated

## 2025-02-26 NOTE — PATIENT INSTRUCTIONS
Exam and history send with asthmatic bronchitis.  -DuoNeb and dexamethasone 10 mg given in clinic  -Albuterol inhaler with spacer to be used every 6 hours as needed for coughing fits/shortness of breath  -Prednisone x 2 days starting tomorrow   -Increase fluid intake to maintain hydration and fight against infection  -1 teaspoon of honey or mixed with hot tea can help with cough  -Steam inhalation, warm compresses, and humidifier can also help with symptoms  -Please follow up with your PCP in the next 1 to 2 weeks    If symptoms persist or worsen, please contact your PCP and/or return to Urgent Care.

## 2025-02-26 NOTE — ED NOTES
Pt resting on stretcher with family at bedside. Has The Patient Been Infected With Covid-19 In The Past?: No Previous Infection Text: The patient has recovered from a previous COVID-19 infection. Detail Level: Simple

## 2025-02-28 ENCOUNTER — APPOINTMENT (OUTPATIENT)
Facility: HOSPITAL | Age: 85
End: 2025-02-28
Payer: MEDICARE

## 2025-02-28 ENCOUNTER — HOSPITAL ENCOUNTER (EMERGENCY)
Facility: HOSPITAL | Age: 85
Discharge: HOME | End: 2025-02-28
Attending: STUDENT IN AN ORGANIZED HEALTH CARE EDUCATION/TRAINING PROGRAM
Payer: MEDICARE

## 2025-02-28 VITALS
HEIGHT: 69 IN | TEMPERATURE: 97.7 F | RESPIRATION RATE: 25 BRPM | DIASTOLIC BLOOD PRESSURE: 80 MMHG | BODY MASS INDEX: 31.9 KG/M2 | WEIGHT: 215.39 LBS | OXYGEN SATURATION: 98 % | HEART RATE: 70 BPM | SYSTOLIC BLOOD PRESSURE: 154 MMHG

## 2025-02-28 DIAGNOSIS — J44.1 COPD EXACERBATION (HCC): Primary | ICD-10-CM

## 2025-02-28 LAB
ALBUMIN SERPL-MCNC: 3.4 G/DL (ref 3.5–5)
ALBUMIN/GLOB SERPL: 0.7 (ref 1.1–2.2)
ALP SERPL-CCNC: 76 U/L (ref 45–117)
ALT SERPL-CCNC: 27 U/L (ref 12–78)
ANION GAP SERPL CALC-SCNC: 5 MMOL/L (ref 2–12)
AST SERPL-CCNC: 25 U/L (ref 15–37)
BASOPHILS # BLD: 0.01 K/UL (ref 0–0.1)
BASOPHILS NFR BLD: 0.1 % (ref 0–1)
BILIRUB SERPL-MCNC: 0.3 MG/DL (ref 0.2–1)
BUN SERPL-MCNC: 49 MG/DL (ref 6–20)
BUN/CREAT SERPL: 26 (ref 12–20)
CALCIUM SERPL-MCNC: 9.2 MG/DL (ref 8.5–10.1)
CHLORIDE SERPL-SCNC: 106 MMOL/L (ref 97–108)
CO2 SERPL-SCNC: 27 MMOL/L (ref 21–32)
CREAT SERPL-MCNC: 1.92 MG/DL (ref 0.7–1.3)
DIFFERENTIAL METHOD BLD: ABNORMAL
EKG ATRIAL RATE: 78 BPM
EKG DIAGNOSIS: NORMAL
EKG Q-T INTERVAL: 448 MS
EKG QRS DURATION: 148 MS
EKG QTC CALCULATION (BAZETT): 513 MS
EKG R AXIS: -74 DEGREES
EKG T AXIS: 103 DEGREES
EKG VENTRICULAR RATE: 79 BPM
EOSINOPHIL # BLD: 0.04 K/UL (ref 0–0.4)
EOSINOPHIL NFR BLD: 0.6 % (ref 0–7)
ERYTHROCYTE [DISTWIDTH] IN BLOOD BY AUTOMATED COUNT: 15.4 % (ref 11.5–14.5)
GLOBULIN SER CALC-MCNC: 4.6 G/DL (ref 2–4)
GLUCOSE SERPL-MCNC: 137 MG/DL (ref 65–100)
HCT VFR BLD AUTO: 36.9 % (ref 36.6–50.3)
HGB BLD-MCNC: 11.5 G/DL (ref 12.1–17)
IMM GRANULOCYTES # BLD AUTO: 0.03 K/UL (ref 0–0.04)
IMM GRANULOCYTES NFR BLD AUTO: 0.4 % (ref 0–0.5)
LYMPHOCYTES # BLD: 0.93 K/UL (ref 0.8–3.5)
LYMPHOCYTES NFR BLD: 12.9 % (ref 12–49)
MAGNESIUM SERPL-MCNC: 2.4 MG/DL (ref 1.6–2.4)
MCH RBC QN AUTO: 26.7 PG (ref 26–34)
MCHC RBC AUTO-ENTMCNC: 31.2 G/DL (ref 30–36.5)
MCV RBC AUTO: 85.8 FL (ref 80–99)
MONOCYTES # BLD: 0.42 K/UL (ref 0–1)
MONOCYTES NFR BLD: 5.8 % (ref 5–13)
NEUTS SEG # BLD: 5.77 K/UL (ref 1.8–8)
NEUTS SEG NFR BLD: 80.2 % (ref 32–75)
NRBC # BLD: 0 K/UL (ref 0–0.01)
NRBC BLD-RTO: 0 PER 100 WBC
NT PRO BNP: 6122 PG/ML
PLATELET # BLD AUTO: 119 K/UL (ref 150–400)
PMV BLD AUTO: 12.5 FL (ref 8.9–12.9)
POTASSIUM SERPL-SCNC: 3.8 MMOL/L (ref 3.5–5.1)
PROT SERPL-MCNC: 8 G/DL (ref 6.4–8.2)
RBC # BLD AUTO: 4.3 M/UL (ref 4.1–5.7)
SODIUM SERPL-SCNC: 138 MMOL/L (ref 136–145)
TROPONIN I SERPL HS-MCNC: 29 NG/L (ref 0–76)
WBC # BLD AUTO: 7.2 K/UL (ref 4.1–11.1)

## 2025-02-28 PROCEDURE — 36415 COLL VENOUS BLD VENIPUNCTURE: CPT

## 2025-02-28 PROCEDURE — 99285 EMERGENCY DEPT VISIT HI MDM: CPT

## 2025-02-28 PROCEDURE — 6370000000 HC RX 637 (ALT 250 FOR IP): Performed by: STUDENT IN AN ORGANIZED HEALTH CARE EDUCATION/TRAINING PROGRAM

## 2025-02-28 PROCEDURE — 84484 ASSAY OF TROPONIN QUANT: CPT

## 2025-02-28 PROCEDURE — 83735 ASSAY OF MAGNESIUM: CPT

## 2025-02-28 PROCEDURE — 83880 ASSAY OF NATRIURETIC PEPTIDE: CPT

## 2025-02-28 PROCEDURE — 71045 X-RAY EXAM CHEST 1 VIEW: CPT

## 2025-02-28 PROCEDURE — 80053 COMPREHEN METABOLIC PANEL: CPT

## 2025-02-28 PROCEDURE — 85025 COMPLETE CBC W/AUTO DIFF WBC: CPT

## 2025-02-28 PROCEDURE — 94640 AIRWAY INHALATION TREATMENT: CPT

## 2025-02-28 PROCEDURE — 93005 ELECTROCARDIOGRAM TRACING: CPT | Performed by: STUDENT IN AN ORGANIZED HEALTH CARE EDUCATION/TRAINING PROGRAM

## 2025-02-28 RX ORDER — GUAIFENESIN/DEXTROMETHORPHAN 100-10MG/5
5 SYRUP ORAL 3 TIMES DAILY PRN
Qty: 120 ML | Refills: 0 | Status: SHIPPED | OUTPATIENT
Start: 2025-02-28 | End: 2025-03-10

## 2025-02-28 RX ORDER — GUAIFENESIN 600 MG/1
600 TABLET, EXTENDED RELEASE ORAL
Status: COMPLETED | OUTPATIENT
Start: 2025-02-28 | End: 2025-02-28

## 2025-02-28 RX ORDER — IPRATROPIUM BROMIDE AND ALBUTEROL SULFATE 2.5; .5 MG/3ML; MG/3ML
3 SOLUTION RESPIRATORY (INHALATION)
Status: COMPLETED | OUTPATIENT
Start: 2025-02-28 | End: 2025-02-28

## 2025-02-28 RX ORDER — PREDNISONE 20 MG/1
40 TABLET ORAL DAILY
Qty: 10 TABLET | Refills: 0 | Status: SHIPPED | OUTPATIENT
Start: 2025-02-28 | End: 2025-03-05

## 2025-02-28 RX ORDER — BENZONATATE 200 MG/1
200 CAPSULE ORAL 3 TIMES DAILY PRN
Qty: 30 CAPSULE | Refills: 0 | Status: SHIPPED | OUTPATIENT
Start: 2025-02-28 | End: 2025-03-10

## 2025-02-28 RX ORDER — PREDNISONE 20 MG/1
60 TABLET ORAL
Status: COMPLETED | OUTPATIENT
Start: 2025-02-28 | End: 2025-02-28

## 2025-02-28 RX ADMIN — IPRATROPIUM BROMIDE AND ALBUTEROL SULFATE 3 DOSE: 2.5; .5 SOLUTION RESPIRATORY (INHALATION) at 08:47

## 2025-02-28 RX ADMIN — PREDNISONE 60 MG: 20 TABLET ORAL at 07:59

## 2025-02-28 RX ADMIN — GUAIFENESIN 600 MG: 600 TABLET ORAL at 07:59

## 2025-02-28 ASSESSMENT — PAIN SCALES - GENERAL: PAINLEVEL_OUTOF10: 0

## 2025-02-28 NOTE — ED PROVIDER NOTES
[facility] EMERGENCY DEPARTMENT  EMERGENCY DEPARTMENT ENCOUNTER       Pt Name: Wilberto Kuhn  MRN: [id number]  Birthdate 1940  Date of evaluation: 2/28/2025  Provider: Dalton Arrington MD   PCP: Isabel Simpson MD  Note Started: 11:20 AM 2/28/25     CHIEF COMPLAINT       Chief Complaint   Patient presents with    Shortness of Breath     Pt describes coughing and wheezing x 2 days, given steroids at PCP office without improvement        HISTORY OF PRESENT ILLNESS: 1 or more elements      History From: Patient  None     Wilberto Kuhn is a 84 y.o. male who presents to the emergency department with shortness of breath.  Patient states symptoms have been worsening over the past 2 days.  He was seen at his primary care doctor's office and was treated with steroids.  Patient reports no significant relief with the steroids.  He states cough is mostly dry.  He denies any associated fevers or chest pain.  He does report previous history of COPD.     Nursing Notes were all reviewed and agreed with or any disagreements were addressed in the HPI.     REVIEW OF SYSTEMS      Review of Systems     Positives and Pertinent negatives as per HPI.    PAST HISTORY     Past Medical History:  Past Medical History:   Diagnosis Date    Acute respiratory failure with hypoxia (HCC) 02/08/2014    also 11/28/15, 2/17/16, 5/14/17     Alcohol abuse     As of 11/29/18 pt stated he quit 20 years    Arthritis     Back and shoulder    Atrial fibrillation (HCC)     Atrial flutter (HCC) 08/2017    saw Dr. Espinoza; underwent a-flutter ablation    BPH (benign prostatic hyperplasia)     CHF (congestive heart failure) (HCC) 02/11/2014    TTE 11/15: EF 40-45%, 2/14: EF 20%  Admitted for acute exacerbations 2/8/14, 11/28/15, 2/17/16, and 5/4/17)    Chronic kidney disease     III    Chronic low back pain     LS spine X-ray 4/8/17: mild DDD    Combined forms of age-related cataract of left eye 12/12/2018    KPE/IOL OS    Combined forms of  Stroke Score       Heart Score       Curb-65          EMERGENCY DEPARTMENT COURSE and DIFFERENTIAL DIAGNOSIS/MDM   Vitals:    Vitals:    02/28/25 1015 02/28/25 1030 02/28/25 1045 02/28/25 1106   BP:    (!) 154/80   Pulse: 70 70 71 70   Resp: 19 22 19 25   Temp:       SpO2: 99% 99% 98% 98%   Weight:       Height:                Patient was given the following medications:  Medications   ipratropium 0.5 mg-albuterol 2.5 mg (DUONEB) nebulizer solution 3 Dose (3 Doses Inhalation Given 2/28/25 0800)   predniSONE (DELTASONE) tablet 60 mg (60 mg Oral Given 2/28/25 5569)   guaiFENesin (MUCINEX) extended release tablet 600 mg (600 mg Oral Given 2/28/25 6259)       CONSULTS: (Who and What was discussed)  None      Chronic Conditions: COPD, CHF    Social Determinants affecting Dx or Tx: None    Records Reviewed (source and summary of external notes): Nursing Notes    CC/HPI Summary, DDx, ED Course, and Reassessment:     84-year-old male with history of COPD and CHF presenting to the emergency department with shortness of breath.  He has some increased work of breathing and diffuse expiratory wheezing on exam.  Ordered DuoNebs x 3, ordered Mucinex, prednisone.  Ordered chest x-ray and labs as well.    Patient had improvement in symptoms with nebs, was able to ambulate without hypoxia.  His labs are overall reassuring.  He has mild anemia which has improved from recent values.  He has CKD which is essentially at baseline.  BNP is elevated but improved from 5 months ago and he has no pulmonary edema on chest x-ray.  Discharge patient home with prednisone, Robitussin DM, Tessalon.    ED Course as of 03/02/25 1120   Fri Feb 28, 2025   0918 NT Pro-BNP(!): 6,122  Improved compared to previous values [SP]      ED Course User Index  [SP] Dalton Arrington MD       Disposition Considerations (Tests not done, Shared Decision Making, Pt Expectation of Test or Tx.):      FINAL IMPRESSION     1. COPD exacerbation (HCC)

## 2025-02-28 NOTE — DISCHARGE INSTRUCTIONS
Thank You!    It was a pleasure taking care of you in our Emergency Department today. We know that when you come to our Emergency Department, you are entrusting us with your health, comfort, and safety. Our physicians and nurses honor that trust, and truly appreciate the opportunity to care for you and your loved ones.      We also value your feedback. If you receive a survey about your Emergency Department experience today, please fill it out.  We care about our patients' feedback, and we listen to what you have to say.  Thank you.    Dalton Arrington MD  ________________________________________________________________________  I have included a copy of your lab results and/or radiologic studies from today's visit so you can have them easily available at your follow-up visit. We hope you feel better and please do not hesitate to contact the ED if you have any questions at all!    Recent Results (from the past 12 hour(s))   EKG 12 Lead    Collection Time: 02/28/25  7:34 AM   Result Value Ref Range    Ventricular Rate 79 BPM    Atrial Rate 78 BPM    QRS Duration 148 ms    Q-T Interval 448 ms    QTc Calculation (Bazett) 513 ms    R Axis -74 degrees    T Axis 103 degrees    Diagnosis       Ventricular-paced rhythm with premature ventricular or aberrantly conducted   complexes  underlying atrial fibrillation   Confirmed by Gilda Chen M.D. ([id number]) on 2/28/2025 9:24:18 AM     CBC with Auto Differential    Collection Time: 02/28/25  7:50 AM   Result Value Ref Range    WBC 7.2 4.1 - 11.1 K/uL    RBC 4.30 4.10 - 5.70 M/uL    Hemoglobin 11.5 (L) 12.1 - 17.0 g/dL    Hematocrit 36.9 36.6 - 50.3 %    MCV 85.8 80.0 - 99.0 FL    MCH 26.7 26.0 - 34.0 PG    MCHC 31.2 30.0 - 36.5 g/dL    RDW 15.4 (H) 11.5 - 14.5 %    Platelets 119 (L) 150 - 400 K/uL    MPV 12.5 8.9 - 12.9 FL    Nucleated RBCs 0.0 0  WBC    nRBC 0.00 0.00 - 0.01 K/uL    Neutrophils % 80.2 (H) 32.0 - 75.0 %    Lymphocytes % 12.9 12.0 - 49.0 %

## 2025-02-28 NOTE — ED NOTES
Pt ambulatory out with this RN and got into vehicle with cane and paperwork in hand. DC instructions given.

## 2025-02-28 NOTE — ED NOTES
Pt ambulated in hallway with pulse ox at this time. O2 did not drop below 98%, pt denied SOB while walking.

## 2025-03-04 DIAGNOSIS — I48.0 PAF (PAROXYSMAL ATRIAL FIBRILLATION) (HCC): ICD-10-CM

## 2025-03-04 RX ORDER — WARFARIN SODIUM 5 MG/1
5 TABLET ORAL DAILY
Qty: 30 TABLET | Refills: 2 | Status: SHIPPED | OUTPATIENT
Start: 2025-03-04

## 2025-03-04 NOTE — TELEPHONE ENCOUNTER
PCP: Isabel Simpson MD     Last appt:  10/14/2024    No future appointments.       Requested Prescriptions     Pending Prescriptions Disp Refills    warfarin (COUMADIN) 5 MG tablet [Pharmacy Med Name: Warfarin Sodium 5 MG Oral Tablet] 30 tablet 0     Sig: Take 1 tablet by mouth once daily

## 2025-04-24 ENCOUNTER — OFFICE VISIT (OUTPATIENT)
Age: 85
End: 2025-04-24

## 2025-04-24 VITALS
HEART RATE: 74 BPM | OXYGEN SATURATION: 100 % | WEIGHT: 222 LBS | SYSTOLIC BLOOD PRESSURE: 116 MMHG | BODY MASS INDEX: 32.78 KG/M2 | RESPIRATION RATE: 18 BRPM | TEMPERATURE: 97.8 F | DIASTOLIC BLOOD PRESSURE: 73 MMHG

## 2025-04-24 DIAGNOSIS — J30.89 ENVIRONMENTAL AND SEASONAL ALLERGIES: ICD-10-CM

## 2025-04-24 DIAGNOSIS — R51.9 NONINTRACTABLE HEADACHE, UNSPECIFIED CHRONICITY PATTERN, UNSPECIFIED HEADACHE TYPE: Primary | ICD-10-CM

## 2025-04-24 RX ORDER — LORATADINE 10 MG/1
10 TABLET ORAL DAILY
Qty: 30 TABLET | Refills: 0 | Status: SHIPPED | OUTPATIENT
Start: 2025-04-24 | End: 2025-05-24

## 2025-04-24 RX ORDER — FLUTICASONE PROPIONATE 50 MCG
2 SPRAY, SUSPENSION (ML) NASAL DAILY
Qty: 16 G | Refills: 0 | Status: SHIPPED | OUTPATIENT
Start: 2025-04-24

## 2025-04-24 NOTE — PATIENT INSTRUCTIONS
There are no red flags or concerning signs or symptoms on your exam.  Your blood pressure is normal.    I believe you have some seasonal allergies that may be contributing to your headache and head heaviness    Start a daily antihistamine, loratadine, 10 mg once daily    Started daily steroid nasal spray: Flonase: 2 sprays each nostril once daily    You can take Tylenol 650 mg every 6 hours as needed for headaches and pain

## 2025-04-24 NOTE — PROGRESS NOTES
Wilberto Kuhn (:  1940) is a 85 y.o. male,Established patient, here for evaluation of the following chief complaint(s):  Headache (Patient c/o with headaches x couple days.)      Assessment & Plan :  Visit Diagnoses and Associated Orders         Nonintractable headache, unspecified chronicity pattern, unspecified headache type    -  Primary           Environmental and seasonal allergies        loratadine (CLARITIN) 10 MG tablet [33641]      fluticasone (FLONASE) 50 MCG/ACT nasal spray [25732]                 There are no red flags or concerning signs or symptoms on your exam.  Your blood pressure is normal.    I believe you have some seasonal allergies that may be contributing to your headache and head heaviness    Start a daily antihistamine, loratadine, 10 mg once daily    Started daily steroid nasal spray: Flonase: 2 sprays each nostril once daily    You can take Tylenol 650 mg every 6 hours as needed for headaches and pain    Subjective :    Headache       85 y.o. male presents with a feeling of head fullness and heaviness that is a bit of an ache for a few days.  He states when he bends his head down his head feels full and heavy.  Denies thunderclap headaches, worst headache of his life, dizziness, vision changes, fever, chills, shortness of breath, chest pain, nausea or vomiting.  States has had a little bit of sinus congestion and nose congestion for few days as well. does not take any allergy medications    Vitals:    25 0927   BP: 116/73   BP Site: Left Upper Arm   Patient Position: Sitting   BP Cuff Size: Large Adult   Pulse: 74   Resp: 18   Temp: 97.8 °F (36.6 °C)   TempSrc: Oral   SpO2: 100%   Weight: 100.7 kg (222 lb)       No results found for this visit on 25.      Objective   Physical Exam  Constitutional:       General: He is not in acute distress.     Appearance: Normal appearance. He is not ill-appearing or toxic-appearing.   HENT:      Head: Normocephalic and atraumatic.

## 2025-06-06 DIAGNOSIS — I48.0 PAF (PAROXYSMAL ATRIAL FIBRILLATION) (HCC): ICD-10-CM

## 2025-06-06 RX ORDER — WARFARIN SODIUM 5 MG/1
5 TABLET ORAL DAILY
Qty: 30 TABLET | Refills: 2 | Status: SHIPPED | OUTPATIENT
Start: 2025-06-06

## 2025-06-06 NOTE — TELEPHONE ENCOUNTER
PCP: Isabel Simpson MD     Last appt:  10/14/2024      Future Appointments   Date Time Provider Department Center   8/22/2025  1:20 PM Isabel Simpson MD [facility] DEP          Requested Prescriptions     Pending Prescriptions Disp Refills    warfarin (COUMADIN) 5 MG tablet [Pharmacy Med Name: Warfarin Sodium 5 MG Oral Tablet] 30 tablet 0     Sig: Take 1 tablet by mouth once daily

## 2025-08-18 ENCOUNTER — TELEPHONE (OUTPATIENT)
Facility: CLINIC | Age: 85
End: 2025-08-18

## 2025-08-18 SDOH — HEALTH STABILITY: PHYSICAL HEALTH: ON AVERAGE, HOW MANY DAYS PER WEEK DO YOU ENGAGE IN MODERATE TO STRENUOUS EXERCISE (LIKE A BRISK WALK)?: 0 DAYS

## 2025-08-18 SDOH — HEALTH STABILITY: PHYSICAL HEALTH: ON AVERAGE, HOW MANY MINUTES DO YOU ENGAGE IN EXERCISE AT THIS LEVEL?: 0 MIN

## 2025-08-18 ASSESSMENT — PATIENT HEALTH QUESTIONNAIRE - PHQ9
2. FEELING DOWN, DEPRESSED OR HOPELESS: NOT AT ALL
1. LITTLE INTEREST OR PLEASURE IN DOING THINGS: NOT AT ALL
SUM OF ALL RESPONSES TO PHQ QUESTIONS 1-9: 0

## 2025-08-18 ASSESSMENT — LIFESTYLE VARIABLES
HOW OFTEN DO YOU HAVE SIX OR MORE DRINKS ON ONE OCCASION: 1
HOW OFTEN DO YOU HAVE A DRINK CONTAINING ALCOHOL: 1
HOW OFTEN DO YOU HAVE A DRINK CONTAINING ALCOHOL: NEVER
HOW MANY STANDARD DRINKS CONTAINING ALCOHOL DO YOU HAVE ON A TYPICAL DAY: 0
HOW MANY STANDARD DRINKS CONTAINING ALCOHOL DO YOU HAVE ON A TYPICAL DAY: PATIENT DOES NOT DRINK

## 2025-08-22 ENCOUNTER — OFFICE VISIT (OUTPATIENT)
Facility: CLINIC | Age: 85
End: 2025-08-22

## 2025-08-22 VITALS
TEMPERATURE: 97.8 F | SYSTOLIC BLOOD PRESSURE: 120 MMHG | WEIGHT: 218 LBS | OXYGEN SATURATION: 97 % | DIASTOLIC BLOOD PRESSURE: 60 MMHG | RESPIRATION RATE: 16 BRPM | BODY MASS INDEX: 32.29 KG/M2 | HEART RATE: 70 BPM | HEIGHT: 69 IN

## 2025-08-22 DIAGNOSIS — E11.22 TYPE 2 DIABETES MELLITUS WITH STAGE 3B CHRONIC KIDNEY DISEASE, WITHOUT LONG-TERM CURRENT USE OF INSULIN (HCC): ICD-10-CM

## 2025-08-22 DIAGNOSIS — I50.32 CHRONIC DIASTOLIC CONGESTIVE HEART FAILURE (HCC): ICD-10-CM

## 2025-08-22 DIAGNOSIS — N18.32 TYPE 2 DIABETES MELLITUS WITH STAGE 3B CHRONIC KIDNEY DISEASE, WITHOUT LONG-TERM CURRENT USE OF INSULIN (HCC): ICD-10-CM

## 2025-08-22 DIAGNOSIS — Z79.01 CHRONIC ANTICOAGULATION: ICD-10-CM

## 2025-08-22 DIAGNOSIS — H91.90 HEARING LOSS, UNSPECIFIED HEARING LOSS TYPE, UNSPECIFIED LATERALITY: ICD-10-CM

## 2025-08-22 DIAGNOSIS — E78.2 MIXED HYPERLIPIDEMIA: ICD-10-CM

## 2025-08-22 DIAGNOSIS — I48.0 PAF (PAROXYSMAL ATRIAL FIBRILLATION) (HCC): ICD-10-CM

## 2025-08-22 DIAGNOSIS — C61 PROSTATE CANCER (HCC): ICD-10-CM

## 2025-08-22 DIAGNOSIS — I12.9 BENIGN HYPERTENSION WITH CKD (CHRONIC KIDNEY DISEASE) STAGE III (HCC): ICD-10-CM

## 2025-08-22 DIAGNOSIS — B37.2 CANDIDAL INTERTRIGO: ICD-10-CM

## 2025-08-22 DIAGNOSIS — Z00.00 MEDICARE ANNUAL WELLNESS VISIT, SUBSEQUENT: Primary | ICD-10-CM

## 2025-08-22 DIAGNOSIS — N18.30 BENIGN HYPERTENSION WITH CKD (CHRONIC KIDNEY DISEASE) STAGE III (HCC): ICD-10-CM

## 2025-08-22 LAB
ALBUMIN SERPL-MCNC: 4.2 G/DL (ref 3.5–5.2)
ALBUMIN/GLOB SERPL: 1.1 (ref 1.1–2.2)
ALP SERPL-CCNC: 95 U/L (ref 40–129)
ALT SERPL-CCNC: 18 U/L (ref 10–50)
ANION GAP SERPL CALC-SCNC: 14 MMOL/L (ref 2–14)
AST SERPL-CCNC: 29 U/L (ref 10–50)
BASOPHILS # BLD: 0.04 K/UL (ref 0–0.1)
BASOPHILS NFR BLD: 0.9 % (ref 0–1)
BILIRUB SERPL-MCNC: 0.5 MG/DL (ref 0–1.2)
BUN SERPL-MCNC: 41 MG/DL (ref 8–23)
BUN/CREAT SERPL: 19 (ref 12–20)
CALCIUM SERPL-MCNC: 9.6 MG/DL (ref 8.8–10.2)
CHLORIDE SERPL-SCNC: 100 MMOL/L (ref 98–107)
CHOLEST SERPL-MCNC: 89 MG/DL (ref 0–200)
CO2 SERPL-SCNC: 26 MMOL/L (ref 20–29)
CREAT SERPL-MCNC: 2.16 MG/DL (ref 0.7–1.2)
CREAT UR-MCNC: 60.5 MG/DL (ref 39–259)
DIFFERENTIAL METHOD BLD: ABNORMAL
EOSINOPHIL # BLD: 0.27 K/UL (ref 0–0.4)
EOSINOPHIL NFR BLD: 5.8 % (ref 0–7)
ERYTHROCYTE [DISTWIDTH] IN BLOOD BY AUTOMATED COUNT: 14.1 % (ref 11.5–14.5)
GLOBULIN SER CALC-MCNC: 3.7 G/DL (ref 2–4)
GLUCOSE SERPL-MCNC: 125 MG/DL (ref 65–100)
HBA1C MFR BLD: 6.2 %
HCT VFR BLD AUTO: 38.5 % (ref 36.6–50.3)
HDLC SERPL-MCNC: 42 MG/DL (ref 40–60)
HDLC SERPL: 2.1 (ref 0–5)
HGB BLD-MCNC: 11.9 G/DL (ref 12.1–17)
IMM GRANULOCYTES # BLD AUTO: 0.02 K/UL (ref 0–0.04)
IMM GRANULOCYTES NFR BLD AUTO: 0.4 % (ref 0–0.5)
INR PPP: 2.7 (ref 0.9–1.1)
LDLC SERPL CALC-MCNC: 18 MG/DL
LYMPHOCYTES # BLD: 0.83 K/UL (ref 0.8–3.5)
LYMPHOCYTES NFR BLD: 18 % (ref 12–49)
MCH RBC QN AUTO: 26.3 PG (ref 26–34)
MCHC RBC AUTO-ENTMCNC: 30.9 G/DL (ref 30–36.5)
MCV RBC AUTO: 85.2 FL (ref 80–99)
MICROALBUMIN UR-MCNC: <1.2 MG/DL
MICROALBUMIN/CREAT UR-RTO: NORMAL MG/G
MONOCYTES # BLD: 0.38 K/UL (ref 0–1)
MONOCYTES NFR BLD: 8.2 % (ref 5–13)
NEUTS SEG # BLD: 3.08 K/UL (ref 1.8–8)
NEUTS SEG NFR BLD: 66.7 % (ref 32–75)
NRBC # BLD: 0 K/UL (ref 0–0.01)
NRBC BLD-RTO: 0 PER 100 WBC
PLATELET # BLD AUTO: 136 K/UL (ref 150–400)
POTASSIUM SERPL-SCNC: 4.7 MMOL/L (ref 3.5–5.1)
PROT SERPL-MCNC: 7.8 G/DL (ref 6.4–8.3)
PROTHROMBIN TIME: 27.2 SEC (ref 9.2–11.2)
RBC # BLD AUTO: 4.52 M/UL (ref 4.1–5.7)
SODIUM SERPL-SCNC: 140 MMOL/L (ref 136–145)
SPECIMEN HOLD: NORMAL
TRIGL SERPL-MCNC: 150 MG/DL (ref 0–150)
VLDLC SERPL CALC-MCNC: 30 MG/DL
WBC # BLD AUTO: 4.6 K/UL (ref 4.1–11.1)

## 2025-08-22 RX ORDER — CLOTRIMAZOLE 1 %
CREAM (GRAM) TOPICAL
Qty: 60 G | Refills: 3 | Status: SHIPPED | OUTPATIENT
Start: 2025-08-22 | End: 2025-08-29

## 2025-08-22 RX ORDER — BUMETANIDE 1 MG/1
TABLET ORAL
Qty: 30 TABLET | Refills: 0 | Status: SHIPPED | OUTPATIENT
Start: 2025-08-22 | End: 2025-08-22

## 2025-08-22 RX ORDER — BUMETANIDE 1 MG/1
1 TABLET ORAL 2 TIMES DAILY
COMMUNITY

## 2025-08-28 ENCOUNTER — RESULTS FOLLOW-UP (OUTPATIENT)
Facility: CLINIC | Age: 85
End: 2025-08-28

## 2025-09-03 DIAGNOSIS — I48.0 PAF (PAROXYSMAL ATRIAL FIBRILLATION) (HCC): ICD-10-CM

## 2025-09-03 RX ORDER — WARFARIN SODIUM 5 MG/1
5 TABLET ORAL DAILY
Qty: 90 TABLET | Refills: 1 | Status: SHIPPED | OUTPATIENT
Start: 2025-09-03

## (undated) DEVICE — KENDALL DL ECG CABLE AND LEAD WIRE SYSTEM, 3-LEAD, SINGLE PATIENT USE: Brand: KENDALL

## (undated) DEVICE — 1200 GUARD II KIT W/5MM TUBE W/O VAC TUBE: Brand: GUARDIAN

## (undated) DEVICE — EYE PADSSTERILENOT MADE WITH NATURAL RUBBER LATEXSINGLE USE ONLYDO NOT USE IF PACKAGE OPENED OR DAMAGED: Brand: CARDINAL HEALTH

## (undated) DEVICE — ELECTRODE,RADIOTRANSLUCENT,FOAM,5PK: Brand: MEDLINE

## (undated) DEVICE — ELECTRODE PT RET AD L9FT HI MOIST COND ADH HYDRGEL CORDED

## (undated) DEVICE — NEONATAL-ADULT SPO2 SENSOR: Brand: NELLCOR

## (undated) DEVICE — NDL FLTR TIP 5 MIC 18GX1.5IN --

## (undated) DEVICE — COPILOT BLEEDBACK CONTROL VALVE: Brand: COPILOT

## (undated) DEVICE — CATHETER ANGIO HK 0.038 IN 5 FRX75 CM CS BRAIDED IMPRESS

## (undated) DEVICE — Device

## (undated) DEVICE — PACEMAKER PACK: Brand: MEDLINE INDUSTRIES, INC.

## (undated) DEVICE — STERILE POLYISOPRENE POWDER-FREE SURGICAL GLOVES: Brand: PROTEXIS

## (undated) DEVICE — SYSTEM INTRO 9.5FR L13CM STD WHT CAP HEMSTAT SPLITTABLE

## (undated) DEVICE — SYR 10ML LUER LOK 1/5ML GRAD --

## (undated) DEVICE — KIT ACCS INTRO 4FR L10CM NDL 21GA L7CM GWIRE L40CM

## (undated) DEVICE — SYR 5ML 1/5 GRAD LL NSAF LF --

## (undated) DEVICE — SYR 3ML LL TIP 1/10ML GRAD --

## (undated) DEVICE — SURGICAL PROCEDURE PACK CATRCT CUST

## (undated) DEVICE — 3M™ TEGADERM™ TRANSPARENT FILM DRESSING FRAME STYLE, 1624W, 2-3/8 IN X 2-3/4 IN (6 CM X 7 CM), 100/CT 4CT/CASE: Brand: 3M™ TEGADERM™

## (undated) DEVICE — SUTURE V-LOC 180 SZ 2-0 L12IN ABSRB VLT GS-21 L37MM 1/2 CIR VLOCM0315

## (undated) DEVICE — SOLUTION IV 250ML 0.9% SOD CHL CLR INJ FLX BG CONT PRT CLSR

## (undated) DEVICE — PATCH REF EXT FOR CARTO 3 SYS (EA = 6 PACKS)

## (undated) DEVICE — SOLUTION IV STRL H2O 500 ML AQUALITE POUR BTL

## (undated) DEVICE — GAUZE,SPONGE,AVANT,4"X4",4PLY,STRL,10/TR: Brand: MEDLINE

## (undated) DEVICE — GUIDE COR SNUS L40CM DIA9FR 0.035IN STD CRV ADV UNIQUE

## (undated) DEVICE — TRAP,MUCUS SPECIMEN, 80CC: Brand: MEDLINE

## (undated) DEVICE — SUTURE ABSORBABLE WND CLOSURE 4-0 P-12 12 IN UD V-LOC

## (undated) DEVICE — HEART CATH-MRMC: Brand: MEDLINE INDUSTRIES, INC.

## (undated) DEVICE — 3M™ TEGADERM™ TRANSPARENT FILM DRESSING FRAME STYLE, 1626W, 4 IN X 4-3/4 IN (10 CM X 12 CM), 50/CT 4CT/CASE: Brand: 3M™ TEGADERM™

## (undated) DEVICE — GUIDE WIRE WITH HYDROPHILIC COATING: Brand: ACUITY WHISPER VIEW™

## (undated) DEVICE — PINNACLE INTRODUCER SHEATH: Brand: PINNACLE

## (undated) DEVICE — TRAY,IRRIGATION,PISTON SYRINGE,60ML,STRL: Brand: MEDLINE

## (undated) DEVICE — SNARE ENDOSCP M L240CM W27MM SHTH DIA2.4MM CHN 2.8MM OVL

## (undated) DEVICE — CATHETER ABLAT 8FR L115CM 1-6-2MM SPC TIP 3.5MM FJ CRV

## (undated) DEVICE — SUTURE PERMAHAND SZ 0 L30IN NONABSORBABLE BLK L26MM SH 1/2 K834H

## (undated) DEVICE — NON-REM POLYHESIVE PATIENT RETURN ELECTRODE: Brand: VALLEYLAB

## (undated) DEVICE — Z DISCONTINUED PER MEDLINE LINE GAS SAMPLING O2/CO2 LNG AD 13 FT NSL W/ TBNG FILTERLINE

## (undated) DEVICE — INTRODUCER SPLIT SHEATH PRELUDE SNAP 9FR X 13CM

## (undated) DEVICE — SYRINGE INSULIN 1ML LUERSLIP TIP W/O SFTY U100 BLISTER PK

## (undated) DEVICE — CONTAINER SPEC 20 ML LID NEUT BUFF FORMALIN 10 % POLYPR STS

## (undated) DEVICE — HIGH FLOW RATE EXTENSION SET, LUER LOCK ADAPTERS

## (undated) DEVICE — SOLIDIFIER MEDC 1200ML -- CONVERT TO 356117

## (undated) DEVICE — SHEATH GUID 11.5X8.5FR L71MM M CRV L22MM BIDIR STEER CARTO

## (undated) DEVICE — TOWEL 4 PLY TISS 19X30 SUE WHT

## (undated) DEVICE — CATH IV AUTOGRD BC PNK 20GA 25 -- INSYTE

## (undated) DEVICE — 3M™ IOBAN™ 2 ANTIMICROBIAL INCISE DRAPE 6650EZ: Brand: IOBAN™ 2

## (undated) DEVICE — THE MONARCH® "D" CARTRIDGE IS A SINGLE-USE POLYPROPYLENE CARTRIDGE FOR POSTERIOR CHAMBER IOL DELIVERY: Brand: MONARCH® III

## (undated) DEVICE — INTRODUCER SHTH L13CM OD7FR SH ORNG HUB SEAMLESS SAFSHTH

## (undated) DEVICE — NEEDLE HYPO 18GA L1.5IN PNK S STL HUB POLYPR SHLD REG BVL

## (undated) DEVICE — RADIFOCUS GLIDEWIRE: Brand: GLIDEWIRE

## (undated) DEVICE — TUBING PMP FOR CARTO SYS SMARTABLATE

## (undated) DEVICE — BASIC SINGLE BASIN BTC-LF: Brand: MEDLINE INDUSTRIES, INC.

## (undated) DEVICE — SET ADMIN 16ML TBNG L100IN 2 Y INJ SITE IV PIGGY BK DISP

## (undated) DEVICE — TRAP SPEC COLL POLYP POLYSTYR --

## (undated) DEVICE — MEDI-TRACE CADENCE ADULT, DEFIBRILLATION ELECTRODE -RTS  (10 PR/PK) - PHYSIO-CONTROL: Brand: MEDI-TRACE CADENCE

## (undated) DEVICE — STRAINER URIN CALC RNL MSH -- CONVERT TO ITEM 357634

## (undated) DEVICE — CABLE CATH L10FT RED PIN CONN 34-34 FOR THERMOCOOL